# Patient Record
Sex: FEMALE | Race: OTHER | HISPANIC OR LATINO | URBAN - METROPOLITAN AREA
[De-identification: names, ages, dates, MRNs, and addresses within clinical notes are randomized per-mention and may not be internally consistent; named-entity substitution may affect disease eponyms.]

---

## 2017-02-09 ENCOUNTER — EMERGENCY (EMERGENCY)
Facility: HOSPITAL | Age: 62
LOS: 1 days | Discharge: HOME | End: 2017-02-09
Attending: EMERGENCY MEDICINE
Payer: MEDICAID

## 2017-02-09 VITALS
HEIGHT: 62 IN | HEART RATE: 75 BPM | OXYGEN SATURATION: 100 % | TEMPERATURE: 99 F | RESPIRATION RATE: 18 BRPM | WEIGHT: 130.07 LBS | DIASTOLIC BLOOD PRESSURE: 63 MMHG | SYSTOLIC BLOOD PRESSURE: 110 MMHG

## 2017-02-09 VITALS
HEART RATE: 66 BPM | DIASTOLIC BLOOD PRESSURE: 61 MMHG | OXYGEN SATURATION: 99 % | SYSTOLIC BLOOD PRESSURE: 106 MMHG | RESPIRATION RATE: 18 BRPM | TEMPERATURE: 99 F

## 2017-02-09 DIAGNOSIS — Z98.89 OTHER SPECIFIED POSTPROCEDURAL STATES: Chronic | ICD-10-CM
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
## 2017-02-09 DIAGNOSIS — S63.105A UNSPECIFIED DISLOCATION OF LEFT THUMB, INITIAL ENCOUNTER: ICD-10-CM
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
## 2017-02-09 DIAGNOSIS — Z93.1 GASTROSTOMY STATUS: Chronic | ICD-10-CM

## 2017-02-09 PROCEDURE — 99284 EMERGENCY DEPT VISIT MOD MDM: CPT

## 2017-02-09 PROCEDURE — 73130 X-RAY EXAM OF HAND: CPT | Mod: 26,LT

## 2017-02-09 PROCEDURE — 73130 X-RAY EXAM OF HAND: CPT

## 2017-02-09 PROCEDURE — 99283 EMERGENCY DEPT VISIT LOW MDM: CPT | Mod: 25

## 2017-02-09 NOTE — ED PROVIDER NOTE - NS ED MD SCRIBE ATTENDING SCRIBE SECTIONS
REVIEW OF SYSTEMS/DISPOSITION/PHYSICAL EXAM/HIV/HISTORY OF PRESENT ILLNESS/VITAL SIGNS( Pullset)/PAST MEDICAL/SURGICAL/SOCIAL HISTORY

## 2017-02-09 NOTE — ED PROVIDER NOTE - OBJECTIVE STATEMENT
60 y/o female presents to the ED c/o the ED for L thumb pain. Pt sent from nursing home, pt is bed bound, intubated, with feeding tube. ROS limited given pt's condition.

## 2017-02-09 NOTE — ED ADULT NURSE NOTE - OBJECTIVE STATEMENT
Patient came to the ED BIBA from the nursing home for left thumb fracture as per EMS. Patient left thumb has splint that was placed by RN in the nursing home. Awaiting for x-ray as ordered by Dr. Vanessa.

## 2017-02-09 NOTE — ED ADULT NURSE REASSESSMENT NOTE - NS ED NURSE REASSESS COMMENT FT1
PATIENT AWAKE, NON VERBAL, TRACHEOSTOMY IN PLACE, PATENT , ATTACHED TO O2. NO SOB NOTED, PATENT AIRWAY, LEFT THUMB  WITH SWELLING, NO REDNESS NOTED.

## 2017-02-09 NOTE — ED PROVIDER NOTE - ENMT, MLM
intubated. Airway patent, Nasal mucosa clear. Mouth with normal mucosa. Throat has no vesicles, no oropharyngeal exudates and uvula is midline.

## 2018-02-18 ENCOUNTER — INPATIENT (INPATIENT)
Facility: HOSPITAL | Age: 63
LOS: 3 days | Discharge: NURSING, REHAB OR LONG-TERM CARE | DRG: 394 | End: 2018-02-22
Attending: INTERNAL MEDICINE | Admitting: INTERNAL MEDICINE
Payer: MEDICAID

## 2018-02-18 VITALS
SYSTOLIC BLOOD PRESSURE: 135 MMHG | WEIGHT: 119.93 LBS | OXYGEN SATURATION: 100 % | HEIGHT: 62 IN | TEMPERATURE: 99 F | HEART RATE: 58 BPM | RESPIRATION RATE: 18 BRPM | DIASTOLIC BLOOD PRESSURE: 76 MMHG

## 2018-02-18 DIAGNOSIS — Z93.1 GASTROSTOMY STATUS: Chronic | ICD-10-CM

## 2018-02-18 DIAGNOSIS — Z98.89 OTHER SPECIFIED POSTPROCEDURAL STATES: Chronic | ICD-10-CM

## 2018-02-18 PROCEDURE — 71045 X-RAY EXAM CHEST 1 VIEW: CPT | Mod: 26

## 2018-02-18 RX ORDER — SODIUM CHLORIDE 9 MG/ML
1000 INJECTION INTRAMUSCULAR; INTRAVENOUS; SUBCUTANEOUS ONCE
Qty: 0 | Refills: 0 | Status: COMPLETED | OUTPATIENT
Start: 2018-02-18 | End: 2018-02-18

## 2018-02-18 RX ORDER — SODIUM CHLORIDE 9 MG/ML
1000 INJECTION INTRAMUSCULAR; INTRAVENOUS; SUBCUTANEOUS
Qty: 0 | Refills: 0 | Status: DISCONTINUED | OUTPATIENT
Start: 2018-02-18 | End: 2018-02-19

## 2018-02-18 RX ORDER — SODIUM CHLORIDE 9 MG/ML
3 INJECTION INTRAMUSCULAR; INTRAVENOUS; SUBCUTANEOUS EVERY 8 HOURS
Qty: 0 | Refills: 0 | Status: DISCONTINUED | OUTPATIENT
Start: 2018-02-18 | End: 2018-02-22

## 2018-02-18 RX ADMIN — SODIUM CHLORIDE 3000 MILLILITER(S): 9 INJECTION INTRAMUSCULAR; INTRAVENOUS; SUBCUTANEOUS at 23:45

## 2018-02-18 NOTE — ED PROVIDER NOTE - OBJECTIVE STATEMENT
63 yo F s/p trach transferred from [facility] for dislodged feeding tube and replacement. Pt's stoma is stenotic and unable to pass 16 Fr feeding tube after several attempts.

## 2018-02-18 NOTE — ED PROVIDER NOTE - MEDICAL DECISION MAKING DETAILS
MAR and Dr. Amor endorsed. Pt admitted for placement for feeding tube. I had a detailed discussion with the patient and/or guardian regarding the historical points, exam findings, and any diagnostic results supporting the admit diagnosis.

## 2018-02-18 NOTE — ED PROVIDER NOTE - PMH
Diabetes mellitus    Diabetic coma    Hypertension    No pertinent past medical history    Schizophrenia

## 2018-02-18 NOTE — ED ADULT NURSE NOTE - ED STAT RN HANDOFF DETAILS
pt.remained   stable. admitted  to  medicine  for feeding tube dysfunction unable to pass 16 Fr feeding tube after several attempts by . on humified air 28% via trach. no distress noted  at this time./

## 2018-02-19 DIAGNOSIS — Z43.1 ENCOUNTER FOR ATTENTION TO GASTROSTOMY: ICD-10-CM

## 2018-02-19 DIAGNOSIS — R56.9 UNSPECIFIED CONVULSIONS: ICD-10-CM

## 2018-02-19 DIAGNOSIS — F41.9 ANXIETY DISORDER, UNSPECIFIED: ICD-10-CM

## 2018-02-19 DIAGNOSIS — I82.409 ACUTE EMBOLISM AND THROMBOSIS OF UNSPECIFIED DEEP VEINS OF UNSPECIFIED LOWER EXTREMITY: ICD-10-CM

## 2018-02-19 DIAGNOSIS — Z29.9 ENCOUNTER FOR PROPHYLACTIC MEASURES, UNSPECIFIED: ICD-10-CM

## 2018-02-19 DIAGNOSIS — F31.9 BIPOLAR DISORDER, UNSPECIFIED: ICD-10-CM

## 2018-02-19 DIAGNOSIS — E11.9 TYPE 2 DIABETES MELLITUS WITHOUT COMPLICATIONS: ICD-10-CM

## 2018-02-19 DIAGNOSIS — J45.909 UNSPECIFIED ASTHMA, UNCOMPLICATED: ICD-10-CM

## 2018-02-19 DIAGNOSIS — T85.598A OTHER MECHANICAL COMPLICATION OF OTHER GASTROINTESTINAL PROSTHETIC DEVICES, IMPLANTS AND GRAFTS, INITIAL ENCOUNTER: ICD-10-CM

## 2018-02-19 DIAGNOSIS — I10 ESSENTIAL (PRIMARY) HYPERTENSION: ICD-10-CM

## 2018-02-19 LAB
ALBUMIN SERPL ELPH-MCNC: 3.4 G/DL — LOW (ref 3.5–5)
ALP SERPL-CCNC: 86 U/L — SIGNIFICANT CHANGE UP (ref 40–120)
ALT FLD-CCNC: 45 U/L DA — SIGNIFICANT CHANGE UP (ref 10–60)
ANION GAP SERPL CALC-SCNC: 5 MMOL/L — SIGNIFICANT CHANGE UP (ref 5–17)
ANION GAP SERPL CALC-SCNC: 7 MMOL/L — SIGNIFICANT CHANGE UP (ref 5–17)
APTT BLD: 42.2 SEC — HIGH (ref 27.5–37.4)
AST SERPL-CCNC: 26 U/L — SIGNIFICANT CHANGE UP (ref 10–40)
BASOPHILS # BLD AUTO: 0.1 K/UL — SIGNIFICANT CHANGE UP (ref 0–0.2)
BASOPHILS # BLD AUTO: 0.1 K/UL — SIGNIFICANT CHANGE UP (ref 0–0.2)
BASOPHILS NFR BLD AUTO: 0.7 % — SIGNIFICANT CHANGE UP (ref 0–2)
BASOPHILS NFR BLD AUTO: 0.7 % — SIGNIFICANT CHANGE UP (ref 0–2)
BILIRUB SERPL-MCNC: 0.8 MG/DL — SIGNIFICANT CHANGE UP (ref 0.2–1.2)
BUN SERPL-MCNC: 21 MG/DL — HIGH (ref 7–18)
BUN SERPL-MCNC: 24 MG/DL — HIGH (ref 7–18)
CALCIUM SERPL-MCNC: 9.6 MG/DL — SIGNIFICANT CHANGE UP (ref 8.4–10.5)
CALCIUM SERPL-MCNC: 9.7 MG/DL — SIGNIFICANT CHANGE UP (ref 8.4–10.5)
CHLORIDE SERPL-SCNC: 105 MMOL/L — SIGNIFICANT CHANGE UP (ref 96–108)
CHLORIDE SERPL-SCNC: 108 MMOL/L — SIGNIFICANT CHANGE UP (ref 96–108)
CHOLEST SERPL-MCNC: 136 MG/DL — SIGNIFICANT CHANGE UP (ref 10–199)
CO2 SERPL-SCNC: 26 MMOL/L — SIGNIFICANT CHANGE UP (ref 22–31)
CO2 SERPL-SCNC: 31 MMOL/L — SIGNIFICANT CHANGE UP (ref 22–31)
CREAT SERPL-MCNC: 0.71 MG/DL — SIGNIFICANT CHANGE UP (ref 0.5–1.3)
CREAT SERPL-MCNC: 0.73 MG/DL — SIGNIFICANT CHANGE UP (ref 0.5–1.3)
EOSINOPHIL # BLD AUTO: 0.1 K/UL — SIGNIFICANT CHANGE UP (ref 0–0.5)
EOSINOPHIL # BLD AUTO: 0.1 K/UL — SIGNIFICANT CHANGE UP (ref 0–0.5)
EOSINOPHIL NFR BLD AUTO: 0.7 % — SIGNIFICANT CHANGE UP (ref 0–6)
EOSINOPHIL NFR BLD AUTO: 0.8 % — SIGNIFICANT CHANGE UP (ref 0–6)
FOLATE SERPL-MCNC: >20 NG/ML — SIGNIFICANT CHANGE UP (ref 4.8–24.2)
GLUCOSE BLDC GLUCOMTR-MCNC: 122 MG/DL — HIGH (ref 70–99)
GLUCOSE BLDC GLUCOMTR-MCNC: 135 MG/DL — HIGH (ref 70–99)
GLUCOSE BLDC GLUCOMTR-MCNC: 136 MG/DL — HIGH (ref 70–99)
GLUCOSE BLDC GLUCOMTR-MCNC: 150 MG/DL — HIGH (ref 70–99)
GLUCOSE BLDC GLUCOMTR-MCNC: 157 MG/DL — HIGH (ref 70–99)
GLUCOSE BLDC GLUCOMTR-MCNC: 171 MG/DL — HIGH (ref 70–99)
GLUCOSE SERPL-MCNC: 136 MG/DL — HIGH (ref 70–99)
GLUCOSE SERPL-MCNC: 152 MG/DL — HIGH (ref 70–99)
HBA1C BLD-MCNC: 9.1 % — HIGH (ref 4–5.6)
HCT VFR BLD CALC: 43.6 % — SIGNIFICANT CHANGE UP (ref 34.5–45)
HCT VFR BLD CALC: 45.2 % — HIGH (ref 34.5–45)
HDLC SERPL-MCNC: 46 MG/DL — SIGNIFICANT CHANGE UP (ref 40–125)
HGB BLD-MCNC: 13.9 G/DL — SIGNIFICANT CHANGE UP (ref 11.5–15.5)
HGB BLD-MCNC: 13.9 G/DL — SIGNIFICANT CHANGE UP (ref 11.5–15.5)
INR BLD: 1.02 RATIO — SIGNIFICANT CHANGE UP (ref 0.88–1.16)
INR BLD: 1.02 RATIO — SIGNIFICANT CHANGE UP (ref 0.88–1.16)
LACTATE SERPL-SCNC: 2 MMOL/L — SIGNIFICANT CHANGE UP (ref 0.7–2)
LIPID PNL WITH DIRECT LDL SERPL: 63 MG/DL — SIGNIFICANT CHANGE UP
LYMPHOCYTES # BLD AUTO: 3.1 K/UL — SIGNIFICANT CHANGE UP (ref 1–3.3)
LYMPHOCYTES # BLD AUTO: 3.1 K/UL — SIGNIFICANT CHANGE UP (ref 1–3.3)
LYMPHOCYTES # BLD AUTO: 30.4 % — SIGNIFICANT CHANGE UP (ref 13–44)
LYMPHOCYTES # BLD AUTO: 33.4 % — SIGNIFICANT CHANGE UP (ref 13–44)
MAGNESIUM SERPL-MCNC: 2 MG/DL — SIGNIFICANT CHANGE UP (ref 1.6–2.6)
MCHC RBC-ENTMCNC: 25.2 PG — LOW (ref 27–34)
MCHC RBC-ENTMCNC: 25.6 PG — LOW (ref 27–34)
MCHC RBC-ENTMCNC: 30.8 GM/DL — LOW (ref 32–36)
MCHC RBC-ENTMCNC: 31.9 GM/DL — LOW (ref 32–36)
MCV RBC AUTO: 80.3 FL — SIGNIFICANT CHANGE UP (ref 80–100)
MCV RBC AUTO: 81.7 FL — SIGNIFICANT CHANGE UP (ref 80–100)
MONOCYTES # BLD AUTO: 0.5 K/UL — SIGNIFICANT CHANGE UP (ref 0–0.9)
MONOCYTES # BLD AUTO: 0.6 K/UL — SIGNIFICANT CHANGE UP (ref 0–0.9)
MONOCYTES NFR BLD AUTO: 5.8 % — SIGNIFICANT CHANGE UP (ref 2–14)
MONOCYTES NFR BLD AUTO: 6.2 % — SIGNIFICANT CHANGE UP (ref 2–14)
NEUTROPHILS # BLD AUTO: 5.4 K/UL — SIGNIFICANT CHANGE UP (ref 1.8–7.4)
NEUTROPHILS # BLD AUTO: 6.4 K/UL — SIGNIFICANT CHANGE UP (ref 1.8–7.4)
NEUTROPHILS NFR BLD AUTO: 59.4 % — SIGNIFICANT CHANGE UP (ref 43–77)
NEUTROPHILS NFR BLD AUTO: 61.8 % — SIGNIFICANT CHANGE UP (ref 43–77)
PHOSPHATE SERPL-MCNC: 3.8 MG/DL — SIGNIFICANT CHANGE UP (ref 2.5–4.5)
PLATELET # BLD AUTO: 144 K/UL — LOW (ref 150–400)
PLATELET # BLD AUTO: 163 K/UL — SIGNIFICANT CHANGE UP (ref 150–400)
POTASSIUM SERPL-MCNC: 4.1 MMOL/L — SIGNIFICANT CHANGE UP (ref 3.5–5.3)
POTASSIUM SERPL-MCNC: 4.6 MMOL/L — SIGNIFICANT CHANGE UP (ref 3.5–5.3)
POTASSIUM SERPL-SCNC: 4.1 MMOL/L — SIGNIFICANT CHANGE UP (ref 3.5–5.3)
POTASSIUM SERPL-SCNC: 4.6 MMOL/L — SIGNIFICANT CHANGE UP (ref 3.5–5.3)
PROT SERPL-MCNC: 7.4 G/DL — SIGNIFICANT CHANGE UP (ref 6–8.3)
PROTHROM AB SERPL-ACNC: 11.1 SEC — SIGNIFICANT CHANGE UP (ref 9.8–12.7)
PROTHROM AB SERPL-ACNC: 11.1 SEC — SIGNIFICANT CHANGE UP (ref 9.8–12.7)
RBC # BLD: 5.43 M/UL — HIGH (ref 3.8–5.2)
RBC # BLD: 5.53 M/UL — HIGH (ref 3.8–5.2)
RBC # FLD: 13.4 % — SIGNIFICANT CHANGE UP (ref 10.3–14.5)
RBC # FLD: 13.7 % — SIGNIFICANT CHANGE UP (ref 10.3–14.5)
SODIUM SERPL-SCNC: 141 MMOL/L — SIGNIFICANT CHANGE UP (ref 135–145)
SODIUM SERPL-SCNC: 141 MMOL/L — SIGNIFICANT CHANGE UP (ref 135–145)
TOTAL CHOLESTEROL/HDL RATIO MEASUREMENT: 3 RATIO — LOW (ref 3.3–7.1)
TRIGL SERPL-MCNC: 135 MG/DL — SIGNIFICANT CHANGE UP (ref 10–149)
TSH SERPL-MCNC: 1.71 UU/ML — SIGNIFICANT CHANGE UP (ref 0.34–4.82)
VIT B12 SERPL-MCNC: 1513 PG/ML — HIGH (ref 232–1245)
WBC # BLD: 10.3 K/UL — SIGNIFICANT CHANGE UP (ref 3.8–10.5)
WBC # BLD: 9.2 K/UL — SIGNIFICANT CHANGE UP (ref 3.8–10.5)
WBC # FLD AUTO: 10.3 K/UL — SIGNIFICANT CHANGE UP (ref 3.8–10.5)
WBC # FLD AUTO: 9.2 K/UL — SIGNIFICANT CHANGE UP (ref 3.8–10.5)

## 2018-02-19 PROCEDURE — 93010 ELECTROCARDIOGRAM REPORT: CPT

## 2018-02-19 PROCEDURE — 99285 EMERGENCY DEPT VISIT HI MDM: CPT | Mod: 25

## 2018-02-19 PROCEDURE — 99255 IP/OBS CONSLTJ NEW/EST HI 80: CPT

## 2018-02-19 RX ORDER — INSULIN GLARGINE 100 [IU]/ML
20 INJECTION, SOLUTION SUBCUTANEOUS AT BEDTIME
Qty: 0 | Refills: 0 | Status: DISCONTINUED | OUTPATIENT
Start: 2018-02-19 | End: 2018-02-22

## 2018-02-19 RX ORDER — SODIUM CHLORIDE 9 MG/ML
1000 INJECTION INTRAMUSCULAR; INTRAVENOUS; SUBCUTANEOUS
Qty: 0 | Refills: 0 | Status: DISCONTINUED | OUTPATIENT
Start: 2018-02-19 | End: 2018-02-21

## 2018-02-19 RX ORDER — ENOXAPARIN SODIUM 100 MG/ML
40 INJECTION SUBCUTANEOUS
Qty: 0 | Refills: 0 | Status: DISCONTINUED | OUTPATIENT
Start: 2018-02-19 | End: 2018-02-22

## 2018-02-19 RX ORDER — BENZTROPINE MESYLATE 1 MG
0.5 TABLET ORAL EVERY 12 HOURS
Qty: 0 | Refills: 0 | Status: COMPLETED | OUTPATIENT
Start: 2018-02-19 | End: 2018-02-21

## 2018-02-19 RX ORDER — SODIUM CHLORIDE 9 MG/ML
1000 INJECTION, SOLUTION INTRAVENOUS
Qty: 0 | Refills: 0 | Status: DISCONTINUED | OUTPATIENT
Start: 2018-02-19 | End: 2018-02-22

## 2018-02-19 RX ORDER — ALBUTEROL 90 UG/1
1 AEROSOL, METERED ORAL EVERY 4 HOURS
Qty: 0 | Refills: 0 | Status: DISCONTINUED | OUTPATIENT
Start: 2018-02-19 | End: 2018-02-22

## 2018-02-19 RX ORDER — INSULIN HUMAN 100 [IU]/ML
INJECTION, SOLUTION SUBCUTANEOUS EVERY 6 HOURS
Qty: 0 | Refills: 0 | Status: DISCONTINUED | OUTPATIENT
Start: 2018-02-19 | End: 2018-02-22

## 2018-02-19 RX ORDER — METOPROLOL TARTRATE 50 MG
2.5 TABLET ORAL EVERY 12 HOURS
Qty: 0 | Refills: 0 | Status: DISCONTINUED | OUTPATIENT
Start: 2018-02-19 | End: 2018-02-22

## 2018-02-19 RX ORDER — ALBUTEROL 90 UG/1
2 AEROSOL, METERED ORAL EVERY 6 HOURS
Qty: 0 | Refills: 0 | Status: DISCONTINUED | OUTPATIENT
Start: 2018-02-19 | End: 2018-02-19

## 2018-02-19 RX ORDER — FAMOTIDINE 10 MG/ML
20 INJECTION INTRAVENOUS ONCE
Qty: 0 | Refills: 0 | Status: COMPLETED | OUTPATIENT
Start: 2018-02-19 | End: 2018-02-19

## 2018-02-19 RX ORDER — HALOPERIDOL DECANOATE 100 MG/ML
0.5 INJECTION INTRAMUSCULAR
Qty: 0 | Refills: 0 | Status: DISCONTINUED | OUTPATIENT
Start: 2018-02-19 | End: 2018-02-19

## 2018-02-19 RX ORDER — IPRATROPIUM/ALBUTEROL SULFATE 18-103MCG
3 AEROSOL WITH ADAPTER (GRAM) INHALATION EVERY 6 HOURS
Qty: 0 | Refills: 0 | Status: DISCONTINUED | OUTPATIENT
Start: 2018-02-19 | End: 2018-02-22

## 2018-02-19 RX ORDER — LEVETIRACETAM 250 MG/1
10 TABLET, FILM COATED ORAL
Qty: 0 | Refills: 0 | COMMUNITY

## 2018-02-19 RX ORDER — TIOTROPIUM BROMIDE 18 UG/1
1 CAPSULE ORAL; RESPIRATORY (INHALATION) DAILY
Qty: 0 | Refills: 0 | Status: DISCONTINUED | OUTPATIENT
Start: 2018-02-19 | End: 2018-02-22

## 2018-02-19 RX ORDER — DIPHENHYDRAMINE HCL 50 MG
25 CAPSULE ORAL ONCE
Qty: 0 | Refills: 0 | Status: COMPLETED | OUTPATIENT
Start: 2018-02-19 | End: 2018-02-19

## 2018-02-19 RX ORDER — BACITRACIN ZINC 500 UNIT/G
1 OINTMENT IN PACKET (EA) TOPICAL DAILY
Qty: 0 | Refills: 0 | Status: DISCONTINUED | OUTPATIENT
Start: 2018-02-19 | End: 2018-02-22

## 2018-02-19 RX ORDER — LEVETIRACETAM 250 MG/1
1000 TABLET, FILM COATED ORAL EVERY 12 HOURS
Qty: 0 | Refills: 0 | Status: DISCONTINUED | OUTPATIENT
Start: 2018-02-19 | End: 2018-02-22

## 2018-02-19 RX ADMIN — ALBUTEROL 2 PUFF(S): 90 AEROSOL, METERED ORAL at 06:36

## 2018-02-19 RX ADMIN — Medication 25 MILLIGRAM(S): at 09:03

## 2018-02-19 RX ADMIN — SODIUM CHLORIDE 3 MILLILITER(S): 9 INJECTION INTRAMUSCULAR; INTRAVENOUS; SUBCUTANEOUS at 06:13

## 2018-02-19 RX ADMIN — Medication 0.5 MILLIGRAM(S): at 06:11

## 2018-02-19 RX ADMIN — HALOPERIDOL DECANOATE 0.5 MILLIGRAM(S): 100 INJECTION INTRAMUSCULAR at 06:13

## 2018-02-19 RX ADMIN — LEVETIRACETAM 400 MILLIGRAM(S): 250 TABLET, FILM COATED ORAL at 06:11

## 2018-02-19 RX ADMIN — Medication 1 APPLICATION(S): at 11:34

## 2018-02-19 RX ADMIN — TIOTROPIUM BROMIDE 1 CAPSULE(S): 18 CAPSULE ORAL; RESPIRATORY (INHALATION) at 12:25

## 2018-02-19 RX ADMIN — SODIUM CHLORIDE 3 MILLILITER(S): 9 INJECTION INTRAMUSCULAR; INTRAVENOUS; SUBCUTANEOUS at 21:36

## 2018-02-19 RX ADMIN — ENOXAPARIN SODIUM 40 MILLIGRAM(S): 100 INJECTION SUBCUTANEOUS at 06:13

## 2018-02-19 RX ADMIN — INSULIN GLARGINE 20 UNIT(S): 100 INJECTION, SOLUTION SUBCUTANEOUS at 21:34

## 2018-02-19 RX ADMIN — Medication 2.5 MILLIGRAM(S): at 06:12

## 2018-02-19 RX ADMIN — Medication 2.5 MILLIGRAM(S): at 18:28

## 2018-02-19 RX ADMIN — FAMOTIDINE 20 MILLIGRAM(S): 10 INJECTION INTRAVENOUS at 11:32

## 2018-02-19 RX ADMIN — ENOXAPARIN SODIUM 40 MILLIGRAM(S): 100 INJECTION SUBCUTANEOUS at 17:40

## 2018-02-19 RX ADMIN — LEVETIRACETAM 400 MILLIGRAM(S): 250 TABLET, FILM COATED ORAL at 18:28

## 2018-02-19 RX ADMIN — SODIUM CHLORIDE 60 MILLILITER(S): 9 INJECTION, SOLUTION INTRAVENOUS at 11:53

## 2018-02-19 RX ADMIN — SODIUM CHLORIDE 150 MILLILITER(S): 9 INJECTION INTRAMUSCULAR; INTRAVENOUS; SUBCUTANEOUS at 00:46

## 2018-02-19 RX ADMIN — Medication 3 MILLILITER(S): at 20:36

## 2018-02-19 RX ADMIN — ALBUTEROL 2 PUFF(S): 90 AEROSOL, METERED ORAL at 11:34

## 2018-02-19 RX ADMIN — SODIUM CHLORIDE 60 MILLILITER(S): 9 INJECTION, SOLUTION INTRAVENOUS at 21:35

## 2018-02-19 RX ADMIN — Medication 0.5 MILLIGRAM(S): at 11:52

## 2018-02-19 RX ADMIN — Medication 0.5 MILLIGRAM(S): at 15:24

## 2018-02-19 RX ADMIN — Medication 0.5 MILLIGRAM(S): at 21:36

## 2018-02-19 RX ADMIN — Medication 0.5 MILLIGRAM(S): at 18:28

## 2018-02-19 RX ADMIN — SODIUM CHLORIDE 3 MILLILITER(S): 9 INJECTION INTRAMUSCULAR; INTRAVENOUS; SUBCUTANEOUS at 16:18

## 2018-02-19 RX ADMIN — SODIUM CHLORIDE 75 MILLILITER(S): 9 INJECTION INTRAMUSCULAR; INTRAVENOUS; SUBCUTANEOUS at 01:35

## 2018-02-19 RX ADMIN — Medication 2 MILLIGRAM(S): at 09:04

## 2018-02-19 NOTE — CONSULT NOTE ADULT - RS GEN PE MLT RESP DETAILS PC
good air movement/respirations non-labored/no wheezes/clear to auscultation bilaterally/airway patent/breath sounds equal

## 2018-02-19 NOTE — H&P ADULT - PROBLEM SELECTOR PLAN 7
C/w bronchodilator PRN  Not in exacerbation C/w bronchodilator PRN  Not in exacerbation  On trach 40% humidified O2

## 2018-02-19 NOTE — H&P ADULT - RS GEN PE MLT RESP DETAILS PC
breath sounds equal/clear to auscultation bilaterally/respirations non-labored/good air movement/airway patent/no chest wall tenderness/no rhonchi/no wheezes/no subcutaneous emphysema/no rales/no intercostal retractions

## 2018-02-19 NOTE — H&P ADULT - PROBLEM SELECTOR PLAN 9
Imporve VTE score is 5 ( previous DVT, immobilization, age) c/w lovenox  No need for GI ppx Improve VTE score is 5 ( previous DVT, immobilization, age) c/w Lovenox  No need for GI ppx

## 2018-02-19 NOTE — CONSULT NOTE ADULT - ASSESSMENT
1) Recurrent seizure? decreased threshold or non-absorption from clogged PEG
1. Dysphagia  2. Dislodges Peg tube (Peg site closed)     Suggestions:    1. Patient needs PEG tube replacement by endoscopy  2. Consider NGT feedingmeawhile  3. Aspiration precaution  4. DVT prophylaxis

## 2018-02-19 NOTE — H&P ADULT - ATTENDING COMMENTS
61 Y/O F,. non-verbal, bed bound, Trach, PEG tube, from NH, PMhx of HTN, asthma, convulsion disorder, bipolar, DVT legs, DM, sent from NH due to dislodgement of PEG tube and for PEG tube insertion. Unable to get any history from patient, all source of info is NH papers. No record of fever, SOB there. Patient was started on Tamiflu 75 mg daily and supposed to continue upto 2/20/18. In the Ed initial vitals, labs stable, got 1 L bolus, admitted for placement of PEG tube.     pt seen in bed, vitals stable, physical exam reveals tarditive diskinesia, trach colar with oxygen via venti mask, lungs cta b/l, heart s1s2, abd soft nd nt bs+, ext no edema. labs and diagnostic test result reviewed.    assessment  --  dislodged peg, h/o HTN, asthma, convulsion disorder, bipolar, DVT legs, DM,     plan  --  admit to med, cont preadmit home meds, gi and dvt profilaxis,  cbc, bmp, mg, phos, lipids, tsh, bld cx, ua, ucx,      gi cons for peg replacement  neuro cons  psych cons for med mgmt

## 2018-02-19 NOTE — H&P ADULT - PROBLEM SELECTOR PLAN 8
Patient is on lovenox 40 BID for DVT , c/w same dose Patient is on Lovenox 40 BID for DVT , c/w same dose

## 2018-02-19 NOTE — CONSULT NOTE ADULT - ATTENDING COMMENTS
62-year-old woman who has contracted both arms and legs, trach and PEG tube brought in to ED with symptoms of clogged PEG tube. I was wondering if her anti-seizure medicine may have not been well absorb by malfunctioning PEG. So, for the time being use IV Keppra 1000 mg BID as per dose. EEG can be ordered to make sure she does not have subclinical seizures.

## 2018-02-19 NOTE — CONSULT NOTE ADULT - SUBJECTIVE AND OBJECTIVE BOX
History of Present Illness:    HPI:  63 Y/O F,. non-verbal, bed bound, Trach, PEG tube, from NH, PMhx of HTN, asthma, convulsion disorder, bipolar, DVT legs, DM, sent from NH due to dislodgement of PEG tube and for PEG tube insertion. Unable to get any history from patient, all source of info is NH papers. No record of fever, SOB there. Patient was started on Tamiflu 75 mg daily and supposed to continue upto 2/20/18. In the Ed initial vitals, labs stable, got 1 L bolus, admitted for placement of PEG tube.     Later on the floor: Pt is lethargic, non-verbal. As per the chart and requesting resident physician's note: pt has h/o seizure disorder and has been admitted with clogging of her PEG tube. She exhibited mouth automatism and TD. She was given ativan and while I saw her on the floor she was sleeping. no facial/mouth automatism or jerking anywhere on her body.    Allergies    angiotensin converting enzyme inhibitors (Unknown)    Intolerances    PAST MEDICAL & SURGICAL HISTORY:  No pertinent past medical history  Hypertension  Schizophrenia  Diabetic coma  Diabetes mellitus  No significant past surgical history  S/P percutaneous endoscopic gastrostomy (PEG) tube placement  H/O tracheostomy    Social History: [ ] Tobacco use, [ ] Alcohol use.  none    MEDICATIONS  (STANDING):  ALBUTerol    90 MICROgram(s) HFA Inhaler 2 Puff(s) Inhalation every 6 hours  BACItracin   Ointment 1 Application(s) Topical daily  benztropine Injectable 0.5 milliGRAM(s) IV Push every 12 hours  dextrose 5% + sodium chloride 0.9%. 1000 milliLiter(s) (60 mL/Hr) IV Continuous <Continuous>  enoxaparin Injectable 40 milliGRAM(s) SubCutaneous two times a day  insulin glargine Injectable (LANTUS) 20 Unit(s) SubCutaneous at bedtime  insulin regular  human corrective regimen sliding scale   SubCutaneous every 6 hours  levETIRAcetam  IVPB 1000 milliGRAM(s) IV Intermittent every 12 hours  LORazepam   Injectable 0.5 milliGRAM(s) IV Push every 8 hours  metoprolol    tartrate Injectable 2.5 milliGRAM(s) IV Push every 12 hours  sodium chloride 0.9% lock flush 3 milliLiter(s) IV Push every 8 hours  sodium chloride 0.9%. 1000 milliLiter(s) (75 mL/Hr) IV Continuous <Continuous>  tiotropium 18 MICROgram(s) Capsule 1 Capsule(s) Inhalation daily      Family:  FAMILY HISTORY:    Review of Systems:  Pt is non-verbal-unable to obtain.    Vital Signs:    T(C): 37.2 (02-19-18 @ 14:41), Max: 37.3 (02-18-18 @ 21:44)  HR: 63 (02-19-18 @ 14:41) (58 - 77)  BP: 126/82 (02-19-18 @ 14:41) (116/66 - 147/99)  RR: 20 (02-19-18 @ 14:41) (18 - 20)  SpO2: 100% (02-19-18 @ 14:41) (99% - 100%)    Physical Exam:  General:  General Appearance - Well groomed, Not sickly   Build and nutrition - Well nourished and Well developed      Head and Neck:  Head - normocephalic, atraumatic with no lesions or palpable masses    Chest and Lung exam:  Quiet, even and easy respiratory effort with no use of accessory muscles and on ausculation, normal breath sounds, no adventitious sounds and normal vocal resonance    Cardiovascular:  Auscultation: Normal heart sounds  Murmurs & Other heart sounds: Auscultation of the heart reveals- no murmurs  Carotid arteris: No Carotid bruits    Abdomen:  Palpation/Percussion: Non Tender, No Rebound tenderness, No hepatosplenomegaly, and No palpable abdominal masses    Peripheral Vascular:  Lower extremity: Inspection - Bilateral - No varicose veins  Palpation: Tenderness - bilateral - Non tender  Dorsalis pedis pulse - bilateral - normal  Edema - bilateral - no edema    Neurologic Exam:  Mental Status - sleeping, non-verbal  Cranial Nerves: Limited examination  Pupils: reacting  Face looks symmetrical.     other cranial nerves are difficult to examine.    Motor:  Bulk and Contour: Normal  Tone: increased- contracted  Strength:                                                              General Assessment of Reflexes: Right Wrist - 2+ ;  Left Wrist - 2+ ; Right Elbow - 2+ ;  Left Elbow - 1 ;  Right Knee - 1 ;  Left Knee -1  ;   Right Ankle – 0 ; Left Ankle – 0  Plantar Reflexes(Babinski) (L4-S2) – Bilateral – Flexion  Sensory:

## 2018-02-19 NOTE — H&P ADULT - GASTROINTESTINAL DETAILS
no rebound tenderness/no masses palpable/no rigidity/no organomegaly/bowel sounds normal/no guarding/no bruit/soft/nontender/no distention

## 2018-02-19 NOTE — H&P ADULT - PROBLEM SELECTOR PLAN 5
reduce the lantus dose to 20 Units at bedtime ( home dose is 30 Units)  f/up HBa1c reduce the Lantus dose to 20 Units at bedtime ( home dose is 30 Units)  f/up HBa1c

## 2018-02-19 NOTE — CONSULT NOTE ADULT - SUBJECTIVE AND OBJECTIVE BOX
[  ] STAT REQUEST              [  ]ROUTINE REQUEST    Patient is a 62y old  Female who presents with a chief complaint of sent from NH due to dislodged PEG tube. GI consulted to evaluate.      HPI:  62 year old female from NH brought to hospital with dilodged PEG tube for reinsertion. No abdominal pain, nausea, vomiting, abdominal pain, hematemesis, hematochezia, melena, fever, chills, chest pain, SOB, diarrhea reported.          PAIN MANAGEMENT:  Pain Scale:                0 /10  Pain Location:      Prior Colonoscopy: Unknown    PAST MEDICAL HISTORY  DVT  Hypertension  Schizophrenia  Diabetic coma  Diabetes mellitus  Bipolar  Dysphagia      PAST SURGICAL HISTORY  S/P percutaneous endoscopic gastrostomy (PEG) tube placement  H/O tracheostomy      Allergies    angiotensin converting enzyme inhibitors (Unknown)          MEDICATIONS  (STANDING):  ALBUTerol    90 MICROgram(s) HFA Inhaler 2 Puff(s) Inhalation every 6 hours  BACItracin   Ointment 1 Application(s) Topical daily  benztropine Injectable 0.5 milliGRAM(s) IV Push every 12 hours  dextrose 5% + sodium chloride 0.9%. 1000 milliLiter(s) (60 mL/Hr) IV Continuous <Continuous>  enoxaparin Injectable 40 milliGRAM(s) SubCutaneous two times a day  insulin glargine Injectable (LANTUS) 20 Unit(s) SubCutaneous at bedtime  insulin regular  human corrective regimen sliding scale   SubCutaneous every 6 hours  levETIRAcetam  IVPB 1000 milliGRAM(s) IV Intermittent every 12 hours  LORazepam   Injectable 0.5 milliGRAM(s) IV Push every 8 hours  metoprolol    tartrate Injectable 2.5 milliGRAM(s) IV Push every 12 hours  sodium chloride 0.9% lock flush 3 milliLiter(s) IV Push every 8 hours  sodium chloride 0.9%. 1000 milliLiter(s) (75 mL/Hr) IV Continuous <Continuous>  tiotropium 18 MICROgram(s) Capsule 1 Capsule(s) Inhalation daily    MEDICATIONS  (PRN):  artificial  tears Solution 1 Drop(s) Both EYES three times a day PRN Dry Eyes      SOCIAL HISTORY  Advanced Directives:       [ X ] Full Code       [  ] DNR  Marital Status:         [  ] M      [ XX ] S      [  ] D       [  ] W  Children:       [ X ] Yes      [  ] No  Occupation:        [  ] Employed       [ X ] Unemployed       [  ] Retired  Diet:       [  ] Regular       [ X ] PEG feeding          [  ] NG tube feeding  Drug Use:           [ X ] No          [  ] Yes  Alcohol:           [ X ] No             [  ] Yes (socially)         [  ] Yes (chronic)  Tobacco:           [  ] Yes           [ X ] No        FAMILY HISTORY  [  ] Heart Disease            [ X ] Diabetes(mother)             [  ] HTN             [  ] Colon Cancer             [  ] Stomach Cancer              [  ] Pancreatic Cancer      VITAL SIGNS   Vital Signs Last 24 Hrs  T(C): 37.3 (19 Feb 2018 10:00), Max: 37.3 (18 Feb 2018 21:44)  T(F): 99.1 (19 Feb 2018 10:00), Max: 99.2 (18 Feb 2018 21:44)  HR: 77 (19 Feb 2018 10:00) (58 - 77)  BP: 124/74 (19 Feb 2018 10:00) (116/66 - 147/99)   RR: 20 (19 Feb 2018 10:00) (18 - 20)  SpO2: 100% (19 Feb 2018 10:00) (99% - 100%)  Daily Height in cm: 157.48 (18 Feb 2018 21:44)             CBC Full  -  ( 19 Feb 2018 06:17 )  WBC Count : 9.2 K/uL  Hemoglobin : 13.9 g/dL  Hematocrit : 45.2 %  Platelet Count - Automated : 144 K/uL  Mean Cell Volume : 81.7 fl  Mean Cell Hemoglobin : 25.2 pg  Mean Cell Hemoglobin Concentration : 30.8 gm/dL  Auto Neutrophil # : 5.4 K/uL  Auto Lymphocyte # : 3.1 K/uL  Auto Monocyte # : 0.5 K/uL  Auto Eosinophil # : 0.1 K/uL  Auto Basophil # : 0.1 K/uL  Auto Neutrophil % : 59.4 %  Auto Lymphocyte % : 33.4 %  Auto Monocyte % : 5.8 %  Auto Eosinophil % : 0.7 %  Auto Basophil % : 0.7 %      02-19    141  |  108  |  21<H>  ----------------------------<  152<H>  4.6   |  26  |  0.73    Ca    9.7      19 Feb 2018 06:17  Phos  3.8     02-19  Mg     2.0     02-19    TPro  7.4  /  Alb  3.4<L>  /  TBili  0.8  /  DBili  x   /  AST  26  /  ALT  45  /  AlkPhos  86  02-19    PT/INR - ( 19 Feb 2018 06:17 )   PT: 11.1 sec;   INR: 1.02 ratio       PTT - ( 18 Feb 2018 23:56 )  PTT:42.2 sec    Urinalysis (07.23.16 @ 09:05)    Color: YELLOW    Urine Appearance: HAZY    Glucose: >1000    Bilirubin: NEGATIVE    Ketone - Urine: MODERATE    Specific Gravity: 1.024    Blood: TRACE    pH - Urine: 6.5    Protein, Urine: 30    Urobilinogen: NORMAL E.U.    Nitrite: NEGATIVE    Leukocyte Esterase Concentration: LARGE    Red Blood Cell - Urine: 3-5    White Blood Cell - Urine: >50    Mucus: MANY    White Blood Cell Clump: PRESENT    Bacteria: MANY        Ventricular Rate 69 BPM    Atrial Rate 69 BPM    P-R Interval 142 ms    QRS Duration 72 ms    Q-T Interval 408 ms    QTC Calculation(Bezet) 437 ms    P Axis 64 degrees    R Axis 1 degrees    T Axis 66 degrees    Diagnosis Line Normal sinus rhythm  Normal ECG      RADIOLOGY/IMAGING                EXAM:  CT ABD AND PELV W CON        PROCEDURE DATE:  Jul 23 2016     INTERPRETATION:  CLINICAL INFORMATION: 51-year-old female status post   tracheostomy and PEG. Abdominal pain.    COMPARISON: None.    PROCEDURE:   Helical CT of the abdomen and pelvis was performed with intravenous and   enteric contrast. 90 cc of Omnipaque 350 intravenous contrast were   administered, and 10 cc were discarded. Examination was performed with   the patient sedated without breath-holding.    FINDINGS:    LOWER CHEST: Bibasilar subsegmental atelectasis.    LIVER: Within normal limits.  BILE DUCTS: Normal caliber.  GALLBLADDER: Within normal limits.  SPLEEN: Within normal limits.  PANCREAS: Within normal limits.  ADRENALS: Within normal limits.  KIDNEYS/URETERS: Within normal limits.    BLADDER: Within normal limits.  REPRODUCTIVE ORGANS: Normal size uterus with submucosal leiomyoma.   Endometrial thickening to 2.3 cm. Gastrostomy tube in place.    BOWEL: No bowel obstruction. Appendix normal.  PERITONEUM:No ascites.  VESSELS:  Within normal limits.  RETROPERITONEUM: No lymphadenopathy.    ABDOMINAL WALL: Within normal limits.  BONES: Within normal limits.    IMPRESSION: Cause of abdominal pain not identified.  Endometrial thickening with differentialdiagnosis of polyp hyperplasia   or neoplasm.      EXAM:  XR CHEST PORTABLE IMMED 1V                            PROCEDURE DATE:  02/18/2018          INTERPRETATION:  Exam Date: 2/18/2018 11:36 PM    History: Shortness of breath    Technique: Single frontal portable view of the chest with comparisonto    7/28/2016    Findings:    Tracheostomy visualized. Elevation of the right hemidiaphragm with right   basilar subsegmental atelectasis versus pneumonia. Left lung is grossly   clear. The apices are unremarkable. Degenerative changes of the   visualized osseous structures.        Impression:    Elevation of the right hemidiaphragm with right basilar subsegmental   atelectasis versus pneumonia.

## 2018-02-20 DIAGNOSIS — G24.01 DRUG INDUCED SUBACUTE DYSKINESIA: ICD-10-CM

## 2018-02-20 DIAGNOSIS — K94.23 GASTROSTOMY MALFUNCTION: ICD-10-CM

## 2018-02-20 LAB
GLUCOSE BLDC GLUCOMTR-MCNC: 135 MG/DL — HIGH (ref 70–99)
GLUCOSE BLDC GLUCOMTR-MCNC: 138 MG/DL — HIGH (ref 70–99)
GLUCOSE BLDC GLUCOMTR-MCNC: 142 MG/DL — HIGH (ref 70–99)
GLUCOSE BLDC GLUCOMTR-MCNC: 146 MG/DL — HIGH (ref 70–99)
GLUCOSE BLDC GLUCOMTR-MCNC: 153 MG/DL — HIGH (ref 70–99)
GLUCOSE BLDC GLUCOMTR-MCNC: 158 MG/DL — HIGH (ref 70–99)

## 2018-02-20 RX ADMIN — ENOXAPARIN SODIUM 40 MILLIGRAM(S): 100 INJECTION SUBCUTANEOUS at 05:45

## 2018-02-20 RX ADMIN — Medication 1 APPLICATION(S): at 12:12

## 2018-02-20 RX ADMIN — Medication 2.5 MILLIGRAM(S): at 05:44

## 2018-02-20 RX ADMIN — INSULIN GLARGINE 20 UNIT(S): 100 INJECTION, SOLUTION SUBCUTANEOUS at 21:55

## 2018-02-20 RX ADMIN — LEVETIRACETAM 400 MILLIGRAM(S): 250 TABLET, FILM COATED ORAL at 17:22

## 2018-02-20 RX ADMIN — Medication 0.5 MILLIGRAM(S): at 17:21

## 2018-02-20 RX ADMIN — Medication 1 DROP(S): at 21:55

## 2018-02-20 RX ADMIN — SODIUM CHLORIDE 60 MILLILITER(S): 9 INJECTION, SOLUTION INTRAVENOUS at 13:11

## 2018-02-20 RX ADMIN — LEVETIRACETAM 400 MILLIGRAM(S): 250 TABLET, FILM COATED ORAL at 05:45

## 2018-02-20 RX ADMIN — Medication 3 MILLILITER(S): at 02:03

## 2018-02-20 RX ADMIN — Medication 0.5 MILLIGRAM(S): at 05:45

## 2018-02-20 RX ADMIN — INSULIN HUMAN 1: 100 INJECTION, SOLUTION SUBCUTANEOUS at 12:12

## 2018-02-20 RX ADMIN — Medication 0.5 MILLIGRAM(S): at 05:44

## 2018-02-20 RX ADMIN — INSULIN HUMAN 1: 100 INJECTION, SOLUTION SUBCUTANEOUS at 06:55

## 2018-02-20 RX ADMIN — ENOXAPARIN SODIUM 40 MILLIGRAM(S): 100 INJECTION SUBCUTANEOUS at 17:21

## 2018-02-20 RX ADMIN — Medication 3 MILLILITER(S): at 09:06

## 2018-02-20 RX ADMIN — SODIUM CHLORIDE 3 MILLILITER(S): 9 INJECTION INTRAMUSCULAR; INTRAVENOUS; SUBCUTANEOUS at 13:11

## 2018-02-20 RX ADMIN — SODIUM CHLORIDE 3 MILLILITER(S): 9 INJECTION INTRAMUSCULAR; INTRAVENOUS; SUBCUTANEOUS at 21:56

## 2018-02-20 RX ADMIN — Medication 3 MILLILITER(S): at 20:27

## 2018-02-20 RX ADMIN — Medication 0.5 MILLIGRAM(S): at 21:55

## 2018-02-20 RX ADMIN — Medication 0.5 MILLIGRAM(S): at 13:11

## 2018-02-20 RX ADMIN — SODIUM CHLORIDE 60 MILLILITER(S): 9 INJECTION, SOLUTION INTRAVENOUS at 05:45

## 2018-02-20 RX ADMIN — Medication 2.5 MILLIGRAM(S): at 17:21

## 2018-02-20 RX ADMIN — SODIUM CHLORIDE 3 MILLILITER(S): 9 INJECTION INTRAMUSCULAR; INTRAVENOUS; SUBCUTANEOUS at 05:52

## 2018-02-20 RX ADMIN — Medication 3 MILLILITER(S): at 14:00

## 2018-02-20 NOTE — PROGRESS NOTE ADULT - SUBJECTIVE AND OBJECTIVE BOX
HPI:  61 Y/O F,. non-verbal, bed bound, Trach, PEG tube, from NH, PMhx of HTN, asthma, diabetic coma, convulsion disorder, bipolar, DVT legs, DM, sent from NH due to dislodgement of PEG tube and for PEG tube insertion.     Interval history:  Patient has lip smacking and extrapyramidal facial movements. Patient was started on ativan prn. She is afebrile and normotensive.    ROS: Patient has no resp distress, abdomen distension, diarrhea, hematochezia, vomiting, fall, trauma.    Vital:  T(C): 37.1 (02-20-18 @ 05:20), Max: 37.2 (02-19-18 @ 14:41)  HR: 57 (02-20-18 @ 06:07) (57 - 66)  BP: 131/65 (02-20-18 @ 06:07) (95/69 - 131/65)  RR: 20 (02-20-18 @ 06:07) (20 - 22)  SpO2: 100% (02-20-18 @ 06:07) (99% - 100%)      REVIEW OF SYSTEMS: denies fever, chills, SOB, palpitations, chest pain, abdominal pain, nausea, vomitting, diarrhea, constipation, dizziness    MEDICATIONS  (STANDING):  ALBUTerol    90 MICROgram(s) HFA Inhaler 1 Puff(s) Inhalation every 4 hours  ALBUTerol/ipratropium for Nebulization 3 milliLiter(s) Nebulizer every 6 hours  BACItracin   Ointment 1 Application(s) Topical daily  benztropine Injectable 0.5 milliGRAM(s) IV Push every 12 hours  dextrose 5% + sodium chloride 0.9%. 1000 milliLiter(s) (60 mL/Hr) IV Continuous <Continuous>  enoxaparin Injectable 40 milliGRAM(s) SubCutaneous two times a day  insulin glargine Injectable (LANTUS) 20 Unit(s) SubCutaneous at bedtime  insulin regular  human corrective regimen sliding scale   SubCutaneous every 6 hours  levETIRAcetam  IVPB 1000 milliGRAM(s) IV Intermittent every 12 hours  LORazepam   Injectable 0.5 milliGRAM(s) IV Push every 8 hours  metoprolol    tartrate Injectable 2.5 milliGRAM(s) IV Push every 12 hours  sodium chloride 0.9% lock flush 3 milliLiter(s) IV Push every 8 hours  sodium chloride 0.9%. 1000 milliLiter(s) (75 mL/Hr) IV Continuous <Continuous>  tiotropium 18 MICROgram(s) Capsule 1 Capsule(s) Inhalation daily  tiotropium 18 MICROgram(s) Capsule 1 Capsule(s) Inhalation daily    MEDICATIONS  (PRN):  artificial  tears Solution 1 Drop(s) Both EYES three times a day PRN Dry Eyes      PHYSICAL EXAM:  GENERAL: Contracted, bedbound, non verbal and has tracheostomy.  HEAD:  Atraumatic, Normocephalic  EYES: EOMI, PERRLA, conjunctiva and sclera clear  ENMT: No tonsillar erythema, exudates, or enlargement; Moist mucous membranes, Good dentition, No lesions  NECK: Supple, No JVD, Normal thyroid  NERVOUS SYSTEM:  Alert & Oriented X0, Patient is contracted and not following commands  CHEST/LUNG: Clear to percussion bilaterally; No rales, rhonchi, wheezing, or rubs  HEART: Regular rate and rhythm; No murmurs, rubs, or gallops  ABDOMEN: Peg stoma healing with no erythema and discharge, BS +ve, no distension   EXTREMITIES:  2+ Peripheral Pulses, No clubbing, cyanosis, or edema  LYMPH: No lymphadenopathy noted  SKIN: No rashes or lesions  LABS:                        13.9   9.2   )-----------( 144      ( 19 Feb 2018 06:17 )             45.2     02-19    141  |  108  |  21<H>  ----------------------------<  152<H>  4.6   |  26  |  0.73    Ca    9.7      19 Feb 2018 06:17  Phos  3.8     02-19  Mg     2.0     02-19    TPro  7.4  /  Alb  3.4<L>  /  TBili  0.8  /  DBili  x   /  AST  26  /  ALT  45  /  AlkPhos  86  02-19    PT/INR - ( 19 Feb 2018 06:17 )   PT: 11.1 sec;   INR: 1.02 ratio         PTT - ( 18 Feb 2018 23:56 )  PTT:42.2 sec    CAPILLARY BLOOD GLUCOSE      POCT Blood Glucose.: 153 mg/dL (20 Feb 2018 06:48)  POCT Blood Glucose.: 146 mg/dL (20 Feb 2018 00:33)  POCT Blood Glucose.: 122 mg/dL (19 Feb 2018 21:17)  POCT Blood Glucose.: 150 mg/dL (19 Feb 2018 16:39)  POCT Blood Glucose.: 135 mg/dL (19 Feb 2018 11:42)

## 2018-02-21 LAB
ANION GAP SERPL CALC-SCNC: 9 MMOL/L — SIGNIFICANT CHANGE UP (ref 5–17)
BUN SERPL-MCNC: 9 MG/DL — SIGNIFICANT CHANGE UP (ref 7–18)
CALCIUM SERPL-MCNC: 8.7 MG/DL — SIGNIFICANT CHANGE UP (ref 8.4–10.5)
CHLORIDE SERPL-SCNC: 110 MMOL/L — HIGH (ref 96–108)
CO2 SERPL-SCNC: 23 MMOL/L — SIGNIFICANT CHANGE UP (ref 22–31)
CREAT SERPL-MCNC: 0.69 MG/DL — SIGNIFICANT CHANGE UP (ref 0.5–1.3)
GLUCOSE BLDC GLUCOMTR-MCNC: 132 MG/DL — HIGH (ref 70–99)
GLUCOSE BLDC GLUCOMTR-MCNC: 190 MG/DL — HIGH (ref 70–99)
GLUCOSE BLDC GLUCOMTR-MCNC: 93 MG/DL — SIGNIFICANT CHANGE UP (ref 70–99)
GLUCOSE SERPL-MCNC: 206 MG/DL — HIGH (ref 70–99)
HCT VFR BLD CALC: 39.2 % — SIGNIFICANT CHANGE UP (ref 34.5–45)
HGB BLD-MCNC: 11.9 G/DL — SIGNIFICANT CHANGE UP (ref 11.5–15.5)
INR BLD: 1.04 RATIO — SIGNIFICANT CHANGE UP (ref 0.88–1.16)
MAGNESIUM SERPL-MCNC: 1.7 MG/DL — SIGNIFICANT CHANGE UP (ref 1.6–2.6)
MCHC RBC-ENTMCNC: 24.7 PG — LOW (ref 27–34)
MCHC RBC-ENTMCNC: 30.3 GM/DL — LOW (ref 32–36)
MCV RBC AUTO: 81.5 FL — SIGNIFICANT CHANGE UP (ref 80–100)
PHOSPHATE SERPL-MCNC: 3.1 MG/DL — SIGNIFICANT CHANGE UP (ref 2.5–4.5)
PLATELET # BLD AUTO: 115 K/UL — LOW (ref 150–400)
POTASSIUM SERPL-MCNC: 4.3 MMOL/L — SIGNIFICANT CHANGE UP (ref 3.5–5.3)
POTASSIUM SERPL-SCNC: 4.3 MMOL/L — SIGNIFICANT CHANGE UP (ref 3.5–5.3)
PROTHROM AB SERPL-ACNC: 11.3 SEC — SIGNIFICANT CHANGE UP (ref 9.8–12.7)
RBC # BLD: 4.81 M/UL — SIGNIFICANT CHANGE UP (ref 3.8–5.2)
RBC # FLD: 13.8 % — SIGNIFICANT CHANGE UP (ref 10.3–14.5)
SODIUM SERPL-SCNC: 142 MMOL/L — SIGNIFICANT CHANGE UP (ref 135–145)
WBC # BLD: 6.5 K/UL — SIGNIFICANT CHANGE UP (ref 3.8–10.5)
WBC # FLD AUTO: 6.5 K/UL — SIGNIFICANT CHANGE UP (ref 3.8–10.5)

## 2018-02-21 RX ADMIN — LEVETIRACETAM 400 MILLIGRAM(S): 250 TABLET, FILM COATED ORAL at 05:06

## 2018-02-21 RX ADMIN — Medication 0.5 MILLIGRAM(S): at 21:16

## 2018-02-21 RX ADMIN — Medication 2.5 MILLIGRAM(S): at 17:23

## 2018-02-21 RX ADMIN — Medication 3 MILLILITER(S): at 02:57

## 2018-02-21 RX ADMIN — SODIUM CHLORIDE 3 MILLILITER(S): 9 INJECTION INTRAMUSCULAR; INTRAVENOUS; SUBCUTANEOUS at 21:16

## 2018-02-21 RX ADMIN — SODIUM CHLORIDE 3 MILLILITER(S): 9 INJECTION INTRAMUSCULAR; INTRAVENOUS; SUBCUTANEOUS at 13:29

## 2018-02-21 RX ADMIN — Medication 3 MILLILITER(S): at 14:03

## 2018-02-21 RX ADMIN — Medication 1 APPLICATION(S): at 12:36

## 2018-02-21 RX ADMIN — Medication 0.5 MILLIGRAM(S): at 05:07

## 2018-02-21 RX ADMIN — INSULIN GLARGINE 20 UNIT(S): 100 INJECTION, SOLUTION SUBCUTANEOUS at 21:16

## 2018-02-21 RX ADMIN — LEVETIRACETAM 400 MILLIGRAM(S): 250 TABLET, FILM COATED ORAL at 17:23

## 2018-02-21 RX ADMIN — Medication 0.5 MILLIGRAM(S): at 05:08

## 2018-02-21 RX ADMIN — Medication 3 MILLILITER(S): at 20:27

## 2018-02-21 RX ADMIN — ENOXAPARIN SODIUM 40 MILLIGRAM(S): 100 INJECTION SUBCUTANEOUS at 17:24

## 2018-02-21 RX ADMIN — SODIUM CHLORIDE 3 MILLILITER(S): 9 INJECTION INTRAMUSCULAR; INTRAVENOUS; SUBCUTANEOUS at 05:08

## 2018-02-21 RX ADMIN — Medication 0.5 MILLIGRAM(S): at 13:26

## 2018-02-21 RX ADMIN — ENOXAPARIN SODIUM 40 MILLIGRAM(S): 100 INJECTION SUBCUTANEOUS at 05:07

## 2018-02-21 RX ADMIN — Medication 2.5 MILLIGRAM(S): at 05:07

## 2018-02-21 RX ADMIN — INSULIN HUMAN 1: 100 INJECTION, SOLUTION SUBCUTANEOUS at 12:36

## 2018-02-21 RX ADMIN — Medication 3 MILLILITER(S): at 09:24

## 2018-02-21 NOTE — PROGRESS NOTE ADULT - SUBJECTIVE AND OBJECTIVE BOX
HPI:  61 Y/O F,. non-verbal, bed bound, Trach, PEG tube, from NH, PMhx of HTN, asthma, diabetic coma, convulsion disorder, bipolar, DVT legs, DM, sent from NH due to dislodgement of PEG tube and for PEG tube insertion.     Interval history:  Patient is afebrile and normotensive. The tongue movements improved as compare to yesterday    ROS: Patient has no resp distress, abdomen distension, diarrhea, hematochezia, vomiting, fall, trauma.    Vital Signs Last 24 Hrs  T(C): 37.3 (21 Feb 2018 05:08), Max: 37.3 (21 Feb 2018 05:08)  T(F): 99.1 (21 Feb 2018 05:08), Max: 99.1 (21 Feb 2018 05:08)  HR: 71 (21 Feb 2018 05:08) (60 - 71)  BP: 127/61 (21 Feb 2018 05:08) (127/61 - 172/100)  RR: 18 (21 Feb 2018 05:08) (18 - 20)  SpO2: 100% (21 Feb 2018 05:08) (100% - 100%)      REVIEW OF SYSTEMS: denies fever, chills, SOB, palpitations, chest pain, abdominal pain, nausea, vomitting, diarrhea, constipation, dizziness    MEDICATIONS  (STANDING):  ALBUTerol    90 MICROgram(s) HFA Inhaler 1 Puff(s) Inhalation every 4 hours  ALBUTerol/ipratropium for Nebulization 3 milliLiter(s) Nebulizer every 6 hours  BACItracin   Ointment 1 Application(s) Topical daily  benztropine Injectable 0.5 milliGRAM(s) IV Push every 12 hours  dextrose 5% + sodium chloride 0.9%. 1000 milliLiter(s) (60 mL/Hr) IV Continuous <Continuous>  enoxaparin Injectable 40 milliGRAM(s) SubCutaneous two times a day  insulin glargine Injectable (LANTUS) 20 Unit(s) SubCutaneous at bedtime  insulin regular  human corrective regimen sliding scale   SubCutaneous every 6 hours  levETIRAcetam  IVPB 1000 milliGRAM(s) IV Intermittent every 12 hours  LORazepam   Injectable 0.5 milliGRAM(s) IV Push every 8 hours  metoprolol    tartrate Injectable 2.5 milliGRAM(s) IV Push every 12 hours  sodium chloride 0.9% lock flush 3 milliLiter(s) IV Push every 8 hours  sodium chloride 0.9%. 1000 milliLiter(s) (75 mL/Hr) IV Continuous <Continuous>  tiotropium 18 MICROgram(s) Capsule 1 Capsule(s) Inhalation daily  tiotropium 18 MICROgram(s) Capsule 1 Capsule(s) Inhalation daily    MEDICATIONS  (PRN):  artificial  tears Solution 1 Drop(s) Both EYES three times a day PRN Dry Eyes      PHYSICAL EXAM:  GENERAL: Contracted, bedbound, non verbal and has tracheostomy.  HEAD:  Atraumatic, Normocephalic  EYES: EOMI, PERRLA, conjunctiva and sclera clear  ENMT: No tonsillar erythema, exudates, or enlargement; Moist mucous membranes, Good dentition, No lesions  NECK: Supple, No JVD, Normal thyroid  NERVOUS SYSTEM:  Alert & Oriented X0, Patient is contracted and not following commands  CHEST/LUNG: Clear to percussion bilaterally; No rales, rhonchi, wheezing, or rubs  HEART: Regular rate and rhythm; No murmurs, rubs, or gallops  ABDOMEN: Peg stoma healing with no erythema and discharge, BS +ve, no distension   EXTREMITIES:  2+ Peripheral Pulses, No clubbing, cyanosis, or edema  LYMPH: No lymphadenopathy noted  SKIN: No rashes or lesions  LABS:                                             11.9   6.5   )-----------( 115      ( 21 Feb 2018 07:15 )             39.2   02-21    142  |  110<H>  |  9   ----------------------------<  206<H>  4.3   |  23  |  0.69    Ca    8.7      21 Feb 2018 07:15  Phos  3.1     02-21  Mg     1.7     02-21

## 2018-02-21 NOTE — DIETITIAN INITIAL EVALUATION ADULT. - OTHER INFO
Nutrition consult Nutrition consult requested for enteral or parenteral feeding PTA,. Patient from [facility] with malfunction PEG tube & with trach. Visited pt. alert but confused also agitated, presently NPO with IV fluids, awaiting PEG placement by GI/team, no GI distress, abdominal wound care noted, D/W RN.

## 2018-02-21 NOTE — DIETITIAN INITIAL EVALUATION ADULT. - FACTORS AFF FOOD INTAKE
change in mental status/difficulty chewing/difficulty with food procurement/preparation/difficulty feeding self/difficulty swallowing/other (specify)/pain dislodged PEG tube, tracheostomy, bedbound, diabetes/coma, seizure/other (specify)/pain/change in mental status/difficulty with food procurement/preparation/difficulty chewing/difficulty feeding self/difficulty swallowing

## 2018-02-21 NOTE — DIETITIAN INITIAL EVALUATION ADULT. - NS AS NUTRI INTERV COLLABORAT
RD at nursing home, as medically feasible/Collaboration with other nutrition professionals/Collaboration with other providers

## 2018-02-21 NOTE — DIETITIAN INITIAL EVALUATION ADULT. - ORAL INTAKE PTA
n/a/NH diet order-Glucerna 1.2 kcal, 1500ml vol., rate 75ml/hr, total Kcal-1800 Kcal, total vol.-2100 ml, total water-1810 ml, LPS 15-30 xTID daily 30ml via GT.

## 2018-02-21 NOTE — PROGRESS NOTE ADULT - SUBJECTIVE AND OBJECTIVE BOX
IM Attending For This Patients Hospitalization     Pt seen and examined at bedside  Case discussed with medical team     No new acute events noted overnight       HPI:  61 Y/O F,. non-verbal, bed bound, Trach, PEG tube, from NH, PMhx of HTN, asthma, diabetic coma, convulsion disorder, bipolar, DVT legs, DM, sent from NH due to dislodgement of PEG tube and for PEG tube insertion.       ROS: limited 2/2 pt's acute/chronic medical status, however no noted diarrhea, fevers, hematochezia, vomiting, fall, trauma.    Vital Signs Last 24 Hrs  T(C): 37.3 (21 Feb 2018 05:08), Max: 37.3 (21 Feb 2018 05:08)  T(F): 99.1 (21 Feb 2018 05:08), Max: 99.1 (21 Feb 2018 05:08)  HR: 71 (21 Feb 2018 05:08) (60 - 71)  BP: 127/61 (21 Feb 2018 05:08) (127/61 - 172/100)  BP(mean): --  RR: 18 (21 Feb 2018 05:08) (18 - 20)  SpO2: 100% (21 Feb 2018 05:08) (100% - 100%)      MEDICATIONS  (STANDING):  ALBUTerol    90 MICROgram(s) HFA Inhaler 1 Puff(s) Inhalation every 4 hours  ALBUTerol/ipratropium for Nebulization 3 milliLiter(s) Nebulizer every 6 hours  BACItracin   Ointment 1 Application(s) Topical daily  dextrose 5% + sodium chloride 0.9%. 1000 milliLiter(s) (60 mL/Hr) IV Continuous <Continuous>  enoxaparin Injectable 40 milliGRAM(s) SubCutaneous two times a day  insulin glargine Injectable (LANTUS) 20 Unit(s) SubCutaneous at bedtime  insulin regular  human corrective regimen sliding scale   SubCutaneous every 6 hours  levETIRAcetam  IVPB 1000 milliGRAM(s) IV Intermittent every 12 hours  LORazepam   Injectable 0.5 milliGRAM(s) IV Push every 8 hours  metoprolol    tartrate Injectable 2.5 milliGRAM(s) IV Push every 12 hours  sodium chloride 0.9% lock flush 3 milliLiter(s) IV Push every 8 hours  sodium chloride 0.9%. 1000 milliLiter(s) (75 mL/Hr) IV Continuous <Continuous>  tiotropium 18 MICROgram(s) Capsule 1 Capsule(s) Inhalation daily  tiotropium 18 MICROgram(s) Capsule 1 Capsule(s) Inhalation daily    MEDICATIONS  (PRN):  artificial  tears Solution 1 Drop(s) Both EYES three times a day PRN Dry Eyes      PHYSICAL EXAM:  GENERAL: Contracted, bedbound, non verbal and has tracheostomy.  HEAD:  Atraumatic, Normocephalic  EYES: conjunctiva and sclera clear  ENMT: No tonsillar erythema, exudates, or enlargement; Moist mucous membranes, Good dentition, No lesions  NECK: Supple  NERVOUS SYSTEM: Awake.  Alert & Oriented X0, Patient is contracted and not following commands  CHEST/LUNG: trach intact.  HEART: Regular rate and rhythm; No murmurs, rubs, or gallops  ABDOMEN: Peg stoma healing with no erythema and discharge, BS +ve, no distension   EXTREMITIES:  2+ Peripheral Pulses, No clubbing, cyanosis, or edema  LYMPH: No lymphadenopathy noted    LABS:                   Magnesium, Serum (02.21.18 @ 07:15)    Magnesium, Serum: 1.7 mg/dL    Phosphorus Level, Serum (02.21.18 @ 07:15)    Phosphorus Level, Serum: 3.1 mg/dL    Prothrombin Time and INR, Plasma (02.21.18 @ 07:15)    Prothrombin Time, Plasma: 11.3: Effective March 21st, the reference range for PT has changed. sec    INR: 1.04: Please note: New Critical Value: 5.0 ratio as of 1/2/14. ratio    Complete Blood Count (02.21.18 @ 07:15)    WBC Count: 6.5 K/uL    RBC Count: 4.81 M/uL    Hemoglobin: 11.9 g/dL    Hematocrit: 39.2 %    Mean Cell Volume: 81.5 fl    Mean Cell Hemoglobin: 24.7 pg    Mean Cell Hemoglobin Conc: 30.3 gm/dL    Red Cell Distrib Width: 13.8 %    Platelet Count - Automated: 115 K/uL

## 2018-02-21 NOTE — DIETITIAN INITIAL EVALUATION ADULT. - MD RECOMMEND
Rec. tube feeding: Goal: Glucerna 1.5 @ 65ml/h x 16h (1040ml, 1560kcal, 85.8g protein, 789ml water at goal)   MD to adjust free water as needed

## 2018-02-22 ENCOUNTER — TRANSCRIPTION ENCOUNTER (OUTPATIENT)
Age: 63
End: 2018-02-22

## 2018-02-22 VITALS — SYSTOLIC BLOOD PRESSURE: 135 MMHG | HEART RATE: 109 BPM | DIASTOLIC BLOOD PRESSURE: 97 MMHG

## 2018-02-22 LAB
ANION GAP SERPL CALC-SCNC: 9 MMOL/L — SIGNIFICANT CHANGE UP (ref 5–17)
BUN SERPL-MCNC: 7 MG/DL — SIGNIFICANT CHANGE UP (ref 7–18)
CALCIUM SERPL-MCNC: 8.5 MG/DL — SIGNIFICANT CHANGE UP (ref 8.4–10.5)
CHLORIDE SERPL-SCNC: 111 MMOL/L — HIGH (ref 96–108)
CO2 SERPL-SCNC: 23 MMOL/L — SIGNIFICANT CHANGE UP (ref 22–31)
CREAT SERPL-MCNC: 0.6 MG/DL — SIGNIFICANT CHANGE UP (ref 0.5–1.3)
GLUCOSE BLDC GLUCOMTR-MCNC: 185 MG/DL — HIGH (ref 70–99)
GLUCOSE BLDC GLUCOMTR-MCNC: 199 MG/DL — HIGH (ref 70–99)
GLUCOSE BLDC GLUCOMTR-MCNC: 214 MG/DL — HIGH (ref 70–99)
GLUCOSE BLDC GLUCOMTR-MCNC: 215 MG/DL — HIGH (ref 70–99)
GLUCOSE SERPL-MCNC: 226 MG/DL — HIGH (ref 70–99)
HCT VFR BLD CALC: 34.2 % — LOW (ref 34.5–45)
HGB BLD-MCNC: 10.8 G/DL — LOW (ref 11.5–15.5)
MCHC RBC-ENTMCNC: 25.8 PG — LOW (ref 27–34)
MCHC RBC-ENTMCNC: 31.6 GM/DL — LOW (ref 32–36)
MCV RBC AUTO: 81.8 FL — SIGNIFICANT CHANGE UP (ref 80–100)
PLATELET # BLD AUTO: 101 K/UL — LOW (ref 150–400)
POTASSIUM SERPL-MCNC: 3.7 MMOL/L — SIGNIFICANT CHANGE UP (ref 3.5–5.3)
POTASSIUM SERPL-SCNC: 3.7 MMOL/L — SIGNIFICANT CHANGE UP (ref 3.5–5.3)
RBC # BLD: 4.18 M/UL — SIGNIFICANT CHANGE UP (ref 3.8–5.2)
RBC # FLD: 13.5 % — SIGNIFICANT CHANGE UP (ref 10.3–14.5)
SODIUM SERPL-SCNC: 143 MMOL/L — SIGNIFICANT CHANGE UP (ref 135–145)
WBC # BLD: 6.2 K/UL — SIGNIFICANT CHANGE UP (ref 3.8–10.5)
WBC # FLD AUTO: 6.2 K/UL — SIGNIFICANT CHANGE UP (ref 3.8–10.5)

## 2018-02-22 PROCEDURE — 82746 ASSAY OF FOLIC ACID SERUM: CPT

## 2018-02-22 PROCEDURE — 84443 ASSAY THYROID STIM HORMONE: CPT

## 2018-02-22 PROCEDURE — 83605 ASSAY OF LACTIC ACID: CPT

## 2018-02-22 PROCEDURE — 86850 RBC ANTIBODY SCREEN: CPT

## 2018-02-22 PROCEDURE — 80053 COMPREHEN METABOLIC PANEL: CPT

## 2018-02-22 PROCEDURE — 99285 EMERGENCY DEPT VISIT HI MDM: CPT | Mod: 25

## 2018-02-22 PROCEDURE — 82607 VITAMIN B-12: CPT

## 2018-02-22 PROCEDURE — 85027 COMPLETE CBC AUTOMATED: CPT

## 2018-02-22 PROCEDURE — 86900 BLOOD TYPING SEROLOGIC ABO: CPT

## 2018-02-22 PROCEDURE — 86901 BLOOD TYPING SEROLOGIC RH(D): CPT

## 2018-02-22 PROCEDURE — 83735 ASSAY OF MAGNESIUM: CPT

## 2018-02-22 PROCEDURE — 85610 PROTHROMBIN TIME: CPT

## 2018-02-22 PROCEDURE — 71045 X-RAY EXAM CHEST 1 VIEW: CPT

## 2018-02-22 PROCEDURE — 93005 ELECTROCARDIOGRAM TRACING: CPT

## 2018-02-22 PROCEDURE — C1889: CPT

## 2018-02-22 PROCEDURE — 85730 THROMBOPLASTIN TIME PARTIAL: CPT

## 2018-02-22 PROCEDURE — 82962 GLUCOSE BLOOD TEST: CPT

## 2018-02-22 PROCEDURE — 84100 ASSAY OF PHOSPHORUS: CPT

## 2018-02-22 PROCEDURE — 94640 AIRWAY INHALATION TREATMENT: CPT

## 2018-02-22 PROCEDURE — 80061 LIPID PANEL: CPT

## 2018-02-22 PROCEDURE — 80048 BASIC METABOLIC PNL TOTAL CA: CPT

## 2018-02-22 PROCEDURE — 83036 HEMOGLOBIN GLYCOSYLATED A1C: CPT

## 2018-02-22 RX ORDER — LEVETIRACETAM 250 MG/1
1000 TABLET, FILM COATED ORAL
Qty: 0 | Refills: 0 | Status: DISCONTINUED | OUTPATIENT
Start: 2018-02-22 | End: 2018-02-22

## 2018-02-22 RX ORDER — INSULIN LISPRO 100/ML
0 VIAL (ML) SUBCUTANEOUS
Qty: 0 | Refills: 0 | COMMUNITY

## 2018-02-22 RX ORDER — HALOPERIDOL DECANOATE 100 MG/ML
1 INJECTION INTRAMUSCULAR
Qty: 0 | Refills: 0 | COMMUNITY

## 2018-02-22 RX ORDER — HALOPERIDOL DECANOATE 100 MG/ML
0.5 INJECTION INTRAMUSCULAR ONCE
Qty: 0 | Refills: 0 | Status: DISCONTINUED | OUTPATIENT
Start: 2018-02-22 | End: 2018-02-22

## 2018-02-22 RX ORDER — LANOLIN ALCOHOL/MO/W.PET/CERES
5 CREAM (GRAM) TOPICAL ONCE
Qty: 0 | Refills: 0 | Status: DISCONTINUED | OUTPATIENT
Start: 2018-02-22 | End: 2018-02-22

## 2018-02-22 RX ADMIN — Medication 2.5 MILLIGRAM(S): at 19:21

## 2018-02-22 RX ADMIN — SODIUM CHLORIDE 3 MILLILITER(S): 9 INJECTION INTRAMUSCULAR; INTRAVENOUS; SUBCUTANEOUS at 05:48

## 2018-02-22 RX ADMIN — ENOXAPARIN SODIUM 40 MILLIGRAM(S): 100 INJECTION SUBCUTANEOUS at 19:22

## 2018-02-22 RX ADMIN — INSULIN HUMAN 1: 100 INJECTION, SOLUTION SUBCUTANEOUS at 06:05

## 2018-02-22 RX ADMIN — LEVETIRACETAM 400 MILLIGRAM(S): 250 TABLET, FILM COATED ORAL at 05:48

## 2018-02-22 RX ADMIN — SODIUM CHLORIDE 3 MILLILITER(S): 9 INJECTION INTRAMUSCULAR; INTRAVENOUS; SUBCUTANEOUS at 14:44

## 2018-02-22 RX ADMIN — LEVETIRACETAM 1000 MILLIGRAM(S): 250 TABLET, FILM COATED ORAL at 19:34

## 2018-02-22 RX ADMIN — INSULIN HUMAN 2: 100 INJECTION, SOLUTION SUBCUTANEOUS at 12:58

## 2018-02-22 RX ADMIN — ENOXAPARIN SODIUM 40 MILLIGRAM(S): 100 INJECTION SUBCUTANEOUS at 05:48

## 2018-02-22 RX ADMIN — Medication 0.5 MILLIGRAM(S): at 05:47

## 2018-02-22 RX ADMIN — Medication 3 MILLILITER(S): at 08:10

## 2018-02-22 RX ADMIN — Medication 0.5 MILLIGRAM(S): at 02:30

## 2018-02-22 RX ADMIN — Medication 1 APPLICATION(S): at 13:00

## 2018-02-22 RX ADMIN — Medication 3 MILLILITER(S): at 02:31

## 2018-02-22 RX ADMIN — Medication 2.5 MILLIGRAM(S): at 05:47

## 2018-02-22 RX ADMIN — Medication 3 MILLILITER(S): at 15:31

## 2018-02-22 NOTE — DISCHARGE NOTE ADULT - PATIENT PORTAL LINK FT
You can access the [facility] Patient Portal, offered by [facility], by registering with the following website: [email or web address]

## 2018-02-22 NOTE — DISCHARGE NOTE ADULT - MEDICATION SUMMARY - MEDICATIONS TO STOP TAKING
I will STOP taking the medications listed below when I get home from the hospital:    Haldol 0.5 mg oral tablet  -- 1 tab(s) by mouth 2 times a day    HumaLOG 100 units/mL injectable solution

## 2018-02-22 NOTE — PROGRESS NOTE ADULT - SUBJECTIVE AND OBJECTIVE BOX
IM Attending For This Patients Hospitalization     Pt seen and examined at bedside  Case discussed with medical team     No new acute events noted overnight       HPI:  61 Y/O F,. non-verbal, bed bound, Trach, PEG tube, from NH, PMhx of HTN, asthma, diabetic coma, convulsion disorder, bipolar, DVT legs, DM, sent from NH due to dislodgement of PEG tube and for PEG tube insertion.       ROS: limited 2/2 pt's acute/chronic medical status, however no noted diarrhea, fevers, hematochezia, vomiting, fall, trauma.    Vital Signs Last 24 Hrs  T(C): 36.9 (22 Feb 2018 05:25), Max: 37.1 (21 Feb 2018 15:30)  T(F): 98.4 (22 Feb 2018 05:25), Max: 98.7 (21 Feb 2018 15:30)  HR: 67 (22 Feb 2018 05:25) (54 - 72)  BP: 138/72 (22 Feb 2018 05:25) (127/71 - 170/75)  BP(mean): --  RR: 18 (22 Feb 2018 05:25) (18 - 20)  SpO2: 100% (22 Feb 2018 05:25) (99% - 100%)      MEDICATIONS  (STANDING):  ALBUTerol    90 MICROgram(s) HFA Inhaler 1 Puff(s) Inhalation every 4 hours  ALBUTerol/ipratropium for Nebulization 3 milliLiter(s) Nebulizer every 6 hours  BACItracin   Ointment 1 Application(s) Topical daily  dextrose 5% + sodium chloride 0.9%. 1000 milliLiter(s) (60 mL/Hr) IV Continuous <Continuous>  enoxaparin Injectable 40 milliGRAM(s) SubCutaneous two times a day  insulin glargine Injectable (LANTUS) 20 Unit(s) SubCutaneous at bedtime  insulin regular  human corrective regimen sliding scale   SubCutaneous every 6 hours  levETIRAcetam 1000 milliGRAM(s) Oral two times a day  LORazepam   Injectable 0.5 milliGRAM(s) IV Push every 8 hours  metoprolol    tartrate Injectable 2.5 milliGRAM(s) IV Push every 12 hours  sodium chloride 0.9% lock flush 3 milliLiter(s) IV Push every 8 hours  tiotropium 18 MICROgram(s) Capsule 1 Capsule(s) Inhalation daily  tiotropium 18 MICROgram(s) Capsule 1 Capsule(s) Inhalation daily    MEDICATIONS  (PRN):  artificial  tears Solution 1 Drop(s) Both EYES three times a day PRN Dry Eyes      PHYSICAL EXAM:  GENERAL: Contracted, bedbound, non verbal and has tracheostomy.  HEAD:  Atraumatic, Normocephalic  EYES: conjunctiva and sclera clear  ENMT: No tonsillar erythema, exudates, or enlargement; Moist mucous membranes, Good dentition, No lesions  NECK: Supple  NERVOUS SYSTEM: Awake.  Alert & Oriented X0, Patient is contracted and not following commands  CHEST/LUNG: trach intact.  HEART: Regular rate and rhythm; No murmurs, rubs, or gallops  ABDOMEN: Peg intact, BS +ve, no distension   EXTREMITIES:  2+ Peripheral Pulses, No clubbing, cyanosis, or edema  LYMPH: No lymphadenopathy noted    LABS:          Complete Blood Count (02.22.18 @ 07:32)    WBC Count: 6.2 K/uL    RBC Count: 4.18 M/uL    Hemoglobin: 10.8 g/dL    Hematocrit: 34.2 %    Mean Cell Volume: 81.8 fl    Mean Cell Hemoglobin: 25.8 pg    Mean Cell Hemoglobin Conc: 31.6 gm/dL    Red Cell Distrib Width: 13.5 %    Platelet Count - Automated: 101 K/uL    POCT  Blood Glucose (02.22.18 @ 06:03)    POCT Blood Glucose.: 199: MDNotified No Repeat mg/dL

## 2018-02-22 NOTE — DISCHARGE NOTE ADULT - CARE PLAN
Principal Discharge DX:	Dyskinesia, tardive  Goal:	Prevent complications  Assessment and plan of treatment:	Avoid first generation antipsychotics  Secondary Diagnosis:	Dislodged gastrostomy tube  Assessment and plan of treatment:	PEG placed  Secondary Diagnosis:	Diabetes mellitus

## 2018-02-22 NOTE — DISCHARGE NOTE ADULT - HOSPITAL COURSE
61 Y/O F,. non-verbal, bed bound, Trach, PEG tube, from NH, PMhx of HTN, asthma, diabetic coma, convulsion disorder, bipolar, DVT legs, DM, sent from NH due to dislodgement of PEG tube and for PEG tube insertion. Patient has extrapyramidal signs including tardive dyskinesia and lip smacking. Haloperidol was discontinued and patient was kept on ativan prn agitation. Patient Keppra was kept IV. GI consult Dr. Oswald evaluated the patient. PEG was placed. Patient was started on PEG feeding and was able to tolerate the diet. Patient is medically stable and will be discharge home.    Comply with medications and follow up with PMD in 1 week  Encourage hydration

## 2018-02-22 NOTE — DISCHARGE NOTE ADULT - MEDICATION SUMMARY - MEDICATIONS TO TAKE
I will START or STAY ON the medications listed below when I get home from the hospital:    acetaminophen 160 mg/5 mL oral liquid  -- 5 milliliter(s) by gastrostomy tube every 8 hours, As Needed  -- Indication: For Fever prn    Lovenox 40 mg/0.4 mL injectable solution  -- 0.4 milliliter(s) injectable 2 times a day  -- Indication: For Prophylactic measure    Keppra 100 mg/mL oral solution  -- 10 milliliter(s) by mouth 2 times a day  -- Indication: For Seizure    LORazepam 2 mg/mL oral concentrate  -- 0.5 milliliter(s) by mouth 3 times a day  -- Indication: For Agitation, TD    insulin lispro 100 units/mL subcutaneous solution  --  subcutaneous 3 times a day (before meals); 1 Unit(s) if Glucose 151 - 200  2 Unit(s) if Glucose 201 - 250  3 Unit(s) if Glucose 251 - 300  4 Unit(s) if Glucose 301 - 350  5 Unit(s) if Glucose 351 - 400  6 Unit(s) if Glucose GREATER THAN 400  -- Indication: For DM    Lantus 100 units/mL subcutaneous solution  -- 30 unit(s) subcutaneous once a day (at bedtime)  -- Indication: For DM    nystatin 100,000 units/g topical powder (obsolete)  -- Indication: For RASH    atorvastatin 20 mg oral tablet  -- 1 tab(s) by mouth once a day  -- Indication: For HLD    metoprolol tartrate 100 mg oral tablet  -- 1 tab(s) by mouth 2 times a day  -- Indication: For HTN    ipratropium-albuterol 0.5 mg-2.5 mg/3 mLinhalation solution  -- 3 milliliter(s) inhaled 4 times a day, As Needed  -- Indication: For reactive airway disease    amLODIPine 2.5 mg oral tablet  -- 1 tab(s) by mouth once a day  -- Indication: For Htn    bacitracin zinc 500 units/g topical ointment  -- Apply on skin to affected area 2 times a day, As Needed  -- Indication: For rash    Senna 8.6 mg oral tablet  -- 1 tab(s) by mouth once a day (at bedtime)  -- Indication: For Constipation as needed    ocular lubricant ophthalmic solution  -- 1 drop(s) to each affected eye 3 times a day, As needed, Dry Eyes  -- Indication: For Dry eye

## 2018-07-13 ENCOUNTER — EMERGENCY (EMERGENCY)
Facility: HOSPITAL | Age: 63
LOS: 1 days | Discharge: HOME | End: 2018-07-13
Attending: EMERGENCY MEDICINE
Payer: MEDICAID

## 2018-07-13 VITALS
HEART RATE: 90 BPM | TEMPERATURE: 99 F | DIASTOLIC BLOOD PRESSURE: 106 MMHG | SYSTOLIC BLOOD PRESSURE: 145 MMHG | OXYGEN SATURATION: 99 % | HEIGHT: 62 IN | WEIGHT: 160.06 LBS | RESPIRATION RATE: 20 BRPM

## 2018-07-13 VITALS
DIASTOLIC BLOOD PRESSURE: 69 MMHG | HEART RATE: 91 BPM | SYSTOLIC BLOOD PRESSURE: 154 MMHG | TEMPERATURE: 98 F | RESPIRATION RATE: 20 BRPM

## 2018-07-13 DIAGNOSIS — Z98.89 OTHER SPECIFIED POSTPROCEDURAL STATES: Chronic | ICD-10-CM

## 2018-07-13 DIAGNOSIS — Z93.1 GASTROSTOMY STATUS: Chronic | ICD-10-CM

## 2018-07-13 PROCEDURE — 99283 EMERGENCY DEPT VISIT LOW MDM: CPT

## 2018-07-13 PROCEDURE — 49465 FLUORO EXAM OF G/COLON TUBE: CPT

## 2018-07-13 NOTE — ED PROVIDER NOTE - OBJECTIVE STATEMENT
64 yo F sent to ED from nursing facility for dislodged gastric tube. history obtain by nursing home records. unable to obtain history from patient because of medical condition.

## 2018-07-13 NOTE — ED PROVIDER NOTE - PROGRESS NOTE DETAILS
nursing home reports dislodged peg tube but there was a gastric tube in place under the abdominal brace. will confirm placement with gastroview xray.

## 2018-07-13 NOTE — ED ADULT NURSE NOTE - ED STAT RN HANDOFF DETAILS
handover report received from Nurse Jimenez. PATIENT responding to verbal commands. Is being discharged awaiting transport.

## 2018-07-13 NOTE — ED PROVIDER NOTE - PSH
H/O tracheostomy    No significant past surgical history    S/P percutaneous endoscopic gastrostomy (PEG) tube placement

## 2018-07-14 ENCOUNTER — INPATIENT (INPATIENT)
Facility: HOSPITAL | Age: 63
LOS: 16 days | Discharge: NURSING, REHAB OR LONG-TERM CARE | DRG: 871 | End: 2018-07-31
Attending: INTERNAL MEDICINE | Admitting: INTERNAL MEDICINE
Payer: MEDICAID

## 2018-07-14 VITALS
OXYGEN SATURATION: 100 % | RESPIRATION RATE: 22 BRPM | SYSTOLIC BLOOD PRESSURE: 122 MMHG | HEART RATE: 100 BPM | HEIGHT: 66 IN | WEIGHT: 139.99 LBS | DIASTOLIC BLOOD PRESSURE: 92 MMHG

## 2018-07-14 DIAGNOSIS — R11.10 VOMITING, UNSPECIFIED: ICD-10-CM

## 2018-07-14 DIAGNOSIS — E11.9 TYPE 2 DIABETES MELLITUS WITHOUT COMPLICATIONS: ICD-10-CM

## 2018-07-14 DIAGNOSIS — I10 ESSENTIAL (PRIMARY) HYPERTENSION: ICD-10-CM

## 2018-07-14 DIAGNOSIS — J69.0 PNEUMONITIS DUE TO INHALATION OF FOOD AND VOMIT: ICD-10-CM

## 2018-07-14 DIAGNOSIS — F20.9 SCHIZOPHRENIA, UNSPECIFIED: ICD-10-CM

## 2018-07-14 DIAGNOSIS — Z29.9 ENCOUNTER FOR PROPHYLACTIC MEASURES, UNSPECIFIED: ICD-10-CM

## 2018-07-14 DIAGNOSIS — Z98.89 OTHER SPECIFIED POSTPROCEDURAL STATES: Chronic | ICD-10-CM

## 2018-07-14 DIAGNOSIS — Z93.1 GASTROSTOMY STATUS: Chronic | ICD-10-CM

## 2018-07-14 DIAGNOSIS — R56.9 UNSPECIFIED CONVULSIONS: ICD-10-CM

## 2018-07-14 LAB
ALBUMIN SERPL ELPH-MCNC: 3.4 G/DL — LOW (ref 3.5–5)
ALP SERPL-CCNC: 108 U/L — SIGNIFICANT CHANGE UP (ref 40–120)
ALT FLD-CCNC: 44 U/L DA — SIGNIFICANT CHANGE UP (ref 10–60)
ANION GAP SERPL CALC-SCNC: 8 MMOL/L — SIGNIFICANT CHANGE UP (ref 5–17)
ANISOCYTOSIS BLD QL: SLIGHT — SIGNIFICANT CHANGE UP
AST SERPL-CCNC: 26 U/L — SIGNIFICANT CHANGE UP (ref 10–40)
BASOPHILS # BLD AUTO: 0.1 K/UL — SIGNIFICANT CHANGE UP (ref 0–0.2)
BASOPHILS NFR BLD AUTO: 0.4 % — SIGNIFICANT CHANGE UP (ref 0–2)
BILIRUB SERPL-MCNC: 0.9 MG/DL — SIGNIFICANT CHANGE UP (ref 0.2–1.2)
BUN SERPL-MCNC: 28 MG/DL — HIGH (ref 7–18)
CALCIUM SERPL-MCNC: 9 MG/DL — SIGNIFICANT CHANGE UP (ref 8.4–10.5)
CHLORIDE SERPL-SCNC: 113 MMOL/L — HIGH (ref 96–108)
CO2 SERPL-SCNC: 27 MMOL/L — SIGNIFICANT CHANGE UP (ref 22–31)
CREAT SERPL-MCNC: 1.12 MG/DL — SIGNIFICANT CHANGE UP (ref 0.5–1.3)
EOSINOPHIL # BLD AUTO: 0 K/UL — SIGNIFICANT CHANGE UP (ref 0–0.5)
EOSINOPHIL NFR BLD AUTO: 0 % — SIGNIFICANT CHANGE UP (ref 0–6)
GLUCOSE SERPL-MCNC: 308 MG/DL — HIGH (ref 70–99)
HCT VFR BLD CALC: 44 % — SIGNIFICANT CHANGE UP (ref 34.5–45)
HGB BLD-MCNC: 13.4 G/DL — SIGNIFICANT CHANGE UP (ref 11.5–15.5)
HYPOCHROMIA BLD QL: SLIGHT — SIGNIFICANT CHANGE UP
LYMPHOCYTES # BLD AUTO: 12.5 % — LOW (ref 13–44)
LYMPHOCYTES # BLD AUTO: 2.4 K/UL — SIGNIFICANT CHANGE UP (ref 1–3.3)
MANUAL DIF COMMENT BLD-IMP: SIGNIFICANT CHANGE UP
MCHC RBC-ENTMCNC: 24.8 PG — LOW (ref 27–34)
MCHC RBC-ENTMCNC: 30.4 GM/DL — LOW (ref 32–36)
MCV RBC AUTO: 81.5 FL — SIGNIFICANT CHANGE UP (ref 80–100)
MONOCYTES # BLD AUTO: 0.9 K/UL — SIGNIFICANT CHANGE UP (ref 0–0.9)
MONOCYTES NFR BLD AUTO: 4.9 % — SIGNIFICANT CHANGE UP (ref 2–14)
NEUTROPHILS # BLD AUTO: 15.7 K/UL — HIGH (ref 1.8–7.4)
NEUTROPHILS NFR BLD AUTO: 82.2 % — HIGH (ref 43–77)
PLAT MORPH BLD: NORMAL — SIGNIFICANT CHANGE UP
PLATELET # BLD AUTO: 183 K/UL — SIGNIFICANT CHANGE UP (ref 150–400)
POTASSIUM SERPL-MCNC: 5.1 MMOL/L — SIGNIFICANT CHANGE UP (ref 3.5–5.3)
POTASSIUM SERPL-SCNC: 5.1 MMOL/L — SIGNIFICANT CHANGE UP (ref 3.5–5.3)
PROT SERPL-MCNC: 7.4 G/DL — SIGNIFICANT CHANGE UP (ref 6–8.3)
RBC # BLD: 5.4 M/UL — HIGH (ref 3.8–5.2)
RBC # FLD: 14.2 % — SIGNIFICANT CHANGE UP (ref 10.3–14.5)
RBC BLD AUTO: ABNORMAL
SODIUM SERPL-SCNC: 148 MMOL/L — HIGH (ref 135–145)
WBC # BLD: 19.2 K/UL — HIGH (ref 3.8–10.5)
WBC # FLD AUTO: 19.2 K/UL — HIGH (ref 3.8–10.5)

## 2018-07-14 PROCEDURE — 99285 EMERGENCY DEPT VISIT HI MDM: CPT

## 2018-07-14 PROCEDURE — 74018 RADEX ABDOMEN 1 VIEW: CPT | Mod: 26

## 2018-07-14 PROCEDURE — 71045 X-RAY EXAM CHEST 1 VIEW: CPT | Mod: 26

## 2018-07-14 PROCEDURE — 70360 X-RAY EXAM OF NECK: CPT | Mod: 26

## 2018-07-14 RX ORDER — DEXTROSE 50 % IN WATER 50 %
25 SYRINGE (ML) INTRAVENOUS ONCE
Qty: 0 | Refills: 0 | Status: DISCONTINUED | OUTPATIENT
Start: 2018-07-14 | End: 2018-07-31

## 2018-07-14 RX ORDER — PIPERACILLIN AND TAZOBACTAM 4; .5 G/20ML; G/20ML
3.38 INJECTION, POWDER, LYOPHILIZED, FOR SOLUTION INTRAVENOUS ONCE
Qty: 0 | Refills: 0 | Status: COMPLETED | OUTPATIENT
Start: 2018-07-14 | End: 2018-07-14

## 2018-07-14 RX ORDER — VANCOMYCIN HCL 1 G
1000 VIAL (EA) INTRAVENOUS EVERY 12 HOURS
Qty: 0 | Refills: 0 | Status: DISCONTINUED | OUTPATIENT
Start: 2018-07-14 | End: 2018-07-16

## 2018-07-14 RX ORDER — SODIUM CHLORIDE 9 MG/ML
1000 INJECTION INTRAMUSCULAR; INTRAVENOUS; SUBCUTANEOUS ONCE
Qty: 0 | Refills: 0 | Status: COMPLETED | OUTPATIENT
Start: 2018-07-14 | End: 2018-07-14

## 2018-07-14 RX ORDER — AMLODIPINE BESYLATE 2.5 MG/1
2.5 TABLET ORAL DAILY
Qty: 0 | Refills: 0 | Status: DISCONTINUED | OUTPATIENT
Start: 2018-07-14 | End: 2018-07-31

## 2018-07-14 RX ORDER — INSULIN GLARGINE 100 [IU]/ML
30 INJECTION, SOLUTION SUBCUTANEOUS AT BEDTIME
Qty: 0 | Refills: 0 | Status: DISCONTINUED | OUTPATIENT
Start: 2018-07-14 | End: 2018-07-17

## 2018-07-14 RX ORDER — SODIUM CHLORIDE 9 MG/ML
1000 INJECTION, SOLUTION INTRAVENOUS
Qty: 0 | Refills: 0 | Status: DISCONTINUED | OUTPATIENT
Start: 2018-07-14 | End: 2018-07-31

## 2018-07-14 RX ORDER — ATORVASTATIN CALCIUM 80 MG/1
20 TABLET, FILM COATED ORAL AT BEDTIME
Qty: 0 | Refills: 0 | Status: DISCONTINUED | OUTPATIENT
Start: 2018-07-14 | End: 2018-07-31

## 2018-07-14 RX ORDER — LEVETIRACETAM 250 MG/1
1000 TABLET, FILM COATED ORAL
Qty: 0 | Refills: 0 | Status: DISCONTINUED | OUTPATIENT
Start: 2018-07-14 | End: 2018-07-31

## 2018-07-14 RX ORDER — GLUCAGON INJECTION, SOLUTION 0.5 MG/.1ML
1 INJECTION, SOLUTION SUBCUTANEOUS ONCE
Qty: 0 | Refills: 0 | Status: DISCONTINUED | OUTPATIENT
Start: 2018-07-14 | End: 2018-07-31

## 2018-07-14 RX ORDER — ACETAMINOPHEN 500 MG
650 TABLET ORAL EVERY 6 HOURS
Qty: 0 | Refills: 0 | Status: DISCONTINUED | OUTPATIENT
Start: 2018-07-14 | End: 2018-07-31

## 2018-07-14 RX ORDER — PIPERACILLIN AND TAZOBACTAM 4; .5 G/20ML; G/20ML
3.38 INJECTION, POWDER, LYOPHILIZED, FOR SOLUTION INTRAVENOUS EVERY 8 HOURS
Qty: 0 | Refills: 0 | Status: DISCONTINUED | OUTPATIENT
Start: 2018-07-14 | End: 2018-07-18

## 2018-07-14 RX ORDER — DEXTROSE MONOHYDRATE, SODIUM CHLORIDE, AND POTASSIUM CHLORIDE 50; .745; 4.5 G/1000ML; G/1000ML; G/1000ML
1000 INJECTION, SOLUTION INTRAVENOUS
Qty: 0 | Refills: 0 | Status: DISCONTINUED | OUTPATIENT
Start: 2018-07-14 | End: 2018-07-15

## 2018-07-14 RX ORDER — DEXTROSE 50 % IN WATER 50 %
15 SYRINGE (ML) INTRAVENOUS ONCE
Qty: 0 | Refills: 0 | Status: DISCONTINUED | OUTPATIENT
Start: 2018-07-14 | End: 2018-07-31

## 2018-07-14 RX ORDER — METOPROLOL TARTRATE 50 MG
100 TABLET ORAL
Qty: 0 | Refills: 0 | Status: DISCONTINUED | OUTPATIENT
Start: 2018-07-14 | End: 2018-07-31

## 2018-07-14 RX ORDER — DEXTROSE 50 % IN WATER 50 %
12.5 SYRINGE (ML) INTRAVENOUS ONCE
Qty: 0 | Refills: 0 | Status: DISCONTINUED | OUTPATIENT
Start: 2018-07-14 | End: 2018-07-31

## 2018-07-14 RX ORDER — INSULIN LISPRO 100/ML
VIAL (ML) SUBCUTANEOUS
Qty: 0 | Refills: 0 | Status: DISCONTINUED | OUTPATIENT
Start: 2018-07-14 | End: 2018-07-15

## 2018-07-14 RX ORDER — HALOPERIDOL DECANOATE 100 MG/ML
5 INJECTION INTRAMUSCULAR ONCE
Qty: 0 | Refills: 0 | Status: COMPLETED | OUTPATIENT
Start: 2018-07-14 | End: 2018-07-14

## 2018-07-14 RX ADMIN — PIPERACILLIN AND TAZOBACTAM 200 GRAM(S): 4; .5 INJECTION, POWDER, LYOPHILIZED, FOR SOLUTION INTRAVENOUS at 18:59

## 2018-07-14 RX ADMIN — Medication 650 MILLIGRAM(S): at 21:01

## 2018-07-14 RX ADMIN — Medication 1 MILLIGRAM(S): at 14:40

## 2018-07-14 RX ADMIN — HALOPERIDOL DECANOATE 5 MILLIGRAM(S): 100 INJECTION INTRAMUSCULAR at 14:40

## 2018-07-14 RX ADMIN — SODIUM CHLORIDE 1000 MILLILITER(S): 9 INJECTION INTRAMUSCULAR; INTRAVENOUS; SUBCUTANEOUS at 15:45

## 2018-07-14 RX ADMIN — Medication 2 MILLIGRAM(S): at 21:05

## 2018-07-14 NOTE — H&P ADULT - PROBLEM SELECTOR PLAN 3
-Sliding scale AC & HS  -c/w Lantus 20 units at bedtime- home dose is 30 units, inc as indicated   -f/u HbA1c -c/w home dose amlodipine and lopressor   -Monitor BP

## 2018-07-14 NOTE — ED PROVIDER NOTE - OBJECTIVE STATEMENT
63 yr old female DNR from [facility] home with hx of non-verbal, bed bound, Trach, PEG tube, from NH, PMhx of HTN, asthma, convulsion disorder, bipolar, DVT legs, DM, sent from NH due to vomiting coffee ground color.  pt remains very active.

## 2018-07-14 NOTE — H&P ADULT - NEGATIVE OPHTHALMOLOGIC SYMPTOMS
no diplopia/no photophobia/no blurred vision R/no blurred vision L/no lacrimation L/no lacrimation R no lacrimation L/no lacrimation R

## 2018-07-14 NOTE — ED ADULT NURSE REASSESSMENT NOTE - NS ED NURSE REASSESS COMMENT FT1
received pt awake with trache to o2 mask,with saline lock intact n o rednes n o swelling noted,with family member at bedside.

## 2018-07-14 NOTE — ED ADULT NURSE REASSESSMENT NOTE - NS ED NURSE REASSESS COMMENT FT1
pt awake ,with trache to o2 ,not in acute distress,with saline lock intact no redness no swelling noted.

## 2018-07-14 NOTE — H&P ADULT - PROBLEM SELECTOR PLAN 1
-Leukocytosis 19  -CXR concerning for possible RLL infiltrate  -c/w IV zosyn   -f/u Chest CT  -ID: -Sepsis 2/2 Aspiration PNA  -Leukocytosis 19  -CXR concerning for possible RLL infiltrate  -c/w IV zosyn   -f/u Chest CT  -ID: Dr. Amin -Sepsis 2/2 Aspiration PNA  -Leukocytosis 19  -CXR concerning for possible RLL infiltrate  -c/w IV zosyn/ Vanc  -f/u Vanc trough  -f/u Chest CT  -ID: Dr. Kwon -Sepsis 2/2 Complicated Aspiration PNA/HCAP  -CXR concerning for possible RLL infiltrate  -c/w IV zosyn/ Vanc  -f/u Vanc trough  -f/u Chest CT  -ID: Dr. Kwon, Pulm - , Cardio -

## 2018-07-14 NOTE — ED PROVIDER NOTE - PROGRESS NOTE DETAILS
Nolen: cxr shows no sign of pna.  pt require multiple staff to hold down to obtain blood due to pt fighting staff.  haldol  and ativan given still active.    lab shows elevated wbc unknown why but with history likely aspiration pna.    Admit to med for possible aspiration pna.  ct chest non-con ordered- IM team to follow.

## 2018-07-14 NOTE — H&P ADULT - PROBLEM SELECTOR PLAN 5
[] Previous VTE                                                3  [] Thrombophilia                                             2  [] Lower limb paralysis                                   2    [] Current Cancer                                             2   [x] Immobilization > 24 hrs                              1  [] ICU/CCU stay > 24 hours                             1  [x] Age > 60                                                         1    IMPROVE VTE Score: 2; Lovenox for DVT prophy -c/w keppra therapy  -f/u keppra level

## 2018-07-14 NOTE — ED ADULT NURSE REASSESSMENT NOTE - NS ED NURSE REASSESS COMMENT FT1
Received pt awake, restless non verbal niece at bedside , on oxygen via tracheostomy color good skin warm and dry

## 2018-07-14 NOTE — H&P ADULT - PROBLEM SELECTOR PLAN 6
[] Previous VTE                                                3  [] Thrombophilia                                             2  [] Lower limb paralysis                                   2    [] Current Cancer                                             2   [x] Immobilization > 24 hrs                              1  [] ICU/CCU stay > 24 hours                             1  [x] Age > 60                                                         1    IMPROVE VTE Score: 2; Lovenox for DVT prophy

## 2018-07-14 NOTE — H&P ADULT - PROBLEM SELECTOR PLAN 2
-c/w home dose amlodipine and lopressor   -Monitor BP -Questionable coffee ground emesis from nursing home  -Hb stable  -monitor CBC daily  -Repeat CBC if pt has another episode of coffee ground emesis -Questionable coffee ground emesis from nursing home  -Hb stable  -monitor CBC daily  -Repeat CBC if pt has another episode of coffee ground emesis  -f/u Abd CT -Questionable coffee ground emesis from nursing home  -Hb stable  -monitor CBC daily  -Repeat CBC if pt has another episode of coffee ground emesis  -f/u Abd CT  npo, ivf, protonix,   GI -

## 2018-07-14 NOTE — H&P ADULT - HISTORY OF PRESENT ILLNESS
63 Y/O F,. non-verbal, bed bound, Trach, PEG tube, from NH, PMhx of HTN, asthma, convulsion disorder, bipolar, DVT legs, DM, sent from NH 61 Y/O F, non-verbal, bed bound, Trach/ PEG tube, from NH, PMhx of HTN, asthma, convulsion disorder, bipolar, DVT legs, DM, sent from NH for vomiting and cough.  Pt unable to provide history.  History given by sister who states pt was vomiting coffee ground emesis yesterday.  Pt had 2 episodes of reported vomiting, and has not had an episode since.  Pt also developed cough with clear sputum.  Sister states pt is combative at baseline, and her current behavior is baseline.  No reported fever, discomfort, diarrhea, or SOB.      In the ED, pt presents in no acute distress, and vitals WNL. 61 Y/O F, non-verbal, bed bound, Trach/ PEG tube, from NH, PMhx of HTN, asthma, convulsion disorder, bipolar, DVT legs, DM 2, sent from NH for vomiting and cough.  Pt unable to provide history.  History given by sister who states pt was vomiting coffee ground emesis yesterday.  Pt had 2 episodes of reported vomiting, and has not had an episode since.  Pt also developed cough with clear sputum.  Sister states pt is combative at baseline, and her current behavior is baseline.  No reported fever, discomfort, diarrhea, or SOB.

## 2018-07-14 NOTE — ED ADULT NURSE NOTE - OBJECTIVE STATEMENT
BIB EMS to R/O GI bleeding, bed bound none verbal baseline,  tracheostomy, PEG tube noted.  Pt refuse to blood draw become agitated and restless during assessment. MD Nolen made aware

## 2018-07-14 NOTE — H&P ADULT - ATTENDING COMMENTS
pt seen and evaluated  pt admitted for:   -Sepsis 2/2 likely Complicated Aspiration PNA/HCAP - broad spectrum abx, ivf, supportive care, nebs, aspiration precautions  -ARF from dehydration - ivf, treat infection, monitor renal function   -Uncontrolled Type 2 DM with complications of hyperglycemia - lantus, moderate ihss, monitor acks  -Hematemesis - at NH. NPO, IVF, f/u ct abd. GI on board.

## 2018-07-14 NOTE — H&P ADULT - PROBLEM SELECTOR PLAN 4
-c/w keppra therapy  -f/u keppra level -Sliding scale AC & HS  -c/w Lantus 20 units at bedtime- home dose is 30 units, inc as indicated   -f/u HbA1c uncontrolled  -Sliding scale AC & HS  -c/w Lantus 20 units at bedtime- home dose is 30 units, inc as indicated   -f/u HbA1c

## 2018-07-14 NOTE — ED PROVIDER NOTE - MEDICAL DECISION MAKING DETAILS
63 yr old female DNR from [facility] home with hx of non-verbal, bed bound, Trach, PEG tube, from NH, PMhx of HTN, asthma, convulsion disorder, bipolar, DVT legs, DM, sent from NH due to vomiting coffee ground color.  pt remains very active.    pt likely with aspiration pna vs pneumonitis.  labs, cxr, fluids, zosyn, admit

## 2018-07-14 NOTE — H&P ADULT - NSHPPHYSICALEXAM_GEN_ALL_CORE
Vital Signs Last 24 Hrs  T(C): 36.7 (13 Jul 2018 20:20), Max: 36.7 (13 Jul 2018 20:20)  T(F): 98 (13 Jul 2018 20:20), Max: 98 (13 Jul 2018 20:20)  HR: 100 (14 Jul 2018 11:34) (91 - 100)  BP: 122/92 (14 Jul 2018 11:34) (122/92 - 154/69)  RR: 22 (14 Jul 2018 11:34) (20 - 22)  SpO2: 100% (14 Jul 2018 11:34) (100% - 100%)

## 2018-07-14 NOTE — H&P ADULT - RS GEN PE MLT RESP DETAILS PC
good air movement/airway patent/breath sounds equal/respirations non-labored breath sounds equal/good air movement/clear to auscultation bilaterally/respirations non-labored/airway patent

## 2018-07-15 DIAGNOSIS — N17.9 ACUTE KIDNEY FAILURE, UNSPECIFIED: ICD-10-CM

## 2018-07-15 DIAGNOSIS — E86.0 DEHYDRATION: ICD-10-CM

## 2018-07-15 DIAGNOSIS — I10 ESSENTIAL (PRIMARY) HYPERTENSION: ICD-10-CM

## 2018-07-15 LAB
ANION GAP SERPL CALC-SCNC: 10 MMOL/L — SIGNIFICANT CHANGE UP (ref 5–17)
APPEARANCE UR: ABNORMAL
BILIRUB UR-MCNC: NEGATIVE — SIGNIFICANT CHANGE UP
BUN SERPL-MCNC: 19 MG/DL — HIGH (ref 7–18)
CALCIUM SERPL-MCNC: 9 MG/DL — SIGNIFICANT CHANGE UP (ref 8.4–10.5)
CHLORIDE SERPL-SCNC: 110 MMOL/L — HIGH (ref 96–108)
CHOLEST SERPL-MCNC: 124 MG/DL — SIGNIFICANT CHANGE UP (ref 10–199)
CO2 SERPL-SCNC: 24 MMOL/L — SIGNIFICANT CHANGE UP (ref 22–31)
COLOR SPEC: YELLOW — SIGNIFICANT CHANGE UP
CREAT SERPL-MCNC: 0.68 MG/DL — SIGNIFICANT CHANGE UP (ref 0.5–1.3)
DIFF PNL FLD: ABNORMAL
GLUCOSE SERPL-MCNC: 133 MG/DL — HIGH (ref 70–99)
GLUCOSE UR QL: 250
HBA1C BLD-MCNC: 9.8 % — HIGH (ref 4–5.6)
HCT VFR BLD CALC: 38.7 % — SIGNIFICANT CHANGE UP (ref 34.5–45)
HDLC SERPL-MCNC: 49 MG/DL — SIGNIFICANT CHANGE UP (ref 40–125)
HGB BLD-MCNC: 12.2 G/DL — SIGNIFICANT CHANGE UP (ref 11.5–15.5)
KETONES UR-MCNC: ABNORMAL
LACTATE SERPL-SCNC: 0.8 MMOL/L — SIGNIFICANT CHANGE UP (ref 0.7–2)
LEUKOCYTE ESTERASE UR-ACNC: ABNORMAL
LIPID PNL WITH DIRECT LDL SERPL: 60 MG/DL — SIGNIFICANT CHANGE UP
MAGNESIUM SERPL-MCNC: 1.9 MG/DL — SIGNIFICANT CHANGE UP (ref 1.6–2.6)
MCHC RBC-ENTMCNC: 25.3 PG — LOW (ref 27–34)
MCHC RBC-ENTMCNC: 31.6 GM/DL — LOW (ref 32–36)
MCV RBC AUTO: 80.2 FL — SIGNIFICANT CHANGE UP (ref 80–100)
NITRITE UR-MCNC: POSITIVE
PH UR: 6 — SIGNIFICANT CHANGE UP (ref 5–8)
PHOSPHATE SERPL-MCNC: 2.2 MG/DL — LOW (ref 2.5–4.5)
PLATELET # BLD AUTO: 180 K/UL — SIGNIFICANT CHANGE UP (ref 150–400)
POTASSIUM SERPL-MCNC: 4.1 MMOL/L — SIGNIFICANT CHANGE UP (ref 3.5–5.3)
POTASSIUM SERPL-SCNC: 4.1 MMOL/L — SIGNIFICANT CHANGE UP (ref 3.5–5.3)
PROCALCITONIN SERPL-MCNC: 0.32 NG/ML — HIGH (ref 0.02–0.1)
PROT UR-MCNC: 100
RBC # BLD: 4.82 M/UL — SIGNIFICANT CHANGE UP (ref 3.8–5.2)
RBC # FLD: 13.9 % — SIGNIFICANT CHANGE UP (ref 10.3–14.5)
SODIUM SERPL-SCNC: 144 MMOL/L — SIGNIFICANT CHANGE UP (ref 135–145)
SP GR SPEC: 1.02 — SIGNIFICANT CHANGE UP (ref 1.01–1.02)
TOTAL CHOLESTEROL/HDL RATIO MEASUREMENT: 2.5 RATIO — LOW (ref 3.3–7.1)
TRIGL SERPL-MCNC: 77 MG/DL — SIGNIFICANT CHANGE UP (ref 10–149)
TSH SERPL-MCNC: 2.92 UU/ML — SIGNIFICANT CHANGE UP (ref 0.34–4.82)
UROBILINOGEN FLD QL: NEGATIVE — SIGNIFICANT CHANGE UP
VIT B12 SERPL-MCNC: 1644 PG/ML — HIGH (ref 232–1245)
WBC # BLD: 23 K/UL — HIGH (ref 3.8–10.5)
WBC # FLD AUTO: 23 K/UL — HIGH (ref 3.8–10.5)

## 2018-07-15 PROCEDURE — 74176 CT ABD & PELVIS W/O CONTRAST: CPT | Mod: 26

## 2018-07-15 PROCEDURE — 71250 CT THORAX DX C-: CPT | Mod: 26

## 2018-07-15 RX ORDER — SODIUM CHLORIDE 9 MG/ML
1000 INJECTION, SOLUTION INTRAVENOUS
Qty: 0 | Refills: 0 | Status: DISCONTINUED | OUTPATIENT
Start: 2018-07-15 | End: 2018-07-15

## 2018-07-15 RX ORDER — INSULIN LISPRO 100/ML
VIAL (ML) SUBCUTANEOUS
Qty: 0 | Refills: 0 | Status: DISCONTINUED | OUTPATIENT
Start: 2018-07-15 | End: 2018-07-17

## 2018-07-15 RX ORDER — PANTOPRAZOLE SODIUM 20 MG/1
40 TABLET, DELAYED RELEASE ORAL
Qty: 0 | Refills: 0 | Status: DISCONTINUED | OUTPATIENT
Start: 2018-07-15 | End: 2018-07-17

## 2018-07-15 RX ORDER — IPRATROPIUM/ALBUTEROL SULFATE 18-103MCG
3 AEROSOL WITH ADAPTER (GRAM) INHALATION EVERY 6 HOURS
Qty: 0 | Refills: 0 | Status: DISCONTINUED | OUTPATIENT
Start: 2018-07-15 | End: 2018-07-31

## 2018-07-15 RX ORDER — SODIUM CHLORIDE 9 MG/ML
1000 INJECTION, SOLUTION INTRAVENOUS
Qty: 0 | Refills: 0 | Status: DISCONTINUED | OUTPATIENT
Start: 2018-07-15 | End: 2018-07-17

## 2018-07-15 RX ADMIN — Medication 250 MILLIGRAM(S): at 06:06

## 2018-07-15 RX ADMIN — SODIUM CHLORIDE 80 MILLILITER(S): 9 INJECTION, SOLUTION INTRAVENOUS at 22:06

## 2018-07-15 RX ADMIN — Medication 1 MILLIGRAM(S): at 22:05

## 2018-07-15 RX ADMIN — Medication 5: at 08:00

## 2018-07-15 RX ADMIN — PIPERACILLIN AND TAZOBACTAM 25 GRAM(S): 4; .5 INJECTION, POWDER, LYOPHILIZED, FOR SOLUTION INTRAVENOUS at 22:06

## 2018-07-15 RX ADMIN — Medication 100 MILLIGRAM(S): at 17:58

## 2018-07-15 RX ADMIN — Medication 250 MILLIGRAM(S): at 17:57

## 2018-07-15 RX ADMIN — Medication 3 MILLILITER(S): at 20:22

## 2018-07-15 RX ADMIN — Medication 100 MILLIGRAM(S): at 06:07

## 2018-07-15 RX ADMIN — PIPERACILLIN AND TAZOBACTAM 25 GRAM(S): 4; .5 INJECTION, POWDER, LYOPHILIZED, FOR SOLUTION INTRAVENOUS at 13:55

## 2018-07-15 RX ADMIN — Medication 4: at 17:41

## 2018-07-15 RX ADMIN — PIPERACILLIN AND TAZOBACTAM 25 GRAM(S): 4; .5 INJECTION, POWDER, LYOPHILIZED, FOR SOLUTION INTRAVENOUS at 06:06

## 2018-07-15 RX ADMIN — Medication 1 MILLIGRAM(S): at 13:55

## 2018-07-15 RX ADMIN — INSULIN GLARGINE 30 UNIT(S): 100 INJECTION, SOLUTION SUBCUTANEOUS at 01:01

## 2018-07-15 RX ADMIN — AMLODIPINE BESYLATE 2.5 MILLIGRAM(S): 2.5 TABLET ORAL at 06:07

## 2018-07-15 RX ADMIN — LEVETIRACETAM 1000 MILLIGRAM(S): 250 TABLET, FILM COATED ORAL at 06:06

## 2018-07-15 RX ADMIN — LEVETIRACETAM 1000 MILLIGRAM(S): 250 TABLET, FILM COATED ORAL at 17:57

## 2018-07-15 RX ADMIN — SODIUM CHLORIDE 80 MILLILITER(S): 9 INJECTION, SOLUTION INTRAVENOUS at 12:05

## 2018-07-15 RX ADMIN — DEXTROSE MONOHYDRATE, SODIUM CHLORIDE, AND POTASSIUM CHLORIDE 70 MILLILITER(S): 50; .745; 4.5 INJECTION, SOLUTION INTRAVENOUS at 01:03

## 2018-07-15 RX ADMIN — Medication 0.5 MILLIGRAM(S): at 09:16

## 2018-07-15 NOTE — CONSULT NOTE ADULT - SUBJECTIVE AND OBJECTIVE BOX
Time of visit:1200    CHIEF COMPLAINT: Patient is a 63y old  Female who presents with a chief complaint of SOB (14 Jul 2018 18:51)      HPI:  63 Y/O F, non-verbal, bed bound, Trach/ PEG tube, from NH, PMhx of HTN, asthma, convulsion disorder, bipolar, DVT legs, DM 2, sent from NH for vomiting and cough.  Pt unable to provide history.  History given by sister who states pt was vomiting coffee ground emesis yesterday.  Pt had 2 episodes of reported vomiting, and has not had an episode since.  Pt also developed cough with clear sputum.  Sister states pt is combative at baseline, and her current behavior is baseline.  No reported fever, discomfort, diarrhea, or SOB. (14 Jul 2018 18:51)   Patient seen and examined.     PAST MEDICAL & SURGICAL HISTORY:  No pertinent past medical history  Hypertension  Schizophrenia  Diabetic coma  Diabetes mellitus  No significant past surgical history  S/P percutaneous endoscopic gastrostomy (PEG) tube placement  H/O tracheostomy      Allergies    angiotensin converting enzyme inhibitors (Unknown)    Intolerances        MEDICATIONS  (STANDING):  amLODIPine   Tablet 2.5 milliGRAM(s) Oral daily  atorvastatin 20 milliGRAM(s) Oral at bedtime  dextrose 5%. 1000 milliLiter(s) (50 mL/Hr) IV Continuous <Continuous>  dextrose 50% Injectable 12.5 Gram(s) IV Push once  dextrose 50% Injectable 25 Gram(s) IV Push once  dextrose 50% Injectable 25 Gram(s) IV Push once  insulin glargine Injectable (LANTUS) 30 Unit(s) SubCutaneous at bedtime  insulin lispro (HumaLOG) corrective regimen sliding scale   SubCutaneous three times a day before meals  levETIRAcetam  Solution 1000 milliGRAM(s) Oral two times a day  LORazepam     Tablet 1 milliGRAM(s) Oral three times a day  metoprolol tartrate 100 milliGRAM(s) Oral two times a day  pantoprazole    Tablet 40 milliGRAM(s) Oral before breakfast  piperacillin/tazobactam IVPB. 3.375 Gram(s) IV Intermittent every 8 hours  sodium chloride 0.45%. 1000 milliLiter(s) (80 mL/Hr) IV Continuous <Continuous>  vancomycin  IVPB 1000 milliGRAM(s) IV Intermittent every 12 hours      MEDICATIONS  (PRN):  acetaminophen    Suspension 650 milliGRAM(s) Oral every 6 hours PRN For Temp greater than 38 C (100.4 F)  dextrose 40% Gel 15 Gram(s) Oral once PRN Blood Glucose LESS THAN 70 milliGRAM(s)/deciLiter  glucagon  Injectable 1 milliGRAM(s) IntraMuscular once PRN Glucose <70 milliGRAM(s)/deciLiter   Medications up to date at time of exam.    Medications up to date at time of exam.    FAMILY HISTORY:  No pertinent family history in first degree relatives      SOCIAL HISTORY  Smoking History: [  x ] unknown  Living Condition: [   x] NH resident  Work History:   Travel History: denies recent travel  Illicit Substance Use: denies  Alcohol Use: denies    REVIEW OF SYSTEMS: pat is trach and PEG and none verbal    CONSTITUTIONAL:  denies fevers, chills, sweats, weight loss    HEENT:  denies diplopia or blurred vision, sore throat or runny nose.    CARDIOVASCULAR:  denies pressure, squeezing, tightness, or heaviness about the chest; no palpitations.    RESPIRATORY:  denies SOB, cough, DIXON, wheezing.    GASTROINTESTINAL:  denies abdominal pain, nausea, vomiting or diarrhea.    GENITOURINARY: denies dysuria, frequency or urgency.    NEUROLOGIC:  denies numbness, tingling, seizures or weakness.    PSYCHIATRIC:  denies disorder of thought or mood.    MSK: denies swelling, redness      PHYSICAL EXAMINATION:    GENERAL: The patient is a well-developed, well-nourished, in no apparent distress.     Vital Signs Last 24 Hrs  T(C): 36.6 (15 Jul 2018 13:50), Max: 38.2 (14 Jul 2018 20:31)  T(F): 97.8 (15 Jul 2018 13:50), Max: 100.7 (14 Jul 2018 20:31)  HR: 90 (15 Jul 2018 14:02) (85 - 102)  BP: 160/90 (15 Jul 2018 13:50) (123/75 - 160/90)  BP(mean): --  RR: 18 (15 Jul 2018 14:02) (16 - 22)  SpO2: 100% (15 Jul 2018 14:02) (97% - 100%)   (if applicable)    Chest Tube (if applicable)    HEENT: Head is normocephalic and atraumatic. Extraocular muscles are intact. Mucous membranes are moist.     NECK: + trach    LUNGS: Clear to auscultation, no wheezing, rales, or rhonchi.    HEART: Regular rate and rhythm without murmur.    ABDOMEN: Soft, nontender, and nondistended. + PEG    EXTREMITIES: Without any cyanosis, clubbing, rash, lesions or edema.    NEUROLOGIC: Awake, do not follow commands    SKIN: Warm, dry, good turgor.      LABS:                        12.2   23.0  )-----------( 180      ( 15 Jul 2018 12:45 )             38.7     07-15    144  |  110<H>  |  19<H>  ----------------------------<  133<H>  4.1   |  24  |  0.68    Ca    9.0      15 Jul 2018 12:45  Phos  2.2     07-15  Mg     1.9     07-15    TPro  7.4  /  Alb  3.4<L>  /  TBili  0.9  /  DBili  x   /  AST  26  /  ALT  44  /  AlkPhos  108  07-14                  Lactate, Blood: 0.8 mmol/L (07-15-18 @ 12:45)        MICROBIOLOGY: (if applicable)    RADIOLOGY & ADDITIONAL STUDIES:  EKG:   CT chest:< from: CT Chest No Cont (07.15.18 @ 11:14) >    Multiple axial images of the chest, abdomen andpelvis were obtained from   the lung apices through symphysis pubis without the administration of   oral or intravascular contrast limiting the sensitivity of evaluation.   Reformatted coronal and sagittal images are submitted.    COMPARISON: CT evaluation abdomen/pelvis July 23, 2016.    FINDINGS: Mid to distal esophageal wall thickening is suggested.   Correlate with endoscopic evaluation.    A midline tracheostomy is identified.    A PEG tube is identified, the distal aspect of which is identified in the   region of the pylorus/duodenal bulb; correlate clinically as to   appropriate positioning.    No abnormally enlarged mediastinal or hilar lymph nodes are noted. There   is no evidence for axillary lymphadenopathy. The heart size appears   within normal limits. No pericardial effusion is identified. Thoracic   aortic caliber demonstrates unremarkable caliber.    The central bronchial anatomy appears patent.    Elevation of the right hemidiaphragm is identified. There is opacity   identified within the right lower lobe lung parenchyma, likely   representative of a combination of both atelectasis and consolidation. No   additional regions of lung parenchymal infiltrate or consolidation   identified bilaterally. A small right pleuraleffusion is noted. There is   no evidence for left pleural effusion. There is no evidence of   pneumothorax. No mediastinal shift is noted.    The liver is normal in size. No focal hepatic masses are identified.   There is no evidence for intrahepatic or extrahepatic biliary dilatation.   No gallstones, gallbladder wall thickening or pericholecystic fluid   identified.    The spleen, pancreas and adrenal glands are unremarkable.    The kidneys enhance bilaterally, without evidence for space-occupying   lesion or hydronephrosis. A small less than 3 mm calculus is noted within   the upper pole region of the left kidney. No right renal calculi are   identified. The abdominal aorta is normal in course and caliber. No   abnormally enlarged retroperitoneal or pelvic lymphadenopathy is noted.    Fecal material is scattered throughout the colon. There is no evidence   for mechanical bowel obstruction. No colonic wall thickening is   identified. There is no evidence for free intraperitoneal air orfluid.   There is no evidence for acute appendicitis.    The uterus and urinary bladder appear unremarkable.     No acute osseous fractures are evident.    IMPRESSION:    1. Mid to distal esophageal wall thickening is suggested. Correlate with   endoscopic evaluation.  2. A PEG tube is identified, the distal aspect of which is identified in   the region of the pylorus/duodenal bulb; correlate clinically as to   appropriate positioning.  3. There is opacity identified within the right lower lobe lung   parenchyma, likely representative of a combination of both atelectasis   and consolidation. Small right pleural effusion.        < end of copied text >    ECHO:    IMPRESSION: 63y Female PAST MEDICAL & SURGICAL HISTORY:  Hypertension  Schizophrenia  Diabetic coma  Diabetes mellitus  metabolic encephalopathy  No significant past surgical history  S/P percutaneous endoscopic gastrostomy (PEG) tube placement  H/O tracheostomy   p/w      presented with cough, sob and vomiting. Pat has leukocytosis with RLL opacity due to asp pna most likely gram neg organism since she is from a NH. Antibx was started.      Sugg: continue antibx with gram neg coverage   -f/u cultures  -continue O2 40 %  via trach collar   -frequent pulmo hygiene  -add duonebs  -dvt/gi prophy  -monitor blood sugar level

## 2018-07-15 NOTE — PROGRESS NOTE ADULT - SUBJECTIVE AND OBJECTIVE BOX
Patient seen and examined at bedside  agitated and combative overnight  febrile and tachy overnight   Case discussed with medical team    HPI:  63 Y/O F, non-verbal, bed bound, Trach/ PEG tube, from NH, PMhx of HTN, asthma, convulsion disorder, bipolar, DVT legs, DM 2, sent from NH for vomiting and cough.  Pt unable to provide history.  History given by sister who states pt was vomiting coffee ground emesis yesterday.  Pt had 2 episodes of reported vomiting, and has not had an episode since.  Pt also developed cough with clear sputum.  Sister states pt is combative at baseline, and her current behavior is baseline.  No reported fever, discomfort, diarrhea, or SOB. (14 Jul 2018 18:51)      PAST MEDICAL & SURGICAL HISTORY:  No pertinent past medical history  Hypertension  Schizophrenia  Diabetic coma  Diabetes mellitus  No significant past surgical history  S/P percutaneous endoscopic gastrostomy (PEG) tube placement  H/O tracheostomy      angiotensin converting enzyme inhibitors (Unknown)       MEDICATIONS  (STANDING):  amLODIPine   Tablet 2.5 milliGRAM(s) Oral daily  atorvastatin 20 milliGRAM(s) Oral at bedtime  dextrose 5%. 1000 milliLiter(s) (50 mL/Hr) IV Continuous <Continuous>  dextrose 50% Injectable 12.5 Gram(s) IV Push once  dextrose 50% Injectable 25 Gram(s) IV Push once  dextrose 50% Injectable 25 Gram(s) IV Push once  insulin glargine Injectable (LANTUS) 30 Unit(s) SubCutaneous at bedtime  insulin lispro (HumaLOG) corrective regimen sliding scale   SubCutaneous three times a day before meals  levETIRAcetam  Solution 1000 milliGRAM(s) Oral two times a day  LORazepam     Tablet 1 milliGRAM(s) Oral three times a day  metoprolol tartrate 100 milliGRAM(s) Oral two times a day  pantoprazole    Tablet 40 milliGRAM(s) Oral before breakfast  piperacillin/tazobactam IVPB. 3.375 Gram(s) IV Intermittent every 8 hours  sodium chloride 0.45%. 1000 milliLiter(s) (80 mL/Hr) IV Continuous <Continuous>  vancomycin  IVPB 1000 milliGRAM(s) IV Intermittent every 12 hours    MEDICATIONS  (PRN):  acetaminophen    Suspension 650 milliGRAM(s) Oral every 6 hours PRN For Temp greater than 38 C (100.4 F)  dextrose 40% Gel 15 Gram(s) Oral once PRN Blood Glucose LESS THAN 70 milliGRAM(s)/deciLiter  glucagon  Injectable 1 milliGRAM(s) IntraMuscular once PRN Glucose <70 milliGRAM(s)/deciLiter      REVIEW OF SYSTEMS: limited    T(C): 36.6 (07-15-18 @ 04:39), Max: 38.2 (07-14-18 @ 20:31)  HR: 100 (07-15-18 @ 04:39) (89 - 102)  BP: 142/86 (07-15-18 @ 04:39) (122/92 - 144/84)  RR: 18 (07-15-18 @ 04:39) (18 - 22)  SpO2: 100% (07-15-18 @ 04:39) (97% - 100%)    PHYSICAL EXAMINATION:   Constitutional: NAD  HEENT: NC, AT  Neck:  Supple  Respiratory:  trach intact. rhonchi. Adequate airflow b/l. Not using accessory muscles of respiration.  Cardiovascular: diastolic murmur, S1 & S2 intact, no R/G, 2+ radial pulses b/l  Gastrointestinal: soft, ND, normoactive b.s.,  Extremities: WWP  Neurological:  awake. Crude sensation intact.     Labs and imaging reviewed    LABS:                        13.4   19.2  )-----------( 183      ( 14 Jul 2018 17:50 )             44.0     07-14    148<H>  |  113<H>  |  28<H>  ----------------------------<  308<H>  5.1   |  27  |  1.12    Ca    9.0      14 Jul 2018 17:50    TPro  7.4  /  Alb  3.4<L>  /  TBili  0.9  /  DBili  x   /  AST  26  /  ALT  44  /  AlkPhos  108  07-14            CAPILLARY BLOOD GLUCOSE      POCT Blood Glucose.: 353 mg/dL (15 Jul 2018 07:56)  POCT Blood Glucose.: 254 mg/dL (15 Jul 2018 00:42)  POCT Blood Glucose.: 230 mg/dL (14 Jul 2018 22:22)  POCT Blood Glucose.: 316 mg/dL (14 Jul 2018 15:54)        LIVER FUNCTIONS - ( 14 Jul 2018 17:50 )  Alb: 3.4 g/dL / Pro: 7.4 g/dL / ALK PHOS: 108 U/L / ALT: 44 U/L DA / AST: 26 U/L / GGT: x               RADIOLOGY & ADDITIONAL STUDIES:

## 2018-07-15 NOTE — CONSULT NOTE ADULT - SUBJECTIVE AND OBJECTIVE BOX
A 61 yo Female with multiple co-morbidities including non-verbal, bed bound, Trach/ PEG tube, sent in to the ER from NH for evaluation of shortness of breath and cough after vomiting. On admission, she has no fever or chills but Leukocytosis. The CXR  shows No consolidation but CT chest shows RLL opacity. She has  started on Vancomycin and Zosyn and The ID consult  requested to assist with further evaluation and antibiotic management.         REVIEW OF SYSTEMS: Unable to obtained since nonverbal, unless mentioned in HPI        PAST MEDICAL & SURGICAL HISTORY:  Hypertension  Schizophrenia  Diabetic coma  Diabetes mellitus  No significant past surgical history  S/P percutaneous endoscopic gastrostomy (PEG) tube placement  H/O tracheostomy      SOCIAL HISTORY  Alcohol: Does not drink  Tobacco: Does not smoke  Illicit substance use: None      FAMILY HISTORY: Non contributory to the present illness        ALLERGIES: ACIs        T(C): 36.6 (07-15-18 @ 13:50), Max: 38.2 (07-14-18 @ 20:31)  HR: 90 (07-15-18 @ 14:02) (85 - 102)  BP: 160/90 (07-15-18 @ 13:50) (123/75 - 160/90)  RR: 18 (07-15-18 @ 14:02) (16 - 22)  SpO2: 100% (07-15-18 @ 14:02) (97% - 100%)  Wt(kg): --  I&O's Summary        PHYSICAL EXAM:  GENERAL: Not in acute distress  CVS: S1 and s2 present  RESP: Air entry B/L  GI: Abdomen nondistended and PEG in placed  EXT: No pedal edema  CNS: Awake and  alert        LABS:                        12.2   23.0  )-----------( 180      ( 15 Jul 2018 12:45 )             38.7     07-15    144  |  110<H>  |  19<H>  ----------------------------<  133<H>  4.1   |  24  |  0.68    Ca    9.0      15 Jul 2018 12:45  Phos  2.2     07-15  Mg     1.9     07-15    TPro  7.4  /  Alb  3.4<L>  /  TBili  0.9  /  DBili  x   /  AST  26  /  ALT  44  /  AlkPhos  108  07-14        CAPILLARY BLOOD GLUCOSE      POCT Blood Glucose.: 211 mg/dL (15 Jul 2018 17:22)  POCT Blood Glucose.: 123 mg/dL (15 Jul 2018 11:44)  POCT Blood Glucose.: 353 mg/dL (15 Jul 2018 07:56)  POCT Blood Glucose.: 254 mg/dL (15 Jul 2018 00:42)  POCT Blood Glucose.: 230 mg/dL (14 Jul 2018 22:22)            MEDICATIONS  (STANDING):  ALBUTerol/ipratropium for Nebulization 3 milliLiter(s) Nebulizer every 6 hours  amLODIPine   Tablet 2.5 milliGRAM(s) Oral daily  atorvastatin 20 milliGRAM(s) Oral at bedtime  dextrose 5%. 1000 milliLiter(s) (50 mL/Hr) IV Continuous <Continuous>  dextrose 50% Injectable 12.5 Gram(s) IV Push once  dextrose 50% Injectable 25 Gram(s) IV Push once  dextrose 50% Injectable 25 Gram(s) IV Push once  insulin glargine Injectable (LANTUS) 30 Unit(s) SubCutaneous at bedtime  insulin lispro (HumaLOG) corrective regimen sliding scale   SubCutaneous three times a day before meals  levETIRAcetam  Solution 1000 milliGRAM(s) Oral two times a day  LORazepam     Tablet 1 milliGRAM(s) Oral three times a day  metoprolol tartrate 100 milliGRAM(s) Oral two times a day  pantoprazole    Tablet 40 milliGRAM(s) Oral before breakfast  piperacillin/tazobactam IVPB. 3.375 Gram(s) IV Intermittent every 8 hours  sodium chloride 0.45%. 1000 milliLiter(s) (80 mL/Hr) IV Continuous <Continuous>  vancomycin  IVPB 1000 milliGRAM(s) IV Intermittent every 12 hours    MEDICATIONS  (PRN):  acetaminophen    Suspension 650 milliGRAM(s) Oral every 6 hours PRN For Temp greater than 38 C (100.4 F)  dextrose 40% Gel 15 Gram(s) Oral once PRN Blood Glucose LESS THAN 70 milliGRAM(s)/deciLiter  glucagon  Injectable 1 milliGRAM(s) IntraMuscular once PRN Glucose <70 milliGRAM(s)/deciLiter        RADIOLOGY & ADDITIONAL TESTS:    7/15/18: CT Chest No Cont (07.15.18 @ 11:14) 1. Mid to distal esophageal wall thickening is suggested. Correlate with endoscopic evaluation.  2. A PEG tube is identified, the distal aspect of which is identified in the region of the pylorus/duodenal bulb; correlate clinically as to appropriate positioning.  3. There is opacity identified within the right lower lobe lung parenchyma, likely representative of a combination of both atelectasis and consolidation. Small right pleural effusion.    7/14/18: Xray Chest 1 View AP/PA (07.14.18 @ 15:34)No consolidation. Platelike atelectasis in the right lower lobe. A 61 yo Female with multiple co-morbidities including non-verbal, bed bound, Trach/ PEG tube, sent in to the ER from NH for evaluation of shortness of breath and cough after vomiting. On admission, she has no fever or chills but Leukocytosis. The CXR  shows No consolidation but CT chest shows RLL opacity. She has  started on Vancomycin and Zosyn and The ID consult  requested to assist with further evaluation and antibiotic management.         REVIEW OF SYSTEMS: Unable to obtained since nonverbal, unless mentioned in HPI        PAST MEDICAL & SURGICAL HISTORY:  Hypertension  Schizophrenia  Diabetic coma  Diabetes mellitus  No significant past surgical history  S/P percutaneous endoscopic gastrostomy (PEG) tube placement  H/O tracheostomy      SOCIAL HISTORY  Alcohol: Does not drink  Tobacco: Does not smoke  Illicit substance use: None      FAMILY HISTORY: Non contributory to the present illness        ALLERGIES: ACIs        T(C): 36.6 (07-15-18 @ 13:50), Max: 38.2 (07-14-18 @ 20:31)  HR: 90 (07-15-18 @ 14:02) (85 - 102)  BP: 160/90 (07-15-18 @ 13:50) (123/75 - 160/90)  RR: 18 (07-15-18 @ 14:02) (16 - 22)  SpO2: 100% (07-15-18 @ 14:02) (97% - 100%)  Wt(kg): --  I&O's Summary        PHYSICAL EXAM:  GENERAL: Not in acute distress  HEENT: Trach in placed  CVS: S1 and s2 present  RESP: Air entry B/L  GI: Abdomen nondistended, NT and PEG in placed  EXT: No pedal edema, hands contracted   CNS: Awake but not alert        LABS:                        12.2   23.0  )-----------( 180      ( 15 Jul 2018 12:45 )             38.7         07-15    144  |  110<H>  |  19<H>  ----------------------------<  133<H>  4.1   |  24  |  0.68    Ca    9.0      15 Jul 2018 12:45  Phos  2.2     07-15  Mg     1.9     07-15    TPro  7.4  /  Alb  3.4<L>  /  TBili  0.9  /  DBili  x   /  AST  26  /  ALT  44  /  AlkPhos  108  07-14        CAPILLARY BLOOD GLUCOSE      POCT Blood Glucose.: 211 mg/dL (15 Jul 2018 17:22)  POCT Blood Glucose.: 123 mg/dL (15 Jul 2018 11:44)  POCT Blood Glucose.: 353 mg/dL (15 Jul 2018 07:56)  POCT Blood Glucose.: 254 mg/dL (15 Jul 2018 00:42)  POCT Blood Glucose.: 230 mg/dL (14 Jul 2018 22:22)        MEDICATIONS  (STANDING):  ALBUTerol/ipratropium for Nebulization 3 milliLiter(s) Nebulizer every 6 hours  amLODIPine   Tablet 2.5 milliGRAM(s) Oral daily  atorvastatin 20 milliGRAM(s) Oral at bedtime  dextrose 5%. 1000 milliLiter(s) (50 mL/Hr) IV Continuous <Continuous>  dextrose 50% Injectable 12.5 Gram(s) IV Push once  dextrose 50% Injectable 25 Gram(s) IV Push once  dextrose 50% Injectable 25 Gram(s) IV Push once  insulin glargine Injectable (LANTUS) 30 Unit(s) SubCutaneous at bedtime  insulin lispro (HumaLOG) corrective regimen sliding scale   SubCutaneous three times a day before meals  levETIRAcetam  Solution 1000 milliGRAM(s) Oral two times a day  LORazepam     Tablet 1 milliGRAM(s) Oral three times a day  metoprolol tartrate 100 milliGRAM(s) Oral two times a day  pantoprazole    Tablet 40 milliGRAM(s) Oral before breakfast  piperacillin/tazobactam IVPB. 3.375 Gram(s) IV Intermittent every 8 hours  sodium chloride 0.45%. 1000 milliLiter(s) (80 mL/Hr) IV Continuous <Continuous>  vancomycin  IVPB 1000 milliGRAM(s) IV Intermittent every 12 hours    MEDICATIONS  (PRN):  acetaminophen    Suspension 650 milliGRAM(s) Oral every 6 hours PRN For Temp greater than 38 C (100.4 F)  dextrose 40% Gel 15 Gram(s) Oral once PRN Blood Glucose LESS THAN 70 milliGRAM(s)/deciLiter  glucagon  Injectable 1 milliGRAM(s) IntraMuscular once PRN Glucose <70 milliGRAM(s)/deciLiter        RADIOLOGY & ADDITIONAL TESTS:    7/15/18: CT Chest No Cont (07.15.18 @ 11:14) 1. Mid to distal esophageal wall thickening is suggested. Correlate with endoscopic evaluation.  2. A PEG tube is identified, the distal aspect of which is identified in the region of the pylorus/duodenal bulb; correlate clinically as to appropriate positioning.  3. There is opacity identified within the right lower lobe lung parenchyma, likely representative of a combination of both atelectasis and consolidation. Small right pleural effusion.    7/14/18: Xray Chest 1 View AP/PA (07.14.18 @ 15:34)No consolidation. Platelike atelectasis in the right lower lobe.

## 2018-07-15 NOTE — CONSULT NOTE ADULT - PROBLEM SELECTOR RECOMMENDATION 3
If BP remains elevated would increase amlodipine to 5mg daily, but given that HTN is likely due to IVF, will just monitor for now.

## 2018-07-15 NOTE — PROGRESS NOTE ADULT - ASSESSMENT
61 Y/O F, non-verbal, bed bound, Trach/ PEG tube, from NH, PMhx of HTN, asthma, convulsion disorder, bipolar, DVT legs, DM, sent from NH for vomiting and cough.  In the ED, pt presents in no acute distress, and vitals WNL.  Pt is afebrile with leukocytosis of 19.  CXR concerning for RLL infiltrate likely 2/2 asp pneumonia.  Abdominal x-ray sig for non-specific gas pattern and intact PEG tube.  Pt will be admitted to medicine for suspected Aspiration pneumonia.    Plan:   -Sepsis 2/2 likely Complicated Aspiration PNA/HCAP - f/u ct chest and ct abd/pelvis. C/w broad spectrum abx, ivf, supportive care, nebs, aspiration precautions  -ARF from dehydration - ivf, treat infection, monitor renal function   -Uncontrolled Type 2 DM with complications of hyperglycemia - lantus, moderate ihss, monitor acks  -Hematemesis - at NH. NPO, IVF, f/u ct abd. GI on board.  -Need for prophylactic measure: dvt/gi ppx

## 2018-07-15 NOTE — CONSULT NOTE ADULT - ASSESSMENT
63 year-old woman nonverbal from NH with [facility] due to dehydration.  HTN with BP elevated, in part due to IVF.

## 2018-07-15 NOTE — CONSULT NOTE ADULT - ASSESSMENT
A 61 yo Female with multiple co-morbidities including non-verbal, bed bound, Trach/ PEG tube, sent in to the ER from NH for evaluation of shortness of breath and cough after vomiting. On admission, she has no fever or chills but Leukocytosis. The CXR  shows No consolidation but CT chest shows RLL opacity. She has  started on Vancomycin and Zosyn and The ID consult  requested to assist with further evaluation and antibiotic management.     # Aspiration Pneumonia  # Hematemesis    Would recommend:    1. Obtain sputum culture and MRSA/MSSA PCR panel  2. Follow up Procalcitonin level  2. Aspiration precaution  3. Monitor WBC count  and Obtain urine analysis and culture  4. Continue current antimicrobials until work up is done    will follow the patient with you and make further recommendation based on the clinical course and Lab results  Thank you for the opportunity to participate in Ms. PRAJAPATI's care A 63 yo Female with multiple co-morbidities including non-verbal, bed bound, Trach/ PEG tube, sent in to the ER from NH for evaluation of shortness of breath and cough after vomiting. On admission, she has no fever or chills but Leukocytosis. The CXR  shows No consolidation but CT chest shows RLL opacity. She has  started on Vancomycin and Zosyn and The ID consult  requested to assist with further evaluation and antibiotic management.     # Aspiration Pneumonia  # Hematemesis    Would recommend:    1. Obtain sputum culture and MRSA/MSSA PCR panel  2. Follow up Procalcitonin level  2. Aspiration precaution  3. Monitor WBC count  and Obtain urine analysis and culture  4. Continue current antimicrobials until work up is done  5. Monitor Temp. and c/w supportive care    d/w nursing staff    will follow the patient with you and make further recommendation based on the clinical course and Lab results  Thank you for the opportunity to participate in Ms. PRAJAPATI's care

## 2018-07-15 NOTE — CONSULT NOTE ADULT - SUBJECTIVE AND OBJECTIVE BOX
[facility] NEPHROLOGY- CONSULTATION NOTE    Patient is a 63y Female non-verbal, bed bound, Trach/ PEG tube, from NH,  who presented to the hospital with cough/SOB.  She has no history of CKD, but had creatinine elevated from baseline and hypernatremia.  Pt unable to give history.  No ACEi/ARBs/NSAIDs/Diuretics/IV Contrast.    PAST MEDICAL & SURGICAL HISTORY:  No pertinent past medical history  Hypertension  Schizophrenia  Diabetic coma  Diabetes mellitus  No significant past surgical history  S/P percutaneous endoscopic gastrostomy (PEG) tube placement  H/O tracheostomy    angiotensin converting enzyme inhibitors (Unknown)    Home Medications Reviewed  Hospital Medications:   MEDICATIONS  (STANDING):  amLODIPine   Tablet 2.5 milliGRAM(s) Oral daily  atorvastatin 20 milliGRAM(s) Oral at bedtime  dextrose 5%. 1000 milliLiter(s) (50 mL/Hr) IV Continuous <Continuous>  dextrose 50% Injectable 12.5 Gram(s) IV Push once  dextrose 50% Injectable 25 Gram(s) IV Push once  dextrose 50% Injectable 25 Gram(s) IV Push once  insulin glargine Injectable (LANTUS) 30 Unit(s) SubCutaneous at bedtime  insulin lispro (HumaLOG) corrective regimen sliding scale   SubCutaneous three times a day before meals  levETIRAcetam  Solution 1000 milliGRAM(s) Oral two times a day  LORazepam     Tablet 1 milliGRAM(s) Oral three times a day  metoprolol tartrate 100 milliGRAM(s) Oral two times a day  pantoprazole    Tablet 40 milliGRAM(s) Oral before breakfast  piperacillin/tazobactam IVPB. 3.375 Gram(s) IV Intermittent every 8 hours  sodium chloride 0.45%. 1000 milliLiter(s) (80 mL/Hr) IV Continuous <Continuous>  vancomycin  IVPB 1000 milliGRAM(s) IV Intermittent every 12 hours    SOCIAL HISTORY:  Denies ETOh,Smoking,   FAMILY HISTORY:  No pertinent family history in first degree relatives    REVIEW OF SYSTEMS: unable to obtain    VITALS:  T(F): 97.8 (07-15-18 @ 13:50), Max: 100.7 (07-14-18 @ 20:31)  HR: 90 (07-15-18 @ 14:02)  BP: 160/90 (07-15-18 @ 13:50)  RR: 18 (07-15-18 @ 14:02)  SpO2: 100% (07-15-18 @ 14:02)  Wt(kg): --    PHYSICAL EXAM:  Constitutional: NAD  HEENT: anicteric sclera, oropharynx clear, MMM  Respiratory: CTAB, no wheezes, rales or rhonchi, +trach, benign  Cardiovascular: S1, S2, RRR  Gastrointestinal: BS+, soft, NT/ND, +PEG, benign  Extremities: No cyanosis or clubbing. No peripheral edema  Psychiatric: nonverbal/noninteractive  : No CVA tenderness. No coe.   Skin: No rashes    LABS:  07-15    144  |  110<H>  |  19<H>  ----------------------------<  133<H>  4.1   |  24  |  0.68    Ca    9.0      15 Jul 2018 12:45  Phos  2.2     07-15  Mg     1.9     07-15    TPro  7.4  /  Alb  3.4<L>  /  TBili  0.9  /  DBili      /  AST  26  /  ALT  44  /  AlkPhos  108  07-14    Creatinine Trend: 0.68 <--, 1.12 <--                        12.2   23.0  )-----------( 180      ( 15 Jul 2018 12:45 )             38.7     Urine Studies:      RADIOLOGY & ADDITIONAL STUDIES:        < from: CT Chest No Cont (07.15.18 @ 11:14) >    EXAM:  CT ABDOMEN AND PELVIS                          EXAM:  CT CHEST                            PROCEDURE DATE:  07/15/2018          INTERPRETATION:  CLINICAL HISTORY:  Respiratory failure; sepsis    Multiple axial images of the chest, abdomen andpelvis were obtained from   the lung apices through symphysis pubis without the administration of   oral or intravascular contrast limiting the sensitivity of evaluation.   Reformatted coronal and sagittal images are submitted.    COMPARISON: CT evaluation abdomen/pelvis July 23, 2016.    FINDINGS: Mid to distal esophageal wall thickening is suggested.   Correlate with endoscopic evaluation.    A midline tracheostomy is identified.    A PEG tube is identified, the distal aspect of which is identified in the   region of the pylorus/duodenal bulb; correlate clinically as to   appropriate positioning.    No abnormally enlarged mediastinal or hilar lymph nodes are noted. There   is no evidence for axillary lymphadenopathy. The heart size appears   within normal limits. No pericardial effusion is identified. Thoracic   aortic caliber demonstrates unremarkable caliber.    The central bronchial anatomy appears patent.    Elevation of the right hemidiaphragm is identified. There is opacity   identified within the right lower lobe lung parenchyma, likely   representative of a combination of both atelectasis and consolidation. No   additional regions of lung parenchymal infiltrate or consolidation   identified bilaterally. A small right pleuraleffusion is noted. There is   no evidence for left pleural effusion. There is no evidence of   pneumothorax. No mediastinal shift is noted.    The liver is normal in size. No focal hepatic masses are identified.   There is no evidence for intrahepatic or extrahepatic biliary dilatation.   No gallstones, gallbladder wall thickening or pericholecystic fluid   identified.    The spleen, pancreas and adrenal glands are unremarkable.    The kidneys enhance bilaterally, without evidence for space-occupying   lesion or hydronephrosis. A small less than 3 mm calculus is noted within   the upper pole region of the left kidney. No right renal calculi are   identified. The abdominal aorta is normal in course and caliber. No   abnormally enlarged retroperitoneal or pelvic lymphadenopathy is noted.    Fecal material is scattered throughout the colon. There is no evidence   for mechanical bowel obstruction. No colonic wall thickening is   identified. There is no evidence for free intraperitoneal air orfluid.   There is no evidence for acute appendicitis.    The uterus and urinary bladder appear unremarkable.     No acute osseous fractures are evident.    IMPRESSION:    1. Mid to distal esophageal wall thickening is suggested. Correlate with   endoscopic evaluation.  2. A PEG tube is identified, the distal aspect of which is identified in   the region of the pylorus/duodenal bulb; correlate clinically as to   appropriate positioning.  3. There is opacity identified within the right lower lobe lung   parenchyma, likely representative of a combination of both atelectasis   and consolidation. Small right pleural effusion.                  LUCI FELIZ   This document has been electronically signed. Jul 15 2018 11:41AM                < end of copied text >

## 2018-07-15 NOTE — CONSULT NOTE ADULT - ATTENDING COMMENTS
[facility] NEPHROLOGY  Donnell Torres M.D.  Miguel Clayton D.O.  Lizzette Musa M.D.  Yuliya Taylor, MSN, ANP-C    Telephone: [phone]  Facsimile: [phone]    [address] [place] [address]

## 2018-07-16 DIAGNOSIS — R45.1 RESTLESSNESS AND AGITATION: ICD-10-CM

## 2018-07-16 LAB
ANION GAP SERPL CALC-SCNC: 7 MMOL/L — SIGNIFICANT CHANGE UP (ref 5–17)
BUN SERPL-MCNC: 17 MG/DL — SIGNIFICANT CHANGE UP (ref 7–18)
CALCIUM SERPL-MCNC: 8.9 MG/DL — SIGNIFICANT CHANGE UP (ref 8.4–10.5)
CHLORIDE SERPL-SCNC: 105 MMOL/L — SIGNIFICANT CHANGE UP (ref 96–108)
CO2 SERPL-SCNC: 27 MMOL/L — SIGNIFICANT CHANGE UP (ref 22–31)
CREAT SERPL-MCNC: 0.87 MG/DL — SIGNIFICANT CHANGE UP (ref 0.5–1.3)
GLUCOSE SERPL-MCNC: 248 MG/DL — HIGH (ref 70–99)
GRAM STN FLD: SIGNIFICANT CHANGE UP
HCT VFR BLD CALC: 38.1 % — SIGNIFICANT CHANGE UP (ref 34.5–45)
HGB BLD-MCNC: 11.8 G/DL — SIGNIFICANT CHANGE UP (ref 11.5–15.5)
MAGNESIUM SERPL-MCNC: 2 MG/DL — SIGNIFICANT CHANGE UP (ref 1.6–2.6)
MCHC RBC-ENTMCNC: 25 PG — LOW (ref 27–34)
MCHC RBC-ENTMCNC: 30.8 GM/DL — LOW (ref 32–36)
MCV RBC AUTO: 81.2 FL — SIGNIFICANT CHANGE UP (ref 80–100)
MRSA PCR RESULT.: SIGNIFICANT CHANGE UP
PHOSPHATE SERPL-MCNC: 2.1 MG/DL — LOW (ref 2.5–4.5)
PLATELET # BLD AUTO: 170 K/UL — SIGNIFICANT CHANGE UP (ref 150–400)
POTASSIUM SERPL-MCNC: 4.3 MMOL/L — SIGNIFICANT CHANGE UP (ref 3.5–5.3)
POTASSIUM SERPL-SCNC: 4.3 MMOL/L — SIGNIFICANT CHANGE UP (ref 3.5–5.3)
RBC # BLD: 4.69 M/UL — SIGNIFICANT CHANGE UP (ref 3.8–5.2)
RBC # FLD: 13.9 % — SIGNIFICANT CHANGE UP (ref 10.3–14.5)
S AUREUS DNA NOSE QL NAA+PROBE: SIGNIFICANT CHANGE UP
SODIUM SERPL-SCNC: 139 MMOL/L — SIGNIFICANT CHANGE UP (ref 135–145)
SPECIMEN SOURCE: SIGNIFICANT CHANGE UP
VANCOMYCIN TROUGH SERPL-MCNC: 16.5 UG/ML — SIGNIFICANT CHANGE UP (ref 10–20)
WBC # BLD: 15.4 K/UL — HIGH (ref 3.8–10.5)
WBC # FLD AUTO: 15.4 K/UL — HIGH (ref 3.8–10.5)

## 2018-07-16 RX ORDER — INSULIN GLARGINE 100 [IU]/ML
15 INJECTION, SOLUTION SUBCUTANEOUS ONCE
Qty: 0 | Refills: 0 | Status: COMPLETED | OUTPATIENT
Start: 2018-07-16 | End: 2018-07-16

## 2018-07-16 RX ORDER — HEPARIN SODIUM 5000 [USP'U]/ML
5000 INJECTION INTRAVENOUS; SUBCUTANEOUS EVERY 8 HOURS
Qty: 0 | Refills: 0 | Status: DISCONTINUED | OUTPATIENT
Start: 2018-07-16 | End: 2018-07-31

## 2018-07-16 RX ORDER — QUETIAPINE FUMARATE 200 MG/1
50 TABLET, FILM COATED ORAL
Qty: 0 | Refills: 0 | Status: DISCONTINUED | OUTPATIENT
Start: 2018-07-16 | End: 2018-07-17

## 2018-07-16 RX ORDER — QUETIAPINE FUMARATE 200 MG/1
50 TABLET, FILM COATED ORAL ONCE
Qty: 0 | Refills: 0 | Status: COMPLETED | OUTPATIENT
Start: 2018-07-16 | End: 2018-07-16

## 2018-07-16 RX ORDER — POTASSIUM PHOSPHATE, MONOBASIC POTASSIUM PHOSPHATE, DIBASIC 236; 224 MG/ML; MG/ML
15 INJECTION, SOLUTION INTRAVENOUS ONCE
Qty: 0 | Refills: 0 | Status: COMPLETED | OUTPATIENT
Start: 2018-07-16 | End: 2018-07-16

## 2018-07-16 RX ADMIN — Medication 1 MILLIGRAM(S): at 00:47

## 2018-07-16 RX ADMIN — Medication 3 MILLILITER(S): at 14:36

## 2018-07-16 RX ADMIN — AMLODIPINE BESYLATE 2.5 MILLIGRAM(S): 2.5 TABLET ORAL at 05:54

## 2018-07-16 RX ADMIN — SODIUM CHLORIDE 80 MILLILITER(S): 9 INJECTION, SOLUTION INTRAVENOUS at 05:55

## 2018-07-16 RX ADMIN — HEPARIN SODIUM 5000 UNIT(S): 5000 INJECTION INTRAVENOUS; SUBCUTANEOUS at 14:07

## 2018-07-16 RX ADMIN — Medication 1 MILLIGRAM(S): at 05:53

## 2018-07-16 RX ADMIN — Medication 1 MILLIGRAM(S): at 22:03

## 2018-07-16 RX ADMIN — PIPERACILLIN AND TAZOBACTAM 25 GRAM(S): 4; .5 INJECTION, POWDER, LYOPHILIZED, FOR SOLUTION INTRAVENOUS at 14:07

## 2018-07-16 RX ADMIN — Medication 250 MILLIGRAM(S): at 17:12

## 2018-07-16 RX ADMIN — Medication 3 MILLILITER(S): at 02:54

## 2018-07-16 RX ADMIN — PIPERACILLIN AND TAZOBACTAM 25 GRAM(S): 4; .5 INJECTION, POWDER, LYOPHILIZED, FOR SOLUTION INTRAVENOUS at 22:04

## 2018-07-16 RX ADMIN — ATORVASTATIN CALCIUM 20 MILLIGRAM(S): 80 TABLET, FILM COATED ORAL at 00:48

## 2018-07-16 RX ADMIN — Medication 3 MILLILITER(S): at 20:43

## 2018-07-16 RX ADMIN — PIPERACILLIN AND TAZOBACTAM 25 GRAM(S): 4; .5 INJECTION, POWDER, LYOPHILIZED, FOR SOLUTION INTRAVENOUS at 05:53

## 2018-07-16 RX ADMIN — PANTOPRAZOLE SODIUM 40 MILLIGRAM(S): 20 TABLET, DELAYED RELEASE ORAL at 05:55

## 2018-07-16 RX ADMIN — INSULIN GLARGINE 15 UNIT(S): 100 INJECTION, SOLUTION SUBCUTANEOUS at 23:20

## 2018-07-16 RX ADMIN — ATORVASTATIN CALCIUM 20 MILLIGRAM(S): 80 TABLET, FILM COATED ORAL at 22:03

## 2018-07-16 RX ADMIN — QUETIAPINE FUMARATE 50 MILLIGRAM(S): 200 TABLET, FILM COATED ORAL at 15:13

## 2018-07-16 RX ADMIN — Medication 1 MILLIGRAM(S): at 14:07

## 2018-07-16 RX ADMIN — Medication 100 MILLIGRAM(S): at 17:18

## 2018-07-16 RX ADMIN — LEVETIRACETAM 1000 MILLIGRAM(S): 250 TABLET, FILM COATED ORAL at 17:12

## 2018-07-16 RX ADMIN — Medication 100 MILLIGRAM(S): at 05:54

## 2018-07-16 RX ADMIN — Medication 3 MILLILITER(S): at 08:28

## 2018-07-16 RX ADMIN — LEVETIRACETAM 1000 MILLIGRAM(S): 250 TABLET, FILM COATED ORAL at 05:53

## 2018-07-16 RX ADMIN — Medication 2: at 11:39

## 2018-07-16 RX ADMIN — INSULIN GLARGINE 30 UNIT(S): 100 INJECTION, SOLUTION SUBCUTANEOUS at 00:54

## 2018-07-16 RX ADMIN — Medication 250 MILLIGRAM(S): at 05:53

## 2018-07-16 RX ADMIN — POTASSIUM PHOSPHATE, MONOBASIC POTASSIUM PHOSPHATE, DIBASIC 62.5 MILLIMOLE(S): 236; 224 INJECTION, SOLUTION INTRAVENOUS at 09:42

## 2018-07-16 RX ADMIN — Medication 6: at 08:11

## 2018-07-16 RX ADMIN — HEPARIN SODIUM 5000 UNIT(S): 5000 INJECTION INTRAVENOUS; SUBCUTANEOUS at 22:03

## 2018-07-16 NOTE — PROGRESS NOTE ADULT - PROBLEM SELECTOR PLAN 2
-Questionable coffee ground emesis from nursing home  -Hb stable    -Repeat CBC if pt has another episode of coffee ground emesis  -f/u Abd CT  npo, ivf, protonix,   GI -  -Questionable coffee ground emesis from nursing home  -Hb stable  - dr. michelle consulted - continue with protonix.  He will adjust peg tube tomorrow.    -Repeat CBC if pt has another episode of coffee ground emesis

## 2018-07-16 NOTE — DIETITIAN INITIAL EVALUATION ADULT. - PROBLEM SELECTOR PLAN 2
-Questionable coffee ground emesis from nursing home  -Hb stable  -monitor CBC daily  -Repeat CBC if pt has another episode of coffee ground emesis  -f/u Abd CT  npo, ivf, protonix,   GI -

## 2018-07-16 NOTE — CONSULT NOTE ADULT - SUBJECTIVE AND OBJECTIVE BOX
GI INITIAL CONSULT    HPI: 63F w/stated PMH presented from SNF for reported coffee-ground emesis and was found to have aspiration PNA. [facility]ematem[facility] Hb remains stable. Unable to obtain any history from pt as she is nonverbal.    PMH: HTN, asthma, seizure d/o, DM2, DVT, bipolar d/o, nonberbal  PSH: trach/PEG    Meds: MEDICATIONS  (STANDING):  ALBUTerol/ipratropium for Nebulization 3 milliLiter(s) Nebulizer every 6 hours  amLODIPine   Tablet 2.5 milliGRAM(s) Oral daily  atorvastatin 20 milliGRAM(s) Oral at bedtime  dextrose 5%. 1000 milliLiter(s) (50 mL/Hr) IV Continuous <Continuous>  dextrose 50% Injectable 12.5 Gram(s) IV Push once  dextrose 50% Injectable 25 Gram(s) IV Push once  dextrose 50% Injectable 25 Gram(s) IV Push once  heparin  Injectable 5000 Unit(s) SubCutaneous every 8 hours  insulin glargine Injectable (LANTUS) 30 Unit(s) SubCutaneous at bedtime  insulin lispro (HumaLOG) corrective regimen sliding scale   SubCutaneous three times a day before meals  levETIRAcetam  Solution 1000 milliGRAM(s) Oral two times a day  LORazepam     Tablet 1 milliGRAM(s) Oral three times a day  metoprolol tartrate 100 milliGRAM(s) Oral two times a day  pantoprazole    Tablet 40 milliGRAM(s) Oral before breakfast  piperacillin/tazobactam IVPB. 3.375 Gram(s) IV Intermittent every 8 hours  sodium chloride 0.45%. 1000 milliLiter(s) (80 mL/Hr) IV Continuous <Continuous>  vancomycin  IVPB 1000 milliGRAM(s) IV Intermittent every 12 hours    SH: unable to obtain given pt's MS  FH: unable to obtain given pt's MS  ROS: unable to obtain given pt's MS    Vitals: T(C): 36.6 (07-16-18 @ 14:08)  T(F): 97.8 (07-16-18 @ 14:08), Max: 98.5 (07-15-18 @ 20:34)  HR: 79 (07-16-18 @ 14:08) (75 - 79)  BP: 133/70 (07-16-18 @ 14:08) (133/70 - 162/78)    Gen: pt has stereotypical movements, tongue is out, lip-smacking present; does not answer questions and is unable to do so  CVS: RRR; S1/S2  Chest: coarse BS B/L  Abd: S/NT/ND; PEG fistula clean, nonerythematous, no discharge; replacement coe catheter in place of original PEG tube is seen                        11.8   15.4  )-----------( 170      ( 16 Jul 2018 07:12 )             38.1   07-16    139  |  105  |  17  ----------------------------<  248<H>  4.3   |  27  |  0.87    Ca    8.9      16 Jul 2018 07:12  Phos  2.1     07-16  Mg     2.0     07-16    TPro  7.4  /  Alb  3.4<L>  /  TBili  0.9  /  DBili  x   /  AST  26  /  ALT  44  /  AlkPhos  108  07-14    CT Abdomen and Pelvis No Cont (07.15.18 @ 11:10)  IMPRESSION:    1. Mid to distal esophageal wall thickening is suggested. Correlate with endoscopic evaluation.  2. A PEG tube is identified, the distal aspect of which is identified in the region of the pylorus/duodenal bulb; correlate clinically as to appropriate positioning.  3. There is opacity identified within the right lower lobe lung parenchyma, likely representative of a combination of both atelectasis and consolidation. Small right pleural effusion.

## 2018-07-16 NOTE — DIETITIAN INITIAL EVALUATION ADULT. - OTHER INFO
Pt visited. Asleep. Ivf infusing. Pt is from NH was on Glucerna 1.2 @ 1500 ml , 1800 calories.+ LPS protein 15-30 ml TID. Pt is NPO at present. Go consult noted Pt visited. Asleep. Ivf infusing. Pt is from NH was on Glucerna 1.2 @ 1500 ml , 1800 calories.+ LPS protein 15-30 ml TID. Pt is NPO at present. Go consult noted.  Pt seen for Nutrition consult.

## 2018-07-16 NOTE — PROGRESS NOTE ADULT - PROBLEM SELECTOR PLAN 1
-Sepsis 2/2 Complicated Aspiration PNA/HCAP  -CXR concerning for possible RLL infiltrate  - zosyn 3.375mg ivp q 8 hours  - vanco 1 gram q 12 hours  - vanco trough now - will follow  -ID: Dr. Kwon,

## 2018-07-16 NOTE — PROGRESS NOTE ADULT - SUBJECTIVE AND OBJECTIVE BOX
NP Note discussed with  Primary Attending    Patient is a 63y old  Female who presents with a chief complaint of vomiting (2018 18:51).  63 year old female, lying in bed, nad.  Pt is nonverbal, trach and peg s/p diabetic coma 2 years ago.  Pt was admitted after vomiting coffee brown emesis at NH ( [facility]).  No vomiting since.  Xray show right lower lobe consolidation.  Pt now being treated for asp. pna.  Pt recently had peg replaced earlier this week.        INTERVAL HPI/OVERNIGHT EVENTS: no new complaints    MEDICATIONS  (STANDING):  ALBUTerol/ipratropium for Nebulization 3 milliLiter(s) Nebulizer every 6 hours  amLODIPine   Tablet 2.5 milliGRAM(s) Oral daily  atorvastatin 20 milliGRAM(s) Oral at bedtime  dextrose 5%. 1000 milliLiter(s) (50 mL/Hr) IV Continuous <Continuous>  dextrose 50% Injectable 12.5 Gram(s) IV Push once  dextrose 50% Injectable 25 Gram(s) IV Push once  dextrose 50% Injectable 25 Gram(s) IV Push once  heparin  Injectable 5000 Unit(s) SubCutaneous every 8 hours  insulin glargine Injectable (LANTUS) 30 Unit(s) SubCutaneous at bedtime  insulin lispro (HumaLOG) corrective regimen sliding scale   SubCutaneous three times a day before meals  levETIRAcetam  Solution 1000 milliGRAM(s) Oral two times a day  LORazepam     Tablet 1 milliGRAM(s) Oral three times a day  metoprolol tartrate 100 milliGRAM(s) Oral two times a day  pantoprazole    Tablet 40 milliGRAM(s) Oral before breakfast  piperacillin/tazobactam IVPB. 3.375 Gram(s) IV Intermittent every 8 hours  sodium chloride 0.45%. 1000 milliLiter(s) (80 mL/Hr) IV Continuous <Continuous>  vancomycin  IVPB 1000 milliGRAM(s) IV Intermittent every 12 hours    MEDICATIONS  (PRN):  acetaminophen    Suspension 650 milliGRAM(s) Oral every 6 hours PRN For Temp greater than 38 C (100.4 F)  dextrose 40% Gel 15 Gram(s) Oral once PRN Blood Glucose LESS THAN 70 milliGRAM(s)/deciLiter  glucagon  Injectable 1 milliGRAM(s) IntraMuscular once PRN Glucose <70 milliGRAM(s)/deciLiter      __________________________________________________  REVIEW OF SYSTEMS:    unable to elicit due to mentation    Vital Signs Last 24 Hrs  T(C): 36.6 (2018 05:10), Max: 36.9 (15 Jul 2018 20:34)  T(F): 97.8 (2018 05:10), Max: 98.5 (15 Jul 2018 20:34)  HR: 76 (2018 05:10) (75 - 90)  BP: 162/78 (2018 05:10) (143/84 - 162/78)  BP(mean): --  RR: 18 (2018 05:10) (16 - 18)  SpO2: 100% (2018 05:10) (100% - 100%)    ________________________________________________  PHYSICAL EXAM:  GENERAL: NAD, no fever  HEENT: Normocephalic;  conjunctivae and sclerae clear  CHEST/LUNG: decreased bilateral breath sounds, + trach  HEART: S1 S2  regular; no murmurs, gallops or rubs  ABDOMEN: Soft, + peg  EXTREMITIES: no cyanosis; no edema; no calf tenderness  SKIN: warm and dry; no rash  NERVOUS SYSTEM:  Awake and alert, nonverbal, not following commands    _________________________________________________  LABS:                        11.8   15.4  )-----------( 170      ( 2018 07:12 )             38.1     07-16    139  |  105  |  17  ----------------------------<  248<H>  4.3   |  27  |  0.87    Ca    8.9      2018 07:12  Phos  2.1     07-16  Mg     2.0     07-16    TPro  7.4  /  Alb  3.4<L>  /  TBili  0.9  /  DBili  x   /  AST  26  /  ALT  44  /  AlkPhos  108  07-14      Urinalysis Basic - ( 15 Jul 2018 23:04 )    Color: Yellow / Appearance: Slightly Turbid / S.020 / pH: x  Gluc: x / Ketone: Trace  / Bili: Negative / Urobili: Negative   Blood: x / Protein: 100 / Nitrite: Positive   Leuk Esterase: Moderate / RBC: 5-10 /HPF / WBC 11-25 /HPF   Sq Epi: x / Non Sq Epi: Many /HPF / Bacteria: Many /HPF      CAPILLARY BLOOD GLUCOSE      POCT Blood Glucose.: 169 mg/dL (2018 11:33)  POCT Blood Glucose.: 270 mg/dL (2018 08:05)  POCT Blood Glucose.: 242 mg/dL (2018 00:52)  POCT Blood Glucose.: 211 mg/dL (15 Jul 2018 17:22)        RADIOLOGY & ADDITIONAL TESTS:    Imaging Personally Reviewed:  YES/NO    Consultant(s) Notes Reviewed:   YES/ No    Care Discussed with Consultants :     Plan of care was discussed with patient and /or primary care giver; all questions and concerns were addressed and care was aligned with patient's wishes. NP Note discussed with  Primary Attending    Patient is a 63y old  Female who presents with a chief complaint of vomiting (2018 18:51).  63 year old female, lying in bed, nad.  Pt is nonverbal, trach and peg s/p diabetic coma 2 years ago.  Pt was admitted after vomiting coffee brown emesis at NH ( [facility]).  No vomiting since.  Xray show right lower lobe consolidation.  Pt now being treated for asp. pna.  Pt recently had peg replaced earlier this week because it was pulled out.  A coe catheter was put in its place. Dr. michelle to revise it tomorrow.      INTERVAL HPI/OVERNIGHT EVENTS: no new complaints    MEDICATIONS  (STANDING):  ALBUTerol/ipratropium for Nebulization 3 milliLiter(s) Nebulizer every 6 hours  amLODIPine   Tablet 2.5 milliGRAM(s) Oral daily  atorvastatin 20 milliGRAM(s) Oral at bedtime  dextrose 5%. 1000 milliLiter(s) (50 mL/Hr) IV Continuous <Continuous>  dextrose 50% Injectable 12.5 Gram(s) IV Push once  dextrose 50% Injectable 25 Gram(s) IV Push once  dextrose 50% Injectable 25 Gram(s) IV Push once  heparin  Injectable 5000 Unit(s) SubCutaneous every 8 hours  insulin glargine Injectable (LANTUS) 30 Unit(s) SubCutaneous at bedtime  insulin lispro (HumaLOG) corrective regimen sliding scale   SubCutaneous three times a day before meals  levETIRAcetam  Solution 1000 milliGRAM(s) Oral two times a day  LORazepam     Tablet 1 milliGRAM(s) Oral three times a day  metoprolol tartrate 100 milliGRAM(s) Oral two times a day  pantoprazole    Tablet 40 milliGRAM(s) Oral before breakfast  piperacillin/tazobactam IVPB. 3.375 Gram(s) IV Intermittent every 8 hours  sodium chloride 0.45%. 1000 milliLiter(s) (80 mL/Hr) IV Continuous <Continuous>  vancomycin  IVPB 1000 milliGRAM(s) IV Intermittent every 12 hours    MEDICATIONS  (PRN):  acetaminophen    Suspension 650 milliGRAM(s) Oral every 6 hours PRN For Temp greater than 38 C (100.4 F)  dextrose 40% Gel 15 Gram(s) Oral once PRN Blood Glucose LESS THAN 70 milliGRAM(s)/deciLiter  glucagon  Injectable 1 milliGRAM(s) IntraMuscular once PRN Glucose <70 milliGRAM(s)/deciLiter      __________________________________________________  REVIEW OF SYSTEMS:    unable to elicit due to mentation    Vital Signs Last 24 Hrs  T(C): 36.6 (2018 05:10), Max: 36.9 (15 Jul 2018 20:34)  T(F): 97.8 (2018 05:10), Max: 98.5 (15 Jul 2018 20:34)  HR: 76 (2018 05:10) (75 - 90)  BP: 162/78 (2018 05:10) (143/84 - 162/78)  BP(mean): --  RR: 18 (2018 05:10) (16 - 18)  SpO2: 100% (2018 05:10) (100% - 100%)    ________________________________________________  PHYSICAL EXAM:  GENERAL: NAD, no fever  HEENT: Normocephalic;  conjunctivae and sclerae clear  CHEST/LUNG: decreased bilateral breath sounds, + trach  HEART: S1 S2  regular; no murmurs, gallops or rubs  ABDOMEN: Soft, + peg  EXTREMITIES: no cyanosis; no edema; no calf tenderness  SKIN: warm and dry; no rash  NERVOUS SYSTEM:  Awake and alert, nonverbal, not following commands    _________________________________________________  LABS:                        11.8   15.4  )-----------( 170      ( 2018 07:12 )             38.1     07-16    139  |  105  |  17  ----------------------------<  248<H>  4.3   |  27  |  0.87    Ca    8.9      2018 07:12  Phos  2.1     07-16  Mg     2.0     07-16    TPro  7.4  /  Alb  3.4<L>  /  TBili  0.9  /  DBili  x   /  AST  26  /  ALT  44  /  AlkPhos  108  07-14      Urinalysis Basic - ( 15 Jul 2018 23:04 )    Color: Yellow / Appearance: Slightly Turbid / S.020 / pH: x  Gluc: x / Ketone: Trace  / Bili: Negative / Urobili: Negative   Blood: x / Protein: 100 / Nitrite: Positive   Leuk Esterase: Moderate / RBC: 5-10 /HPF / WBC 11-25 /HPF   Sq Epi: x / Non Sq Epi: Many /HPF / Bacteria: Many /HPF      CAPILLARY BLOOD GLUCOSE      POCT Blood Glucose.: 169 mg/dL (2018 11:33)  POCT Blood Glucose.: 270 mg/dL (2018 08:05)  POCT Blood Glucose.: 242 mg/dL (2018 00:52)  POCT Blood Glucose.: 211 mg/dL (15 Jul 2018 17:22)        RADIOLOGY & ADDITIONAL TESTS:    Imaging Personally Reviewed:  YES/NO    Consultant(s) Notes Reviewed:   YES/ No    Care Discussed with Consultants :     Plan of care was discussed with patient and /or primary care giver; all questions and concerns were addressed and care was aligned with patient's wishes.

## 2018-07-16 NOTE — PROGRESS NOTE ADULT - ASSESSMENT
61 Y/O F, non-verbal, bed bound, Trach/ PEG tube, from NH, PMhx of HTN, asthma, convulsion disorder, bipolar, DVT legs, DM, sent from NH for vomiting and cough.  In the ED, pt presents in no acute distress, and vitals WNL.  Pt is afebrile with leukocytosis of 19.  CXR concerning for RLL infiltrate likely 2/2 asp pneumonia.  Abdominal x-ray sig for non-specific gas pattern and intact PEG tube.  Pt will be admitted to medicine for suspected Aspiration pneumonia.

## 2018-07-16 NOTE — DIETITIAN INITIAL EVALUATION ADULT. - PROBLEM SELECTOR PLAN 1
-Sepsis 2/2 Complicated Aspiration PNA/HCAP  -CXR concerning for possible RLL infiltrate  -c/w IV zosyn/ Vanc  -f/u Vanc trough  -f/u Chest CT  -ID: Dr. Kwon, Pulm - , Cardio -

## 2018-07-16 NOTE — PROGRESS NOTE ADULT - PROBLEM SELECTOR PLAN 4
uncontrolled  -Sliding scale AC & HS  -c/w Lantus 20 units at bedtime- home dose is 30 units, inc as indicated   -f/u HbA1c

## 2018-07-16 NOTE — CONSULT NOTE ADULT - ASSESSMENT
63F nonverbal w/reported coffee ground emesis.  -coffee ground emesis not observed on this admission  -Hb stable  -no overt signs of bleeding  -very mild and questionable esophageal findings on CT  -given current clinical situation would tx pt conservatively by checking daily CBC and starting Protonix 40 mg daily  -please call back if pt develops overt signs of GI bleeding

## 2018-07-16 NOTE — PROGRESS NOTE ADULT - ASSESSMENT
A 63 yo Female with multiple co-morbidities including non-verbal, bed bound, Trach/ PEG tube, sent in to the ER from NH for evaluation of shortness of breath and cough after vomiting. On admission, she has no fever or chills but Leukocytosis. The CXR  shows No consolidation but CT chest shows RLL opacity. She has  started on Vancomycin and Zosyn and The ID consult  requested to assist with further evaluation and antibiotic management.     # Aspiration Pneumonia  # Hematemesis  # Procalcitonin level elevated    Would recommend:    1. Follow up  sputum culture   2. Aspiration precaution  3. Monitor WBC count    4. Continue zosyn until sputum culture is finalized  5. Discontinue Vancomycin since MRSA PCR is negative    d/w nursing staff    will follow the patient with you A 61 yo Female with multiple co-morbidities including non-verbal, bed bound, Trach/ PEG tube, sent in to the ER from NH for evaluation of shortness of breath and cough after vomiting. On admission, she has no fever or chills but Leukocytosis. The CXR  shows No consolidation but CT chest shows RLL opacity. She has  started on Vancomycin and Zosyn and The ID consult  requested to assist with further evaluation and antibiotic management.     # Aspiration Pneumonia  # Hematemesis  # Procalcitonin level elevated    Would recommend:    1. Follow up Final sputum culture   2. Aspiration precaution  3. Monitor WBC count , is trending down  4. Continue zosyn until sputum culture is finalized  5. Discontinue Vancomycin since MRSA PCR is negative    d/w nursing staff    will follow the patient with you

## 2018-07-16 NOTE — PROGRESS NOTE ADULT - SUBJECTIVE AND OBJECTIVE BOX
Patient seen and examined at bedside  No new acute events noted overnight  Case discussed with medical team    HPI:  63 Y/O F, non-verbal, bed bound, Trach/ PEG tube, from NH, PMhx of HTN, asthma, convulsion disorder, bipolar, DVT legs, DM 2, sent from NH for vomiting and cough.  Pt unable to provide history.  History given by sister who states pt was vomiting coffee ground emesis yesterday.  Pt had 2 episodes of reported vomiting, and has not had an episode since.  Pt also developed cough with clear sputum.  Sister states pt is combative at baseline, and her current behavior is baseline.  No reported fever, discomfort, diarrhea, or SOB. (2018 18:51)      PAST MEDICAL & SURGICAL HISTORY:  No pertinent past medical history  Hypertension  Schizophrenia  Diabetic coma  Diabetes mellitus  No significant past surgical history  S/P percutaneous endoscopic gastrostomy (PEG) tube placement  H/O tracheostomy      angiotensin converting enzyme inhibitors (Unknown)       MEDICATIONS  (STANDING):  ALBUTerol/ipratropium for Nebulization 3 milliLiter(s) Nebulizer every 6 hours  amLODIPine   Tablet 2.5 milliGRAM(s) Oral daily  atorvastatin 20 milliGRAM(s) Oral at bedtime  dextrose 5%. 1000 milliLiter(s) (50 mL/Hr) IV Continuous <Continuous>  dextrose 50% Injectable 12.5 Gram(s) IV Push once  dextrose 50% Injectable 25 Gram(s) IV Push once  dextrose 50% Injectable 25 Gram(s) IV Push once  heparin  Injectable 5000 Unit(s) SubCutaneous every 8 hours  insulin glargine Injectable (LANTUS) 30 Unit(s) SubCutaneous at bedtime  insulin lispro (HumaLOG) corrective regimen sliding scale   SubCutaneous three times a day before meals  levETIRAcetam  Solution 1000 milliGRAM(s) Oral two times a day  LORazepam     Tablet 1 milliGRAM(s) Oral three times a day  metoprolol tartrate 100 milliGRAM(s) Oral two times a day  pantoprazole    Tablet 40 milliGRAM(s) Oral before breakfast  piperacillin/tazobactam IVPB. 3.375 Gram(s) IV Intermittent every 8 hours  potassium phosphate IVPB 15 milliMole(s) IV Intermittent once  sodium chloride 0.45%. 1000 milliLiter(s) (80 mL/Hr) IV Continuous <Continuous>  vancomycin  IVPB 1000 milliGRAM(s) IV Intermittent every 12 hours    MEDICATIONS  (PRN):  acetaminophen    Suspension 650 milliGRAM(s) Oral every 6 hours PRN For Temp greater than 38 C (100.4 F)  dextrose 40% Gel 15 Gram(s) Oral once PRN Blood Glucose LESS THAN 70 milliGRAM(s)/deciLiter  glucagon  Injectable 1 milliGRAM(s) IntraMuscular once PRN Glucose <70 milliGRAM(s)/deciLiter      REVIEW OF SYSTEMS: limited 2/2 mental status  	    T(C): 36.6 (18 @ 05:10), Max: 36.9 (07-15-18 @ 20:34)  HR: 76 (18 @ 05:10) (75 - 90)  BP: 162/78 (18 @ 05:10) (143/84 - 162/78)  RR: 18 (18 @ 05:10) (16 - 18)  SpO2: 100% (18 @ 05:10) (100% - 100%)    PHYSICAL EXAMINATION:   Constitutional: ill appearance. NAD  HEENT: poor dentition AT  Neck:  Supple  Respiratory:  stable rhonchi. trach intact. Adequate airflow b/l. Not using accessory muscles of respiration.  Cardiovascular:  S1 & S2 intact, no R/G, 2+ radial pulses b/l  Gastrointestinal: peg intact. Soft, NT, ND, normoactive b.s., no organomegaly/RT/rigidity  Extremities: WWP  Neurological:  awake.  Crude sensation intact.     Labs and imaging reviewed    LABS:                        12.2   23.0  )-----------( 180      ( 15 Jul 2018 12:45 )             38.7     -15    144  |  110<H>  |  19<H>  ----------------------------<  133<H>  4.1   |  24  |  0.68    Ca    9.0      15 Jul 2018 12:45  Phos  2.2     07-15  Mg     1.9     -15    TPro  7.4  /  Alb  3.4<L>  /  TBili  0.9  /  DBili  x   /  AST  26  /  ALT  44  /  AlkPhos  108  07-14          Urinalysis Basic - ( 15 Jul 2018 23:04 )    Color: Yellow / Appearance: Slightly Turbid / S.020 / pH: x  Gluc: x / Ketone: Trace  / Bili: Negative / Urobili: Negative   Blood: x / Protein: 100 / Nitrite: Positive   Leuk Esterase: Moderate / RBC: 5-10 /HPF / WBC 11-25 /HPF   Sq Epi: x / Non Sq Epi: Many /HPF / Bacteria: Many /HPF      CAPILLARY BLOOD GLUCOSE      POCT Blood Glucose.: 242 mg/dL (2018 00:52)  POCT Blood Glucose.: 211 mg/dL (15 Jul 2018 17:22)  POCT Blood Glucose.: 123 mg/dL (15 Jul 2018 11:44)  POCT Blood Glucose.: 353 mg/dL (15 Jul 2018 07:56)        LIVER FUNCTIONS - ( 2018 17:50 )  Alb: 3.4 g/dL / Pro: 7.4 g/dL / ALK PHOS: 108 U/L / ALT: 44 U/L DA / AST: 26 U/L / GGT: x               RADIOLOGY & ADDITIONAL STUDIES:

## 2018-07-16 NOTE — PROGRESS NOTE ADULT - SUBJECTIVE AND OBJECTIVE BOX
Patient is seen and examined at the bed side, is afebrile. The MRSA/MSSA PCR is negative. The Sputum culture is pending.      REVIEW OF SYSTEMS: Unable to obtained since nonverbal      ALLERGIES: ACIs      Vital Signs Last 24 Hrs  T(C): 36.6 (16 Jul 2018 14:08), Max: 36.9 (15 Jul 2018 20:34)  T(F): 97.8 (16 Jul 2018 14:08), Max: 98.5 (15 Jul 2018 20:34)  HR: 79 (16 Jul 2018 14:08) (75 - 79)  BP: 133/70 (16 Jul 2018 14:08) (133/70 - 162/78)  BP(mean): --  RR: 17 (16 Jul 2018 14:08) (17 - 18)  SpO2: 95% (16 Jul 2018 14:08) (95% - 100%)      PHYSICAL EXAM:  GENERAL: Not in acute distress  HEENT: Trach in placed  CVS: S1 and s2 present  RESP: Air entry B/L  GI: Abdomen nondistended, NT and PEG in placed  EXT: No pedal edema, hands contracted   CNS: Awake but not alert        LABS:                        12.2   23.0  )-----------( 180      ( 15 Jul 2018 12:45 )             38.7         07-15    144  |  110<H>  |  19<H>  ----------------------------<  133<H>  4.1   |  24  |  0.68    Ca    9.0      15 Jul 2018 12:45  Phos  2.2     07-15  Mg     1.9     07-15    TPro  7.4  /  Alb  3.4<L>  /  TBili  0.9  /  DBili  x   /  AST  26  /  ALT  44  /  AlkPhos  108  07-14        CAPILLARY BLOOD GLUCOSE      POCT Blood Glucose.: 211 mg/dL (15 Jul 2018 17:22)  POCT Blood Glucose.: 123 mg/dL (15 Jul 2018 11:44)  POCT Blood Glucose.: 353 mg/dL (15 Jul 2018 07:56)  POCT Blood Glucose.: 254 mg/dL (15 Jul 2018 00:42)  POCT Blood Glucose.: 230 mg/dL (14 Jul 2018 22:22)        MEDICATIONS  (STANDING):  ALBUTerol/ipratropium for Nebulization 3 milliLiter(s) Nebulizer every 6 hours  amLODIPine   Tablet 2.5 milliGRAM(s) Oral daily  atorvastatin 20 milliGRAM(s) Oral at bedtime  dextrose 5%. 1000 milliLiter(s) (50 mL/Hr) IV Continuous <Continuous>  heparin  Injectable 5000 Unit(s) SubCutaneous every 8 hours  insulin glargine Injectable (LANTUS) 30 Unit(s) SubCutaneous at bedtime  insulin lispro (HumaLOG) corrective regimen sliding scale   SubCutaneous three times a day before meals  levETIRAcetam  Solution 1000 milliGRAM(s) Oral two times a day  LORazepam     Tablet 1 milliGRAM(s) Oral three times a day  metoprolol tartrate 100 milliGRAM(s) Oral two times a day  pantoprazole    Tablet 40 milliGRAM(s) Oral before breakfast  piperacillin/tazobactam IVPB. 3.375 Gram(s) IV Intermittent every 8 hours  sodium chloride 0.45%. 1000 milliLiter(s) (80 mL/Hr) IV Continuous <Continuous>  vancomycin  IVPB 1000 milliGRAM(s) IV Intermittent every 12 hours    MEDICATIONS  (PRN):  acetaminophen    Suspension 650 milliGRAM(s) Oral every 6 hours PRN For Temp greater than 38 C (100.4 F)  dextrose 40% Gel 15 Gram(s) Oral once PRN Blood Glucose LESS THAN 70 milliGRAM(s)/deciLiter  glucagon  Injectable 1 milliGRAM(s) IntraMuscular once PRN Glucose <70 milliGRAM(s)/deciLiter  QUEtiapine 50 milliGRAM(s) Oral two times a day PRN agitation        RADIOLOGY & ADDITIONAL TESTS:    7/15/18: CT Chest No Cont (07.15.18 @ 11:14) 1. Mid to distal esophageal wall thickening is suggested. Correlate with endoscopic evaluation.  2. A PEG tube is identified, the distal aspect of which is identified in the region of the pylorus/duodenal bulb; correlate clinically as to appropriate positioning.  3. There is opacity identified within the right lower lobe lung parenchyma, likely representative of a combination of both atelectasis and consolidation. Small right pleural effusion.    7/14/18: Xray Chest 1 View AP/PA (07.14.18 @ 15:34)No consolidation. Platelike atelectasis in the right lower lobe.        MICROBIOLOGY DATA:    Culture - Sputum . (07.16.18 @ 10:36)    Gram Stain:   Moderate polymorphonuclear leukocytes seen per low power field  Moderate Squamous epithelial cells seen per low power field  Moderate Gram Negative Rods seen per oil power field  Few Gram Positive Cocci seen per oil power field    Specimen Source: .Sputum Sputum trap    MRSA/MSSA PCR (07.16.18 @ 10:15)    [facility].: [facility]: MRSA/MSSA PCR assay is a qualitative in vitro diagnostic test for the  direct detection and differentiation of methicillin-resistant  Staphylococcus aureus (MRSA) from Staphylococcus aureus (SA). The assay  detects DNA from nasal swabs in patients atrisk for nasal colonization.  It is not intended to diagnose MRSA or SA infections nor guide or monitor  treatment for MRSA/SA infections. A negative result does not preclude  nasal colonization. The assay is FDA-approved and its performance has  been established by [employer], [place] and the [facility], [place], [place].    Staph Aureus PCR Result: [facility]    Urine Microscopic-Add On (NC) (07.15.18 @ 23:04)    Bacteria: Many /HPF    Epithelial Cells: Many /HPF    Red Blood Cell - Urine: 5-10 /HPF    White Blood Cell - Urine: 11-25 /HPF Patient is seen and examined at the bed side, is afebrile. The MRSA/MSSA PCR is negative. The Sputum culture is pending. The Leukocytosis is trending down.      REVIEW OF SYSTEMS: Unable to obtained since nonverbal      ALLERGIES: ACIs      Vital Signs Last 24 Hrs  T(C): 36.6 (16 Jul 2018 14:08), Max: 36.9 (15 Jul 2018 20:34)  T(F): 97.8 (16 Jul 2018 14:08), Max: 98.5 (15 Jul 2018 20:34)  HR: 79 (16 Jul 2018 14:08) (75 - 79)  BP: 133/70 (16 Jul 2018 14:08) (133/70 - 162/78)  BP(mean): --  RR: 17 (16 Jul 2018 14:08) (17 - 18)  SpO2: 95% (16 Jul 2018 14:08) (95% - 100%)        PHYSICAL EXAM:  GENERAL: Not in acute distress  HEENT: Trach in placed  CVS: S1 and s2 present  RESP: Air entry B/L  GI: Abdomen nondistended, NT and PEG in placed  EXT: No pedal edema, hands contracted   CNS: Awake but not alert        LABS:                        11.8   15.4  )-----------( 170      ( 16 Jul 2018 07:12 )             38.1                           12.2   23.0  )-----------( 180      ( 15 Jul 2018 12:45 )             38.7           07-16    139  |  105  |  17  ----------------------------<  248<H>  4.3   |  27  |  0.87    Ca    8.9      16 Jul 2018 07:12  Phos  2.1     07-16  Mg     2.0     07-16      07-15    144  |  110<H>  |  19<H>  ----------------------------<  133<H>  4.1   |  24  |  0.68    Ca    9.0      15 Jul 2018 12:45  Phos  2.2     07-15  Mg     1.9     07-15    TPro  7.4  /  Alb  3.4<L>  /  TBili  0.9  /  DBili  x   /  AST  26  /  ALT  44  /  AlkPhos  108  07-14        CAPILLARY BLOOD GLUCOSE      POCT Blood Glucose.: 211 mg/dL (15 Jul 2018 17:22)  POCT Blood Glucose.: 123 mg/dL (15 Jul 2018 11:44)  POCT Blood Glucose.: 353 mg/dL (15 Jul 2018 07:56)  POCT Blood Glucose.: 254 mg/dL (15 Jul 2018 00:42)  POCT Blood Glucose.: 230 mg/dL (14 Jul 2018 22:22)        MEDICATIONS  (STANDING):  ALBUTerol/ipratropium for Nebulization 3 milliLiter(s) Nebulizer every 6 hours  amLODIPine   Tablet 2.5 milliGRAM(s) Oral daily  atorvastatin 20 milliGRAM(s) Oral at bedtime  dextrose 5%. 1000 milliLiter(s) (50 mL/Hr) IV Continuous <Continuous>  heparin  Injectable 5000 Unit(s) SubCutaneous every 8 hours  insulin glargine Injectable (LANTUS) 30 Unit(s) SubCutaneous at bedtime  insulin lispro (HumaLOG) corrective regimen sliding scale   SubCutaneous three times a day before meals  levETIRAcetam  Solution 1000 milliGRAM(s) Oral two times a day  LORazepam     Tablet 1 milliGRAM(s) Oral three times a day  metoprolol tartrate 100 milliGRAM(s) Oral two times a day  pantoprazole    Tablet 40 milliGRAM(s) Oral before breakfast  piperacillin/tazobactam IVPB. 3.375 Gram(s) IV Intermittent every 8 hours  sodium chloride 0.45%. 1000 milliLiter(s) (80 mL/Hr) IV Continuous <Continuous>  vancomycin  IVPB 1000 milliGRAM(s) IV Intermittent every 12 hours    MEDICATIONS  (PRN):  acetaminophen    Suspension 650 milliGRAM(s) Oral every 6 hours PRN For Temp greater than 38 C (100.4 F)  dextrose 40% Gel 15 Gram(s) Oral once PRN Blood Glucose LESS THAN 70 milliGRAM(s)/deciLiter  glucagon  Injectable 1 milliGRAM(s) IntraMuscular once PRN Glucose <70 milliGRAM(s)/deciLiter  QUEtiapine 50 milliGRAM(s) Oral two times a day PRN agitation        RADIOLOGY & ADDITIONAL TESTS:    7/15/18: CT Chest No Cont (07.15.18 @ 11:14) 1. Mid to distal esophageal wall thickening is suggested. Correlate with endoscopic evaluation.  2. A PEG tube is identified, the distal aspect of which is identified in the region of the pylorus/duodenal bulb; correlate clinically as to appropriate positioning.  3. There is opacity identified within the right lower lobe lung parenchyma, likely representative of a combination of both atelectasis and consolidation. Small right pleural effusion.    7/14/18: Xray Chest 1 View AP/PA (07.14.18 @ 15:34)No consolidation. Platelike atelectasis in the right lower lobe.        MICROBIOLOGY DATA:    Culture - Sputum . (07.16.18 @ 10:36)    Gram Stain:   Moderate polymorphonuclear leukocytes seen per low power field  Moderate Squamous epithelial cells seen per low power field  Moderate Gram Negative Rods seen per oil power field  Few Gram Positive Cocci seen per oil power field    Specimen Source: .Sputum Sputum trap    MRSA/MSSA PCR (07.16.18 @ 10:15)    MRSA PCR Result.: [facility]: MRSA/MSSA PCR assay is a qualitative in vitro diagnostic test for the  direct detection and differentiation of methicillin-resistant  Staphylococcus aureus (MRSA) from Staphylococcus aureus (SA). The assay  detects DNA from nasal swabs in patients atrisk for nasal colonization.  It is not intended to diagnose MRSA or SA infections nor guide or monitor  treatment for MRSA/SA infections. A negative result does not preclude  nasal colonization. The assay is FDA-approved and its performance has  been established by [employer] [place], [place] and the [facility], [place], [place].    Staph Aureus PCR Result: [facility]    Urine Microscopic-Add On (NC) (07.15.18 @ 23:04)    Bacteria: Many /HPF    Epithelial Cells: Many /HPF    Red Blood Cell - Urine: 5-10 /HPF    White Blood Cell - Urine: 11-25 /HPF

## 2018-07-16 NOTE — DIETITIAN INITIAL EVALUATION ADULT. - MD RECOMMEND
other/When Tf started Recc Glucerna 1.2 Initial Goal rate @ 80 ml/hr x 16 hr to provide 1280 ml, 1536 calories, [id number] gms, free water 1030 ml /day)

## 2018-07-16 NOTE — PROGRESS NOTE ADULT - SUBJECTIVE AND OBJECTIVE BOX
Time of Visit:  Patient seen and examined.     MEDICATIONS  (STANDING):  ALBUTerol/ipratropium for Nebulization 3 milliLiter(s) Nebulizer every 6 hours  amLODIPine   Tablet 2.5 milliGRAM(s) Oral daily  atorvastatin 20 milliGRAM(s) Oral at bedtime  dextrose 5%. 1000 milliLiter(s) (50 mL/Hr) IV Continuous <Continuous>  dextrose 50% Injectable 12.5 Gram(s) IV Push once  dextrose 50% Injectable 25 Gram(s) IV Push once  dextrose 50% Injectable 25 Gram(s) IV Push once  heparin  Injectable 5000 Unit(s) SubCutaneous every 8 hours  insulin glargine Injectable (LANTUS) 30 Unit(s) SubCutaneous at bedtime  insulin lispro (HumaLOG) corrective regimen sliding scale   SubCutaneous three times a day before meals  levETIRAcetam  Solution 1000 milliGRAM(s) Oral two times a day  LORazepam     Tablet 1 milliGRAM(s) Oral three times a day  metoprolol tartrate 100 milliGRAM(s) Oral two times a day  pantoprazole    Tablet 40 milliGRAM(s) Oral before breakfast  piperacillin/tazobactam IVPB. 3.375 Gram(s) IV Intermittent every 8 hours  sodium chloride 0.45%. 1000 milliLiter(s) (80 mL/Hr) IV Continuous <Continuous>  vancomycin  IVPB 1000 milliGRAM(s) IV Intermittent every 12 hours      MEDICATIONS  (PRN):  acetaminophen    Suspension 650 milliGRAM(s) Oral every 6 hours PRN For Temp greater than 38 C (100.4 F)  dextrose 40% Gel 15 Gram(s) Oral once PRN Blood Glucose LESS THAN 70 milliGRAM(s)/deciLiter  glucagon  Injectable 1 milliGRAM(s) IntraMuscular once PRN Glucose <70 milliGRAM(s)/deciLiter       Medications up to date at time of exam.    ROS; Limited due to Non verbal. No fever, chills, cough, congestion on exam.   PHYSICAL EXAMINATION:      Vital Signs Last 24 Hrs  T(C): 36.6 (2018 14:08), Max: 36.9 (15 Jul 2018 20:34)  T(F): 97.8 (2018 14:08), Max: 98.5 (15 Jul 2018 20:34)  HR: 79 (2018 14:08) (75 - 79)  BP: 133/70 (2018 14:08) (133/70 - 162/78)  BP(mean): --  RR: 17 (2018 14:08) (17 - 18)  SpO2: 95% (2018 14:08) (95% - 100%)   (if applicable)    General; Non verbal. Total care. No acute distress .    HEENT: Normocephalic and atraumatic. No nasal tenderness. Moist mucosa.     NECK: Has Trach#6, non infected.    LUNGS: Clear to auscultation, no wheezing, rales, or rhonchi. No use of accessory muscle.     HEART: S1 S2 Regular rate and no click/ rub.     ABDOMEN: Soft, nontender, and nondistended. Active bowel sounds. + Peg( Fr#20).    EXTREMITIES: Without any cyanosis, clubbing, rash, lesions or edema.    NEUROLOGIC: Cognitive impaired. Non verbal.     SKIN: Warm and moist. Non diaphoretic.       LABS:                        11.8   15.4  )-----------( 170      ( 2018 07:12 )             38.1     07-16    139  |  105  |  17  ----------------------------<  248<H>  4.3   |  27  |  0.87    Ca    8.9      2018 07:12  Phos  2.1     07-16  Mg     2.0     -16    TPro  7.4  /  Alb  3.4<L>  /  TBili  0.9  /  DBili  x   /  AST  26  /  ALT  44  /  AlkPhos  108  07-14      Urinalysis Basic - ( 15 Jul 2018 23:04 )    Color: Yellow / Appearance: Slightly Turbid / S.020 / pH: x  Gluc: x / Ketone: Trace  / Bili: Negative / Urobili: Negative   Blood: x / Protein: 100 / Nitrite: Positive   Leuk Esterase: Moderate / RBC: 5-10 /HPF / WBC 11-25 /HPF   Sq Epi: x / Non Sq Epi: Many /HPF / Bacteria: Many /HPF    Procalcitonin, Serum: 0.32 ng/mL (07-15-18 @ 21:12)        RADIOLOGY & ADDITIONAL STUDIES:  EKG:   Ct chest:  < from: CT Chest No Cont (07.15.18 @ 11:14) >  EXAM:  CT ABDOMEN AND PELVIS                          EXAM:  CT CHEST                            PROCEDURE DATE:  07/15/2018          INTERPRETATION:  CLINICAL HISTORY:  Respiratory failure; sepsis    Multiple axial images of the chest, abdomen andpelvis were obtained from   the lung apices through symphysis pubis without the administration of   oral or intravascular contrast limiting the sensitivity of evaluation.   Reformatted coronal and sagittal images are submitted.    COMPARISON: CT evaluation abdomen/pelvis 2016.    FINDINGS: Mid to distal esophageal wall thickening is suggested.   Correlate with endoscopic evaluation.    A midline tracheostomy is identified.    A PEG tube is identified, the distal aspect of which is identified in the   region of the pylorus/duodenal bulb; correlate clinically as to   appropriate positioning.    No abnormally enlarged mediastinal or hilar lymph nodes are noted. There   is no evidence for axillary lymphadenopathy. The heart size appears   within normal limits. No pericardial effusion is identified. Thoracic   aortic caliber demonstrates unremarkable caliber.    The central bronchial anatomy appears patent.    Elevation of the right hemidiaphragm is identified. There is opacity   identified within the right lower lobe lung parenchyma, likely   representative of a combination of both atelectasis and consolidation. No   additional regions of lung parenchymal infiltrate or consolidation   identified bilaterally. A small right pleuraleffusion is noted. There is   no evidence for left pleural effusion. There is no evidence of   pneumothorax. No mediastinal shift is noted.    The liver is normal in size. No focal hepatic masses are identified.   There is no evidence for intrahepatic or extrahepatic biliary dilatation.   No gallstones, gallbladder wall thickening or pericholecystic fluid   identified.    The spleen, pancreas and adrenal glands are unremarkable.    The kidneys enhance bilaterally, without evidence for space-occupying   lesion or hydronephrosis. A small less than 3 mm calculus is noted within   the upper pole region of the left kidney. No right renal calculi are   identified. The abdominal aorta is normal in course and caliber. No   abnormally enlarged retroperitoneal or pelvic lymphadenopathy is noted.    Fecal material is scattered throughout the colon. There is no evidence   for mechanical bowel obstruction. No colonic wall thickening is   identified. There is no evidence for free intraperitoneal air orfluid.   There is no evidence for acute appendicitis.    The uterus and urinary bladder appear unremarkable.     No acute osseous fractures are evident.    IMPRESSION:    1. Mid to distal esophageal wall thickening is suggested. Correlate with   endoscopic evaluation.  2. A PEG tube is identified, the distal aspect of which is identified in   the region of the pylorus/duodenal bulb; correlate clinically as to   appropriate positioning.  3. There is opacity identified within the right lower lobe lung   parenchyma, likely representative of a combination of both atelectasis   and consolidation. Small right pleural effusion.    PAST MEDICAL & SURGICAL HISTORY:  No pertinent past medical history  Hypertension  Schizophrenia  Diabetic coma  Diabetes mellitus  No significant past surgical history  S/P percutaneous endoscopic gastrostomy (PEG) tube placement  H/O tracheostomy     Impression; 64 Y/O Female with prior mentioned multiple chronic conditions. Presented with Cough, SOB and Vomiting. Leukocytosis from WBC 23.1 to WBC 15.4 with RLL Opacity secondary to Aspiration PNA most likely Gram Negative Organism since she is from Nursing Home. Oxygen supplementation FIO2 40% via Trach collar due to Chronic Hypoxic respiratory failure.      Suggestion:   Oxygen supplementation via Trach collar.  Continue DuoNeb Q 6 hours.  Continue antibiotic as per ID recommendations.  DVT/ GI prophylactic.    F/u cultures  Aspiration precautions. HOB elevation especially during Peg feeding. Time of Visit:  Patient seen and examined.     MEDICATIONS  (STANDING):  ALBUTerol/ipratropium for Nebulization 3 milliLiter(s) Nebulizer every 6 hours  amLODIPine   Tablet 2.5 milliGRAM(s) Oral daily  atorvastatin 20 milliGRAM(s) Oral at bedtime  dextrose 5%. 1000 milliLiter(s) (50 mL/Hr) IV Continuous <Continuous>  dextrose 50% Injectable 12.5 Gram(s) IV Push once  dextrose 50% Injectable 25 Gram(s) IV Push once  dextrose 50% Injectable 25 Gram(s) IV Push once  heparin  Injectable 5000 Unit(s) SubCutaneous every 8 hours  insulin glargine Injectable (LANTUS) 30 Unit(s) SubCutaneous at bedtime  insulin lispro (HumaLOG) corrective regimen sliding scale   SubCutaneous three times a day before meals  levETIRAcetam  Solution 1000 milliGRAM(s) Oral two times a day  LORazepam     Tablet 1 milliGRAM(s) Oral three times a day  metoprolol tartrate 100 milliGRAM(s) Oral two times a day  pantoprazole    Tablet 40 milliGRAM(s) Oral before breakfast  piperacillin/tazobactam IVPB. 3.375 Gram(s) IV Intermittent every 8 hours  sodium chloride 0.45%. 1000 milliLiter(s) (80 mL/Hr) IV Continuous <Continuous>  vancomycin  IVPB 1000 milliGRAM(s) IV Intermittent every 12 hours      MEDICATIONS  (PRN):  acetaminophen    Suspension 650 milliGRAM(s) Oral every 6 hours PRN For Temp greater than 38 C (100.4 F)  dextrose 40% Gel 15 Gram(s) Oral once PRN Blood Glucose LESS THAN 70 milliGRAM(s)/deciLiter  glucagon  Injectable 1 milliGRAM(s) IntraMuscular once PRN Glucose <70 milliGRAM(s)/deciLiter       Medications up to date at time of exam.    ROS; Limited due to Non verbal. No fever, chills, cough, congestion on exam.   PHYSICAL EXAMINATION:      Vital Signs Last 24 Hrs  T(C): 36.6 (2018 14:08), Max: 36.9 (15 Jul 2018 20:34)  T(F): 97.8 (2018 14:08), Max: 98.5 (15 Jul 2018 20:34)  HR: 79 (2018 14:08) (75 - 79)  BP: 133/70 (2018 14:08) (133/70 - 162/78)  BP(mean): --  RR: 17 (2018 14:08) (17 - 18)  SpO2: 95% (2018 14:08) (95% - 100%)   (if applicable)    General; Non verbal. Total care. No acute distress .    HEENT: Normocephalic and atraumatic. No nasal tenderness. Moist mucosa.     NECK: Has Trach#6, non infected.    LUNGS: Clear to auscultation, no wheezing, rales, or rhonchi. No use of accessory muscle.     HEART: S1 S2 Regular rate and no click/ rub.     ABDOMEN: Soft, nontender, and nondistended. Active bowel sounds. + Peg( Fr#20).    EXTREMITIES: Without any cyanosis, clubbing, rash, lesions or edema.    NEUROLOGIC: Cognitive impaired. Non verbal.     SKIN: Warm and moist. Non diaphoretic.       LABS:                        11.8   15.4  )-----------( 170      ( 2018 07:12 )             38.1     07-16    139  |  105  |  17  ----------------------------<  248<H>  4.3   |  27  |  0.87    Ca    8.9      2018 07:12  Phos  2.1     07-16  Mg     2.0     -16    TPro  7.4  /  Alb  3.4<L>  /  TBili  0.9  /  DBili  x   /  AST  26  /  ALT  44  /  AlkPhos  108  07-14      Urinalysis Basic - ( 15 Jul 2018 23:04 )    Color: Yellow / Appearance: Slightly Turbid / S.020 / pH: x  Gluc: x / Ketone: Trace  / Bili: Negative / Urobili: Negative   Blood: x / Protein: 100 / Nitrite: Positive   Leuk Esterase: Moderate / RBC: 5-10 /HPF / WBC 11-25 /HPF   Sq Epi: x / Non Sq Epi: Many /HPF / Bacteria: Many /HPF    Procalcitonin, Serum: 0.32 ng/mL (07-15-18 @ 21:12)        RADIOLOGY & ADDITIONAL STUDIES:  EKG:   Ct chest:  < from: CT Chest No Cont (07.15.18 @ 11:14) >  EXAM:  CT ABDOMEN AND PELVIS                          EXAM:  CT CHEST                            PROCEDURE DATE:  07/15/2018          INTERPRETATION:  CLINICAL HISTORY:  Respiratory failure; sepsis    Multiple axial images of the chest, abdomen andpelvis were obtained from   the lung apices through symphysis pubis without the administration of   oral or intravascular contrast limiting the sensitivity of evaluation.   Reformatted coronal and sagittal images are submitted.    COMPARISON: CT evaluation abdomen/pelvis 2016.    FINDINGS: Mid to distal esophageal wall thickening is suggested.   Correlate with endoscopic evaluation.    A midline tracheostomy is identified.    A PEG tube is identified, the distal aspect of which is identified in the   region of the pylorus/duodenal bulb; correlate clinically as to   appropriate positioning.    No abnormally enlarged mediastinal or hilar lymph nodes are noted. There   is no evidence for axillary lymphadenopathy. The heart size appears   within normal limits. No pericardial effusion is identified. Thoracic   aortic caliber demonstrates unremarkable caliber.    The central bronchial anatomy appears patent.    Elevation of the right hemidiaphragm is identified. There is opacity   identified within the right lower lobe lung parenchyma, likely   representative of a combination of both atelectasis and consolidation. No   additional regions of lung parenchymal infiltrate or consolidation   identified bilaterally. A small right pleuraleffusion is noted. There is   no evidence for left pleural effusion. There is no evidence of   pneumothorax. No mediastinal shift is noted.    The liver is normal in size. No focal hepatic masses are identified.   There is no evidence for intrahepatic or extrahepatic biliary dilatation.   No gallstones, gallbladder wall thickening or pericholecystic fluid   identified.    The spleen, pancreas and adrenal glands are unremarkable.    The kidneys enhance bilaterally, without evidence for space-occupying   lesion or hydronephrosis. A small less than 3 mm calculus is noted within   the upper pole region of the left kidney. No right renal calculi are   identified. The abdominal aorta is normal in course and caliber. No   abnormally enlarged retroperitoneal or pelvic lymphadenopathy is noted.    Fecal material is scattered throughout the colon. There is no evidence   for mechanical bowel obstruction. No colonic wall thickening is   identified. There is no evidence for free intraperitoneal air orfluid.   There is no evidence for acute appendicitis.    The uterus and urinary bladder appear unremarkable.     No acute osseous fractures are evident.    IMPRESSION:    1. Mid to distal esophageal wall thickening is suggested. Correlate with   endoscopic evaluation.  2. A PEG tube is identified, the distal aspect of which is identified in   the region of the pylorus/duodenal bulb; correlate clinically as to   appropriate positioning.  3. There is opacity identified within the right lower lobe lung   parenchyma, likely representative of a combination of both atelectasis   and consolidation. Small right pleural effusion.    PAST MEDICAL & SURGICAL HISTORY:  No pertinent past medical history  Hypertension  Schizophrenia  Diabetic coma  Diabetes mellitus  No significant past surgical history  S/P percutaneous endoscopic gastrostomy (PEG) tube placement  H/O tracheostomy     Impression; 64 Y/O Female with prior mentioned multiple chronic conditions. Presented with Cough, SOB and Vomiting. Leukocytosis from WBC 23.1 to WBC 15.4 with RLL Opacity secondary to Aspiration PNA most likely Gram Negative Organism since she is from Nursing Home. Oxygen supplementation FIO2 40% via Trach collar due to Chronic Hypoxic respiratory failure.      Suggestion:   Oxygen supplementation via Trach collar.  Continue DuoNeb Q 6 hours.  Continue antibiotic as per ID recommendations.  DVT/ GI prophylactic.    F/u cultures  Aspiration precautions. HOB elevation especially during Peg feeding.     Agree with above assessment and plan as transcribed.

## 2018-07-16 NOTE — PROGRESS NOTE ADULT - ASSESSMENT
63 Y/O F, non-verbal, bed bound, Trach/ PEG tube, from NH, PMhx of HTN, asthma, convulsion disorder, bipolar, DVT legs, DM, sent from NH for vomiting and cough.  In the ED, pt presents in no acute distress, and vitals WNL.  Pt is afebrile with leukocytosis of 19.  CXR concerning for RLL infiltrate likely 2/2 asp pneumonia.  Abdominal x-ray sig for non-specific gas pattern and intact PEG tube.  Pt will be admitted to medicine for suspected Aspiration pneumonia.    Plan:   -Sepsis 2/2 likely Complicated Aspiration PNA/HCAP & UTI - (+) u/a.  F/u UCx. RLL PNA. C/w broad spectrum abx, ivf, supportive care, nebs, aspiration precautions  -ARF from dehydration - improving. C/w IVF, treat infection, monitor renal function   -Uncontrolled Type 2 DM with complications of hyperglycemia - improving. C/w lantus, moderate ihss, monitor acks  -Hematemesis - no additional events noted inpatient.  Questionable placement of peg tube.  F/u GI recs. C/w protonix. F/u nutrition and restart peg feeding pending gi clearance.   -Need for prophylactic measure: dvt/gi ppx   -Hypophosphatemia: replace

## 2018-07-17 DIAGNOSIS — J69.0 PNEUMONITIS DUE TO INHALATION OF FOOD AND VOMIT: ICD-10-CM

## 2018-07-17 LAB
ANION GAP SERPL CALC-SCNC: 7 MMOL/L — SIGNIFICANT CHANGE UP (ref 5–17)
BUN SERPL-MCNC: 11 MG/DL — SIGNIFICANT CHANGE UP (ref 7–18)
CALCIUM SERPL-MCNC: 8.5 MG/DL — SIGNIFICANT CHANGE UP (ref 8.4–10.5)
CHLORIDE SERPL-SCNC: 103 MMOL/L — SIGNIFICANT CHANGE UP (ref 96–108)
CO2 SERPL-SCNC: 27 MMOL/L — SIGNIFICANT CHANGE UP (ref 22–31)
CREAT SERPL-MCNC: 0.74 MG/DL — SIGNIFICANT CHANGE UP (ref 0.5–1.3)
GLUCOSE SERPL-MCNC: 177 MG/DL — HIGH (ref 70–99)
HCT VFR BLD CALC: 39.9 % — SIGNIFICANT CHANGE UP (ref 34.5–45)
HGB BLD-MCNC: 12.5 G/DL — SIGNIFICANT CHANGE UP (ref 11.5–15.5)
LEVETIRACETAM SERPL-MCNC: 17.9 MCG/ML — SIGNIFICANT CHANGE UP (ref 12–46)
MCHC RBC-ENTMCNC: 25.1 PG — LOW (ref 27–34)
MCHC RBC-ENTMCNC: 31.3 GM/DL — LOW (ref 32–36)
MCV RBC AUTO: 80.2 FL — SIGNIFICANT CHANGE UP (ref 80–100)
PLATELET # BLD AUTO: 157 K/UL — SIGNIFICANT CHANGE UP (ref 150–400)
POTASSIUM SERPL-MCNC: 3.9 MMOL/L — SIGNIFICANT CHANGE UP (ref 3.5–5.3)
POTASSIUM SERPL-SCNC: 3.9 MMOL/L — SIGNIFICANT CHANGE UP (ref 3.5–5.3)
RBC # BLD: 4.97 M/UL — SIGNIFICANT CHANGE UP (ref 3.8–5.2)
RBC # FLD: 13.7 % — SIGNIFICANT CHANGE UP (ref 10.3–14.5)
SODIUM SERPL-SCNC: 137 MMOL/L — SIGNIFICANT CHANGE UP (ref 135–145)
WBC # BLD: 10.1 K/UL — SIGNIFICANT CHANGE UP (ref 3.8–10.5)
WBC # FLD AUTO: 10.1 K/UL — SIGNIFICANT CHANGE UP (ref 3.8–10.5)

## 2018-07-17 RX ORDER — PANTOPRAZOLE SODIUM 20 MG/1
40 TABLET, DELAYED RELEASE ORAL DAILY
Qty: 0 | Refills: 0 | Status: DISCONTINUED | OUTPATIENT
Start: 2018-07-17 | End: 2018-07-31

## 2018-07-17 RX ORDER — SODIUM CHLORIDE 9 MG/ML
1000 INJECTION, SOLUTION INTRAVENOUS
Qty: 0 | Refills: 0 | Status: DISCONTINUED | OUTPATIENT
Start: 2018-07-17 | End: 2018-07-26

## 2018-07-17 RX ORDER — INSULIN LISPRO 100/ML
VIAL (ML) SUBCUTANEOUS EVERY 6 HOURS
Qty: 0 | Refills: 0 | Status: DISCONTINUED | OUTPATIENT
Start: 2018-07-17 | End: 2018-07-31

## 2018-07-17 RX ORDER — QUETIAPINE FUMARATE 200 MG/1
50 TABLET, FILM COATED ORAL EVERY 12 HOURS
Qty: 0 | Refills: 0 | Status: DISCONTINUED | OUTPATIENT
Start: 2018-07-17 | End: 2018-07-24

## 2018-07-17 RX ORDER — QUETIAPINE FUMARATE 200 MG/1
50 TABLET, FILM COATED ORAL
Qty: 0 | Refills: 0 | Status: DISCONTINUED | OUTPATIENT
Start: 2018-07-17 | End: 2018-07-17

## 2018-07-17 RX ORDER — SODIUM CHLORIDE 9 MG/ML
1000 INJECTION, SOLUTION INTRAVENOUS
Qty: 0 | Refills: 0 | Status: DISCONTINUED | OUTPATIENT
Start: 2018-07-17 | End: 2018-07-17

## 2018-07-17 RX ORDER — INSULIN GLARGINE 100 [IU]/ML
15 INJECTION, SOLUTION SUBCUTANEOUS AT BEDTIME
Qty: 0 | Refills: 0 | Status: DISCONTINUED | OUTPATIENT
Start: 2018-07-17 | End: 2018-07-31

## 2018-07-17 RX ADMIN — Medication 1 MILLIGRAM(S): at 23:12

## 2018-07-17 RX ADMIN — SODIUM CHLORIDE 75 MILLILITER(S): 9 INJECTION, SOLUTION INTRAVENOUS at 23:20

## 2018-07-17 RX ADMIN — Medication 100 MILLIGRAM(S): at 05:48

## 2018-07-17 RX ADMIN — Medication 2: at 05:49

## 2018-07-17 RX ADMIN — QUETIAPINE FUMARATE 50 MILLIGRAM(S): 200 TABLET, FILM COATED ORAL at 11:03

## 2018-07-17 RX ADMIN — LEVETIRACETAM 1000 MILLIGRAM(S): 250 TABLET, FILM COATED ORAL at 05:49

## 2018-07-17 RX ADMIN — HEPARIN SODIUM 5000 UNIT(S): 5000 INJECTION INTRAVENOUS; SUBCUTANEOUS at 05:49

## 2018-07-17 RX ADMIN — INSULIN GLARGINE 15 UNIT(S): 100 INJECTION, SOLUTION SUBCUTANEOUS at 23:13

## 2018-07-17 RX ADMIN — ATORVASTATIN CALCIUM 20 MILLIGRAM(S): 80 TABLET, FILM COATED ORAL at 21:13

## 2018-07-17 RX ADMIN — PIPERACILLIN AND TAZOBACTAM 25 GRAM(S): 4; .5 INJECTION, POWDER, LYOPHILIZED, FOR SOLUTION INTRAVENOUS at 05:48

## 2018-07-17 RX ADMIN — Medication 1 MILLIGRAM(S): at 18:48

## 2018-07-17 RX ADMIN — Medication 3 MILLILITER(S): at 08:51

## 2018-07-17 RX ADMIN — HEPARIN SODIUM 5000 UNIT(S): 5000 INJECTION INTRAVENOUS; SUBCUTANEOUS at 21:13

## 2018-07-17 RX ADMIN — PIPERACILLIN AND TAZOBACTAM 25 GRAM(S): 4; .5 INJECTION, POWDER, LYOPHILIZED, FOR SOLUTION INTRAVENOUS at 13:26

## 2018-07-17 RX ADMIN — PANTOPRAZOLE SODIUM 40 MILLIGRAM(S): 20 TABLET, DELAYED RELEASE ORAL at 11:03

## 2018-07-17 RX ADMIN — HEPARIN SODIUM 5000 UNIT(S): 5000 INJECTION INTRAVENOUS; SUBCUTANEOUS at 13:26

## 2018-07-17 RX ADMIN — Medication 100 MILLIGRAM(S): at 17:23

## 2018-07-17 RX ADMIN — Medication 3 MILLILITER(S): at 15:19

## 2018-07-17 RX ADMIN — Medication 1 MILLIGRAM(S): at 05:48

## 2018-07-17 RX ADMIN — Medication 1 MILLIGRAM(S): at 13:26

## 2018-07-17 RX ADMIN — Medication 3 MILLILITER(S): at 20:36

## 2018-07-17 RX ADMIN — Medication 3 MILLILITER(S): at 02:34

## 2018-07-17 RX ADMIN — QUETIAPINE FUMARATE 50 MILLIGRAM(S): 200 TABLET, FILM COATED ORAL at 17:23

## 2018-07-17 RX ADMIN — PIPERACILLIN AND TAZOBACTAM 25 GRAM(S): 4; .5 INJECTION, POWDER, LYOPHILIZED, FOR SOLUTION INTRAVENOUS at 21:13

## 2018-07-17 RX ADMIN — AMLODIPINE BESYLATE 2.5 MILLIGRAM(S): 2.5 TABLET ORAL at 05:48

## 2018-07-17 RX ADMIN — LEVETIRACETAM 1000 MILLIGRAM(S): 250 TABLET, FILM COATED ORAL at 17:22

## 2018-07-17 NOTE — PROGRESS NOTE ADULT - SUBJECTIVE AND OBJECTIVE BOX
Patient is a 63y old  Female who presents with a chief complaint of SOB (2018 18:51)  Allergy:  angiotensin converting enzyme inhibitors (Unknown)      INTERVAL HPI/OVERNIGHT EVENTS:    T(C): 37.2 (18 @ 14:30), Max: 37.2 (18 @ 14:30)  HR: 94 (18 @ 14:30) (60 - 94)  BP: 146/72 (18 @ 14:30) (121/60 - 152/91)  RR: 18 (18 @ 14:30) (16 - 18)  SpO2: 92% (18 @ 14:30) (92% - 100%)  I&O's Summary    Vital Signs Last 24 Hrs  T(C): 37.2 (2018 14:30), Max: 37.2 (2018 14:30)  T(F): 99 (2018 14:30), Max: 99 (2018 14:30)  HR: 94 (2018 14:30) (60 - 94)  BP: 146/72 (2018 14:30) (121/60 - 152/91)  BP(mean): --  RR: 18 (2018 14:30) (16 - 18)  SpO2: 92% (2018 14:30) (92% - 100%)  PAST MEDICAL & SURGICAL HISTORY:  No pertinent past medical history  Hypertension  Schizophrenia  Diabetic coma  Diabetes mellitus  No significant past surgical history  S/P percutaneous endoscopic gastrostomy (PEG) tube placement  H/O tracheostomy          LABS:                        12.5   10.1  )-----------( 157      ( 2018 06:01 )             39.9     -    137  |  103  |  11  ----------------------------<  177<H>  3.9   |  27  |  0.74    Ca    8.5      2018 06:01  Phos  2.1     07-16  Mg     2.0     07-16        CAPILLARY BLOOD GLUCOSE      POCT Blood Glucose.: 134 mg/dL (2018 11:52)  POCT Blood Glucose.: 158 mg/dL (2018 05:30)  POCT Blood Glucose.: 150 mg/dL (2018 23:10)  POCT Blood Glucose.: 170 mg/dL (2018 22:03)  POCT Blood Glucose.: 114 mg/dL (2018 17:10)        Urinalysis Basic - ( 15 Jul 2018 23:04 )    Color: Yellow / Appearance: Slightly Turbid / S.020 / pH: x  Gluc: x / Ketone: Trace  / Bili: Negative / Urobili: Negative   Blood: x / Protein: 100 / Nitrite: Positive   Leuk Esterase: Moderate / RBC: 5-10 /HPF / WBC 11-25 /HPF   Sq Epi: x / Non Sq Epi: Many /HPF / Bacteria: Many /HPF        MEDICATIONS  (STANDING):  ALBUTerol/ipratropium for Nebulization 3 milliLiter(s) Nebulizer every 6 hours  amLODIPine   Tablet 2.5 milliGRAM(s) Oral daily  atorvastatin 20 milliGRAM(s) Oral at bedtime  dextrose 5%. 1000 milliLiter(s) (50 mL/Hr) IV Continuous <Continuous>  dextrose 50% Injectable 12.5 Gram(s) IV Push once  dextrose 50% Injectable 25 Gram(s) IV Push once  dextrose 50% Injectable 25 Gram(s) IV Push once  heparin  Injectable 5000 Unit(s) SubCutaneous every 8 hours  insulin glargine Injectable (LANTUS) 30 Unit(s) SubCutaneous at bedtime  insulin lispro (HumaLOG) corrective regimen sliding scale   SubCutaneous every 6 hours  levETIRAcetam  Solution 1000 milliGRAM(s) Oral two times a day  metoprolol tartrate 100 milliGRAM(s) Oral two times a day  pantoprazole   Suspension 40 milliGRAM(s) Enteral Tube daily  piperacillin/tazobactam IVPB. 3.375 Gram(s) IV Intermittent every 8 hours  QUEtiapine 50 milliGRAM(s) Oral every 12 hours  sodium chloride 0.45%. 1000 milliLiter(s) (80 mL/Hr) IV Continuous <Continuous>    MEDICATIONS  (PRN):  acetaminophen    Suspension 650 milliGRAM(s) Oral every 6 hours PRN For Temp greater than 38 C (100.4 F)  dextrose 40% Gel 15 Gram(s) Oral once PRN Blood Glucose LESS THAN 70 milliGRAM(s)/deciLiter  glucagon  Injectable 1 milliGRAM(s) IntraMuscular once PRN Glucose <70 milliGRAM(s)/deciLiter  LORazepam     Tablet 1 milliGRAM(s) Oral every 6 hours PRN Assaultive behavior      REVIEW OF SYSTEMS:  CONSTITUTIONAL: No fever, weight loss, or fatigueRADIOLOGY & ADDITIONAL TESTS:      Consultant(s) Notes Reviewed:  [ ] YES  [ ] NO    PHYSICAL EXAM:  GENERAL: NAD, well-groomed,   HEAD:  Atraumatic, Normocephalic  EYES:  conjunctiva and sclera clear  ENMT:  Moist mucous membranes NECK: trach in place.  NERVOUS SYSTEM:  EDMONDS, aggressive with care  CHEST/LUNG: rhonchi and productive yellow sputum noted via mouth. suctioned x 2  HEART: S1, S2;  ABDOMEN: Soft, Nontender, Nondistended; Peg in situ. Bowel sounds present. Pt NPO  EXTREMITIES:  2+ Peripheral Pulses, No clubbing, cyanosis, or edema  LYMPH: No lymphadenopathy noted  SKIN: No rashes or lesions    Care Collaborated Discussed with Consultants/Other Providers [y ] YES  [ ] NO

## 2018-07-17 NOTE — PROGRESS NOTE ADULT - ASSESSMENT
63 year-old woman nonverbal from NH with [facility] due to dehydration.  HTN with BP elevated, in part due to IVF.    All improved.  Will s/o. Please reconsult as necessary

## 2018-07-17 NOTE — PROGRESS NOTE ADULT - SUBJECTIVE AND OBJECTIVE BOX
Patient is seen and examined at the bed side, is afebrile.  The Sputum culture growing Pseudomonas and the Leukocytosis  trended down to normal.       REVIEW OF SYSTEMS: Unable to obtained since nonverbal      ALLERGIES: ACIs      Vital Signs Last 24 Hrs  T(C): 37.2 (17 Jul 2018 14:30), Max: 37.2 (17 Jul 2018 14:30)  T(F): 99 (17 Jul 2018 14:30), Max: 99 (17 Jul 2018 14:30)  HR: 75 (17 Jul 2018 17:15) (60 - 94)  BP: 120/65 (17 Jul 2018 17:15) (120/65 - 152/91)  BP(mean): --  RR: 18 (17 Jul 2018 14:30) (16 - 18)  SpO2: 92% (17 Jul 2018 14:30) (92% - 100%)      PHYSICAL EXAM:  GENERAL: Not in acute distress  HEENT: Trach in placed  CVS: S1 and s2 present  RESP: Air entry B/L  GI: Abdomen nondistended, NT and PEG in placed  EXT: No pedal edema, hands contracted   CNS: Awake but not alert        LABS:                        12.5   10.1  )-----------( 157      ( 17 Jul 2018 06:01 )             39.9                           11.8   15.4  )-----------( 170      ( 16 Jul 2018 07:12 )             38.1                           12.2   23.0  )-----------( 180      ( 15 Jul 2018 12:45 )             38.7         07-17    137  |  103  |  11  ----------------------------<  177<H>  3.9   |  27  |  0.74    Ca    8.5      17 Jul 2018 06:01  Phos  2.1     07-16  Mg     2.0     07-16      07-16    139  |  105  |  17  ----------------------------<  248<H>  4.3   |  27  |  0.87    Ca    8.9      16 Jul 2018 07:12  Phos  2.1     07-16  Mg     2.0     07-16    TPro  7.4  /  Alb  3.4<L>  /  TBili  0.9  /  DBili  x   /  AST  26  /  ALT  44  /  AlkPhos  108  07-14        CAPILLARY BLOOD GLUCOSE      POCT Blood Glucose.: 211 mg/dL (15 Jul 2018 17:22)  POCT Blood Glucose.: 123 mg/dL (15 Jul 2018 11:44)  POCT Blood Glucose.: 353 mg/dL (15 Jul 2018 07:56)  POCT Blood Glucose.: 254 mg/dL (15 Jul 2018 00:42)  POCT Blood Glucose.: 230 mg/dL (14 Jul 2018 22:22)        MEDICATIONS  (STANDING):  ALBUTerol/ipratropium for Nebulization 3 milliLiter(s) Nebulizer every 6 hours  amLODIPine   Tablet 2.5 milliGRAM(s) Oral daily  atorvastatin 20 milliGRAM(s) Oral at bedtime  dextrose 5%. 1000 milliLiter(s) (50 mL/Hr) IV Continuous <Continuous>  heparin  Injectable 5000 Unit(s) SubCutaneous every 8 hours  insulin glargine Injectable (LANTUS) 30 Unit(s) SubCutaneous at bedtime  insulin lispro (HumaLOG) corrective regimen sliding scale   SubCutaneous every 6 hours  levETIRAcetam  Solution 1000 milliGRAM(s) Oral two times a day  LORazepam     Tablet 1 milliGRAM(s) Oral every 6 hours  LORazepam   Injectable 1 milliGRAM(s) IV Push once  metoprolol tartrate 100 milliGRAM(s) Oral two times a day  pantoprazole   Suspension 40 milliGRAM(s) Enteral Tube daily  piperacillin/tazobactam IVPB. 3.375 Gram(s) IV Intermittent every 8 hours  QUEtiapine 50 milliGRAM(s) Oral every 12 hours  sodium chloride 0.45%. 1000 milliLiter(s) (80 mL/Hr) IV Continuous <Continuous>    MEDICATIONS  (PRN):  acetaminophen    Suspension 650 milliGRAM(s) Oral every 6 hours PRN For Temp greater than 38 C (100.4 F)  dextrose 40% Gel 15 Gram(s) Oral once PRN Blood Glucose LESS THAN 70 milliGRAM(s)/deciLiter  glucagon  Injectable 1 milliGRAM(s) IntraMuscular once PRN Glucose <70 milliGRAM(s)/deciLiter        RADIOLOGY & ADDITIONAL TESTS:    7/15/18: CT Chest No Cont (07.15.18 @ 11:14) 1. Mid to distal esophageal wall thickening is suggested. Correlate with endoscopic evaluation.  2. A PEG tube is identified, the distal aspect of which is identified in the region of the pylorus/duodenal bulb; correlate clinically as to appropriate positioning.  3. There is opacity identified within the right lower lobe lung parenchyma, likely representative of a combination of both atelectasis and consolidation. Small right pleural effusion.    7/14/18: Xray Chest 1 View AP/PA (07.14.18 @ 15:34)No consolidation. Platelike atelectasis in the right lower lobe.        MICROBIOLOGY DATA:      Culture - Sputum . (07.16.18 @ 10:36)    Gram Stain:   Moderate polymorphonuclear leukocytes seen per low power field  Moderate Squamous epithelial cells seen per low power field  Moderate Gram Negative Rods seen per oil power field  Few Gram Positive Cocci seen per oil power field    Specimen Source: .Sputum Sputum trap    Culture Results:   Three or more mixed gram negative rods including  Moderate Pseudomonas aeruginosa        MRSA/MSSA PCR (07.16.18 @ 10:15)    MRSA PCR Result.: NotDete: MRSA/MSSA PCR assay is a qualitative in vitro diagnostic test for the  direct detection and differentiation of methicillin-resistant  Staphylococcus aureus (MRSA) from Staphylococcus aureus (SA). The assay  detects DNA from nasal swabs in patients atrisk for nasal colonization.  It is not intended to diagnose MRSA or SA infections nor guide or monitor  treatment for MRSA/SA infections. A negative result does not preclude  nasal colonization. The assay is FDA-approved and its performance has  been established by [employer], [place] and the [facility], [place], [place].    Staph Aureus PCR Result: NotDetec    Urine Microscopic-Add On (NC) (07.15.18 @ 23:04)    Bacteria: Many /HPF    Epithelial Cells: Many /HPF    Red Blood Cell - Urine: 5-10 /HPF    White Blood Cell - Urine: 11-25 /HPF

## 2018-07-17 NOTE — PROGRESS NOTE ADULT - SUBJECTIVE AND OBJECTIVE BOX
Time of Visit:  Patient seen and examined.     MEDICATIONS  (STANDING):  ALBUTerol/ipratropium for Nebulization 3 milliLiter(s) Nebulizer every 6 hours  amLODIPine   Tablet 2.5 milliGRAM(s) Oral daily  atorvastatin 20 milliGRAM(s) Oral at bedtime  dextrose 5%. 1000 milliLiter(s) (50 mL/Hr) IV Continuous <Continuous>  dextrose 50% Injectable 12.5 Gram(s) IV Push once  dextrose 50% Injectable 25 Gram(s) IV Push once  dextrose 50% Injectable 25 Gram(s) IV Push once  heparin  Injectable 5000 Unit(s) SubCutaneous every 8 hours  insulin glargine Injectable (LANTUS) 30 Unit(s) SubCutaneous at bedtime  insulin lispro (HumaLOG) corrective regimen sliding scale   SubCutaneous every 6 hours  levETIRAcetam  Solution 1000 milliGRAM(s) Oral two times a day  LORazepam     Tablet 1 milliGRAM(s) Oral three times a day  metoprolol tartrate 100 milliGRAM(s) Oral two times a day  pantoprazole   Suspension 40 milliGRAM(s) Enteral Tube daily  piperacillin/tazobactam IVPB. 3.375 Gram(s) IV Intermittent every 8 hours  sodium chloride 0.45%. 1000 milliLiter(s) (80 mL/Hr) IV Continuous <Continuous>      MEDICATIONS  (PRN):  acetaminophen    Suspension 650 milliGRAM(s) Oral every 6 hours PRN For Temp greater than 38 C (100.4 F)  dextrose 40% Gel 15 Gram(s) Oral once PRN Blood Glucose LESS THAN 70 milliGRAM(s)/deciLiter  glucagon  Injectable 1 milliGRAM(s) IntraMuscular once PRN Glucose <70 milliGRAM(s)/deciLiter  QUEtiapine 50 milliGRAM(s) Oral two times a day PRN agitation       Medications up to date at time of exam.    ROS: Limited due to patient is Non verbal. No fever, chills, congestion, cough on exam.  PHYSICAL EXAMINATION:  Vital Signs Last 24 Hrs  T(C): 36.7 (2018 04:45), Max: 37.1 (2018 20:55)  T(F): 98 (2018 04:45), Max: 98.7 (2018 20:55)  HR: 89 (2018 04:45) (60 - 89)  BP: 152/91 (2018 04:45) (121/60 - 152/91)  BP(mean): --  RR: 16 (2018 04:45) (16 - 17)  SpO2: 95% (2018 04:45) (95% - 100%)   (if applicable)    General; Non verbal. Total care. No acute distress .      HEENT: Normocephalic and atraumatic. No nasal tenderness. Moist mucosa.     NECK: Has Trach#6, non infected.    LUNGS: Clear to auscultation, no wheezing, rales, or rhonchi. No use of accessory muscle.     HEART: S1 S2 Regular rate and no click/ rub.     ABDOMEN: Soft, nontender, and nondistended. Active bowel sounds. + Peg( Fr#20).    EXTREMITIES: Without any cyanosis, clubbing, rash, lesions or edema.    NEUROLOGIC: Non verbal. Cognitive impaired.      SKIN: Warm and moist. Non diaphoretic.       LABS:                        12.5   10.1  )-----------( 157      ( 2018 06:01 )             39.9         137  |  103  |  11  ----------------------------<  177<H>  3.9   |  27  |  0.74    Ca    8.5      2018 06:01  Phos  2.1       Mg     2.0             Urinalysis Basic - ( 15 Jul 2018 23:04 )    Color: Yellow / Appearance: Slightly Turbid / S.020 / pH: x  Gluc: x / Ketone: Trace  / Bili: Negative / Urobili: Negative   Blood: x / Protein: 100 / Nitrite: Positive   Leuk Esterase: Moderate / RBC: 5-10 /HPF / WBC 11-25 /HPF   Sq Epi: x / Non Sq Epi: Many /HPF / Bacteria: Many /HPF  Procalcitonin, Serum: 0.32 ng/mL (07-15-18 @ 21:12)        RADIOLOGY & ADDITIONAL STUDIES:  EKG:   Ct chest: < from: CT Chest No Cont (07.15.18 @ 11:14) >    EXAM:  CT ABDOMEN AND PELVIS                          EXAM:  CT CHEST                            PROCEDURE DATE:  07/15/2018          INTERPRETATION:  CLINICAL HISTORY:  Respiratory failure; sepsis    Multiple axial images of the chest, abdomen andpelvis were obtained from   the lung apices through symphysis pubis without the administration of   oral or intravascular contrast limiting the sensitivity of evaluation.   Reformatted coronal and sagittal images are submitted.    COMPARISON: CT evaluation abdomen/pelvis 2016.    FINDINGS: Mid to distal esophageal wall thickening is suggested.   Correlate with endoscopic evaluation.    A midline tracheostomy is identified.    A PEG tube is identified, the distal aspect of which is identified in the   region of the pylorus/duodenal bulb; correlate clinically as to   appropriate positioning.    No abnormally enlarged mediastinal or hilar lymph nodes are noted. There   is no evidence for axillary lymphadenopathy. The heart size appears   within normal limits. No pericardial effusion is identified. Thoracic   aortic caliber demonstrates unremarkable caliber.    The central bronchial anatomy appears patent.    Elevation of the right hemidiaphragm is identified. There is opacity   identified within the right lower lobe lung parenchyma, likely   representative of a combination of both atelectasis and consolidation. No   additional regions of lung parenchymal infiltrate or consolidation   identified bilaterally. A small right pleuraleffusion is noted. There is   no evidence for left pleural effusion. There is no evidence of   pneumothorax. No mediastinal shift is noted.    The liver is normal in size. No focal hepatic masses are identified.   There is no evidence for intrahepatic or extrahepatic biliary dilatation.   No gallstones, gallbladder wall thickening or pericholecystic fluid   identified.    The spleen, pancreas and adrenal glands are unremarkable.    The kidneys enhance bilaterally, without evidence for space-occupying   lesion or hydronephrosis. A small less than 3 mm calculus is noted within   the upper pole region of the left kidney. No right renal calculi are   identified. The abdominal aorta is normal in course and caliber. No   abnormally enlarged retroperitoneal or pelvic lymphadenopathy is noted.    Fecal material is scattered throughout the colon. There is no evidence   for mechanical bowel obstruction. No colonic wall thickening is   identified. There is no evidence for free intraperitoneal air orfluid.   There is no evidence for acute appendicitis.    The uterus and urinary bladder appear unremarkable.     No acute osseous fractures are evident.    IMPRESSION:    1. Mid to distal esophageal wall thickening is suggested. Correlate with   endoscopic evaluation.  2. A PEG tube is identified, the distal aspect of which is identified in   the region of the pylorus/duodenal bulb; correlate clinically as to   appropriate positioning.  3. There is opacity identified within the right lower lobe lung   parenchyma, likely representative of a combination of both atelectasis   and consolidation. Small right pleural effusion.     PAST MEDICAL & SURGICAL HISTORY:  No pertinent past medical history  Hypertension  Schizophrenia  Diabetic coma  Diabetes mellitus  No significant past surgical history  S/P percutaneous endoscopic gastrostomy (PEG) tube placement  H/O tracheostomy    Impression; 64 Y/O Female with prior mentioned multiple chronic conditions. Presented with Cough, SOB and Vomiting. Leukocytosis from WBC 23.1 to WBC 15.4 with RLL Opacity secondary to Aspiration PNA most likely Gram Negative Organism since she is from Nursing Home. Oxygen supplementation FIO2 40% via Trach collar due to Chronic Hypoxic respiratory failure. leukocytosis WBC from 23 to WBC 10.1. Been Afebrile.     Suggestion:   Oxygen supplementation via Trach collar.  Continue DuoNeb Q 6 hours.  Continue antibiotic as per ID recommendations.  DVT/ GI prophylactic.    Aspiration precautions. HOB elevation especially during Peg feeding.   Turn and reposition in bed. Time of Visit:  Patient seen and examined.     MEDICATIONS  (STANDING):  ALBUTerol/ipratropium for Nebulization 3 milliLiter(s) Nebulizer every 6 hours  amLODIPine   Tablet 2.5 milliGRAM(s) Oral daily  atorvastatin 20 milliGRAM(s) Oral at bedtime  dextrose 5%. 1000 milliLiter(s) (50 mL/Hr) IV Continuous <Continuous>  dextrose 50% Injectable 12.5 Gram(s) IV Push once  dextrose 50% Injectable 25 Gram(s) IV Push once  dextrose 50% Injectable 25 Gram(s) IV Push once  heparin  Injectable 5000 Unit(s) SubCutaneous every 8 hours  insulin glargine Injectable (LANTUS) 30 Unit(s) SubCutaneous at bedtime  insulin lispro (HumaLOG) corrective regimen sliding scale   SubCutaneous every 6 hours  levETIRAcetam  Solution 1000 milliGRAM(s) Oral two times a day  LORazepam     Tablet 1 milliGRAM(s) Oral three times a day  metoprolol tartrate 100 milliGRAM(s) Oral two times a day  pantoprazole   Suspension 40 milliGRAM(s) Enteral Tube daily  piperacillin/tazobactam IVPB. 3.375 Gram(s) IV Intermittent every 8 hours  sodium chloride 0.45%. 1000 milliLiter(s) (80 mL/Hr) IV Continuous <Continuous>      MEDICATIONS  (PRN):  acetaminophen    Suspension 650 milliGRAM(s) Oral every 6 hours PRN For Temp greater than 38 C (100.4 F)  dextrose 40% Gel 15 Gram(s) Oral once PRN Blood Glucose LESS THAN 70 milliGRAM(s)/deciLiter  glucagon  Injectable 1 milliGRAM(s) IntraMuscular once PRN Glucose <70 milliGRAM(s)/deciLiter  QUEtiapine 50 milliGRAM(s) Oral two times a day PRN agitation       Medications up to date at time of exam.    ROS: Limited due to patient is Non verbal. No fever, chills, congestion, cough on exam.  PHYSICAL EXAMINATION:  Vital Signs Last 24 Hrs  T(C): 36.7 (2018 04:45), Max: 37.1 (2018 20:55)  T(F): 98 (2018 04:45), Max: 98.7 (2018 20:55)  HR: 89 (2018 04:45) (60 - 89)  BP: 152/91 (2018 04:45) (121/60 - 152/91)  BP(mean): --  RR: 16 (2018 04:45) (16 - 17)  SpO2: 95% (2018 04:45) (95% - 100%)   (if applicable)    General; Non verbal. Total care. No acute distress .      HEENT: Normocephalic and atraumatic. No nasal tenderness. Moist mucosa.     NECK: Has Trach#6, non infected.    LUNGS: Clear to auscultation, no wheezing, rales, or rhonchi. No use of accessory muscle.     HEART: S1 S2 Regular rate and no click/ rub.     ABDOMEN: Soft, nontender, and nondistended. Active bowel sounds. + Peg( Fr#20).    EXTREMITIES: Without any cyanosis, clubbing, rash, lesions or edema.    NEUROLOGIC: Non verbal. Cognitive impaired.      SKIN: Warm and moist. Non diaphoretic.       LABS:                        12.5   10.1  )-----------( 157      ( 2018 06:01 )             39.9         137  |  103  |  11  ----------------------------<  177<H>  3.9   |  27  |  0.74    Ca    8.5      2018 06:01  Phos  2.1       Mg     2.0             Urinalysis Basic - ( 15 Jul 2018 23:04 )    Color: Yellow / Appearance: Slightly Turbid / S.020 / pH: x  Gluc: x / Ketone: Trace  / Bili: Negative / Urobili: Negative   Blood: x / Protein: 100 / Nitrite: Positive   Leuk Esterase: Moderate / RBC: 5-10 /HPF / WBC 11-25 /HPF   Sq Epi: x / Non Sq Epi: Many /HPF / Bacteria: Many /HPF  Procalcitonin, Serum: 0.32 ng/mL (07-15-18 @ 21:12)        RADIOLOGY & ADDITIONAL STUDIES:  EKG:   Ct chest: < from: CT Chest No Cont (07.15.18 @ 11:14) >    EXAM:  CT ABDOMEN AND PELVIS                          EXAM:  CT CHEST                            PROCEDURE DATE:  07/15/2018          INTERPRETATION:  CLINICAL HISTORY:  Respiratory failure; sepsis    Multiple axial images of the chest, abdomen andpelvis were obtained from   the lung apices through symphysis pubis without the administration of   oral or intravascular contrast limiting the sensitivity of evaluation.   Reformatted coronal and sagittal images are submitted.    COMPARISON: CT evaluation abdomen/pelvis 2016.    FINDINGS: Mid to distal esophageal wall thickening is suggested.   Correlate with endoscopic evaluation.    A midline tracheostomy is identified.    A PEG tube is identified, the distal aspect of which is identified in the   region of the pylorus/duodenal bulb; correlate clinically as to   appropriate positioning.    No abnormally enlarged mediastinal or hilar lymph nodes are noted. There   is no evidence for axillary lymphadenopathy. The heart size appears   within normal limits. No pericardial effusion is identified. Thoracic   aortic caliber demonstrates unremarkable caliber.    The central bronchial anatomy appears patent.    Elevation of the right hemidiaphragm is identified. There is opacity   identified within the right lower lobe lung parenchyma, likely   representative of a combination of both atelectasis and consolidation. No   additional regions of lung parenchymal infiltrate or consolidation   identified bilaterally. A small right pleuraleffusion is noted. There is   no evidence for left pleural effusion. There is no evidence of   pneumothorax. No mediastinal shift is noted.    The liver is normal in size. No focal hepatic masses are identified.   There is no evidence for intrahepatic or extrahepatic biliary dilatation.   No gallstones, gallbladder wall thickening or pericholecystic fluid   identified.    The spleen, pancreas and adrenal glands are unremarkable.    The kidneys enhance bilaterally, without evidence for space-occupying   lesion or hydronephrosis. A small less than 3 mm calculus is noted within   the upper pole region of the left kidney. No right renal calculi are   identified. The abdominal aorta is normal in course and caliber. No   abnormally enlarged retroperitoneal or pelvic lymphadenopathy is noted.    Fecal material is scattered throughout the colon. There is no evidence   for mechanical bowel obstruction. No colonic wall thickening is   identified. There is no evidence for free intraperitoneal air orfluid.   There is no evidence for acute appendicitis.    The uterus and urinary bladder appear unremarkable.     No acute osseous fractures are evident.    IMPRESSION:    1. Mid to distal esophageal wall thickening is suggested. Correlate with   endoscopic evaluation.  2. A PEG tube is identified, the distal aspect of which is identified in   the region of the pylorus/duodenal bulb; correlate clinically as to   appropriate positioning.  3. There is opacity identified within the right lower lobe lung   parenchyma, likely representative of a combination of both atelectasis   and consolidation. Small right pleural effusion.     PAST MEDICAL & SURGICAL HISTORY:  No pertinent past medical history  Hypertension  Schizophrenia  Diabetic coma  Diabetes mellitus  No significant past surgical history  S/P percutaneous endoscopic gastrostomy (PEG) tube placement  H/O tracheostomy    Impression; 62 Y/O Female with prior mentioned multiple chronic conditions. Presented with Cough, SOB and Vomiting. Leukocytosis from WBC 23.1 to WBC 15.4 with RLL Opacity secondary to Aspiration PNA most likely Gram Negative Organism since she is from Nursing Home. Oxygen supplementation FIO2 40% via Trach collar due to Chronic Hypoxic respiratory failure. leukocytosis WBC from 23 to WBC 10.1. Been Afebrile.     Suggestion:   Oxygen supplementation via Trach collar.  Continue DuoNeb Q 6 hours.  Continue antibiotic as per ID recommendations.  DVT/ GI prophylactic.    Aspiration precautions. HOB elevation especially during Peg feeding.   Turn and reposition in bed.    Agree with above assessment and plan as transcribed.

## 2018-07-17 NOTE — PROGRESS NOTE ADULT - ASSESSMENT
61 Y/O F, non-verbal, bed bound, Trach/ PEG tube, from NH, PMhx of HTN, asthma, convulsion disorder, bipolar, DVT legs, DM, sent from NH for vomiting and cough.  In the ED, pt presents in no acute distress, and vitals WNL.  Pt is afebrile with leukocytosis of 19.  CXR concerning for RLL infiltrate likely 2/2 asp pneumonia.  Abdominal x-ray sig for non-specific gas pattern and intact PEG tube.  Pt will be admitted to medicine for suspected Aspiration pneumonia.    Plan:   -Sepsis 2/2 likely Complicated Aspiration PNA/HCAP & UTI - improving, c/w broad spectrum abx, ivf, supportive care, nebs, aspiration precautions, f/u cultures to final date.   -ARF from dehydration - improved. C/w IVF, treat infection, monitor renal function   -Uncontrolled Type 2 DM with complications of hyperglycemia - improving. C/w lantus, moderate ihss, monitor acks  -Hematemesis - no additional events noted inpatient. replace peg for peg tube dislodgement  F/u GI recs. C/w protonix. F/u nutrition and restart peg feeding after peg replacement.   -Need for prophylactic measure: dvt/gi ppx

## 2018-07-17 NOTE — CHART NOTE - NSCHARTNOTEFT_GEN_A_CORE
Was told by RN that patient is NPO for aspiration PNA and blood sugar at bedtime is 104. Patient has 30 units of Lantus ordered, will change to 15 units at bedtime , fluids changed to dextrose + 0.45 normal saline. Primary team to follow.

## 2018-07-17 NOTE — PROGRESS NOTE ADULT - SUBJECTIVE AND OBJECTIVE BOX
[facility] NEPHROLOGY- CONSULTATION NOTE    Patient is a 63y Female non-verbal, bed bound, Trach/ PEG tube, from NH,  who presented to the hospital with cough/SOB.  She has no history of CKD, but had creatinine elevated from baseline and hypernatremia.  Pt unable to give history.  No ACEi/ARBs/NSAIDs/Diuretics/IV Contrast.  ROS: unable to obtain    VITALS:  T(F): 98 (18 @ 04:45), Max: 98.7 (18 @ 20:55)  HR: 89 (18 @ 04:45)  BP: 152/91 (18 @ 04:45)  RR: 16 (18 @ 04:45)  SpO2: 95% (18 @ 04:45)  Wt(kg): --    PHYSICAL EXAM:  Constitutional: NAD  HEENT: anicteric sclera, oropharynx clear, MMM  Respiratory: CTAB, no wheezes, rales or rhonchi, +trach, benign  Cardiovascular: S1, S2, RRR  Gastrointestinal: BS+, soft, NT/ND, +PEG, benign  Extremities: No cyanosis or clubbing. No peripheral edema  Psychiatric: nonverbal/noninteractive  : No CVA tenderness. No coe.       LABS:      137  |  103  |  11  ----------------------------<  177<H>  3.9   |  27  |  0.74    Ca    8.5      2018 06:01  Phos  2.1     -16  Mg     2.0     07-16      Creatinine Trend: 0.74 <--, 0.87 <--, 0.68 <--, 1.12 <--                        12.5   10.1  )-----------( 157      ( 2018 06:01 )             39.9     Urine Studies:  Urinalysis Basic - ( 15 Jul 2018 23:04 )    Color: Yellow / Appearance: Slightly Turbid / S.020 / pH:   Gluc:  / Ketone: Trace  / Bili: Negative / Urobili: Negative   Blood:  / Protein: 100 / Nitrite: Positive   Leuk Esterase: Moderate / RBC: 5-10 /HPF / WBC 11-25 /HPF   Sq Epi:  / Non Sq Epi: Many /HPF / Bacteria: Many /HPF                        LUCI FELIZ   This document has been electronically signed. Jul 15 2018 11:41AM                < end of copied text >

## 2018-07-17 NOTE — PROGRESS NOTE ADULT - ASSESSMENT
A 63 yo Female with multiple co-morbidities including non-verbal, bed bound, Trach/ PEG tube, sent in to the ER from NH for evaluation of shortness of breath and cough after vomiting. On admission, she has no fever or chills but Leukocytosis. The CXR  shows No consolidation but CT chest shows RLL opacity. She has  started on Vancomycin and Zosyn and The ID consult  requested to assist with further evaluation and antibiotic management.     # Aspiration Pneumonia - Pseudomonas  # Hematemesis  # Procalcitonin level elevated    Would recommend:    1. Follow up the sensitivity of Pseudomonas in  sputum culture   2. Aspiration precaution  3. Continue zosyn until sputum culture is finalized  4. bronchial suctioning    d/w nursing staff    will follow the patient with you

## 2018-07-17 NOTE — PROGRESS NOTE ADULT - SUBJECTIVE AND OBJECTIVE BOX
Patient seen and examined at bedside, with sister at bedside.   No acute events noted overnight  Case discussed with medical team    HPI:  63 Y/O F, non-verbal, bed bound, Trach/ PEG tube, from NH, PMhx of HTN, asthma, convulsion disorder, bipolar, DVT legs, DM 2, sent from NH for vomiting and cough.  Pt unable to provide history.  History given by sister who states pt was vomiting coffee ground emesis yesterday.  Pt had 2 episodes of reported vomiting, and has not had an episode since.  Pt also developed cough with clear sputum.  Sister states pt is combative at baseline, and her current behavior is baseline.  No reported fever, discomfort, diarrhea, or SOB. (2018 18:51)      PAST MEDICAL & SURGICAL HISTORY:  No pertinent past medical history  Hypertension  Schizophrenia  Diabetic coma  Diabetes mellitus  No significant past surgical history  S/P percutaneous endoscopic gastrostomy (PEG) tube placement  H/O tracheostomy      angiotensin converting enzyme inhibitors (Unknown)       MEDICATIONS  (STANDING):  ALBUTerol/ipratropium for Nebulization 3 milliLiter(s) Nebulizer every 6 hours  amLODIPine   Tablet 2.5 milliGRAM(s) Oral daily  atorvastatin 20 milliGRAM(s) Oral at bedtime  dextrose 5%. 1000 milliLiter(s) (50 mL/Hr) IV Continuous <Continuous>  dextrose 50% Injectable 12.5 Gram(s) IV Push once  dextrose 50% Injectable 25 Gram(s) IV Push once  dextrose 50% Injectable 25 Gram(s) IV Push once  heparin  Injectable 5000 Unit(s) SubCutaneous every 8 hours  insulin glargine Injectable (LANTUS) 30 Unit(s) SubCutaneous at bedtime  insulin lispro (HumaLOG) corrective regimen sliding scale   SubCutaneous every 6 hours  levETIRAcetam  Solution 1000 milliGRAM(s) Oral two times a day  LORazepam     Tablet 1 milliGRAM(s) Oral three times a day  metoprolol tartrate 100 milliGRAM(s) Oral two times a day  pantoprazole    Tablet 40 milliGRAM(s) Oral before breakfast  piperacillin/tazobactam IVPB. 3.375 Gram(s) IV Intermittent every 8 hours  sodium chloride 0.45%. 1000 milliLiter(s) (80 mL/Hr) IV Continuous <Continuous>    MEDICATIONS  (PRN):  acetaminophen    Suspension 650 milliGRAM(s) Oral every 6 hours PRN For Temp greater than 38 C (100.4 F)  dextrose 40% Gel 15 Gram(s) Oral once PRN Blood Glucose LESS THAN 70 milliGRAM(s)/deciLiter  glucagon  Injectable 1 milliGRAM(s) IntraMuscular once PRN Glucose <70 milliGRAM(s)/deciLiter  QUEtiapine 50 milliGRAM(s) Oral two times a day PRN agitation      REVIEW OF SYSTEMS: limited 2/2 mental status and medical condition   	    T(C): 36.7 (18 @ 04:45), Max: 37.1 (18 @ 20:55)  HR: 89 (18 @ 04:45) (60 - 89)  BP: 152/91 (18 @ 04:45) (121/60 - 152/91)  RR: 16 (18 @ 04:45) (16 - 17)  SpO2: 95% (18 @ 04:45) (95% - 100%)    PHYSICAL EXAMINATION:   Constitutional: NAD  HEENT: trach intact. NC, AT  Neck:  Supple  Respiratory:  Adequate airflow b/l. Not using accessory muscles of respiration.  Cardiovascular:  S1 & S2 intact, no R/G, 2+ radial pulses b/l  Gastrointestinal: Soft, NT, ND, normoactive b.s., no organomegaly/RT/rigidity  Extremities: WWP  Neurological:  awake.  stable. Crude sensation intact.     Labs and imaging reviewed    LABS:                        12.5   10.1  )-----------( 157      ( 2018 06:01 )             39.9     07-17    137  |  103  |  11  ----------------------------<  177<H>  3.9   |  27  |  0.74    Ca    8.5      2018 06:01  Phos  2.1     07-16  Mg     2.0     07-16            Urinalysis Basic - ( 15 Jul 2018 23:04 )    Color: Yellow / Appearance: Slightly Turbid / S.020 / pH: x  Gluc: x / Ketone: Trace  / Bili: Negative / Urobili: Negative   Blood: x / Protein: 100 / Nitrite: Positive   Leuk Esterase: Moderate / RBC: 5-10 /HPF / WBC 11-25 /HPF   Sq Epi: x / Non Sq Epi: Many /HPF / Bacteria: Many /HPF      CAPILLARY BLOOD GLUCOSE      POCT Blood Glucose.: 158 mg/dL (2018 05:30)  POCT Blood Glucose.: 150 mg/dL (2018 23:10)  POCT Blood Glucose.: 170 mg/dL (2018 22:03)  POCT Blood Glucose.: 114 mg/dL (2018 17:10)  POCT Blood Glucose.: 169 mg/dL (2018 11:33)        Culture - Sputum (collected 18 @ 10:36)  Source: .Sputum Sputum trap  Gram Stain (18 @ 12:43):    Moderate polymorphonuclear leukocytes seen per low power field    Moderate Squamous epithelial cells seen per low power field    Moderate Gram Negative Rods seen per oil power field    Few Gram Positive Cocci seen per oil power field            RADIOLOGY & ADDITIONAL STUDIES:

## 2018-07-17 NOTE — PROGRESS NOTE ADULT - ATTENDING COMMENTS
[facility] NEPHROLOGY  Donnell Torres M.D.  Miguel Clayton D.O.  Lizzette Musa M.D.  Yuliya Taylor, MSN, ANP-C    Telephone: [phone]  Facsimile: [phone]    [id number] [place] [address]

## 2018-07-18 DIAGNOSIS — E11.9 TYPE 2 DIABETES MELLITUS WITHOUT COMPLICATIONS: ICD-10-CM

## 2018-07-18 DIAGNOSIS — Z93.1 GASTROSTOMY STATUS: ICD-10-CM

## 2018-07-18 LAB
-  AMIKACIN: SIGNIFICANT CHANGE UP
-  AZTREONAM: SIGNIFICANT CHANGE UP
-  CEFEPIME: SIGNIFICANT CHANGE UP
-  CEFTAZIDIME: SIGNIFICANT CHANGE UP
-  CIPROFLOXACIN: SIGNIFICANT CHANGE UP
-  GENTAMICIN: SIGNIFICANT CHANGE UP
-  IMIPENEM: SIGNIFICANT CHANGE UP
-  LEVOFLOXACIN: SIGNIFICANT CHANGE UP
-  MEROPENEM: SIGNIFICANT CHANGE UP
-  PIPERACILLIN/TAZOBACTAM: SIGNIFICANT CHANGE UP
-  TOBRAMYCIN: SIGNIFICANT CHANGE UP
ANION GAP SERPL CALC-SCNC: 7 MMOL/L — SIGNIFICANT CHANGE UP (ref 5–17)
BUN SERPL-MCNC: 6 MG/DL — LOW (ref 7–18)
CALCIUM SERPL-MCNC: 8.2 MG/DL — LOW (ref 8.4–10.5)
CHLORIDE SERPL-SCNC: 107 MMOL/L — SIGNIFICANT CHANGE UP (ref 96–108)
CO2 SERPL-SCNC: 27 MMOL/L — SIGNIFICANT CHANGE UP (ref 22–31)
CREAT SERPL-MCNC: 0.78 MG/DL — SIGNIFICANT CHANGE UP (ref 0.5–1.3)
CULTURE RESULTS: SIGNIFICANT CHANGE UP
GLUCOSE SERPL-MCNC: 202 MG/DL — HIGH (ref 70–99)
LEVETIRACETAM SERPL-MCNC: 18.5 MCG/ML — SIGNIFICANT CHANGE UP (ref 12–46)
METHOD TYPE: SIGNIFICANT CHANGE UP
ORGANISM # SPEC MICROSCOPIC CNT: SIGNIFICANT CHANGE UP
ORGANISM # SPEC MICROSCOPIC CNT: SIGNIFICANT CHANGE UP
POTASSIUM SERPL-MCNC: 3.6 MMOL/L — SIGNIFICANT CHANGE UP (ref 3.5–5.3)
POTASSIUM SERPL-SCNC: 3.6 MMOL/L — SIGNIFICANT CHANGE UP (ref 3.5–5.3)
SODIUM SERPL-SCNC: 141 MMOL/L — SIGNIFICANT CHANGE UP (ref 135–145)
SPECIMEN SOURCE: SIGNIFICANT CHANGE UP

## 2018-07-18 RX ORDER — AMIKACIN SULFATE 250 MG/ML
600 INJECTION, SOLUTION INTRAMUSCULAR; INTRAVENOUS EVERY 12 HOURS
Qty: 0 | Refills: 0 | Status: DISCONTINUED | OUTPATIENT
Start: 2018-07-18 | End: 2018-07-29

## 2018-07-18 RX ORDER — CEFEPIME 1 G/1
2000 INJECTION, POWDER, FOR SOLUTION INTRAMUSCULAR; INTRAVENOUS EVERY 8 HOURS
Qty: 0 | Refills: 0 | Status: DISCONTINUED | OUTPATIENT
Start: 2018-07-18 | End: 2018-07-29

## 2018-07-18 RX ORDER — AMIKACIN SULFATE 250 MG/ML
500 INJECTION, SOLUTION INTRAMUSCULAR; INTRAVENOUS EVERY 12 HOURS
Qty: 0 | Refills: 0 | Status: DISCONTINUED | OUTPATIENT
Start: 2018-07-18 | End: 2018-07-18

## 2018-07-18 RX ORDER — CEFEPIME 1 G/1
2000 INJECTION, POWDER, FOR SOLUTION INTRAMUSCULAR; INTRAVENOUS ONCE
Qty: 0 | Refills: 0 | Status: COMPLETED | OUTPATIENT
Start: 2018-07-18 | End: 2018-07-18

## 2018-07-18 RX ORDER — CEFEPIME 1 G/1
INJECTION, POWDER, FOR SOLUTION INTRAMUSCULAR; INTRAVENOUS
Qty: 0 | Refills: 0 | Status: DISCONTINUED | OUTPATIENT
Start: 2018-07-18 | End: 2018-07-29

## 2018-07-18 RX ORDER — SODIUM CHLORIDE 9 MG/ML
1000 INJECTION, SOLUTION INTRAVENOUS
Qty: 0 | Refills: 0 | Status: DISCONTINUED | OUTPATIENT
Start: 2018-07-18 | End: 2018-07-26

## 2018-07-18 RX ADMIN — CEFEPIME 100 MILLIGRAM(S): 1 INJECTION, POWDER, FOR SOLUTION INTRAMUSCULAR; INTRAVENOUS at 18:08

## 2018-07-18 RX ADMIN — Medication 1 MILLIGRAM(S): at 18:07

## 2018-07-18 RX ADMIN — INSULIN GLARGINE 15 UNIT(S): 100 INJECTION, SOLUTION SUBCUTANEOUS at 22:46

## 2018-07-18 RX ADMIN — Medication 3 MILLILITER(S): at 20:16

## 2018-07-18 RX ADMIN — PIPERACILLIN AND TAZOBACTAM 25 GRAM(S): 4; .5 INJECTION, POWDER, LYOPHILIZED, FOR SOLUTION INTRAVENOUS at 06:47

## 2018-07-18 RX ADMIN — LEVETIRACETAM 1000 MILLIGRAM(S): 250 TABLET, FILM COATED ORAL at 06:21

## 2018-07-18 RX ADMIN — PANTOPRAZOLE SODIUM 40 MILLIGRAM(S): 20 TABLET, DELAYED RELEASE ORAL at 12:01

## 2018-07-18 RX ADMIN — HEPARIN SODIUM 5000 UNIT(S): 5000 INJECTION INTRAVENOUS; SUBCUTANEOUS at 06:20

## 2018-07-18 RX ADMIN — HEPARIN SODIUM 5000 UNIT(S): 5000 INJECTION INTRAVENOUS; SUBCUTANEOUS at 22:46

## 2018-07-18 RX ADMIN — HEPARIN SODIUM 5000 UNIT(S): 5000 INJECTION INTRAVENOUS; SUBCUTANEOUS at 13:21

## 2018-07-18 RX ADMIN — AMIKACIN SULFATE 102.4 MILLIGRAM(S): 250 INJECTION, SOLUTION INTRAMUSCULAR; INTRAVENOUS at 22:44

## 2018-07-18 RX ADMIN — SODIUM CHLORIDE 50 MILLILITER(S): 9 INJECTION, SOLUTION INTRAVENOUS at 18:51

## 2018-07-18 RX ADMIN — Medication 1 MILLIGRAM(S): at 12:01

## 2018-07-18 RX ADMIN — CEFEPIME 100 MILLIGRAM(S): 1 INJECTION, POWDER, FOR SOLUTION INTRAMUSCULAR; INTRAVENOUS at 22:45

## 2018-07-18 RX ADMIN — Medication 100 MILLIGRAM(S): at 18:07

## 2018-07-18 RX ADMIN — Medication 100 MILLIGRAM(S): at 06:20

## 2018-07-18 RX ADMIN — Medication 4: at 12:01

## 2018-07-18 RX ADMIN — QUETIAPINE FUMARATE 50 MILLIGRAM(S): 200 TABLET, FILM COATED ORAL at 18:07

## 2018-07-18 RX ADMIN — AMLODIPINE BESYLATE 2.5 MILLIGRAM(S): 2.5 TABLET ORAL at 06:20

## 2018-07-18 RX ADMIN — Medication 3 MILLILITER(S): at 14:44

## 2018-07-18 RX ADMIN — LEVETIRACETAM 1000 MILLIGRAM(S): 250 TABLET, FILM COATED ORAL at 18:08

## 2018-07-18 RX ADMIN — Medication 1 MILLIGRAM(S): at 06:20

## 2018-07-18 RX ADMIN — Medication 3 MILLILITER(S): at 08:31

## 2018-07-18 RX ADMIN — QUETIAPINE FUMARATE 50 MILLIGRAM(S): 200 TABLET, FILM COATED ORAL at 06:20

## 2018-07-18 NOTE — PROGRESS NOTE ADULT - SUBJECTIVE AND OBJECTIVE BOX
Patient seen and examined at bedside  No acute events noted overnight  Case discussed with medical team    HPI:  61 Y/O F, non-verbal, bed bound, Trach/ PEG tube, from NH, PMhx of HTN, asthma, convulsion disorder, bipolar, DVT legs, DM 2, sent from NH for vomiting and cough.  Pt unable to provide history.  History given by sister who states pt was vomiting coffee ground emesis yesterday.  Pt had 2 episodes of reported vomiting, and has not had an episode since.  Pt also developed cough with clear sputum.  Sister states pt is combative at baseline, and her current behavior is baseline.  No reported fever, discomfort, diarrhea, or SOB. (14 Jul 2018 18:51)      PAST MEDICAL & SURGICAL HISTORY:  No pertinent past medical history  Hypertension  Schizophrenia  Diabetic coma  Diabetes mellitus  No significant past surgical history  S/P percutaneous endoscopic gastrostomy (PEG) tube placement  H/O tracheostomy      angiotensin converting enzyme inhibitors (Unknown)       MEDICATIONS  (STANDING):  ALBUTerol/ipratropium for Nebulization 3 milliLiter(s) Nebulizer every 6 hours  amLODIPine   Tablet 2.5 milliGRAM(s) Oral daily  atorvastatin 20 milliGRAM(s) Oral at bedtime  dextrose 5% + sodium chloride 0.45%. 1000 milliLiter(s) (75 mL/Hr) IV Continuous <Continuous>  dextrose 5%. 1000 milliLiter(s) (50 mL/Hr) IV Continuous <Continuous>  dextrose 50% Injectable 12.5 Gram(s) IV Push once  dextrose 50% Injectable 25 Gram(s) IV Push once  dextrose 50% Injectable 25 Gram(s) IV Push once  heparin  Injectable 5000 Unit(s) SubCutaneous every 8 hours  insulin glargine Injectable (LANTUS) 15 Unit(s) SubCutaneous at bedtime  insulin lispro (HumaLOG) corrective regimen sliding scale   SubCutaneous every 6 hours  levETIRAcetam  Solution 1000 milliGRAM(s) Oral two times a day  LORazepam     Tablet 1 milliGRAM(s) Oral every 6 hours  LORazepam   Injectable 1 milliGRAM(s) IV Push once  metoprolol tartrate 100 milliGRAM(s) Oral two times a day  pantoprazole   Suspension 40 milliGRAM(s) Enteral Tube daily  piperacillin/tazobactam IVPB. 3.375 Gram(s) IV Intermittent every 8 hours  QUEtiapine 50 milliGRAM(s) Oral every 12 hours    MEDICATIONS  (PRN):  acetaminophen    Suspension 650 milliGRAM(s) Oral every 6 hours PRN For Temp greater than 38 C (100.4 F)  dextrose 40% Gel 15 Gram(s) Oral once PRN Blood Glucose LESS THAN 70 milliGRAM(s)/deciLiter  glucagon  Injectable 1 milliGRAM(s) IntraMuscular once PRN Glucose <70 milliGRAM(s)/deciLiter      REVIEW OF SYSTEMS:  limited 2/2 mental status	    T(C): 36.5 (07-18-18 @ 04:45), Max: 37.2 (07-17-18 @ 14:30)  HR: 64 (07-18-18 @ 04:45) (56 - 94)  BP: 142/70 (07-18-18 @ 04:45) (120/65 - 146/72)  RR: 16 (07-18-18 @ 04:45) (16 - 18)  SpO2: 100% (07-18-18 @ 04:45) (92% - 100%)    PHYSICAL EXAMINATION:   Constitutional: NAD  HEENT: trach intact. AT  Neck:  Supple  Respiratory: stable b/l bronchial breath sounds. Adequate airflow b/l. Not using accessory muscles of respiration.  Cardiovascular:  S1 & S2 intact, no R/G, 2+ radial pulses b/l  Gastrointestinal: Soft, ND, normoactive b.s.  Extremities: WWP  Neurological:  stable. awake    Labs and imaging reviewed    LABS:                        12.5   10.1  )-----------( 157      ( 17 Jul 2018 06:01 )             39.9     07-17    137  |  103  |  11  ----------------------------<  177<H>  3.9   |  27  |  0.74    Ca    8.5      17 Jul 2018 06:01              CAPILLARY BLOOD GLUCOSE      POCT Blood Glucose.: 147 mg/dL (18 Jul 2018 06:00)  POCT Blood Glucose.: 104 mg/dL (17 Jul 2018 22:23)  POCT Blood Glucose.: 134 mg/dL (17 Jul 2018 11:52)              RADIOLOGY & ADDITIONAL STUDIES:

## 2018-07-18 NOTE — PROGRESS NOTE ADULT - PROBLEM SELECTOR PLAN 4
Continue zosyn. Afebrile at present Sputum grew out MDR pseudomonas only sensitive to Amikacin. D/W ID and will change ABX.

## 2018-07-18 NOTE — PROGRESS NOTE ADULT - SUBJECTIVE AND OBJECTIVE BOX
Patient is a 63y old  Female who presents with a chief complaint of SOB (14 Jul 2018 18:51)    angiotensin converting enzyme inhibitors (Unknown)      INTERVAL HPI/OVERNIGHT EVENTS:    T(C): 36.5 (07-18-18 @ 04:45), Max: 37.2 (07-17-18 @ 14:30)  HR: 64 (07-18-18 @ 04:45) (56 - 94)  BP: 142/70 (07-18-18 @ 04:45) (120/65 - 146/72)  RR: 16 (07-18-18 @ 04:45) (16 - 18)  SpO2: 100% (07-18-18 @ 04:45) (92% - 100%)  I&O's Summary    Vital Signs Last 24 Hrs  T(C): 36.5 (18 Jul 2018 04:45), Max: 37.2 (17 Jul 2018 14:30)  T(F): 97.7 (18 Jul 2018 04:45), Max: 99 (17 Jul 2018 14:30)  HR: 64 (18 Jul 2018 04:45) (56 - 94)  BP: 142/70 (18 Jul 2018 04:45) (120/65 - 146/72)  BP(mean): --  RR: 16 (18 Jul 2018 04:45) (16 - 18)  SpO2: 100% (18 Jul 2018 04:45) (92% - 100%)  PAST MEDICAL & SURGICAL HISTORY:  No pertinent past medical history  Hypertension  Schizophrenia  Diabetic coma  Diabetes mellitus  No significant past surgical history  S/P percutaneous endoscopic gastrostomy (PEG) tube placement  H/O tracheostomy          LABS:                        12.5   10.1  )-----------( 157      ( 17 Jul 2018 06:01 )             39.9     07-17    137  |  103  |  11  ----------------------------<  177<H>  3.9   |  27  |  0.74    Ca    8.5      17 Jul 2018 06:01        CAPILLARY BLOOD GLUCOSE      POCT Blood Glucose.: 147 mg/dL (18 Jul 2018 06:00)  POCT Blood Glucose.: 104 mg/dL (17 Jul 2018 22:23)  POCT Blood Glucose.: 134 mg/dL (17 Jul 2018 11:52)            MEDICATIONS  (STANDING):  ALBUTerol/ipratropium for Nebulization 3 milliLiter(s) Nebulizer every 6 hours  amLODIPine   Tablet 2.5 milliGRAM(s) Oral daily  atorvastatin 20 milliGRAM(s) Oral at bedtime  dextrose 5% + sodium chloride 0.45%. 1000 milliLiter(s) (75 mL/Hr) IV Continuous <Continuous>  dextrose 5%. 1000 milliLiter(s) (50 mL/Hr) IV Continuous <Continuous>  dextrose 50% Injectable 12.5 Gram(s) IV Push once  dextrose 50% Injectable 25 Gram(s) IV Push once  dextrose 50% Injectable 25 Gram(s) IV Push once  heparin  Injectable 5000 Unit(s) SubCutaneous every 8 hours  insulin glargine Injectable (LANTUS) 15 Unit(s) SubCutaneous at bedtime  insulin lispro (HumaLOG) corrective regimen sliding scale   SubCutaneous every 6 hours  levETIRAcetam  Solution 1000 milliGRAM(s) Oral two times a day  LORazepam     Tablet 1 milliGRAM(s) Oral every 6 hours  LORazepam   Injectable 1 milliGRAM(s) IV Push once  metoprolol tartrate 100 milliGRAM(s) Oral two times a day  pantoprazole   Suspension 40 milliGRAM(s) Enteral Tube daily  piperacillin/tazobactam IVPB. 3.375 Gram(s) IV Intermittent every 8 hours  QUEtiapine 50 milliGRAM(s) Oral every 12 hours    MEDICATIONS  (PRN):  acetaminophen    Suspension 650 milliGRAM(s) Oral every 6 hours PRN For Temp greater than 38 C (100.4 F)  dextrose 40% Gel 15 Gram(s) Oral once PRN Blood Glucose LESS THAN 70 milliGRAM(s)/deciLiter  glucagon  Injectable 1 milliGRAM(s) IntraMuscular once PRN Glucose <70 milliGRAM(s)/deciLiter      REVIEW OF SYSTEMS:  Unable to review systems 2/2 cognitive impairment    RADIOLOGY & ADDITIONAL TESTS:      Consultant(s) Notes Reviewed:  [ ] YES  [ ] NO    PHYSICAL EXAM:  GENERAL: NAD,  HEAD:  Atraumatic, Normocephalic  EYES:  conjunctiva and sclera clear  ENMT:; Moist mucous membranes, Good dentition, No lesions  NECK: Supple, No JVD, Normal thyroid  NERVOUS SYSTEM: calm today resting comfortablys   CHEST/LUNG: decreased BS  HEART: S1, S2;  ABDOMEN: Soft, Nontender, Nondistended; Bowel sounds present. Mallory/peg insitu  EXTREMITIES:  2+ Peripheral Pulses, No clubbing, cyanosis, or edema  LYMPH: No lymphadenopathy noted  SKIN: No rashes or lesions    Care Collaborated Discussed with Consultants/Other Providers [ Y] YES

## 2018-07-18 NOTE — PROGRESS NOTE ADULT - SUBJECTIVE AND OBJECTIVE BOX
Patient is seen and examined at the bed side, is afebrile.  The Sensitivity of Pseudomonas shows Carbapenem resistant and only sensitive to Amikacin and Intermediate to Cefepime.       REVIEW OF SYSTEMS: Unable to obtained since nonverbal      ALLERGIES: ACIs      Vital Signs Last 24 Hrs  T(C): 36.2 (18 Jul 2018 13:20), Max: 36.7 (17 Jul 2018 20:49)  T(F): 97.2 (18 Jul 2018 13:20), Max: 98.1 (17 Jul 2018 20:49)  HR: 59 (18 Jul 2018 13:20) (56 - 75)  BP: 127/62 (18 Jul 2018 13:20) (120/65 - 142/70)  BP(mean): --  RR: 18 (18 Jul 2018 13:20) (16 - 18)  SpO2: 100% (18 Jul 2018 13:20) (100% - 100%)        PHYSICAL EXAM:  GENERAL: Not in acute distress, having involuntary movements of face  HEENT: Trach in placed  CVS: S1 and s2 present  RESP: Air entry B/L  GI: Abdomen nondistended, NT and PEG in placed  EXT: No pedal edema, hands contracted   CNS: Awake but not alert        LABS: No new CBC                        12.5   10.1  )-----------( 157      ( 17 Jul 2018 06:01 )             39.9                    11.8   15.4  )-----------( 170      ( 16 Jul 2018 07:12 )             38.1                           12.2   23.0  )-----------( 180      ( 15 Jul 2018 12:45 )             38.7         07-18    141  |  107  |  6<L>  ----------------------------<  202<H>  3.6   |  27  |  0.78    Ca    8.2<L>      18 Jul 2018 13:06      07-17    137  |  103  |  11  ----------------------------<  177<H>  3.9   |  27  |  0.74    Ca    8.5      17 Jul 2018 06:01  Phos  2.1     07-16  Mg     2.0     07-16    TPro  7.4  /  Alb  3.4<L>  /  TBili  0.9  /  DBili  x   /  AST  26  /  ALT  44  /  AlkPhos  108  07-14        CAPILLARY BLOOD GLUCOSE      POCT Blood Glucose.: 211 mg/dL (15 Jul 2018 17:22)  POCT Blood Glucose.: 123 mg/dL (15 Jul 2018 11:44)  POCT Blood Glucose.: 353 mg/dL (15 Jul 2018 07:56)  POCT Blood Glucose.: 254 mg/dL (15 Jul 2018 00:42)  POCT Blood Glucose.: 230 mg/dL (14 Jul 2018 22:22)          MEDICATIONS  (STANDING):  ALBUTerol/ipratropium for Nebulization 3 milliLiter(s) Nebulizer every 6 hours  amiKACIN  IVPB 500 milliGRAM(s) IV Intermittent every 12 hours  amLODIPine   Tablet 2.5 milliGRAM(s) Oral daily  atorvastatin 20 milliGRAM(s) Oral at bedtime  dextrose 5% + sodium chloride 0.45%. 1000 milliLiter(s) (75 mL/Hr) IV Continuous <Continuous>  dextrose 5%. 1000 milliLiter(s) (50 mL/Hr) IV Continuous <Continuous>  dextrose 50% Injectable 12.5 Gram(s) IV Push once  dextrose 50% Injectable 25 Gram(s) IV Push once  dextrose 50% Injectable 25 Gram(s) IV Push once  heparin  Injectable 5000 Unit(s) SubCutaneous every 8 hours  insulin glargine Injectable (LANTUS) 15 Unit(s) SubCutaneous at bedtime  insulin lispro (HumaLOG) corrective regimen sliding scale   SubCutaneous every 6 hours  levETIRAcetam  Solution 1000 milliGRAM(s) Oral two times a day  LORazepam     Tablet 1 milliGRAM(s) Oral every 6 hours  LORazepam   Injectable 1 milliGRAM(s) IV Push once  metoprolol tartrate 100 milliGRAM(s) Oral two times a day  pantoprazole   Suspension 40 milliGRAM(s) Enteral Tube daily  QUEtiapine 50 milliGRAM(s) Oral every 12 hours    MEDICATIONS  (PRN):  acetaminophen    Suspension 650 milliGRAM(s) Oral every 6 hours PRN For Temp greater than 38 C (100.4 F)  dextrose 40% Gel 15 Gram(s) Oral once PRN Blood Glucose LESS THAN 70 milliGRAM(s)/deciLiter  glucagon  Injectable 1 milliGRAM(s) IntraMuscular once PRN Glucose <70 milliGRAM(s)/deciLiter        RADIOLOGY & ADDITIONAL TESTS:    7/15/18: CT Chest No Cont (07.15.18 @ 11:14) 1. Mid to distal esophageal wall thickening is suggested. Correlate with endoscopic evaluation.  2. A PEG tube is identified, the distal aspect of which is identified in the region of the pylorus/duodenal bulb; correlate clinically as to appropriate positioning.  3. There is opacity identified within the right lower lobe lung parenchyma, likely representative of a combination of both atelectasis and consolidation. Small right pleural effusion.    7/14/18: Xray Chest 1 View AP/PA (07.14.18 @ 15:34)No consolidation. Platelike atelectasis in the right lower lobe.        MICROBIOLOGY DATA:    Culture - Sputum . (07.16.18 @ 10:36)    -  Gentamicin: R >8    -  Imipenem: R >8    -  Levofloxacin: R >4    -  Meropenem: R >8    -  Piperacillin/Tazobactam: R >64    -  Tobramycin: R >8    -  Amikacin: S 16    Gram Stain:   Moderate polymorphonuclear leukocytes seen per low power field  Moderate Squamous epithelial cells seen per low power field  Moderate Gram Negative Rods seen per oil power field  Few Gram Positive Cocci seen per oil power field    -  Aztreonam: R >16    -  Cefepime: I 16    -  Ceftazidime: R >16    -  Ciprofloxacin: R >2    Specimen Source: .Sputum Sputum trap    Culture Results:   Three or more mixed gram negative rods including  Moderate Pseudomonas aeruginosa (Carbapenem Resistant)    Organism Identification: Pseudomonas aeruginosa (Carbapenem Resistant)    Organism: Pseudomonas aeruginosa (Carbapenem Resistant)    Method Type: ESTIVEN        MRSA/MSSA PCR (07.16.18 @ 10:15)    MRSA PCR Result.: NotDete: MRSA/MSSA PCR assay is a qualitative in vitro diagnostic test for the  direct detection and differentiation of methicillin-resistant  Staphylococcus aureus (MRSA) from Staphylococcus aureus (SA). The assay  detects DNA from nasal swabs in patients atrisk for nasal colonization.  It is not intended to diagnose MRSA or SA infections nor guide or monitor  treatment for MRSA/SA infections. A negative result does not preclude  nasal colonization. The assay is FDA-approved and its performance has  been established by [employer] [place], [place] and the [facility], [place], [place].    Staph Aureus PCR Result: NotDetec    Urine Microscopic-Add On (NC) (07.15.18 @ 23:04)    Bacteria: Many /HPF    Epithelial Cells: Many /HPF    Red Blood Cell - Urine: 5-10 /HPF    White Blood Cell - Urine: 11-25 /HPF

## 2018-07-18 NOTE — PROGRESS NOTE ADULT - SUBJECTIVE AND OBJECTIVE BOX
Time of Visit:  Patient seen and examined.     MEDICATIONS  (STANDING):  ALBUTerol/ipratropium for Nebulization 3 milliLiter(s) Nebulizer every 6 hours  amiKACIN  IVPB 500 milliGRAM(s) IV Intermittent every 12 hours  amLODIPine   Tablet 2.5 milliGRAM(s) Oral daily  atorvastatin 20 milliGRAM(s) Oral at bedtime  dextrose 5% + sodium chloride 0.45%. 1000 milliLiter(s) (75 mL/Hr) IV Continuous <Continuous>  dextrose 5%. 1000 milliLiter(s) (50 mL/Hr) IV Continuous <Continuous>  dextrose 50% Injectable 12.5 Gram(s) IV Push once  dextrose 50% Injectable 25 Gram(s) IV Push once  dextrose 50% Injectable 25 Gram(s) IV Push once  heparin  Injectable 5000 Unit(s) SubCutaneous every 8 hours  insulin glargine Injectable (LANTUS) 15 Unit(s) SubCutaneous at bedtime  insulin lispro (HumaLOG) corrective regimen sliding scale   SubCutaneous every 6 hours  levETIRAcetam  Solution 1000 milliGRAM(s) Oral two times a day  LORazepam     Tablet 1 milliGRAM(s) Oral every 6 hours  LORazepam   Injectable 1 milliGRAM(s) IV Push once  metoprolol tartrate 100 milliGRAM(s) Oral two times a day  pantoprazole   Suspension 40 milliGRAM(s) Enteral Tube daily  QUEtiapine 50 milliGRAM(s) Oral every 12 hours      MEDICATIONS  (PRN):  acetaminophen    Suspension 650 milliGRAM(s) Oral every 6 hours PRN For Temp greater than 38 C (100.4 F)  dextrose 40% Gel 15 Gram(s) Oral once PRN Blood Glucose LESS THAN 70 milliGRAM(s)/deciLiter  glucagon  Injectable 1 milliGRAM(s) IntraMuscular once PRN Glucose <70 milliGRAM(s)/deciLiter       Medications up to date at time of exam.    ROS: Limited due to Non verbal. No cough, congestion, hypoxia, fever, chills.   PHYSICAL EXAMINATION:    Vital Signs Last 24 Hrs  T(C): 36.2 (18 Jul 2018 13:20), Max: 36.7 (17 Jul 2018 20:49)  T(F): 97.2 (18 Jul 2018 13:20), Max: 98.1 (17 Jul 2018 20:49)  HR: 59 (18 Jul 2018 13:20) (56 - 75)  BP: 127/62 (18 Jul 2018 13:20) (120/65 - 142/70)  BP(mean): --  RR: 18 (18 Jul 2018 13:20) (16 - 18)  SpO2: 100% (18 Jul 2018 13:20) (100% - 100%)   (if applicable)    General; Non verbal. Total care. No acute distress . Eyes does not follow verbal and tactile stimuli.      HEENT: Normocephalic and atraumatic. No nasal tenderness. Moist mucosa.     NECK: Has Trach#6, non infected.    LUNGS: Clear to auscultation B/L. No wheezing, rales, or rhonchi. No use of accessory muscle.     HEART: S1 S2 Regular rate and no click/ rub.     ABDOMEN: Soft, nontender, and nondistended. Active bowel sounds. + G tube( Mallory tubing type) ( Fr#20).    EXTREMITIES: Without any cyanosis, clubbing, rash, lesions or edema.    NEUROLOGIC: Non verbal. Cognitive impaired.      SKIN: Warm and moist. Non diaphoretic.     LABS:                        12.5   10.1  )-----------( 157      ( 17 Jul 2018 06:01 )             39.9     07-18    141  |  107  |  6<L>  ----------------------------<  202<H>  3.6   |  27  |  0.78    Ca    8.2<L>      18 Jul 2018 13:06      Procalcitonin, Serum: 0.32 ng/mL (07-15-18 @ 21:12)      RADIOLOGY & ADDITIONAL STUDIES:  EKG:   CXR: < from: Xray Chest 1 View AP/PA (07.14.18 @ 15:34) >  INTERPRETATION:  Chest radiograph (one view)          CLINICAL INFORMATION:  Chest pain. The patient is unable to communicate.    TECHNIQUE:  Single frontalview of the chest was obtained.    FINDINGS:  No previous examinations are available for review.    There is a poor inspiratory effort. No consolidation is identified. There   is a platelike atelectasis in the right lower lobe..  No pleural   abnormality is seen.     The heart and mediastinum appear intact. A tracheostomy tube is in place.    No destructive lesion is seen in the visualized skeleton.    IMPRESSION: No consolidation. Platelike atelectasis in the right lower   lobe.      PAST MEDICAL & SURGICAL HISTORY:  No pertinent past medical history  Hypertension  Schizophrenia  Diabetic coma  Diabetes mellitus  No significant past surgical history  S/P percutaneous endoscopic gastrostomy (PEG) tube placement  H/O tracheostomy     Impression; 62 Y/O Female with prior mentioned multiple chronic conditions. Presented with Cough, SOB and Vomiting. Leukocytosis from WBC 23.1 to WBC 15.4 with RLL Opacity secondary to Aspiration PNA most likely Gram Negative Organism since she is from Nursing Home. Oxygen supplementation FIO2 40% via Trach collar due to Chronic Hypoxic respiratory failure. Leukocytosis WBC from 23 to WBC 10.1. Been Afebrile.     Suggestion:   Oxygen supplementation via Trach collar.  Continue DuoNeb Q 6 hours.  Continue antibiotic as per ID recommendations.  DVT/ GI prophylactic.    Aspiration precautions. HOB elevation especially during Peg feeding.   Turn and reposition in bed. Time of Visit:  Patient seen and examined.     MEDICATIONS  (STANDING):  ALBUTerol/ipratropium for Nebulization 3 milliLiter(s) Nebulizer every 6 hours  amiKACIN  IVPB 500 milliGRAM(s) IV Intermittent every 12 hours  amLODIPine   Tablet 2.5 milliGRAM(s) Oral daily  atorvastatin 20 milliGRAM(s) Oral at bedtime  dextrose 5% + sodium chloride 0.45%. 1000 milliLiter(s) (75 mL/Hr) IV Continuous <Continuous>  dextrose 5%. 1000 milliLiter(s) (50 mL/Hr) IV Continuous <Continuous>  dextrose 50% Injectable 12.5 Gram(s) IV Push once  dextrose 50% Injectable 25 Gram(s) IV Push once  dextrose 50% Injectable 25 Gram(s) IV Push once  heparin  Injectable 5000 Unit(s) SubCutaneous every 8 hours  insulin glargine Injectable (LANTUS) 15 Unit(s) SubCutaneous at bedtime  insulin lispro (HumaLOG) corrective regimen sliding scale   SubCutaneous every 6 hours  levETIRAcetam  Solution 1000 milliGRAM(s) Oral two times a day  LORazepam     Tablet 1 milliGRAM(s) Oral every 6 hours  LORazepam   Injectable 1 milliGRAM(s) IV Push once  metoprolol tartrate 100 milliGRAM(s) Oral two times a day  pantoprazole   Suspension 40 milliGRAM(s) Enteral Tube daily  QUEtiapine 50 milliGRAM(s) Oral every 12 hours      MEDICATIONS  (PRN):  acetaminophen    Suspension 650 milliGRAM(s) Oral every 6 hours PRN For Temp greater than 38 C (100.4 F)  dextrose 40% Gel 15 Gram(s) Oral once PRN Blood Glucose LESS THAN 70 milliGRAM(s)/deciLiter  glucagon  Injectable 1 milliGRAM(s) IntraMuscular once PRN Glucose <70 milliGRAM(s)/deciLiter       Medications up to date at time of exam.    ROS: Limited due to Non verbal. No cough, congestion, hypoxia, fever, chills.   PHYSICAL EXAMINATION:    Vital Signs Last 24 Hrs  T(C): 36.2 (18 Jul 2018 13:20), Max: 36.7 (17 Jul 2018 20:49)  T(F): 97.2 (18 Jul 2018 13:20), Max: 98.1 (17 Jul 2018 20:49)  HR: 59 (18 Jul 2018 13:20) (56 - 75)  BP: 127/62 (18 Jul 2018 13:20) (120/65 - 142/70)  BP(mean): --  RR: 18 (18 Jul 2018 13:20) (16 - 18)  SpO2: 100% (18 Jul 2018 13:20) (100% - 100%)   (if applicable)    General; Non verbal. Total care. No acute distress . Eyes does not follow verbal and tactile stimuli.      HEENT: Normocephalic and atraumatic. No nasal tenderness. Moist mucosa.     NECK: Has Trach#6, non infected.    LUNGS: Clear to auscultation B/L. No wheezing, rales, or rhonchi. No use of accessory muscle.     HEART: S1 S2 Regular rate and no click/ rub.     ABDOMEN: Soft, nontender, and nondistended. Active bowel sounds. + G tube( Mallory tubing type) ( Fr#20).    EXTREMITIES: Without any cyanosis, clubbing, rash, lesions or edema.    NEUROLOGIC: Non verbal. Cognitive impaired.      SKIN: Warm and moist. Non diaphoretic.     LABS:                        12.5   10.1  )-----------( 157      ( 17 Jul 2018 06:01 )             39.9     07-18    141  |  107  |  6<L>  ----------------------------<  202<H>  3.6   |  27  |  0.78    Ca    8.2<L>      18 Jul 2018 13:06      Procalcitonin, Serum: 0.32 ng/mL (07-15-18 @ 21:12)      RADIOLOGY & ADDITIONAL STUDIES:  EKG:   CXR: < from: Xray Chest 1 View AP/PA (07.14.18 @ 15:34) >  INTERPRETATION:  Chest radiograph (one view)          CLINICAL INFORMATION:  Chest pain. The patient is unable to communicate.    TECHNIQUE:  Single frontalview of the chest was obtained.    FINDINGS:  No previous examinations are available for review.    There is a poor inspiratory effort. No consolidation is identified. There   is a platelike atelectasis in the right lower lobe..  No pleural   abnormality is seen.     The heart and mediastinum appear intact. A tracheostomy tube is in place.    No destructive lesion is seen in the visualized skeleton.    IMPRESSION: No consolidation. Platelike atelectasis in the right lower   lobe.      PAST MEDICAL & SURGICAL HISTORY:  No pertinent past medical history  Hypertension  Schizophrenia  Diabetic coma  Diabetes mellitus  No significant past surgical history  S/P percutaneous endoscopic gastrostomy (PEG) tube placement  H/O tracheostomy     Impression; 64 Y/O Female with prior mentioned multiple chronic conditions. Presented with Cough, SOB and Vomiting. Leukocytosis from WBC 23.1 to WBC 15.4 with RLL Opacity secondary to Aspiration PNA most likely Gram Negative Organism since she is from Nursing Home. Oxygen supplementation FIO2 40% via Trach collar due to Chronic Hypoxic respiratory failure. Leukocytosis WBC from 23 to WBC 10.1. Been Afebrile.     Suggestion:   Oxygen supplementation via Trach collar.  Continue DuoNeb Q 6 hours.  Continue antibiotic as per ID recommendations.  DVT/ GI prophylactic.    Aspiration precautions. HOB elevation especially during Peg feeding.   Turn and reposition in bed.    Agree with above assessment and plan as transcribed.

## 2018-07-18 NOTE — PROGRESS NOTE ADULT - SUBJECTIVE AND OBJECTIVE BOX
Pt had a malfunctioning PEG tube, which was essentially a Coe catheter.    MEDICATIONS  (STANDING):  ALBUTerol/ipratropium for Nebulization 3 milliLiter(s) Nebulizer every 6 hours  amiKACIN  IVPB 600 milliGRAM(s) IV Intermittent every 12 hours  amLODIPine   Tablet 2.5 milliGRAM(s) Oral daily  atorvastatin 20 milliGRAM(s) Oral at bedtime  cefepime   IVPB 2000 milliGRAM(s) IV Intermittent every 8 hours  cefepime   IVPB      dextrose 5% + sodium chloride 0.45%. 1000 milliLiter(s) (50 mL/Hr) IV Continuous <Continuous>  dextrose 5% + sodium chloride 0.45%. 1000 milliLiter(s) (75 mL/Hr) IV Continuous <Continuous>  dextrose 5%. 1000 milliLiter(s) (50 mL/Hr) IV Continuous <Continuous>  dextrose 50% Injectable 12.5 Gram(s) IV Push once  dextrose 50% Injectable 25 Gram(s) IV Push once  dextrose 50% Injectable 25 Gram(s) IV Push once  heparin  Injectable 5000 Unit(s) SubCutaneous every 8 hours  insulin glargine Injectable (LANTUS) 15 Unit(s) SubCutaneous at bedtime  insulin lispro (HumaLOG) corrective regimen sliding scale   SubCutaneous every 6 hours  levETIRAcetam  Solution 1000 milliGRAM(s) Oral two times a day  LORazepam     Tablet 1 milliGRAM(s) Oral every 6 hours  LORazepam   Injectable 1 milliGRAM(s) IV Push once  metoprolol tartrate 100 milliGRAM(s) Oral two times a day  pantoprazole   Suspension 40 milliGRAM(s) Enteral Tube daily  QUEtiapine 50 milliGRAM(s) Oral every 12 hours    T(C): 36.3 (07-18-18 @ 20:46)  T(F): 97.4 (07-18-18 @ 20:46), Max: 97.7 (07-18-18 @ 04:45)  HR: 58 (07-18-18 @ 20:46) (58 - 64)  BP: 120/54 (07-18-18 @ 20:46) (117/59 - 142/70)    CVS: S1/S2  Chest: coarse BS B/L  Abd: S/NT/ND; coe-as-PEG tube in place, balloon deflated; PEG site clean, nonerythematous                        12.5   10.1  )-----------( 157      ( 17 Jul 2018 06:01 )             39.9   07-18    141  |  107  |  6<L>  ----------------------------<  202<H>  3.6   |  27  |  0.78    Ca    8.2<L>      18 Jul 2018 13:06

## 2018-07-18 NOTE — PROGRESS NOTE ADULT - ASSESSMENT
63 Y/O F, non-verbal, bed bound, Trach/ PEG tube, from NH, PMhx of HTN, asthma, convulsion disorder, bipolar, DVT legs, DM, sent from NH for vomiting and cough.  In the ED, pt presents in no acute distress, and vitals WNL.  Pt is afebrile with leukocytosis of 19.  CXR concerning for RLL infiltrate likely 2/2 asp pneumonia.  Abdominal x-ray sig for non-specific gas pattern and intact PEG tube.  Pt will be admitted to medicine for suspected Aspiration pneumonia.    Plan:   -Sepsis 2/2 likely Complicated Aspiration PNA/HCAP & UTI - improving, c/w iv zosyn. ivf, supportive care, nebs, aspiration precautions, f/u cultures to final date.   -Hematemesis - no additional events noted inpatient. **Patient needs PEG replacement  F/u GI recs. C/w protonix. F/u nutrition and restart peg feeding after peg replacement.   -Uncontrolled Type 2 DM with complications of hyperglycemia - improving. C/w lantus, moderate ihss, monitor acks  -Need for prophylactic measure: dvt/gi ppx

## 2018-07-18 NOTE — PROGRESS NOTE ADULT - PROBLEM SELECTOR PLAN 1
Mallory in tract. D/W Dr. Zambrano.  Start feeds with Glucerna and he will swap Mallory to peg tube. Gastrografin study discussed. Dr. Lau's note appreciated. Aspiration precautions needed

## 2018-07-18 NOTE — PROGRESS NOTE ADULT - ASSESSMENT
63F w/malfunctioning PEG tube.  -coe catheter that was used in lieu of an actual PEG tube was removed  -a 22-Fr balloon replacement PEG tube placed at bedside successfully  -obtain tube study X-ray to confirm proper placement  -once placement is confirmed can start using PEG tube for feeds and meds

## 2018-07-18 NOTE — PROGRESS NOTE ADULT - ASSESSMENT
A 63 yo Female with multiple co-morbidities including non-verbal, bed bound, Trach/ PEG tube, sent in to the ER from NH for evaluation of shortness of breath and cough after vomiting. On admission, she has no fever or chills but Leukocytosis. The CXR  shows No consolidation but CT chest shows RLL opacity. She has  started on Vancomycin and Zosyn and The ID consult  requested to assist with further evaluation and antibiotic management.     # Aspiration Pneumonia - Pseudomonas ( CRE)  # Hematemesis  # Procalcitonin level elevated    Would recommend:    1. Add Amikacin 10 mg /Kg q 12hours and Also add Cefepime as a double agent  to prevent further resistant  2. Aspiration precaution  3. DisContinue zosyn since its resistant  4. Continue  Bronchial/Oral  suctioning    d/w nursing staff and Covering NP    will follow the patient with you A 63 yo Female with multiple co-morbidities including non-verbal, bed bound, Trach/ PEG tube, sent in to the ER from NH for evaluation of shortness of breath and cough after vomiting. On admission, she has no fever or chills but Leukocytosis. The CXR  shows No consolidation but CT chest shows RLL opacity. She has  started on Vancomycin and Zosyn and The ID consult  requested to assist with further evaluation and antibiotic management.     # Aspiration Pneumonia - Pseudomonas ( CRE)  # Hematemesis  # Procalcitonin level elevated    Would recommend:    1. Add Amikacin 10 mg /Kg q 12hours and Also add Cefepime as a double agent  to prevent further resistant and continue until 7/28/18  2. Aspiration precaution  3. DisContinue zosyn since its resistant  4. Continue  Bronchial/Oral  suctioning    d/w nursing staff and Covering NP    will follow the patient with you A 61 yo Female with multiple co-morbidities including non-verbal, bed bound, Trach/ PEG tube, sent in to the ER from NH for evaluation of shortness of breath and cough after vomiting. On admission, she has no fever or chills but Leukocytosis. The CXR  shows No consolidation but CT chest shows RLL opacity. She has  started on Vancomycin and Zosyn and The ID consult  requested to assist with further evaluation and antibiotic management.     # Aspiration Pneumonia - Pseudomonas ( CRE)  # Hematemesis  # Procalcitonin level elevated    Would recommend:    1. Add Amikacin 10 mg /Kg q 12hours and Also add Cefepime as a double agent  to prevent further resistant and continue until 7/28/18  2. Aspiration precaution  3. DisContinue zosyn since its resistant  4. Continue  Bronchial/Oral  suctioning  5. Contact Isoaltion    d/w nursing staff and Covering NP    will follow the patient with you

## 2018-07-18 NOTE — PROVIDER CONTACT NOTE (CRITICAL VALUE NOTIFICATION) - SITUATION
sputum culture 07/15/18 mixed gram neg rods including moderate pseudomonas aeruginosa carbapenem resistant

## 2018-07-19 DIAGNOSIS — B35.6 TINEA CRURIS: ICD-10-CM

## 2018-07-19 DIAGNOSIS — R19.7 DIARRHEA, UNSPECIFIED: ICD-10-CM

## 2018-07-19 DIAGNOSIS — E11.9 TYPE 2 DIABETES MELLITUS WITHOUT COMPLICATIONS: ICD-10-CM

## 2018-07-19 LAB
HCT VFR BLD CALC: 40.2 % — SIGNIFICANT CHANGE UP (ref 34.5–45)
HGB BLD-MCNC: 12.6 G/DL — SIGNIFICANT CHANGE UP (ref 11.5–15.5)
MCHC RBC-ENTMCNC: 25.2 PG — LOW (ref 27–34)
MCHC RBC-ENTMCNC: 31.5 GM/DL — LOW (ref 32–36)
MCV RBC AUTO: 80.1 FL — SIGNIFICANT CHANGE UP (ref 80–100)
PLATELET # BLD AUTO: 153 K/UL — SIGNIFICANT CHANGE UP (ref 150–400)
RBC # BLD: 5.01 M/UL — SIGNIFICANT CHANGE UP (ref 3.8–5.2)
RBC # FLD: 13.9 % — SIGNIFICANT CHANGE UP (ref 10.3–14.5)
WBC # BLD: 6.1 K/UL — SIGNIFICANT CHANGE UP (ref 3.8–10.5)
WBC # FLD AUTO: 6.1 K/UL — SIGNIFICANT CHANGE UP (ref 3.8–10.5)

## 2018-07-19 PROCEDURE — 74018 RADEX ABDOMEN 1 VIEW: CPT | Mod: 26

## 2018-07-19 RX ADMIN — HEPARIN SODIUM 5000 UNIT(S): 5000 INJECTION INTRAVENOUS; SUBCUTANEOUS at 14:10

## 2018-07-19 RX ADMIN — Medication 3 MILLILITER(S): at 02:12

## 2018-07-19 RX ADMIN — Medication 3 MILLILITER(S): at 21:26

## 2018-07-19 RX ADMIN — AMLODIPINE BESYLATE 2.5 MILLIGRAM(S): 2.5 TABLET ORAL at 06:57

## 2018-07-19 RX ADMIN — Medication 1 MILLIGRAM(S): at 06:59

## 2018-07-19 RX ADMIN — Medication 1 MILLIGRAM(S): at 17:47

## 2018-07-19 RX ADMIN — HEPARIN SODIUM 5000 UNIT(S): 5000 INJECTION INTRAVENOUS; SUBCUTANEOUS at 06:42

## 2018-07-19 RX ADMIN — QUETIAPINE FUMARATE 50 MILLIGRAM(S): 200 TABLET, FILM COATED ORAL at 17:47

## 2018-07-19 RX ADMIN — LEVETIRACETAM 1000 MILLIGRAM(S): 250 TABLET, FILM COATED ORAL at 17:58

## 2018-07-19 RX ADMIN — Medication 100 MILLIGRAM(S): at 06:57

## 2018-07-19 RX ADMIN — Medication 0: at 23:53

## 2018-07-19 RX ADMIN — PANTOPRAZOLE SODIUM 40 MILLIGRAM(S): 20 TABLET, DELAYED RELEASE ORAL at 12:03

## 2018-07-19 RX ADMIN — INSULIN GLARGINE 15 UNIT(S): 100 INJECTION, SOLUTION SUBCUTANEOUS at 22:46

## 2018-07-19 RX ADMIN — QUETIAPINE FUMARATE 50 MILLIGRAM(S): 200 TABLET, FILM COATED ORAL at 06:57

## 2018-07-19 RX ADMIN — ATORVASTATIN CALCIUM 20 MILLIGRAM(S): 80 TABLET, FILM COATED ORAL at 22:47

## 2018-07-19 RX ADMIN — CEFEPIME 100 MILLIGRAM(S): 1 INJECTION, POWDER, FOR SOLUTION INTRAMUSCULAR; INTRAVENOUS at 06:43

## 2018-07-19 RX ADMIN — Medication 1 MILLIGRAM(S): at 12:03

## 2018-07-19 RX ADMIN — Medication 100 MILLIGRAM(S): at 17:58

## 2018-07-19 RX ADMIN — Medication 3 MILLILITER(S): at 15:08

## 2018-07-19 RX ADMIN — HEPARIN SODIUM 5000 UNIT(S): 5000 INJECTION INTRAVENOUS; SUBCUTANEOUS at 22:48

## 2018-07-19 RX ADMIN — Medication 1 MILLIGRAM(S): at 22:48

## 2018-07-19 RX ADMIN — Medication 1 MILLIGRAM(S): at 19:22

## 2018-07-19 RX ADMIN — Medication 3 MILLILITER(S): at 08:39

## 2018-07-19 RX ADMIN — Medication 1 MILLIGRAM(S): at 00:57

## 2018-07-19 RX ADMIN — AMIKACIN SULFATE 102.4 MILLIGRAM(S): 250 INJECTION, SOLUTION INTRAMUSCULAR; INTRAVENOUS at 06:41

## 2018-07-19 RX ADMIN — LEVETIRACETAM 1000 MILLIGRAM(S): 250 TABLET, FILM COATED ORAL at 06:57

## 2018-07-19 NOTE — PROGRESS NOTE ADULT - SUBJECTIVE AND OBJECTIVE BOX
Patient seen and examined at bedside  Abx adjusted for CRE Psudomonas   Case discussed with medical team    HPI:  63 Y/O F, non-verbal, bed bound, Trach/ PEG tube, from NH, PMhx of HTN, asthma, convulsion disorder, bipolar, DVT legs, DM 2, sent from NH for vomiting and cough.  Pt unable to provide history.  History given by sister who states pt was vomiting coffee ground emesis yesterday.  Pt had 2 episodes of reported vomiting, and has not had an episode since.  Pt also developed cough with clear sputum.  Sister states pt is combative at baseline, and her current behavior is baseline.  No reported fever, discomfort, diarrhea, or SOB. (14 Jul 2018 18:51)      PAST MEDICAL & SURGICAL HISTORY:  No pertinent past medical history  Hypertension  Schizophrenia  Diabetic coma  Diabetes mellitus  No significant past surgical history  S/P percutaneous endoscopic gastrostomy (PEG) tube placement  H/O tracheostomy      angiotensin converting enzyme inhibitors (Unknown)       MEDICATIONS  (STANDING):  ALBUTerol/ipratropium for Nebulization 3 milliLiter(s) Nebulizer every 6 hours  amiKACIN  IVPB 600 milliGRAM(s) IV Intermittent every 12 hours  amLODIPine   Tablet 2.5 milliGRAM(s) Oral daily  atorvastatin 20 milliGRAM(s) Oral at bedtime  cefepime   IVPB 2000 milliGRAM(s) IV Intermittent every 8 hours  cefepime   IVPB      dextrose 5% + sodium chloride 0.45%. 1000 milliLiter(s) (50 mL/Hr) IV Continuous <Continuous>  dextrose 5% + sodium chloride 0.45%. 1000 milliLiter(s) (75 mL/Hr) IV Continuous <Continuous>  dextrose 5%. 1000 milliLiter(s) (50 mL/Hr) IV Continuous <Continuous>  dextrose 50% Injectable 12.5 Gram(s) IV Push once  dextrose 50% Injectable 25 Gram(s) IV Push once  dextrose 50% Injectable 25 Gram(s) IV Push once  heparin  Injectable 5000 Unit(s) SubCutaneous every 8 hours  insulin glargine Injectable (LANTUS) 15 Unit(s) SubCutaneous at bedtime  insulin lispro (HumaLOG) corrective regimen sliding scale   SubCutaneous every 6 hours  levETIRAcetam  Solution 1000 milliGRAM(s) Oral two times a day  LORazepam     Tablet 1 milliGRAM(s) Oral every 6 hours  LORazepam   Injectable 1 milliGRAM(s) IV Push once  metoprolol tartrate 100 milliGRAM(s) Oral two times a day  pantoprazole   Suspension 40 milliGRAM(s) Enteral Tube daily  QUEtiapine 50 milliGRAM(s) Oral every 12 hours    MEDICATIONS  (PRN):  acetaminophen    Suspension 650 milliGRAM(s) Oral every 6 hours PRN For Temp greater than 38 C (100.4 F)  dextrose 40% Gel 15 Gram(s) Oral once PRN Blood Glucose LESS THAN 70 milliGRAM(s)/deciLiter  glucagon  Injectable 1 milliGRAM(s) IntraMuscular once PRN Glucose <70 milliGRAM(s)/deciLiter  LORazepam     Tablet 1 milliGRAM(s) Oral once PRN Anxiety      REVIEW OF SYSTEMS:  limited 2/2 mental status    T(C): 37.1 (07-19-18 @ 06:18), Max: 37.1 (07-19-18 @ 06:18)  HR: 69 (07-19-18 @ 06:18) (58 - 69)  BP: 137/62 (07-19-18 @ 06:18) (117/59 - 137/62)  RR: 16 (07-19-18 @ 06:18) (16 - 18)  SpO2: 100% (07-19-18 @ 06:18) (98% - 100%)    PHYSICAL EXAMINATION:   Constitutional: stable ill appearance. NAD  HEENT: AT  Neck:  Supple  Respiratory:  rhonchi. Adequate airflow b/l. Not using accessory muscles of respiration.  Cardiovascular:  S1 & S2 intact, no R/G, 2+ radial pulses b/l  Gastrointestinal: Soft, ND, normoactive b.s., no organomegaly/RT/rigidity  Extremities: WWP  Neurological: awake. Crude sensation intact.     Labs and imaging reviewed    LABS:    07-18    141  |  107  |  6<L>  ----------------------------<  202<H>  3.6   |  27  |  0.78    Ca    8.2<L>      18 Jul 2018 13:06              CAPILLARY BLOOD GLUCOSE      POCT Blood Glucose.: 121 mg/dL (19 Jul 2018 06:39)  POCT Blood Glucose.: 136 mg/dL (18 Jul 2018 23:41)  POCT Blood Glucose.: 166 mg/dL (18 Jul 2018 22:43)  POCT Blood Glucose.: 144 mg/dL (18 Jul 2018 17:20)  POCT Blood Glucose.: 63 mg/dL (18 Jul 2018 16:45)  POCT Blood Glucose.: 216 mg/dL (18 Jul 2018 11:48)              RADIOLOGY & ADDITIONAL STUDIES:

## 2018-07-19 NOTE — PROGRESS NOTE ADULT - ASSESSMENT
A 63 yo Female with multiple co-morbidities including non-verbal, bed bound, Trach/ PEG tube, sent in to the ER from NH for evaluation of shortness of breath and cough after vomiting. On admission, she has no fever or chills but Leukocytosis. The CXR  shows No consolidation but CT chest shows RLL opacity. She has  started on Vancomycin and Zosyn and The ID consult  requested to assist with further evaluation and antibiotic management.     # Aspiration Pneumonia - Pseudomonas ( CRE)  # Hematemesis  # Procalcitonin level elevated    Would recommend:    1. Continue Amikacin 10 mg /Kg q 12hours and  Cefepime as a double agent  to prevent further resistant and continue until 7/28/18  2. Aspiration precaution  3. Continue  Bronchial/Oral  suctioning  4. Contact Isoaltion    d/w nursing staff and Covering NP    will follow the patient with you A 61 yo Female with multiple co-morbidities including non-verbal, bed bound, Trach/ PEG tube, sent in to the ER from NH for evaluation of shortness of breath and cough after vomiting. On admission, she has no fever or chills but Leukocytosis. The CXR  shows No consolidation but CT chest shows RLL opacity. She has  started on Vancomycin and Zosyn and The ID consult  requested to assist with further evaluation and antibiotic management.     # Aspiration Pneumonia - Pseudomonas ( CRE)  # Hematemesis  # Procalcitonin level elevated    Would recommend:    1. Continue Amikacin and Cefepime until 7/28/18  2. Aspiration precaution  3. Continue  Bronchial/Oral  suctioning  4. [facility]    d/w nursing staff     will follow the patient with you

## 2018-07-19 NOTE — PROGRESS NOTE ADULT - SUBJECTIVE AND OBJECTIVE BOX
Patient is a 63y old  Female who presents with a chief complaint of SOB (14 Jul 2018 18:51)    Allergy: angiotensin converting enzyme inhibitors (Unknown)      INTERVAL HPI/OVERNIGHT EVENTS:    T(C): 37.1 (07-19-18 @ 06:18), Max: 37.1 (07-19-18 @ 06:18)  HR: 69 (07-19-18 @ 06:18) (58 - 69)  BP: 137/62 (07-19-18 @ 06:18) (117/59 - 137/62)  RR: 16 (07-19-18 @ 06:18) (16 - 18)  SpO2: 100% (07-19-18 @ 06:18) (98% - 100%)  I&O's Summary    Vital Signs Last 24 Hrs  T(C): 37.1 (19 Jul 2018 06:18), Max: 37.1 (19 Jul 2018 06:18)  T(F): 98.7 (19 Jul 2018 06:18), Max: 98.7 (19 Jul 2018 06:18)  HR: 69 (19 Jul 2018 06:18) (58 - 69)  BP: 137/62 (19 Jul 2018 06:18) (117/59 - 137/62)  BP(mean): --  RR: 16 (19 Jul 2018 06:18) (16 - 18)  SpO2: 100% (19 Jul 2018 06:18) (98% - 100%)    PAST MEDICAL & SURGICAL HISTORY:    Hypertension  Schizophrenia  Diabetic coma  Diabetes mellitus  No significant past surgical history  S/P percutaneous endoscopic gastrostomy (PEG) tube placement  H/O tracheostomy          LABS:                        12.6   6.1   )-----------( 153      ( 19 Jul 2018 07:30 )             40.2     07-18    141  |  107  |  6<L>  ----------------------------<  202<H>  3.6   |  27  |  0.78    Ca    8.2<L>      18 Jul 2018 13:06        CAPILLARY BLOOD GLUCOSE      POCT Blood Glucose.: 121 mg/dL (19 Jul 2018 06:39)  POCT Blood Glucose.: 136 mg/dL (18 Jul 2018 23:41)  POCT Blood Glucose.: 166 mg/dL (18 Jul 2018 22:43)  POCT Blood Glucose.: 144 mg/dL (18 Jul 2018 17:20)  POCT Blood Glucose.: 63 mg/dL (18 Jul 2018 16:45)  POCT Blood Glucose.: 216 mg/dL (18 Jul 2018 11:48)            MEDICATIONS  (STANDING):  ALBUTerol/ipratropium for Nebulization 3 milliLiter(s) Nebulizer every 6 hours  amiKACIN  IVPB 600 milliGRAM(s) IV Intermittent every 12 hours  amLODIPine   Tablet 2.5 milliGRAM(s) Oral daily  atorvastatin 20 milliGRAM(s) Oral at bedtime  cefepime   IVPB 2000 milliGRAM(s) IV Intermittent every 8 hours  cefepime   IVPB      dextrose 5% + sodium chloride 0.45%. 1000 milliLiter(s) (50 mL/Hr) IV Continuous <Continuous>  dextrose 5% + sodium chloride 0.45%. 1000 milliLiter(s) (75 mL/Hr) IV Continuous <Continuous>  dextrose 5%. 1000 milliLiter(s) (50 mL/Hr) IV Continuous <Continuous>  dextrose 50% Injectable 12.5 Gram(s) IV Push once  dextrose 50% Injectable 25 Gram(s) IV Push once  dextrose 50% Injectable 25 Gram(s) IV Push once  heparin  Injectable 5000 Unit(s) SubCutaneous every 8 hours  insulin glargine Injectable (LANTUS) 15 Unit(s) SubCutaneous at bedtime  insulin lispro (HumaLOG) corrective regimen sliding scale   SubCutaneous every 6 hours  levETIRAcetam  Solution 1000 milliGRAM(s) Oral two times a day  LORazepam     Tablet 1 milliGRAM(s) Oral every 6 hours  LORazepam   Injectable 1 milliGRAM(s) IV Push once  metoprolol tartrate 100 milliGRAM(s) Oral two times a day  pantoprazole   Suspension 40 milliGRAM(s) Enteral Tube daily  QUEtiapine 50 milliGRAM(s) Oral every 12 hours    MEDICATIONS  (PRN):  acetaminophen    Suspension 650 milliGRAM(s) Oral every 6 hours PRN For Temp greater than 38 C (100.4 F)  dextrose 40% Gel 15 Gram(s) Oral once PRN Blood Glucose LESS THAN 70 milliGRAM(s)/deciLiter  glucagon  Injectable 1 milliGRAM(s) IntraMuscular once PRN Glucose <70 milliGRAM(s)/deciLiter  LORazepam     Tablet 1 milliGRAM(s) Oral once PRN Anxiety      REVIEW OF SYSTEMS: Unable to perform  RADIOLOGY & ADDITIONAL TESTS:        Consultant(s) Notes Reviewed:  [ ] YES  [ ] NO    PHYSICAL EXAM:  GENERAL: NAD,HEAD:  Atraumatic, Normocephalic  EYES:  conjunctiva and sclera clear  ENT: Moist mucous membranes, Good dentition, No lesions  NECK: Supple, No JVD, Normal thyroid  NERVOUS SYSTEM: EDMONDS, Restless non verba  lHEART: S1, S2;   ABDOMEN: Soft, Nontender, Nondistended; Bowel sounds present. 22 [place] peg present  EXTREMITIES:  2+ Peripheral Pulses, No clubbing, cyanosis, or edema  LYMPH: No lymphadenopathy noted  SKIN:Groin and inner thigh rash  Care Collaborated Discussed with Consultants/Other Providers [Y ] YES  [ ] NO

## 2018-07-19 NOTE — PROGRESS NOTE ADULT - SUBJECTIVE AND OBJECTIVE BOX
Time of Visit:  Patient seen and examined.     MEDICATIONS  (STANDING):  ALBUTerol/ipratropium for Nebulization 3 milliLiter(s) Nebulizer every 6 hours  amiKACIN  IVPB 600 milliGRAM(s) IV Intermittent every 12 hours  amLODIPine   Tablet 2.5 milliGRAM(s) Oral daily  atorvastatin 20 milliGRAM(s) Oral at bedtime  cefepime   IVPB 2000 milliGRAM(s) IV Intermittent every 8 hours  cefepime   IVPB      dextrose 5% + sodium chloride 0.45%. 1000 milliLiter(s) (50 mL/Hr) IV Continuous <Continuous>  dextrose 5% + sodium chloride 0.45%. 1000 milliLiter(s) (75 mL/Hr) IV Continuous <Continuous>  dextrose 5%. 1000 milliLiter(s) (50 mL/Hr) IV Continuous <Continuous>  dextrose 50% Injectable 12.5 Gram(s) IV Push once  dextrose 50% Injectable 25 Gram(s) IV Push once  dextrose 50% Injectable 25 Gram(s) IV Push once  heparin  Injectable 5000 Unit(s) SubCutaneous every 8 hours  insulin glargine Injectable (LANTUS) 15 Unit(s) SubCutaneous at bedtime  insulin lispro (HumaLOG) corrective regimen sliding scale   SubCutaneous every 6 hours  levETIRAcetam  Solution 1000 milliGRAM(s) Oral two times a day  LORazepam     Tablet 1 milliGRAM(s) Oral every 6 hours  LORazepam   Injectable 1 milliGRAM(s) IV Push once  metoprolol tartrate 100 milliGRAM(s) Oral two times a day  pantoprazole   Suspension 40 milliGRAM(s) Enteral Tube daily  QUEtiapine 50 milliGRAM(s) Oral every 12 hours      MEDICATIONS  (PRN):  acetaminophen    Suspension 650 milliGRAM(s) Oral every 6 hours PRN For Temp greater than 38 C (100.4 F)  dextrose 40% Gel 15 Gram(s) Oral once PRN Blood Glucose LESS THAN 70 milliGRAM(s)/deciLiter  glucagon  Injectable 1 milliGRAM(s) IntraMuscular once PRN Glucose <70 milliGRAM(s)/deciLiter  LORazepam     Tablet 1 milliGRAM(s) Oral once PRN Anxiety       Medications up to date at time of exam.    ROS: Limited due to Non verbal. No fever, chills, cough, congestion on exam.  PHYSICAL EXAMINATION:    Vital Signs Last 24 Hrs  T(C): 37.1 (19 Jul 2018 06:18), Max: 37.1 (19 Jul 2018 06:18)  T(F): 98.7 (19 Jul 2018 06:18), Max: 98.7 (19 Jul 2018 06:18)  HR: 69 (19 Jul 2018 06:18) (58 - 69)  BP: 137/62 (19 Jul 2018 06:18) (117/59 - 137/62)  BP(mean): --  RR: 16 (19 Jul 2018 06:18) (16 - 18)  SpO2: 100% (19 Jul 2018 06:18) (98% - 100%)   (if applicable)    General; Non verbal. Total care. Eyes does not follow verbal and tactile stimuli. No acute distress.      HEENT: Normocephalic and atraumatic. No nasal tenderness. Moist mucosa.     NECK: Has Trach#6, non infected.    LUNGS: Clear to auscultation B/L. No wheezing, rales, or rhonchi. No use of accessory muscle.     HEART: S1 S2 Regular rate and no click/ rub.     ABDOMEN: Soft, nontender, and nondistended. Active bowel sounds. + G tube( Mallory tubing type) ( Fr#20).    EXTREMITIES: Without any cyanosis, clubbing, rash, lesions or edema.    NEUROLOGIC: Non verbal. Cognitive impaired.      SKIN: Warm and moist. Non diaphoretic.       LABS:                        12.6   6.1   )-----------( 153      ( 19 Jul 2018 07:30 )             40.2     07-18    141  |  107  |  6<L>  ----------------------------<  202<H>  3.6   |  27  |  0.78    Ca    8.2<L>      18 Jul 2018 13:06    RADIOLOGY & ADDITIONAL STUDIES:  EKG:   Ct chest:  < from: CT Chest No Cont (07.15.18 @ 11:14) >    EXAM:  CT ABDOMEN AND PELVIS                          EXAM:  CT CHEST                            PROCEDURE DATE:  07/15/2018          INTERPRETATION:  CLINICAL HISTORY:  Respiratory failure; sepsis    Multiple axial images of the chest, abdomen andpelvis were obtained from   the lung apices through symphysis pubis without the administration of   oral or intravascular contrast limiting the sensitivity of evaluation.   Reformatted coronal and sagittal images are submitted.    COMPARISON: CT evaluation abdomen/pelvis July 23, 2016.    FINDINGS: Mid to distal esophageal wall thickening is suggested.   Correlate with endoscopic evaluation.    A midline tracheostomy is identified.    A PEG tube is identified, the distal aspect of which is identified in the   region of the pylorus/duodenal bulb; correlate clinically as to   appropriate positioning.    No abnormally enlarged mediastinal or hilar lymph nodes are noted. There   is no evidence for axillary lymphadenopathy. The heart size appears   within normal limits. No pericardial effusion is identified. Thoracic   aortic caliber demonstrates unremarkable caliber.    The central bronchial anatomy appears patent.    Elevation of the right hemidiaphragm is identified. There is opacity   identified within the right lower lobe lung parenchyma, likely   representative of a combination of both atelectasis and consolidation. No   additional regions of lung parenchymal infiltrate or consolidation   identified bilaterally. A small right pleuraleffusion is noted. There is   no evidence for left pleural effusion. There is no evidence of   pneumothorax. No mediastinal shift is noted.    The liver is normal in size. No focal hepatic masses are identified.   There is no evidence for intrahepatic or extrahepatic biliary dilatation.   No gallstones, gallbladder wall thickening or pericholecystic fluid   identified.    The spleen, pancreas and adrenal glands are unremarkable.    The kidneys enhance bilaterally, without evidence for space-occupying   lesion or hydronephrosis. A small less than 3 mm calculus is noted within   the upper pole region of the left kidney. No right renal calculi are   identified. The abdominal aorta is normal in course and caliber. No   abnormally enlarged retroperitoneal or pelvic lymphadenopathy is noted.    Fecal material is scattered throughout the colon. There is no evidence   for mechanical bowel obstruction. No colonic wall thickening is   identified. There is no evidence for free intraperitoneal air orfluid.   There is no evidence for acute appendicitis.    The uterus and urinary bladder appear unremarkable.     No acute osseous fractures are evident.    IMPRESSION:    1. Mid to distal esophageal wall thickening is suggested. Correlate with   endoscopic evaluation.  2. A PEG tube is identified, the distal aspect of which is identified in   the region of the pylorus/duodenal bulb; correlate clinically as to   appropriate positioning.  3. There is opacity identified within the right lower lobe lung   parenchyma, likely representative of a combination of both atelectasis   and consolidation. Small right pleural effusion.     PAST MEDICAL & SURGICAL HISTORY:  No pertinent past medical history  Hypertension  Schizophrenia  Diabetic coma  Diabetes mellitus  No significant past surgical history  S/P percutaneous endoscopic gastrostomy (PEG) tube placement  H/O tracheostomy     Impression; 62 Y/O Female with prior mentioned multiple chronic conditions. Presented with Cough, SOB and Vomiting. Leukocytosis from WBC 23.1 to WBC 15.4 with RLL Opacity secondary to Aspiration PNA most likely Gram Negative Organism since she is from Nursing Home. Oxygen supplementation FIO2 40% via Trach collar due to Chronic Hypoxic respiratory failure. Leukocytosis WBC from 23 to WBC 6.1. Been Afebrile.     Suggestion:   Oxygen supplementation via Trach collar.  Continue DuoNeb Q 6 hours.  Continue antibiotic as per ID recommendations.  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    Aspiration precautions. HOB elevation especially during Peg feeding.   Turn and reposition in bed. Time of Visit:  Patient seen and examined.     MEDICATIONS  (STANDING):  ALBUTerol/ipratropium for Nebulization 3 milliLiter(s) Nebulizer every 6 hours  amiKACIN  IVPB 600 milliGRAM(s) IV Intermittent every 12 hours  amLODIPine   Tablet 2.5 milliGRAM(s) Oral daily  atorvastatin 20 milliGRAM(s) Oral at bedtime  cefepime   IVPB 2000 milliGRAM(s) IV Intermittent every 8 hours  cefepime   IVPB      dextrose 5% + sodium chloride 0.45%. 1000 milliLiter(s) (50 mL/Hr) IV Continuous <Continuous>  dextrose 5% + sodium chloride 0.45%. 1000 milliLiter(s) (75 mL/Hr) IV Continuous <Continuous>  dextrose 5%. 1000 milliLiter(s) (50 mL/Hr) IV Continuous <Continuous>  dextrose 50% Injectable 12.5 Gram(s) IV Push once  dextrose 50% Injectable 25 Gram(s) IV Push once  dextrose 50% Injectable 25 Gram(s) IV Push once  heparin  Injectable 5000 Unit(s) SubCutaneous every 8 hours  insulin glargine Injectable (LANTUS) 15 Unit(s) SubCutaneous at bedtime  insulin lispro (HumaLOG) corrective regimen sliding scale   SubCutaneous every 6 hours  levETIRAcetam  Solution 1000 milliGRAM(s) Oral two times a day  LORazepam     Tablet 1 milliGRAM(s) Oral every 6 hours  LORazepam   Injectable 1 milliGRAM(s) IV Push once  metoprolol tartrate 100 milliGRAM(s) Oral two times a day  pantoprazole   Suspension 40 milliGRAM(s) Enteral Tube daily  QUEtiapine 50 milliGRAM(s) Oral every 12 hours      MEDICATIONS  (PRN):  acetaminophen    Suspension 650 milliGRAM(s) Oral every 6 hours PRN For Temp greater than 38 C (100.4 F)  dextrose 40% Gel 15 Gram(s) Oral once PRN Blood Glucose LESS THAN 70 milliGRAM(s)/deciLiter  glucagon  Injectable 1 milliGRAM(s) IntraMuscular once PRN Glucose <70 milliGRAM(s)/deciLiter  LORazepam     Tablet 1 milliGRAM(s) Oral once PRN Anxiety       Medications up to date at time of exam.    ROS: Limited due to Non verbal. No fever, chills, cough, congestion on exam.  PHYSICAL EXAMINATION:    Vital Signs Last 24 Hrs  T(C): 37.1 (19 Jul 2018 06:18), Max: 37.1 (19 Jul 2018 06:18)  T(F): 98.7 (19 Jul 2018 06:18), Max: 98.7 (19 Jul 2018 06:18)  HR: 69 (19 Jul 2018 06:18) (58 - 69)  BP: 137/62 (19 Jul 2018 06:18) (117/59 - 137/62)  BP(mean): --  RR: 16 (19 Jul 2018 06:18) (16 - 18)  SpO2: 100% (19 Jul 2018 06:18) (98% - 100%)   (if applicable)    General; Non verbal. Total care. Eyes does not follow verbal and tactile stimuli. No acute distress.      HEENT: Normocephalic and atraumatic. No nasal tenderness. Moist mucosa.     NECK: Has Trach#6, non infected.    LUNGS: Clear to auscultation B/L. No wheezing, rales, or rhonchi. No use of accessory muscle.     HEART: S1 S2 Regular rate and no click/ rub.     ABDOMEN: Soft, nontender, and nondistended. Active bowel sounds. + G tube( Mallory tubing type) ( Fr#20).    EXTREMITIES: Without any cyanosis, clubbing, rash, lesions or edema.    NEUROLOGIC: Non verbal. Cognitive impaired.      SKIN: Warm and moist. Non diaphoretic.       LABS:                        12.6   6.1   )-----------( 153      ( 19 Jul 2018 07:30 )             40.2     07-18    141  |  107  |  6<L>  ----------------------------<  202<H>  3.6   |  27  |  0.78    Ca    8.2<L>      18 Jul 2018 13:06    RADIOLOGY & ADDITIONAL STUDIES:  EKG:   Ct chest:  < from: CT Chest No Cont (07.15.18 @ 11:14) >    EXAM:  CT ABDOMEN AND PELVIS                          EXAM:  CT CHEST                            PROCEDURE DATE:  07/15/2018          INTERPRETATION:  CLINICAL HISTORY:  Respiratory failure; sepsis    Multiple axial images of the chest, abdomen andpelvis were obtained from   the lung apices through symphysis pubis without the administration of   oral or intravascular contrast limiting the sensitivity of evaluation.   Reformatted coronal and sagittal images are submitted.    COMPARISON: CT evaluation abdomen/pelvis July 23, 2016.    FINDINGS: Mid to distal esophageal wall thickening is suggested.   Correlate with endoscopic evaluation.    A midline tracheostomy is identified.    A PEG tube is identified, the distal aspect of which is identified in the   region of the pylorus/duodenal bulb; correlate clinically as to   appropriate positioning.    No abnormally enlarged mediastinal or hilar lymph nodes are noted. There   is no evidence for axillary lymphadenopathy. The heart size appears   within normal limits. No pericardial effusion is identified. Thoracic   aortic caliber demonstrates unremarkable caliber.    The central bronchial anatomy appears patent.    Elevation of the right hemidiaphragm is identified. There is opacity   identified within the right lower lobe lung parenchyma, likely   representative of a combination of both atelectasis and consolidation. No   additional regions of lung parenchymal infiltrate or consolidation   identified bilaterally. A small right pleuraleffusion is noted. There is   no evidence for left pleural effusion. There is no evidence of   pneumothorax. No mediastinal shift is noted.    The liver is normal in size. No focal hepatic masses are identified.   There is no evidence for intrahepatic or extrahepatic biliary dilatation.   No gallstones, gallbladder wall thickening or pericholecystic fluid   identified.    The spleen, pancreas and adrenal glands are unremarkable.    The kidneys enhance bilaterally, without evidence for space-occupying   lesion or hydronephrosis. A small less than 3 mm calculus is noted within   the upper pole region of the left kidney. No right renal calculi are   identified. The abdominal aorta is normal in course and caliber. No   abnormally enlarged retroperitoneal or pelvic lymphadenopathy is noted.    Fecal material is scattered throughout the colon. There is no evidence   for mechanical bowel obstruction. No colonic wall thickening is   identified. There is no evidence for free intraperitoneal air orfluid.   There is no evidence for acute appendicitis.    The uterus and urinary bladder appear unremarkable.     No acute osseous fractures are evident.    IMPRESSION:    1. Mid to distal esophageal wall thickening is suggested. Correlate with   endoscopic evaluation.  2. A PEG tube is identified, the distal aspect of which is identified in   the region of the pylorus/duodenal bulb; correlate clinically as to   appropriate positioning.  3. There is opacity identified within the right lower lobe lung   parenchyma, likely representative of a combination of both atelectasis   and consolidation. Small right pleural effusion.     PAST MEDICAL & SURGICAL HISTORY:  No pertinent past medical history  Hypertension  Schizophrenia  Diabetic coma  Diabetes mellitus  No significant past surgical history  S/P percutaneous endoscopic gastrostomy (PEG) tube placement  H/O tracheostomy     Impression; 62 Y/O Female with prior mentioned multiple chronic conditions. Presented with Cough, SOB and Vomiting. Leukocytosis from WBC 23.1 to WBC 15.4 with RLL Opacity secondary to Aspiration PNA due to pseudomonas ( CRE) Oxygen supplementation FIO2 40% via Trach collar due to Chronic Hypoxic respiratory failure. Leukocytosis WBC from 23 to WBC 6.1. Been Afebrile. Will need antibx until 7/28.. double coverage     Suggestion:   Oxygen supplementation via Trach collar.  Continue DuoNeb Q 6 hours.  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    Aspiration precautions. HOB elevation especially during Peg feeding.   Turn and reposition in bed.  Agree with above assessment and plan as transcribed.

## 2018-07-19 NOTE — PROGRESS NOTE ADULT - ASSESSMENT
63 Y/O F, non-verbal, bed bound, Trach/ PEG tube, from NH, PMhx of HTN, asthma, convulsion disorder, bipolar, DVT legs, DM, sent from NH for vomiting and cough.  In the ED, pt presents in no acute distress, and vitals WNL.  Pt is afebrile with leukocytosis of 19.  CXR concerning for RLL infiltrate likely 2/2 asp pneumonia.  Abdominal x-ray sig for non-specific gas pattern and intact PEG tube.  Pt will be admitted to medicine for suspected Aspiration pneumonia.    Plan:   -Sepsis 2/2 likely Complicated Aspiration PNA/HCAP 2/2 CRE Pseudomonas & UTI - c/w iv abx, plan for picc. contact precautions. ivf, supportive care, nebs, aspiration precautions, f/u cultures to final date.   -Hematemesis - improved  - PEG Dislodgement - peg replaced. peg feeding starting   -Uncontrolled Type 2 DM with complications of hyperglycemia - improved. C/w lantus, moderate ihss, monitor acks  -Need for prophylactic measure: dvt/gi ppx

## 2018-07-19 NOTE — PROGRESS NOTE ADULT - PROBLEM SELECTOR PLAN 1
Rectal tube placed for liquid diarrhea. Pt had Tube feeding restarted last evening. Most likely 2/2 feeds. No  cCdiff like odor and wbc WNL Rectal tube placed for brown liquid diarrhea. Pt had Tube feeding restarted last evening. Most likely 2/2 feeds. No  C diff like odor and wbc WNL

## 2018-07-19 NOTE — PROGRESS NOTE ADULT - SUBJECTIVE AND OBJECTIVE BOX
Patient is seen and examined at the bed side, is afebrile.  The Sensitivity of Pseudomonas shows Carbapenem resistant and only sensitive to Amikacin and Intermediate to Cefepime.       REVIEW OF SYSTEMS: Unable to obtained since nonverbal      ALLERGIES: ACIs      Vital Signs Last 24 Hrs  T(C): 36.9 (19 Jul 2018 14:08), Max: 37.1 (19 Jul 2018 06:18)  T(F): 98.4 (19 Jul 2018 14:08), Max: 98.7 (19 Jul 2018 06:18)  HR: 58 (19 Jul 2018 14:08) (58 - 69)  BP: 141/53 (19 Jul 2018 14:08) (117/59 - 141/53)  BP(mean): --  RR: 19 (19 Jul 2018 14:08) (16 - 19)  SpO2: 100% (19 Jul 2018 14:08) (98% - 100%)      PHYSICAL EXAM:  GENERAL: Not in acute distress, having involuntary movements of face  HEENT: Trach in placed  CVS: S1 and s2 present  RESP: Air entry B/L  GI: Abdomen nondistended, NT and PEG in placed  EXT: No pedal edema, hands contracted   CNS: Awake but not alert        LABS:                                  12.6   6.1   )-----------( 153      ( 19 Jul 2018 07:30 )             40.2                       11.8   15.4  )-----------( 170      ( 16 Jul 2018 07:12 )             38.1                           12.2   23.0  )-----------( 180      ( 15 Jul 2018 12:45 )             38.7       07-18    141  |  107  |  6<L>  ----------------------------<  202<H>  3.6   |  27  |  0.78    Ca    8.2<L>      18 Jul 2018 13:06      07-18    141  |  107  |  6<L>  ----------------------------<  202<H>  3.6   |  27  |  0.78    Ca    8.2<L>      18 Jul 2018 13:06      TPro  7.4  /  Alb  3.4<L>  /  TBili  0.9  /  DBili  x   /  AST  26  /  ALT  44  /  AlkPhos  108  07-14        CAPILLARY BLOOD GLUCOSE      POCT Blood Glucose.: 211 mg/dL (15 Jul 2018 17:22)  POCT Blood Glucose.: 123 mg/dL (15 Jul 2018 11:44)  POCT Blood Glucose.: 353 mg/dL (15 Jul 2018 07:56)  POCT Blood Glucose.: 254 mg/dL (15 Jul 2018 00:42)  POCT Blood Glucose.: 230 mg/dL (14 Jul 2018 22:22)        MEDICATIONS  (STANDING):  ALBUTerol/ipratropium for Nebulization 3 milliLiter(s) Nebulizer every 6 hours  amiKACIN  IVPB 600 milliGRAM(s) IV Intermittent every 12 hours  amLODIPine   Tablet 2.5 milliGRAM(s) Oral daily  atorvastatin 20 milliGRAM(s) Oral at bedtime  cefepime   IVPB 2000 milliGRAM(s) IV Intermittent every 8 hours  cefepime   IVPB      dextrose 5% + sodium chloride 0.45%. 1000 milliLiter(s) (50 mL/Hr) IV Continuous <Continuous>  dextrose 5% + sodium chloride 0.45%. 1000 milliLiter(s) (75 mL/Hr) IV Continuous <Continuous>  dextrose 50% Injectable 25 Gram(s) IV Push once  heparin  Injectable 5000 Unit(s) SubCutaneous every 8 hours  insulin glargine Injectable (LANTUS) 15 Unit(s) SubCutaneous at bedtime  insulin lispro (HumaLOG) corrective regimen sliding scale   SubCutaneous every 6 hours  levETIRAcetam  Solution 1000 milliGRAM(s) Oral two times a day  LORazepam     Tablet 1 milliGRAM(s) Oral every 6 hours  LORazepam   Injectable 1 milliGRAM(s) IV Push once  metoprolol tartrate 100 milliGRAM(s) Oral two times a day  pantoprazole   Suspension 40 milliGRAM(s) Enteral Tube daily  QUEtiapine 50 milliGRAM(s) Oral every 12 hours    MEDICATIONS  (PRN):  acetaminophen    Suspension 650 milliGRAM(s) Oral every 6 hours PRN For Temp greater than 38 C (100.4 F)  dextrose 40% Gel 15 Gram(s) Oral once PRN Blood Glucose LESS THAN 70 milliGRAM(s)/deciLiter  glucagon  Injectable 1 milliGRAM(s) IntraMuscular once PRN Glucose <70 milliGRAM(s)/deciLiter  LORazepam     Tablet 1 milliGRAM(s) Oral once PRN Anxiety        RADIOLOGY & ADDITIONAL TESTS:    7/15/18: CT Chest No Cont (07.15.18 @ 11:14) 1. Mid to distal esophageal wall thickening is suggested. Correlate with endoscopic evaluation.  2. A PEG tube is identified, the distal aspect of which is identified in the region of the pylorus/duodenal bulb; correlate clinically as to appropriate positioning.  3. There is opacity identified within the right lower lobe lung parenchyma, likely representative of a combination of both atelectasis and consolidation. Small right pleural effusion.    7/14/18: Xray Chest 1 View AP/PA (07.14.18 @ 15:34)No consolidation. Platelike atelectasis in the right lower lobe.        MICROBIOLOGY DATA:    Culture - Sputum . (07.16.18 @ 10:36)    -  Gentamicin: R >8    -  Imipenem: R >8    -  Levofloxacin: R >4    -  Meropenem: R >8    -  Piperacillin/Tazobactam: R >64    -  Tobramycin: R >8    -  Amikacin: S 16    Gram Stain:   Moderate polymorphonuclear leukocytes seen per low power field  Moderate Squamous epithelial cells seen per low power field  Moderate Gram Negative Rods seen per oil power field  Few Gram Positive Cocci seen per oil power field    -  Aztreonam: R >16    -  Cefepime: I 16    -  Ceftazidime: R >16    -  Ciprofloxacin: R >2    Specimen Source: .Sputum Sputum trap    Culture Results:   Three or more mixed gram negative rods including  Moderate Pseudomonas aeruginosa (Carbapenem Resistant)    Organism Identification: Pseudomonas aeruginosa (Carbapenem Resistant)    Organism: Pseudomonas aeruginosa (Carbapenem Resistant)    Method Type: ESTIVEN        MRSA/MSSA PCR (07.16.18 @ 10:15)    MRSA PCR Result.: NotDetec: MRSA/MSSA PCR assay is a qualitative in vitro diagnostic test for the  direct detection and differentiation of methicillin-resistant  Staphylococcus aureus (MRSA) from Staphylococcus aureus (SA). The assay  detects DNA from nasal swabs in patients atrisk for nasal colonization.  It is not intended to diagnose MRSA or SA infections nor guide or monitor  treatment for MRSA/SA infections. A negative result does not preclude  nasal colonization. The assay is FDA-approved and its performance has  been established by [employer], [place] and the [facility], [place], [place].    Staph Aureus PCR Result: NotDetec    Urine Microscopic-Add On (NC) (07.15.18 @ 23:04)    Bacteria: Many /HPF    Epithelial Cells: Many /HPF    Red Blood Cell - Urine: 5-10 /HPF    White Blood Cell - Urine: 11-25 /HPF Patient is seen and examined at the bed side, is afebrile.  She is tolerating Amikacin and  Cefepime well.       REVIEW OF SYSTEMS: Unable to obtained since nonverbal      ALLERGIES: ACIs      Vital Signs Last 24 Hrs  T(C): 36.9 (19 Jul 2018 14:08), Max: 37.1 (19 Jul 2018 06:18)  T(F): 98.4 (19 Jul 2018 14:08), Max: 98.7 (19 Jul 2018 06:18)  HR: 58 (19 Jul 2018 14:08) (58 - 69)  BP: 141/53 (19 Jul 2018 14:08) (117/59 - 141/53)  BP(mean): --  RR: 19 (19 Jul 2018 14:08) (16 - 19)  SpO2: 100% (19 Jul 2018 14:08) (98% - 100%)      PHYSICAL EXAM:  GENERAL: Not in acute distress, having involuntary movements of face  HEENT: Trach in placed  CVS: S1 and s2 present  RESP: Air entry B/L  GI: Abdomen nondistended, NT and PEG in placed  EXT: No pedal edema, hands contracted   CNS: Awake but not alert        LABS:                                  12.6   6.1   )-----------( 153      ( 19 Jul 2018 07:30 )             40.2                       11.8   15.4  )-----------( 170      ( 16 Jul 2018 07:12 )             38.1                           12.2   23.0  )-----------( 180      ( 15 Jul 2018 12:45 )             38.7           07-18    141  |  107  |  6<L>  ----------------------------<  202<H>  3.6   |  27  |  0.78    Ca    8.2<L>      18 Jul 2018 13:06      TPro  7.4  /  Alb  3.4<L>  /  TBili  0.9  /  DBili  x   /  AST  26  /  ALT  44  /  AlkPhos  108  07-14        CAPILLARY BLOOD GLUCOSE      POCT Blood Glucose.: 211 mg/dL (15 Jul 2018 17:22)  POCT Blood Glucose.: 123 mg/dL (15 Jul 2018 11:44)  POCT Blood Glucose.: 353 mg/dL (15 Jul 2018 07:56)  POCT Blood Glucose.: 254 mg/dL (15 Jul 2018 00:42)  POCT Blood Glucose.: 230 mg/dL (14 Jul 2018 22:22)        MEDICATIONS  (STANDING):  ALBUTerol/ipratropium for Nebulization 3 milliLiter(s) Nebulizer every 6 hours  amiKACIN  IVPB 600 milliGRAM(s) IV Intermittent every 12 hours  amLODIPine   Tablet 2.5 milliGRAM(s) Oral daily  atorvastatin 20 milliGRAM(s) Oral at bedtime  cefepime   IVPB 2000 milliGRAM(s) IV Intermittent every 8 hours  cefepime   IVPB      dextrose 5% + sodium chloride 0.45%. 1000 milliLiter(s) (50 mL/Hr) IV Continuous <Continuous>  dextrose 5% + sodium chloride 0.45%. 1000 milliLiter(s) (75 mL/Hr) IV Continuous <Continuous>  dextrose 50% Injectable 25 Gram(s) IV Push once  heparin  Injectable 5000 Unit(s) SubCutaneous every 8 hours  insulin glargine Injectable (LANTUS) 15 Unit(s) SubCutaneous at bedtime  insulin lispro (HumaLOG) corrective regimen sliding scale   SubCutaneous every 6 hours  levETIRAcetam  Solution 1000 milliGRAM(s) Oral two times a day  LORazepam     Tablet 1 milliGRAM(s) Oral every 6 hours  LORazepam   Injectable 1 milliGRAM(s) IV Push once  metoprolol tartrate 100 milliGRAM(s) Oral two times a day  pantoprazole   Suspension 40 milliGRAM(s) Enteral Tube daily  QUEtiapine 50 milliGRAM(s) Oral every 12 hours    MEDICATIONS  (PRN):  acetaminophen    Suspension 650 milliGRAM(s) Oral every 6 hours PRN For Temp greater than 38 C (100.4 F)  dextrose 40% Gel 15 Gram(s) Oral once PRN Blood Glucose LESS THAN 70 milliGRAM(s)/deciLiter  glucagon  Injectable 1 milliGRAM(s) IntraMuscular once PRN Glucose <70 milliGRAM(s)/deciLiter  LORazepam     Tablet 1 milliGRAM(s) Oral once PRN Anxiety        RADIOLOGY & ADDITIONAL TESTS:    7/15/18: CT Chest No Cont (07.15.18 @ 11:14) 1. Mid to distal esophageal wall thickening is suggested. Correlate with endoscopic evaluation.  2. A PEG tube is identified, the distal aspect of which is identified in the region of the pylorus/duodenal bulb; correlate clinically as to appropriate positioning.  3. There is opacity identified within the right lower lobe lung parenchyma, likely representative of a combination of both atelectasis and consolidation. Small right pleural effusion.    7/14/18: Xray Chest 1 View AP/PA (07.14.18 @ 15:34)No consolidation. Platelike atelectasis in the right lower lobe.        MICROBIOLOGY DATA:    Culture - Sputum . (07.16.18 @ 10:36)    -  Gentamicin: R >8    -  Imipenem: R >8    -  Levofloxacin: R >4    -  Meropenem: R >8    -  Piperacillin/Tazobactam: R >64    -  Tobramycin: R >8    -  Amikacin: S 16    Gram Stain:   Moderate polymorphonuclear leukocytes seen per low power field  Moderate Squamous epithelial cells seen per low power field  Moderate Gram Negative Rods seen per oil power field  Few Gram Positive Cocci seen per oil power field    -  Aztreonam: R >16    -  Cefepime: I 16    -  Ceftazidime: R >16    -  Ciprofloxacin: R >2    Specimen Source: .Sputum Sputum trap    Culture Results:   Three or more mixed gram negative rods including  Moderate Pseudomonas aeruginosa (Carbapenem Resistant)    Organism Identification: Pseudomonas aeruginosa (Carbapenem Resistant)    Organism: Pseudomonas aeruginosa (Carbapenem Resistant)    Method Type: ESTIVEN        MRSA/MSSA PCR (07.16.18 @ 10:15)    MRSA PCR Result.: Melanie: MRSA/MSSA PCR assay is a qualitative in vitro diagnostic test for the  direct detection and differentiation of methicillin-resistant  Staphylococcus aureus (MRSA) from Staphylococcus aureus (SA). The assay  detects DNA from nasal swabs in patients atrisk for nasal colonization.  It is not intended to diagnose MRSA or SA infections nor guide or monitor  treatment for MRSA/SA infections. A negative result does not preclude  nasal colonization. The assay is FDA-approved and its performance has  been established by [employer], [place] and the [facility], [place], [place].    Staph Aureus PCR Result: Yolite[employer]    Urine Microscopic-Add On (NC) (07.15.18 @ 23:04)    Bacteria: Many /HPF    Epithelial Cells: Many /HPF    Red Blood Cell - Urine: 5-10 /HPF    White Blood Cell - Urine: 11-25 /HPF

## 2018-07-20 PROCEDURE — 76937 US GUIDE VASCULAR ACCESS: CPT | Mod: 26

## 2018-07-20 PROCEDURE — 36569 INSJ PICC 5 YR+ W/O IMAGING: CPT

## 2018-07-20 PROCEDURE — 77001 FLUOROGUIDE FOR VEIN DEVICE: CPT | Mod: 26

## 2018-07-20 RX ORDER — SODIUM CHLORIDE 9 MG/ML
10 INJECTION INTRAMUSCULAR; INTRAVENOUS; SUBCUTANEOUS EVERY 12 HOURS
Qty: 0 | Refills: 0 | Status: DISCONTINUED | OUTPATIENT
Start: 2018-07-20 | End: 2018-07-31

## 2018-07-20 RX ORDER — SODIUM CHLORIDE 9 MG/ML
20 INJECTION INTRAMUSCULAR; INTRAVENOUS; SUBCUTANEOUS ONCE
Qty: 0 | Refills: 0 | Status: DISCONTINUED | OUTPATIENT
Start: 2018-07-20 | End: 2018-07-31

## 2018-07-20 RX ORDER — SODIUM CHLORIDE 9 MG/ML
10 INJECTION INTRAMUSCULAR; INTRAVENOUS; SUBCUTANEOUS
Qty: 0 | Refills: 0 | Status: DISCONTINUED | OUTPATIENT
Start: 2018-07-20 | End: 2018-07-31

## 2018-07-20 RX ADMIN — Medication 3 MILLILITER(S): at 08:35

## 2018-07-20 RX ADMIN — HEPARIN SODIUM 5000 UNIT(S): 5000 INJECTION INTRAVENOUS; SUBCUTANEOUS at 13:55

## 2018-07-20 RX ADMIN — Medication 1 MILLIGRAM(S): at 11:08

## 2018-07-20 RX ADMIN — Medication 1 MILLIGRAM(S): at 17:19

## 2018-07-20 RX ADMIN — Medication 1 MILLIGRAM(S): at 23:18

## 2018-07-20 RX ADMIN — Medication 3 MILLILITER(S): at 02:25

## 2018-07-20 RX ADMIN — HEPARIN SODIUM 5000 UNIT(S): 5000 INJECTION INTRAVENOUS; SUBCUTANEOUS at 06:34

## 2018-07-20 RX ADMIN — Medication 3 MILLILITER(S): at 21:19

## 2018-07-20 RX ADMIN — LEVETIRACETAM 1000 MILLIGRAM(S): 250 TABLET, FILM COATED ORAL at 17:17

## 2018-07-20 RX ADMIN — HEPARIN SODIUM 5000 UNIT(S): 5000 INJECTION INTRAVENOUS; SUBCUTANEOUS at 22:09

## 2018-07-20 RX ADMIN — Medication 100 MILLIGRAM(S): at 06:35

## 2018-07-20 RX ADMIN — INSULIN GLARGINE 15 UNIT(S): 100 INJECTION, SOLUTION SUBCUTANEOUS at 23:16

## 2018-07-20 RX ADMIN — Medication 1 MILLIGRAM(S): at 06:34

## 2018-07-20 RX ADMIN — AMLODIPINE BESYLATE 2.5 MILLIGRAM(S): 2.5 TABLET ORAL at 06:34

## 2018-07-20 RX ADMIN — QUETIAPINE FUMARATE 50 MILLIGRAM(S): 200 TABLET, FILM COATED ORAL at 06:34

## 2018-07-20 RX ADMIN — PANTOPRAZOLE SODIUM 40 MILLIGRAM(S): 20 TABLET, DELAYED RELEASE ORAL at 11:08

## 2018-07-20 RX ADMIN — ATORVASTATIN CALCIUM 20 MILLIGRAM(S): 80 TABLET, FILM COATED ORAL at 22:09

## 2018-07-20 RX ADMIN — AMIKACIN SULFATE 102.4 MILLIGRAM(S): 250 INJECTION, SOLUTION INTRAMUSCULAR; INTRAVENOUS at 17:17

## 2018-07-20 RX ADMIN — LEVETIRACETAM 1000 MILLIGRAM(S): 250 TABLET, FILM COATED ORAL at 06:34

## 2018-07-20 RX ADMIN — CEFEPIME 100 MILLIGRAM(S): 1 INJECTION, POWDER, FOR SOLUTION INTRAMUSCULAR; INTRAVENOUS at 22:09

## 2018-07-20 RX ADMIN — Medication 100 MILLIGRAM(S): at 17:17

## 2018-07-20 RX ADMIN — QUETIAPINE FUMARATE 50 MILLIGRAM(S): 200 TABLET, FILM COATED ORAL at 17:17

## 2018-07-20 NOTE — PROGRESS NOTE ADULT - SUBJECTIVE AND OBJECTIVE BOX
Time of Visit:  Patient seen and examined.     MEDICATIONS  (STANDING):  ALBUTerol/ipratropium for Nebulization 3 milliLiter(s) Nebulizer every 6 hours  amiKACIN  IVPB 600 milliGRAM(s) IV Intermittent every 12 hours  amLODIPine   Tablet 2.5 milliGRAM(s) Oral daily  atorvastatin 20 milliGRAM(s) Oral at bedtime  cefepime   IVPB 2000 milliGRAM(s) IV Intermittent every 8 hours  cefepime   IVPB      dextrose 5% + sodium chloride 0.45%. 1000 milliLiter(s) (50 mL/Hr) IV Continuous <Continuous>  dextrose 5% + sodium chloride 0.45%. 1000 milliLiter(s) (75 mL/Hr) IV Continuous <Continuous>  dextrose 5%. 1000 milliLiter(s) (50 mL/Hr) IV Continuous <Continuous>  dextrose 50% Injectable 12.5 Gram(s) IV Push once  dextrose 50% Injectable 25 Gram(s) IV Push once  dextrose 50% Injectable 25 Gram(s) IV Push once  heparin  Injectable 5000 Unit(s) SubCutaneous every 8 hours  insulin glargine Injectable (LANTUS) 15 Unit(s) SubCutaneous at bedtime  insulin lispro (HumaLOG) corrective regimen sliding scale   SubCutaneous every 6 hours  levETIRAcetam  Solution 1000 milliGRAM(s) Oral two times a day  LORazepam     Tablet 1 milliGRAM(s) Oral every 6 hours  LORazepam   Injectable 1 milliGRAM(s) IV Push once  metoprolol tartrate 100 milliGRAM(s) Oral two times a day  pantoprazole   Suspension 40 milliGRAM(s) Enteral Tube daily  QUEtiapine 50 milliGRAM(s) Oral every 12 hours      MEDICATIONS  (PRN):  acetaminophen    Suspension 650 milliGRAM(s) Oral every 6 hours PRN For Temp greater than 38 C (100.4 F)  dextrose 40% Gel 15 Gram(s) Oral once PRN Blood Glucose LESS THAN 70 milliGRAM(s)/deciLiter  glucagon  Injectable 1 milliGRAM(s) IntraMuscular once PRN Glucose <70 milliGRAM(s)/deciLiter       Medications up to date at time of exam.    ROS: No fever, chills, cough, congestion. No Hypoxia on exam.  PHYSICAL EXAMINATION:  Vital Signs Last 24 Hrs  T(C): 36.7 (20 Jul 2018 06:32), Max: 36.9 (19 Jul 2018 14:08)  T(F): 98 (20 Jul 2018 06:32), Max: 98.4 (19 Jul 2018 14:08)  HR: 74 (20 Jul 2018 06:32) (55 - 74)  BP: 160/71 (20 Jul 2018 06:32) (122/78 - 160/71)  BP(mean): 87 (19 Jul 2018 17:54) (87 - 87)  RR: 18 (20 Jul 2018 06:32) (16 - 19)  SpO2: 100% (20 Jul 2018 06:32) (100% - 100%)   (if applicable)    General; Non verbal. No acute distress.  Eyes does not follow verbal and tactile stimuli. Total care.      HEENT: Normocephalic and atraumatic. No nasal tenderness. Moist mucosa.     NECK: Has Trach#6, non infected.    LUNGS: Clear to auscultation B/L. No wheezing, rales, or rhonchi. No use of accessory muscle.     HEART: S1 S2 Regular rate and no click/ rub.     ABDOMEN: Soft, nontender, and nondistended. Active bowel sounds. + Peg.     EXTREMITIES: Without any cyanosis, clubbing, rash, lesions or edema.    NEUROLOGIC: Non verbal. Cognitive impaired.      SKIN: Warm and moist. Non diaphoretic.           LABS:                        12.6   6.1   )-----------( 153      ( 19 Jul 2018 07:30 )             40.2     07-18    141  |  107  |  6<L>  ----------------------------<  202<H>  3.6   |  27  |  0.78    Ca    8.2<L>      18 Jul 2018 13:06  RADIOLOGY & ADDITIONAL STUDIES:  EKG:   Ct chest: < from: CT Chest No Cont (07.15.18 @ 11:14) >    EXAM:  CT ABDOMEN AND PELVIS                          EXAM:  CT CHEST                            PROCEDURE DATE:  07/15/2018          INTERPRETATION:  CLINICAL HISTORY:  Respiratory failure; sepsis    Multiple axial images of the chest, abdomen andpelvis were obtained from   the lung apices through symphysis pubis without the administration of   oral or intravascular contrast limiting the sensitivity of evaluation.   Reformatted coronal and sagittal images are submitted.    COMPARISON: CT evaluation abdomen/pelvis July 23, 2016.    FINDINGS: Mid to distal esophageal wall thickening is suggested.   Correlate with endoscopic evaluation.    A midline tracheostomy is identified.    A PEG tube is identified, the distal aspect of which is identified in the   region of the pylorus/duodenal bulb; correlate clinically as to   appropriate positioning.    No abnormally enlarged mediastinal or hilar lymph nodes are noted. There   is no evidence for axillary lymphadenopathy. The heart size appears   within normal limits. No pericardial effusion is identified. Thoracic   aortic caliber demonstrates unremarkable caliber.    The central bronchial anatomy appears patent.    Elevation of the right hemidiaphragm is identified. There is opacity   identified within the right lower lobe lung parenchyma, likely   representative of a combination of both atelectasis and consolidation. No   additional regions of lung parenchymal infiltrate or consolidation   identified bilaterally. A small right pleuraleffusion is noted. There is   no evidence for left pleural effusion. There is no evidence of   pneumothorax. No mediastinal shift is noted.      The liver is normal in size. No focal hepatic masses are identified.   There is no evidence for intrahepatic or extrahepatic biliary dilatation.   No gallstones, gallbladder wall thickening or pericholecystic fluid   identified.    The spleen, pancreas and adrenal glands are unremarkable.    The kidneys enhance bilaterally, without evidence for space-occupying   lesion or hydronephrosis. A small less than 3 mm calculus is noted within   the upper pole region of the left kidney. No right renal calculi are   identified. The abdominal aorta is normal in course and caliber. No   abnormally enlarged retroperitoneal or pelvic lymphadenopathy is noted.    Fecal material is scattered throughout the colon. There is no evidence   for mechanical bowel obstruction. No colonic wall thickening is   identified. There is no evidence for free intraperitoneal air orfluid.   There is no evidence for acute appendicitis.    The uterus and urinary bladder appear unremarkable.     No acute osseous fractures are evident.    IMPRESSION:    1. Mid to distal esophageal wall thickening is suggested. Correlate with   endoscopic evaluation.  2. A PEG tube is identified, the distal aspect of which is identified in   the region of the pylorus/duodenal bulb; correlate clinically as to   appropriate positioning.  3. There is opacity identified within the right lower lobe lung   parenchyma, likely representative of a combination of both atelectasis   and consolidation. Small right pleural effusion.     PAST MEDICAL & SURGICAL HISTORY:  No pertinent past medical history  Hypertension  Schizophrenia  Diabetic coma  Diabetes mellitus  No significant past surgical history  S/P percutaneous endoscopic gastrostomy (PEG) tube placement  H/O tracheostomy      Impression; 62 Y/O Female with prior mentioned multiple chronic conditions. Presented with Cough, SOB and Vomiting. Leukocytosis from WBC 23.1 to WBC 15.4 with RLL Opacity secondary to Aspiration PNA due to pseudomonas ( CRE) Oxygen supplementation FIO2 40% via Trach collar due to Chronic Hypoxic respiratory failure. Leukocytosis WBC from 23 to WBC 6.1. Been Afebrile. Will need antibx until 7/28.. double coverage     Suggestion:     Continue DuoNeb Q 6 hours.  Oxygen supplementation via Trach collar.  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    Aspiration precautions. HOB elevation especially during Peg feeding.   Turn and reposition in bed. Time of Visit:  Patient seen and examined.     MEDICATIONS  (STANDING):  ALBUTerol/ipratropium for Nebulization 3 milliLiter(s) Nebulizer every 6 hours  amiKACIN  IVPB 600 milliGRAM(s) IV Intermittent every 12 hours  amLODIPine   Tablet 2.5 milliGRAM(s) Oral daily  atorvastatin 20 milliGRAM(s) Oral at bedtime  cefepime   IVPB 2000 milliGRAM(s) IV Intermittent every 8 hours  cefepime   IVPB      dextrose 5% + sodium chloride 0.45%. 1000 milliLiter(s) (50 mL/Hr) IV Continuous <Continuous>  dextrose 5% + sodium chloride 0.45%. 1000 milliLiter(s) (75 mL/Hr) IV Continuous <Continuous>  dextrose 5%. 1000 milliLiter(s) (50 mL/Hr) IV Continuous <Continuous>  dextrose 50% Injectable 12.5 Gram(s) IV Push once  dextrose 50% Injectable 25 Gram(s) IV Push once  dextrose 50% Injectable 25 Gram(s) IV Push once  heparin  Injectable 5000 Unit(s) SubCutaneous every 8 hours  insulin glargine Injectable (LANTUS) 15 Unit(s) SubCutaneous at bedtime  insulin lispro (HumaLOG) corrective regimen sliding scale   SubCutaneous every 6 hours  levETIRAcetam  Solution 1000 milliGRAM(s) Oral two times a day  LORazepam     Tablet 1 milliGRAM(s) Oral every 6 hours  LORazepam   Injectable 1 milliGRAM(s) IV Push once  metoprolol tartrate 100 milliGRAM(s) Oral two times a day  pantoprazole   Suspension 40 milliGRAM(s) Enteral Tube daily  QUEtiapine 50 milliGRAM(s) Oral every 12 hours      MEDICATIONS  (PRN):  acetaminophen    Suspension 650 milliGRAM(s) Oral every 6 hours PRN For Temp greater than 38 C (100.4 F)  dextrose 40% Gel 15 Gram(s) Oral once PRN Blood Glucose LESS THAN 70 milliGRAM(s)/deciLiter  glucagon  Injectable 1 milliGRAM(s) IntraMuscular once PRN Glucose <70 milliGRAM(s)/deciLiter       Medications up to date at time of exam.    ROS: No fever, chills, cough, congestion. No Hypoxia on exam.  PHYSICAL EXAMINATION:  Vital Signs Last 24 Hrs  T(C): 36.7 (20 Jul 2018 06:32), Max: 36.9 (19 Jul 2018 14:08)  T(F): 98 (20 Jul 2018 06:32), Max: 98.4 (19 Jul 2018 14:08)  HR: 74 (20 Jul 2018 06:32) (55 - 74)  BP: 160/71 (20 Jul 2018 06:32) (122/78 - 160/71)  BP(mean): 87 (19 Jul 2018 17:54) (87 - 87)  RR: 18 (20 Jul 2018 06:32) (16 - 19)  SpO2: 100% (20 Jul 2018 06:32) (100% - 100%)   (if applicable)    General; Non verbal. No acute distress.  Eyes does not follow verbal and tactile stimuli. Total care.      HEENT: Normocephalic and atraumatic. No nasal tenderness. Moist mucosa.     NECK: Has Trach#6, non infected.    LUNGS: Clear to auscultation B/L. No wheezing, rales, or rhonchi. No use of accessory muscle.     HEART: S1 S2 Regular rate and no click/ rub.     ABDOMEN: Soft, nontender, and nondistended. Active bowel sounds. + Peg.     EXTREMITIES: Without any cyanosis, clubbing, rash, lesions or edema.    NEUROLOGIC: Non verbal. Cognitive impaired.      SKIN: Warm and moist. Non diaphoretic.           LABS:                        12.6   6.1   )-----------( 153      ( 19 Jul 2018 07:30 )             40.2     07-18    141  |  107  |  6<L>  ----------------------------<  202<H>  3.6   |  27  |  0.78    Ca    8.2<L>      18 Jul 2018 13:06  RADIOLOGY & ADDITIONAL STUDIES:  EKG:   Ct chest: < from: CT Chest No Cont (07.15.18 @ 11:14) >    EXAM:  CT ABDOMEN AND PELVIS                          EXAM:  CT CHEST                            PROCEDURE DATE:  07/15/2018          INTERPRETATION:  CLINICAL HISTORY:  Respiratory failure; sepsis    Multiple axial images of the chest, abdomen andpelvis were obtained from   the lung apices through symphysis pubis without the administration of   oral or intravascular contrast limiting the sensitivity of evaluation.   Reformatted coronal and sagittal images are submitted.    COMPARISON: CT evaluation abdomen/pelvis July 23, 2016.    FINDINGS: Mid to distal esophageal wall thickening is suggested.   Correlate with endoscopic evaluation.    A midline tracheostomy is identified.    A PEG tube is identified, the distal aspect of which is identified in the   region of the pylorus/duodenal bulb; correlate clinically as to   appropriate positioning.    No abnormally enlarged mediastinal or hilar lymph nodes are noted. There   is no evidence for axillary lymphadenopathy. The heart size appears   within normal limits. No pericardial effusion is identified. Thoracic   aortic caliber demonstrates unremarkable caliber.    The central bronchial anatomy appears patent.    Elevation of the right hemidiaphragm is identified. There is opacity   identified within the right lower lobe lung parenchyma, likely   representative of a combination of both atelectasis and consolidation. No   additional regions of lung parenchymal infiltrate or consolidation   identified bilaterally. A small right pleuraleffusion is noted. There is   no evidence for left pleural effusion. There is no evidence of   pneumothorax. No mediastinal shift is noted.      The liver is normal in size. No focal hepatic masses are identified.   There is no evidence for intrahepatic or extrahepatic biliary dilatation.   No gallstones, gallbladder wall thickening or pericholecystic fluid   identified.    The spleen, pancreas and adrenal glands are unremarkable.    The kidneys enhance bilaterally, without evidence for space-occupying   lesion or hydronephrosis. A small less than 3 mm calculus is noted within   the upper pole region of the left kidney. No right renal calculi are   identified. The abdominal aorta is normal in course and caliber. No   abnormally enlarged retroperitoneal or pelvic lymphadenopathy is noted.    Fecal material is scattered throughout the colon. There is no evidence   for mechanical bowel obstruction. No colonic wall thickening is   identified. There is no evidence for free intraperitoneal air orfluid.   There is no evidence for acute appendicitis.    The uterus and urinary bladder appear unremarkable.     No acute osseous fractures are evident.    IMPRESSION:    1. Mid to distal esophageal wall thickening is suggested. Correlate with   endoscopic evaluation.  2. A PEG tube is identified, the distal aspect of which is identified in   the region of the pylorus/duodenal bulb; correlate clinically as to   appropriate positioning.  3. There is opacity identified within the right lower lobe lung   parenchyma, likely representative of a combination of both atelectasis   and consolidation. Small right pleural effusion.     PAST MEDICAL & SURGICAL HISTORY:  No pertinent past medical history  Hypertension  Schizophrenia  Diabetic coma  Diabetes mellitus  No significant past surgical history  S/P percutaneous endoscopic gastrostomy (PEG) tube placement  H/O tracheostomy      Impression; 62 Y/O Female with prior mentioned multiple chronic conditions. Presented with Cough, SOB and Vomiting. Leukocytosis from WBC 23.1 to WBC 15.4 with RLL Opacity secondary to Aspiration PNA due to pseudomonas ( CRE) Oxygen supplementation FIO2 40% via Trach collar due to Chronic Hypoxic respiratory failure. Leukocytosis WBC from 23 to WBC 6.1. Been Afebrile. Will need antibx until 7/28.. double coverage     Suggestion:     Continue DuoNeb Q 6 hours.  Oxygen supplementation via Trach collar.  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    Aspiration precautions. HOB elevation especially during Peg feeding.   Turn and reposition in bed.    Agree with above assessment and plan as transcribed.

## 2018-07-20 NOTE — PROGRESS NOTE ADULT - ASSESSMENT
A 61 yo Female with multiple co-morbidities including non-verbal, bed bound, Trach/ PEG tube, sent in to the ER from NH for evaluation of shortness of breath and cough after vomiting. On admission, she has no fever or chills but Leukocytosis. The CXR  shows No consolidation but CT chest shows RLL opacity. She has  started on Vancomycin and Zosyn and The ID consult  requested to assist with further evaluation and antibiotic management.     # Aspiration Pneumonia - Pseudomonas ( CRE)  # Hematemesis  # Procalcitonin level elevated    Would recommend:    1.  Continue  Bronchial/Oral  suctioning  2. Continue Amikacin and Cefepime until 7/28/18  3. Aspiration precaution  4. Contact Isolation     d/w nursing staff     will follow the patient with you

## 2018-07-20 NOTE — PROGRESS NOTE ADULT - ASSESSMENT
61 Y/O F, non-verbal, bed bound, Trach/ PEG tube, from NH, PMhx of HTN, asthma, convulsion disorder, bipolar, DVT legs, DM, sent from NH for vomiting and cough.  In the ED, pt presents in no acute distress, and vitals WNL.  Pt is afebrile with leukocytosis of 19.  CXR concerning for RLL infiltrate likely 2/2 asp pneumonia.  Abdominal x-ray sig for non-specific gas pattern and intact PEG tube.  Pt will be admitted to medicine for suspected Aspiration pneumonia.    Plan:   -Sepsis 2/2 likely Complicated Aspiration PNA/HCAP 2/2 CRE Pseudomonas & UTI - c/w iv abx til 7/28/18, plan for picc. contact precautions. ivf, supportive care, nebs, aspiration precautions, f/u cultures to final date.   - Diarrhea: likely 2/2 restarting peg feeding it started after initiation.  Monitor stool quantity/quality.  If diarrhea persists then obtain cdiff pcr.   -Hematemesis - improved  - PEG Dislodgement - improved   -Uncontrolled Type 2 DM with complications of hyperglycemia - improved. C/w lantus, moderate ihss, monitor acks  -Need for prophylactic measure: dvt/gi ppx

## 2018-07-20 NOTE — PROGRESS NOTE ADULT - PROBLEM SELECTOR PLAN 1
PT with pseudamonous in sputim. On Amikacin x 10 days. Pt missed 2 doses 2/2 to no access. Picc line being placed today. 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 On Amikacin x 10 days. Pt missed 2 doses 2/2 to no access. Picc line being placed today. IV access was attempted but several practitioners w/o success.  Secretions noted via trach.

## 2018-07-20 NOTE — PROGRESS NOTE ADULT - SUBJECTIVE AND OBJECTIVE BOX
Patient seen and examined at bedside  No acute events noted overnight  Case discussed with medical team    HPI:  61 Y/O F, non-verbal, bed bound, Trach/ PEG tube, from NH, PMhx of HTN, asthma, convulsion disorder, bipolar, DVT legs, DM 2, sent from NH for vomiting and cough.  Pt unable to provide history.  History given by sister who states pt was vomiting coffee ground emesis yesterday.  Pt had 2 episodes of reported vomiting, and has not had an episode since.  Pt also developed cough with clear sputum.  Sister states pt is combative at baseline, and her current behavior is baseline.  No reported fever, discomfort, diarrhea, or SOB. (14 Jul 2018 18:51)      PAST MEDICAL & SURGICAL HISTORY:  No pertinent past medical history  Hypertension  Schizophrenia  Diabetic coma  Diabetes mellitus  No significant past surgical history  S/P percutaneous endoscopic gastrostomy (PEG) tube placement  H/O tracheostomy      angiotensin converting enzyme inhibitors (Unknown)       MEDICATIONS  (STANDING):  ALBUTerol/ipratropium for Nebulization 3 milliLiter(s) Nebulizer every 6 hours  amiKACIN  IVPB 600 milliGRAM(s) IV Intermittent every 12 hours  amLODIPine   Tablet 2.5 milliGRAM(s) Oral daily  atorvastatin 20 milliGRAM(s) Oral at bedtime  cefepime   IVPB 2000 milliGRAM(s) IV Intermittent every 8 hours  cefepime   IVPB      dextrose 5% + sodium chloride 0.45%. 1000 milliLiter(s) (50 mL/Hr) IV Continuous <Continuous>  dextrose 5% + sodium chloride 0.45%. 1000 milliLiter(s) (75 mL/Hr) IV Continuous <Continuous>  dextrose 5%. 1000 milliLiter(s) (50 mL/Hr) IV Continuous <Continuous>  dextrose 50% Injectable 12.5 Gram(s) IV Push once  dextrose 50% Injectable 25 Gram(s) IV Push once  dextrose 50% Injectable 25 Gram(s) IV Push once  heparin  Injectable 5000 Unit(s) SubCutaneous every 8 hours  insulin glargine Injectable (LANTUS) 15 Unit(s) SubCutaneous at bedtime  insulin lispro (HumaLOG) corrective regimen sliding scale   SubCutaneous every 6 hours  levETIRAcetam  Solution 1000 milliGRAM(s) Oral two times a day  LORazepam     Tablet 1 milliGRAM(s) Oral every 6 hours  LORazepam   Injectable 1 milliGRAM(s) IV Push once  metoprolol tartrate 100 milliGRAM(s) Oral two times a day  pantoprazole   Suspension 40 milliGRAM(s) Enteral Tube daily  QUEtiapine 50 milliGRAM(s) Oral every 12 hours    MEDICATIONS  (PRN):  acetaminophen    Suspension 650 milliGRAM(s) Oral every 6 hours PRN For Temp greater than 38 C (100.4 F)  dextrose 40% Gel 15 Gram(s) Oral once PRN Blood Glucose LESS THAN 70 milliGRAM(s)/deciLiter  glucagon  Injectable 1 milliGRAM(s) IntraMuscular once PRN Glucose <70 milliGRAM(s)/deciLiter      REVIEW OF SYSTEMS:  limited 2/2 mental status	    T(C): 36.7 (07-20-18 @ 06:32), Max: 36.9 (07-19-18 @ 14:08)  HR: 74 (07-20-18 @ 06:32) (55 - 74)  BP: 160/71 (07-20-18 @ 06:32) (122/78 - 160/71)  RR: 18 (07-20-18 @ 06:32) (16 - 19)  SpO2: 100% (07-20-18 @ 06:32) (100% - 100%)    PHYSICAL EXAMINATION:   Constitutional: NAD  HEENT: AT  Neck:  Supple  Respiratory:  Adequate airflow b/l. Not using accessory muscles of respiration.  Cardiovascular:  S1 & S2 intact, no R/G, 2+ radial pulses b/l  Gastrointestinal: Soft, ND, normoactive b.s., no organomegaly/RT/rigidity  Extremities: WWP  Neurological:  awake.  Crude sensation intact.     Labs and imaging reviewed    LABS:                        12.6   6.1   )-----------( 153      ( 19 Jul 2018 07:30 )             40.2     07-18    141  |  107  |  6<L>  ----------------------------<  202<H>  3.6   |  27  |  0.78    Ca    8.2<L>      18 Jul 2018 13:06              CAPILLARY BLOOD GLUCOSE      POCT Blood Glucose.: 125 mg/dL (20 Jul 2018 05:25)  POCT Blood Glucose.: 133 mg/dL (19 Jul 2018 23:52)  POCT Blood Glucose.: 124 mg/dL (19 Jul 2018 21:55)  POCT Blood Glucose.: 90 mg/dL (19 Jul 2018 16:53)  POCT Blood Glucose.: 98 mg/dL (19 Jul 2018 11:55)              RADIOLOGY & ADDITIONAL STUDIES:

## 2018-07-20 NOTE — PROGRESS NOTE ADULT - SUBJECTIVE AND OBJECTIVE BOX
Patient is seen and examined at the bed side, is afebrile.  She is tolerating Amikacin and  Cefepime well.       REVIEW OF SYSTEMS: Unable to obtained since nonverbal      ALLERGIES: ACIs      Vital Signs Last 24 Hrs  T(C): 36.6 (20 Jul 2018 13:44), Max: 36.7 (19 Jul 2018 20:41)  T(F): 97.8 (20 Jul 2018 13:44), Max: 98.1 (19 Jul 2018 20:41)  HR: 72 (20 Jul 2018 13:44) (55 - 74)  BP: 112/56 (20 Jul 2018 13:44) (112/56 - 160/71)  BP(mean): 87 (19 Jul 2018 17:54) (87 - 87)  RR: 20 (20 Jul 2018 13:44) (16 - 20)  SpO2: 100% (20 Jul 2018 13:44) (100% - 100%)        PHYSICAL EXAM:  GENERAL: Not in acute distress, having involuntary movements of face  HEENT: Trach in placed  CVS: S1 and s2 present  RESP: Air entry B/L  GI: Abdomen nondistended, NT and PEG in placed  EXT: No pedal edema, hands contracted   CNS: Awake but not alert        LABS: NO NEW labs                        12.6   6.1   )-----------( 153      ( 19 Jul 2018 07:30 )             40.2                         11.8   15.4  )-----------( 170      ( 16 Jul 2018 07:12 )             38.1                           12.2   23.0  )-----------( 180      ( 15 Jul 2018 12:45 )             38.7           07-18    141  |  107  |  6<L>  ----------------------------<  202<H>  3.6   |  27  |  0.78    Ca    8.2<L>      18 Jul 2018 13:06      TPro  7.4  /  Alb  3.4<L>  /  TBili  0.9  /  DBili  x   /  AST  26  /  ALT  44  /  AlkPhos  108  07-14        CAPILLARY BLOOD GLUCOSE      POCT Blood Glucose.: 211 mg/dL (15 Jul 2018 17:22)  POCT Blood Glucose.: 123 mg/dL (15 Jul 2018 11:44)  POCT Blood Glucose.: 353 mg/dL (15 Jul 2018 07:56)  POCT Blood Glucose.: 254 mg/dL (15 Jul 2018 00:42)  POCT Blood Glucose.: 230 mg/dL (14 Jul 2018 22:22)      MEDICATIONS  (STANDING):  ALBUTerol/ipratropium for Nebulization 3 milliLiter(s) Nebulizer every 6 hours  amiKACIN  IVPB 600 milliGRAM(s) IV Intermittent every 12 hours  amLODIPine   Tablet 2.5 milliGRAM(s) Oral daily  atorvastatin 20 milliGRAM(s) Oral at bedtime  cefepime   IVPB 2000 milliGRAM(s) IV Intermittent every 8 hours  cefepime   IVPB      dextrose 5% + sodium chloride 0.45%. 1000 milliLiter(s) (50 mL/Hr) IV Continuous <Continuous>  dextrose 5% + sodium chloride 0.45%. 1000 milliLiter(s) (75 mL/Hr) IV Continuous <Continuous>  dextrose 5%. 1000 milliLiter(s) (50 mL/Hr) IV Continuous <Continuous>  dextrose 50% Injectable 12.5 Gram(s) IV Push once  dextrose 50% Injectable 25 Gram(s) IV Push once  dextrose 50% Injectable 25 Gram(s) IV Push once  heparin  Injectable 5000 Unit(s) SubCutaneous every 8 hours  insulin glargine Injectable (LANTUS) 15 Unit(s) SubCutaneous at bedtime  insulin lispro (HumaLOG) corrective regimen sliding scale   SubCutaneous every 6 hours  levETIRAcetam  Solution 1000 milliGRAM(s) Oral two times a day  LORazepam     Tablet 1 milliGRAM(s) Oral every 6 hours  LORazepam   Injectable 1 milliGRAM(s) IV Push once  metoprolol tartrate 100 milliGRAM(s) Oral two times a day  pantoprazole   Suspension 40 milliGRAM(s) Enteral Tube daily  QUEtiapine 50 milliGRAM(s) Oral every 12 hours  sodium chloride 0.9% lock flush 20 milliLiter(s) IV Push once    MEDICATIONS  (PRN):  acetaminophen    Suspension 650 milliGRAM(s) Oral every 6 hours PRN For Temp greater than 38 C (100.4 F)  dextrose 40% Gel 15 Gram(s) Oral once PRN Blood Glucose LESS THAN 70 milliGRAM(s)/deciLiter  glucagon  Injectable 1 milliGRAM(s) IntraMuscular once PRN Glucose <70 milliGRAM(s)/deciLiter  sodium chloride 0.9% lock flush 10 milliLiter(s) IV Push every 1 hour PRN After each medication administration  sodium chloride 0.9% lock flush 10 milliLiter(s) IV Push every 12 hours PRN Lumen of catheter NOT used      RADIOLOGY & ADDITIONAL TESTS:    7/15/18: CT Chest No Cont (07.15.18 @ 11:14) 1. Mid to distal esophageal wall thickening is suggested. Correlate with endoscopic evaluation.  2. A PEG tube is identified, the distal aspect of which is identified in the region of the pylorus/duodenal bulb; correlate clinically as to appropriate positioning.  3. There is opacity identified within the right lower lobe lung parenchyma, likely representative of a combination of both atelectasis and consolidation. Small right pleural effusion.    7/14/18: Xray Chest 1 View AP/PA (07.14.18 @ 15:34)No consolidation. Platelike atelectasis in the right lower lobe.        MICROBIOLOGY DATA:    Culture - Sputum . (07.16.18 @ 10:36)    -  Gentamicin: R >8    -  Imipenem: R >8    -  Levofloxacin: R >4    -  Meropenem: R >8    -  Piperacillin/Tazobactam: R >64    -  Tobramycin: R >8    -  Amikacin: S 16    Gram Stain:   Moderate polymorphonuclear leukocytes seen per low power field  Moderate Squamous epithelial cells seen per low power field  Moderate Gram Negative Rods seen per oil power field  Few Gram Positive Cocci seen per oil power field    -  Aztreonam: R >16    -  Cefepime: I 16    -  Ceftazidime: R >16    -  Ciprofloxacin: R >2    Specimen Source: .Sputum Sputum trap    Culture Results:   Three or more mixed gram negative rods including  Moderate Pseudomonas aeruginosa (Carbapenem Resistant)    Organism Identification: Pseudomonas aeruginosa (Carbapenem Resistant)    Organism: Pseudomonas aeruginosa (Carbapenem Resistant)    Method Type: ESTIVEN        MRSA/MSSA PCR (07.16.18 @ 10:15)    MRSA PCR Result.: NotDetec: MRSA/MSSA PCR assay is a qualitative in vitro diagnostic test for the  direct detection and differentiation of methicillin-resistant  Staphylococcus aureus (MRSA) from Staphylococcus aureus (SA). The assay  detects DNA from nasal swabs in patients atrisk for nasal colonization.  It is not intended to diagnose MRSA or SA infections nor guide or monitor  treatment for MRSA/SA infections. A negative result does not preclude  nasal colonization. The assay is FDA-approved and its performance has  been established by [employer], [place] and the [facility], [place], [place].    Staph Aureus PCR Result: NotDete    Urine Microscopic-Add On (NC) (07.15.18 @ 23:04)    Bacteria: Many /HPF    Epithelial Cells: Many /HPF    Red Blood Cell - Urine: 5-10 /HPF    White Blood Cell - Urine: 11-25 /HPF            Assessment and Plan:   · Assessment		  A 61 yo Female with multiple co-morbidities including non-verbal, bed bound, Trach/ PEG tube, sent in to the ER from NH for evaluation of shortness of breath and cough after vomiting. On admission, she has no fever or chills but Leukocytosis. The CXR  shows No consolidation but CT chest shows RLL opacity. She has  started on Vancomycin and Zosyn and The ID consult  requested to assist with further evaluation and antibiotic management.     # Aspiration Pneumonia - Pseudomonas ( CRE)  # Hematemesis  # Procalcitonin level elevated    Would recommend:    1. Continue Amikacin and Cefepime until 7/28/18  2. Aspiration precaution  3. Continue  Bronchial/Oral  suctioning  4. Contact Isoaltion    d/w nursing staff     will follow the patient with you Patient is seen and examined at the bed side, is afebrile.  No new events.       REVIEW OF SYSTEMS: Unable to obtained since nonverbal      ALLERGIES: ACIs      Vital Signs Last 24 Hrs  T(C): 36.6 (20 Jul 2018 13:44), Max: 36.7 (19 Jul 2018 20:41)  T(F): 97.8 (20 Jul 2018 13:44), Max: 98.1 (19 Jul 2018 20:41)  HR: 72 (20 Jul 2018 13:44) (55 - 74)  BP: 112/56 (20 Jul 2018 13:44) (112/56 - 160/71)  BP(mean): 87 (19 Jul 2018 17:54) (87 - 87)  RR: 20 (20 Jul 2018 13:44) (16 - 20)  SpO2: 100% (20 Jul 2018 13:44) (100% - 100%)        PHYSICAL EXAM:  GENERAL: Not in acute distress, having involuntary movements of face  HEENT: Trach in placed  CVS: S1 and s2 present  RESP: Air entry B/L  GI: Abdomen nondistended, NT and PEG in placed  EXT: No pedal edema, hands contracted   CNS: Awake but not alert        LABS: NO NEW labs                        12.6   6.1   )-----------( 153      ( 19 Jul 2018 07:30 )             40.2                         11.8   15.4  )-----------( 170      ( 16 Jul 2018 07:12 )             38.1                           12.2   23.0  )-----------( 180      ( 15 Jul 2018 12:45 )             38.7           07-18    141  |  107  |  6<L>  ----------------------------<  202<H>  3.6   |  27  |  0.78    Ca    8.2<L>      18 Jul 2018 13:06      TPro  7.4  /  Alb  3.4<L>  /  TBili  0.9  /  DBili  x   /  AST  26  /  ALT  44  /  AlkPhos  108  07-14        CAPILLARY BLOOD GLUCOSE      POCT Blood Glucose.: 211 mg/dL (15 Jul 2018 17:22)  POCT Blood Glucose.: 123 mg/dL (15 Jul 2018 11:44)  POCT Blood Glucose.: 353 mg/dL (15 Jul 2018 07:56)  POCT Blood Glucose.: 254 mg/dL (15 Jul 2018 00:42)  POCT Blood Glucose.: 230 mg/dL (14 Jul 2018 22:22)      MEDICATIONS  (STANDING):  ALBUTerol/ipratropium for Nebulization 3 milliLiter(s) Nebulizer every 6 hours  amiKACIN  IVPB 600 milliGRAM(s) IV Intermittent every 12 hours  amLODIPine   Tablet 2.5 milliGRAM(s) Oral daily  atorvastatin 20 milliGRAM(s) Oral at bedtime  cefepime   IVPB 2000 milliGRAM(s) IV Intermittent every 8 hours  cefepime   IVPB      dextrose 5% + sodium chloride 0.45%. 1000 milliLiter(s) (50 mL/Hr) IV Continuous <Continuous>  dextrose 5% + sodium chloride 0.45%. 1000 milliLiter(s) (75 mL/Hr) IV Continuous <Continuous>  dextrose 5%. 1000 milliLiter(s) (50 mL/Hr) IV Continuous <Continuous>  dextrose 50% Injectable 12.5 Gram(s) IV Push once  dextrose 50% Injectable 25 Gram(s) IV Push once  dextrose 50% Injectable 25 Gram(s) IV Push once  heparin  Injectable 5000 Unit(s) SubCutaneous every 8 hours  insulin glargine Injectable (LANTUS) 15 Unit(s) SubCutaneous at bedtime  insulin lispro (HumaLOG) corrective regimen sliding scale   SubCutaneous every 6 hours  levETIRAcetam  Solution 1000 milliGRAM(s) Oral two times a day  LORazepam     Tablet 1 milliGRAM(s) Oral every 6 hours  LORazepam   Injectable 1 milliGRAM(s) IV Push once  metoprolol tartrate 100 milliGRAM(s) Oral two times a day  pantoprazole   Suspension 40 milliGRAM(s) Enteral Tube daily  QUEtiapine 50 milliGRAM(s) Oral every 12 hours  sodium chloride 0.9% lock flush 20 milliLiter(s) IV Push once    MEDICATIONS  (PRN):  acetaminophen    Suspension 650 milliGRAM(s) Oral every 6 hours PRN For Temp greater than 38 C (100.4 F)  dextrose 40% Gel 15 Gram(s) Oral once PRN Blood Glucose LESS THAN 70 milliGRAM(s)/deciLiter  glucagon  Injectable 1 milliGRAM(s) IntraMuscular once PRN Glucose <70 milliGRAM(s)/deciLiter  sodium chloride 0.9% lock flush 10 milliLiter(s) IV Push every 1 hour PRN After each medication administration  sodium chloride 0.9% lock flush 10 milliLiter(s) IV Push every 12 hours PRN Lumen of catheter NOT used      RADIOLOGY & ADDITIONAL TESTS:    7/15/18: CT Chest No Cont (07.15.18 @ 11:14) 1. Mid to distal esophageal wall thickening is suggested. Correlate with endoscopic evaluation.  2. A PEG tube is identified, the distal aspect of which is identified in the region of the pylorus/duodenal bulb; correlate clinically as to appropriate positioning.  3. There is opacity identified within the right lower lobe lung parenchyma, likely representative of a combination of both atelectasis and consolidation. Small right pleural effusion.    7/14/18: Xray Chest 1 View AP/PA (07.14.18 @ 15:34)No consolidation. Platelike atelectasis in the right lower lobe.        MICROBIOLOGY DATA:    Culture - Sputum . (07.16.18 @ 10:36)    -  Gentamicin: R >8    -  Imipenem: R >8    -  Levofloxacin: R >4    -  Meropenem: R >8    -  Piperacillin/Tazobactam: R >64    -  Tobramycin: R >8    -  Amikacin: S 16    Gram Stain:   Moderate polymorphonuclear leukocytes seen per low power field  Moderate Squamous epithelial cells seen per low power field  Moderate Gram Negative Rods seen per oil power field  Few Gram Positive Cocci seen per oil power field    -  Aztreonam: R >16    -  Cefepime: I 16    -  Ceftazidime: R >16    -  Ciprofloxacin: R >2    Specimen Source: .Sputum Sputum trap    Culture Results:   Three or more mixed gram negative rods including  Moderate Pseudomonas aeruginosa (Carbapenem Resistant)    Organism Identification: Pseudomonas aeruginosa (Carbapenem Resistant)    Organism: Pseudomonas aeruginosa (Carbapenem Resistant)    Method Type: ESTIVEN        MRSA/MSSA PCR (07.16.18 @ 10:15)    MRSA PCR Result.: NotDetec: MRSA/MSSA PCR assay is a qualitative in vitro diagnostic test for the  direct detection and differentiation of methicillin-resistant  Staphylococcus aureus (MRSA) from Staphylococcus aureus (SA). The assay  detects DNA from nasal swabs in patients atrisk for nasal colonization.  It is not intended to diagnose MRSA or SA infections nor guide or monitor  treatment for MRSA/SA infections. A negative result does not preclude  nasal colonization. The assay is FDA-approved and its performance has  been established by [employer], [place] and the [facility], [place], [place].    Staph Aureus PCR Result: [facility]    Urine Microscopic-Add On (NC) (07.15.18 @ 23:04)    Bacteria: Many /HPF    Epithelial Cells: Many /HPF    Red Blood Cell - Urine: 5-10 /HPF    White Blood Cell - Urine: 11-25 /HPF

## 2018-07-20 NOTE — PROGRESS NOTE ADULT - SUBJECTIVE AND OBJECTIVE BOX
Patient is a 63y old  Female who presents with a chief complaint of SOB (14 Jul 2018 18:51)  PT en route to IR for PICC line. IV access failed x several attempts from many discipline to no avail.    Allergies:  angiotensin converting enzyme inhibitors (Unknown)      INTERVAL HPI/OVERNIGHT EVENTS:    T(C): 36.6 (07-20-18 @ 13:44), Max: 36.7 (07-19-18 @ 20:41)  HR: 72 (07-20-18 @ 13:44) (55 - 74)  BP: 112/56 (07-20-18 @ 13:44) (112/56 - 160/71)  RR: 20 (07-20-18 @ 13:44) (16 - 20)  SpO2: 100% (07-20-18 @ 13:44) (100% - 100%)  I&O's Summary    19 Jul 2018 07:01  -  20 Jul 2018 07:00  --------------------------------------------------------  IN: 0 mL / OUT: 350 mL / NET: -350 mL      Vital Signs Last 24 Hrs  T(C): 36.6 (20 Jul 2018 13:44), Max: 36.7 (19 Jul 2018 20:41)  T(F): 97.8 (20 Jul 2018 13:44), Max: 98.1 (19 Jul 2018 20:41)  HR: 72 (20 Jul 2018 13:44) (55 - 74)  BP: 112/56 (20 Jul 2018 13:44) (112/56 - 160/71)  BP(mean): 87 (19 Jul 2018 17:54) (87 - 87)  RR: 20 (20 Jul 2018 13:44) (16 - 20)  SpO2: 100% (20 Jul 2018 13:44) (100% - 100%)  PAST MEDICAL & SURGICAL HISTORY:  No pertinent past medical history  Hypertension  Schizophrenia  Diabetic coma  Diabetes mellitus  No significant past surgical history  S/P percutaneous endoscopic gastrostomy (PEG) tube placement  H/O tracheostomy          LABS:                        12.6   6.1   )-----------( 153      ( 19 Jul 2018 07:30 )             40.2             CAPILLARY BLOOD GLUCOSE      POCT Blood Glucose.: 106 mg/dL (20 Jul 2018 11:52)  POCT Blood Glucose.: 125 mg/dL (20 Jul 2018 05:25)  POCT Blood Glucose.: 133 mg/dL (19 Jul 2018 23:52)  POCT Blood Glucose.: 124 mg/dL (19 Jul 2018 21:55)  POCT Blood Glucose.: 90 mg/dL (19 Jul 2018 16:53)            MEDICATIONS  (STANDING):  ALBUTerol/ipratropium for Nebulization 3 milliLiter(s) Nebulizer every 6 hours  amiKACIN  IVPB 600 milliGRAM(s) IV Intermittent every 12 hours  amLODIPine   Tablet 2.5 milliGRAM(s) Oral daily  atorvastatin 20 milliGRAM(s) Oral at bedtime  cefepime   IVPB 2000 milliGRAM(s) IV Intermittent every 8 hours  cefepime   IVPB      dextrose 5% + sodium chloride 0.45%. 1000 milliLiter(s) (50 mL/Hr) IV Continuous <Continuous>  dextrose 5% + sodium chloride 0.45%. 1000 milliLiter(s) (75 mL/Hr) IV Continuous <Continuous>  dextrose 5%. 1000 milliLiter(s) (50 mL/Hr) IV Continuous <Continuous>  dextrose 50% Injectable 12.5 Gram(s) IV Push once  dextrose 50% Injectable 25 Gram(s) IV Push once  dextrose 50% Injectable 25 Gram(s) IV Push once  heparin  Injectable 5000 Unit(s) SubCutaneous every 8 hours  insulin glargine Injectable (LANTUS) 15 Unit(s) SubCutaneous at bedtime  insulin lispro (HumaLOG) corrective regimen sliding scale   SubCutaneous every 6 hours  levETIRAcetam  Solution 1000 milliGRAM(s) Oral two times a day  LORazepam     Tablet 1 milliGRAM(s) Oral every 6 hours  LORazepam   Injectable 1 milliGRAM(s) IV Push once  metoprolol tartrate 100 milliGRAM(s) Oral two times a day  pantoprazole   Suspension 40 milliGRAM(s) Enteral Tube daily  QUEtiapine 50 milliGRAM(s) Oral every 12 hours    MEDICATIONS  (PRN):  acetaminophen    Suspension 650 milliGRAM(s) Oral every 6 hours PRN For Temp greater than 38 C (100.4 F)  dextrose 40% Gel 15 Gram(s) Oral once PRN Blood Glucose LESS THAN 70 milliGRAM(s)/deciLiter  glucagon  Injectable 1 milliGRAM(s) IntraMuscular once PRN Glucose <70 milliGRAM(s)/deciLiter      REVIEW OF SYSTEMS:  CONSTITUTIONAL:unable to review systems  RADIOLOGY & ADDITIONAL TESTS:      Consultant(s) Notes Reviewed:  [ ] YES  [ ] NO    PHYSICAL EXAM:  GENERAL: NAD, well-groomed, HEAD:  Atraumatic, Normocephalic  EYES:, conjunctiva and sclera clear  ENMT: Moist mucous membranes, Good dentition, No lesions  NECK: Supple, No JVD, Normal thyroid  NERVOUS SYSTEM:  Alert , EDMONDS, Restless  CHEST/LUNG: Coarse BS b/l , HEART: S1, S2; No murmurs, rubs, or gallops  ABDOMEN: Soft, Nontender, Nondistended; Bowel sounds present, peg in situ  EXTREMITIES:  2+ Peripheral Pulses, No clubbing, cyanosis, or edema  LYMPH: No lymphadenopathy noted  SKIN: No rashes or lesions    Care Collaborated Discussed with Consultants/Other Providers [y ] YES  [ ] NO

## 2018-07-21 RX ADMIN — AMLODIPINE BESYLATE 2.5 MILLIGRAM(S): 2.5 TABLET ORAL at 06:06

## 2018-07-21 RX ADMIN — Medication 3 MILLILITER(S): at 14:12

## 2018-07-21 RX ADMIN — ATORVASTATIN CALCIUM 20 MILLIGRAM(S): 80 TABLET, FILM COATED ORAL at 21:43

## 2018-07-21 RX ADMIN — QUETIAPINE FUMARATE 50 MILLIGRAM(S): 200 TABLET, FILM COATED ORAL at 17:59

## 2018-07-21 RX ADMIN — Medication 100 MILLIGRAM(S): at 06:05

## 2018-07-21 RX ADMIN — Medication 100 MILLIGRAM(S): at 17:59

## 2018-07-21 RX ADMIN — AMIKACIN SULFATE 102.4 MILLIGRAM(S): 250 INJECTION, SOLUTION INTRAMUSCULAR; INTRAVENOUS at 06:47

## 2018-07-21 RX ADMIN — Medication 1 MILLIGRAM(S): at 18:01

## 2018-07-21 RX ADMIN — HEPARIN SODIUM 5000 UNIT(S): 5000 INJECTION INTRAVENOUS; SUBCUTANEOUS at 06:05

## 2018-07-21 RX ADMIN — AMIKACIN SULFATE 102.4 MILLIGRAM(S): 250 INJECTION, SOLUTION INTRAMUSCULAR; INTRAVENOUS at 18:32

## 2018-07-21 RX ADMIN — Medication 4: at 00:08

## 2018-07-21 RX ADMIN — LEVETIRACETAM 1000 MILLIGRAM(S): 250 TABLET, FILM COATED ORAL at 17:59

## 2018-07-21 RX ADMIN — HEPARIN SODIUM 5000 UNIT(S): 5000 INJECTION INTRAVENOUS; SUBCUTANEOUS at 15:22

## 2018-07-21 RX ADMIN — Medication 1 MILLIGRAM(S): at 12:59

## 2018-07-21 RX ADMIN — Medication 2: at 23:16

## 2018-07-21 RX ADMIN — CEFEPIME 100 MILLIGRAM(S): 1 INJECTION, POWDER, FOR SOLUTION INTRAMUSCULAR; INTRAVENOUS at 21:43

## 2018-07-21 RX ADMIN — HEPARIN SODIUM 5000 UNIT(S): 5000 INJECTION INTRAVENOUS; SUBCUTANEOUS at 21:43

## 2018-07-21 RX ADMIN — LEVETIRACETAM 1000 MILLIGRAM(S): 250 TABLET, FILM COATED ORAL at 06:05

## 2018-07-21 RX ADMIN — Medication 4: at 12:35

## 2018-07-21 RX ADMIN — INSULIN GLARGINE 15 UNIT(S): 100 INJECTION, SOLUTION SUBCUTANEOUS at 22:58

## 2018-07-21 RX ADMIN — Medication 1 MILLIGRAM(S): at 23:15

## 2018-07-21 RX ADMIN — Medication 3 MILLILITER(S): at 08:15

## 2018-07-21 RX ADMIN — Medication 1 MILLIGRAM(S): at 12:35

## 2018-07-21 RX ADMIN — CEFEPIME 100 MILLIGRAM(S): 1 INJECTION, POWDER, FOR SOLUTION INTRAMUSCULAR; INTRAVENOUS at 15:22

## 2018-07-21 RX ADMIN — PANTOPRAZOLE SODIUM 40 MILLIGRAM(S): 20 TABLET, DELAYED RELEASE ORAL at 12:35

## 2018-07-21 RX ADMIN — CEFEPIME 100 MILLIGRAM(S): 1 INJECTION, POWDER, FOR SOLUTION INTRAMUSCULAR; INTRAVENOUS at 06:05

## 2018-07-21 RX ADMIN — SODIUM CHLORIDE 50 MILLILITER(S): 9 INJECTION, SOLUTION INTRAVENOUS at 15:23

## 2018-07-21 RX ADMIN — Medication 3 MILLILITER(S): at 20:43

## 2018-07-21 RX ADMIN — Medication 1 MILLIGRAM(S): at 06:06

## 2018-07-21 RX ADMIN — QUETIAPINE FUMARATE 50 MILLIGRAM(S): 200 TABLET, FILM COATED ORAL at 06:05

## 2018-07-21 RX ADMIN — Medication 3 MILLILITER(S): at 02:28

## 2018-07-21 NOTE — PROGRESS NOTE ADULT - SUBJECTIVE AND OBJECTIVE BOX
Time of Visit:  Patient seen and examined.     MEDICATIONS  (STANDING):  ALBUTerol/ipratropium for Nebulization 3 milliLiter(s) Nebulizer every 6 hours  amiKACIN  IVPB 600 milliGRAM(s) IV Intermittent every 12 hours  amLODIPine   Tablet 2.5 milliGRAM(s) Oral daily  atorvastatin 20 milliGRAM(s) Oral at bedtime  cefepime   IVPB 2000 milliGRAM(s) IV Intermittent every 8 hours  cefepime   IVPB      dextrose 5% + sodium chloride 0.45%. 1000 milliLiter(s) (50 mL/Hr) IV Continuous <Continuous>  dextrose 5% + sodium chloride 0.45%. 1000 milliLiter(s) (75 mL/Hr) IV Continuous <Continuous>  dextrose 5%. 1000 milliLiter(s) (50 mL/Hr) IV Continuous <Continuous>  dextrose 50% Injectable 12.5 Gram(s) IV Push once  dextrose 50% Injectable 25 Gram(s) IV Push once  dextrose 50% Injectable 25 Gram(s) IV Push once  heparin  Injectable 5000 Unit(s) SubCutaneous every 8 hours  insulin glargine Injectable (LANTUS) 15 Unit(s) SubCutaneous at bedtime  insulin lispro (HumaLOG) corrective regimen sliding scale   SubCutaneous every 6 hours  levETIRAcetam  Solution 1000 milliGRAM(s) Oral two times a day  LORazepam     Tablet 1 milliGRAM(s) Oral every 6 hours  LORazepam   Injectable 1 milliGRAM(s) IV Push once  metoprolol tartrate 100 milliGRAM(s) Oral two times a day  pantoprazole   Suspension 40 milliGRAM(s) Enteral Tube daily  QUEtiapine 50 milliGRAM(s) Oral every 12 hours  sodium chloride 0.9% lock flush 20 milliLiter(s) IV Push once      MEDICATIONS  (PRN):  acetaminophen    Suspension 650 milliGRAM(s) Oral every 6 hours PRN For Temp greater than 38 C (100.4 F)  dextrose 40% Gel 15 Gram(s) Oral once PRN Blood Glucose LESS THAN 70 milliGRAM(s)/deciLiter  glucagon  Injectable 1 milliGRAM(s) IntraMuscular once PRN Glucose <70 milliGRAM(s)/deciLiter  sodium chloride 0.9% lock flush 10 milliLiter(s) IV Push every 1 hour PRN After each medication administration  sodium chloride 0.9% lock flush 10 milliLiter(s) IV Push every 12 hours PRN Lumen of catheter NOT used       Medications up to date at time of exam.      PHYSICAL EXAMINATION:  Patient has no new complaints.  GENERAL: The patient is a well-developed, well-nourished, in no apparent distress.     Vital Signs Last 24 Hrs  T(C): 37 (21 Jul 2018 12:44), Max: 37 (21 Jul 2018 12:44)  T(F): 98.6 (21 Jul 2018 12:44), Max: 98.6 (21 Jul 2018 12:44)  HR: 75 (21 Jul 2018 12:44) (52 - 75)  BP: 152/113 (21 Jul 2018 12:44) (126/64 - 152/113)  BP(mean): --  RR: 20 (21 Jul 2018 12:44) (18 - 20)  SpO2: 100% (21 Jul 2018 12:44) (100% - 100%)   (if applicable)    Chest Tube (if applicable)    HEENT: Head is normocephalic and atraumatic. Extraocular muscles are intact. Mucous membranes are moist.     NECK: Supple, no palpable adenopathy.    LUNGS: Clear to auscultation, no wheezing, rales, or rhonchi.    HEART: Regular rate and rhythm without murmur.    ABDOMEN: Soft, nontender, and nondistended.  No hepatosplenomegaly is noted.    EXTREMITIES: Without any cyanosis, clubbing, rash, lesions or edema.    NEUROLOGIC: Awake, alert, oriented.     SKIN: Warm, dry, good turgor.      LABS:        MICROBIOLOGY: (if applicable)    RADIOLOGY & ADDITIONAL STUDIES:  EKG:   CXR:  ECHO:    IMPRESSION: 63y Female PAST MEDICAL & SURGICAL HISTORY:  No pertinent past medical history  Hypertension  Schizophrenia  Diabetic coma  Diabetes mellitus  No significant past surgical history  S/P percutaneous endoscopic gastrostomy (PEG) tube placement  H/O tracheostomy   p/w       Impression; 64 Y/O Female with prior mentioned multiple chronic conditions. Presented with Cough, SOB , Vomiting and RLL Opacity secondary to Aspiration PNA due to pseudomonas ( CRE) Oxygen supplementation FIO2 40% via Trach collar due to Chronic Hypoxic resp failure     Suggestion:     Continue DuoNeb Q 6 hours.  Oxygen supplementation via Trach collar.  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    Aspiration precautions. HOB elevation especially during Peg feeding.   Turn and reposition in bed.

## 2018-07-21 NOTE — PROGRESS NOTE ADULT - ASSESSMENT
61 Y/O F, non-verbal, bed bound, Trach/ PEG tube, from NH, PMhx of HTN, asthma, convulsion disorder, bipolar, DVT legs, DM, sent from NH for vomiting and cough.  In the ED, pt presents in no acute distress, and vitals WNL.  Pt is afebrile with leukocytosis of 19.  CXR concerning for RLL infiltrate likely 2/2 asp pneumonia.  Abdominal x-ray sig for non-specific gas pattern and intact PEG tube.  Pt will be admitted to medicine for suspected Aspiration pneumonia.    Plan:   - Diarrhea: persists, f/u Cdiff pcr  -Complicated Aspiration PNA/HCAP 2/2 CRE Pseudomonas & UTI - c/w iv abx til 7/28/18. contact precautions. ivf, supportive care, nebs, aspiration precautions, f/u cultures to final date.   -Hematemesis - improved  - PEG Dislodgement - improved   -Uncontrolled Type 2 DM with complications of hyperglycemia - improved. C/w lantus, moderate ihss, monitor acks  -Need for prophylactic measure: dvt/gi ppx

## 2018-07-21 NOTE — PROGRESS NOTE ADULT - SUBJECTIVE AND OBJECTIVE BOX
Patient seen and examined at bedside  diarrhea persists    HPI:  63 Y/O F, non-verbal, bed bound, Trach/ PEG tube, from NH, PMhx of HTN, asthma, convulsion disorder, bipolar, DVT legs, DM 2, sent from NH for vomiting and cough.  Pt unable to provide history.  History given by sister who states pt was vomiting coffee ground emesis yesterday.  Pt had 2 episodes of reported vomiting, and has not had an episode since.  Pt also developed cough with clear sputum.  Sister states pt is combative at baseline, and her current behavior is baseline.  No reported fever, discomfort, diarrhea, or SOB. (14 Jul 2018 18:51)      PAST MEDICAL & SURGICAL HISTORY:  No pertinent past medical history  Hypertension  Schizophrenia  Diabetic coma  Diabetes mellitus  No significant past surgical history  S/P percutaneous endoscopic gastrostomy (PEG) tube placement  H/O tracheostomy      angiotensin converting enzyme inhibitors (Unknown)       MEDICATIONS  (STANDING):  ALBUTerol/ipratropium for Nebulization 3 milliLiter(s) Nebulizer every 6 hours  amiKACIN  IVPB 600 milliGRAM(s) IV Intermittent every 12 hours  amLODIPine   Tablet 2.5 milliGRAM(s) Oral daily  atorvastatin 20 milliGRAM(s) Oral at bedtime  cefepime   IVPB 2000 milliGRAM(s) IV Intermittent every 8 hours  cefepime   IVPB      dextrose 5% + sodium chloride 0.45%. 1000 milliLiter(s) (50 mL/Hr) IV Continuous <Continuous>  dextrose 5% + sodium chloride 0.45%. 1000 milliLiter(s) (75 mL/Hr) IV Continuous <Continuous>  dextrose 5%. 1000 milliLiter(s) (50 mL/Hr) IV Continuous <Continuous>  dextrose 50% Injectable 12.5 Gram(s) IV Push once  dextrose 50% Injectable 25 Gram(s) IV Push once  dextrose 50% Injectable 25 Gram(s) IV Push once  heparin  Injectable 5000 Unit(s) SubCutaneous every 8 hours  insulin glargine Injectable (LANTUS) 15 Unit(s) SubCutaneous at bedtime  insulin lispro (HumaLOG) corrective regimen sliding scale   SubCutaneous every 6 hours  levETIRAcetam  Solution 1000 milliGRAM(s) Oral two times a day  LORazepam     Tablet 1 milliGRAM(s) Oral every 6 hours  LORazepam   Injectable 1 milliGRAM(s) IV Push once  metoprolol tartrate 100 milliGRAM(s) Oral two times a day  pantoprazole   Suspension 40 milliGRAM(s) Enteral Tube daily  QUEtiapine 50 milliGRAM(s) Oral every 12 hours    MEDICATIONS  (PRN):  acetaminophen    Suspension 650 milliGRAM(s) Oral every 6 hours PRN For Temp greater than 38 C (100.4 F)  dextrose 40% Gel 15 Gram(s) Oral once PRN Blood Glucose LESS THAN 70 milliGRAM(s)/deciLiter  glucagon  Injectable 1 milliGRAM(s) IntraMuscular once PRN Glucose <70 milliGRAM(s)/deciLiter      REVIEW OF SYSTEMS:  limited 2/2 mental status	    Vital Signs Last 24 Hrs  T(C): 36.7 (21 Jul 2018 04:45), Max: 36.8 (20 Jul 2018 20:21)  T(F): 98 (21 Jul 2018 04:45), Max: 98.2 (20 Jul 2018 20:21)  HR: 61 (21 Jul 2018 04:45) (52 - 75)  BP: 126/64 (21 Jul 2018 04:45) (112/56 - 145/82)  BP(mean): --  RR: 18 (21 Jul 2018 04:45) (18 - 20)  SpO2: 100% (21 Jul 2018 04:45) (100% - 100%)    PHYSICAL EXAMINATION:   Constitutional: NAD  HEENT: AT  Neck:  Supple  Respiratory:  trach intact. adequate airflow b/l. Not using accessory muscles of respiration.  Cardiovascular:  S1 & S2 intact, no R/G, 2+ radial pulses b/l  Gastrointestinal: rectal tube with diarrhea. NT, ND, normoactive b.s., no organomegaly/RT/rigidity  Extremities: WWP  Neurological:  awake.  Crude sensation intact.     Labs and imaging reviewed    LABS:                        12.6   6.1   )-----------( 153      ( 19 Jul 2018 07:30 )             40.2     07-18    141  |  107  |  6<L>  ----------------------------<  202<H>  3.6   |  27  |  0.78    Ca    8.2<L>      18 Jul 2018 13:06              CAPILLARY BLOOD GLUCOSE      POCT Blood Glucose.: 125 mg/dL (20 Jul 2018 05:25)  POCT Blood Glucose.: 133 mg/dL (19 Jul 2018 23:52)  POCT Blood Glucose.: 124 mg/dL (19 Jul 2018 21:55)  POCT Blood Glucose.: 90 mg/dL (19 Jul 2018 16:53)  POCT Blood Glucose.: 98 mg/dL (19 Jul 2018 11:55)              RADIOLOGY & ADDITIONAL STUDIES:

## 2018-07-21 NOTE — PROGRESS NOTE ADULT - ASSESSMENT
A 63 yo Female with multiple co-morbidities including non-verbal, bed bound, Trach/ PEG tube, sent in to the ER from NH for evaluation of shortness of breath and cough after vomiting. On admission, she has no fever or chills but Leukocytosis. The CXR  shows No consolidation but CT chest shows RLL opacity. She has  started on Vancomycin and Zosyn and The ID consult  requested to assist with further evaluation and antibiotic management.     # Aspiration Pneumonia - Pseudomonas ( CRE)  # Hematemesis  # Procalcitonin level elevated    Would recommend:    1.  Continue  Bronchial/Oral  suctioning  2. Continue Amikacin and Cefepime until 7/28/18  3. Aspiration precaution  4. Contact Isolation     d/w nursing staff     will follow the patient with you A 63 yo Female with multiple co-morbidities including non-verbal, bed bound, Trach/ PEG tube, sent in to the ER from NH for evaluation of shortness of breath and cough after vomiting. On admission, she has no fever or chills but Leukocytosis. The CXR  shows No consolidation but CT chest shows RLL opacity. She has  started on Vancomycin and Zosyn and The ID consult  requested to assist with further evaluation and antibiotic management.     # Aspiration Pneumonia - Pseudomonas ( CRE)  # Hematemesis  # Procalcitonin level elevated    Would recommend:    1. Aspiration precaution  2. Continue  Bronchial/Oral  suctioning  3. Continue Amikacin and Cefepime until 7/28/18  4. Contact Isolation     d/w nursing staff     will follow the patient with you

## 2018-07-21 NOTE — PROGRESS NOTE ADULT - SUBJECTIVE AND OBJECTIVE BOX
Patient is seen and examined at the bed side, is afebrile.  No new events.       REVIEW OF SYSTEMS: Unable to obtained since nonverbal      ALLERGIES: ACIs      Vital Signs Last 24 Hrs  T(C): 37 (21 Jul 2018 12:44), Max: 37 (21 Jul 2018 12:44)  T(F): 98.6 (21 Jul 2018 12:44), Max: 98.6 (21 Jul 2018 12:44)  HR: 75 (21 Jul 2018 12:44) (52 - 75)  BP: 152/113 (21 Jul 2018 12:44) (126/64 - 152/113)  BP(mean): --  RR: 20 (21 Jul 2018 12:44) (18 - 20)  SpO2: 100% (21 Jul 2018 12:44) (100% - 100%)      PHYSICAL EXAM:  GENERAL: Not in acute distress, having involuntary movements of face  HEENT: Trach in placed  CVS: S1 and s2 present  RESP: Air entry B/L  GI: Abdomen nondistended, NT and PEG in placed  EXT: No pedal edema, hands contracted   CNS: Awake but not alert        LABS: NO NEW labs                            12.6   6.1   )-----------( 153      ( 19 Jul 2018 07:30 )             40.2                         11.8   15.4  )-----------( 170      ( 16 Jul 2018 07:12 )             38.1                           12.2   23.0  )-----------( 180      ( 15 Jul 2018 12:45 )             38.7           07-18    141  |  107  |  6<L>  ----------------------------<  202<H>  3.6   |  27  |  0.78    Ca    8.2<L>      18 Jul 2018 13:06      TPro  7.4  /  Alb  3.4<L>  /  TBili  0.9  /  DBili  x   /  AST  26  /  ALT  44  /  AlkPhos  108  07-14        CAPILLARY BLOOD GLUCOSE      POCT Blood Glucose.: 211 mg/dL (15 Jul 2018 17:22)  POCT Blood Glucose.: 123 mg/dL (15 Jul 2018 11:44)  POCT Blood Glucose.: 353 mg/dL (15 Jul 2018 07:56)  POCT Blood Glucose.: 254 mg/dL (15 Jul 2018 00:42)  POCT Blood Glucose.: 230 mg/dL (14 Jul 2018 22:22)    MEDICATIONS  (STANDING):  ALBUTerol/ipratropium for Nebulization 3 milliLiter(s) Nebulizer every 6 hours  amiKACIN  IVPB 600 milliGRAM(s) IV Intermittent every 12 hours  amLODIPine   Tablet 2.5 milliGRAM(s) Oral daily  atorvastatin 20 milliGRAM(s) Oral at bedtime  cefepime   IVPB 2000 milliGRAM(s) IV Intermittent every 8 hours  cefepime   IVPB      dextrose 5% + sodium chloride 0.45%. 1000 milliLiter(s) (50 mL/Hr) IV Continuous <Continuous>  dextrose 5% + sodium chloride 0.45%. 1000 milliLiter(s) (75 mL/Hr) IV Continuous <Continuous>  dextrose 5%. 1000 milliLiter(s) (50 mL/Hr) IV Continuous <Continuous>  dextrose 50% Injectable 12.5 Gram(s) IV Push once  dextrose 50% Injectable 25 Gram(s) IV Push once  dextrose 50% Injectable 25 Gram(s) IV Push once  heparin  Injectable 5000 Unit(s) SubCutaneous every 8 hours  insulin glargine Injectable (LANTUS) 15 Unit(s) SubCutaneous at bedtime  insulin lispro (HumaLOG) corrective regimen sliding scale   SubCutaneous every 6 hours  levETIRAcetam  Solution 1000 milliGRAM(s) Oral two times a day  LORazepam     Tablet 1 milliGRAM(s) Oral every 6 hours  LORazepam   Injectable 1 milliGRAM(s) IV Push once  metoprolol tartrate 100 milliGRAM(s) Oral two times a day  pantoprazole   Suspension 40 milliGRAM(s) Enteral Tube daily  QUEtiapine 50 milliGRAM(s) Oral every 12 hours  sodium chloride 0.9% lock flush 20 milliLiter(s) IV Push once    MEDICATIONS  (PRN):  acetaminophen    Suspension 650 milliGRAM(s) Oral every 6 hours PRN For Temp greater than 38 C (100.4 F)  dextrose 40% Gel 15 Gram(s) Oral once PRN Blood Glucose LESS THAN 70 milliGRAM(s)/deciLiter  glucagon  Injectable 1 milliGRAM(s) IntraMuscular once PRN Glucose <70 milliGRAM(s)/deciLiter  sodium chloride 0.9% lock flush 10 milliLiter(s) IV Push every 1 hour PRN After each medication administration  sodium chloride 0.9% lock flush 10 milliLiter(s) IV Push every 12 hours PRN Lumen of catheter NOT used          RADIOLOGY & ADDITIONAL TESTS:    7/15/18: CT Chest No Cont (07.15.18 @ 11:14) 1. Mid to distal esophageal wall thickening is suggested. Correlate with endoscopic evaluation.  2. A PEG tube is identified, the distal aspect of which is identified in the region of the pylorus/duodenal bulb; correlate clinically as to appropriate positioning.  3. There is opacity identified within the right lower lobe lung parenchyma, likely representative of a combination of both atelectasis and consolidation. Small right pleural effusion.    7/14/18: Xray Chest 1 View AP/PA (07.14.18 @ 15:34)No consolidation. Platelike atelectasis in the right lower lobe.        MICROBIOLOGY DATA:    Culture - Sputum . (07.16.18 @ 10:36)    -  Gentamicin: R >8    -  Imipenem: R >8    -  Levofloxacin: R >4    -  Meropenem: R >8    -  Piperacillin/Tazobactam: R >64    -  Tobramycin: R >8    -  Amikacin: S 16    Gram Stain:   Moderate polymorphonuclear leukocytes seen per low power field  Moderate Squamous epithelial cells seen per low power field  Moderate Gram Negative Rods seen per oil power field  Few Gram Positive Cocci seen per oil power field    -  Aztreonam: R >16    -  Cefepime: I 16    -  Ceftazidime: R >16    -  Ciprofloxacin: R >2    Specimen Source: .Sputum Sputum trap    Culture Results:   Three or more mixed gram negative rods including  Moderate Pseudomonas aeruginosa (Carbapenem Resistant)    Organism Identification: Pseudomonas aeruginosa (Carbapenem Resistant)    Organism: Pseudomonas aeruginosa (Carbapenem Resistant)    Method Type: ESTIVEN        MRSA/MSSA PCR (07.16.18 @ 10:15)    MRSA PCR Result.: NotDetec: MRSA/MSSA PCR assay is a qualitative in vitro diagnostic test for the  direct detection and differentiation of methicillin-resistant  Staphylococcus aureus (MRSA) from Staphylococcus aureus (SA). The assay  detects DNA from nasal swabs in patients atrisk for nasal colonization.  It is not intended to diagnose MRSA or SA infections nor guide or monitor  treatment for MRSA/SA infections. A negative result does not preclude  nasal colonization. The assay is FDA-approved and its performance has  been established by [employer] [place] and the [facility], [place], [place].    Staph Aureus PCR Result: [facility]    Urine Microscopic-Add On (NC) (07.15.18 @ 23:04)    Bacteria: Many /HPF    Epithelial Cells: Many /HPF    Red Blood Cell - Urine: 5-10 /HPF    White Blood Cell - Urine: 11-25 /HPF Patient is seen and examined at the bed side, is afebrile.  She has having loose stool.       REVIEW OF SYSTEMS: Unable to obtained since nonverbal      ALLERGIES: ACIs      Vital Signs Last 24 Hrs  T(C): 37 (21 Jul 2018 12:44), Max: 37 (21 Jul 2018 12:44)  T(F): 98.6 (21 Jul 2018 12:44), Max: 98.6 (21 Jul 2018 12:44)  HR: 75 (21 Jul 2018 12:44) (52 - 75)  BP: 152/113 (21 Jul 2018 12:44) (126/64 - 152/113)  BP(mean): --  RR: 20 (21 Jul 2018 12:44) (18 - 20)  SpO2: 100% (21 Jul 2018 12:44) (100% - 100%)        PHYSICAL EXAM:  GENERAL: Not in acute distress, having involuntary movements of face  HEENT: Trach in placed  CVS: S1 and s2 present  RESP: Air entry B/L  GI: Abdomen nondistended, NT and PEG in placed  EXT: No pedal edema, hands contracted   CNS: Awake but not alert        LABS: NO NEW labs                            12.6   6.1   )-----------( 153      ( 19 Jul 2018 07:30 )             40.2                         11.8   15.4  )-----------( 170      ( 16 Jul 2018 07:12 )             38.1                           12.2   23.0  )-----------( 180      ( 15 Jul 2018 12:45 )             38.7           07-18    141  |  107  |  6<L>  ----------------------------<  202<H>  3.6   |  27  |  0.78    Ca    8.2<L>      18 Jul 2018 13:06      TPro  7.4  /  Alb  3.4<L>  /  TBili  0.9  /  DBili  x   /  AST  26  /  ALT  44  /  AlkPhos  108  07-14        CAPILLARY BLOOD GLUCOSE      POCT Blood Glucose.: 211 mg/dL (15 Jul 2018 17:22)  POCT Blood Glucose.: 123 mg/dL (15 Jul 2018 11:44)  POCT Blood Glucose.: 353 mg/dL (15 Jul 2018 07:56)  POCT Blood Glucose.: 254 mg/dL (15 Jul 2018 00:42)  POCT Blood Glucose.: 230 mg/dL (14 Jul 2018 22:22)    MEDICATIONS  (STANDING):  ALBUTerol/ipratropium for Nebulization 3 milliLiter(s) Nebulizer every 6 hours  amiKACIN  IVPB 600 milliGRAM(s) IV Intermittent every 12 hours  amLODIPine   Tablet 2.5 milliGRAM(s) Oral daily  atorvastatin 20 milliGRAM(s) Oral at bedtime  cefepime   IVPB 2000 milliGRAM(s) IV Intermittent every 8 hours  cefepime   IVPB      dextrose 5% + sodium chloride 0.45%. 1000 milliLiter(s) (50 mL/Hr) IV Continuous <Continuous>  dextrose 5% + sodium chloride 0.45%. 1000 milliLiter(s) (75 mL/Hr) IV Continuous <Continuous>  dextrose 5%. 1000 milliLiter(s) (50 mL/Hr) IV Continuous <Continuous>  dextrose 50% Injectable 12.5 Gram(s) IV Push once  dextrose 50% Injectable 25 Gram(s) IV Push once  dextrose 50% Injectable 25 Gram(s) IV Push once  heparin  Injectable 5000 Unit(s) SubCutaneous every 8 hours  insulin glargine Injectable (LANTUS) 15 Unit(s) SubCutaneous at bedtime  insulin lispro (HumaLOG) corrective regimen sliding scale   SubCutaneous every 6 hours  levETIRAcetam  Solution 1000 milliGRAM(s) Oral two times a day  LORazepam     Tablet 1 milliGRAM(s) Oral every 6 hours  LORazepam   Injectable 1 milliGRAM(s) IV Push once  metoprolol tartrate 100 milliGRAM(s) Oral two times a day  pantoprazole   Suspension 40 milliGRAM(s) Enteral Tube daily  QUEtiapine 50 milliGRAM(s) Oral every 12 hours  sodium chloride 0.9% lock flush 20 milliLiter(s) IV Push once    MEDICATIONS  (PRN):  acetaminophen    Suspension 650 milliGRAM(s) Oral every 6 hours PRN For Temp greater than 38 C (100.4 F)  dextrose 40% Gel 15 Gram(s) Oral once PRN Blood Glucose LESS THAN 70 milliGRAM(s)/deciLiter  glucagon  Injectable 1 milliGRAM(s) IntraMuscular once PRN Glucose <70 milliGRAM(s)/deciLiter  sodium chloride 0.9% lock flush 10 milliLiter(s) IV Push every 1 hour PRN After each medication administration  sodium chloride 0.9% lock flush 10 milliLiter(s) IV Push every 12 hours PRN Lumen of catheter NOT used          RADIOLOGY & ADDITIONAL TESTS:    7/15/18: CT Chest No Cont (07.15.18 @ 11:14) 1. Mid to distal esophageal wall thickening is suggested. Correlate with endoscopic evaluation.  2. A PEG tube is identified, the distal aspect of which is identified in the region of the pylorus/duodenal bulb; correlate clinically as to appropriate positioning.  3. There is opacity identified within the right lower lobe lung parenchyma, likely representative of a combination of both atelectasis and consolidation. Small right pleural effusion.    7/14/18: Xray Chest 1 View AP/PA (07.14.18 @ 15:34)No consolidation. Platelike atelectasis in the right lower lobe.        MICROBIOLOGY DATA:    Culture - Sputum . (07.16.18 @ 10:36)    -  Gentamicin: R >8    -  Imipenem: R >8    -  Levofloxacin: R >4    -  Meropenem: R >8    -  Piperacillin/Tazobactam: R >64    -  Tobramycin: R >8    -  Amikacin: S 16    Gram Stain:   Moderate polymorphonuclear leukocytes seen per low power field  Moderate Squamous epithelial cells seen per low power field  Moderate Gram Negative Rods seen per oil power field  Few Gram Positive Cocci seen per oil power field    -  Aztreonam: R >16    -  Cefepime: I 16    -  Ceftazidime: R >16    -  Ciprofloxacin: R >2    Specimen Source: .Sputum Sputum trap    Culture Results:   Three or more mixed gram negative rods including  Moderate Pseudomonas aeruginosa (Carbapenem Resistant)    Organism Identification: Pseudomonas aeruginosa (Carbapenem Resistant)    Organism: Pseudomonas aeruginosa (Carbapenem Resistant)    Method Type: ESTIVEN        MRSA/MSSA PCR (07.16.18 @ 10:15)    MRSA PCR Result.: NotDetec: MRSA/MSSA PCR assay is a qualitative in vitro diagnostic test for the  direct detection and differentiation of methicillin-resistant  Staphylococcus aureus (MRSA) from Staphylococcus aureus (SA). The assay  detects DNA from nasal swabs in patients atrisk for nasal colonization.  It is not intended to diagnose MRSA or SA infections nor guide or monitor  treatment for MRSA/SA infections. A negative result does not preclude  nasal colonization. The assay is FDA-approved and its performance has  been established by [employer], [place] and the [place], [place].    Staph Aureus PCR Result: NotDete    Urine Microscopic-Add On (NC) (07.15.18 @ 23:04)    Bacteria: Many /HPF    Epithelial Cells: Many /HPF    Red Blood Cell - Urine: 5-10 /HPF    White Blood Cell - Urine: 11-25 /HPF

## 2018-07-22 LAB
ANION GAP SERPL CALC-SCNC: 9 MMOL/L — SIGNIFICANT CHANGE UP (ref 5–17)
BUN SERPL-MCNC: 4 MG/DL — LOW (ref 7–18)
CALCIUM SERPL-MCNC: 9.1 MG/DL — SIGNIFICANT CHANGE UP (ref 8.4–10.5)
CHLORIDE SERPL-SCNC: 108 MMOL/L — SIGNIFICANT CHANGE UP (ref 96–108)
CO2 SERPL-SCNC: 27 MMOL/L — SIGNIFICANT CHANGE UP (ref 22–31)
CREAT SERPL-MCNC: 0.74 MG/DL — SIGNIFICANT CHANGE UP (ref 0.5–1.3)
GLUCOSE SERPL-MCNC: 173 MG/DL — HIGH (ref 70–99)
HCT VFR BLD CALC: 37.2 % — SIGNIFICANT CHANGE UP (ref 34.5–45)
HGB BLD-MCNC: 11.6 G/DL — SIGNIFICANT CHANGE UP (ref 11.5–15.5)
MAGNESIUM SERPL-MCNC: 1.7 MG/DL — SIGNIFICANT CHANGE UP (ref 1.6–2.6)
MCHC RBC-ENTMCNC: 25.3 PG — LOW (ref 27–34)
MCHC RBC-ENTMCNC: 31.2 GM/DL — LOW (ref 32–36)
MCV RBC AUTO: 81.1 FL — SIGNIFICANT CHANGE UP (ref 80–100)
PHOSPHATE SERPL-MCNC: 3 MG/DL — SIGNIFICANT CHANGE UP (ref 2.5–4.5)
PLATELET # BLD AUTO: 146 K/UL — LOW (ref 150–400)
POTASSIUM SERPL-MCNC: 4 MMOL/L — SIGNIFICANT CHANGE UP (ref 3.5–5.3)
POTASSIUM SERPL-SCNC: 4 MMOL/L — SIGNIFICANT CHANGE UP (ref 3.5–5.3)
RBC # BLD: 4.59 M/UL — SIGNIFICANT CHANGE UP (ref 3.8–5.2)
RBC # FLD: 14 % — SIGNIFICANT CHANGE UP (ref 10.3–14.5)
SODIUM SERPL-SCNC: 144 MMOL/L — SIGNIFICANT CHANGE UP (ref 135–145)
WBC # BLD: 6 K/UL — SIGNIFICANT CHANGE UP (ref 3.8–10.5)
WBC # FLD AUTO: 6 K/UL — SIGNIFICANT CHANGE UP (ref 3.8–10.5)

## 2018-07-22 RX ADMIN — Medication 3 MILLILITER(S): at 14:54

## 2018-07-22 RX ADMIN — ATORVASTATIN CALCIUM 20 MILLIGRAM(S): 80 TABLET, FILM COATED ORAL at 22:29

## 2018-07-22 RX ADMIN — Medication 3 MILLILITER(S): at 08:44

## 2018-07-22 RX ADMIN — Medication 2: at 06:37

## 2018-07-22 RX ADMIN — HEPARIN SODIUM 5000 UNIT(S): 5000 INJECTION INTRAVENOUS; SUBCUTANEOUS at 05:29

## 2018-07-22 RX ADMIN — LEVETIRACETAM 1000 MILLIGRAM(S): 250 TABLET, FILM COATED ORAL at 17:31

## 2018-07-22 RX ADMIN — AMIKACIN SULFATE 102.4 MILLIGRAM(S): 250 INJECTION, SOLUTION INTRAMUSCULAR; INTRAVENOUS at 06:39

## 2018-07-22 RX ADMIN — Medication 1 MILLIGRAM(S): at 12:36

## 2018-07-22 RX ADMIN — Medication 2: at 12:32

## 2018-07-22 RX ADMIN — Medication 1 MILLIGRAM(S): at 05:27

## 2018-07-22 RX ADMIN — CEFEPIME 100 MILLIGRAM(S): 1 INJECTION, POWDER, FOR SOLUTION INTRAMUSCULAR; INTRAVENOUS at 05:28

## 2018-07-22 RX ADMIN — HEPARIN SODIUM 5000 UNIT(S): 5000 INJECTION INTRAVENOUS; SUBCUTANEOUS at 22:29

## 2018-07-22 RX ADMIN — Medication 100 MILLIGRAM(S): at 17:32

## 2018-07-22 RX ADMIN — Medication 3 MILLILITER(S): at 02:18

## 2018-07-22 RX ADMIN — QUETIAPINE FUMARATE 50 MILLIGRAM(S): 200 TABLET, FILM COATED ORAL at 17:32

## 2018-07-22 RX ADMIN — LEVETIRACETAM 1000 MILLIGRAM(S): 250 TABLET, FILM COATED ORAL at 05:29

## 2018-07-22 RX ADMIN — Medication 1 MILLIGRAM(S): at 17:31

## 2018-07-22 RX ADMIN — CEFEPIME 100 MILLIGRAM(S): 1 INJECTION, POWDER, FOR SOLUTION INTRAMUSCULAR; INTRAVENOUS at 14:37

## 2018-07-22 RX ADMIN — CEFEPIME 100 MILLIGRAM(S): 1 INJECTION, POWDER, FOR SOLUTION INTRAMUSCULAR; INTRAVENOUS at 22:29

## 2018-07-22 RX ADMIN — AMLODIPINE BESYLATE 2.5 MILLIGRAM(S): 2.5 TABLET ORAL at 05:28

## 2018-07-22 RX ADMIN — AMIKACIN SULFATE 102.4 MILLIGRAM(S): 250 INJECTION, SOLUTION INTRAMUSCULAR; INTRAVENOUS at 18:36

## 2018-07-22 RX ADMIN — QUETIAPINE FUMARATE 50 MILLIGRAM(S): 200 TABLET, FILM COATED ORAL at 05:28

## 2018-07-22 RX ADMIN — Medication 100 MILLIGRAM(S): at 05:28

## 2018-07-22 RX ADMIN — PANTOPRAZOLE SODIUM 40 MILLIGRAM(S): 20 TABLET, DELAYED RELEASE ORAL at 12:33

## 2018-07-22 RX ADMIN — HEPARIN SODIUM 5000 UNIT(S): 5000 INJECTION INTRAVENOUS; SUBCUTANEOUS at 14:37

## 2018-07-22 RX ADMIN — Medication 3 MILLILITER(S): at 20:12

## 2018-07-22 RX ADMIN — INSULIN GLARGINE 15 UNIT(S): 100 INJECTION, SOLUTION SUBCUTANEOUS at 22:30

## 2018-07-22 RX ADMIN — Medication 2: at 17:32

## 2018-07-22 NOTE — PROGRESS NOTE ADULT - SUBJECTIVE AND OBJECTIVE BOX
Time of Visit:  Patient seen and examined.     MEDICATIONS  (STANDING):  ALBUTerol/ipratropium for Nebulization 3 milliLiter(s) Nebulizer every 6 hours  amiKACIN  IVPB 600 milliGRAM(s) IV Intermittent every 12 hours  amLODIPine   Tablet 2.5 milliGRAM(s) Oral daily  atorvastatin 20 milliGRAM(s) Oral at bedtime  cefepime   IVPB 2000 milliGRAM(s) IV Intermittent every 8 hours  cefepime   IVPB      dextrose 5% + sodium chloride 0.45%. 1000 milliLiter(s) (50 mL/Hr) IV Continuous <Continuous>  dextrose 5% + sodium chloride 0.45%. 1000 milliLiter(s) (75 mL/Hr) IV Continuous <Continuous>  dextrose 5%. 1000 milliLiter(s) (50 mL/Hr) IV Continuous <Continuous>  dextrose 50% Injectable 12.5 Gram(s) IV Push once  dextrose 50% Injectable 25 Gram(s) IV Push once  dextrose 50% Injectable 25 Gram(s) IV Push once  heparin  Injectable 5000 Unit(s) SubCutaneous every 8 hours  insulin glargine Injectable (LANTUS) 15 Unit(s) SubCutaneous at bedtime  insulin lispro (HumaLOG) corrective regimen sliding scale   SubCutaneous every 6 hours  levETIRAcetam  Solution 1000 milliGRAM(s) Oral two times a day  LORazepam     Tablet 1 milliGRAM(s) Oral every 6 hours  LORazepam   Injectable 1 milliGRAM(s) IV Push once  metoprolol tartrate 100 milliGRAM(s) Oral two times a day  pantoprazole   Suspension 40 milliGRAM(s) Enteral Tube daily  QUEtiapine 50 milliGRAM(s) Oral every 12 hours  sodium chloride 0.9% lock flush 20 milliLiter(s) IV Push once      MEDICATIONS  (PRN):  acetaminophen    Suspension 650 milliGRAM(s) Oral every 6 hours PRN For Temp greater than 38 C (100.4 F)  dextrose 40% Gel 15 Gram(s) Oral once PRN Blood Glucose LESS THAN 70 milliGRAM(s)/deciLiter  glucagon  Injectable 1 milliGRAM(s) IntraMuscular once PRN Glucose <70 milliGRAM(s)/deciLiter  sodium chloride 0.9% lock flush 10 milliLiter(s) IV Push every 1 hour PRN After each medication administration  sodium chloride 0.9% lock flush 10 milliLiter(s) IV Push every 12 hours PRN Lumen of catheter NOT used       Medications up to date at time of exam.      PHYSICAL EXAMINATION:  Patient has no new complaints.  GENERAL: The patient is a well-developed, well-nourished, in no apparent distress.     Vital Signs Last 24 Hrs  T(C): 36.4 (22 Jul 2018 13:53), Max: 36.9 (21 Jul 2018 20:58)  T(F): 97.6 (22 Jul 2018 13:53), Max: 98.5 (21 Jul 2018 20:58)  HR: 53 (22 Jul 2018 13:53) (53 - 74)  BP: 107/50 (22 Jul 2018 13:53) (107/50 - 145/59)  BP(mean): --  RR: 18 (22 Jul 2018 13:53) (18 - 20)  SpO2: 100% (22 Jul 2018 13:53) (96% - 100%)   (if applicable)    Chest Tube (if applicable)    HEENT: Head is normocephalic and atraumatic. Extraocular muscles are intact. Mucous membranes are moist.     NECK: + trach    LUNGS: Clear to auscultation, no wheezing, rales, or rhonchi.    HEART: Regular rate and rhythm without murmur.    ABDOMEN: Soft, +PEG    EXTREMITIES: Without any cyanosis, clubbing, rash, lesions or edema.    NEUROLOGIC: Awake, alert, oriented.     SKIN: Warm, dry, good turgor.      LABS:                        11.6   6.0   )-----------( 146      ( 22 Jul 2018 07:06 )             37.2     07-22    144  |  108  |  4<L>  ----------------------------<  173<H>  4.0   |  27  |  0.74    Ca    9.1      22 Jul 2018 07:06  Phos  3.0     07-22  Mg     1.7     07-22                          MICROBIOLOGY: (if applicable)    RADIOLOGY & ADDITIONAL STUDIES:  EKG:   CXR:  ECHO:    IMPRESSION: 63y Female PAST MEDICAL & SURGICAL HISTORY:  No pertinent past medical history  Hypertension  Schizophrenia  Diabetic coma  Diabetes mellitus  No significant past surgical history  S/P percutaneous endoscopic gastrostomy (PEG) tube placement  H/O tracheostomy   p/w     Impression; 64 Y/O Female with prior mentioned multiple chronic conditions. Presented with Cough, SOB and Vomiting. Leukocytosis from WBC 23.1 to WBC 15.4 with RLL Opacity secondary to Aspiration PNA due to pseudomonas ( CRE) Oxygen supplementation FIO2 40% via Trach collar due to Chronic Hypoxic respiratory failure.      Suggestion:     Continue DuoNeb Q 6 hours.  Oxygen supplementation via Trach collar.  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    Aspiration precautions. HOB elevation especially during Peg feeding.   Turn and reposition in bed.

## 2018-07-23 RX ADMIN — Medication 1 MILLIGRAM(S): at 00:10

## 2018-07-23 RX ADMIN — Medication 3 MILLILITER(S): at 02:12

## 2018-07-23 RX ADMIN — HEPARIN SODIUM 5000 UNIT(S): 5000 INJECTION INTRAVENOUS; SUBCUTANEOUS at 06:25

## 2018-07-23 RX ADMIN — Medication 4: at 00:10

## 2018-07-23 RX ADMIN — Medication 1 MILLIGRAM(S): at 09:06

## 2018-07-23 RX ADMIN — CEFEPIME 100 MILLIGRAM(S): 1 INJECTION, POWDER, FOR SOLUTION INTRAMUSCULAR; INTRAVENOUS at 06:24

## 2018-07-23 RX ADMIN — Medication 1 MILLIGRAM(S): at 06:26

## 2018-07-23 RX ADMIN — ATORVASTATIN CALCIUM 20 MILLIGRAM(S): 80 TABLET, FILM COATED ORAL at 22:08

## 2018-07-23 RX ADMIN — Medication 3 MILLILITER(S): at 09:01

## 2018-07-23 RX ADMIN — HEPARIN SODIUM 5000 UNIT(S): 5000 INJECTION INTRAVENOUS; SUBCUTANEOUS at 22:08

## 2018-07-23 RX ADMIN — Medication 1 MILLIGRAM(S): at 23:19

## 2018-07-23 RX ADMIN — QUETIAPINE FUMARATE 50 MILLIGRAM(S): 200 TABLET, FILM COATED ORAL at 17:08

## 2018-07-23 RX ADMIN — Medication 100 MILLIGRAM(S): at 17:08

## 2018-07-23 RX ADMIN — INSULIN GLARGINE 15 UNIT(S): 100 INJECTION, SOLUTION SUBCUTANEOUS at 23:18

## 2018-07-23 RX ADMIN — CEFEPIME 100 MILLIGRAM(S): 1 INJECTION, POWDER, FOR SOLUTION INTRAMUSCULAR; INTRAVENOUS at 22:08

## 2018-07-23 RX ADMIN — Medication 1 MILLIGRAM(S): at 17:10

## 2018-07-23 RX ADMIN — AMIKACIN SULFATE 102.4 MILLIGRAM(S): 250 INJECTION, SOLUTION INTRAMUSCULAR; INTRAVENOUS at 06:24

## 2018-07-23 RX ADMIN — Medication 2: at 12:35

## 2018-07-23 RX ADMIN — LEVETIRACETAM 1000 MILLIGRAM(S): 250 TABLET, FILM COATED ORAL at 17:08

## 2018-07-23 RX ADMIN — Medication 3 MILLILITER(S): at 14:24

## 2018-07-23 RX ADMIN — AMLODIPINE BESYLATE 2.5 MILLIGRAM(S): 2.5 TABLET ORAL at 06:25

## 2018-07-23 RX ADMIN — QUETIAPINE FUMARATE 50 MILLIGRAM(S): 200 TABLET, FILM COATED ORAL at 06:25

## 2018-07-23 RX ADMIN — AMIKACIN SULFATE 102.4 MILLIGRAM(S): 250 INJECTION, SOLUTION INTRAMUSCULAR; INTRAVENOUS at 23:19

## 2018-07-23 RX ADMIN — HEPARIN SODIUM 5000 UNIT(S): 5000 INJECTION INTRAVENOUS; SUBCUTANEOUS at 13:00

## 2018-07-23 RX ADMIN — Medication 1 MILLIGRAM(S): at 11:43

## 2018-07-23 RX ADMIN — LEVETIRACETAM 1000 MILLIGRAM(S): 250 TABLET, FILM COATED ORAL at 06:26

## 2018-07-23 RX ADMIN — Medication 4: at 23:19

## 2018-07-23 RX ADMIN — CEFEPIME 100 MILLIGRAM(S): 1 INJECTION, POWDER, FOR SOLUTION INTRAMUSCULAR; INTRAVENOUS at 13:00

## 2018-07-23 RX ADMIN — Medication 3 MILLILITER(S): at 20:02

## 2018-07-23 RX ADMIN — Medication 100 MILLIGRAM(S): at 06:25

## 2018-07-23 RX ADMIN — PANTOPRAZOLE SODIUM 40 MILLIGRAM(S): 20 TABLET, DELAYED RELEASE ORAL at 11:44

## 2018-07-23 NOTE — PROGRESS NOTE ADULT - ASSESSMENT
61 Y/O F, non-verbal, bed bound, Trach/ PEG tube, from NH, PMhx of HTN, asthma, convulsion disorder, bipolar, DVT legs, DM, sent from NH for vomiting and cough.  In the ED, pt presents in no acute distress, and vitals WNL.  Pt is afebrile with leukocytosis of 19.  CXR concerning for RLL infiltrate likely 2/2 asp pneumonia.  Abdominal x-ray sig for non-specific gas pattern and intact PEG tube.  Pt will be admitted to medicine for suspected Aspiration pneumonia.    Plan:   - Diarrhea: persists, f/u Cdiff pcr  - Complicated Aspiration PNA/HCAP 2/2 CRE Pseudomonas & UTI - c/w iv abx til 7/28/18. contact precautions. ivf, supportive care, nebs, aspiration precautions, f/u cultures to final date.   - Hematemesis - improved  - PEG Dislodgement - improved   - Uncontrolled Type 2 DM with complications of hyperglycemia - improved. C/w lantus, moderate ihss, monitor acks  - Need for prophylactic measure: dvt/gi ppx

## 2018-07-23 NOTE — PROGRESS NOTE ADULT - SUBJECTIVE AND OBJECTIVE BOX
Patient seen and examined.     MEDICATIONS  (STANDING):  ALBUTerol/ipratropium for Nebulization 3 milliLiter(s) Nebulizer every 6 hours  amiKACIN  IVPB 600 milliGRAM(s) IV Intermittent every 12 hours  amLODIPine   Tablet 2.5 milliGRAM(s) Oral daily  atorvastatin 20 milliGRAM(s) Oral at bedtime  cefepime   IVPB 2000 milliGRAM(s) IV Intermittent every 8 hours  cefepime   IVPB      dextrose 5% + sodium chloride 0.45%. 1000 milliLiter(s) (50 mL/Hr) IV Continuous <Continuous>  dextrose 5% + sodium chloride 0.45%. 1000 milliLiter(s) (75 mL/Hr) IV Continuous <Continuous>  dextrose 5%. 1000 milliLiter(s) (50 mL/Hr) IV Continuous <Continuous>  dextrose 50% Injectable 12.5 Gram(s) IV Push once  dextrose 50% Injectable 25 Gram(s) IV Push once  dextrose 50% Injectable 25 Gram(s) IV Push once  heparin  Injectable 5000 Unit(s) SubCutaneous every 8 hours  insulin glargine Injectable (LANTUS) 15 Unit(s) SubCutaneous at bedtime  insulin lispro (HumaLOG) corrective regimen sliding scale   SubCutaneous every 6 hours  levETIRAcetam  Solution 1000 milliGRAM(s) Oral two times a day  LORazepam     Tablet 1 milliGRAM(s) Oral every 6 hours  LORazepam   Injectable 1 milliGRAM(s) IV Push once  metoprolol tartrate 100 milliGRAM(s) Oral two times a day  pantoprazole   Suspension 40 milliGRAM(s) Enteral Tube daily  QUEtiapine 50 milliGRAM(s) Oral every 12 hours  sodium chloride 0.9% lock flush 20 milliLiter(s) IV Push once      MEDICATIONS  (PRN):  acetaminophen    Suspension 650 milliGRAM(s) Oral every 6 hours PRN For Temp greater than 38 C (100.4 F)  dextrose 40% Gel 15 Gram(s) Oral once PRN Blood Glucose LESS THAN 70 milliGRAM(s)/deciLiter  glucagon  Injectable 1 milliGRAM(s) IntraMuscular once PRN Glucose <70 milliGRAM(s)/deciLiter  LORazepam   Injectable 1 milliGRAM(s) IV Push every 6 hours PRN combative /agitation  sodium chloride 0.9% lock flush 10 milliLiter(s) IV Push every 1 hour PRN After each medication administration  sodium chloride 0.9% lock flush 10 milliLiter(s) IV Push every 12 hours PRN Lumen of catheter NOT used     Medications up to date at time of exam.    PHYSICAL EXAMINATION:  Patient has no new complaints.  GENERAL: The patient is a well-developed, well-nourished, in no apparent distress.     Vital Signs Last 24 Hrs  T(C): 36.8 (23 Jul 2018 13:28), Max: 36.8 (23 Jul 2018 13:28)  T(F): 98.3 (23 Jul 2018 13:28), Max: 98.3 (23 Jul 2018 13:28)  HR: 66 (23 Jul 2018 13:28) (57 - 75)  BP: 130/68 (23 Jul 2018 13:28) (119/65 - 130/68)  BP(mean): --  RR: 18 (23 Jul 2018 13:28) (16 - 18)  SpO2: 95% (23 Jul 2018 13:28) (95% - 99%)   (if applicable)    Chest Tube (if applicable)    HEENT: Head is normocephalic and atraumatic.     NECK: Supple, no palpable adenopathy. +trach    LUNGS: Clear to auscultation, no wheezing, rales, + rhonchi.    HEART: Regular rate and rhythm without murmur.    ABDOMEN: Soft, nontender, and nondistended.  PEG    EXTREMITIES: Without any cyanosis, clubbing, rash, lesions or edema.    NEUROLOGIC: Awake, alert, not oriented    SKIN: Warm, dry, good turgor.    LABS:                        11.6   6.0   )-----------( 146      ( 22 Jul 2018 07:06 )             37.2     07-22    144  |  108  |  4<L>  ----------------------------<  173<H>  4.0   |  27  |  0.74    Ca    9.1      22 Jul 2018 07:06  Phos  3.0     07-22  Mg     1.7     07-22        MICROBIOLOGY: (if applicable)    RADIOLOGY & ADDITIONAL STUDIES:  EKG:   CXR:  ECHO:    IMPRESSION: 63y Female PAST MEDICAL & SURGICAL HISTORY:  No pertinent past medical history  Hypertension  Schizophrenia  Diabetic coma  Diabetes mellitus  No significant past surgical history  S/P percutaneous endoscopic gastrostomy (PEG) tube placement  H/O tracheostomy       Impression; 62 Y/O Female with prior mentioned multiple chronic conditions. Presented with Cough, SOB and Vomiting. Leukocytosis from WBC 23.1 to WBC 15.4 with RLL Opacity secondary to Aspiration PNA due to pseudomonas ( CRE) Oxygen supplementation FIO2 40% via Trach collar due to Chronic Hypoxic respiratory failure.      Suggestion:     Continue DuoNeb Q 6 hours.  Oxygen supplementation via Trach collar.  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    Aspiration precautions. HOB elevation especially during Peg feeding.   Turn and reposition in bed. Patient seen and examined.     MEDICATIONS  (STANDING):  ALBUTerol/ipratropium for Nebulization 3 milliLiter(s) Nebulizer every 6 hours  amiKACIN  IVPB 600 milliGRAM(s) IV Intermittent every 12 hours  amLODIPine   Tablet 2.5 milliGRAM(s) Oral daily  atorvastatin 20 milliGRAM(s) Oral at bedtime  cefepime   IVPB 2000 milliGRAM(s) IV Intermittent every 8 hours  cefepime   IVPB      dextrose 5% + sodium chloride 0.45%. 1000 milliLiter(s) (50 mL/Hr) IV Continuous <Continuous>  dextrose 5% + sodium chloride 0.45%. 1000 milliLiter(s) (75 mL/Hr) IV Continuous <Continuous>  dextrose 5%. 1000 milliLiter(s) (50 mL/Hr) IV Continuous <Continuous>  dextrose 50% Injectable 12.5 Gram(s) IV Push once  dextrose 50% Injectable 25 Gram(s) IV Push once  dextrose 50% Injectable 25 Gram(s) IV Push once  heparin  Injectable 5000 Unit(s) SubCutaneous every 8 hours  insulin glargine Injectable (LANTUS) 15 Unit(s) SubCutaneous at bedtime  insulin lispro (HumaLOG) corrective regimen sliding scale   SubCutaneous every 6 hours  levETIRAcetam  Solution 1000 milliGRAM(s) Oral two times a day  LORazepam     Tablet 1 milliGRAM(s) Oral every 6 hours  LORazepam   Injectable 1 milliGRAM(s) IV Push once  metoprolol tartrate 100 milliGRAM(s) Oral two times a day  pantoprazole   Suspension 40 milliGRAM(s) Enteral Tube daily  QUEtiapine 50 milliGRAM(s) Oral every 12 hours  sodium chloride 0.9% lock flush 20 milliLiter(s) IV Push once      MEDICATIONS  (PRN):  acetaminophen    Suspension 650 milliGRAM(s) Oral every 6 hours PRN For Temp greater than 38 C (100.4 F)  dextrose 40% Gel 15 Gram(s) Oral once PRN Blood Glucose LESS THAN 70 milliGRAM(s)/deciLiter  glucagon  Injectable 1 milliGRAM(s) IntraMuscular once PRN Glucose <70 milliGRAM(s)/deciLiter  LORazepam   Injectable 1 milliGRAM(s) IV Push every 6 hours PRN combative /agitation  sodium chloride 0.9% lock flush 10 milliLiter(s) IV Push every 1 hour PRN After each medication administration  sodium chloride 0.9% lock flush 10 milliLiter(s) IV Push every 12 hours PRN Lumen of catheter NOT used     Medications up to date at time of exam.    PHYSICAL EXAMINATION:  Patient has no new complaints.  GENERAL: The patient is a well-developed, well-nourished, in no apparent distress.     Vital Signs Last 24 Hrs  T(C): 36.8 (23 Jul 2018 13:28), Max: 36.8 (23 Jul 2018 13:28)  T(F): 98.3 (23 Jul 2018 13:28), Max: 98.3 (23 Jul 2018 13:28)  HR: 66 (23 Jul 2018 13:28) (57 - 75)  BP: 130/68 (23 Jul 2018 13:28) (119/65 - 130/68)  BP(mean): --  RR: 18 (23 Jul 2018 13:28) (16 - 18)  SpO2: 95% (23 Jul 2018 13:28) (95% - 99%)   (if applicable)    Chest Tube (if applicable)    HEENT: Head is normocephalic and atraumatic.     NECK: Supple, no palpable adenopathy. +trach    LUNGS: Clear to auscultation, no wheezing, rales, + rhonchi.    HEART: Regular rate and rhythm without murmur.    ABDOMEN: Soft, nontender, and nondistended.  PEG    EXTREMITIES: Without any cyanosis, clubbing, rash, lesions or edema.    NEUROLOGIC: Awake, alert, not oriented    SKIN: Warm, dry, good turgor.    LABS:                        11.6   6.0   )-----------( 146      ( 22 Jul 2018 07:06 )             37.2     07-22    144  |  108  |  4<L>  ----------------------------<  173<H>  4.0   |  27  |  0.74    Ca    9.1      22 Jul 2018 07:06  Phos  3.0     07-22  Mg     1.7     07-22        MICROBIOLOGY: (if applicable)    RADIOLOGY & ADDITIONAL STUDIES:  EKG:   CXR:  ECHO:    IMPRESSION: 63y Female PAST MEDICAL & SURGICAL HISTORY:  No pertinent past medical history  Hypertension  Schizophrenia  Diabetic coma  Diabetes mellitus  No significant past surgical history  S/P percutaneous endoscopic gastrostomy (PEG) tube placement  H/O tracheostomy       Impression; 62 Y/O Female with prior mentioned multiple chronic conditions. Presented with Cough, SOB and Vomiting. Leukocytosis from WBC 23.1 to WBC 15.4 with RLL Opacity secondary to Aspiration PNA due to pseudomonas ( CRE) Oxygen supplementation FIO2 40% via Trach collar due to Chronic Hypoxic respiratory failure.      Suggestion:     Continue DuoNeb Q 6 hours.  Oxygen supplementation via Trach collar.  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    Aspiration precautions. HOB elevation especially during Peg feeding.   Turn and reposition in bed.      Agree with above assessment and plan as transcribed.

## 2018-07-23 NOTE — PROGRESS NOTE ADULT - ASSESSMENT
A 63 yo Female with multiple co-morbidities including non-verbal, bed bound, Trach/ PEG tube, sent in to the ER from NH for evaluation of shortness of breath and cough after vomiting. On admission, she has no fever or chills but Leukocytosis. The CXR  shows No consolidation but CT chest shows RLL opacity. She has  started on Vancomycin and Zosyn and The ID consult  requested to assist with further evaluation and antibiotic management.     # Aspiration Pneumonia - Pseudomonas ( CRE)  # Hematemesis  # Procalcitonin level elevated    Would recommend:    1. Aspiration precaution  2. Continue  Bronchial/Oral  suctioning  3. Continue Amikacin and Cefepime until 7/28/18  4. Contact Isolation     d/w nursing staff     will follow the patient with you A 63 yo Female with multiple co-morbidities including non-verbal, bed bound, Trach/ PEG tube, sent in to the ER from NH for evaluation of shortness of breath and cough after vomiting. On admission, she has no fever or chills but Leukocytosis. The CXR  shows No consolidation but CT chest shows RLL opacity. She has  started on Vancomycin and Zosyn and The ID consult  requested to assist with further evaluation and antibiotic management.     # Aspiration Pneumonia - Pseudomonas ( CRE)  # Hematemesis  # Procalcitonin level elevated    Would recommend:    1. Continue  Bronchial/Oral  suctioning  2. Aspiration precaution  3. Continue Amikacin and Cefepime until 7/28/18  4. Contact Isolation     d/w nursing staff     will follow the patient with you

## 2018-07-23 NOTE — PROGRESS NOTE ADULT - SUBJECTIVE AND OBJECTIVE BOX
Patient is a 63y old  Female who presents with a chief complaint of SOB  Today pt is much more agitated today        (14 Jul 2018 18:51)    Allergies: angiotensin converting enzyme inhibitors (Unknown)      INTERVAL HPI/OVERNIGHT EVENTS:    T(C): 36.6 (07-23-18 @ 04:45), Max: 36.6 (07-22-18 @ 20:35)  HR: 75 (07-23-18 @ 04:45) (53 - 75)  BP: 119/65 (07-23-18 @ 04:45) (107/50 - 129/69)  RR: 16 (07-23-18 @ 04:45) (16 - 18)  SpO2: 99% (07-23-18 @ 04:45) (98% - 100%)  I&O's Summary    Vital Signs Last 24 Hrs  T(C): 36.6 (23 Jul 2018 04:45), Max: 36.6 (22 Jul 2018 20:35)  T(F): 97.8 (23 Jul 2018 04:45), Max: 97.8 (22 Jul 2018 20:35)  HR: 75 (23 Jul 2018 04:45) (53 - 75)  BP: 119/65 (23 Jul 2018 04:45) (107/50 - 129/69)  BP(mean): --  RR: 16 (23 Jul 2018 04:45) (16 - 18)  SpO2: 99% (23 Jul 2018 04:45) (98% - 100%)  PAST MEDICAL & SURGICAL HISTORY:  No pertinent past medical history  Hypertension  Schizophrenia  Diabetic coma  Diabetes mellitus  No significant past surgical history  S/P percutaneous endoscopic gastrostomy (PEG) tube placement  H/O tracheostomy          LABS:                        11.6   6.0   )-----------( 146      ( 22 Jul 2018 07:06 )             37.2     07-22    144  |  108  |  4<L>  ----------------------------<  173<H>  4.0   |  27  |  0.74    Ca    9.1      22 Jul 2018 07:06  Phos  3.0     07-22  Mg     1.7     07-22        CAPILLARY BLOOD GLUCOSE      POCT Blood Glucose.: 186 mg/dL (23 Jul 2018 12:13)  POCT Blood Glucose.: 131 mg/dL (23 Jul 2018 05:21)  POCT Blood Glucose.: 208 mg/dL (22 Jul 2018 23:35)  POCT Blood Glucose.: 164 mg/dL (22 Jul 2018 16:57)            MEDICATIONS  (STANDING):  ALBUTerol/ipratropium for Nebulization 3 milliLiter(s) Nebulizer every 6 hours  amiKACIN  IVPB 600 milliGRAM(s) IV Intermittent every 12 hours  amLODIPine   Tablet 2.5 milliGRAM(s) Oral daily  atorvastatin 20 milliGRAM(s) Oral at bedtime  cefepime   IVPB 2000 milliGRAM(s) IV Intermittent every 8 hours  cefepime   IVPB      dextrose 5% + sodium chloride 0.45%. 1000 milliLiter(s) (50 mL/Hr) IV Continuous <Continuous>  dextrose 5% + sodium chloride 0.45%. 1000 milliLiter(s) (75 mL/Hr) IV Continuous <Continuous>  dextrose 5%. 1000 milliLiter(s) (50 mL/Hr) IV Continuous <Continuous>  dextrose 50% Injectable 12.5 Gram(s) IV Push once  dextrose 50% Injectable 25 Gram(s) IV Push once  dextrose 50% Injectable 25 Gram(s) IV Push once  heparin  Injectable 5000 Unit(s) SubCutaneous every 8 hours  insulin glargine Injectable (LANTUS) 15 Unit(s) SubCutaneous at bedtime  insulin lispro (HumaLOG) corrective regimen sliding scale   SubCutaneous every 6 hours  levETIRAcetam  Solution 1000 milliGRAM(s) Oral two times a day  LORazepam     Tablet 1 milliGRAM(s) Oral every 6 hours  LORazepam   Injectable 1 milliGRAM(s) IV Push once  metoprolol tartrate 100 milliGRAM(s) Oral two times a day  pantoprazole   Suspension 40 milliGRAM(s) Enteral Tube daily  QUEtiapine 50 milliGRAM(s) Oral every 12 hours  sodium chloride 0.9% lock flush 20 milliLiter(s) IV Push once    MEDICATIONS  (PRN):  acetaminophen    Suspension 650 milliGRAM(s) Oral every 6 hours PRN For Temp greater than 38 C (100.4 F)  dextrose 40% Gel 15 Gram(s) Oral once PRN Blood Glucose LESS THAN 70 milliGRAM(s)/deciLiter  glucagon  Injectable 1 milliGRAM(s) IntraMuscular once PRN Glucose <70 milliGRAM(s)/deciLiter  sodium chloride 0.9% lock flush 10 milliLiter(s) IV Push every 1 hour PRN After each medication administration  sodium chloride 0.9% lock flush 10 milliLiter(s) IV Push every 12 hours PRN Lumen of catheter NOT used      REVIEW OF SYSTEMS:  unable  RADIOLOGY & ADDITIONAL TESTS:      Consultant(s) Notes Reviewed:  [ ] YES  [ ] NO    PHYSICAL EXAM:  GENERAL: NAD,   HEAD:  Atraumatic, Normocephalic  EYES: conjunctiva and sclera clear  ENMT: Moist mucous membranes, Good dentition, No lesions  NECK: Supple, No JVD, Normal thyroid  NERVOUS SYSTEM:  Alert , agitated. EDMONDS  CHEST/LUNG: Good air movement bilaterally;  HEART: S1, S2;  ABDOMEN: Soft, Nontender, Nondistended; Bowel sounds present. Peg in place  EXTREMITIES:  2+ Peripheral Pulses, No clubbing, cyanosis, or edema  LYMPH: No lymphadenopathy noted  SKIN: No rashes or lesions    Care Collaborated Discussed with Consultants/Other Providers [ ] YES  [ ] NO

## 2018-07-23 NOTE — PROGRESS NOTE ADULT - SUBJECTIVE AND OBJECTIVE BOX
Patient seen and examined at bedside  diarrhea persists    HPI:  61 Y/O F, non-verbal, bed bound, Trach/ PEG tube, from NH, PMhx of HTN, asthma, convulsion disorder, bipolar, DVT legs, DM 2, sent from NH for vomiting and cough.  Pt unable to provide history.  History given by sister who states pt was vomiting coffee ground emesis yesterday.  Pt had 2 episodes of reported vomiting, and has not had an episode since.  Pt also developed cough with clear sputum.  Sister states pt is combative at baseline, and her current behavior is baseline.  No reported fever, discomfort, diarrhea, or SOB. (14 Jul 2018 18:51)      PAST MEDICAL & SURGICAL HISTORY:  No pertinent past medical history  Hypertension  Schizophrenia  Diabetic coma  Diabetes mellitus  No significant past surgical history  S/P percutaneous endoscopic gastrostomy (PEG) tube placement  H/O tracheostomy      angiotensin converting enzyme inhibitors (Unknown)       MEDICATIONS  (STANDING):  ALBUTerol/ipratropium for Nebulization 3 milliLiter(s) Nebulizer every 6 hours  amiKACIN  IVPB 600 milliGRAM(s) IV Intermittent every 12 hours  amLODIPine   Tablet 2.5 milliGRAM(s) Oral daily  atorvastatin 20 milliGRAM(s) Oral at bedtime  cefepime   IVPB 2000 milliGRAM(s) IV Intermittent every 8 hours  cefepime   IVPB      dextrose 5% + sodium chloride 0.45%. 1000 milliLiter(s) (50 mL/Hr) IV Continuous <Continuous>  dextrose 5% + sodium chloride 0.45%. 1000 milliLiter(s) (75 mL/Hr) IV Continuous <Continuous>  dextrose 5%. 1000 milliLiter(s) (50 mL/Hr) IV Continuous <Continuous>  dextrose 50% Injectable 12.5 Gram(s) IV Push once  dextrose 50% Injectable 25 Gram(s) IV Push once  dextrose 50% Injectable 25 Gram(s) IV Push once  heparin  Injectable 5000 Unit(s) SubCutaneous every 8 hours  insulin glargine Injectable (LANTUS) 15 Unit(s) SubCutaneous at bedtime  insulin lispro (HumaLOG) corrective regimen sliding scale   SubCutaneous every 6 hours  levETIRAcetam  Solution 1000 milliGRAM(s) Oral two times a day  LORazepam     Tablet 1 milliGRAM(s) Oral every 6 hours  LORazepam   Injectable 1 milliGRAM(s) IV Push once  metoprolol tartrate 100 milliGRAM(s) Oral two times a day  pantoprazole   Suspension 40 milliGRAM(s) Enteral Tube daily  QUEtiapine 50 milliGRAM(s) Oral every 12 hours    MEDICATIONS  (PRN):  acetaminophen    Suspension 650 milliGRAM(s) Oral every 6 hours PRN For Temp greater than 38 C (100.4 F)  dextrose 40% Gel 15 Gram(s) Oral once PRN Blood Glucose LESS THAN 70 milliGRAM(s)/deciLiter  glucagon  Injectable 1 milliGRAM(s) IntraMuscular once PRN Glucose <70 milliGRAM(s)/deciLiter      REVIEW OF SYSTEMS:  limited 2/2 mental status	  ICU Vital Signs Last 24 Hrs  T(C): 36.6 (23 Jul 2018 04:45), Max: 36.6 (22 Jul 2018 20:35)  T(F): 97.8 (23 Jul 2018 04:45), Max: 97.8 (22 Jul 2018 20:35)  HR: 75 (23 Jul 2018 04:45) (53 - 75)  BP: 119/65 (23 Jul 2018 04:45) (107/50 - 129/69)  BP(mean): --  ABP: --  ABP(mean): --  RR: 16 (23 Jul 2018 04:45) (16 - 18)  SpO2: 99% (23 Jul 2018 04:45) (98% - 100%)        PHYSICAL EXAMINATION:   Constitutional: NAD  HEENT: AT  Neck:  Supple  Respiratory:  trach intact. adequate airflow b/l. Not using accessory muscles of respiration.  Cardiovascular:  S1 & S2 intact, no R/G, 2+ radial pulses b/l  Gastrointestinal: rectal tube with diarrhea. NT, ND, normoactive b.s., no organomegaly/RT/rigidity  Extremities: WWP  Neurological:  awake.  Crude sensation intact.     Labs and imaging reviewed    LABS:        Magnesium, Serum in AM (07.22.18 @ 07:06)    Magnesium, Serum: 1.7 mg/dL    Basic Metabolic Panel in AM (07.22.18 @ 07:06)    Sodium, Serum: 144 mmol/L    Potassium, Serum: 4.0 mmol/L    Chloride, Serum: 108 mmol/L    Carbon Dioxide, Serum: 27 mmol/L    Anion Gap, Serum: 9 mmol/L    Blood Urea Nitrogen, Serum: 4 mg/dL    Creatinine, Serum: 0.74 mg/dL    Glucose, Serum: 173 mg/dL    Calcium, Total Serum: 9.1 mg/dL    eGFR if Non : 86: Interpretative comment  The units for eGFR are ml/min/1.73m2 (normalized body surface area). The  eGFR is calculated from a serum creatinine using the CKD-EPI equation.  Other variables required for calculation are race, age and sex. Among  patients with chronic kidney disease (CKD), the eGFR is useful in  determining the stage of disease according to KDOQI CKD classification.  All eGFR results are reported numerically with the following  interpretation.          GFR                    With                 Without     (ml/min/1.73 m2)    Kidney Damage       Kidney Damage        >= 90                    Stage 1                     Normal        60-89                    Stage 2                     Decreased GFR        30-59     Stage 3                     Stage 3        15-29                    Stage 4                     Stage 4        < 15                      Stage 5                     Stage 5  Each stage of CKD assumes that the associated GFR level has been in  effect for at least 3 months. Determination of stages one and two (with  eGFR > 59 ml/min/m2) requires estimation of kidney damage for at least 3  months as defined by structural or functional abnormalities.  Limitations: All estimates of GFR will be less accurate for patients at  extremes of muscle mass (including but not limited to frail elderly,  critically ill, or cancer patients), those with unusual diets, and those  with conditions associated with reduced secretion or extrarenal  elimination of creatinine. The eGFR equation is not recommended for use  in patients with unstable creatinine levels. mL/min/1.73M2    eGFR if African American: 100 mL/min/1.73M2          RADIOLOGY & ADDITIONAL STUDIES:

## 2018-07-24 DIAGNOSIS — F05 DELIRIUM DUE TO KNOWN PHYSIOLOGICAL CONDITION: ICD-10-CM

## 2018-07-24 LAB
ANION GAP SERPL CALC-SCNC: 4 MMOL/L — LOW (ref 5–17)
BUN SERPL-MCNC: 6 MG/DL — LOW (ref 7–18)
CALCIUM SERPL-MCNC: 9.3 MG/DL — SIGNIFICANT CHANGE UP (ref 8.4–10.5)
CHLORIDE SERPL-SCNC: 104 MMOL/L — SIGNIFICANT CHANGE UP (ref 96–108)
CO2 SERPL-SCNC: 31 MMOL/L — SIGNIFICANT CHANGE UP (ref 22–31)
CREAT SERPL-MCNC: 0.83 MG/DL — SIGNIFICANT CHANGE UP (ref 0.5–1.3)
GLUCOSE SERPL-MCNC: 207 MG/DL — HIGH (ref 70–99)
HCT VFR BLD CALC: 39.8 % — SIGNIFICANT CHANGE UP (ref 34.5–45)
HGB BLD-MCNC: 12.5 G/DL — SIGNIFICANT CHANGE UP (ref 11.5–15.5)
MCHC RBC-ENTMCNC: 25.2 PG — LOW (ref 27–34)
MCHC RBC-ENTMCNC: 31.4 GM/DL — LOW (ref 32–36)
MCV RBC AUTO: 80.3 FL — SIGNIFICANT CHANGE UP (ref 80–100)
PLATELET # BLD AUTO: 152 K/UL — SIGNIFICANT CHANGE UP (ref 150–400)
POTASSIUM SERPL-MCNC: 3.8 MMOL/L — SIGNIFICANT CHANGE UP (ref 3.5–5.3)
POTASSIUM SERPL-SCNC: 3.8 MMOL/L — SIGNIFICANT CHANGE UP (ref 3.5–5.3)
RBC # BLD: 4.96 M/UL — SIGNIFICANT CHANGE UP (ref 3.8–5.2)
RBC # FLD: 14.2 % — SIGNIFICANT CHANGE UP (ref 10.3–14.5)
SODIUM SERPL-SCNC: 139 MMOL/L — SIGNIFICANT CHANGE UP (ref 135–145)
WBC # BLD: 6.2 K/UL — SIGNIFICANT CHANGE UP (ref 3.8–10.5)
WBC # FLD AUTO: 6.2 K/UL — SIGNIFICANT CHANGE UP (ref 3.8–10.5)

## 2018-07-24 RX ORDER — BENZTROPINE MESYLATE 1 MG
1 TABLET ORAL
Qty: 0 | Refills: 0 | Status: DISCONTINUED | OUTPATIENT
Start: 2018-07-24 | End: 2018-07-25

## 2018-07-24 RX ORDER — BENZTROPINE MESYLATE 1 MG
2 TABLET ORAL ONCE
Qty: 0 | Refills: 0 | Status: DISCONTINUED | OUTPATIENT
Start: 2018-07-24 | End: 2018-07-31

## 2018-07-24 RX ADMIN — HEPARIN SODIUM 5000 UNIT(S): 5000 INJECTION INTRAVENOUS; SUBCUTANEOUS at 14:06

## 2018-07-24 RX ADMIN — CEFEPIME 100 MILLIGRAM(S): 1 INJECTION, POWDER, FOR SOLUTION INTRAMUSCULAR; INTRAVENOUS at 05:19

## 2018-07-24 RX ADMIN — Medication 3 MILLILITER(S): at 20:13

## 2018-07-24 RX ADMIN — INSULIN GLARGINE 15 UNIT(S): 100 INJECTION, SOLUTION SUBCUTANEOUS at 22:46

## 2018-07-24 RX ADMIN — HEPARIN SODIUM 5000 UNIT(S): 5000 INJECTION INTRAVENOUS; SUBCUTANEOUS at 21:29

## 2018-07-24 RX ADMIN — Medication 100 MILLIGRAM(S): at 17:49

## 2018-07-24 RX ADMIN — AMIKACIN SULFATE 102.4 MILLIGRAM(S): 250 INJECTION, SOLUTION INTRAMUSCULAR; INTRAVENOUS at 17:50

## 2018-07-24 RX ADMIN — AMIKACIN SULFATE 102.4 MILLIGRAM(S): 250 INJECTION, SOLUTION INTRAMUSCULAR; INTRAVENOUS at 09:10

## 2018-07-24 RX ADMIN — Medication 1 MILLIGRAM(S): at 06:23

## 2018-07-24 RX ADMIN — Medication 3 MILLILITER(S): at 15:13

## 2018-07-24 RX ADMIN — Medication 1 MILLIGRAM(S): at 17:53

## 2018-07-24 RX ADMIN — Medication 1 MILLIGRAM(S): at 05:27

## 2018-07-24 RX ADMIN — PANTOPRAZOLE SODIUM 40 MILLIGRAM(S): 20 TABLET, DELAYED RELEASE ORAL at 12:29

## 2018-07-24 RX ADMIN — Medication 100 MILLIGRAM(S): at 06:24

## 2018-07-24 RX ADMIN — LEVETIRACETAM 1000 MILLIGRAM(S): 250 TABLET, FILM COATED ORAL at 17:50

## 2018-07-24 RX ADMIN — QUETIAPINE FUMARATE 50 MILLIGRAM(S): 200 TABLET, FILM COATED ORAL at 06:24

## 2018-07-24 RX ADMIN — Medication 3 MILLILITER(S): at 02:16

## 2018-07-24 RX ADMIN — Medication 3 MILLILITER(S): at 08:33

## 2018-07-24 RX ADMIN — ATORVASTATIN CALCIUM 20 MILLIGRAM(S): 80 TABLET, FILM COATED ORAL at 21:29

## 2018-07-24 RX ADMIN — AMLODIPINE BESYLATE 2.5 MILLIGRAM(S): 2.5 TABLET ORAL at 06:24

## 2018-07-24 RX ADMIN — Medication 4: at 12:26

## 2018-07-24 RX ADMIN — LEVETIRACETAM 1000 MILLIGRAM(S): 250 TABLET, FILM COATED ORAL at 06:24

## 2018-07-24 RX ADMIN — Medication 1 MILLIGRAM(S): at 12:26

## 2018-07-24 RX ADMIN — Medication 4: at 23:30

## 2018-07-24 RX ADMIN — HEPARIN SODIUM 5000 UNIT(S): 5000 INJECTION INTRAVENOUS; SUBCUTANEOUS at 06:24

## 2018-07-24 RX ADMIN — Medication 1 MILLIGRAM(S): at 17:56

## 2018-07-24 RX ADMIN — CEFEPIME 100 MILLIGRAM(S): 1 INJECTION, POWDER, FOR SOLUTION INTRAMUSCULAR; INTRAVENOUS at 14:06

## 2018-07-24 RX ADMIN — CEFEPIME 100 MILLIGRAM(S): 1 INJECTION, POWDER, FOR SOLUTION INTRAMUSCULAR; INTRAVENOUS at 21:29

## 2018-07-24 NOTE — PROGRESS NOTE ADULT - SUBJECTIVE AND OBJECTIVE BOX
Time of Visit:  Patient seen and examined.     MEDICATIONS  (STANDING):  ALBUTerol/ipratropium for Nebulization 3 milliLiter(s) Nebulizer every 6 hours  amiKACIN  IVPB 600 milliGRAM(s) IV Intermittent every 12 hours  amLODIPine   Tablet 2.5 milliGRAM(s) Oral daily  atorvastatin 20 milliGRAM(s) Oral at bedtime  cefepime   IVPB 2000 milliGRAM(s) IV Intermittent every 8 hours  cefepime   IVPB      dextrose 5% + sodium chloride 0.45%. 1000 milliLiter(s) (50 mL/Hr) IV Continuous <Continuous>  dextrose 5% + sodium chloride 0.45%. 1000 milliLiter(s) (75 mL/Hr) IV Continuous <Continuous>  dextrose 5%. 1000 milliLiter(s) (50 mL/Hr) IV Continuous <Continuous>  dextrose 50% Injectable 12.5 Gram(s) IV Push once  dextrose 50% Injectable 25 Gram(s) IV Push once  dextrose 50% Injectable 25 Gram(s) IV Push once  heparin  Injectable 5000 Unit(s) SubCutaneous every 8 hours  insulin glargine Injectable (LANTUS) 15 Unit(s) SubCutaneous at bedtime  insulin lispro (HumaLOG) corrective regimen sliding scale   SubCutaneous every 6 hours  levETIRAcetam  Solution 1000 milliGRAM(s) Oral two times a day  LORazepam     Tablet 1 milliGRAM(s) Oral every 6 hours  LORazepam   Injectable 1 milliGRAM(s) IV Push once  metoprolol tartrate 100 milliGRAM(s) Oral two times a day  pantoprazole   Suspension 40 milliGRAM(s) Enteral Tube daily  sodium chloride 0.9% lock flush 20 milliLiter(s) IV Push once      MEDICATIONS  (PRN):  acetaminophen    Suspension 650 milliGRAM(s) Oral every 6 hours PRN For Temp greater than 38 C (100.4 F)  dextrose 40% Gel 15 Gram(s) Oral once PRN Blood Glucose LESS THAN 70 milliGRAM(s)/deciLiter  glucagon  Injectable 1 milliGRAM(s) IntraMuscular once PRN Glucose <70 milliGRAM(s)/deciLiter  LORazepam   Injectable 1 milliGRAM(s) IV Push every 6 hours PRN combative /agitation  sodium chloride 0.9% lock flush 10 milliLiter(s) IV Push every 1 hour PRN After each medication administration  sodium chloride 0.9% lock flush 10 milliLiter(s) IV Push every 12 hours PRN Lumen of catheter NOT used       Medications up to date at time of exam.    ROS: No fever, chills, cough, congestion, Hypoxia.   PHYSICAL EXAMINATION:  Vital Signs Last 24 Hrs  T(C): 36.7 (24 Jul 2018 08:25), Max: 36.8 (23 Jul 2018 13:28)  T(F): 98.1 (24 Jul 2018 08:25), Max: 98.3 (23 Jul 2018 13:28)  HR: 63 (24 Jul 2018 08:34) (52 - 70)  BP: 126/60 (24 Jul 2018 08:25) (116/59 - 169/83)  BP(mean): 76 (24 Jul 2018 08:25) (72 - 76)  RR: 15 (24 Jul 2018 08:25) (15 - 18)  SpO2: 100% (24 Jul 2018 08:34) (95% - 100%)   (if applicable)    General; Non verbal. Eyes does not follow verbal and tactile stimuli. Total care. No acute distress.      HEENT: Normocephalic and atraumatic. No nasal tenderness. Moist mucosa.     NECK: Has Trach#6, non infected.    LUNGS: Clear to auscultation B/L.  No use of accessory muscle.     HEART: S1 S2 Regular rate and no click/ rub.     ABDOMEN: Soft, nontender, and nondistended. Active bowel sounds. + Peg.     EXTREMITIES: Without any cyanosis, clubbing, rash, lesions or edema.    NEUROLOGIC: Non verbal. Cognitive impaired.      SKIN: Warm and moist. Non diaphoretic.       LABS:                        12.5   6.2   )-----------( 152      ( 24 Jul 2018 10:56 )             39.8     07-24    139  |  104  |  6<L>  ----------------------------<  207<H>  3.8   |  31  |  0.83    Ca    9.3      24 Jul 2018 10:56    RADIOLOGY & ADDITIONAL STUDIES:  EKG:   Ct chest:  < from: CT Chest No Cont (07.15.18 @ 11:14) >    EXAM:  CT ABDOMEN AND PELVIS                          EXAM:  CT CHEST                            PROCEDURE DATE:  07/15/2018          INTERPRETATION:  CLINICAL HISTORY:  Respiratory failure; sepsis    Multiple axial images of the chest, abdomen andpelvis were obtained from   the lung apices through symphysis pubis without the administration of   oral or intravascular contrast limiting the sensitivity of evaluation.   Reformatted coronal and sagittal images are submitted.    COMPARISON: CT evaluation abdomen/pelvis July 23, 2016.    FINDINGS: Mid to distal esophageal wall thickening is suggested.   Correlate with endoscopic evaluation.    A midline tracheostomy is identified.    A PEG tube is identified, the distal aspect of which is identified in the   region of the pylorus/duodenal bulb; correlate clinically as to   appropriate positioning.    No abnormally enlarged mediastinal or hilar lymph nodes are noted. There   is no evidence for axillary lymphadenopathy. The heart size appears   within normal limits. No pericardial effusion is identified. Thoracic   aortic caliber demonstrates unremarkable caliber.    The central bronchial anatomy appears patent.    Elevation of the right hemidiaphragm is identified. There is opacity   identified within the right lower lobe lung parenchyma, likely   representative of a combination of both atelectasis and consolidation. No   additional regions of lung parenchymal infiltrate or consolidation   identified bilaterally. A small right pleuraleffusion is noted. There is   no evidence for left pleural effusion. There is no evidence of   pneumothorax. No mediastinal shift is noted.    The liver is normal in size. No focal hepatic masses are identified.   There is no evidence for intrahepatic or extrahepatic biliary dilatation.   No gallstones, gallbladder wall thickening or pericholecystic fluid   identified.    The spleen, pancreas and adrenal glands are unremarkable.    The kidneys enhance bilaterally, without evidence for space-occupying   lesion or hydronephrosis. A small less than 3 mm calculus is noted within   the upper pole region of the left kidney. No right renal calculi are   identified. The abdominal aorta is normal in course and caliber. No   abnormally enlarged retroperitoneal or pelvic lymphadenopathy is noted.    Fecal material is scattered throughout the colon. There is no evidence   for mechanical bowel obstruction. No colonic wall thickening is   identified. There is no evidence for free intraperitoneal air orfluid.   There is no evidence for acute appendicitis.    The uterus and urinary bladder appear unremarkable.     No acute osseous fractures are evident.    IMPRESSION:    1. Mid to distal esophageal wall thickening is suggested. Correlate with   endoscopic evaluation.  2. A PEG tube is identified, the distal aspect of which is identified in   the region of the pylorus/duodenal bulb; correlate clinically as to   appropriate positioning.  3. There is opacity identified within the right lower lobe lung   parenchyma, likely representative of a combination of both atelectasis   and consolidation. Small right pleural effusion.     PAST MEDICAL & SURGICAL HISTORY:  No pertinent past medical history  Hypertension  Schizophrenia  Diabetic coma  Diabetes mellitus  No significant past surgical history  S/P percutaneous endoscopic gastrostomy (PEG) tube placement  H/O tracheostomy    Impression; 62 Y/O Female with prior mentioned multiple chronic conditions. Presented with Cough, SOB and Vomiting. Leukocytosis from WBC 23.1 to WBC 15.4 with RLL Opacity secondary to Aspiration PNA due to pseudomonas ( CRE) Oxygen supplementation FIO2 40% via Trach collar due to Chronic Hypoxic respiratory failure. Leukocytosis WBC from 23 to WBC 6.2. Been Afebrile. Will need antibx until 7/28.. double coverage     Suggestion:     Continue DuoNeb Q 6 hours.  Oxygen supplementation via Trach collar.  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    Aspiration precautions. HOB elevation especially during Peg feeding.   Turn and reposition in bed. Time of Visit:  Patient seen and examined.     MEDICATIONS  (STANDING):  ALBUTerol/ipratropium for Nebulization 3 milliLiter(s) Nebulizer every 6 hours  amiKACIN  IVPB 600 milliGRAM(s) IV Intermittent every 12 hours  amLODIPine   Tablet 2.5 milliGRAM(s) Oral daily  atorvastatin 20 milliGRAM(s) Oral at bedtime  cefepime   IVPB 2000 milliGRAM(s) IV Intermittent every 8 hours  cefepime   IVPB      dextrose 5% + sodium chloride 0.45%. 1000 milliLiter(s) (50 mL/Hr) IV Continuous <Continuous>  dextrose 5% + sodium chloride 0.45%. 1000 milliLiter(s) (75 mL/Hr) IV Continuous <Continuous>  dextrose 5%. 1000 milliLiter(s) (50 mL/Hr) IV Continuous <Continuous>  dextrose 50% Injectable 12.5 Gram(s) IV Push once  dextrose 50% Injectable 25 Gram(s) IV Push once  dextrose 50% Injectable 25 Gram(s) IV Push once  heparin  Injectable 5000 Unit(s) SubCutaneous every 8 hours  insulin glargine Injectable (LANTUS) 15 Unit(s) SubCutaneous at bedtime  insulin lispro (HumaLOG) corrective regimen sliding scale   SubCutaneous every 6 hours  levETIRAcetam  Solution 1000 milliGRAM(s) Oral two times a day  LORazepam     Tablet 1 milliGRAM(s) Oral every 6 hours  LORazepam   Injectable 1 milliGRAM(s) IV Push once  metoprolol tartrate 100 milliGRAM(s) Oral two times a day  pantoprazole   Suspension 40 milliGRAM(s) Enteral Tube daily  sodium chloride 0.9% lock flush 20 milliLiter(s) IV Push once      MEDICATIONS  (PRN):  acetaminophen    Suspension 650 milliGRAM(s) Oral every 6 hours PRN For Temp greater than 38 C (100.4 F)  dextrose 40% Gel 15 Gram(s) Oral once PRN Blood Glucose LESS THAN 70 milliGRAM(s)/deciLiter  glucagon  Injectable 1 milliGRAM(s) IntraMuscular once PRN Glucose <70 milliGRAM(s)/deciLiter  LORazepam   Injectable 1 milliGRAM(s) IV Push every 6 hours PRN combative /agitation  sodium chloride 0.9% lock flush 10 milliLiter(s) IV Push every 1 hour PRN After each medication administration  sodium chloride 0.9% lock flush 10 milliLiter(s) IV Push every 12 hours PRN Lumen of catheter NOT used       Medications up to date at time of exam.    ROS: No fever, chills, cough, congestion, Hypoxia.   PHYSICAL EXAMINATION:  Vital Signs Last 24 Hrs  T(C): 36.7 (24 Jul 2018 08:25), Max: 36.8 (23 Jul 2018 13:28)  T(F): 98.1 (24 Jul 2018 08:25), Max: 98.3 (23 Jul 2018 13:28)  HR: 63 (24 Jul 2018 08:34) (52 - 70)  BP: 126/60 (24 Jul 2018 08:25) (116/59 - 169/83)  BP(mean): 76 (24 Jul 2018 08:25) (72 - 76)  RR: 15 (24 Jul 2018 08:25) (15 - 18)  SpO2: 100% (24 Jul 2018 08:34) (95% - 100%)   (if applicable)    General; Non verbal. Eyes does not follow verbal and tactile stimuli. Total care. No acute distress.      HEENT: Normocephalic and atraumatic. No nasal tenderness. Moist mucosa.     NECK: Has Trach#6, non infected.    LUNGS: Clear to auscultation B/L.  No use of accessory muscle.     HEART: S1 S2 Regular rate and no click/ rub.     ABDOMEN: Soft, nontender, and nondistended. Active bowel sounds. + Peg.     EXTREMITIES: Without any cyanosis, clubbing, rash, lesions or edema.    NEUROLOGIC: Non verbal. Cognitive impaired.      SKIN: Warm and moist. Non diaphoretic.       LABS:                        12.5   6.2   )-----------( 152      ( 24 Jul 2018 10:56 )             39.8     07-24    139  |  104  |  6<L>  ----------------------------<  207<H>  3.8   |  31  |  0.83    Ca    9.3      24 Jul 2018 10:56    RADIOLOGY & ADDITIONAL STUDIES:  EKG:   Ct chest:  < from: CT Chest No Cont (07.15.18 @ 11:14) >    EXAM:  CT ABDOMEN AND PELVIS                          EXAM:  CT CHEST                            PROCEDURE DATE:  07/15/2018          INTERPRETATION:  CLINICAL HISTORY:  Respiratory failure; sepsis    Multiple axial images of the chest, abdomen andpelvis were obtained from   the lung apices through symphysis pubis without the administration of   oral or intravascular contrast limiting the sensitivity of evaluation.   Reformatted coronal and sagittal images are submitted.    COMPARISON: CT evaluation abdomen/pelvis July 23, 2016.    FINDINGS: Mid to distal esophageal wall thickening is suggested.   Correlate with endoscopic evaluation.    A midline tracheostomy is identified.    A PEG tube is identified, the distal aspect of which is identified in the   region of the pylorus/duodenal bulb; correlate clinically as to   appropriate positioning.    No abnormally enlarged mediastinal or hilar lymph nodes are noted. There   is no evidence for axillary lymphadenopathy. The heart size appears   within normal limits. No pericardial effusion is identified. Thoracic   aortic caliber demonstrates unremarkable caliber.    The central bronchial anatomy appears patent.    Elevation of the right hemidiaphragm is identified. There is opacity   identified within the right lower lobe lung parenchyma, likely   representative of a combination of both atelectasis and consolidation. No   additional regions of lung parenchymal infiltrate or consolidation   identified bilaterally. A small right pleuraleffusion is noted. There is   no evidence for left pleural effusion. There is no evidence of   pneumothorax. No mediastinal shift is noted.    The liver is normal in size. No focal hepatic masses are identified.   There is no evidence for intrahepatic or extrahepatic biliary dilatation.   No gallstones, gallbladder wall thickening or pericholecystic fluid   identified.    The spleen, pancreas and adrenal glands are unremarkable.    The kidneys enhance bilaterally, without evidence for space-occupying   lesion or hydronephrosis. A small less than 3 mm calculus is noted within   the upper pole region of the left kidney. No right renal calculi are   identified. The abdominal aorta is normal in course and caliber. No   abnormally enlarged retroperitoneal or pelvic lymphadenopathy is noted.    Fecal material is scattered throughout the colon. There is no evidence   for mechanical bowel obstruction. No colonic wall thickening is   identified. There is no evidence for free intraperitoneal air orfluid.   There is no evidence for acute appendicitis.    The uterus and urinary bladder appear unremarkable.     No acute osseous fractures are evident.    IMPRESSION:    1. Mid to distal esophageal wall thickening is suggested. Correlate with   endoscopic evaluation.  2. A PEG tube is identified, the distal aspect of which is identified in   the region of the pylorus/duodenal bulb; correlate clinically as to   appropriate positioning.  3. There is opacity identified within the right lower lobe lung   parenchyma, likely representative of a combination of both atelectasis   and consolidation. Small right pleural effusion.     PAST MEDICAL & SURGICAL HISTORY:  No pertinent past medical history  Hypertension  Schizophrenia  Diabetic coma  Diabetes mellitus  No significant past surgical history  S/P percutaneous endoscopic gastrostomy (PEG) tube placement  H/O tracheostomy    Impression; 64 Y/O Female with prior mentioned multiple chronic conditions. Presented with Cough, SOB and Vomiting. Leukocytosis from WBC 23.1 to WBC 15.4 with RLL Opacity secondary to Aspiration PNA due to pseudomonas ( CRE) Oxygen supplementation FIO2 40% via Trach collar due to Chronic Hypoxic respiratory failure. Leukocytosis WBC from 23 to WBC 6.2. Been Afebrile. Will need antibx until 7/28.. double coverage     Suggestion:     Continue DuoNeb Q 6 hours.  Oxygen supplementation via Trach collar.  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    Aspiration precautions. HOB elevation especially during Peg feeding.   Turn and reposition in bed.      Agree with above assessment and plan as transcribed.

## 2018-07-24 NOTE — PROGRESS NOTE ADULT - SUBJECTIVE AND OBJECTIVE BOX
Patient is a 63y old  Female who presents with a chief complaint of SOB (14 Jul 2018 18:51)    angiotensin converting enzyme inhibitors (Unknown)      INTERVAL HPI/OVERNIGHT EVENTS:    T(C): 36.7 (07-24-18 @ 08:25), Max: 36.8 (07-23-18 @ 13:28)  HR: 63 (07-24-18 @ 08:34) (52 - 70)  BP: 126/60 (07-24-18 @ 08:25) (116/59 - 169/83)  RR: 15 (07-24-18 @ 08:25) (15 - 18)  SpO2: 100% (07-24-18 @ 08:34) (95% - 100%)  I&O's Summary    Vital Signs Last 24 Hrs  T(C): 36.7 (24 Jul 2018 08:25), Max: 36.8 (23 Jul 2018 13:28)  T(F): 98.1 (24 Jul 2018 08:25), Max: 98.3 (23 Jul 2018 13:28)  HR: 63 (24 Jul 2018 08:34) (52 - 70)  BP: 126/60 (24 Jul 2018 08:25) (116/59 - 169/83)  BP(mean): 76 (24 Jul 2018 08:25) (72 - 76)  RR: 15 (24 Jul 2018 08:25) (15 - 18)  SpO2: 100% (24 Jul 2018 08:34) (95% - 100%)  PAST MEDICAL & SURGICAL HISTORY:  No pertinent past medical history  Hypertension  Schizophrenia  Diabetic coma  Diabetes mellitus  No significant past surgical history  S/P percutaneous endoscopic gastrostomy (PEG) tube placement  H/O tracheostomy          LABS:                        12.5   6.2   )-----------( 152      ( 24 Jul 2018 10:56 )             39.8     07-24    139  |  104  |  6<L>  ----------------------------<  207<H>  3.8   |  31  |  0.83    Ca    9.3      24 Jul 2018 10:56        CAPILLARY BLOOD GLUCOSE      POCT Blood Glucose.: 235 mg/dL (24 Jul 2018 11:57)  POCT Blood Glucose.: 120 mg/dL (24 Jul 2018 06:36)  POCT Blood Glucose.: 216 mg/dL (23 Jul 2018 23:13)  POCT Blood Glucose.: 110 mg/dL (23 Jul 2018 17:32)            MEDICATIONS  (STANDING):  ALBUTerol/ipratropium for Nebulization 3 milliLiter(s) Nebulizer every 6 hours  amiKACIN  IVPB 600 milliGRAM(s) IV Intermittent every 12 hours  amLODIPine   Tablet 2.5 milliGRAM(s) Oral daily  atorvastatin 20 milliGRAM(s) Oral at bedtime  cefepime   IVPB 2000 milliGRAM(s) IV Intermittent every 8 hours  cefepime   IVPB      dextrose 5% + sodium chloride 0.45%. 1000 milliLiter(s) (50 mL/Hr) IV Continuous <Continuous>  dextrose 5% + sodium chloride 0.45%. 1000 milliLiter(s) (75 mL/Hr) IV Continuous <Continuous>  dextrose 5%. 1000 milliLiter(s) (50 mL/Hr) IV Continuous <Continuous>  dextrose 50% Injectable 12.5 Gram(s) IV Push once  dextrose 50% Injectable 25 Gram(s) IV Push once  dextrose 50% Injectable 25 Gram(s) IV Push once  heparin  Injectable 5000 Unit(s) SubCutaneous every 8 hours  insulin glargine Injectable (LANTUS) 15 Unit(s) SubCutaneous at bedtime  insulin lispro (HumaLOG) corrective regimen sliding scale   SubCutaneous every 6 hours  levETIRAcetam  Solution 1000 milliGRAM(s) Oral two times a day  LORazepam     Tablet 1 milliGRAM(s) Oral every 6 hours  LORazepam   Injectable 1 milliGRAM(s) IV Push once  metoprolol tartrate 100 milliGRAM(s) Oral two times a day  pantoprazole   Suspension 40 milliGRAM(s) Enteral Tube daily  sodium chloride 0.9% lock flush 20 milliLiter(s) IV Push once    MEDICATIONS  (PRN):  acetaminophen    Suspension 650 milliGRAM(s) Oral every 6 hours PRN For Temp greater than 38 C (100.4 F)  dextrose 40% Gel 15 Gram(s) Oral once PRN Blood Glucose LESS THAN 70 milliGRAM(s)/deciLiter  glucagon  Injectable 1 milliGRAM(s) IntraMuscular once PRN Glucose <70 milliGRAM(s)/deciLiter  LORazepam   Injectable 1 milliGRAM(s) IV Push every 6 hours PRN combative /agitation  sodium chloride 0.9% lock flush 10 milliLiter(s) IV Push every 1 hour PRN After each medication administration  sodium chloride 0.9% lock flush 10 milliLiter(s) IV Push every 12 hours PRN Lumen of catheter NOT used      REVIEW OF SYSTEMS:  unable, non verbal  RADIOLOGY & ADDITIONAL TESTS:      Consultant(s) Notes Reviewed:  [ ] YES  [ ] NO    PHYSICAL EXAM:  GENERAL: NAD, well-groomed,   HEAD:  Atraumatic, Normocephalic  EYES: , conjunctiva and sclera clear  ENMT: No tonsillar erythema, exudate from trach,  Moist mucous membranes,   NECK: Supple, No JVD, Normal thyroid  NERVOUS SYSTEM:  Agitated, combative ,  worm like tongue motion  CHEST/LUNG: decreased BS   HEART: S1, S2; No murmurs, rubs, or gallops  ABDOMEN: Soft, Nontender, Nondistended; Bowel sounds present. Peg noted, no further diarrhea  EXTREMITIES:  2+ Peripheral Pulses, No clubbing, cyanosis, or edema  LYMPH: No lymphadenopathy noted  SKIN: No rashes or lesions    Care Collaborated Discussed with Consultants/Other Providers [Y ] YES  [ ] NO

## 2018-07-24 NOTE — PROGRESS NOTE ADULT - SUBJECTIVE AND OBJECTIVE BOX
Patient is seen and examined at the bed side, is afebrile.  She is less restless today.      REVIEW OF SYSTEMS: Unable to obtained since nonverbal      ALLERGIES: ACIs      Vital Signs Last 24 Hrs  T(C): 37.1 (24 Jul 2018 20:26), Max: 37.2 (24 Jul 2018 13:55)  T(F): 98.7 (24 Jul 2018 20:26), Max: 99 (24 Jul 2018 13:55)  HR: 56 (24 Jul 2018 20:26) (52 - 70)  BP: 120/53 (24 Jul 2018 20:26) (118/65 - 169/83)  BP(mean): 69 (24 Jul 2018 17:58) (69 - 80)  RR: 17 (24 Jul 2018 20:26) (15 - 17)  SpO2: 99% (24 Jul 2018 20:26) (96% - 100%)      PHYSICAL EXAM:  GENERAL: Not in acute distress, having involuntary movements of face  HEENT: Trach in placed  CVS: S1 and s2 present  RESP: Air entry B/L  GI: Abdomen nondistended, NT and PEG in placed  EXT: No pedal edema, hands contracted   CNS: Awake but not alert        LABS:                        12.5   6.2   )-----------( 152      ( 24 Jul 2018 10:56 )             39.8                  11.8   15.4  )-----------( 170      ( 16 Jul 2018 07:12 )             38.1                           12.2   23.0  )-----------( 180      ( 15 Jul 2018 12:45 )             38.7         07-24    139  |  104  |  6<L>  ----------------------------<  207<H>  3.8   |  31  |  0.83    Ca    9.3      24 Jul 2018 10:56      07-22    144  |  108  |  4<L>  ----------------------------<  173<H>  4.0   |  27  |  0.74    Ca    9.1      22 Jul 2018 07:06  Phos  3.0     07-22  Mg     1.7     07-22    TPro  7.4  /  Alb  3.4<L>  /  TBili  0.9  /  DBili  x   /  AST  26  /  ALT  44  /  AlkPhos  108  07-14        CAPILLARY BLOOD GLUCOSE      POCT Blood Glucose.: 211 mg/dL (15 Jul 2018 17:22)  POCT Blood Glucose.: 123 mg/dL (15 Jul 2018 11:44)  POCT Blood Glucose.: 353 mg/dL (15 Jul 2018 07:56)  POCT Blood Glucose.: 254 mg/dL (15 Jul 2018 00:42)  POCT Blood Glucose.: 230 mg/dL (14 Jul 2018 22:22)        MEDICATIONS  (STANDING):  ALBUTerol/ipratropium for Nebulization 3 milliLiter(s) Nebulizer every 6 hours  amiKACIN  IVPB 600 milliGRAM(s) IV Intermittent every 12 hours  amLODIPine   Tablet 2.5 milliGRAM(s) Oral daily  atorvastatin 20 milliGRAM(s) Oral at bedtime  benztropine 1 milliGRAM(s) Oral two times a day  cefepime   IVPB 2000 milliGRAM(s) IV Intermittent every 8 hours  cefepime   IVPB      dextrose 5% + sodium chloride 0.45%. 1000 milliLiter(s) (50 mL/Hr) IV Continuous <Continuous>  dextrose 5% + sodium chloride 0.45%. 1000 milliLiter(s) (75 mL/Hr) IV Continuous <Continuous>  dextrose 5%. 1000 milliLiter(s) (50 mL/Hr) IV Continuous <Continuous>  heparin  Injectable 5000 Unit(s) SubCutaneous every 8 hours  insulin glargine Injectable (LANTUS) 15 Unit(s) SubCutaneous at bedtime  insulin lispro (HumaLOG) corrective regimen sliding scale   SubCutaneous every 6 hours  levETIRAcetam  Solution 1000 milliGRAM(s) Oral two times a day  LORazepam   Injectable 1 milliGRAM(s) IV Push once  metoprolol tartrate 100 milliGRAM(s) Oral two times a day  pantoprazole   Suspension 40 milliGRAM(s) Enteral Tube daily  sodium chloride 0.9% lock flush 20 milliLiter(s) IV Push once    MEDICATIONS  (PRN):  acetaminophen    Suspension 650 milliGRAM(s) Oral every 6 hours PRN For Temp greater than 38 C (100.4 F)  benztropine Injectable 2 milliGRAM(s) IV Push once PRN Extrapyramidal symptoms  dextrose 40% Gel 15 Gram(s) Oral once PRN Blood Glucose LESS THAN 70 milliGRAM(s)/deciLiter  glucagon  Injectable 1 milliGRAM(s) IntraMuscular once PRN Glucose <70 milliGRAM(s)/deciLiter  LORazepam   Injectable 1 milliGRAM(s) IV Push every 6 hours PRN combative /agitation  sodium chloride 0.9% lock flush 10 milliLiter(s) IV Push every 1 hour PRN After each medication administration  sodium chloride 0.9% lock flush 10 milliLiter(s) IV Push every 12 hours PRN Lumen of catheter NOT used          RADIOLOGY & ADDITIONAL TESTS:    7/19/18: Xray Abdomen 1 View PORTABLE -Urgent (07.19.18 @ 02:39) Gastrostomy evaluation without evidence for extravasation as described.      7/15/18: CT Chest No Cont (07.15.18 @ 11:14) 1. Mid to distal esophageal wall thickening is suggested. Correlate with endoscopic evaluation.  2. A PEG tube is identified, the distal aspect of which is identified in the region of the pylorus/duodenal bulb; correlate clinically as to appropriate positioning.  3. There is opacity identified within the right lower lobe lung parenchyma, likely representative of a combination of both atelectasis and consolidation. Small right pleural effusion.    7/14/18: Xray Chest 1 View AP/PA (07.14.18 @ 15:34)No consolidation. Platelike atelectasis in the right lower lobe.        MICROBIOLOGY DATA:        Culture - Sputum . (07.16.18 @ 10:36)    -  Gentamicin: R >8    -  Imipenem: R >8    -  Levofloxacin: R >4    -  Meropenem: R >8    -  Piperacillin/Tazobactam: R >64    -  Tobramycin: R >8    -  Amikacin: S 16    Gram Stain:   Moderate polymorphonuclear leukocytes seen per low power field  Moderate Squamous epithelial cells seen per low power field  Moderate Gram Negative Rods seen per oil power field  Few Gram Positive Cocci seen per oil power field    -  Aztreonam: R >16    -  Cefepime: I 16    -  Ceftazidime: R >16    -  Ciprofloxacin: R >2    Specimen Source: .Sputum Sputum trap    Culture Results:   Three or more mixed gram negative rods including  Moderate Pseudomonas aeruginosa (Carbapenem Resistant)    Organism Identification: Pseudomonas aeruginosa (Carbapenem Resistant)    Organism: Pseudomonas aeruginosa (Carbapenem Resistant)    Method Type: ESTIVEN        MRSA/MSSA PCR (07.16.18 @ 10:15)    MRSA PCR Result.: [facility]: MRSA/MSSA PCR assay is a qualitative in vitro diagnostic test for the  direct detection and differentiation of methicillin-resistant  Staphylococcus aureus (MRSA) from Staphylococcus aureus (SA). The assay  detects DNA from nasal swabs in patients atrisk for nasal colonization.  It is not intended to diagnose MRSA or SA infections nor guide or monitor  treatment for MRSA/SA infections. A negative result does not preclude  nasal colonization. The assay is FDA-approved and its performance has  been established by [employer], [place] and the [facility], [place], [place].    Staph Aureus PCR Result: [facility]    Urine Microscopic-Add On (NC) (07.15.18 @ 23:04)    Bacteria: Many /HPF    Epithelial Cells: Many /HPF    Red Blood Cell - Urine: 5-10 /HPF    White Blood Cell - Urine: 11-25 /HPF

## 2018-07-24 NOTE — PROGRESS NOTE ADULT - ASSESSMENT
A 63 yo Female with multiple co-morbidities including non-verbal, bed bound, Trach/ PEG tube, sent in to the ER from NH for evaluation of shortness of breath and cough after vomiting. On admission, she has no fever or chills but Leukocytosis. The CXR  shows No consolidation but CT chest shows RLL opacity. She has  started on Vancomycin and Zosyn and The ID consult  requested to assist with further evaluation and antibiotic management.     # Aspiration Pneumonia - Pseudomonas ( CRE)  # Hematemesis  # Procalcitonin level elevated    Would recommend:    1. Aspiration precaution  2. Continue  Bronchial/Oral  suctioning  3. Continue Amikacin and Cefepime until 7/28/18  4. Contact Isolation     d/w nursing staff     will follow the patient with you

## 2018-07-24 NOTE — PROGRESS NOTE ADULT - SUBJECTIVE AND OBJECTIVE BOX
Patient seen and examined at bedside  Case discussed at [facility] with the sister at bedside.  all questions answered appropriately.   improving diarrhea  Case discussed with medical team    HPI:  61 Y/O F, non-verbal, bed bound, Trach/ PEG tube, from NH, PMhx of HTN, asthma, convulsion disorder, bipolar, DVT legs, DM 2, sent from NH for vomiting and cough.  Pt unable to provide history.  History given by sister who states pt was vomiting coffee ground emesis yesterday.  Pt had 2 episodes of reported vomiting, and has not had an episode since.  Pt also developed cough with clear sputum.  Sister states pt is combative at baseline, and her current behavior is baseline.  No reported fever, discomfort, diarrhea, or SOB. (14 Jul 2018 18:51)      PAST MEDICAL & SURGICAL HISTORY:  No pertinent past medical history  Hypertension  Schizophrenia  Diabetic coma  Diabetes mellitus  No significant past surgical history  S/P percutaneous endoscopic gastrostomy (PEG) tube placement  H/O tracheostomy      angiotensin converting enzyme inhibitors (Unknown)       MEDICATIONS  (STANDING):  ALBUTerol/ipratropium for Nebulization 3 milliLiter(s) Nebulizer every 6 hours  amiKACIN  IVPB 600 milliGRAM(s) IV Intermittent every 12 hours  amLODIPine   Tablet 2.5 milliGRAM(s) Oral daily  atorvastatin 20 milliGRAM(s) Oral at bedtime  benztropine 1 milliGRAM(s) Oral two times a day  cefepime   IVPB 2000 milliGRAM(s) IV Intermittent every 8 hours  cefepime   IVPB      dextrose 5% + sodium chloride 0.45%. 1000 milliLiter(s) (50 mL/Hr) IV Continuous <Continuous>  dextrose 5% + sodium chloride 0.45%. 1000 milliLiter(s) (75 mL/Hr) IV Continuous <Continuous>  dextrose 5%. 1000 milliLiter(s) (50 mL/Hr) IV Continuous <Continuous>  dextrose 50% Injectable 12.5 Gram(s) IV Push once  dextrose 50% Injectable 25 Gram(s) IV Push once  dextrose 50% Injectable 25 Gram(s) IV Push once  heparin  Injectable 5000 Unit(s) SubCutaneous every 8 hours  insulin glargine Injectable (LANTUS) 15 Unit(s) SubCutaneous at bedtime  insulin lispro (HumaLOG) corrective regimen sliding scale   SubCutaneous every 6 hours  levETIRAcetam  Solution 1000 milliGRAM(s) Oral two times a day  LORazepam   Injectable 1 milliGRAM(s) IV Push once  metoprolol tartrate 100 milliGRAM(s) Oral two times a day  pantoprazole   Suspension 40 milliGRAM(s) Enteral Tube daily  sodium chloride 0.9% lock flush 20 milliLiter(s) IV Push once    MEDICATIONS  (PRN):  acetaminophen    Suspension 650 milliGRAM(s) Oral every 6 hours PRN For Temp greater than 38 C (100.4 F)  benztropine Injectable 2 milliGRAM(s) IV Push once PRN Extrapyramidal symptoms  dextrose 40% Gel 15 Gram(s) Oral once PRN Blood Glucose LESS THAN 70 milliGRAM(s)/deciLiter  glucagon  Injectable 1 milliGRAM(s) IntraMuscular once PRN Glucose <70 milliGRAM(s)/deciLiter  LORazepam   Injectable 1 milliGRAM(s) IV Push every 6 hours PRN combative /agitation  sodium chloride 0.9% lock flush 10 milliLiter(s) IV Push every 1 hour PRN After each medication administration  sodium chloride 0.9% lock flush 10 milliLiter(s) IV Push every 12 hours PRN Lumen of catheter NOT used      REVIEW OF SYSTEMS:  limited 2/2 mental status    T(C): 37.2 (07-24-18 @ 13:55), Max: 37.2 (07-24-18 @ 13:55)  HR: 64 (07-24-18 @ 17:58) (52 - 70)  BP: 127/5 (07-24-18 @ 17:58) (118/65 - 169/83)  RR: 17 (07-24-18 @ 13:55) (15 - 17)  SpO2: 100% (07-24-18 @ 13:55) (96% - 100%)    PHYSICAL EXAMINATION:   Constitutional:NAD  HEENT: NC, AT  Neck:  Supple  Respiratory:  trach intact. Adequate airflow b/l. Not using accessory muscles of respiration.  Cardiovascular:  S1 & S2 intact, no R/G, 2+ radial pulses b/l  Gastrointestinal: peg intact, soft, ND, normoactive b.s., no organomegaly/RT/rigidity  Extremities: WWP  Neurological:  awake.  Crude sensation intact.     Labs and imaging reviewed    LABS:                        12.5   6.2   )-----------( 152      ( 24 Jul 2018 10:56 )             39.8     07-24    139  |  104  |  6<L>  ----------------------------<  207<H>  3.8   |  31  |  0.83    Ca    9.3      24 Jul 2018 10:56              CAPILLARY BLOOD GLUCOSE      POCT Blood Glucose.: 84 mg/dL (24 Jul 2018 16:55)  POCT Blood Glucose.: 235 mg/dL (24 Jul 2018 11:57)  POCT Blood Glucose.: 120 mg/dL (24 Jul 2018 06:36)  POCT Blood Glucose.: 216 mg/dL (23 Jul 2018 23:13)              RADIOLOGY & ADDITIONAL STUDIES:

## 2018-07-24 NOTE — BEHAVIORAL HEALTH ASSESSMENT NOTE - SUMMARY
This is a 64 y/o  female with PMH of PEG/Trach tube,Asthma,DM,Seizure disorder was admitted with aspiration PNA.  Patient has Trach/PEG tube on,nonverbal state.  Patient was on Ativan at NH.  While in the hospital patient was started on Seroquel and started having constant leg and arm movement.  Seroquel was d/c on 07/24/18.  Patient is on Ativan 1mg po q6 h and also on PRN ativan    Patient is nonverbal.constanly moving her extremities and has lip smacking.  Unable to do formal MSE and MMse at this time due to nonverbal,confusional state.

## 2018-07-24 NOTE — PROGRESS NOTE ADULT - ASSESSMENT
61 Y/O F, non-verbal, bed bound, Trach/ PEG tube, from NH, PMhx of HTN, asthma, convulsion disorder, bipolar, DVT legs, DM, sent from NH for vomiting and cough.  In the ED, pt presents in no acute distress, and vitals WNL.  Pt is afebrile with leukocytosis of 19.  CXR concerning for RLL infiltrate likely 2/2 asp pneumonia.  Abdominal x-ray sig for non-specific gas pattern and intact PEG tube.  Pt will be admitted to medicine for suspected Aspiration pneumonia.    Plan:   - Diarrhea improved. (-) Cdiff pcr  - Complicated Aspiration PNA/HCAP 2/2 CRE Pseudomonas & UTI improving. d/c back to facility with completion of iv abx   - Hematemesis - improved  - PEG Dislodgement - improved   - Uncontrolled Type 2 DM with complications of hyperglycemia - improved. C/w lantus, moderate ihss, monitor acks  - Need for prophylactic measure: dvt/gi ppx

## 2018-07-25 DIAGNOSIS — R40.3 PERSISTENT VEGETATIVE STATE: ICD-10-CM

## 2018-07-25 DIAGNOSIS — R56.9 UNSPECIFIED CONVULSIONS: ICD-10-CM

## 2018-07-25 DIAGNOSIS — G25.9 EXTRAPYRAMIDAL AND MOVEMENT DISORDER, UNSPECIFIED: ICD-10-CM

## 2018-07-25 LAB
CK MB BLD-MCNC: 2 % — SIGNIFICANT CHANGE UP (ref 0–3.5)
CK MB CFR SERPL CALC: 1.5 NG/ML — SIGNIFICANT CHANGE UP (ref 0–3.6)
CK SERPL-CCNC: 76 U/L — SIGNIFICANT CHANGE UP (ref 21–215)
D DIMER BLD IA.RAPID-MCNC: 269 NG/ML DDU — HIGH
TROPONIN I SERPL-MCNC: <0.015 NG/ML — SIGNIFICANT CHANGE UP (ref 0–0.04)

## 2018-07-25 PROCEDURE — 99223 1ST HOSP IP/OBS HIGH 75: CPT

## 2018-07-25 PROCEDURE — 71045 X-RAY EXAM CHEST 1 VIEW: CPT | Mod: 26

## 2018-07-25 RX ORDER — MORPHINE SULFATE 50 MG/1
2 CAPSULE, EXTENDED RELEASE ORAL ONCE
Qty: 0 | Refills: 0 | Status: DISCONTINUED | OUTPATIENT
Start: 2018-07-25 | End: 2018-07-25

## 2018-07-25 RX ORDER — BENZTROPINE MESYLATE 1 MG
2 TABLET ORAL
Qty: 0 | Refills: 0 | Status: DISCONTINUED | OUTPATIENT
Start: 2018-07-25 | End: 2018-07-31

## 2018-07-25 RX ADMIN — ATORVASTATIN CALCIUM 20 MILLIGRAM(S): 80 TABLET, FILM COATED ORAL at 21:26

## 2018-07-25 RX ADMIN — LEVETIRACETAM 1000 MILLIGRAM(S): 250 TABLET, FILM COATED ORAL at 05:23

## 2018-07-25 RX ADMIN — CEFEPIME 100 MILLIGRAM(S): 1 INJECTION, POWDER, FOR SOLUTION INTRAMUSCULAR; INTRAVENOUS at 21:26

## 2018-07-25 RX ADMIN — Medication 1 MILLIGRAM(S): at 23:52

## 2018-07-25 RX ADMIN — LEVETIRACETAM 1000 MILLIGRAM(S): 250 TABLET, FILM COATED ORAL at 18:38

## 2018-07-25 RX ADMIN — AMIKACIN SULFATE 102.4 MILLIGRAM(S): 250 INJECTION, SOLUTION INTRAMUSCULAR; INTRAVENOUS at 18:59

## 2018-07-25 RX ADMIN — Medication 3 MILLILITER(S): at 02:10

## 2018-07-25 RX ADMIN — PANTOPRAZOLE SODIUM 40 MILLIGRAM(S): 20 TABLET, DELAYED RELEASE ORAL at 12:03

## 2018-07-25 RX ADMIN — HEPARIN SODIUM 5000 UNIT(S): 5000 INJECTION INTRAVENOUS; SUBCUTANEOUS at 05:22

## 2018-07-25 RX ADMIN — HEPARIN SODIUM 5000 UNIT(S): 5000 INJECTION INTRAVENOUS; SUBCUTANEOUS at 21:26

## 2018-07-25 RX ADMIN — MORPHINE SULFATE 2 MILLIGRAM(S): 50 CAPSULE, EXTENDED RELEASE ORAL at 17:45

## 2018-07-25 RX ADMIN — Medication 100 MILLIGRAM(S): at 18:41

## 2018-07-25 RX ADMIN — Medication 1 MILLIGRAM(S): at 05:22

## 2018-07-25 RX ADMIN — HEPARIN SODIUM 5000 UNIT(S): 5000 INJECTION INTRAVENOUS; SUBCUTANEOUS at 13:55

## 2018-07-25 RX ADMIN — MORPHINE SULFATE 2 MILLIGRAM(S): 50 CAPSULE, EXTENDED RELEASE ORAL at 17:28

## 2018-07-25 RX ADMIN — Medication 3 MILLILITER(S): at 20:08

## 2018-07-25 RX ADMIN — Medication 4: at 23:09

## 2018-07-25 RX ADMIN — Medication 4: at 06:20

## 2018-07-25 RX ADMIN — Medication 3 MILLILITER(S): at 15:25

## 2018-07-25 RX ADMIN — Medication 2 MILLIGRAM(S): at 11:28

## 2018-07-25 RX ADMIN — CEFEPIME 100 MILLIGRAM(S): 1 INJECTION, POWDER, FOR SOLUTION INTRAMUSCULAR; INTRAVENOUS at 05:22

## 2018-07-25 RX ADMIN — Medication 3 MILLILITER(S): at 09:27

## 2018-07-25 RX ADMIN — Medication 100 MILLIGRAM(S): at 05:22

## 2018-07-25 RX ADMIN — INSULIN GLARGINE 15 UNIT(S): 100 INJECTION, SOLUTION SUBCUTANEOUS at 23:31

## 2018-07-25 RX ADMIN — Medication 2 MILLIGRAM(S): at 18:40

## 2018-07-25 RX ADMIN — Medication 1 MILLIGRAM(S): at 18:44

## 2018-07-25 RX ADMIN — Medication 4: at 12:02

## 2018-07-25 RX ADMIN — Medication 2: at 18:40

## 2018-07-25 RX ADMIN — CEFEPIME 100 MILLIGRAM(S): 1 INJECTION, POWDER, FOR SOLUTION INTRAMUSCULAR; INTRAVENOUS at 13:55

## 2018-07-25 RX ADMIN — AMLODIPINE BESYLATE 2.5 MILLIGRAM(S): 2.5 TABLET ORAL at 05:22

## 2018-07-25 RX ADMIN — AMIKACIN SULFATE 102.4 MILLIGRAM(S): 250 INJECTION, SOLUTION INTRAMUSCULAR; INTRAVENOUS at 06:20

## 2018-07-25 NOTE — CONSULT NOTE ADULT - SUBJECTIVE AND OBJECTIVE BOX
History of Present Illness:    HPI:  61 Y/O F, non-verbal, bed bound, Trach/ PEG tube, from NH, PMhx of HTN, asthma, convulsion disorder, bipolar, DVT legs, DM 2, sent from NH for vomiting and cough.  Pt unable to provide history.  History given by sister who states pt was vomiting coffee ground emesis yesterday.  Pt had 2 episodes of reported vomiting, and has not had an episode since.  Pt also developed cough with clear sputum.  Sister states pt is combative at baseline, and her current behavior is baseline.  No reported fever, discomfort, diarrhea, or SOB. (14 Jul 2018 18:51)    Later on the floor: Pt is non-verbal, has trach and PEG after she had diabetic coma. She developed a lot of spasticity/?gaganhaltun and has involuntary movements in her both hands/legs. Psych has already been consulted and has been prescribed Cogentin. Now I have been asked to see her for possible seizure or something. Her body has been contracted.     Allergies    angiotensin converting enzyme inhibitors (Unknown)    Intolerances    PAST MEDICAL & SURGICAL HISTORY:  No pertinent past medical history  Hypertension  Schizophrenia  Diabetic coma  Diabetes mellitus  No significant past surgical history  S/P percutaneous endoscopic gastrostomy (PEG) tube placement  H/O tracheostomy    Social History: [ ] Tobacco use, [ ] Alcohol use.  unable to find out      MEDICATIONS  (STANDING):  ALBUTerol/ipratropium for Nebulization 3 milliLiter(s) Nebulizer every 6 hours  amiKACIN  IVPB 600 milliGRAM(s) IV Intermittent every 12 hours  amLODIPine   Tablet 2.5 milliGRAM(s) Oral daily  atorvastatin 20 milliGRAM(s) Oral at bedtime  benztropine 2 milliGRAM(s) Oral two times a day  cefepime   IVPB 2000 milliGRAM(s) IV Intermittent every 8 hours  cefepime   IVPB      dextrose 5% + sodium chloride 0.45%. 1000 milliLiter(s) (50 mL/Hr) IV Continuous <Continuous>  dextrose 5% + sodium chloride 0.45%. 1000 milliLiter(s) (75 mL/Hr) IV Continuous <Continuous>  dextrose 5%. 1000 milliLiter(s) (50 mL/Hr) IV Continuous <Continuous>  dextrose 50% Injectable 12.5 Gram(s) IV Push once  dextrose 50% Injectable 25 Gram(s) IV Push once  dextrose 50% Injectable 25 Gram(s) IV Push once  heparin  Injectable 5000 Unit(s) SubCutaneous every 8 hours  insulin glargine Injectable (LANTUS) 15 Unit(s) SubCutaneous at bedtime  insulin lispro (HumaLOG) corrective regimen sliding scale   SubCutaneous every 6 hours  levETIRAcetam  Solution 1000 milliGRAM(s) Oral two times a day  LORazepam     Tablet 1 milliGRAM(s) Oral every 6 hours  LORazepam   Injectable 1 milliGRAM(s) IV Push once  metoprolol tartrate 100 milliGRAM(s) Oral two times a day  pantoprazole   Suspension 40 milliGRAM(s) Enteral Tube daily  sodium chloride 0.9% lock flush 20 milliLiter(s) IV Push once    Family:  FAMILY HISTORY:  No pertinent family history in first degree relatives        Review of Systems:  Pt is non-verbal    Vital Signs:    T(C): 36.7 (07-25-18 @ 14:14), Max: 37.2 (07-24-18 @ 13:55)  HR: 98 (07-25-18 @ 14:14) (52 - 127)  BP: 100/76 (07-25-18 @ 14:14) (100/76 - 169/83)  RR: 18 (07-25-18 @ 14:14) (15 - 18)  SpO2: 94% (07-25-18 @ 14:14) (94% - 100%)    Physical Exam:  General:  General Appearance -Contracted  Head and Neck:  Head - normocephalic, atraumatic with no lesions or palpable masses    Chest and Lung exam:  Quiet, even and easy respiratory effort with no use of accessory muscles and on ausculation, normal breath sounds, no adventitious sounds and normal vocal resonance    Cardiovascular:  Auscultation: Normal heart sounds  Murmurs & Other heart sounds: Auscultation of the heart reveals- no murmurs  Carotid arteris: No Carotid bruits    Abdomen:  Palpation/Percussion: Non Tender, No Rebound tenderness, No hepatosplenomegaly, and No palpable abdominal masses    Peripheral Vascular:  Lower extremity: Inspection - Bilateral - No varicose veins  Palpation: Tenderness - bilateral - Non tender  Dorsalis pedis pulse - bilateral - normal  Edema - bilateral - no edema    Neurologic Exam:  Mental Status - aphasic, does not follow any commends.  Cranial Nerves: Limited study  II Optic: Pupils- reacting  Face looks symmetrical  other cranial nerves are difficult to examine.  Motor:  Bulk and Contour: Normal  Tone: Gaganhaulten.  Strength:   sponataneous moments.  General Assessment of Reflexes: Right Wrist - 3+ ;  Left Wrist - 3+ ; Right Elbow - 3+ ;  Left Elbow - 3+ ;  Right Knee - 3+ ;  Left Knee - 3+ ;   Right Ankle – 3+ ; Left Ankle – 3+  Plantar Reflexes(Babinski) (L4-S2) – Bilateral – withdrawal

## 2018-07-25 NOTE — PROGRESS NOTE ADULT - SUBJECTIVE AND OBJECTIVE BOX
Patient is seen and examined at the bed side, is afebrile.  She is less restless today.      REVIEW OF SYSTEMS: Unable to obtained since nonverbal      ALLERGIES: ACIs      Vital Signs Last 24 Hrs  T(C): 36.7 (25 Jul 2018 14:14), Max: 37.1 (24 Jul 2018 20:26)  T(F): 98 (25 Jul 2018 14:14), Max: 98.7 (24 Jul 2018 20:26)  HR: 96 (25 Jul 2018 18:20) (56 - 127)  BP: 121/72 (25 Jul 2018 18:20) (100/76 - 121/72)  BP(mean): --  RR: 18 (25 Jul 2018 14:14) (17 - 18)  SpO2: 94% (25 Jul 2018 14:14) (94% - 99%)        PHYSICAL EXAM:  GENERAL: Not in acute distress, having involuntary movements of face  HEENT: Trach in placed  CVS: S1 and s2 present  RESP: Air entry B/L  GI: Abdomen nondistended, NT and PEG in placed  EXT: No pedal edema, hands contracted   CNS: Awake but not alert        LABS: No new labs                        12.5   6.2   )-----------( 152      ( 24 Jul 2018 10:56 )             39.8                11.8   15.4  )-----------( 170      ( 16 Jul 2018 07:12 )             38.1                           12.2   23.0  )-----------( 180      ( 15 Jul 2018 12:45 )             38.7           07-24    139  |  104  |  6<L>  ----------------------------<  207<H>  3.8   |  31  |  0.83    Ca    9.3      24 Jul 2018 10:56        07-22    144  |  108  |  4<L>  ----------------------------<  173<H>  4.0   |  27  |  0.74    Ca    9.1      22 Jul 2018 07:06  Phos  3.0     07-22  Mg     1.7     07-22    TPro  7.4  /  Alb  3.4<L>  /  TBili  0.9  /  DBili  x   /  AST  26  /  ALT  44  /  AlkPhos  108  07-14        CAPILLARY BLOOD GLUCOSE      POCT Blood Glucose.: 211 mg/dL (15 Jul 2018 17:22)  POCT Blood Glucose.: 123 mg/dL (15 Jul 2018 11:44)  POCT Blood Glucose.: 353 mg/dL (15 Jul 2018 07:56)  POCT Blood Glucose.: 254 mg/dL (15 Jul 2018 00:42)  POCT Blood Glucose.: 230 mg/dL (14 Jul 2018 22:22)        MEDICATIONS  (STANDING):  ALBUTerol/ipratropium for Nebulization 3 milliLiter(s) Nebulizer every 6 hours  amiKACIN  IVPB 600 milliGRAM(s) IV Intermittent every 12 hours  amLODIPine   Tablet 2.5 milliGRAM(s) Oral daily  atorvastatin 20 milliGRAM(s) Oral at bedtime  benztropine 2 milliGRAM(s) Oral two times a day  cefepime   IVPB 2000 milliGRAM(s) IV Intermittent every 8 hours  cefepime   IVPB      dextrose 5% + sodium chloride 0.45%. 1000 milliLiter(s) (50 mL/Hr) IV Continuous <Continuous>  dextrose 5% + sodium chloride 0.45%. 1000 milliLiter(s) (75 mL/Hr) IV Continuous <Continuous>  dextrose 5%. 1000 milliLiter(s) (50 mL/Hr) IV Continuous <Continuous>  dextrose 50% Injectable 12.5 Gram(s) IV Push once  dextrose 50% Injectable 25 Gram(s) IV Push once  dextrose 50% Injectable 25 Gram(s) IV Push once  heparin  Injectable 5000 Unit(s) SubCutaneous every 8 hours  insulin glargine Injectable (LANTUS) 15 Unit(s) SubCutaneous at bedtime  insulin lispro (HumaLOG) corrective regimen sliding scale   SubCutaneous every 6 hours  levETIRAcetam  Solution 1000 milliGRAM(s) Oral two times a day  LORazepam     Tablet 1 milliGRAM(s) Oral every 6 hours  LORazepam   Injectable 1 milliGRAM(s) IV Push once  metoprolol tartrate 100 milliGRAM(s) Oral two times a day  pantoprazole   Suspension 40 milliGRAM(s) Enteral Tube daily  sodium chloride 0.9% lock flush 20 milliLiter(s) IV Push once    MEDICATIONS  (PRN):  acetaminophen    Suspension 650 milliGRAM(s) Oral every 6 hours PRN For Temp greater than 38 C (100.4 F)  benztropine Injectable 2 milliGRAM(s) IV Push once PRN Extrapyramidal symptoms  dextrose 40% Gel 15 Gram(s) Oral once PRN Blood Glucose LESS THAN 70 milliGRAM(s)/deciLiter  glucagon  Injectable 1 milliGRAM(s) IntraMuscular once PRN Glucose <70 milliGRAM(s)/deciLiter  LORazepam   Injectable 1 milliGRAM(s) IV Push every 6 hours PRN combative /agitation  sodium chloride 0.9% lock flush 10 milliLiter(s) IV Push every 1 hour PRN After each medication administration  sodium chloride 0.9% lock flush 10 milliLiter(s) IV Push every 12 hours PRN Lumen of catheter NOT used          RADIOLOGY & ADDITIONAL TESTS:    7/19/18: Xray Abdomen 1 View PORTABLE -Urgent (07.19.18 @ 02:39) Gastrostomy evaluation without evidence for extravasation as described.      7/15/18: CT Chest No Cont (07.15.18 @ 11:14) 1. Mid to distal esophageal wall thickening is suggested. Correlate with endoscopic evaluation.  2. A PEG tube is identified, the distal aspect of which is identified in the region of the pylorus/duodenal bulb; correlate clinically as to appropriate positioning.  3. There is opacity identified within the right lower lobe lung parenchyma, likely representative of a combination of both atelectasis and consolidation. Small right pleural effusion.    7/14/18: Xray Chest 1 View AP/PA (07.14.18 @ 15:34)No consolidation. Platelike atelectasis in the right lower lobe.        MICROBIOLOGY DATA:        Culture - Sputum . (07.16.18 @ 10:36)    -  Gentamicin: R >8    -  Imipenem: R >8    -  Levofloxacin: R >4    -  Meropenem: R >8    -  Piperacillin/Tazobactam: R >64    -  Tobramycin: R >8    -  Amikacin: S 16    Gram Stain:   Moderate polymorphonuclear leukocytes seen per low power field  Moderate Squamous epithelial cells seen per low power field  Moderate Gram Negative Rods seen per oil power field  Few Gram Positive Cocci seen per oil power field    -  Aztreonam: R >16    -  Cefepime: I 16    -  Ceftazidime: R >16    -  Ciprofloxacin: R >2    Specimen Source: .Sputum Sputum trap    Culture Results:   Three or more mixed gram negative rods including  Moderate Pseudomonas aeruginosa (Carbapenem Resistant)    Organism Identification: Pseudomonas aeruginosa (Carbapenem Resistant)    Organism: Pseudomonas aeruginosa (Carbapenem Resistant)    Method Type: ESTIVEN        MRSA/MSSA PCR (07.16.18 @ 10:15)    MRSA PCR Result.: [place]: MRSA/MSSA PCR assay is a qualitative in vitro diagnostic test for the  direct detection and differentiation of methicillin-resistant  Staphylococcus aureus (MRSA) from Staphylococcus aureus (SA). The assay  detects DNA from nasal swabs in patients atrisk for nasal colonization.  It is not intended to diagnose MRSA or SA infections nor guide or monitor  treatment for MRSA/SA infections. A negative result does not preclude  nasal colonization. The assay is FDA-approved and its performance has  been established by [employer], [place] and the [facility], [place], [place].    Staph Aureus PCR Result: NotDetec    Urine Microscopic-Add On (NC) (07.15.18 @ 23:04)    Bacteria: Many /HPF    Epithelial Cells: Many /HPF    Red Blood Cell - Urine: 5-10 /HPF    White Blood Cell - Urine: 11-25 /HPF Patient is seen and examined at the bed side, is afebrile.  She is doing okay, no new events.      REVIEW OF SYSTEMS: Unable to obtained since nonverbal      ALLERGIES: ACIs      Vital Signs Last 24 Hrs  T(C): 36.7 (25 Jul 2018 14:14), Max: 37.1 (24 Jul 2018 20:26)  T(F): 98 (25 Jul 2018 14:14), Max: 98.7 (24 Jul 2018 20:26)  HR: 96 (25 Jul 2018 18:20) (56 - 127)  BP: 121/72 (25 Jul 2018 18:20) (100/76 - 121/72)  BP(mean): --  RR: 18 (25 Jul 2018 14:14) (17 - 18)  SpO2: 94% (25 Jul 2018 14:14) (94% - 99%)        PHYSICAL EXAM:  GENERAL: Not in acute distress  HEENT: Trach in placed  CVS: S1 and s2 present  RESP: Air entry B/L  GI: Abdomen nondistended, NT and PEG in placed  EXT: No pedal edema, hands contracted   CNS: Awake but not alert        LABS: No new labs                        12.5   6.2   )-----------( 152      ( 24 Jul 2018 10:56 )             39.8                11.8   15.4  )-----------( 170      ( 16 Jul 2018 07:12 )             38.1                           12.2   23.0  )-----------( 180      ( 15 Jul 2018 12:45 )             38.7           07-24    139  |  104  |  6<L>  ----------------------------<  207<H>  3.8   |  31  |  0.83    Ca    9.3      24 Jul 2018 10:56        07-22    144  |  108  |  4<L>  ----------------------------<  173<H>  4.0   |  27  |  0.74    Ca    9.1      22 Jul 2018 07:06  Phos  3.0     07-22  Mg     1.7     07-22    TPro  7.4  /  Alb  3.4<L>  /  TBili  0.9  /  DBili  x   /  AST  26  /  ALT  44  /  AlkPhos  108  07-14        CAPILLARY BLOOD GLUCOSE      POCT Blood Glucose.: 211 mg/dL (15 Jul 2018 17:22)  POCT Blood Glucose.: 123 mg/dL (15 Jul 2018 11:44)  POCT Blood Glucose.: 353 mg/dL (15 Jul 2018 07:56)  POCT Blood Glucose.: 254 mg/dL (15 Jul 2018 00:42)  POCT Blood Glucose.: 230 mg/dL (14 Jul 2018 22:22)        MEDICATIONS  (STANDING):  ALBUTerol/ipratropium for Nebulization 3 milliLiter(s) Nebulizer every 6 hours  amiKACIN  IVPB 600 milliGRAM(s) IV Intermittent every 12 hours  amLODIPine   Tablet 2.5 milliGRAM(s) Oral daily  atorvastatin 20 milliGRAM(s) Oral at bedtime  benztropine 2 milliGRAM(s) Oral two times a day  cefepime   IVPB 2000 milliGRAM(s) IV Intermittent every 8 hours  cefepime   IVPB      heparin  Injectable 5000 Unit(s) SubCutaneous every 8 hours  insulin glargine Injectable (LANTUS) 15 Unit(s) SubCutaneous at bedtime  insulin lispro (HumaLOG) corrective regimen sliding scale   SubCutaneous every 6 hours  levETIRAcetam  Solution 1000 milliGRAM(s) Oral two times a day  LORazepam     Tablet 1 milliGRAM(s) Oral every 6 hours  LORazepam   Injectable 1 milliGRAM(s) IV Push once  metoprolol tartrate 100 milliGRAM(s) Oral two times a day  pantoprazole   Suspension 40 milliGRAM(s) Enteral Tube daily  sodium chloride 0.9% lock flush 20 milliLiter(s) IV Push once    MEDICATIONS  (PRN):  acetaminophen    Suspension 650 milliGRAM(s) Oral every 6 hours PRN For Temp greater than 38 C (100.4 F)  benztropine Injectable 2 milliGRAM(s) IV Push once PRN Extrapyramidal symptoms  dextrose 40% Gel 15 Gram(s) Oral once PRN Blood Glucose LESS THAN 70 milliGRAM(s)/deciLiter  glucagon  Injectable 1 milliGRAM(s) IntraMuscular once PRN Glucose <70 milliGRAM(s)/deciLiter  LORazepam   Injectable 1 milliGRAM(s) IV Push every 6 hours PRN combative /agitation  sodium chloride 0.9% lock flush 10 milliLiter(s) IV Push every 1 hour PRN After each medication administration  sodium chloride 0.9% lock flush 10 milliLiter(s) IV Push every 12 hours PRN Lumen of catheter NOT used          RADIOLOGY & ADDITIONAL TESTS:    7/19/18: Xray Abdomen 1 View PORTABLE -Urgent (07.19.18 @ 02:39) Gastrostomy evaluation without evidence for extravasation as described.      7/15/18: CT Chest No Cont (07.15.18 @ 11:14) 1. Mid to distal esophageal wall thickening is suggested. Correlate with endoscopic evaluation.  2. A PEG tube is identified, the distal aspect of which is identified in the region of the pylorus/duodenal bulb; correlate clinically as to appropriate positioning.  3. There is opacity identified within the right lower lobe lung parenchyma, likely representative of a combination of both atelectasis and consolidation. Small right pleural effusion.    7/14/18: Xray Chest 1 View AP/PA (07.14.18 @ 15:34)No consolidation. Platelike atelectasis in the right lower lobe.        MICROBIOLOGY DATA:        Culture - Sputum . (07.16.18 @ 10:36)    -  Gentamicin: R >8    -  Imipenem: R >8    -  Levofloxacin: R >4    -  Meropenem: R >8    -  Piperacillin/Tazobactam: R >64    -  Tobramycin: R >8    -  Amikacin: S 16    Gram Stain:   Moderate polymorphonuclear leukocytes seen per low power field  Moderate Squamous epithelial cells seen per low power field  Moderate Gram Negative Rods seen per oil power field  Few Gram Positive Cocci seen per oil power field    -  Aztreonam: R >16    -  Cefepime: I 16    -  Ceftazidime: R >16    -  Ciprofloxacin: R >2    Specimen Source: .Sputum Sputum trap    Culture Results:   Three or more mixed gram negative rods including  Moderate Pseudomonas aeruginosa (Carbapenem Resistant)    Organism Identification: Pseudomonas aeruginosa (Carbapenem Resistant)    Organism: Pseudomonas aeruginosa (Carbapenem Resistant)    Method Type: ESTIVEN        MRSA/MSSA PCR (07.16.18 @ 10:15)    MRSA PCR Result.: NotDetec: MRSA/MSSA PCR assay is a qualitative in vitro diagnostic test for the  direct detection and differentiation of methicillin-resistant  Staphylococcus aureus (MRSA) from Staphylococcus aureus (SA). The assay  detects DNA from nasal swabs in patients atrisk for nasal colonization.  It is not intended to diagnose MRSA or SA infections nor guide or monitor  treatment for MRSA/SA infections. A negative result does not preclude  nasal colonization. The assay is FDA-approved and its performance has  been established by [employer] [place], [place] and the [facility], [place], [place].    Staph Aureus PCR Result: NotDete    Urine Microscopic-Add On (NC) (07.15.18 @ 23:04)    Bacteria: Many /HPF    Epithelial Cells: Many /HPF    Red Blood Cell - Urine: 5-10 /HPF    White Blood Cell - Urine: 11-25 /HPF

## 2018-07-25 NOTE — PROGRESS NOTE ADULT - ASSESSMENT
A 61 yo Female with multiple co-morbidities including non-verbal, bed bound, Trach/ PEG tube, sent in to the ER from NH for evaluation of shortness of breath and cough after vomiting. On admission, she has no fever or chills but Leukocytosis. The CXR  shows No consolidation but CT chest shows RLL opacity. She has  started on Vancomycin and Zosyn and The ID consult  requested to assist with further evaluation and antibiotic management.     # Aspiration Pneumonia - Pseudomonas ( CRE)  # Hematemesis  # Procalcitonin level elevated    Would recommend:    1. Aspiration precaution  2. Continue  Bronchial/Oral  suctioning  3. Continue Amikacin and Cefepime until 7/28/18  4. Contact Isolation     d/w nursing staff     will follow the patient with you A 61 yo Female with multiple co-morbidities including non-verbal, bed bound, Trach/ PEG tube, sent in to the ER from NH for evaluation of shortness of breath and cough after vomiting. On admission, she has no fever or chills but Leukocytosis. The CXR  shows No consolidation but CT chest shows RLL opacity. She has  started on Vancomycin and Zosyn and The ID consult  requested to assist with further evaluation and antibiotic management.     # Aspiration Pneumonia - Pseudomonas ( CRE)  # Hematemesis  # Procalcitonin level elevated    Would recommend:    1. Continue  Bronchial/Oral  suctioning  2. Aspiration precaution  3. Continue Amikacin and Cefepime until 7/28/18  4. Contact Isolation     d/w nursing staff     - D/C planning as per Primary team

## 2018-07-25 NOTE — CHART NOTE - NSCHARTNOTEFT_GEN_A_CORE
Assessment:   Patient reports [ ] nausea  [ ] vomiting [ ] diarrhea [ ] constipation  [ ]chewing problems [ ] swallowing issues  [ ] other: .  Pt visited. Pt with tracheostomy. D/W RN TF is on Hold d/t pt with increased agitation and Physical movements.     PO intake:   Source for PO intake [ ] Patient/family [ ] chart [ ] staff (x)peg    Current Weight:   % Weight Change 122 lb without scd device     Pertinent Medications: MEDICATIONS  (STANDING):  ALBUTerol/ipratropium for Nebulization 3 milliLiter(s) Nebulizer every 6 hours  amiKACIN  IVPB 600 milliGRAM(s) IV Intermittent every 12 hours  amLODIPine   Tablet 2.5 milliGRAM(s) Oral daily  atorvastatin 20 milliGRAM(s) Oral at bedtime  benztropine 2 milliGRAM(s) Oral two times a day  cefepime   IVPB 2000 milliGRAM(s) IV Intermittent every 8 hours  cefepime   IVPB      dextrose 5% + sodium chloride 0.45%. 1000 milliLiter(s) (50 mL/Hr) IV Continuous <Continuous>  dextrose 5% + sodium chloride 0.45%. 1000 milliLiter(s) (75 mL/Hr) IV Continuous <Continuous>  dextrose 5%. 1000 milliLiter(s) (50 mL/Hr) IV Continuous <Continuous>  dextrose 50% Injectable 12.5 Gram(s) IV Push once  dextrose 50% Injectable 25 Gram(s) IV Push once  dextrose 50% Injectable 25 Gram(s) IV Push once  heparin  Injectable 5000 Unit(s) SubCutaneous every 8 hours  insulin glargine Injectable (LANTUS) 15 Unit(s) SubCutaneous at bedtime  insulin lispro (HumaLOG) corrective regimen sliding scale   SubCutaneous every 6 hours  levETIRAcetam  Solution 1000 milliGRAM(s) Oral two times a day  LORazepam     Tablet 1 milliGRAM(s) Oral every 6 hours  LORazepam   Injectable 1 milliGRAM(s) IV Push once  metoprolol tartrate 100 milliGRAM(s) Oral two times a day  pantoprazole   Suspension 40 milliGRAM(s) Enteral Tube daily  sodium chloride 0.9% lock flush 20 milliLiter(s) IV Push once    MEDICATIONS  (PRN):  acetaminophen    Suspension 650 milliGRAM(s) Oral every 6 hours PRN For Temp greater than 38 C (100.4 F)  benztropine Injectable 2 milliGRAM(s) IV Push once PRN Extrapyramidal symptoms  dextrose 40% Gel 15 Gram(s) Oral once PRN Blood Glucose LESS THAN 70 milliGRAM(s)/deciLiter  glucagon  Injectable 1 milliGRAM(s) IntraMuscular once PRN Glucose <70 milliGRAM(s)/deciLiter  LORazepam   Injectable 1 milliGRAM(s) IV Push every 6 hours PRN combative /agitation  sodium chloride 0.9% lock flush 10 milliLiter(s) IV Push every 1 hour PRN After each medication administration  sodium chloride 0.9% lock flush 10 milliLiter(s) IV Push every 12 hours PRN Lumen of catheter NOT used    Pertinent Labs:  07-24 Na139 mmol/L Glu 207 mg/dL<H> K+ 3.8 mmol/L Cr  0.83 mg/dL BUN 6 mg/dL<L> 07-22 Phos 3.0 mg/dL 07-15 [id number] 9.8 %<H> 07-15 Chol 124 mg/dL LDL 60 mg/dL HDL 49 mg/dL Trig 77 mg/dL      Skin: no PU    Estimated Needs:   [x ] no change since previous assessment  [ ] recalculated:       Previous Nutrition Diagnosis:   [ ] Inadequate Energy Intake [ ]Inadequate Oral Intake [ ] Excessive Energy Intake   [ ] Underweight [ ] Increased Nutrient Needs [ ] Overweight/Obesity   [ ] Altered GI Function [ ] Unintended Weight Loss [ ] Food & Nutrition Related Knowledge Deficit [ ] Malnutrition   (x) swallowing difficulty continous  Nutrition Diagnosis is [ ] ongoing  [ ] resolved [ ] not applicable     New Nutrition Diagnosis: [ ] not applicable  [ ] Inadequate Energy Intake [ ]Inadequate Oral Intake [ ] Excessive Energy Intake   [ ] Underweight [ ] Increased Nutrient Needs [ ] Overweight/Obesity   [ ] Altered GI Function [ ] Unintended Weight Loss [ ] Food & Nutrition Related Knowledge Deficit [ ] Malnutrition     Related to:     As evidenced by:     Interventions:   Recommend  [ ] Change Diet To:  [ ] Nutrition Supplement  [ x] Nutrition Support when TF restarted Recc Glucerna 1.2 initial Goal  rate @ 80 ml/hr x 16 hr as tolerated to Provide 1280 ml,1536 calories, protein 77 gms, free water 1030 ml/day)  [ ] Other:     Monitoring and Evaluation:   [ ] PO intake [x ] Tolerance to diet prescription [ ] weights [ ] follow up per protocol  [ ] other:

## 2018-07-25 NOTE — PROGRESS NOTE ADULT - SUBJECTIVE AND OBJECTIVE BOX
Patient is a 63y old  Female who presents with a chief complaint of SOB (14 Jul 2018 18:51)    Allergies:angiotensin converting enzyme inhibitors (Unknown)      INTERVAL HPI/OVERNIGHT EVENTS:    T(C): 36.7 (07-25-18 @ 14:14), Max: 37.1 (07-24-18 @ 20:26)  HR: 98 (07-25-18 @ 14:14) (56 - 127)  BP: 100/76 (07-25-18 @ 14:14) (100/76 - 127/5)  RR: 18 (07-25-18 @ 14:14) (17 - 18)  SpO2: 94% (07-25-18 @ 14:14) (94% - 99%)  I&O's Summary    Vital Signs Last 24 Hrs  T(C): 36.7 (25 Jul 2018 14:14), Max: 37.1 (24 Jul 2018 20:26)  T(F): 98 (25 Jul 2018 14:14), Max: 98.7 (24 Jul 2018 20:26)  HR: 98 (25 Jul 2018 14:14) (56 - 127)  BP: 100/76 (25 Jul 2018 14:14) (100/76 - 127/5)  BP(mean): 69 (24 Jul 2018 17:58) (69 - 69)  RR: 18 (25 Jul 2018 14:14) (17 - 18)  SpO2: 94% (25 Jul 2018 14:14) (94% - 99%)  PAST MEDICAL & SURGICAL HISTORY:  No pertinent past medical history  Hypertension  Schizophrenia  Diabetic coma  Diabetes mellitus  No significant past surgical history  S/P percutaneous endoscopic gastrostomy (PEG) tube placement  H/O tracheostomy          LABS:                        12.5   6.2   )-----------( 152      ( 24 Jul 2018 10:56 )             39.8     07-24    139  |  104  |  6<L>  ----------------------------<  207<H>  3.8   |  31  |  0.83    Ca    9.3      24 Jul 2018 10:56        CAPILLARY BLOOD GLUCOSE      POCT Blood Glucose.: 213 mg/dL (25 Jul 2018 11:45)  POCT Blood Glucose.: 211 mg/dL (25 Jul 2018 06:17)  POCT Blood Glucose.: 236 mg/dL (24 Jul 2018 22:31)  POCT Blood Glucose.: 84 mg/dL (24 Jul 2018 16:55)            MEDICATIONS  (STANDING):  ALBUTerol/ipratropium for Nebulization 3 milliLiter(s) Nebulizer every 6 hours  amiKACIN  IVPB 600 milliGRAM(s) IV Intermittent every 12 hours  amLODIPine   Tablet 2.5 milliGRAM(s) Oral daily  atorvastatin 20 milliGRAM(s) Oral at bedtime  benztropine 2 milliGRAM(s) Oral two times a day  cefepime   IVPB 2000 milliGRAM(s) IV Intermittent every 8 hours  cefepime   IVPB      dextrose 5% + sodium chloride 0.45%. 1000 milliLiter(s) (50 mL/Hr) IV Continuous <Continuous>  dextrose 5% + sodium chloride 0.45%. 1000 milliLiter(s) (75 mL/Hr) IV Continuous <Continuous>  dextrose 5%. 1000 milliLiter(s) (50 mL/Hr) IV Continuous <Continuous>  dextrose 50% Injectable 12.5 Gram(s) IV Push once  dextrose 50% Injectable 25 Gram(s) IV Push once  dextrose 50% Injectable 25 Gram(s) IV Push once  heparin  Injectable 5000 Unit(s) SubCutaneous every 8 hours  insulin glargine Injectable (LANTUS) 15 Unit(s) SubCutaneous at bedtime  insulin lispro (HumaLOG) corrective regimen sliding scale   SubCutaneous every 6 hours  levETIRAcetam  Solution 1000 milliGRAM(s) Oral two times a day  LORazepam     Tablet 1 milliGRAM(s) Oral every 6 hours  LORazepam   Injectable 1 milliGRAM(s) IV Push once  metoprolol tartrate 100 milliGRAM(s) Oral two times a day  pantoprazole   Suspension 40 milliGRAM(s) Enteral Tube daily  sodium chloride 0.9% lock flush 20 milliLiter(s) IV Push once    MEDICATIONS  (PRN):  acetaminophen    Suspension 650 milliGRAM(s) Oral every 6 hours PRN For Temp greater than 38 C (100.4 F)  benztropine Injectable 2 milliGRAM(s) IV Push once PRN Extrapyramidal symptoms  dextrose 40% Gel 15 Gram(s) Oral once PRN Blood Glucose LESS THAN 70 milliGRAM(s)/deciLiter  glucagon  Injectable 1 milliGRAM(s) IntraMuscular once PRN Glucose <70 milliGRAM(s)/deciLiter  LORazepam   Injectable 1 milliGRAM(s) IV Push every 6 hours PRN combative /agitation  sodium chloride 0.9% lock flush 10 milliLiter(s) IV Push every 1 hour PRN After each medication administration  sodium chloride 0.9% lock flush 10 milliLiter(s) IV Push every 12 hours PRN Lumen of catheter NOT used        RADIOLOGY & ADDITIONAL TESTS:      Consultant(s) Notes Reviewed:  [Y ] YES  [ ] NO    PHYSICAL EXAM:  GENERAL: NAD, well-groomed, well-developed  HEAD:  Atraumatic, Normocephalic  EYES:  conjunctiva and sclera clear  ENMT: Moist mucous membranes, excess secretions  NECK: Supple, No JVD, Normal thyroid  NERVOUS SYSTEM: as per neurology pt is in a chronic vegetative state.  CHEST/LUNG: Decreased BS  HEART: S1, S2;   ABDOMEN: Soft, Nontender, Nondistended; Bowel sounds present. Peg in place  EXTREMITIES:  2+ Peripheral Pulses, No clubbing, cyanosis, or edema  LYMPH: No lymphadenopathy noted      Care Collaborated Discussed with Consultants/Other Providers [ ] YES  [ ] NO

## 2018-07-25 NOTE — PROGRESS NOTE ADULT - SUBJECTIVE AND OBJECTIVE BOX
Patient seen and examined at bedside, case discussed at great length with sister at bedside  pt tachypneic, in distress, restless, agitated, tachycardic  Case discussed with medical team    HPI:  63 Y/O F, non-verbal, bed bound, Trach/ PEG tube, from NH, PMhx of HTN, asthma, convulsion disorder, bipolar, DVT legs, DM 2, sent from NH for vomiting and cough.  Pt unable to provide history.  History given by sister who states pt was vomiting coffee ground emesis yesterday.  Pt had 2 episodes of reported vomiting, and has not had an episode since.  Pt also developed cough with clear sputum.  Sister states pt is combative at baseline, and her current behavior is baseline.  No reported fever, discomfort, diarrhea, or SOB. (14 Jul 2018 18:51)      PAST MEDICAL & SURGICAL HISTORY:  No pertinent past medical history  Hypertension  Schizophrenia  Diabetic coma  Diabetes mellitus  No significant past surgical history  S/P percutaneous endoscopic gastrostomy (PEG) tube placement  H/O tracheostomy      angiotensin converting enzyme inhibitors (Unknown)       MEDICATIONS  (STANDING):  ALBUTerol/ipratropium for Nebulization 3 milliLiter(s) Nebulizer every 6 hours  amiKACIN  IVPB 600 milliGRAM(s) IV Intermittent every 12 hours  amLODIPine   Tablet 2.5 milliGRAM(s) Oral daily  atorvastatin 20 milliGRAM(s) Oral at bedtime  benztropine 2 milliGRAM(s) Oral two times a day  cefepime   IVPB 2000 milliGRAM(s) IV Intermittent every 8 hours  cefepime   IVPB      dextrose 5% + sodium chloride 0.45%. 1000 milliLiter(s) (50 mL/Hr) IV Continuous <Continuous>  dextrose 5% + sodium chloride 0.45%. 1000 milliLiter(s) (75 mL/Hr) IV Continuous <Continuous>  dextrose 5%. 1000 milliLiter(s) (50 mL/Hr) IV Continuous <Continuous>  dextrose 50% Injectable 12.5 Gram(s) IV Push once  dextrose 50% Injectable 25 Gram(s) IV Push once  dextrose 50% Injectable 25 Gram(s) IV Push once  heparin  Injectable 5000 Unit(s) SubCutaneous every 8 hours  insulin glargine Injectable (LANTUS) 15 Unit(s) SubCutaneous at bedtime  insulin lispro (HumaLOG) corrective regimen sliding scale   SubCutaneous every 6 hours  levETIRAcetam  Solution 1000 milliGRAM(s) Oral two times a day  LORazepam     Tablet 1 milliGRAM(s) Oral every 6 hours  LORazepam   Injectable 1 milliGRAM(s) IV Push once  metoprolol tartrate 100 milliGRAM(s) Oral two times a day  morphine  - Injectable 2 milliGRAM(s) IV Push once  pantoprazole   Suspension 40 milliGRAM(s) Enteral Tube daily  sodium chloride 0.9% lock flush 20 milliLiter(s) IV Push once    MEDICATIONS  (PRN):  acetaminophen    Suspension 650 milliGRAM(s) Oral every 6 hours PRN For Temp greater than 38 C (100.4 F)  benztropine Injectable 2 milliGRAM(s) IV Push once PRN Extrapyramidal symptoms  dextrose 40% Gel 15 Gram(s) Oral once PRN Blood Glucose LESS THAN 70 milliGRAM(s)/deciLiter  glucagon  Injectable 1 milliGRAM(s) IntraMuscular once PRN Glucose <70 milliGRAM(s)/deciLiter  LORazepam   Injectable 1 milliGRAM(s) IV Push every 6 hours PRN combative /agitation  sodium chloride 0.9% lock flush 10 milliLiter(s) IV Push every 1 hour PRN After each medication administration  sodium chloride 0.9% lock flush 10 milliLiter(s) IV Push every 12 hours PRN Lumen of catheter NOT used      REVIEW OF SYSTEMS: limited 2/2 mental status    T(C): 36.7 (07-25-18 @ 14:14), Max: 37.1 (07-24-18 @ 20:26)  HR: 98 (07-25-18 @ 14:14) (56 - 127)  BP: 100/76 (07-25-18 @ 14:14) (100/76 - 127/5)  RR: 18 (07-25-18 @ 14:14) (17 - 18)  SpO2: 94% (07-25-18 @ 14:14) (94% - 99%)    PHYSICAL EXAMINATION:   Constitutional: pt tachypneic, in distress, restless, agitated, tachycardic, in distress  HEENT: poor dentition, tongue protruding in and out, AT  Neck: trach intact. Supple  Respiratory: tachypneic, short shallow breaths. Adequate airflow b/l. Not using accessory muscles of respiration.  Cardiovascular:  tachycardic. S1 & S2 intact, no R/G, 2+ radial pulses b/l  Gastrointestinal: Soft, ND, normoactive b.s., no organomegaly/RT/rigidity  Extremities: restless. WWP  Neurological:  awake. stable.     Labs and imaging reviewed    LABS:                        12.5   6.2   )-----------( 152      ( 24 Jul 2018 10:56 )             39.8     07-24    139  |  104  |  6<L>  ----------------------------<  207<H>  3.8   |  31  |  0.83    Ca    9.3      24 Jul 2018 10:56              CAPILLARY BLOOD GLUCOSE      POCT Blood Glucose.: 213 mg/dL (25 Jul 2018 11:45)  POCT Blood Glucose.: 211 mg/dL (25 Jul 2018 06:17)  POCT Blood Glucose.: 236 mg/dL (24 Jul 2018 22:31)              RADIOLOGY & ADDITIONAL STUDIES:

## 2018-07-25 NOTE — PROGRESS NOTE ADULT - SUBJECTIVE AND OBJECTIVE BOX
Patient seen and examined.     MEDICATIONS  (STANDING):  ALBUTerol/ipratropium for Nebulization 3 milliLiter(s) Nebulizer every 6 hours  amiKACIN  IVPB 600 milliGRAM(s) IV Intermittent every 12 hours  amLODIPine   Tablet 2.5 milliGRAM(s) Oral daily  atorvastatin 20 milliGRAM(s) Oral at bedtime  benztropine 2 milliGRAM(s) Oral two times a day  cefepime   IVPB 2000 milliGRAM(s) IV Intermittent every 8 hours  cefepime   IVPB      dextrose 5% + sodium chloride 0.45%. 1000 milliLiter(s) (50 mL/Hr) IV Continuous <Continuous>  dextrose 5% + sodium chloride 0.45%. 1000 milliLiter(s) (75 mL/Hr) IV Continuous <Continuous>  dextrose 5%. 1000 milliLiter(s) (50 mL/Hr) IV Continuous <Continuous>  dextrose 50% Injectable 12.5 Gram(s) IV Push once  dextrose 50% Injectable 25 Gram(s) IV Push once  dextrose 50% Injectable 25 Gram(s) IV Push once  heparin  Injectable 5000 Unit(s) SubCutaneous every 8 hours  insulin glargine Injectable (LANTUS) 15 Unit(s) SubCutaneous at bedtime  insulin lispro (HumaLOG) corrective regimen sliding scale   SubCutaneous every 6 hours  levETIRAcetam  Solution 1000 milliGRAM(s) Oral two times a day  LORazepam     Tablet 1 milliGRAM(s) Oral every 6 hours  LORazepam   Injectable 1 milliGRAM(s) IV Push once  metoprolol tartrate 100 milliGRAM(s) Oral two times a day  pantoprazole   Suspension 40 milliGRAM(s) Enteral Tube daily  sodium chloride 0.9% lock flush 20 milliLiter(s) IV Push once      MEDICATIONS  (PRN):  acetaminophen    Suspension 650 milliGRAM(s) Oral every 6 hours PRN For Temp greater than 38 C (100.4 F)  benztropine Injectable 2 milliGRAM(s) IV Push once PRN Extrapyramidal symptoms  dextrose 40% Gel 15 Gram(s) Oral once PRN Blood Glucose LESS THAN 70 milliGRAM(s)/deciLiter  glucagon  Injectable 1 milliGRAM(s) IntraMuscular once PRN Glucose <70 milliGRAM(s)/deciLiter  LORazepam   Injectable 1 milliGRAM(s) IV Push every 6 hours PRN combative /agitation  sodium chloride 0.9% lock flush 10 milliLiter(s) IV Push every 1 hour PRN After each medication administration  sodium chloride 0.9% lock flush 10 milliLiter(s) IV Push every 12 hours PRN Lumen of catheter NOT used     Medications up to date at time of exam.    PHYSICAL EXAMINATION:  Patient has no new complaints.  GENERAL: The patient is a well-developed, well-nourished, in no apparent distress.     Vital Signs Last 24 Hrs  T(C): 36.7 (25 Jul 2018 14:14), Max: 37.1 (24 Jul 2018 20:26)  T(F): 98 (25 Jul 2018 14:14), Max: 98.7 (24 Jul 2018 20:26)  HR: 98 (25 Jul 2018 14:14) (56 - 127)  BP: 100/76 (25 Jul 2018 14:14) (100/76 - 127/5)  BP(mean): 69 (24 Jul 2018 17:58) (69 - 69)  RR: 18 (25 Jul 2018 14:14) (17 - 18)  SpO2: 94% (25 Jul 2018 14:14) (94% - 99%)   (if applicable)    Chest Tube (if applicable)    HEENT: Head is normocephalic and atraumatic.     NECK: Supple, no palpable adenopathy. trach    LUNGS: Clear to auscultation, no wheezing, rales, or rhonchi.    HEART: Regular rate and rhythm without murmur.    ABDOMEN: Soft, nontender, and nondistended.  PEG    EXTREMITIES: Without any cyanosis, clubbing, rash, lesions or edema.    NEUROLOGIC: Awake, alert.    SKIN: Warm, dry, good turgor.    LABS:                        12.5   6.2   )-----------( 152      ( 24 Jul 2018 10:56 )             39.8     07-24    139  |  104  |  6<L>  ----------------------------<  207<H>  3.8   |  31  |  0.83    Ca    9.3      24 Jul 2018 10:56                          MICROBIOLOGY: (if applicable)    RADIOLOGY & ADDITIONAL STUDIES:  EKG:   CXR:  ECHO:    IMPRESSION: 63y Female PAST MEDICAL & SURGICAL HISTORY:  No pertinent past medical history  Hypertension  Schizophrenia  Diabetic coma  Diabetes mellitus  No significant past surgical history  S/P percutaneous endoscopic gastrostomy (PEG) tube placement  H/O tracheostomy       Impression; 64 Y/O Female with prior mentioned multiple chronic conditions. Presented with Cough, SOB and Vomiting.    RLL Opacity secondary to Aspiration PNA due to pseudomonas ( CRE) Oxygen supplementation FIO2 40% via Trach collar due to Chronic Hypoxic respiratory failure.      leukocytosis resolved     Suggestion:     Continue DuoNeb Q 6 hours.  Oxygen supplementation via Trach collar.  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    Aspiration precautions. HOB elevation especially during Peg feeding.   Turn and reposition in bed.  for CT head, EEG per neurology to r/o seizures Patient seen and examined.     MEDICATIONS  (STANDING):  ALBUTerol/ipratropium for Nebulization 3 milliLiter(s) Nebulizer every 6 hours  amiKACIN  IVPB 600 milliGRAM(s) IV Intermittent every 12 hours  amLODIPine   Tablet 2.5 milliGRAM(s) Oral daily  atorvastatin 20 milliGRAM(s) Oral at bedtime  benztropine 2 milliGRAM(s) Oral two times a day  cefepime   IVPB 2000 milliGRAM(s) IV Intermittent every 8 hours  cefepime   IVPB      dextrose 5% + sodium chloride 0.45%. 1000 milliLiter(s) (50 mL/Hr) IV Continuous <Continuous>  dextrose 5% + sodium chloride 0.45%. 1000 milliLiter(s) (75 mL/Hr) IV Continuous <Continuous>  dextrose 5%. 1000 milliLiter(s) (50 mL/Hr) IV Continuous <Continuous>  dextrose 50% Injectable 12.5 Gram(s) IV Push once  dextrose 50% Injectable 25 Gram(s) IV Push once  dextrose 50% Injectable 25 Gram(s) IV Push once  heparin  Injectable 5000 Unit(s) SubCutaneous every 8 hours  insulin glargine Injectable (LANTUS) 15 Unit(s) SubCutaneous at bedtime  insulin lispro (HumaLOG) corrective regimen sliding scale   SubCutaneous every 6 hours  levETIRAcetam  Solution 1000 milliGRAM(s) Oral two times a day  LORazepam     Tablet 1 milliGRAM(s) Oral every 6 hours  LORazepam   Injectable 1 milliGRAM(s) IV Push once  metoprolol tartrate 100 milliGRAM(s) Oral two times a day  pantoprazole   Suspension 40 milliGRAM(s) Enteral Tube daily  sodium chloride 0.9% lock flush 20 milliLiter(s) IV Push once      MEDICATIONS  (PRN):  acetaminophen    Suspension 650 milliGRAM(s) Oral every 6 hours PRN For Temp greater than 38 C (100.4 F)  benztropine Injectable 2 milliGRAM(s) IV Push once PRN Extrapyramidal symptoms  dextrose 40% Gel 15 Gram(s) Oral once PRN Blood Glucose LESS THAN 70 milliGRAM(s)/deciLiter  glucagon  Injectable 1 milliGRAM(s) IntraMuscular once PRN Glucose <70 milliGRAM(s)/deciLiter  LORazepam   Injectable 1 milliGRAM(s) IV Push every 6 hours PRN combative /agitation  sodium chloride 0.9% lock flush 10 milliLiter(s) IV Push every 1 hour PRN After each medication administration  sodium chloride 0.9% lock flush 10 milliLiter(s) IV Push every 12 hours PRN Lumen of catheter NOT used     Medications up to date at time of exam.    PHYSICAL EXAMINATION:  Patient has no new complaints.  GENERAL: The patient is a well-developed, well-nourished, in no apparent distress.     Vital Signs Last 24 Hrs  T(C): 36.7 (25 Jul 2018 14:14), Max: 37.1 (24 Jul 2018 20:26)  T(F): 98 (25 Jul 2018 14:14), Max: 98.7 (24 Jul 2018 20:26)  HR: 98 (25 Jul 2018 14:14) (56 - 127)  BP: 100/76 (25 Jul 2018 14:14) (100/76 - 127/5)  BP(mean): 69 (24 Jul 2018 17:58) (69 - 69)  RR: 18 (25 Jul 2018 14:14) (17 - 18)  SpO2: 94% (25 Jul 2018 14:14) (94% - 99%)   (if applicable)    Chest Tube (if applicable)    HEENT: Head is normocephalic and atraumatic.     NECK: Supple, no palpable adenopathy. trach    LUNGS: Clear to auscultation, no wheezing, rales, or rhonchi.    HEART: Regular rate and rhythm without murmur.    ABDOMEN: Soft, nontender, and nondistended.  PEG    EXTREMITIES: Without any cyanosis, clubbing, rash, lesions or edema.    NEUROLOGIC: Awake, alert.    SKIN: Warm, dry, good turgor.    LABS:                        12.5   6.2   )-----------( 152      ( 24 Jul 2018 10:56 )             39.8     07-24    139  |  104  |  6<L>  ----------------------------<  207<H>  3.8   |  31  |  0.83    Ca    9.3      24 Jul 2018 10:56                          MICROBIOLOGY: (if applicable)    RADIOLOGY & ADDITIONAL STUDIES:  EKG:   CXR:  ECHO:    IMPRESSION: 63y Female PAST MEDICAL & SURGICAL HISTORY:  No pertinent past medical history  Hypertension  Schizophrenia  Diabetic coma  Diabetes mellitus  No significant past surgical history  S/P percutaneous endoscopic gastrostomy (PEG) tube placement  H/O tracheostomy       Impression; 62 Y/O Female with prior mentioned multiple chronic conditions. Presented with Cough, SOB and Vomiting.    RLL Opacity secondary to Aspiration PNA due to pseudomonas ( CRE) Oxygen supplementation FIO2 40% via Trach collar due to Chronic Hypoxic respiratory failure.      leukocytosis resolved     Suggestion:     Continue DuoNeb Q 6 hours.  Oxygen supplementation via Trach collar.  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    Aspiration precautions. HOB elevation especially during Peg feeding.   Turn and reposition in bed.  for CT head, EEG per neurology to r/o seizures    Agree with above assessment and plan as transcribed.

## 2018-07-25 NOTE — CONSULT NOTE ADULT - ASSESSMENT
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 gaganhaulten spaticity. - consider Ct-head and EEG to r/o non-convulsive status epilepticus. continue with psych help.  3) H/o seizure disorder- on Keppra.

## 2018-07-25 NOTE — PROGRESS NOTE ADULT - ASSESSMENT
63 Y/O F, non-verbal, bed bound, Trach/ PEG tube, from NH, PMhx of HTN, asthma, convulsion disorder, bipolar, DVT legs, DM, sent from NH for vomiting and cough.  In the ED, pt presents in no acute distress, and vitals WNL.  Pt is afebrile with leukocytosis of 19.  CXR concerning for RLL infiltrate likely 2/2 asp pneumonia.  Abdominal x-ray sig for non-specific gas pattern and intact PEG tube.  Pt will be admitted to medicine for suspected Aspiration pneumonia.    Plan:   - Tachypnea: mild respiratory distress, possible multifactorial etiology. S/p ativan 2 mg ivp x 1. Administer morphine 2 mg ivp x 1.  no signs of urinary retention.  Possible reaspiration.  A/w tachycardia.  F/u cxr, abg, cardiac enzymes, ekg.    - Diarrhea improved. (-) Cdiff pcr  - Complicated Aspiration PNA/HCAP 2/2 CRE Pseudomonas & UTI - abx. Overall improving. Repeat labs.  - PEG Dislodgement - improved   - Uncontrolled Type 2 DM with complications of hyperglycemia - improved. C/w lantus, moderate ihss, monitor acks  - Need for prophylactic measure: dvt/gi ppx

## 2018-07-26 DIAGNOSIS — R79.89 OTHER SPECIFIED ABNORMAL FINDINGS OF BLOOD CHEMISTRY: ICD-10-CM

## 2018-07-26 DIAGNOSIS — R00.0 TACHYCARDIA, UNSPECIFIED: ICD-10-CM

## 2018-07-26 LAB
ANION GAP SERPL CALC-SCNC: 5 MMOL/L — SIGNIFICANT CHANGE UP (ref 5–17)
BUN SERPL-MCNC: 8 MG/DL — SIGNIFICANT CHANGE UP (ref 7–18)
CALCIUM SERPL-MCNC: 9.6 MG/DL — SIGNIFICANT CHANGE UP (ref 8.4–10.5)
CHLORIDE SERPL-SCNC: 106 MMOL/L — SIGNIFICANT CHANGE UP (ref 96–108)
CO2 SERPL-SCNC: 30 MMOL/L — SIGNIFICANT CHANGE UP (ref 22–31)
CREAT SERPL-MCNC: 0.89 MG/DL — SIGNIFICANT CHANGE UP (ref 0.5–1.3)
GLUCOSE SERPL-MCNC: 134 MG/DL — HIGH (ref 70–99)
HCT VFR BLD CALC: 39.5 % — SIGNIFICANT CHANGE UP (ref 34.5–45)
HGB BLD-MCNC: 12.3 G/DL — SIGNIFICANT CHANGE UP (ref 11.5–15.5)
LACTATE SERPL-SCNC: 0.9 MMOL/L — SIGNIFICANT CHANGE UP (ref 0.7–2)
LACTATE SERPL-SCNC: 4.2 MMOL/L — CRITICAL HIGH (ref 0.7–2)
MAGNESIUM SERPL-MCNC: 1.8 MG/DL — SIGNIFICANT CHANGE UP (ref 1.6–2.6)
MCHC RBC-ENTMCNC: 25.2 PG — LOW (ref 27–34)
MCHC RBC-ENTMCNC: 31.1 GM/DL — LOW (ref 32–36)
MCV RBC AUTO: 81.1 FL — SIGNIFICANT CHANGE UP (ref 80–100)
PHOSPHATE SERPL-MCNC: 3.2 MG/DL — SIGNIFICANT CHANGE UP (ref 2.5–4.5)
PLATELET # BLD AUTO: 146 K/UL — LOW (ref 150–400)
POTASSIUM SERPL-MCNC: 4.4 MMOL/L — SIGNIFICANT CHANGE UP (ref 3.5–5.3)
POTASSIUM SERPL-SCNC: 4.4 MMOL/L — SIGNIFICANT CHANGE UP (ref 3.5–5.3)
PROCALCITONIN SERPL-MCNC: 0.03 NG/ML — SIGNIFICANT CHANGE UP (ref 0.02–0.1)
RBC # BLD: 4.87 M/UL — SIGNIFICANT CHANGE UP (ref 3.8–5.2)
RBC # FLD: 14.4 % — SIGNIFICANT CHANGE UP (ref 10.3–14.5)
SODIUM SERPL-SCNC: 141 MMOL/L — SIGNIFICANT CHANGE UP (ref 135–145)
WBC # BLD: 7.9 K/UL — SIGNIFICANT CHANGE UP (ref 3.8–10.5)
WBC # FLD AUTO: 7.9 K/UL — SIGNIFICANT CHANGE UP (ref 3.8–10.5)

## 2018-07-26 PROCEDURE — 70450 CT HEAD/BRAIN W/O DYE: CPT | Mod: 26

## 2018-07-26 PROCEDURE — 71250 CT THORAX DX C-: CPT | Mod: 26

## 2018-07-26 RX ORDER — DEXTROSE MONOHYDRATE, SODIUM CHLORIDE, AND POTASSIUM CHLORIDE 50; .745; 4.5 G/1000ML; G/1000ML; G/1000ML
1000 INJECTION, SOLUTION INTRAVENOUS
Qty: 0 | Refills: 0 | Status: DISCONTINUED | OUTPATIENT
Start: 2018-07-26 | End: 2018-07-26

## 2018-07-26 RX ORDER — QUETIAPINE FUMARATE 200 MG/1
50 TABLET, FILM COATED ORAL EVERY 12 HOURS
Qty: 0 | Refills: 0 | Status: DISCONTINUED | OUTPATIENT
Start: 2018-07-26 | End: 2018-07-31

## 2018-07-26 RX ORDER — SODIUM CHLORIDE 9 MG/ML
1000 INJECTION, SOLUTION INTRAVENOUS
Qty: 0 | Refills: 0 | Status: DISCONTINUED | OUTPATIENT
Start: 2018-07-26 | End: 2018-07-27

## 2018-07-26 RX ORDER — SODIUM CHLORIDE 9 MG/ML
500 INJECTION INTRAMUSCULAR; INTRAVENOUS; SUBCUTANEOUS
Qty: 0 | Refills: 0 | Status: DISCONTINUED | OUTPATIENT
Start: 2018-07-26 | End: 2018-07-26

## 2018-07-26 RX ORDER — SODIUM CHLORIDE 9 MG/ML
1000 INJECTION, SOLUTION INTRAVENOUS
Qty: 0 | Refills: 0 | Status: DISCONTINUED | OUTPATIENT
Start: 2018-07-26 | End: 2018-07-26

## 2018-07-26 RX ADMIN — PANTOPRAZOLE SODIUM 40 MILLIGRAM(S): 20 TABLET, DELAYED RELEASE ORAL at 12:18

## 2018-07-26 RX ADMIN — CEFEPIME 100 MILLIGRAM(S): 1 INJECTION, POWDER, FOR SOLUTION INTRAMUSCULAR; INTRAVENOUS at 05:36

## 2018-07-26 RX ADMIN — HEPARIN SODIUM 5000 UNIT(S): 5000 INJECTION INTRAVENOUS; SUBCUTANEOUS at 13:31

## 2018-07-26 RX ADMIN — DEXTROSE MONOHYDRATE, SODIUM CHLORIDE, AND POTASSIUM CHLORIDE 60 MILLILITER(S): 50; .745; 4.5 INJECTION, SOLUTION INTRAVENOUS at 11:38

## 2018-07-26 RX ADMIN — CEFEPIME 100 MILLIGRAM(S): 1 INJECTION, POWDER, FOR SOLUTION INTRAMUSCULAR; INTRAVENOUS at 13:35

## 2018-07-26 RX ADMIN — LEVETIRACETAM 1000 MILLIGRAM(S): 250 TABLET, FILM COATED ORAL at 05:47

## 2018-07-26 RX ADMIN — AMIKACIN SULFATE 102.4 MILLIGRAM(S): 250 INJECTION, SOLUTION INTRAMUSCULAR; INTRAVENOUS at 06:12

## 2018-07-26 RX ADMIN — Medication 1 MILLIGRAM(S): at 18:17

## 2018-07-26 RX ADMIN — LEVETIRACETAM 1000 MILLIGRAM(S): 250 TABLET, FILM COATED ORAL at 17:54

## 2018-07-26 RX ADMIN — Medication 2: at 12:53

## 2018-07-26 RX ADMIN — HEPARIN SODIUM 5000 UNIT(S): 5000 INJECTION INTRAVENOUS; SUBCUTANEOUS at 21:08

## 2018-07-26 RX ADMIN — Medication 1 MILLIGRAM(S): at 05:33

## 2018-07-26 RX ADMIN — Medication 1 MILLIGRAM(S): at 16:32

## 2018-07-26 RX ADMIN — ATORVASTATIN CALCIUM 20 MILLIGRAM(S): 80 TABLET, FILM COATED ORAL at 21:08

## 2018-07-26 RX ADMIN — Medication 2 MILLIGRAM(S): at 05:33

## 2018-07-26 RX ADMIN — AMIKACIN SULFATE 102.4 MILLIGRAM(S): 250 INJECTION, SOLUTION INTRAMUSCULAR; INTRAVENOUS at 18:18

## 2018-07-26 RX ADMIN — Medication 1 MILLIGRAM(S): at 10:17

## 2018-07-26 RX ADMIN — Medication 2: at 17:54

## 2018-07-26 RX ADMIN — Medication 1 MILLIGRAM(S): at 09:10

## 2018-07-26 RX ADMIN — Medication 2 MILLIGRAM(S): at 17:55

## 2018-07-26 RX ADMIN — INSULIN GLARGINE 15 UNIT(S): 100 INJECTION, SOLUTION SUBCUTANEOUS at 23:44

## 2018-07-26 RX ADMIN — Medication 1 MILLIGRAM(S): at 12:16

## 2018-07-26 RX ADMIN — Medication 3 MILLILITER(S): at 20:15

## 2018-07-26 RX ADMIN — Medication 2: at 23:44

## 2018-07-26 RX ADMIN — Medication 3 MILLILITER(S): at 15:51

## 2018-07-26 RX ADMIN — SODIUM CHLORIDE 500 MILLILITER(S): 9 INJECTION INTRAMUSCULAR; INTRAVENOUS; SUBCUTANEOUS at 11:37

## 2018-07-26 RX ADMIN — Medication 100 MILLIGRAM(S): at 05:33

## 2018-07-26 RX ADMIN — HEPARIN SODIUM 5000 UNIT(S): 5000 INJECTION INTRAVENOUS; SUBCUTANEOUS at 05:33

## 2018-07-26 RX ADMIN — Medication 3 MILLILITER(S): at 03:33

## 2018-07-26 RX ADMIN — AMLODIPINE BESYLATE 2.5 MILLIGRAM(S): 2.5 TABLET ORAL at 05:33

## 2018-07-26 RX ADMIN — Medication 1 MILLIGRAM(S): at 23:49

## 2018-07-26 RX ADMIN — QUETIAPINE FUMARATE 50 MILLIGRAM(S): 200 TABLET, FILM COATED ORAL at 13:29

## 2018-07-26 RX ADMIN — CEFEPIME 100 MILLIGRAM(S): 1 INJECTION, POWDER, FOR SOLUTION INTRAMUSCULAR; INTRAVENOUS at 21:08

## 2018-07-26 RX ADMIN — Medication 1 MILLIGRAM(S): at 08:33

## 2018-07-26 RX ADMIN — Medication 3 MILLILITER(S): at 08:18

## 2018-07-26 NOTE — PROGRESS NOTE ADULT - SUBJECTIVE AND OBJECTIVE BOX
Patient is seen and examined at the bed side, is afebrile.  No new events.      REVIEW OF SYSTEMS: Unable to obtained since nonverbal      ALLERGIES: ACIs      Vital Signs Last 24 Hrs  T(C): 37 (26 Jul 2018 14:17), Max: 37.1 (25 Jul 2018 20:43)  T(F): 98.6 (26 Jul 2018 14:17), Max: 98.8 (25 Jul 2018 20:43)  HR: 71 (26 Jul 2018 14:17) (61 - 96)  BP: 110/49 (26 Jul 2018 14:23) (110/49 - 176/126)  BP(mean): --  RR: 18 (26 Jul 2018 14:17) (18 - 20)  SpO2: 100% (26 Jul 2018 14:17) (96% - 100%)      PHYSICAL EXAM:  GENERAL: Not in acute distress  HEENT: Trach in placed  CVS: S1 and s2 present  RESP: Air entry B/L  GI: Abdomen nondistended, NT and PEG in placed  EXT: No pedal edema, hands contracted   CNS: Awake but not alert        LABS:                         12.3   7.9   )-----------( 146      ( 26 Jul 2018 07:44 )             39.5                           12.5   6.2   )-----------( 152      ( 24 Jul 2018 10:56 )             39.8                11.8   15.4  )-----------( 170      ( 16 Jul 2018 07:12 )             38.1                           12.2   23.0  )-----------( 180      ( 15 Jul 2018 12:45 )             38.7         07-26    141  |  106  |  8   ----------------------------<  134<H>  4.4   |  30  |  0.89    Ca    9.6      26 Jul 2018 07:44  Phos  3.2     07-26  Mg     1.8     07-26 07-24    139  |  104  |  6<L>  ----------------------------<  207<H>  3.8   |  31  |  0.83    Ca    9.3      24 Jul 2018 10:56    TPro  7.4  /  Alb  3.4<L>  /  TBili  0.9  /  DBili  x   /  AST  26  /  ALT  44  /  AlkPhos  108  07-14        CAPILLARY BLOOD GLUCOSE      POCT Blood Glucose.: 211 mg/dL (15 Jul 2018 17:22)  POCT Blood Glucose.: 123 mg/dL (15 Jul 2018 11:44)  POCT Blood Glucose.: 353 mg/dL (15 Jul 2018 07:56)  POCT Blood Glucose.: 254 mg/dL (15 Jul 2018 00:42)  POCT Blood Glucose.: 230 mg/dL (14 Jul 2018 22:22)        MEDICATIONS  (STANDING):  ALBUTerol/ipratropium for Nebulization 3 milliLiter(s) Nebulizer every 6 hours  amiKACIN  IVPB 600 milliGRAM(s) IV Intermittent every 12 hours  amLODIPine   Tablet 2.5 milliGRAM(s) Oral daily  atorvastatin 20 milliGRAM(s) Oral at bedtime  benztropine 2 milliGRAM(s) Oral two times a day  cefepime   IVPB 2000 milliGRAM(s) IV Intermittent every 8 hours  cefepime   IVPB      heparin  Injectable 5000 Unit(s) SubCutaneous every 8 hours  insulin glargine Injectable (LANTUS) 15 Unit(s) SubCutaneous at bedtime  insulin lispro (HumaLOG) corrective regimen sliding scale   SubCutaneous every 6 hours  levETIRAcetam  Solution 1000 milliGRAM(s) Oral two times a day  LORazepam     Tablet 1 milliGRAM(s) Oral every 6 hours  metoprolol tartrate 100 milliGRAM(s) Oral two times a day  pantoprazole   Suspension 40 milliGRAM(s) Enteral Tube daily  QUEtiapine 50 milliGRAM(s) Oral every 12 hours  sodium chloride 0.45%. 1000 milliLiter(s) (50 mL/Hr) IV Continuous <Continuous>  sodium chloride 0.9% lock flush 20 milliLiter(s) IV Push once    MEDICATIONS  (PRN):  acetaminophen    Suspension 650 milliGRAM(s) Oral every 6 hours PRN For Temp greater than 38 C (100.4 F)  benztropine Injectable 2 milliGRAM(s) IV Push once PRN Extrapyramidal symptoms  dextrose 40% Gel 15 Gram(s) Oral once PRN Blood Glucose LESS THAN 70 milliGRAM(s)/deciLiter  glucagon  Injectable 1 milliGRAM(s) IntraMuscular once PRN Glucose <70 milliGRAM(s)/deciLiter  LORazepam   Injectable 1 milliGRAM(s) IV Push every 6 hours PRN combative /agitation  sodium chloride 0.9% lock flush 10 milliLiter(s) IV Push every 1 hour PRN After each medication administration  sodium chloride 0.9% lock flush 10 milliLiter(s) IV Push every 12 hours PRN Lumen of catheter NOT used          RADIOLOGY & ADDITIONAL TESTS:    7/19/18: Xray Abdomen 1 View PORTABLE -Urgent (07.19.18 @ 02:39) Gastrostomy evaluation without evidence for extravasation as described.      7/15/18: CT Chest No Cont (07.15.18 @ 11:14) 1. Mid to distal esophageal wall thickening is suggested. Correlate with endoscopic evaluation.  2. A PEG tube is identified, the distal aspect of which is identified in the region of the pylorus/duodenal bulb; correlate clinically as to appropriate positioning.  3. There is opacity identified within the right lower lobe lung parenchyma, likely representative of a combination of both atelectasis and consolidation. Small right pleural effusion.    7/14/18: Xray Chest 1 View AP/PA (07.14.18 @ 15:34)No consolidation. Platelike atelectasis in the right lower lobe.        MICROBIOLOGY DATA:      Culture - Sputum . (07.16.18 @ 10:36)    -  Gentamicin: R >8    -  Imipenem: R >8    -  Levofloxacin: R >4    -  Meropenem: R >8    -  Piperacillin/Tazobactam: R >64    -  Tobramycin: R >8    -  Amikacin: S 16    Gram Stain:   Moderate polymorphonuclear leukocytes seen per low power field  Moderate Squamous epithelial cells seen per low power field  Moderate Gram Negative Rods seen per oil power field  Few Gram Positive Cocci seen per oil power field    -  Aztreonam: R >16    -  Cefepime: I 16    -  Ceftazidime: R >16    -  Ciprofloxacin: R >2    Specimen Source: .Sputum Sputum trap    Culture Results:   Three or more mixed gram negative rods including  Moderate Pseudomonas aeruginosa (Carbapenem Resistant)    Organism Identification: Pseudomonas aeruginosa (Carbapenem Resistant)    Organism: Pseudomonas aeruginosa (Carbapenem Resistant)    Method Type: ESTIVEN        MRSA/MSSA PCR (07.16.18 @ 10:15)    MRSA PCR Result.: NotDetec: MRSA/MSSA PCR assay is a qualitative in vitro diagnostic test for the  direct detection and differentiation of methicillin-resistant  Staphylococcus aureus (MRSA) from Staphylococcus aureus (SA). The assay  detects DNA from nasal swabs in patients atrisk for nasal colonization.  It is not intended to diagnose MRSA or SA infections nor guide or monitor  treatment for MRSA/SA infections. A negative result does not preclude  nasal colonization. The assay is FDA-approved and its performance has  been established by [employer], [place] and the [facility], [place], [place].    Staph Aureus PCR Result: [facility]    Urine Microscopic-Add On (NC) (07.15.18 @ 23:04)    Bacteria: Many /HPF    Epithelial Cells: Many /HPF    Red Blood Cell - Urine: 5-10 /HPF    White Blood Cell - Urine: 11-25 /HPF

## 2018-07-26 NOTE — PROGRESS NOTE ADULT - SUBJECTIVE AND OBJECTIVE BOX
Patient seen and examined at bedside  Case discussed with medical team    HPI:  63 Y/O F, non-verbal, bed bound, Trach/ PEG tube, from NH, PMhx of HTN, asthma, convulsion disorder, bipolar, DVT legs, DM 2, sent from NH for vomiting and cough.  Pt unable to provide history.  History given by sister who states pt was vomiting coffee ground emesis yesterday.  Pt had 2 episodes of reported vomiting, and has not had an episode since.  Pt also developed cough with clear sputum.  Sister states pt is combative at baseline, and her current behavior is baseline.  No reported fever, discomfort, diarrhea, or SOB. (14 Jul 2018 18:51)      PAST MEDICAL & SURGICAL HISTORY:  No pertinent past medical history  Hypertension  Schizophrenia  Diabetic coma  Diabetes mellitus  No significant past surgical history  S/P percutaneous endoscopic gastrostomy (PEG) tube placement  H/O tracheostomy      angiotensin converting enzyme inhibitors (Unknown)       MEDICATIONS  (STANDING):  ALBUTerol/ipratropium for Nebulization 3 milliLiter(s) Nebulizer every 6 hours  amiKACIN  IVPB 600 milliGRAM(s) IV Intermittent every 12 hours  amLODIPine   Tablet 2.5 milliGRAM(s) Oral daily  atorvastatin 20 milliGRAM(s) Oral at bedtime  benztropine 2 milliGRAM(s) Oral two times a day  cefepime   IVPB 2000 milliGRAM(s) IV Intermittent every 8 hours  cefepime   IVPB      dextrose 5% + sodium chloride 0.45%. 1000 milliLiter(s) (50 mL/Hr) IV Continuous <Continuous>  dextrose 5%. 1000 milliLiter(s) (50 mL/Hr) IV Continuous <Continuous>  dextrose 50% Injectable 12.5 Gram(s) IV Push once  dextrose 50% Injectable 25 Gram(s) IV Push once  dextrose 50% Injectable 25 Gram(s) IV Push once  heparin  Injectable 5000 Unit(s) SubCutaneous every 8 hours  insulin glargine Injectable (LANTUS) 15 Unit(s) SubCutaneous at bedtime  insulin lispro (HumaLOG) corrective regimen sliding scale   SubCutaneous every 6 hours  levETIRAcetam  Solution 1000 milliGRAM(s) Oral two times a day  LORazepam     Tablet 1 milliGRAM(s) Oral every 6 hours  metoprolol tartrate 100 milliGRAM(s) Oral two times a day  pantoprazole   Suspension 40 milliGRAM(s) Enteral Tube daily  QUEtiapine 50 milliGRAM(s) Oral every 12 hours  sodium chloride 0.9% lock flush 20 milliLiter(s) IV Push once    MEDICATIONS  (PRN):  acetaminophen    Suspension 650 milliGRAM(s) Oral every 6 hours PRN For Temp greater than 38 C (100.4 F)  benztropine Injectable 2 milliGRAM(s) IV Push once PRN Extrapyramidal symptoms  dextrose 40% Gel 15 Gram(s) Oral once PRN Blood Glucose LESS THAN 70 milliGRAM(s)/deciLiter  glucagon  Injectable 1 milliGRAM(s) IntraMuscular once PRN Glucose <70 milliGRAM(s)/deciLiter  LORazepam   Injectable 1 milliGRAM(s) IV Push every 6 hours PRN combative /agitation  sodium chloride 0.9% lock flush 10 milliLiter(s) IV Push every 1 hour PRN After each medication administration  sodium chloride 0.9% lock flush 10 milliLiter(s) IV Push every 12 hours PRN Lumen of catheter NOT used      REVIEW OF SYSTEMS:  limited 2/2 mental status 	    T(C): 37 (07-26-18 @ 14:17), Max: 37.1 (07-25-18 @ 20:43)  HR: 71 (07-26-18 @ 14:17) (61 - 96)  BP: 110/49 (07-26-18 @ 14:23) (110/49 - 176/126)  RR: 18 (07-26-18 @ 14:17) (18 - 20)  SpO2: 100% (07-26-18 @ 14:17) (96% - 100%)    PHYSICAL EXAMINATION:   Constitutional: greatly improved appearance. NAD  HEENT: AT  Neck:  Supple  Respiratory:  greatly improved resp status. trach intact. Adequate airflow b/l. Not using accessory muscles of respiration.  Cardiovascular:  S1 & S2 intact, no R/G, 2+ radial pulses b/l  Gastrointestinal: Soft, NT, ND, normoactive b.s., no organomegaly/RT/rigidity  Extremities: WWP  Neurological: awake. Crude sensation intact.     Labs and imaging reviewed    LABS:                        12.3   7.9   )-----------( 146      ( 26 Jul 2018 07:44 )             39.5     07-26    141  |  106  |  8   ----------------------------<  134<H>  4.4   |  30  |  0.89    Ca    9.6      26 Jul 2018 07:44  Phos  3.2     07-26  Mg     1.8     07-26      CARDIAC MARKERS ( 25 Jul 2018 18:15 )  <0.015 ng/mL / x     / 76 U/L / x     / 1.5 ng/mL          CAPILLARY BLOOD GLUCOSE      POCT Blood Glucose.: 163 mg/dL (26 Jul 2018 12:16)  POCT Blood Glucose.: 98 mg/dL (26 Jul 2018 05:35)  POCT Blood Glucose.: 250 mg/dL (25 Jul 2018 22:45)  POCT Blood Glucose.: 185 mg/dL (25 Jul 2018 18:19)  POCT Blood Glucose.: 171 mg/dL (25 Jul 2018 17:24)              RADIOLOGY & ADDITIONAL STUDIES:

## 2018-07-26 NOTE — PROGRESS NOTE ADULT - ASSESSMENT
63 Y/O F, non-verbal, bed bound, Trach/ PEG tube, from NH, PMhx of HTN, asthma, convulsion disorder, bipolar, DVT legs, DM, sent from NH for vomiting and cough.  In the ED, pt presents in no acute distress, and vitals WNL.  Pt is afebrile with leukocytosis of 19.  CXR concerning for RLL infiltrate likely 2/2 asp pneumonia.  Abdominal x-ray sig for non-specific gas pattern and intact PEG tube.  Pt will be admitted to medicine for suspected Aspiration pneumonia.    Plan:   - Tachypnea: greatly improved tachpnea/resp status and ill appearance after restarting pt's Quetiapine.  In addition, pt given morphine yesterday evening and ativan prior to CT.  Likely 2/2 rx discontinuation, as infectious workup is negative.   - Lactate elevation - ivf, trend lactate to wnl.   - Diarrhea improved. (-) Cdiff pcr  - Complicated Aspiration PNA/HCAP 2/2 CRE Pseudomonas & UTI - abx. Overall improving. abx per id   - PEG Dislodgement - improved   - Uncontrolled Type 2 DM with complications of hyperglycemia - improved. C/w lantus, moderate ihss, monitor acks  - Need for prophylactic measure: dvt/gi ppx   d/c planning

## 2018-07-26 NOTE — PROGRESS NOTE ADULT - SUBJECTIVE AND OBJECTIVE BOX
Time of Visit:  Patient seen and examined.     MEDICATIONS  (STANDING):  ALBUTerol/ipratropium for Nebulization 3 milliLiter(s) Nebulizer every 6 hours  amiKACIN  IVPB 600 milliGRAM(s) IV Intermittent every 12 hours  amLODIPine   Tablet 2.5 milliGRAM(s) Oral daily  atorvastatin 20 milliGRAM(s) Oral at bedtime  benztropine 2 milliGRAM(s) Oral two times a day  cefepime   IVPB 2000 milliGRAM(s) IV Intermittent every 8 hours  cefepime   IVPB      dextrose 5% + sodium chloride 0.45%. 1000 milliLiter(s) (50 mL/Hr) IV Continuous <Continuous>  dextrose 5% + sodium chloride 0.45%. 1000 milliLiter(s) (75 mL/Hr) IV Continuous <Continuous>  dextrose 5%. 1000 milliLiter(s) (50 mL/Hr) IV Continuous <Continuous>  dextrose 50% Injectable 12.5 Gram(s) IV Push once  dextrose 50% Injectable 25 Gram(s) IV Push once  dextrose 50% Injectable 25 Gram(s) IV Push once  heparin  Injectable 5000 Unit(s) SubCutaneous every 8 hours  insulin glargine Injectable (LANTUS) 15 Unit(s) SubCutaneous at bedtime  insulin lispro (HumaLOG) corrective regimen sliding scale   SubCutaneous every 6 hours  levETIRAcetam  Solution 1000 milliGRAM(s) Oral two times a day  LORazepam     Tablet 1 milliGRAM(s) Oral every 6 hours  metoprolol tartrate 100 milliGRAM(s) Oral two times a day  pantoprazole   Suspension 40 milliGRAM(s) Enteral Tube daily  sodium chloride 0.9% lock flush 20 milliLiter(s) IV Push once  sodium chloride 0.9% with potassium chloride 20 mEq/L 1000 milliLiter(s) (60 mL/Hr) IV Continuous <Continuous>  sodium chloride 0.9%. 500 milliLiter(s) (500 mL/Hr) IV Continuous <Continuous>      MEDICATIONS  (PRN):  acetaminophen    Suspension 650 milliGRAM(s) Oral every 6 hours PRN For Temp greater than 38 C (100.4 F)  benztropine Injectable 2 milliGRAM(s) IV Push once PRN Extrapyramidal symptoms  dextrose 40% Gel 15 Gram(s) Oral once PRN Blood Glucose LESS THAN 70 milliGRAM(s)/deciLiter  glucagon  Injectable 1 milliGRAM(s) IntraMuscular once PRN Glucose <70 milliGRAM(s)/deciLiter  LORazepam   Injectable 1 milliGRAM(s) IV Push every 6 hours PRN combative /agitation  sodium chloride 0.9% lock flush 10 milliLiter(s) IV Push every 1 hour PRN After each medication administration  sodium chloride 0.9% lock flush 10 milliLiter(s) IV Push every 12 hours PRN Lumen of catheter NOT used       Medications up to date at time of exam.    ROS; No fever, chills, cough, congestion on exam. Non verbal. No Hypoxia.   PHYSICAL EXAMINATION:    Vital Signs Last 24 Hrs  T(C): 37.1 (26 Jul 2018 05:26), Max: 37.1 (25 Jul 2018 20:43)  T(F): 98.7 (26 Jul 2018 05:26), Max: 98.8 (25 Jul 2018 20:43)  HR: 61 (26 Jul 2018 10:18) (61 - 98)  BP: 127/61 (26 Jul 2018 10:18) (100/76 - 155/81)  BP(mean): --  RR: 18 (26 Jul 2018 10:18) (18 - 20)  SpO2: 96% (26 Jul 2018 10:18) (94% - 98%)   (if applicable)    General; Non verbal. Eyes does not follow verbal and tactile stimuli. Total care. No acute distress.      HEENT: Normocephalic and atraumatic. No nasal tenderness. Moist mucosa.     NECK: Has Trach#6, non infected.    LUNGS: Clear to auscultation B/L. No wheeze/ rales. No use of accessory muscle.     HEART: S1 S2 Regular rate and no click/ rub.     ABDOMEN: Soft, nontender, and nondistended. Active bowel sounds. + Peg.     EXTREMITIES: Without any cyanosis, clubbing, rash, lesions or edema.    NEUROLOGIC: Non verbal. Cognitive impaired.      SKIN: Warm and moist. Non diaphoretic.     LABS:                        12.3   7.9   )-----------( 146      ( 26 Jul 2018 07:44 )             39.5     07-26    141  |  106  |  8   ----------------------------<  134<H>  4.4   |  30  |  0.89    Ca    9.6      26 Jul 2018 07:44  Phos  3.2     07-26  Mg     1.8     07-26            CARDIAC MARKERS ( 25 Jul 2018 18:15 )  <0.015 ng/mL / x     / 76 U/L / x     / 1.5 ng/mL      D-Dimer Assay, Quantitative: 269 ng/mL DDU (07-25-18 @ 18:15)      Lactate, Blood: 4.2 mmol/L (07-26-18 @ 07:43)    RADIOLOGY & ADDITIONAL STUDIES:  EKG:   CXR: < from: Xray Chest 1 View- PORTABLE-Urgent (07.25.18 @ 18:58) >  EXAM:  XR CHEST PORTABLE URGENT 1V                            PROCEDURE DATE:  07/25/2018          INTERPRETATION:  Portable chest x-ray    Indication: Tachypnea. Respiratory distress.    Portable chest x-ray is compared to a previous examination dated   7/14/2018.    Impression: Stable tracheostomy. New left PICC line terminates at the SVC.    The left lung base is obscured by the patient's left hand. No gross   pulmonary CONSOLIDATION, PLEURAL EFFUSION OR PNEUMOTHORAX.    STABLE MILD RIGHT BASILAR ATELECTASIS.    STABLE ELEVATION OF THE RIGHT HEMIDIAPHRAGM.    THE TRACHEA IS MIDLINE.    STABLE CARDIAC SILHOUETTE.     PAST MEDICAL & SURGICAL HISTORY:  No pertinent past medical history  Hypertension  Schizophrenia  Diabetic coma  Diabetes mellitus  No significant past surgical history  S/P percutaneous endoscopic gastrostomy (PEG) tube placement  H/O tracheostomy     Impression; 62 Y/O Female with prior mentioned multiple chronic conditions. Presented with Cough, SOB and Vomiting. Leukocytosis from WBC 23.1 to WBC 15.4 with RLL Opacity secondary to Aspiration PNA due to pseudomonas ( CRE) Oxygen supplementation FIO2 40% via Trach collar due to Chronic Hypoxic respiratory failure. Leukocytosis WBC from 23 to WBC 7.9. Been Afebrile. Will need antibx until 7/28. double coverage     Suggestion:     Continue DuoNeb Q 6 hours.  Oxygen supplementation via Trach collar.  Continue antibiotic as per ID recommendation until 7/28  Contact precautions due to CRE Pseudomonas Sputum,   Aspiration precautions. HOB elevation especially during Peg feeding.   Turn and reposition in bed.  DVT/ GI prophylactic. Time of Visit:  Patient seen and examined.     MEDICATIONS  (STANDING):  ALBUTerol/ipratropium for Nebulization 3 milliLiter(s) Nebulizer every 6 hours  amiKACIN  IVPB 600 milliGRAM(s) IV Intermittent every 12 hours  amLODIPine   Tablet 2.5 milliGRAM(s) Oral daily  atorvastatin 20 milliGRAM(s) Oral at bedtime  benztropine 2 milliGRAM(s) Oral two times a day  cefepime   IVPB 2000 milliGRAM(s) IV Intermittent every 8 hours  cefepime   IVPB      dextrose 5% + sodium chloride 0.45%. 1000 milliLiter(s) (50 mL/Hr) IV Continuous <Continuous>  dextrose 5% + sodium chloride 0.45%. 1000 milliLiter(s) (75 mL/Hr) IV Continuous <Continuous>  dextrose 5%. 1000 milliLiter(s) (50 mL/Hr) IV Continuous <Continuous>  dextrose 50% Injectable 12.5 Gram(s) IV Push once  dextrose 50% Injectable 25 Gram(s) IV Push once  dextrose 50% Injectable 25 Gram(s) IV Push once  heparin  Injectable 5000 Unit(s) SubCutaneous every 8 hours  insulin glargine Injectable (LANTUS) 15 Unit(s) SubCutaneous at bedtime  insulin lispro (HumaLOG) corrective regimen sliding scale   SubCutaneous every 6 hours  levETIRAcetam  Solution 1000 milliGRAM(s) Oral two times a day  LORazepam     Tablet 1 milliGRAM(s) Oral every 6 hours  metoprolol tartrate 100 milliGRAM(s) Oral two times a day  pantoprazole   Suspension 40 milliGRAM(s) Enteral Tube daily  sodium chloride 0.9% lock flush 20 milliLiter(s) IV Push once  sodium chloride 0.9% with potassium chloride 20 mEq/L 1000 milliLiter(s) (60 mL/Hr) IV Continuous <Continuous>  sodium chloride 0.9%. 500 milliLiter(s) (500 mL/Hr) IV Continuous <Continuous>      MEDICATIONS  (PRN):  acetaminophen    Suspension 650 milliGRAM(s) Oral every 6 hours PRN For Temp greater than 38 C (100.4 F)  benztropine Injectable 2 milliGRAM(s) IV Push once PRN Extrapyramidal symptoms  dextrose 40% Gel 15 Gram(s) Oral once PRN Blood Glucose LESS THAN 70 milliGRAM(s)/deciLiter  glucagon  Injectable 1 milliGRAM(s) IntraMuscular once PRN Glucose <70 milliGRAM(s)/deciLiter  LORazepam   Injectable 1 milliGRAM(s) IV Push every 6 hours PRN combative /agitation  sodium chloride 0.9% lock flush 10 milliLiter(s) IV Push every 1 hour PRN After each medication administration  sodium chloride 0.9% lock flush 10 milliLiter(s) IV Push every 12 hours PRN Lumen of catheter NOT used       Medications up to date at time of exam.    ROS; No fever, chills, cough, congestion on exam. Non verbal. No Hypoxia.   PHYSICAL EXAMINATION:    Vital Signs Last 24 Hrs  T(C): 37.1 (26 Jul 2018 05:26), Max: 37.1 (25 Jul 2018 20:43)  T(F): 98.7 (26 Jul 2018 05:26), Max: 98.8 (25 Jul 2018 20:43)  HR: 61 (26 Jul 2018 10:18) (61 - 98)  BP: 127/61 (26 Jul 2018 10:18) (100/76 - 155/81)  BP(mean): --  RR: 18 (26 Jul 2018 10:18) (18 - 20)  SpO2: 96% (26 Jul 2018 10:18) (94% - 98%)   (if applicable)    General; Non verbal. Eyes does not follow verbal and tactile stimuli. Total care. No acute distress.      HEENT: Normocephalic and atraumatic. No nasal tenderness. Moist mucosa.     NECK: Has Trach#6, non infected.    LUNGS: Clear to auscultation B/L. No wheeze/ rales. No use of accessory muscle.     HEART: S1 S2 Regular rate and no click/ rub.     ABDOMEN: Soft, nontender, and nondistended. Active bowel sounds. + Peg.     EXTREMITIES: Without any cyanosis, clubbing, rash, lesions or edema.    NEUROLOGIC: Non verbal. Cognitive impaired.      SKIN: Warm and moist. Non diaphoretic.     LABS:                        12.3   7.9   )-----------( 146      ( 26 Jul 2018 07:44 )             39.5     07-26    141  |  106  |  8   ----------------------------<  134<H>  4.4   |  30  |  0.89    Ca    9.6      26 Jul 2018 07:44  Phos  3.2     07-26  Mg     1.8     07-26            CARDIAC MARKERS ( 25 Jul 2018 18:15 )  <0.015 ng/mL / x     / 76 U/L / x     / 1.5 ng/mL      D-Dimer Assay, Quantitative: 269 ng/mL DDU (07-25-18 @ 18:15)      Lactate, Blood: 4.2 mmol/L (07-26-18 @ 07:43)    RADIOLOGY & ADDITIONAL STUDIES:  EKG:   CXR: < from: Xray Chest 1 View- PORTABLE-Urgent (07.25.18 @ 18:58) >  EXAM:  XR CHEST PORTABLE URGENT 1V                            PROCEDURE DATE:  07/25/2018          INTERPRETATION:  Portable chest x-ray    Indication: Tachypnea. Respiratory distress.    Portable chest x-ray is compared to a previous examination dated   7/14/2018.    Impression: Stable tracheostomy. New left PICC line terminates at the SVC.    The left lung base is obscured by the patient's left hand. No gross   pulmonary CONSOLIDATION, PLEURAL EFFUSION OR PNEUMOTHORAX.    STABLE MILD RIGHT BASILAR ATELECTASIS.    STABLE ELEVATION OF THE RIGHT HEMIDIAPHRAGM.    THE TRACHEA IS MIDLINE.    STABLE CARDIAC SILHOUETTE.     PAST MEDICAL & SURGICAL HISTORY:  No pertinent past medical history  Hypertension  Schizophrenia  Diabetic coma  Diabetes mellitus  No significant past surgical history  S/P percutaneous endoscopic gastrostomy (PEG) tube placement  H/O tracheostomy     Impression; 64 Y/O Female with prior mentioned multiple chronic conditions. Presented with Cough, SOB and Vomiting. Leukocytosis from WBC 23.1 to WBC 15.4 with RLL Opacity secondary to Aspiration PNA due to pseudomonas ( CRE) Oxygen supplementation FIO2 40% via Trach collar due to Chronic Hypoxic respiratory failure. Leukocytosis WBC from 23 to WBC 7.9. Been Afebrile. Will need antibx until 7/28. double coverage     Suggestion:     Continue DuoNeb Q 6 hours.  Oxygen supplementation via Trach collar.  Continue antibiotic as per ID recommendation until 7/28  Contact precautions due to CRE Pseudomonas Sputum,   Aspiration precautions. HOB elevation especially during Peg feeding.   Turn and reposition in bed.    Agree with above assessment and plan as transcribed.   DVT/ GI prophylactic.

## 2018-07-26 NOTE — PROGRESS NOTE ADULT - ASSESSMENT
A 63 yo Female with multiple co-morbidities including non-verbal, bed bound, Trach/ PEG tube, sent in to the ER from NH for evaluation of shortness of breath and cough after vomiting. On admission, she has no fever or chills but Leukocytosis. The CXR  shows No consolidation but CT chest shows RLL opacity. She has  started on Vancomycin and Zosyn and The ID consult  requested to assist with further evaluation and antibiotic management.     # Aspiration Pneumonia - Pseudomonas ( CRE)  # Hematemesis  # Procalcitonin level elevated    Would recommend:    1. Continue Amikacin and Cefepime until 7/28/18  2. Continue  Bronchial/Oral  suctioning  3. Aspiration precaution  4. Contact Isolation     d/w nursing staff     - D/C planning as per Primary team

## 2018-07-26 NOTE — PROGRESS NOTE ADULT - PROBLEM SELECTOR PLAN 6
[] Previous VTE                                                3  [] Thrombophilia                                             2  [] Lower limb paralysis                                   2    [] Current Cancer                                             2   [x] Immobilization > 24 hrs                              1  [] ICU/CCU stay > 24 hours                             1  [x] Age > 60                                                         1    IMPROVE VTE Score: 2; Lovenox for DVT prophy
Lactate 4.2 d/w ATTG. NS bolus  500cc when returns from CT head and chest then will start NS @ 60 cc/hr with 20 meq KCL/ltr
diarrhea resolved

## 2018-07-26 NOTE — PROGRESS NOTE ADULT - SUBJECTIVE AND OBJECTIVE BOX
Patient is a 63y old  Female who presents with a chief complaint of SOB (14 Jul 2018 18:51)  Pt is scheduled for Head CT as per Dr. Lau, CT chest was requested by Dr. Lau to r/o reaspiration as CXR 7/25 was negative. Pt is afebrile, WBC wnl, RR was 18 taken by me and HR was 68 and regular. BP wnl. Pt was calm after sedation with lorazepam 2 mg prior to CT of chest and head.    Allergies: angiotensin converting enzyme inhibitors (Unknown)      INTERVAL HPI/OVERNIGHT EVENTS:    T(C): 37.1 (07-26-18 @ 05:26), Max: 37.1 (07-25-18 @ 20:43)  HR: 70 (07-26-18 @ 05:26) (70 - 98)  BP: 118/74 (07-26-18 @ 05:26) (100/76 - 155/81)  RR: 20 (07-26-18 @ 05:26) (18 - 20)  SpO2: 98% (07-26-18 @ 06:51) (94% - 98%)  I&O's Summary    Vital Signs Last 24 Hrs  T(C): 37.1 (26 Jul 2018 05:26), Max: 37.1 (25 Jul 2018 20:43)  T(F): 98.7 (26 Jul 2018 05:26), Max: 98.8 (25 Jul 2018 20:43)  HR: 70 (26 Jul 2018 05:26) (70 - 98)  BP: 118/74 (26 Jul 2018 05:26) (100/76 - 155/81)  BP(mean): --  RR: 20 (26 Jul 2018 05:26) (18 - 20)  SpO2: 98% (26 Jul 2018 06:51) (94% - 98%)  PAST MEDICAL & SURGICAL HISTORY:  No pertinent past medical history  Hypertension  Schizophrenia  Diabetic coma  Diabetes mellitus  No significant past surgical history  S/P percutaneous endoscopic gastrostomy (PEG) tube placement  H/O tracheostomy          LABS:                        12.3   7.9   )-----------( 146      ( 26 Jul 2018 07:44 )             39.5     07-26    141  |  106  |  8   ----------------------------<  134<H>  4.4   |  30  |  0.89    Ca    9.6      26 Jul 2018 07:44  Phos  3.2     07-26  Mg     1.8     07-26        CAPILLARY BLOOD GLUCOSE      POCT Blood Glucose.: 98 mg/dL (26 Jul 2018 05:35)  POCT Blood Glucose.: 250 mg/dL (25 Jul 2018 22:45)  POCT Blood Glucose.: 185 mg/dL (25 Jul 2018 18:19)  POCT Blood Glucose.: 171 mg/dL (25 Jul 2018 17:24)  POCT Blood Glucose.: 213 mg/dL (25 Jul 2018 11:45)            MEDICATIONS  (STANDING):  ALBUTerol/ipratropium for Nebulization 3 milliLiter(s) Nebulizer every 6 hours  amiKACIN  IVPB 600 milliGRAM(s) IV Intermittent every 12 hours  amLODIPine   Tablet 2.5 milliGRAM(s) Oral daily  atorvastatin 20 milliGRAM(s) Oral at bedtime  benztropine 2 milliGRAM(s) Oral two times a day  cefepime   IVPB 2000 milliGRAM(s) IV Intermittent every 8 hours  cefepime   IVPB      dextrose 5% + sodium chloride 0.45%. 1000 milliLiter(s) (50 mL/Hr) IV Continuous <Continuous>  dextrose 5% + sodium chloride 0.45%. 1000 milliLiter(s) (75 mL/Hr) IV Continuous <Continuous>  dextrose 5%. 1000 milliLiter(s) (50 mL/Hr) IV Continuous <Continuous>  dextrose 50% Injectable 12.5 Gram(s) IV Push once  dextrose 50% Injectable 25 Gram(s) IV Push once  dextrose 50% Injectable 25 Gram(s) IV Push once  heparin  Injectable 5000 Unit(s) SubCutaneous every 8 hours  insulin glargine Injectable (LANTUS) 15 Unit(s) SubCutaneous at bedtime  insulin lispro (HumaLOG) corrective regimen sliding scale   SubCutaneous every 6 hours  levETIRAcetam  Solution 1000 milliGRAM(s) Oral two times a day  LORazepam     Tablet 1 milliGRAM(s) Oral every 6 hours  metoprolol tartrate 100 milliGRAM(s) Oral two times a day  pantoprazole   Suspension 40 milliGRAM(s) Enteral Tube daily  sodium chloride 0.9% lock flush 20 milliLiter(s) IV Push once  sodium chloride 0.9% with potassium chloride 20 mEq/L 1000 milliLiter(s) (60 mL/Hr) IV Continuous <Continuous>  sodium chloride 0.9%. 500 milliLiter(s) (500 mL/Hr) IV Continuous <Continuous>    MEDICATIONS  (PRN):  acetaminophen    Suspension 650 milliGRAM(s) Oral every 6 hours PRN For Temp greater than 38 C (100.4 F)  benztropine Injectable 2 milliGRAM(s) IV Push once PRN Extrapyramidal symptoms  dextrose 40% Gel 15 Gram(s) Oral once PRN Blood Glucose LESS THAN 70 milliGRAM(s)/deciLiter  glucagon  Injectable 1 milliGRAM(s) IntraMuscular once PRN Glucose <70 milliGRAM(s)/deciLiter  LORazepam   Injectable 1 milliGRAM(s) IV Push every 6 hours PRN combative /agitation  sodium chloride 0.9% lock flush 10 milliLiter(s) IV Push every 1 hour PRN After each medication administration  sodium chloride 0.9% lock flush 10 milliLiter(s) IV Push every 12 hours PRN Lumen of catheter NOT used          RADIOLOGY & ADDITIONAL TESTS:      Consultant(s) Notes Reviewed:  [ ] YES  [ ] NO    PHYSICAL EXAM:  GENERAL: NAD, calm , asleep and not restless  HEAD:  Atraumatic, Normocephalic  EYES:  conjunctiva and sclera clear  ENMT:  Moist mucous membranes, excess secretions  NECK: Supple, No JVD,  trach in place  NERVOUS SYSTEM:  vegetative state  CHEST/LUNG: decreased BS, no rales trach collar in place  HEART: S1, S2;  ABDOMEN: Soft, Nontender, Nondistended; Bowel sounds present peg insitu , clamped for transport  EXTREMITIES:  2+ Peripheral Pulses, No clubbing, cyanosis, or edema  SKIN: No rashes or lesions    Care Collaborated Discussed with Consultants/Other Providers [Y ] YES  [ ] NO

## 2018-07-27 DIAGNOSIS — Z02.9 ENCOUNTER FOR ADMINISTRATIVE EXAMINATIONS, UNSPECIFIED: ICD-10-CM

## 2018-07-27 LAB
ANION GAP SERPL CALC-SCNC: 5 MMOL/L — SIGNIFICANT CHANGE UP (ref 5–17)
BUN SERPL-MCNC: 8 MG/DL — SIGNIFICANT CHANGE UP (ref 7–18)
CALCIUM SERPL-MCNC: 8.8 MG/DL — SIGNIFICANT CHANGE UP (ref 8.4–10.5)
CHLORIDE SERPL-SCNC: 110 MMOL/L — HIGH (ref 96–108)
CO2 SERPL-SCNC: 29 MMOL/L — SIGNIFICANT CHANGE UP (ref 22–31)
CREAT SERPL-MCNC: 0.74 MG/DL — SIGNIFICANT CHANGE UP (ref 0.5–1.3)
GLUCOSE SERPL-MCNC: 163 MG/DL — HIGH (ref 70–99)
POTASSIUM SERPL-MCNC: 3.9 MMOL/L — SIGNIFICANT CHANGE UP (ref 3.5–5.3)
POTASSIUM SERPL-SCNC: 3.9 MMOL/L — SIGNIFICANT CHANGE UP (ref 3.5–5.3)
SODIUM SERPL-SCNC: 144 MMOL/L — SIGNIFICANT CHANGE UP (ref 135–145)

## 2018-07-27 RX ADMIN — Medication 1 MILLIGRAM(S): at 12:13

## 2018-07-27 RX ADMIN — Medication 100 MILLIGRAM(S): at 05:43

## 2018-07-27 RX ADMIN — INSULIN GLARGINE 15 UNIT(S): 100 INJECTION, SOLUTION SUBCUTANEOUS at 21:33

## 2018-07-27 RX ADMIN — HEPARIN SODIUM 5000 UNIT(S): 5000 INJECTION INTRAVENOUS; SUBCUTANEOUS at 13:18

## 2018-07-27 RX ADMIN — AMIKACIN SULFATE 102.4 MILLIGRAM(S): 250 INJECTION, SOLUTION INTRAMUSCULAR; INTRAVENOUS at 18:06

## 2018-07-27 RX ADMIN — Medication 2: at 12:13

## 2018-07-27 RX ADMIN — AMIKACIN SULFATE 102.4 MILLIGRAM(S): 250 INJECTION, SOLUTION INTRAMUSCULAR; INTRAVENOUS at 06:35

## 2018-07-27 RX ADMIN — Medication 2: at 23:42

## 2018-07-27 RX ADMIN — Medication 3 MILLILITER(S): at 03:06

## 2018-07-27 RX ADMIN — LEVETIRACETAM 1000 MILLIGRAM(S): 250 TABLET, FILM COATED ORAL at 05:43

## 2018-07-27 RX ADMIN — PANTOPRAZOLE SODIUM 40 MILLIGRAM(S): 20 TABLET, DELAYED RELEASE ORAL at 12:13

## 2018-07-27 RX ADMIN — Medication 2 MILLIGRAM(S): at 05:43

## 2018-07-27 RX ADMIN — LEVETIRACETAM 1000 MILLIGRAM(S): 250 TABLET, FILM COATED ORAL at 17:25

## 2018-07-27 RX ADMIN — HEPARIN SODIUM 5000 UNIT(S): 5000 INJECTION INTRAVENOUS; SUBCUTANEOUS at 05:43

## 2018-07-27 RX ADMIN — ATORVASTATIN CALCIUM 20 MILLIGRAM(S): 80 TABLET, FILM COATED ORAL at 21:32

## 2018-07-27 RX ADMIN — Medication 3 MILLILITER(S): at 20:31

## 2018-07-27 RX ADMIN — CEFEPIME 100 MILLIGRAM(S): 1 INJECTION, POWDER, FOR SOLUTION INTRAMUSCULAR; INTRAVENOUS at 21:33

## 2018-07-27 RX ADMIN — Medication 1 MILLIGRAM(S): at 05:43

## 2018-07-27 RX ADMIN — Medication 2 MILLIGRAM(S): at 17:27

## 2018-07-27 RX ADMIN — Medication 1 MILLIGRAM(S): at 23:42

## 2018-07-27 RX ADMIN — CEFEPIME 100 MILLIGRAM(S): 1 INJECTION, POWDER, FOR SOLUTION INTRAMUSCULAR; INTRAVENOUS at 05:43

## 2018-07-27 RX ADMIN — HEPARIN SODIUM 5000 UNIT(S): 5000 INJECTION INTRAVENOUS; SUBCUTANEOUS at 21:32

## 2018-07-27 RX ADMIN — AMLODIPINE BESYLATE 2.5 MILLIGRAM(S): 2.5 TABLET ORAL at 05:43

## 2018-07-27 RX ADMIN — Medication 1 MILLIGRAM(S): at 17:25

## 2018-07-27 RX ADMIN — QUETIAPINE FUMARATE 50 MILLIGRAM(S): 200 TABLET, FILM COATED ORAL at 05:43

## 2018-07-27 RX ADMIN — Medication 3 MILLILITER(S): at 08:47

## 2018-07-27 RX ADMIN — CEFEPIME 100 MILLIGRAM(S): 1 INJECTION, POWDER, FOR SOLUTION INTRAMUSCULAR; INTRAVENOUS at 13:17

## 2018-07-27 RX ADMIN — QUETIAPINE FUMARATE 50 MILLIGRAM(S): 200 TABLET, FILM COATED ORAL at 17:26

## 2018-07-27 RX ADMIN — Medication 3 MILLILITER(S): at 14:16

## 2018-07-27 RX ADMIN — Medication 2: at 17:27

## 2018-07-27 RX ADMIN — Medication 100 MILLIGRAM(S): at 17:26

## 2018-07-27 NOTE — PROGRESS NOTE ADULT - PROBLEM SELECTOR PROBLEM 5
Convulsion disorder
Diabetes mellitus
Aspiration pneumonia of right upper lobe due to regurgitated food
Aspiration pneumonia of right upper lobe due to regurgitated food
Diabetes mellitus
Discharge planning issues
Type 2 diabetes mellitus without complication, unspecified whether long term insulin use

## 2018-07-27 NOTE — PROGRESS NOTE ADULT - PROBLEM SELECTOR PROBLEM 4
Diabetes mellitus
Seizure
Agitation
Aspiration pneumonia
Aspiration pneumonia of right upper lobe due to regurgitated food
Aspiration pneumonia of right upper lobe due to regurgitated food
Diabetes mellitus
PEG (percutaneous endoscopic gastrostomy) status
Type 2 diabetes mellitus without complication, with long-term current use of insulin

## 2018-07-27 NOTE — PROGRESS NOTE ADULT - PROBLEM SELECTOR PLAN 3
-c/w home dose amlodipine and lopressor   -Monitor BP
Resolved on cogentin
BP acceptable.  IVF complete today.  Continue TFs.
Continue ativan and Seroquel. Cab be combative
Cogentin started and as per Dr. Bills to increase cogentin to 2 MG 2 x daily
LAYLA resolved  most likely 2/2 dehydration and fluid loss from vomiting
No further vomiting. Has been NPO
Nystatin cream ordered to irwin area and medial thighs
Nystatin to groin for fungal rash
PSYCH consult for side effects of side effect symptoms from Seroquel and agitation. Seroquel d/c'd . Lorazepam prn
continue nystatin  cream

## 2018-07-27 NOTE — PROGRESS NOTE ADULT - PROBLEM SELECTOR PROBLEM 1
Agitation
Diarrhea, unspecified type
Lactate blood increase
PEG (percutaneous endoscopic gastrostomy) status
Vegetative state
Aspiration pneumonia of right upper lobe due to regurgitated food
Acute kidney injury
Aspiration pneumonia
Aspiration pneumonia

## 2018-07-27 NOTE — PROGRESS NOTE ADULT - PROBLEM SELECTOR PROBLEM 3
Agitation
Essential hypertension, benign
Acute kidney injury
Agitation
Extrapyramidal syndrome
Fungal infection of the groin
Vomiting
Hypertension
Extrapyramidal syndrome

## 2018-07-27 NOTE — PROGRESS NOTE ADULT - SUBJECTIVE AND OBJECTIVE BOX
Patient seen and examined at bedside  Case discussed with medical team, and medical administration, and pt's sister at bedside    HPI:  63 Y/O F, non-verbal, bed bound, Trach/ PEG tube, from NH, PMhx of HTN, asthma, convulsion disorder, bipolar, DVT legs, DM 2, sent from NH for vomiting and cough.  Pt unable to provide history.  History given by sister who states pt was vomiting coffee ground emesis yesterday.  Pt had 2 episodes of reported vomiting, and has not had an episode since.  Pt also developed cough with clear sputum.  Sister states pt is combative at baseline, and her current behavior is baseline.  No reported fever, discomfort, diarrhea, or SOB. (14 Jul 2018 18:51)      PAST MEDICAL & SURGICAL HISTORY:  No pertinent past medical history  Hypertension  Schizophrenia  Diabetic coma  Diabetes mellitus  No significant past surgical history  S/P percutaneous endoscopic gastrostomy (PEG) tube placement  H/O tracheostomy      angiotensin converting enzyme inhibitors (Unknown)       MEDICATIONS  (STANDING):  ALBUTerol/ipratropium for Nebulization 3 milliLiter(s) Nebulizer every 6 hours  amiKACIN  IVPB 600 milliGRAM(s) IV Intermittent every 12 hours  amLODIPine   Tablet 2.5 milliGRAM(s) Oral daily  atorvastatin 20 milliGRAM(s) Oral at bedtime  benztropine 2 milliGRAM(s) Oral two times a day  cefepime   IVPB 2000 milliGRAM(s) IV Intermittent every 8 hours  cefepime   IVPB      dextrose 5% + sodium chloride 0.45%. 1000 milliLiter(s) (50 mL/Hr) IV Continuous <Continuous>  dextrose 5%. 1000 milliLiter(s) (50 mL/Hr) IV Continuous <Continuous>  dextrose 50% Injectable 12.5 Gram(s) IV Push once  dextrose 50% Injectable 25 Gram(s) IV Push once  dextrose 50% Injectable 25 Gram(s) IV Push once  heparin  Injectable 5000 Unit(s) SubCutaneous every 8 hours  insulin glargine Injectable (LANTUS) 15 Unit(s) SubCutaneous at bedtime  insulin lispro (HumaLOG) corrective regimen sliding scale   SubCutaneous every 6 hours  levETIRAcetam  Solution 1000 milliGRAM(s) Oral two times a day  LORazepam     Tablet 1 milliGRAM(s) Oral every 6 hours  metoprolol tartrate 100 milliGRAM(s) Oral two times a day  pantoprazole   Suspension 40 milliGRAM(s) Enteral Tube daily  QUEtiapine 50 milliGRAM(s) Oral every 12 hours  sodium chloride 0.9% lock flush 20 milliLiter(s) IV Push once    MEDICATIONS  (PRN):  acetaminophen    Suspension 650 milliGRAM(s) Oral every 6 hours PRN For Temp greater than 38 C (100.4 F)  benztropine Injectable 2 milliGRAM(s) IV Push once PRN Extrapyramidal symptoms  dextrose 40% Gel 15 Gram(s) Oral once PRN Blood Glucose LESS THAN 70 milliGRAM(s)/deciLiter  glucagon  Injectable 1 milliGRAM(s) IntraMuscular once PRN Glucose <70 milliGRAM(s)/deciLiter  LORazepam   Injectable 1 milliGRAM(s) IV Push every 6 hours PRN combative /agitation  sodium chloride 0.9% lock flush 10 milliLiter(s) IV Push every 1 hour PRN After each medication administration  sodium chloride 0.9% lock flush 10 milliLiter(s) IV Push every 12 hours PRN Lumen of catheter NOT used      REVIEW OF SYSTEMS:  limited 2/2 mental status   	  Vital Signs Last 24 Hrs  T(C): 36.6 (27 Jul 2018 13:41), Max: 36.9 (26 Jul 2018 20:51)  T(F): 97.8 (27 Jul 2018 13:41), Max: 98.4 (26 Jul 2018 20:51)  HR: 67 (27 Jul 2018 13:41) (58 - 68)  BP: 105/61 (27 Jul 2018 13:41) (80/47 - 135/58)  BP(mean): --  RR: 18 (27 Jul 2018 13:41) (16 - 18)  SpO2: 100% (27 Jul 2018 13:41) (95% - 100%)    PHYSICAL EXAMINATION:   Constitutional: greatly improved and stable appearance. NAD  HEENT: AT  Neck:  Supple  Respiratory:  greatly improved resp status. trach intact. Adequate airflow b/l. Not using accessory muscles of respiration.  Cardiovascular:  S1 & S2 intact, no R/G, 2+ radial pulses b/l  Gastrointestinal: Soft, NT, ND, normoactive b.s., no organomegaly/RT/rigidity  Extremities: WWP  Neurological: awake. Crude sensation intact.     Labs and imaging reviewed    LABS:                        12.3   7.9   )-----------( 146      ( 26 Jul 2018 07:44 )             39.5     07-26    141  |  106  |  8   ----------------------------<  134<H>  4.4   |  30  |  0.89    Ca    9.6      26 Jul 2018 07:44  Phos  3.2     07-26  Mg     1.8     07-26      CARDIAC MARKERS ( 25 Jul 2018 18:15 )  <0.015 ng/mL / x     / 76 U/L / x     / 1.5 ng/mL          CAPILLARY BLOOD GLUCOSE      POCT Blood Glucose.: 163 mg/dL (26 Jul 2018 12:16)  POCT Blood Glucose.: 98 mg/dL (26 Jul 2018 05:35)  POCT Blood Glucose.: 250 mg/dL (25 Jul 2018 22:45)  POCT Blood Glucose.: 185 mg/dL (25 Jul 2018 18:19)  POCT Blood Glucose.: 171 mg/dL (25 Jul 2018 17:24)              RADIOLOGY & ADDITIONAL STUDIES:

## 2018-07-27 NOTE — PROGRESS NOTE ADULT - PROBLEM SELECTOR PROBLEM 2
Agitation
Aspiration pneumonia of right upper lobe due to regurgitated food
Aspiration pneumonia of right upper lobe due to regurgitated food
Essential hypertension, benign
Seizure
Tachycardia
Type 2 diabetes mellitus without complication, with long-term current use of insulin
Fungal infection of the groin
Dehydration
Vomiting
Agitation

## 2018-07-27 NOTE — PROGRESS NOTE ADULT - PROBLEM SELECTOR PLAN 5
-c/w keppra therapy  -f/u keppra level
continue Lantus HS and Lispro Q6
Continue Cefipime for aspiration and Amikacin for Pseudomonas in sputum
Continue Lantus at HS and sliding scale
NO bed available at 4 PM friday. Will finish IV abx tomorrow and remove PICC before discharge Monday
cont basal and bolus insulin and POCT. BS in low 100s
continue cefepime and amikacin . Afebrile wbc 6 K

## 2018-07-27 NOTE — PROGRESS NOTE ADULT - SUBJECTIVE AND OBJECTIVE BOX
Time of Visit:  Patient seen and examined.     MEDICATIONS  (STANDING):  ALBUTerol/ipratropium for Nebulization 3 milliLiter(s) Nebulizer every 6 hours  amiKACIN  IVPB 600 milliGRAM(s) IV Intermittent every 12 hours  amLODIPine   Tablet 2.5 milliGRAM(s) Oral daily  atorvastatin 20 milliGRAM(s) Oral at bedtime  benztropine 2 milliGRAM(s) Oral two times a day  cefepime   IVPB 2000 milliGRAM(s) IV Intermittent every 8 hours  cefepime   IVPB      dextrose 5%. 1000 milliLiter(s) (50 mL/Hr) IV Continuous <Continuous>  dextrose 50% Injectable 12.5 Gram(s) IV Push once  dextrose 50% Injectable 25 Gram(s) IV Push once  dextrose 50% Injectable 25 Gram(s) IV Push once  heparin  Injectable 5000 Unit(s) SubCutaneous every 8 hours  insulin glargine Injectable (LANTUS) 15 Unit(s) SubCutaneous at bedtime  insulin lispro (HumaLOG) corrective regimen sliding scale   SubCutaneous every 6 hours  levETIRAcetam  Solution 1000 milliGRAM(s) Oral two times a day  LORazepam     Tablet 1 milliGRAM(s) Oral every 6 hours  metoprolol tartrate 100 milliGRAM(s) Oral two times a day  pantoprazole   Suspension 40 milliGRAM(s) Enteral Tube daily  QUEtiapine 50 milliGRAM(s) Oral every 12 hours  sodium chloride 0.45%. 1000 milliLiter(s) (50 mL/Hr) IV Continuous <Continuous>  sodium chloride 0.9% lock flush 20 milliLiter(s) IV Push once      MEDICATIONS  (PRN):  acetaminophen    Suspension 650 milliGRAM(s) Oral every 6 hours PRN For Temp greater than 38 C (100.4 F)  benztropine Injectable 2 milliGRAM(s) IV Push once PRN Extrapyramidal symptoms  dextrose 40% Gel 15 Gram(s) Oral once PRN Blood Glucose LESS THAN 70 milliGRAM(s)/deciLiter  glucagon  Injectable 1 milliGRAM(s) IntraMuscular once PRN Glucose <70 milliGRAM(s)/deciLiter  LORazepam   Injectable 1 milliGRAM(s) IV Push every 6 hours PRN combative /agitation  sodium chloride 0.9% lock flush 10 milliLiter(s) IV Push every 1 hour PRN After each medication administration  sodium chloride 0.9% lock flush 10 milliLiter(s) IV Push every 12 hours PRN Lumen of catheter NOT used       Medications up to date at time of exam.    ROS; Limited due to patient is Non verbal. No fever, chills, cough, congestion, hypoxia on exam. Had episode of Hypotension BP 80/47 current 118/54.   PHYSICAL EXAMINATION:    Vital Signs Last 24 Hrs  T(C): 36.7 (27 Jul 2018 04:40), Max: 37 (26 Jul 2018 14:17)  T(F): 98.1 (27 Jul 2018 04:40), Max: 98.6 (26 Jul 2018 14:17)  HR: 58 (27 Jul 2018 11:22) (58 - 71)  BP: 118/54 (27 Jul 2018 11:22) (80/47 - 176/126)  BP(mean): --  RR: 16 (27 Jul 2018 04:40) (16 - 18)  SpO2: 100% (27 Jul 2018 04:40) (95% - 100%)   (if applicable)    General; Non verbal. No acute distress. Eyes does not follow verbal and tactile stimuli. Total care.       HEENT: Normocephalic and atraumatic. No nasal tenderness. Moist mucosa.     NECK: Has Trach#6, non infected.    LUNGS: Clear to auscultation B/L. No wheeze/ rales. No use of accessory muscle.     HEART: S1 S2 Regular rate and no click/ rub.     ABDOMEN: Soft, nontender, and nondistended. Active bowel sounds. + Peg.     EXTREMITIES: Without any cyanosis, clubbing, rash, lesions or edema.    NEUROLOGIC: Non verbal. Cognitive impaired.      SKIN: Warm and moist. Non diaphoretic.         LABS:                        12.3   7.9   )-----------( 146      ( 26 Jul 2018 07:44 )             39.5     07-27    144  |  110<H>  |  8   ----------------------------<  163<H>  3.9   |  29  |  0.74    Ca    8.8      27 Jul 2018 07:27  Phos  3.2     07-26  Mg     1.8     07-26            CARDIAC MARKERS ( 25 Jul 2018 18:15 )  <0.015 ng/mL / x     / 76 U/L / x     / 1.5 ng/mL          Lactate, Blood: 0.9 mmol/L (07-26-18 @ 15:13)    Procalcitonin, Serum: 0.03 ng/mL (07-26-18 @ 19:32)      MICROBIOLOGY: (if applicable)    RADIOLOGY & ADDITIONAL STUDIES:  CXR: < from: Xray Chest 1 View- PORTABLE-Urgent (07.25.18 @ 18:58) >  INTERPRETATION:  Portable chest x-ray    Indication: Tachypnea. Respiratory distress.    Portable chest x-ray is compared to a previous examination dated   7/14/2018.    Impression: Stable tracheostomy. New left PICC line terminates at the SVC.    The left lung base is obscured by the patient's left hand. No gross   pulmonary CONSOLIDATION, PLEURAL EFFUSION OR PNEUMOTHORAX.    STABLE MILD RIGHT BASILAR ATELECTASIS.    STABLE ELEVATION OF THE RIGHT HEMIDIAPHRAGM.    THE TRACHEA IS MIDLINE.    STABLE CARDIAC SILHOUETTE.    < end of copied text >     Ct chest:  < from: CT Chest No Cont (07.26.18 @ 11:15) >  INTERPRETATION:  EXAMINATION: CT CHEST    CLINICAL INDICATION: Tachycardia.    TECHNIQUE: Noncontrast CT of the chest was obtained.      COMPARISON: 7/15/2018.    FINDINGS:     AIRWAYS AND LUNGS: Tracheostomy tube.  Elevation right hemidiaphragm with   adjacent subsegmental atelectasis.    MEDIASTINUM AND PLEURA: There are no enlarged mediastinal, hilar or   axillary lymph nodes.     There is no pleural effusion. There is no   pneumothorax.      HEART AND VESSELS: The heart is normal in size.  There are   atherosclerotic calcifications of the aorta and coronary arteries.  There   is no pericardial effusion.         UPPER ABDOMEN: Images of the upper abdomen demonstrate no abnormality.     BONES AND SOFT TISSUES: The bones are unremarkable.  The soft tissues are   unremarkable.    TUBES/LINES: Left-sided PICC with tip in SVC    IMPRESSION:   Elevation right hemidiaphragm with adjacent subsegmental atelectasis.   Lungs otherwise clear.        IMPRESSION: 63y Female PAST MEDICAL & SURGICAL HISTORY:  No pertinent past medical history  Hypertension  Schizophrenia  Diabetic coma  Diabetes mellitus  No significant past surgical history  S/P percutaneous endoscopic gastrostomy (PEG) tube placement  H/O tracheostomy    Impression; 64 Y/O Female with prior mentioned multiple chronic conditions. Presented with Cough, SOB and Vomiting. Leukocytosis from WBC 23.1 to WBC 15.4 with RLL Opacity secondary to Aspiration PNA due to pseudomonas ( CRE) Oxygen supplementation FIO2 40% via Trach collar due to Chronic Hypoxic respiratory failure. Leukocytosis WBC from 23 to WBC 7.9. Been Afebrile.     Suggestion:     Continue DuoNeb Q 6 hours.  Oxygen supplementation via Trach collar.  Continue antibiotic as per ID recommendation until 7/28  Contact precautions due to CRE Pseudomonas Sputum,   Aspiration precautions. HOB elevation especially during Peg feeding.   Turn and reposition in bed. Time of Visit:  Patient seen and examined.     MEDICATIONS  (STANDING):  ALBUTerol/ipratropium for Nebulization 3 milliLiter(s) Nebulizer every 6 hours  amiKACIN  IVPB 600 milliGRAM(s) IV Intermittent every 12 hours  amLODIPine   Tablet 2.5 milliGRAM(s) Oral daily  atorvastatin 20 milliGRAM(s) Oral at bedtime  benztropine 2 milliGRAM(s) Oral two times a day  cefepime   IVPB 2000 milliGRAM(s) IV Intermittent every 8 hours  cefepime   IVPB      dextrose 5%. 1000 milliLiter(s) (50 mL/Hr) IV Continuous <Continuous>  dextrose 50% Injectable 12.5 Gram(s) IV Push once  dextrose 50% Injectable 25 Gram(s) IV Push once  dextrose 50% Injectable 25 Gram(s) IV Push once  heparin  Injectable 5000 Unit(s) SubCutaneous every 8 hours  insulin glargine Injectable (LANTUS) 15 Unit(s) SubCutaneous at bedtime  insulin lispro (HumaLOG) corrective regimen sliding scale   SubCutaneous every 6 hours  levETIRAcetam  Solution 1000 milliGRAM(s) Oral two times a day  LORazepam     Tablet 1 milliGRAM(s) Oral every 6 hours  metoprolol tartrate 100 milliGRAM(s) Oral two times a day  pantoprazole   Suspension 40 milliGRAM(s) Enteral Tube daily  QUEtiapine 50 milliGRAM(s) Oral every 12 hours  sodium chloride 0.45%. 1000 milliLiter(s) (50 mL/Hr) IV Continuous <Continuous>  sodium chloride 0.9% lock flush 20 milliLiter(s) IV Push once      MEDICATIONS  (PRN):  acetaminophen    Suspension 650 milliGRAM(s) Oral every 6 hours PRN For Temp greater than 38 C (100.4 F)  benztropine Injectable 2 milliGRAM(s) IV Push once PRN Extrapyramidal symptoms  dextrose 40% Gel 15 Gram(s) Oral once PRN Blood Glucose LESS THAN 70 milliGRAM(s)/deciLiter  glucagon  Injectable 1 milliGRAM(s) IntraMuscular once PRN Glucose <70 milliGRAM(s)/deciLiter  LORazepam   Injectable 1 milliGRAM(s) IV Push every 6 hours PRN combative /agitation  sodium chloride 0.9% lock flush 10 milliLiter(s) IV Push every 1 hour PRN After each medication administration  sodium chloride 0.9% lock flush 10 milliLiter(s) IV Push every 12 hours PRN Lumen of catheter NOT used       Medications up to date at time of exam.    ROS; Limited due to patient is Non verbal. No fever, chills, cough, congestion, hypoxia on exam. Had episode of Hypotension BP 80/47 current 118/54.   PHYSICAL EXAMINATION:    Vital Signs Last 24 Hrs  T(C): 36.7 (27 Jul 2018 04:40), Max: 37 (26 Jul 2018 14:17)  T(F): 98.1 (27 Jul 2018 04:40), Max: 98.6 (26 Jul 2018 14:17)  HR: 58 (27 Jul 2018 11:22) (58 - 71)  BP: 118/54 (27 Jul 2018 11:22) (80/47 - 176/126)  BP(mean): --  RR: 16 (27 Jul 2018 04:40) (16 - 18)  SpO2: 100% (27 Jul 2018 04:40) (95% - 100%)   (if applicable)    General; Non verbal. No acute distress. Eyes does not follow verbal and tactile stimuli. Total care.       HEENT: Normocephalic and atraumatic. No nasal tenderness. Moist mucosa.     NECK: Has Trach#6, non infected.    LUNGS: Clear to auscultation B/L. No wheeze/ rales. No use of accessory muscle.     HEART: S1 S2 Regular rate and no click/ rub.     ABDOMEN: Soft, nontender, and nondistended. Active bowel sounds. + Peg.     EXTREMITIES: Without any cyanosis, clubbing, rash, lesions or edema.    NEUROLOGIC: Non verbal. Cognitive impaired.      SKIN: Warm and moist. Non diaphoretic.         LABS:                        12.3   7.9   )-----------( 146      ( 26 Jul 2018 07:44 )             39.5     07-27    144  |  110<H>  |  8   ----------------------------<  163<H>  3.9   |  29  |  0.74    Ca    8.8      27 Jul 2018 07:27  Phos  3.2     07-26  Mg     1.8     07-26            CARDIAC MARKERS ( 25 Jul 2018 18:15 )  <0.015 ng/mL / x     / 76 U/L / x     / 1.5 ng/mL          Lactate, Blood: 0.9 mmol/L (07-26-18 @ 15:13)    Procalcitonin, Serum: 0.03 ng/mL (07-26-18 @ 19:32)      MICROBIOLOGY: (if applicable)    RADIOLOGY & ADDITIONAL STUDIES:  CXR: < from: Xray Chest 1 View- PORTABLE-Urgent (07.25.18 @ 18:58) >  INTERPRETATION:  Portable chest x-ray    Indication: Tachypnea. Respiratory distress.    Portable chest x-ray is compared to a previous examination dated   7/14/2018.    Impression: Stable tracheostomy. New left PICC line terminates at the SVC.    The left lung base is obscured by the patient's left hand. No gross   pulmonary CONSOLIDATION, PLEURAL EFFUSION OR PNEUMOTHORAX.    STABLE MILD RIGHT BASILAR ATELECTASIS.    STABLE ELEVATION OF THE RIGHT HEMIDIAPHRAGM.    THE TRACHEA IS MIDLINE.    STABLE CARDIAC SILHOUETTE.    < end of copied text >     Ct chest:  < from: CT Chest No Cont (07.26.18 @ 11:15) >  INTERPRETATION:  EXAMINATION: CT CHEST    CLINICAL INDICATION: Tachycardia.    TECHNIQUE: Noncontrast CT of the chest was obtained.      COMPARISON: 7/15/2018.    FINDINGS:     AIRWAYS AND LUNGS: Tracheostomy tube.  Elevation right hemidiaphragm with   adjacent subsegmental atelectasis.    MEDIASTINUM AND PLEURA: There are no enlarged mediastinal, hilar or   axillary lymph nodes.     There is no pleural effusion. There is no   pneumothorax.      HEART AND VESSELS: The heart is normal in size.  There are   atherosclerotic calcifications of the aorta and coronary arteries.  There   is no pericardial effusion.         UPPER ABDOMEN: Images of the upper abdomen demonstrate no abnormality.     BONES AND SOFT TISSUES: The bones are unremarkable.  The soft tissues are   unremarkable.    TUBES/LINES: Left-sided PICC with tip in SVC    IMPRESSION:   Elevation right hemidiaphragm with adjacent subsegmental atelectasis.   Lungs otherwise clear.        IMPRESSION: 63y Female PAST MEDICAL & SURGICAL HISTORY:  No pertinent past medical history  Hypertension  Schizophrenia  Diabetic coma  Diabetes mellitus  No significant past surgical history  S/P percutaneous endoscopic gastrostomy (PEG) tube placement  H/O tracheostomy    Impression; 62 Y/O Female with prior mentioned multiple chronic conditions. Presented with Cough, SOB and Vomiting. Leukocytosis from WBC 23.1 to WBC 15.4 with RLL Opacity secondary to Aspiration PNA due to pseudomonas ( CRE) Oxygen supplementation FIO2 40% via Trach collar due to Chronic Hypoxic respiratory failure. Leukocytosis WBC from 23 to WBC 7.9. Been Afebrile.     Suggestion:     Continue DuoNeb Q 6 hours.  Oxygen supplementation via Trach collar.  Continue antibiotic as per ID recommendation until 7/28  Contact precautions due to CRE Pseudomonas Sputum,   Aspiration precautions. HOB elevation especially during Peg feeding.   Turn and reposition in bed.  Agree with above assessment and plan as transcribed.

## 2018-07-27 NOTE — PROGRESS NOTE ADULT - ASSESSMENT
A 63 yo Female with multiple co-morbidities including non-verbal, bed bound, Trach/ PEG tube, sent in to the ER from NH for evaluation of shortness of breath and cough after vomiting. On admission, she has no fever or chills but Leukocytosis. The CXR  shows No consolidation but CT chest shows RLL opacity. She has  started on Vancomycin and Zosyn and The ID consult  requested to assist with further evaluation and antibiotic management.     # Aspiration Pneumonia - Pseudomonas ( CRE)  # Hematemesis  # Procalcitonin level elevated    Would recommend:    1. Continue  Bronchial/Oral  suctioning  2. Continue Amikacin and Cefepime until 7/28/18  3. Aspiration precaution  4. Contact Isolation     d/w nursing staff     - D/C planning as per Primary team

## 2018-07-27 NOTE — PROGRESS NOTE ADULT - SUBJECTIVE AND OBJECTIVE BOX
Patient is seen and examined at the bed side, is afebrile.  She is tolerating antibiotic.      REVIEW OF SYSTEMS: Unable to obtained since nonverbal      ALLERGIES: ACIs      Vital Signs Last 24 Hrs  T(C): 36.6 (27 Jul 2018 13:41), Max: 36.9 (26 Jul 2018 20:51)  T(F): 97.8 (27 Jul 2018 13:41), Max: 98.4 (26 Jul 2018 20:51)  HR: 67 (27 Jul 2018 13:41) (58 - 68)  BP: 105/61 (27 Jul 2018 13:41) (80/47 - 135/58)  BP(mean): --  RR: 18 (27 Jul 2018 13:41) (16 - 18)  SpO2: 100% (27 Jul 2018 13:41) (95% - 100%)          PHYSICAL EXAM:  GENERAL: Not in acute distress  HEENT: Trach in placed  CVS: S1 and s2 present  RESP: Air entry B/L  GI: Abdomen nondistended, NT and PEG in placed  EXT: No pedal edema, hands contracted   CNS: Awake but not alert        LABS:  No new Labs                        12.3   7.9   )-----------( 146      ( 26 Jul 2018 07:44 )             39.5                      11.8   15.4  )-----------( 170      ( 16 Jul 2018 07:12 )             38.1                           12.2   23.0  )-----------( 180      ( 15 Jul 2018 12:45 )             38.7         07-27    144  |  110<H>  |  8   ----------------------------<  163<H>  3.9   |  29  |  0.74    Ca    8.8      27 Jul 2018 07:27  Phos  3.2     07-26  Mg     1.8     07-26      07-26    141  |  106  |  8   ----------------------------<  134<H>  4.4   |  30  |  0.89    Ca    9.6      26 Jul 2018 07:44  Phos  3.2     07-26  Mg     1.8     07-26      TPro  7.4  /  Alb  3.4<L>  /  TBili  0.9  /  DBili  x   /  AST  26  /  ALT  44  /  AlkPhos  108  07-14        CAPILLARY BLOOD GLUCOSE      POCT Blood Glucose.: 211 mg/dL (15 Jul 2018 17:22)  POCT Blood Glucose.: 123 mg/dL (15 Jul 2018 11:44)  POCT Blood Glucose.: 353 mg/dL (15 Jul 2018 07:56)  POCT Blood Glucose.: 254 mg/dL (15 Jul 2018 00:42)  POCT Blood Glucose.: 230 mg/dL (14 Jul 2018 22:22)        MEDICATIONS  (STANDING):  ALBUTerol/ipratropium for Nebulization 3 milliLiter(s) Nebulizer every 6 hours  amiKACIN  IVPB 600 milliGRAM(s) IV Intermittent every 12 hours  amLODIPine   Tablet 2.5 milliGRAM(s) Oral daily  atorvastatin 20 milliGRAM(s) Oral at bedtime  benztropine 2 milliGRAM(s) Oral two times a day  cefepime   IVPB 2000 milliGRAM(s) IV Intermittent every 8 hours  cefepime   IVPB      dextrose 5%. 1000 milliLiter(s) (50 mL/Hr) IV Continuous <Continuous>  dextrose 50% Injectable 12.5 Gram(s) IV Push once  dextrose 50% Injectable 25 Gram(s) IV Push once  dextrose 50% Injectable 25 Gram(s) IV Push once  heparin  Injectable 5000 Unit(s) SubCutaneous every 8 hours  insulin glargine Injectable (LANTUS) 15 Unit(s) SubCutaneous at bedtime  insulin lispro (HumaLOG) corrective regimen sliding scale   SubCutaneous every 6 hours  levETIRAcetam  Solution 1000 milliGRAM(s) Oral two times a day  LORazepam     Tablet 1 milliGRAM(s) Oral every 6 hours  metoprolol tartrate 100 milliGRAM(s) Oral two times a day  pantoprazole   Suspension 40 milliGRAM(s) Enteral Tube daily  QUEtiapine 50 milliGRAM(s) Oral every 12 hours  sodium chloride 0.45%. 1000 milliLiter(s) (50 mL/Hr) IV Continuous <Continuous>  sodium chloride 0.9% lock flush 20 milliLiter(s) IV Push once    MEDICATIONS  (PRN):  acetaminophen    Suspension 650 milliGRAM(s) Oral every 6 hours PRN For Temp greater than 38 C (100.4 F)  benztropine Injectable 2 milliGRAM(s) IV Push once PRN Extrapyramidal symptoms  dextrose 40% Gel 15 Gram(s) Oral once PRN Blood Glucose LESS THAN 70 milliGRAM(s)/deciLiter  glucagon  Injectable 1 milliGRAM(s) IntraMuscular once PRN Glucose <70 milliGRAM(s)/deciLiter  LORazepam   Injectable 1 milliGRAM(s) IV Push every 6 hours PRN combative /agitation  sodium chloride 0.9% lock flush 10 milliLiter(s) IV Push every 1 hour PRN After each medication administration  sodium chloride 0.9% lock flush 10 milliLiter(s) IV Push every 12 hours PRN Lumen of catheter NOT used        RADIOLOGY & ADDITIONAL TESTS:    7/19/18: Xray Abdomen 1 View PORTABLE -Urgent (07.19.18 @ 02:39) Gastrostomy evaluation without evidence for extravasation as described.      7/15/18: CT Chest No Cont (07.15.18 @ 11:14) 1. Mid to distal esophageal wall thickening is suggested. Correlate with endoscopic evaluation.  2. A PEG tube is identified, the distal aspect of which is identified in the region of the pylorus/duodenal bulb; correlate clinically as to appropriate positioning.  3. There is opacity identified within the right lower lobe lung parenchyma, likely representative of a combination of both atelectasis and consolidation. Small right pleural effusion.    7/14/18: Xray Chest 1 View AP/PA (07.14.18 @ 15:34)No consolidation. Platelike atelectasis in the right lower lobe.        MICROBIOLOGY DATA:      Culture - Sputum . (07.16.18 @ 10:36)    -  Gentamicin: R >8    -  Imipenem: R >8    -  Levofloxacin: R >4    -  Meropenem: R >8    -  Piperacillin/Tazobactam: R >64    -  Tobramycin: R >8    -  Amikacin: S 16    Gram Stain:   Moderate polymorphonuclear leukocytes seen per low power field  Moderate Squamous epithelial cells seen per low power field  Moderate Gram Negative Rods seen per oil power field  Few Gram Positive Cocci seen per oil power field    -  Aztreonam: R >16    -  Cefepime: I 16    -  Ceftazidime: R >16    -  Ciprofloxacin: R >2    Specimen Source: .Sputum Sputum trap    Culture Results:   Three or more mixed gram negative rods including  Moderate Pseudomonas aeruginosa (Carbapenem Resistant)    Organism Identification: Pseudomonas aeruginosa (Carbapenem Resistant)    Organism: Pseudomonas aeruginosa (Carbapenem Resistant)    Method Type: ESTIVEN        MRSA/MSSA PCR (07.16.18 @ 10:15)    MRSA PCR Result.: NotDetec: MRSA/MSSA PCR assay is a qualitative in vitro diagnostic test for the  direct detection and differentiation of methicillin-resistant  Staphylococcus aureus (MRSA) from Staphylococcus aureus (SA). The assay  detects DNA from nasal swabs in patients atrisk for nasal colonization.  It is not intended to diagnose MRSA or SA infections nor guide or monitor  treatment for MRSA/SA infections. A negative result does not preclude  nasal colonization. The assay is FDA-approved and its performance has  been established by [employer], [place] and the [facility], [place], [place].    Staph Aureus PCR Result: NotDete    Urine Microscopic-Add On (NC) (07.15.18 @ 23:04)    Bacteria: Many /HPF    Epithelial Cells: Many /HPF    Red Blood Cell - Urine: 5-10 /HPF    White Blood Cell - Urine: 11-25 /HPF

## 2018-07-27 NOTE — PROGRESS NOTE ADULT - ASSESSMENT
63 Y/O F, non-verbal, bed bound, Trach/ PEG tube, from NH, PMhx of HTN, asthma, convulsion disorder, bipolar, DVT legs, DM, sent from NH for vomiting and cough.  In the ED, pt presents in no acute distress, and vitals WNL.  Pt is afebrile with leukocytosis of 19.  CXR concerning for RLL infiltrate likely 2/2 asp pneumonia.  Abdominal x-ray sig for non-specific gas pattern and intact PEG tube.  Pt will be admitted to medicine for suspected Aspiration pneumonia.    Plan:   - Tachypnea: greatly improved tachpnea/resp status and improved overall ill appearance - with new infectious process r/o, the pt's prior acute s/s were 2/2 removal of psych medication.  Last day of abx tmrw. Discharge.    - Lactate elevation - improved  - Diarrhea improved  - Complicated Aspiration PNA/HCAP 2/2 CRE Pseudomonas & UTI - tmrw is last day abx. Discharge.    - Uncontrolled Type 2 DM with complications of hyperglycemia - improved. C/w lantus, moderate ihss, monitor acks  - Need for prophylactic measure: dvt/gi ppx   d/c

## 2018-07-27 NOTE — PROGRESS NOTE ADULT - SUBJECTIVE AND OBJECTIVE BOX
Patient is a 63y old  Female who presents with a chief complaint of SOB (14 Jul 2018 18:51)    Allergies  angiotensin converting enzyme inhibitors (Unknown)      INTERVAL HPI / OVERNIGHT EVENTS:    T(C): 36.6 (07-27-18 @ 13:41), Max: 36.9 (07-26-18 @ 20:51)  HR: 67 (07-27-18 @ 13:41) (58 - 68)  BP: 105/61 (07-27-18 @ 13:41) (80/47 - 135/58)  RR: 18 (07-27-18 @ 13:41) (16 - 18)  SpO2: 100% (07-27-18 @ 13:41) (95% - 100%)  I&O's Summary    Vital Signs Last 24 Hrs  T(C): 36.6 (27 Jul 2018 13:41), Max: 36.9 (26 Jul 2018 20:51)  T(F): 97.8 (27 Jul 2018 13:41), Max: 98.4 (26 Jul 2018 20:51)  HR: 67 (27 Jul 2018 13:41) (58 - 68)  BP: 105/61 (27 Jul 2018 13:41) (80/47 - 135/58)  BP(mean): --  RR: 18 (27 Jul 2018 13:41) (16 - 18)  SpO2: 100% (27 Jul 2018 13:41) (95% - 100%)  PAST MEDICAL & SURGICAL HISTORY:  No pertinent past medical history  Hypertension  Schizophrenia  Diabetic coma  Diabetes mellitus  No significant past surgical history  S/P percutaneous endoscopic gastrostomy (PEG) tube placement  H/O tracheostomy          LABS:                        12.3   7.9   )-----------( 146      ( 26 Jul 2018 07:44 )             39.5     07-27    144  |  110<H>  |  8   ----------------------------<  163<H>  3.9   |  29  |  0.74    Ca    8.8      27 Jul 2018 07:27  Phos  3.2     07-26  Mg     1.8     07-26        CAPILLARY BLOOD GLUCOSE      POCT Blood Glucose.: 197 mg/dL (27 Jul 2018 12:02)  POCT Blood Glucose.: 111 mg/dL (27 Jul 2018 05:47)  POCT Blood Glucose.: 169 mg/dL (26 Jul 2018 23:29)  POCT Blood Glucose.: 152 mg/dL (26 Jul 2018 17:34)            MEDICATIONS  (STANDING):  ALBUTerol/ipratropium for Nebulization 3 milliLiter(s) Nebulizer every 6 hours  amiKACIN  IVPB 600 milliGRAM(s) IV Intermittent every 12 hours  amLODIPine   Tablet 2.5 milliGRAM(s) Oral daily  atorvastatin 20 milliGRAM(s) Oral at bedtime  benztropine 2 milliGRAM(s) Oral two times a day  cefepime   IVPB 2000 milliGRAM(s) IV Intermittent every 8 hours  cefepime   IVPB      dextrose 5%. 1000 milliLiter(s) (50 mL/Hr) IV Continuous <Continuous>  dextrose 50% Injectable 12.5 Gram(s) IV Push once  dextrose 50% Injectable 25 Gram(s) IV Push once  dextrose 50% Injectable 25 Gram(s) IV Push once  heparin  Injectable 5000 Unit(s) SubCutaneous every 8 hours  insulin glargine Injectable (LANTUS) 15 Unit(s) SubCutaneous at bedtime  insulin lispro (HumaLOG) corrective regimen sliding scale   SubCutaneous every 6 hours  levETIRAcetam  Solution 1000 milliGRAM(s) Oral two times a day  LORazepam     Tablet 1 milliGRAM(s) Oral every 6 hours  metoprolol tartrate 100 milliGRAM(s) Oral two times a day  pantoprazole   Suspension 40 milliGRAM(s) Enteral Tube daily  QUEtiapine 50 milliGRAM(s) Oral every 12 hours  sodium chloride 0.45%. 1000 milliLiter(s) (50 mL/Hr) IV Continuous <Continuous>  sodium chloride 0.9% lock flush 20 milliLiter(s) IV Push once    MEDICATIONS  (PRN):  acetaminophen    Suspension 650 milliGRAM(s) Oral every 6 hours PRN For Temp greater than 38 C (100.4 F)  benztropine Injectable 2 milliGRAM(s) IV Push once PRN Extrapyramidal symptoms  dextrose 40% Gel 15 Gram(s) Oral once PRN Blood Glucose LESS THAN 70 milliGRAM(s)/deciLiter  glucagon  Injectable 1 milliGRAM(s) IntraMuscular once PRN Glucose <70 milliGRAM(s)/deciLiter  LORazepam   Injectable 1 milliGRAM(s) IV Push every 6 hours PRN combative /agitation  sodium chloride 0.9% lock flush 10 milliLiter(s) IV Push every 1 hour PRN After each medication administration  sodium chloride 0.9% lock flush 10 milliLiter(s) IV Push every 12 hours PRN Lumen of catheter NOT used        RADIOLOGY & ADDITIONAL TESTS:      Consultant(s) Notes Reviewed:  [Y ] YES  [ ] NO    PHYSICAL EXAM:  GENERAL: NAD,   HEAD:  Atraumatic, Normocephalic  EYES:  conjunctiva and sclera clear  ENMT: Moist mucous membranes, trach in place  NECK: Supple, No JVD, Normal thyroid  NERVOUS SYSTEM: restless in bed. EDMONDS  CHEST/LUNG: No rales, rhonchi, wheezing, or rubs  HEART: S1, S2; ABDOMEN: Soft, Nontender, Nondistended; Bowel sounds present  EXTREMITIES:  2+ Peripheral Pulses, No clubbing, cyanosis, or edema  LYMPH: No lymphadenopathy noted  SKIN:  Fungal groin  rash    Care Collaborated Discussed with Consultants/Other Providers [ ] YES  [ ] NO

## 2018-07-28 RX ADMIN — INSULIN GLARGINE 15 UNIT(S): 100 INJECTION, SOLUTION SUBCUTANEOUS at 23:14

## 2018-07-28 RX ADMIN — QUETIAPINE FUMARATE 50 MILLIGRAM(S): 200 TABLET, FILM COATED ORAL at 18:53

## 2018-07-28 RX ADMIN — PANTOPRAZOLE SODIUM 40 MILLIGRAM(S): 20 TABLET, DELAYED RELEASE ORAL at 13:45

## 2018-07-28 RX ADMIN — CEFEPIME 100 MILLIGRAM(S): 1 INJECTION, POWDER, FOR SOLUTION INTRAMUSCULAR; INTRAVENOUS at 05:31

## 2018-07-28 RX ADMIN — Medication 2 MILLIGRAM(S): at 05:31

## 2018-07-28 RX ADMIN — Medication 3 MILLILITER(S): at 02:11

## 2018-07-28 RX ADMIN — Medication 2 MILLIGRAM(S): at 18:54

## 2018-07-28 RX ADMIN — QUETIAPINE FUMARATE 50 MILLIGRAM(S): 200 TABLET, FILM COATED ORAL at 05:31

## 2018-07-28 RX ADMIN — AMIKACIN SULFATE 102.4 MILLIGRAM(S): 250 INJECTION, SOLUTION INTRAMUSCULAR; INTRAVENOUS at 05:42

## 2018-07-28 RX ADMIN — CEFEPIME 100 MILLIGRAM(S): 1 INJECTION, POWDER, FOR SOLUTION INTRAMUSCULAR; INTRAVENOUS at 13:45

## 2018-07-28 RX ADMIN — Medication 4: at 11:49

## 2018-07-28 RX ADMIN — Medication 2: at 23:13

## 2018-07-28 RX ADMIN — Medication 100 MILLIGRAM(S): at 05:31

## 2018-07-28 RX ADMIN — AMIKACIN SULFATE 102.4 MILLIGRAM(S): 250 INJECTION, SOLUTION INTRAMUSCULAR; INTRAVENOUS at 19:24

## 2018-07-28 RX ADMIN — ATORVASTATIN CALCIUM 20 MILLIGRAM(S): 80 TABLET, FILM COATED ORAL at 23:14

## 2018-07-28 RX ADMIN — Medication 1 MILLIGRAM(S): at 18:53

## 2018-07-28 RX ADMIN — Medication 1 MILLIGRAM(S): at 23:19

## 2018-07-28 RX ADMIN — Medication 3 MILLILITER(S): at 20:03

## 2018-07-28 RX ADMIN — HEPARIN SODIUM 5000 UNIT(S): 5000 INJECTION INTRAVENOUS; SUBCUTANEOUS at 23:14

## 2018-07-28 RX ADMIN — HEPARIN SODIUM 5000 UNIT(S): 5000 INJECTION INTRAVENOUS; SUBCUTANEOUS at 05:31

## 2018-07-28 RX ADMIN — Medication 3 MILLILITER(S): at 09:47

## 2018-07-28 RX ADMIN — CEFEPIME 100 MILLIGRAM(S): 1 INJECTION, POWDER, FOR SOLUTION INTRAMUSCULAR; INTRAVENOUS at 23:15

## 2018-07-28 RX ADMIN — Medication 1 MILLIGRAM(S): at 11:51

## 2018-07-28 RX ADMIN — Medication 1 MILLIGRAM(S): at 05:31

## 2018-07-28 RX ADMIN — LEVETIRACETAM 1000 MILLIGRAM(S): 250 TABLET, FILM COATED ORAL at 18:54

## 2018-07-28 RX ADMIN — LEVETIRACETAM 1000 MILLIGRAM(S): 250 TABLET, FILM COATED ORAL at 05:30

## 2018-07-28 RX ADMIN — Medication 3 MILLILITER(S): at 15:07

## 2018-07-28 RX ADMIN — AMLODIPINE BESYLATE 2.5 MILLIGRAM(S): 2.5 TABLET ORAL at 05:31

## 2018-07-28 RX ADMIN — Medication 100 MILLIGRAM(S): at 18:53

## 2018-07-28 RX ADMIN — HEPARIN SODIUM 5000 UNIT(S): 5000 INJECTION INTRAVENOUS; SUBCUTANEOUS at 13:44

## 2018-07-28 NOTE — PROGRESS NOTE ADULT - SUBJECTIVE AND OBJECTIVE BOX
Time of Visit:  Patient seen and examined.  laying in bed on medical floor with TC 40%    MEDICATIONS  (STANDING):  ALBUTerol/ipratropium for Nebulization 3 milliLiter(s) Nebulizer every 6 hours  amiKACIN  IVPB 600 milliGRAM(s) IV Intermittent every 12 hours  amLODIPine   Tablet 2.5 milliGRAM(s) Oral daily  atorvastatin 20 milliGRAM(s) Oral at bedtime  benztropine 2 milliGRAM(s) Oral two times a day  cefepime   IVPB 2000 milliGRAM(s) IV Intermittent every 8 hours  cefepime   IVPB      dextrose 5%. 1000 milliLiter(s) (50 mL/Hr) IV Continuous <Continuous>  dextrose 50% Injectable 12.5 Gram(s) IV Push once  dextrose 50% Injectable 25 Gram(s) IV Push once  dextrose 50% Injectable 25 Gram(s) IV Push once  heparin  Injectable 5000 Unit(s) SubCutaneous every 8 hours  insulin glargine Injectable (LANTUS) 15 Unit(s) SubCutaneous at bedtime  insulin lispro (HumaLOG) corrective regimen sliding scale   SubCutaneous every 6 hours  levETIRAcetam  Solution 1000 milliGRAM(s) Oral two times a day  LORazepam     Tablet 1 milliGRAM(s) Oral every 6 hours  metoprolol tartrate 100 milliGRAM(s) Oral two times a day  pantoprazole   Suspension 40 milliGRAM(s) Enteral Tube daily  QUEtiapine 50 milliGRAM(s) Oral every 12 hours  sodium chloride 0.9% lock flush 20 milliLiter(s) IV Push once      MEDICATIONS  (PRN):  acetaminophen    Suspension 650 milliGRAM(s) Oral every 6 hours PRN For Temp greater than 38 C (100.4 F)  benztropine Injectable 2 milliGRAM(s) IV Push once PRN Extrapyramidal symptoms  dextrose 40% Gel 15 Gram(s) Oral once PRN Blood Glucose LESS THAN 70 milliGRAM(s)/deciLiter  glucagon  Injectable 1 milliGRAM(s) IntraMuscular once PRN Glucose <70 milliGRAM(s)/deciLiter  LORazepam   Injectable 1 milliGRAM(s) IV Push every 6 hours PRN combative /agitation  sodium chloride 0.9% lock flush 10 milliLiter(s) IV Push every 1 hour PRN After each medication administration  sodium chloride 0.9% lock flush 10 milliLiter(s) IV Push every 12 hours PRN Lumen of catheter NOT used       Medications up to date at time of exam.      PHYSICAL EXAMINATION:  Patient has no new complaints.  GENERAL: The patient is a well-developed, well-nourished, in no apparent distress.     Vital Signs Last 24 Hrs  T(C): 37.4 (28 Jul 2018 12:55), Max: 37.4 (28 Jul 2018 12:55)  T(F): 99.3 (28 Jul 2018 12:55), Max: 99.3 (28 Jul 2018 12:55)  HR: 75 (28 Jul 2018 12:55) (75 - 117)  BP: 132/82 (28 Jul 2018 12:55) (129/64 - 159/65)  BP(mean): --  RR: 19 (28 Jul 2018 12:55) (16 - 19)  SpO2: 100% (28 Jul 2018 12:55) (94% - 100%)   (if applicable)    Chest Tube (if applicable)    HEENT: Head is normocephalic and atraumatic. Extraocular muscles are intact. Mucous membranes are moist.      NECK: Supple, no palpable adenopathy. + trach .. clean site    LUNGS: Clear to auscultation, no wheezing, rales, or rhonchi.    HEART: Regular rate and rhythm without murmur.    ABDOMEN: Soft, nontender, and nondistended.  No hepatosplenomegaly is noted.    EXTREMITIES: Without any cyanosis, clubbing, rash, lesions or edema.    NEUROLOGIC: eyes open    SKIN: Warm, dry, good turgor.      LABS:    07-27    144  |  110<H>  |  8   ----------------------------<  163<H>  3.9   |  29  |  0.74    Ca    8.8      27 Jul 2018 07:27                      Procalcitonin, Serum: 0.03 ng/mL (07-26-18 @ 19:32)      MICROBIOLOGY: (if applicable)    RADIOLOGY & ADDITIONAL STUDIES:  EKG:   CXR:  ECHO:    IMPRESSION: 63y Female PAST MEDICAL & SURGICAL HISTORY:  No pertinent past medical history  Hypertension  Schizophrenia  Diabetic coma  Diabetes mellitus  No significant past surgical history  S/P percutaneous endoscopic gastrostomy (PEG) tube placement  H/O tracheostomy   p/w     Impression; 62 Y/O Female with prior mentioned multiple chronic conditions. Presented with Cough, SOB with RLL Opacity secondary to Aspiration PNA due to pseudomonas ( CRE) Oxygen supplementation FIO2 40% via Trach collar due to Chronic Hypoxic respiratory failure. ID recommended antibx until 7/28    Suggestion:     -Continue DuoNeb Q 6 hours.  -Oxygen supplementation via Trach collar. 40% via TC  -Continue antibiotic as per ID recommendation until 7/28  -Contact precautions due to CRE Pseudomonas Sputum,   -Aspiration precautions. HOB elevation especially during Peg feeding.   -Turn and reposition in bed.  -frequent pul hygiene

## 2018-07-28 NOTE — PROGRESS NOTE ADULT - ASSESSMENT
63 Y/O F, non-verbal, bed bound, Trach/ PEG tube, from NH, PMhx of HTN, asthma, convulsion disorder, bipolar, DVT legs, DM, sent from NH for vomiting and cough.  In the ED, pt presents in no acute distress, and vitals WNL.  Pt is afebrile with leukocytosis of 19.  CXR concerning for RLL infiltrate likely 2/2 asp pneumonia.  Abdominal x-ray sig for non-specific gas pattern and intact PEG tube.  Pt will be admitted to medicine for suspected Aspiration pneumonia.    Plan:   - Tachypnea: improved. F/u case management for safe discharge asap.    - Lactate elevation - improved  - Diarrhea- improved  - Complicated Aspiration PNA/HCAP 2/2 CRE Pseudomonas & UTI - improving, last day abx.    - Uncontrolled Type 2 DM with complications of hyperglycemia - improved. C/w lantus, moderate ihss, monitor acks  - Need for prophylactic measure: dvt/gi ppx   d/c

## 2018-07-28 NOTE — PROGRESS NOTE ADULT - SUBJECTIVE AND OBJECTIVE BOX
Patient seen and examined at bedside  No acute events noted overnight  Case discussed with medical team    HPI:  61 Y/O F, non-verbal, bed bound, Trach/ PEG tube, from NH, PMhx of HTN, asthma, convulsion disorder, bipolar, DVT legs, DM 2, sent from NH for vomiting and cough.  Pt unable to provide history.  History given by sister who states pt was vomiting coffee ground emesis yesterday.  Pt had 2 episodes of reported vomiting, and has not had an episode since.  Pt also developed cough with clear sputum.  Sister states pt is combative at baseline, and her current behavior is baseline.  No reported fever, discomfort, diarrhea, or SOB. (14 Jul 2018 18:51)      PAST MEDICAL & SURGICAL HISTORY:  No pertinent past medical history  Hypertension  Schizophrenia  Diabetic coma  Diabetes mellitus  No significant past surgical history  S/P percutaneous endoscopic gastrostomy (PEG) tube placement  H/O tracheostomy      angiotensin converting enzyme inhibitors (Unknown)       MEDICATIONS  (STANDING):  ALBUTerol/ipratropium for Nebulization 3 milliLiter(s) Nebulizer every 6 hours  amiKACIN  IVPB 600 milliGRAM(s) IV Intermittent every 12 hours  amLODIPine   Tablet 2.5 milliGRAM(s) Oral daily  atorvastatin 20 milliGRAM(s) Oral at bedtime  benztropine 2 milliGRAM(s) Oral two times a day  cefepime   IVPB 2000 milliGRAM(s) IV Intermittent every 8 hours  cefepime   IVPB      dextrose 5%. 1000 milliLiter(s) (50 mL/Hr) IV Continuous <Continuous>  dextrose 50% Injectable 12.5 Gram(s) IV Push once  dextrose 50% Injectable 25 Gram(s) IV Push once  dextrose 50% Injectable 25 Gram(s) IV Push once  heparin  Injectable 5000 Unit(s) SubCutaneous every 8 hours  insulin glargine Injectable (LANTUS) 15 Unit(s) SubCutaneous at bedtime  insulin lispro (HumaLOG) corrective regimen sliding scale   SubCutaneous every 6 hours  levETIRAcetam  Solution 1000 milliGRAM(s) Oral two times a day  LORazepam     Tablet 1 milliGRAM(s) Oral every 6 hours  metoprolol tartrate 100 milliGRAM(s) Oral two times a day  pantoprazole   Suspension 40 milliGRAM(s) Enteral Tube daily  QUEtiapine 50 milliGRAM(s) Oral every 12 hours  sodium chloride 0.9% lock flush 20 milliLiter(s) IV Push once    MEDICATIONS  (PRN):  acetaminophen    Suspension 650 milliGRAM(s) Oral every 6 hours PRN For Temp greater than 38 C (100.4 F)  benztropine Injectable 2 milliGRAM(s) IV Push once PRN Extrapyramidal symptoms  dextrose 40% Gel 15 Gram(s) Oral once PRN Blood Glucose LESS THAN 70 milliGRAM(s)/deciLiter  glucagon  Injectable 1 milliGRAM(s) IntraMuscular once PRN Glucose <70 milliGRAM(s)/deciLiter  LORazepam   Injectable 1 milliGRAM(s) IV Push every 6 hours PRN combative /agitation  sodium chloride 0.9% lock flush 10 milliLiter(s) IV Push every 1 hour PRN After each medication administration  sodium chloride 0.9% lock flush 10 milliLiter(s) IV Push every 12 hours PRN Lumen of catheter NOT used      REVIEW OF SYSTEMS:  limited 2/2 pts mental state	    T(C): 36.6 (07-28-18 @ 04:45), Max: 36.6 (07-27-18 @ 13:41)  HR: 117 (07-28-18 @ 04:45) (58 - 117)  BP: 159/65 (07-28-18 @ 04:45) (105/61 - 159/65)  RR: 16 (07-28-18 @ 04:45) (16 - 18)  SpO2: 100% (07-28-18 @ 04:45) (94% - 100%)    PHYSICAL EXAMINATION:   Constitutional: NAD  HEENT: NC, AT  Neck:  Supple  Respiratory:  Adequate airflow b/l. Not using accessory muscles of respiration.  Cardiovascular:  S1 & S2 intact, no R/G, 2+ radial pulses b/l  Gastrointestinal: Soft, ND, normoactive b.s., no organomegaly/RT/rigidity  Extremities: WWP  Neurological:  awake.  Crude sensation intact.     Labs and imaging reviewed    LABS:    07-27    144  |  110<H>  |  8   ----------------------------<  163<H>  3.9   |  29  |  0.74    Ca    8.8      27 Jul 2018 07:27              CAPILLARY BLOOD GLUCOSE      POCT Blood Glucose.: 144 mg/dL (28 Jul 2018 05:14)  POCT Blood Glucose.: 180 mg/dL (27 Jul 2018 23:32)  POCT Blood Glucose.: 202 mg/dL (27 Jul 2018 21:29)  POCT Blood Glucose.: 196 mg/dL (27 Jul 2018 17:22)  POCT Blood Glucose.: 197 mg/dL (27 Jul 2018 12:02)              RADIOLOGY & ADDITIONAL STUDIES:

## 2018-07-29 RX ADMIN — Medication 4: at 17:37

## 2018-07-29 RX ADMIN — Medication 1 MILLIGRAM(S): at 18:09

## 2018-07-29 RX ADMIN — Medication 100 MILLIGRAM(S): at 18:09

## 2018-07-29 RX ADMIN — Medication 100 MILLIGRAM(S): at 06:06

## 2018-07-29 RX ADMIN — HEPARIN SODIUM 5000 UNIT(S): 5000 INJECTION INTRAVENOUS; SUBCUTANEOUS at 14:22

## 2018-07-29 RX ADMIN — Medication 3 MILLILITER(S): at 14:56

## 2018-07-29 RX ADMIN — AMIKACIN SULFATE 102.4 MILLIGRAM(S): 250 INJECTION, SOLUTION INTRAMUSCULAR; INTRAVENOUS at 06:05

## 2018-07-29 RX ADMIN — Medication 3 MILLILITER(S): at 03:41

## 2018-07-29 RX ADMIN — ATORVASTATIN CALCIUM 20 MILLIGRAM(S): 80 TABLET, FILM COATED ORAL at 22:32

## 2018-07-29 RX ADMIN — LEVETIRACETAM 1000 MILLIGRAM(S): 250 TABLET, FILM COATED ORAL at 18:09

## 2018-07-29 RX ADMIN — QUETIAPINE FUMARATE 50 MILLIGRAM(S): 200 TABLET, FILM COATED ORAL at 06:07

## 2018-07-29 RX ADMIN — INSULIN GLARGINE 15 UNIT(S): 100 INJECTION, SOLUTION SUBCUTANEOUS at 22:32

## 2018-07-29 RX ADMIN — Medication 2 MILLIGRAM(S): at 18:09

## 2018-07-29 RX ADMIN — Medication 2 MILLIGRAM(S): at 06:06

## 2018-07-29 RX ADMIN — HEPARIN SODIUM 5000 UNIT(S): 5000 INJECTION INTRAVENOUS; SUBCUTANEOUS at 06:06

## 2018-07-29 RX ADMIN — Medication 1 MILLIGRAM(S): at 22:32

## 2018-07-29 RX ADMIN — LEVETIRACETAM 1000 MILLIGRAM(S): 250 TABLET, FILM COATED ORAL at 06:05

## 2018-07-29 RX ADMIN — PANTOPRAZOLE SODIUM 40 MILLIGRAM(S): 20 TABLET, DELAYED RELEASE ORAL at 12:23

## 2018-07-29 RX ADMIN — QUETIAPINE FUMARATE 50 MILLIGRAM(S): 200 TABLET, FILM COATED ORAL at 18:09

## 2018-07-29 RX ADMIN — Medication 3 MILLILITER(S): at 20:54

## 2018-07-29 RX ADMIN — Medication 1 MILLIGRAM(S): at 12:22

## 2018-07-29 RX ADMIN — CEFEPIME 100 MILLIGRAM(S): 1 INJECTION, POWDER, FOR SOLUTION INTRAMUSCULAR; INTRAVENOUS at 06:06

## 2018-07-29 RX ADMIN — HEPARIN SODIUM 5000 UNIT(S): 5000 INJECTION INTRAVENOUS; SUBCUTANEOUS at 22:32

## 2018-07-29 RX ADMIN — Medication 1 MILLIGRAM(S): at 06:06

## 2018-07-29 RX ADMIN — AMLODIPINE BESYLATE 2.5 MILLIGRAM(S): 2.5 TABLET ORAL at 06:06

## 2018-07-29 RX ADMIN — Medication 3 MILLILITER(S): at 08:22

## 2018-07-29 NOTE — PROGRESS NOTE ADULT - ASSESSMENT
A 61 yo Female with multiple co-morbidities including non-verbal, bed bound, Trach/ PEG tube, sent in to the ER from NH for evaluation of shortness of breath and cough after vomiting. On admission, she has no fever or chills but Leukocytosis. The CXR  shows No consolidation but CT chest shows RLL opacity. She has  started on Vancomycin and Zosyn and The ID consult  requested to assist with further evaluation and antibiotic management.     # Aspiration Pneumonia - Pseudomonas ( CRE)  # Hematemesis  # Procalcitonin level elevated    Would recommend:    1. Monitor OFF antibiotics  2. Continue  Bronchial/Oral  suctioning  3. Aspiration precaution  4. Contact Isolation     d/w nursing staff     - D/C planning as per Primary team A 61 yo Female with multiple co-morbidities including non-verbal, bed bound, Trach/ PEG tube, sent in to the ER from NH for evaluation of shortness of breath and cough after vomiting. On admission, she has no fever or chills but Leukocytosis. The CXR  shows No consolidation but CT chest shows RLL opacity. She has  started on Vancomycin and Zosyn and The ID consult  requested to assist with further evaluation and antibiotic management.     # Aspiration Pneumonia - Pseudomonas ( CRE)  # Hematemesis  # Procalcitonin level elevated    Would recommend:    1. Continue  Bronchial/Oral  suctioning  2. Monitor OFF antibiotics  3. Aspiration precaution  4. Contact Isolation     d/w nursing staff     - D/C planning as per Primary team

## 2018-07-29 NOTE — PROGRESS NOTE ADULT - SUBJECTIVE AND OBJECTIVE BOX
Patient is seen and examined at the bed side, is afebrile.  She is tolerating antibiotic.      REVIEW OF SYSTEMS: Unable to obtained since nonverbal      ALLERGIES: ACIs      Vital Signs Last 24 Hrs  T(C): 36.6 (29 Jul 2018 14:52), Max: 37.1 (28 Jul 2018 20:38)  T(F): 97.8 (29 Jul 2018 14:52), Max: 98.7 (28 Jul 2018 20:38)  HR: 83 (29 Jul 2018 18:17) (57 - 89)  BP: 115/65 (29 Jul 2018 18:17) (105/74 - 141/91)  BP(mean): --  RR: 17 (29 Jul 2018 18:17) (16 - 20)  SpO2: 100% (29 Jul 2018 18:17) (97% - 100%)        PHYSICAL EXAM:  GENERAL: Not in acute distress  HEENT: Trach in placed  CVS: S1 and s2 present  RESP: Air entry B/L  GI: Abdomen nondistended, NT and PEG in placed  EXT: No pedal edema, hands contracted   CNS: Awake but not alert        LABS:  No new Labs                          12.3   7.9   )-----------( 146      ( 26 Jul 2018 07:44 )             39.5                      11.8   15.4  )-----------( 170      ( 16 Jul 2018 07:12 )             38.1                           12.2   23.0  )-----------( 180      ( 15 Jul 2018 12:45 )             38.7           07-27    144  |  110<H>  |  8   ----------------------------<  163<H>  3.9   |  29  |  0.74    Ca    8.8      27 Jul 2018 07:27  Phos  3.2     07-26  Mg     1.8     07-26      07-26    141  |  106  |  8   ----------------------------<  134<H>  4.4   |  30  |  0.89    Ca    9.6      26 Jul 2018 07:44  Phos  3.2     07-26  Mg     1.8     07-26      TPro  7.4  /  Alb  3.4<L>  /  TBili  0.9  /  DBili  x   /  AST  26  /  ALT  44  /  AlkPhos  108  07-14        CAPILLARY BLOOD GLUCOSE      POCT Blood Glucose.: 211 mg/dL (15 Jul 2018 17:22)  POCT Blood Glucose.: 123 mg/dL (15 Jul 2018 11:44)  POCT Blood Glucose.: 353 mg/dL (15 Jul 2018 07:56)  POCT Blood Glucose.: 254 mg/dL (15 Jul 2018 00:42)  POCT Blood Glucose.: 230 mg/dL (14 Jul 2018 22:22)      MEDICATIONS  (STANDING):  ALBUTerol/ipratropium for Nebulization 3 milliLiter(s) Nebulizer every 6 hours  amLODIPine   Tablet 2.5 milliGRAM(s) Oral daily  atorvastatin 20 milliGRAM(s) Oral at bedtime  benztropine 2 milliGRAM(s) Oral two times a day  dextrose 5%. 1000 milliLiter(s) (50 mL/Hr) IV Continuous <Continuous>  dextrose 50% Injectable 12.5 Gram(s) IV Push once  dextrose 50% Injectable 25 Gram(s) IV Push once  dextrose 50% Injectable 25 Gram(s) IV Push once  heparin  Injectable 5000 Unit(s) SubCutaneous every 8 hours  insulin glargine Injectable (LANTUS) 15 Unit(s) SubCutaneous at bedtime  insulin lispro (HumaLOG) corrective regimen sliding scale   SubCutaneous every 6 hours  levETIRAcetam  Solution 1000 milliGRAM(s) Oral two times a day  LORazepam     Tablet 1 milliGRAM(s) Oral every 6 hours  metoprolol tartrate 100 milliGRAM(s) Oral two times a day  pantoprazole   Suspension 40 milliGRAM(s) Enteral Tube daily  QUEtiapine 50 milliGRAM(s) Oral every 12 hours  sodium chloride 0.9% lock flush 20 milliLiter(s) IV Push once    MEDICATIONS  (PRN):  acetaminophen    Suspension 650 milliGRAM(s) Oral every 6 hours PRN For Temp greater than 38 C (100.4 F)  benztropine Injectable 2 milliGRAM(s) IV Push once PRN Extrapyramidal symptoms  dextrose 40% Gel 15 Gram(s) Oral once PRN Blood Glucose LESS THAN 70 milliGRAM(s)/deciLiter  glucagon  Injectable 1 milliGRAM(s) IntraMuscular once PRN Glucose <70 milliGRAM(s)/deciLiter  LORazepam   Injectable 1 milliGRAM(s) IV Push every 6 hours PRN combative /agitation  sodium chloride 0.9% lock flush 10 milliLiter(s) IV Push every 1 hour PRN After each medication administration  sodium chloride 0.9% lock flush 10 milliLiter(s) IV Push every 12 hours PRN Lumen of catheter NOT used        RADIOLOGY & ADDITIONAL TESTS:    7/19/18: Xray Abdomen 1 View PORTABLE -Urgent (07.19.18 @ 02:39) Gastrostomy evaluation without evidence for extravasation as described.      7/15/18: CT Chest No Cont (07.15.18 @ 11:14) 1. Mid to distal esophageal wall thickening is suggested. Correlate with endoscopic evaluation.  2. A PEG tube is identified, the distal aspect of which is identified in the region of the pylorus/duodenal bulb; correlate clinically as to appropriate positioning.  3. There is opacity identified within the right lower lobe lung parenchyma, likely representative of a combination of both atelectasis and consolidation. Small right pleural effusion.    7/14/18: Xray Chest 1 View AP/PA (07.14.18 @ 15:34)No consolidation. Platelike atelectasis in the right lower lobe.        MICROBIOLOGY DATA:      Culture - Sputum . (07.16.18 @ 10:36)    -  Gentamicin: R >8    -  Imipenem: R >8    -  Levofloxacin: R >4    -  Meropenem: R >8    -  Piperacillin/Tazobactam: R >64    -  Tobramycin: R >8    -  Amikacin: S 16    Gram Stain:   Moderate polymorphonuclear leukocytes seen per low power field  Moderate Squamous epithelial cells seen per low power field  Moderate Gram Negative Rods seen per oil power field  Few Gram Positive Cocci seen per oil power field    -  Aztreonam: R >16    -  Cefepime: I 16    -  Ceftazidime: R >16    -  Ciprofloxacin: R >2    Specimen Source: .Sputum Sputum trap    Culture Results:   Three or more mixed gram negative rods including  Moderate Pseudomonas aeruginosa (Carbapenem Resistant)    Organism Identification: Pseudomonas aeruginosa (Carbapenem Resistant)    Organism: Pseudomonas aeruginosa (Carbapenem Resistant)    Method Type: ESTIVEN        MRSA/MSSA PCR (07.16.18 @ 10:15)    MRSA PCR Result.: NotDetec: MRSA/MSSA PCR assay is a qualitative in vitro diagnostic test for the  direct detection and differentiation of methicillin-resistant  Staphylococcus aureus (MRSA) from Staphylococcus aureus (SA). The assay  detects DNA from nasal swabs in patients atrisk for nasal colonization.  It is not intended to diagnose MRSA or SA infections nor guide or monitor  treatment for MRSA/SA infections. A negative result does not preclude  nasal colonization. The assay is FDA-approved and its performance has  been established by [employer], [place] and the [facility], [place], [place].    Staph Aureus PCR Result: [facility]    Urine Microscopic-Add On (NC) (07.15.18 @ 23:04)    Bacteria: Many /HPF    Epithelial Cells: Many /HPF    Red Blood Cell - Urine: 5-10 /HPF    White Blood Cell - Urine: 11-25 /HPF Patient is seen and examined at the bed side, is afebrile.  She has no new complaints.       REVIEW OF SYSTEMS: Unable to obtained since nonverbal      ALLERGIES: ACIs      Vital Signs Last 24 Hrs  T(C): 36.6 (29 Jul 2018 14:52), Max: 37.1 (28 Jul 2018 20:38)  T(F): 97.8 (29 Jul 2018 14:52), Max: 98.7 (28 Jul 2018 20:38)  HR: 83 (29 Jul 2018 18:17) (57 - 89)  BP: 115/65 (29 Jul 2018 18:17) (105/74 - 141/91)  BP(mean): --  RR: 17 (29 Jul 2018 18:17) (16 - 20)  SpO2: 100% (29 Jul 2018 18:17) (97% - 100%)        PHYSICAL EXAM:  GENERAL: Not in acute distress  HEENT: Trach in placed  CVS: S1 and s2 present  RESP: Air entry B/L  GI: Abdomen nondistended, NT and PEG in placed  EXT: No pedal edema, hands contracted   CNS: Awake but not alert        LABS:  No new Labs                          12.3   7.9   )-----------( 146      ( 26 Jul 2018 07:44 )             39.5                      11.8   15.4  )-----------( 170      ( 16 Jul 2018 07:12 )             38.1                           12.2   23.0  )-----------( 180      ( 15 Jul 2018 12:45 )             38.7           07-27    144  |  110<H>  |  8   ----------------------------<  163<H>  3.9   |  29  |  0.74    Ca    8.8      27 Jul 2018 07:27  Phos  3.2     07-26  Mg     1.8     07-26      07-26    141  |  106  |  8   ----------------------------<  134<H>  4.4   |  30  |  0.89    Ca    9.6      26 Jul 2018 07:44  Phos  3.2     07-26  Mg     1.8     07-26      TPro  7.4  /  Alb  3.4<L>  /  TBili  0.9  /  DBili  x   /  AST  26  /  ALT  44  /  AlkPhos  108  07-14        CAPILLARY BLOOD GLUCOSE      POCT Blood Glucose.: 211 mg/dL (15 Jul 2018 17:22)  POCT Blood Glucose.: 123 mg/dL (15 Jul 2018 11:44)  POCT Blood Glucose.: 353 mg/dL (15 Jul 2018 07:56)  POCT Blood Glucose.: 254 mg/dL (15 Jul 2018 00:42)  POCT Blood Glucose.: 230 mg/dL (14 Jul 2018 22:22)      MEDICATIONS  (STANDING):  ALBUTerol/ipratropium for Nebulization 3 milliLiter(s) Nebulizer every 6 hours  amLODIPine   Tablet 2.5 milliGRAM(s) Oral daily  atorvastatin 20 milliGRAM(s) Oral at bedtime  benztropine 2 milliGRAM(s) Oral two times a day  dextrose 5%. 1000 milliLiter(s) (50 mL/Hr) IV Continuous <Continuous>  dextrose 50% Injectable 12.5 Gram(s) IV Push once  dextrose 50% Injectable 25 Gram(s) IV Push once  dextrose 50% Injectable 25 Gram(s) IV Push once  heparin  Injectable 5000 Unit(s) SubCutaneous every 8 hours  insulin glargine Injectable (LANTUS) 15 Unit(s) SubCutaneous at bedtime  insulin lispro (HumaLOG) corrective regimen sliding scale   SubCutaneous every 6 hours  levETIRAcetam  Solution 1000 milliGRAM(s) Oral two times a day  LORazepam     Tablet 1 milliGRAM(s) Oral every 6 hours  metoprolol tartrate 100 milliGRAM(s) Oral two times a day  pantoprazole   Suspension 40 milliGRAM(s) Enteral Tube daily  QUEtiapine 50 milliGRAM(s) Oral every 12 hours  sodium chloride 0.9% lock flush 20 milliLiter(s) IV Push once    MEDICATIONS  (PRN):  acetaminophen    Suspension 650 milliGRAM(s) Oral every 6 hours PRN For Temp greater than 38 C (100.4 F)  benztropine Injectable 2 milliGRAM(s) IV Push once PRN Extrapyramidal symptoms  dextrose 40% Gel 15 Gram(s) Oral once PRN Blood Glucose LESS THAN 70 milliGRAM(s)/deciLiter  glucagon  Injectable 1 milliGRAM(s) IntraMuscular once PRN Glucose <70 milliGRAM(s)/deciLiter  LORazepam   Injectable 1 milliGRAM(s) IV Push every 6 hours PRN combative /agitation  sodium chloride 0.9% lock flush 10 milliLiter(s) IV Push every 1 hour PRN After each medication administration  sodium chloride 0.9% lock flush 10 milliLiter(s) IV Push every 12 hours PRN Lumen of catheter NOT used        RADIOLOGY & ADDITIONAL TESTS:    7/19/18: Xray Abdomen 1 View PORTABLE -Urgent (07.19.18 @ 02:39) Gastrostomy evaluation without evidence for extravasation as described.      7/15/18: CT Chest No Cont (07.15.18 @ 11:14) 1. Mid to distal esophageal wall thickening is suggested. Correlate with endoscopic evaluation.  2. A PEG tube is identified, the distal aspect of which is identified in the region of the pylorus/duodenal bulb; correlate clinically as to appropriate positioning.  3. There is opacity identified within the right lower lobe lung parenchyma, likely representative of a combination of both atelectasis and consolidation. Small right pleural effusion.    7/14/18: Xray Chest 1 View AP/PA (07.14.18 @ 15:34)No consolidation. Platelike atelectasis in the right lower lobe.        MICROBIOLOGY DATA:      Culture - Sputum . (07.16.18 @ 10:36)    -  Gentamicin: R >8    -  Imipenem: R >8    -  Levofloxacin: R >4    -  Meropenem: R >8    -  Piperacillin/Tazobactam: R >64    -  Tobramycin: R >8    -  Amikacin: S 16    Gram Stain:   Moderate polymorphonuclear leukocytes seen per low power field  Moderate Squamous epithelial cells seen per low power field  Moderate Gram Negative Rods seen per oil power field  Few Gram Positive Cocci seen per oil power field    -  Aztreonam: R >16    -  Cefepime: I 16    -  Ceftazidime: R >16    -  Ciprofloxacin: R >2    Specimen Source: .Sputum Sputum trap    Culture Results:   Three or more mixed gram negative rods including  Moderate Pseudomonas aeruginosa (Carbapenem Resistant)    Organism Identification: Pseudomonas aeruginosa (Carbapenem Resistant)    Organism: Pseudomonas aeruginosa (Carbapenem Resistant)    Method Type: ESTIVEN        MRSA/MSSA PCR (07.16.18 @ 10:15)    MRSA PCR Result.: NotDetec: MRSA/MSSA PCR assay is a qualitative in vitro diagnostic test for the  direct detection and differentiation of methicillin-resistant  Staphylococcus aureus (MRSA) from Staphylococcus aureus (SA). The assay  detects DNA from nasal swabs in patients atrisk for nasal colonization.  It is not intended to diagnose MRSA or SA infections nor guide or monitor  treatment for MRSA/SA infections. A negative result does not preclude  nasal colonization. The assay is FDA-approved and its performance has  been established by [employer], [place] and the [facility], [place], [place].    Staph Aureus PCR Result: [facility]    Urine Microscopic-Add On (NC) (07.15.18 @ 23:04)    Bacteria: Many /HPF    Epithelial Cells: Many /HPF    Red Blood Cell - Urine: 5-10 /HPF    White Blood Cell - Urine: 11-25 /HPF

## 2018-07-29 NOTE — PROGRESS NOTE ADULT - SUBJECTIVE AND OBJECTIVE BOX
Patient seen and examined at bedside  No acute events noted overnight  Case discussed with medical team    HPI:  63 Y/O F, non-verbal, bed bound, Trach/ PEG tube, from NH, PMhx of HTN, asthma, convulsion disorder, bipolar, DVT legs, DM 2, sent from NH for vomiting and cough.  Pt unable to provide history.  History given by sister who states pt was vomiting coffee ground emesis yesterday.  Pt had 2 episodes of reported vomiting, and has not had an episode since.  Pt also developed cough with clear sputum.  Sister states pt is combative at baseline, and her current behavior is baseline.  No reported fever, discomfort, diarrhea, or SOB. (14 Jul 2018 18:51)      PAST MEDICAL & SURGICAL HISTORY:  No pertinent past medical history  Hypertension  Schizophrenia  Diabetic coma  Diabetes mellitus  No significant past surgical history  S/P percutaneous endoscopic gastrostomy (PEG) tube placement  H/O tracheostomy      angiotensin converting enzyme inhibitors (Unknown)     MEDICATIONS  (STANDING):  ALBUTerol/ipratropium for Nebulization 3 milliLiter(s) Nebulizer every 6 hours  amLODIPine   Tablet 2.5 milliGRAM(s) Oral daily  atorvastatin 20 milliGRAM(s) Oral at bedtime  benztropine 2 milliGRAM(s) Oral two times a day  dextrose 5%. 1000 milliLiter(s) (50 mL/Hr) IV Continuous <Continuous>  dextrose 50% Injectable 12.5 Gram(s) IV Push once  dextrose 50% Injectable 25 Gram(s) IV Push once  dextrose 50% Injectable 25 Gram(s) IV Push once  heparin  Injectable 5000 Unit(s) SubCutaneous every 8 hours  insulin glargine Injectable (LANTUS) 15 Unit(s) SubCutaneous at bedtime  insulin lispro (HumaLOG) corrective regimen sliding scale   SubCutaneous every 6 hours  levETIRAcetam  Solution 1000 milliGRAM(s) Oral two times a day  LORazepam     Tablet 1 milliGRAM(s) Oral every 6 hours  metoprolol tartrate 100 milliGRAM(s) Oral two times a day  pantoprazole   Suspension 40 milliGRAM(s) Enteral Tube daily  QUEtiapine 50 milliGRAM(s) Oral every 12 hours  sodium chloride 0.9% lock flush 20 milliLiter(s) IV Push once    MEDICATIONS  (PRN):  acetaminophen    Suspension 650 milliGRAM(s) Oral every 6 hours PRN For Temp greater than 38 C (100.4 F)  benztropine Injectable 2 milliGRAM(s) IV Push once PRN Extrapyramidal symptoms  dextrose 40% Gel 15 Gram(s) Oral once PRN Blood Glucose LESS THAN 70 milliGRAM(s)/deciLiter  glucagon  Injectable 1 milliGRAM(s) IntraMuscular once PRN Glucose <70 milliGRAM(s)/deciLiter  LORazepam   Injectable 1 milliGRAM(s) IV Push every 6 hours PRN combative /agitation  sodium chloride 0.9% lock flush 10 milliLiter(s) IV Push every 1 hour PRN After each medication administration  sodium chloride 0.9% lock flush 10 milliLiter(s) IV Push every 12 hours PRN Lumen of catheter NOT used        REVIEW OF SYSTEMS:  limited 2/2 pts mental state  	  Vital Signs Last 24 Hrs  T(C): 36.6 (29 Jul 2018 14:52), Max: 37.1 (28 Jul 2018 20:38)  T(F): 97.8 (29 Jul 2018 14:52), Max: 98.7 (28 Jul 2018 20:38)  HR: 89 (29 Jul 2018 14:52) (57 - 89)  BP: 131/95 (29 Jul 2018 14:52) (105/74 - 141/91)  BP(mean): --  RR: 16 (29 Jul 2018 14:52) (16 - 20)  SpO2: 99% (29 Jul 2018 14:52) (97% - 100%)    PHYSICAL EXAMINATION:   Constitutional: NAD  HEENT: NC, AT  Neck:  Supple  Respiratory:  Adequate airflow b/l. Not using accessory muscles of respiration.  Cardiovascular:  S1 & S2 intact, no R/G, 2+ radial pulses b/l  Gastrointestinal: Soft, ND, normoactive b.s., no organomegaly/RT/rigidity  Extremities: WWP  Neurological:  awake.  Crude sensation intact.     Labs and imaging reviewed  RADIOLOGY & ADDITIONAL STUDIES:

## 2018-07-29 NOTE — PROGRESS NOTE ADULT - ASSESSMENT
63 Y/O F, non-verbal, bed bound, Trach/ PEG tube, from NH, PMhx of HTN, asthma, convulsion disorder, bipolar, DVT legs, DM, sent from NH for vomiting and cough.  In the ED, pt presents in no acute distress, and vitals WNL.  Pt is afebrile with leukocytosis of 19.  CXR concerning for RLL infiltrate likely 2/2 asp pneumonia.  Abdominal x-ray sig for non-specific gas pattern and intact PEG tube.  Pt will be admitted to medicine for suspected Aspiration pneumonia.    Plan:   - Tachypnea: improved. F/u case management for safe discharge asap.    - Lactate elevation - improved  - Diarrhea- improved  - Complicated Aspiration PNA/HCAP 2/2 CRE Pseudomonas & UTI - improved, completed abx.  - Uncontrolled Type 2 DM with complications of hyperglycemia - improved. C/w lantus, moderate ihss, monitor acks  - Need for prophylactic measure: dvt/gi ppx   d/c

## 2018-07-30 ENCOUNTER — TRANSCRIPTION ENCOUNTER (OUTPATIENT)
Age: 63
End: 2018-07-30

## 2018-07-30 DIAGNOSIS — S06.9X9S UNSPECIFIED INTRACRANIAL INJURY WITH LOSS OF CONSCIOUSNESS OF UNSPECIFIED DURATION, SEQUELA: ICD-10-CM

## 2018-07-30 DIAGNOSIS — F20.1 DISORGANIZED SCHIZOPHRENIA: ICD-10-CM

## 2018-07-30 PROCEDURE — 99233 SBSQ HOSP IP/OBS HIGH 50: CPT

## 2018-07-30 RX ORDER — PANTOPRAZOLE SODIUM 20 MG/1
40 TABLET, DELAYED RELEASE ORAL
Qty: 0 | Refills: 0 | DISCHARGE
Start: 2018-07-30

## 2018-07-30 RX ORDER — HEPARIN SODIUM 5000 [USP'U]/ML
5000 INJECTION INTRAVENOUS; SUBCUTANEOUS
Qty: 0 | Refills: 0 | DISCHARGE
Start: 2018-07-30

## 2018-07-30 RX ORDER — LEVETIRACETAM 250 MG/1
10 TABLET, FILM COATED ORAL
Qty: 0 | Refills: 0 | COMMUNITY
Start: 2018-07-30

## 2018-07-30 RX ORDER — LEVETIRACETAM 250 MG/1
10 TABLET, FILM COATED ORAL
Qty: 0 | Refills: 0 | DISCHARGE
Start: 2018-07-30

## 2018-07-30 RX ORDER — QUETIAPINE FUMARATE 200 MG/1
1 TABLET, FILM COATED ORAL
Qty: 0 | Refills: 0 | COMMUNITY
Start: 2018-07-30

## 2018-07-30 RX ORDER — ACETAMINOPHEN 500 MG
20.31 TABLET ORAL
Qty: 0 | Refills: 0 | DISCHARGE
Start: 2018-07-30

## 2018-07-30 RX ORDER — BACITRACIN ZINC 500 UNIT/G
1 OINTMENT IN PACKET (EA) TOPICAL
Qty: 0 | Refills: 0 | COMMUNITY

## 2018-07-30 RX ORDER — ENOXAPARIN SODIUM 100 MG/ML
0.4 INJECTION SUBCUTANEOUS
Qty: 0 | Refills: 0 | COMMUNITY

## 2018-07-30 RX ORDER — LEVETIRACETAM 250 MG/1
10 TABLET, FILM COATED ORAL
Qty: 0 | Refills: 0 | COMMUNITY

## 2018-07-30 RX ORDER — BENZTROPINE MESYLATE 1 MG
1 TABLET ORAL
Qty: 0 | Refills: 0 | DISCHARGE
Start: 2018-07-30

## 2018-07-30 RX ADMIN — QUETIAPINE FUMARATE 50 MILLIGRAM(S): 200 TABLET, FILM COATED ORAL at 17:53

## 2018-07-30 RX ADMIN — Medication 3 MILLILITER(S): at 09:08

## 2018-07-30 RX ADMIN — Medication 3 MILLILITER(S): at 21:09

## 2018-07-30 RX ADMIN — Medication 1 MILLIGRAM(S): at 17:58

## 2018-07-30 RX ADMIN — Medication 3 MILLILITER(S): at 14:17

## 2018-07-30 RX ADMIN — Medication 100 MILLIGRAM(S): at 17:52

## 2018-07-30 RX ADMIN — Medication 2 MILLIGRAM(S): at 06:34

## 2018-07-30 RX ADMIN — INSULIN GLARGINE 15 UNIT(S): 100 INJECTION, SOLUTION SUBCUTANEOUS at 22:52

## 2018-07-30 RX ADMIN — LEVETIRACETAM 1000 MILLIGRAM(S): 250 TABLET, FILM COATED ORAL at 06:33

## 2018-07-30 RX ADMIN — QUETIAPINE FUMARATE 50 MILLIGRAM(S): 200 TABLET, FILM COATED ORAL at 06:34

## 2018-07-30 RX ADMIN — Medication 1 MILLIGRAM(S): at 06:35

## 2018-07-30 RX ADMIN — HEPARIN SODIUM 5000 UNIT(S): 5000 INJECTION INTRAVENOUS; SUBCUTANEOUS at 21:56

## 2018-07-30 RX ADMIN — Medication 1 MILLIGRAM(S): at 11:49

## 2018-07-30 RX ADMIN — ATORVASTATIN CALCIUM 20 MILLIGRAM(S): 80 TABLET, FILM COATED ORAL at 21:56

## 2018-07-30 RX ADMIN — Medication 2: at 11:50

## 2018-07-30 RX ADMIN — Medication 4: at 23:01

## 2018-07-30 RX ADMIN — HEPARIN SODIUM 5000 UNIT(S): 5000 INJECTION INTRAVENOUS; SUBCUTANEOUS at 06:34

## 2018-07-30 RX ADMIN — Medication 1 MILLIGRAM(S): at 23:01

## 2018-07-30 RX ADMIN — Medication 100 MILLIGRAM(S): at 06:34

## 2018-07-30 RX ADMIN — LEVETIRACETAM 1000 MILLIGRAM(S): 250 TABLET, FILM COATED ORAL at 17:55

## 2018-07-30 RX ADMIN — HEPARIN SODIUM 5000 UNIT(S): 5000 INJECTION INTRAVENOUS; SUBCUTANEOUS at 13:39

## 2018-07-30 RX ADMIN — Medication 3 MILLILITER(S): at 02:22

## 2018-07-30 RX ADMIN — Medication 2 MILLIGRAM(S): at 17:52

## 2018-07-30 RX ADMIN — Medication 4: at 18:19

## 2018-07-30 RX ADMIN — PANTOPRAZOLE SODIUM 40 MILLIGRAM(S): 20 TABLET, DELAYED RELEASE ORAL at 11:51

## 2018-07-30 RX ADMIN — AMLODIPINE BESYLATE 2.5 MILLIGRAM(S): 2.5 TABLET ORAL at 06:34

## 2018-07-30 RX ADMIN — Medication 4: at 00:07

## 2018-07-30 NOTE — DISCHARGE NOTE ADULT - PLAN OF CARE
To remain afebrile IV  ABX completed . PICC line removed Continue cogentin and lorazepam and Seroquel Continue POCT and Lantus HS and Sliding scale resolved, tolerating feeds continue Nystatin continue metoprolol and amlodipine IV  ABX completed . PICC line to be  removed prior to d/c Awaiting authorization from insurance co. Continue cogentin ,  lorazepam and Seroquel. D/W Psych.

## 2018-07-30 NOTE — PROGRESS NOTE BEHAVIORAL HEALTH - NSBHFUPINTERVALHXFT_PSY_A_CORE
63 Y/O F, non-verbal, bed bound, Trach/ PEG tube, from NH, PMhx of HTN, asthma, convulsion disorder, bipolar, DVT legs, DM 2, sent from NH for vomiting and cough.  Pt unable to provide history.  History given by sister who states pt was vomiting coffee ground emesis yesterday.  Pt had 2 episodes of reported vomiting, and has not had an episode since.  Pt also developed cough with clear sputum.  Sister states pt is combative at baseline, and her current behavior is baseline.  No reported fever, discomfort, diarrhea, or SOB.    Saw pt today after follow up, pt was having significant tardative which Seroquel was stopped and Cogentin 2mg BID was started as well as increasing Ativan 1mg QID. At that point pt became increasely agitated and rechallenged on Seroquel 50 mg PO BID . As per nursing this is the calmest they have seen the patient. Pt does still have significant tongue movements but otherwise has been calm and redirectable , not requiring any prns.

## 2018-07-30 NOTE — DISCHARGE NOTE ADULT - HOSPITAL COURSE
Pt came from NH s/p episode of vomiting  with tube feeds. CT was done and revealed RLL opacity c/w aspiration pneumonia. Sputum culture revealed Pseudomonas via trach and required Amikacin and Cefipime. A PICC  line was placed to enable pt to go back to [facility] home and receive ABX there. Her PEG had been dislodged prior to coming to the floor . A Mallory had been inserted to maintain open tract and new peg was placed. Pt soon developed diarrhea after resuming feeds, and rectal tube was placed, diarrhea stopped and rectal tube was removed. Pt was seen by Psychiatry for ? EPS 2/2 to Seroquel Seroquel was stopped and pt was placed on Cogentin by psychiatry. Attg MD felt EPS was not noted and restarted Seroquel.  Pt stable for discharge back to [facility].

## 2018-07-30 NOTE — PROGRESS NOTE ADULT - SUBJECTIVE AND OBJECTIVE BOX
Patient seen and examined at bedside  No acute events noted overnight  Case discussed with medical team    HPI:  61 Y/O F, non-verbal, bed bound, Trach/ PEG tube, from NH, PMhx of HTN, asthma, convulsion disorder, bipolar, DVT legs, DM 2, sent from NH for vomiting and cough.  Pt unable to provide history.  History given by sister who states pt was vomiting coffee ground emesis yesterday.  Pt had 2 episodes of reported vomiting, and has not had an episode since.  Pt also developed cough with clear sputum.  Sister states pt is combative at baseline, and her current behavior is baseline.  No reported fever, discomfort, diarrhea, or SOB. (14 Jul 2018 18:51)      PAST MEDICAL & SURGICAL HISTORY:  No pertinent past medical history  Hypertension  Schizophrenia  Diabetic coma  Diabetes mellitus  No significant past surgical history  S/P percutaneous endoscopic gastrostomy (PEG) tube placement  H/O tracheostomy      angiotensin converting enzyme inhibitors (Unknown)       MEDICATIONS  (STANDING):  ALBUTerol/ipratropium for Nebulization 3 milliLiter(s) Nebulizer every 6 hours  amLODIPine   Tablet 2.5 milliGRAM(s) Oral daily  atorvastatin 20 milliGRAM(s) Oral at bedtime  benztropine 2 milliGRAM(s) Oral two times a day  dextrose 5%. 1000 milliLiter(s) (50 mL/Hr) IV Continuous <Continuous>  dextrose 50% Injectable 12.5 Gram(s) IV Push once  dextrose 50% Injectable 25 Gram(s) IV Push once  dextrose 50% Injectable 25 Gram(s) IV Push once  heparin  Injectable 5000 Unit(s) SubCutaneous every 8 hours  insulin glargine Injectable (LANTUS) 15 Unit(s) SubCutaneous at bedtime  insulin lispro (HumaLOG) corrective regimen sliding scale   SubCutaneous every 6 hours  levETIRAcetam  Solution 1000 milliGRAM(s) Oral two times a day  LORazepam     Tablet 1 milliGRAM(s) Oral every 6 hours  metoprolol tartrate 100 milliGRAM(s) Oral two times a day  pantoprazole   Suspension 40 milliGRAM(s) Enteral Tube daily  QUEtiapine 50 milliGRAM(s) Oral every 12 hours  sodium chloride 0.9% lock flush 20 milliLiter(s) IV Push once    MEDICATIONS  (PRN):  acetaminophen    Suspension 650 milliGRAM(s) Oral every 6 hours PRN For Temp greater than 38 C (100.4 F)  benztropine Injectable 2 milliGRAM(s) IV Push once PRN Extrapyramidal symptoms  dextrose 40% Gel 15 Gram(s) Oral once PRN Blood Glucose LESS THAN 70 milliGRAM(s)/deciLiter  glucagon  Injectable 1 milliGRAM(s) IntraMuscular once PRN Glucose <70 milliGRAM(s)/deciLiter  LORazepam   Injectable 1 milliGRAM(s) IV Push every 6 hours PRN combative /agitation  sodium chloride 0.9% lock flush 10 milliLiter(s) IV Push every 1 hour PRN After each medication administration  sodium chloride 0.9% lock flush 10 milliLiter(s) IV Push every 12 hours PRN Lumen of catheter NOT used      REVIEW OF SYSTEMS: limited 2/2 mental status    T(C): 36.8 (07-30-18 @ 05:12), Max: 37.1 (07-29-18 @ 20:49)  HR: 80 (07-30-18 @ 05:12) (59 - 89)  BP: 147/98 (07-30-18 @ 05:12) (107/52 - 147/98)  RR: 19 (07-30-18 @ 05:12) (16 - 19)  SpO2: 99% (07-30-18 @ 05:12) (99% - 100%)    PHYSICAL EXAMINATION:   Constitutional: stable. NAD  HEENT: AT  Neck:  Supple  Respiratory: trach intact. Adequate airflow b/l. Not using accessory muscles of respiration.  Cardiovascular:  S1 & S2 intact, no R/G, 2+ radial pulses b/l  Gastrointestinal: Soft, ND, normoactive b.s.,  Extremities: WWP  Neurological:  awake.      Labs and imaging reviewed      CAPILLARY BLOOD GLUCOSE      POCT Blood Glucose.: 84 mg/dL (30 Jul 2018 06:11)  POCT Blood Glucose.: 209 mg/dL (29 Jul 2018 23:59)  POCT Blood Glucose.: 197 mg/dL (29 Jul 2018 21:55)  POCT Blood Glucose.: 209 mg/dL (29 Jul 2018 17:03)  POCT Blood Glucose.: 83 mg/dL (29 Jul 2018 11:59)      RADIOLOGY & ADDITIONAL STUDIES:

## 2018-07-30 NOTE — PROGRESS NOTE ADULT - ASSESSMENT
A 63 yo Female with multiple co-morbidities including non-verbal, bed bound, Trach/ PEG tube, sent in to the ER from NH for evaluation of shortness of breath and cough after vomiting. On admission, she has no fever or chills but Leukocytosis. The CXR  shows No consolidation but CT chest shows RLL opacity. She has  started on Vancomycin and Zosyn and The ID consult  requested to assist with further evaluation and antibiotic management.     # Aspiration Pneumonia - Pseudomonas ( CRE)  # Hematemesis  # Procalcitonin level elevated    Would recommend:    1.  Aspiration precaution  2. Continue  Bronchial/Oral  suctioning  3. Monitor OFF antibiotics  4. Contact Isolation     d/w nursing staff     -awaiting for rehab placement

## 2018-07-30 NOTE — PROGRESS NOTE BEHAVIORAL HEALTH - [facility]_PSY_A_CORE FT
Increase Cogentin to 2mg po bid.  Neurology consult to r/o seizure activity.
would keep patient on Seroquel 50 mg BID   Cogentin 2mg PO BID   Ativan 1mg QID

## 2018-07-30 NOTE — PROGRESS NOTE ADULT - SUBJECTIVE AND OBJECTIVE BOX
Patient is seen and examined at the bed side, remains afebrile.  No new events.      REVIEW OF SYSTEMS: Unable to obtained since nonverbal      ALLERGIES: ACIs      Vital Signs Last 24 Hrs  T(C): 36.8 (30 Jul 2018 13:54), Max: 37.1 (29 Jul 2018 20:49)  T(F): 98.3 (30 Jul 2018 13:54), Max: 98.7 (29 Jul 2018 20:49)  HR: 75 (30 Jul 2018 17:40) (59 - 86)  BP: 122/70 (30 Jul 2018 17:40) (107/52 - 147/98)  BP(mean): 90 (30 Jul 2018 13:54) (90 - 90)  RR: 18 (30 Jul 2018 13:54) (18 - 19)  SpO2: 98% (30 Jul 2018 13:54) (98% - 100%)        PHYSICAL EXAM:  GENERAL: Not in acute distress  HEENT: Trach in placed  CVS: S1 and s2 present  RESP: Air entry B/L  GI: Abdomen nondistended, NT and PEG in placed  EXT: No pedal edema, hands contracted   CNS: Awake but not alert        LABS:  No new Labs                            12.3   7.9   )-----------( 146      ( 26 Jul 2018 07:44 )             39.5                      11.8   15.4  )-----------( 170      ( 16 Jul 2018 07:12 )             38.1                           12.2   23.0  )-----------( 180      ( 15 Jul 2018 12:45 )             38.7           07-27    144  |  110<H>  |  8   ----------------------------<  163<H>  3.9   |  29  |  0.74    Ca    8.8      27 Jul 2018 07:27  Phos  3.2     07-26  Mg     1.8     07-26      07-26    141  |  106  |  8   ----------------------------<  134<H>  4.4   |  30  |  0.89    Ca    9.6      26 Jul 2018 07:44  Phos  3.2     07-26  Mg     1.8     07-26      TPro  7.4  /  Alb  3.4<L>  /  TBili  0.9  /  DBili  x   /  AST  26  /  ALT  44  /  AlkPhos  108  07-14        CAPILLARY BLOOD GLUCOSE      POCT Blood Glucose.: 211 mg/dL (15 Jul 2018 17:22)  POCT Blood Glucose.: 123 mg/dL (15 Jul 2018 11:44)  POCT Blood Glucose.: 353 mg/dL (15 Jul 2018 07:56)  POCT Blood Glucose.: 254 mg/dL (15 Jul 2018 00:42)  POCT Blood Glucose.: 230 mg/dL (14 Jul 2018 22:22)      MEDICATIONS  (STANDING):  ALBUTerol/ipratropium for Nebulization 3 milliLiter(s) Nebulizer every 6 hours  amLODIPine   Tablet 2.5 milliGRAM(s) Oral daily  atorvastatin 20 milliGRAM(s) Oral at bedtime  benztropine 2 milliGRAM(s) Oral two times a day  dextrose 5%. 1000 milliLiter(s) (50 mL/Hr) IV Continuous <Continuous>  dextrose 50% Injectable 12.5 Gram(s) IV Push once  dextrose 50% Injectable 25 Gram(s) IV Push once  dextrose 50% Injectable 25 Gram(s) IV Push once  heparin  Injectable 5000 Unit(s) SubCutaneous every 8 hours  insulin glargine Injectable (LANTUS) 15 Unit(s) SubCutaneous at bedtime  insulin lispro (HumaLOG) corrective regimen sliding scale   SubCutaneous every 6 hours  levETIRAcetam  Solution 1000 milliGRAM(s) Oral two times a day  LORazepam     Tablet 1 milliGRAM(s) Oral every 6 hours  metoprolol tartrate 100 milliGRAM(s) Oral two times a day  pantoprazole   Suspension 40 milliGRAM(s) Enteral Tube daily  QUEtiapine 50 milliGRAM(s) Oral every 12 hours  sodium chloride 0.9% lock flush 20 milliLiter(s) IV Push once    MEDICATIONS  (PRN):  acetaminophen    Suspension 650 milliGRAM(s) Oral every 6 hours PRN For Temp greater than 38 C (100.4 F)  benztropine Injectable 2 milliGRAM(s) IV Push once PRN Extrapyramidal symptoms  dextrose 40% Gel 15 Gram(s) Oral once PRN Blood Glucose LESS THAN 70 milliGRAM(s)/deciLiter  glucagon  Injectable 1 milliGRAM(s) IntraMuscular once PRN Glucose <70 milliGRAM(s)/deciLiter  LORazepam   Injectable 1 milliGRAM(s) IV Push every 6 hours PRN combative /agitation  sodium chloride 0.9% lock flush 10 milliLiter(s) IV Push every 1 hour PRN After each medication administration  sodium chloride 0.9% lock flush 10 milliLiter(s) IV Push every 12 hours PRN Lumen of catheter NOT used        RADIOLOGY & ADDITIONAL TESTS:    7/19/18: Xray Abdomen 1 View PORTABLE -Urgent (07.19.18 @ 02:39) Gastrostomy evaluation without evidence for extravasation as described.      7/15/18: CT Chest No Cont (07.15.18 @ 11:14) 1. Mid to distal esophageal wall thickening is suggested. Correlate with endoscopic evaluation.  2. A PEG tube is identified, the distal aspect of which is identified in the region of the pylorus/duodenal bulb; correlate clinically as to appropriate positioning.  3. There is opacity identified within the right lower lobe lung parenchyma, likely representative of a combination of both atelectasis and consolidation. Small right pleural effusion.    7/14/18: Xray Chest 1 View AP/PA (07.14.18 @ 15:34)No consolidation. Platelike atelectasis in the right lower lobe.        MICROBIOLOGY DATA:      Culture - Sputum . (07.16.18 @ 10:36)    -  Gentamicin: R >8    -  Imipenem: R >8    -  Levofloxacin: R >4    -  Meropenem: R >8    -  Piperacillin/Tazobactam: R >64    -  Tobramycin: R >8    -  Amikacin: S 16    Gram Stain:   Moderate polymorphonuclear leukocytes seen per low power field  Moderate Squamous epithelial cells seen per low power field  Moderate Gram Negative Rods seen per oil power field  Few Gram Positive Cocci seen per oil power field    -  Aztreonam: R >16    -  Cefepime: I 16    -  Ceftazidime: R >16    -  Ciprofloxacin: R >2    Specimen Source: .Sputum Sputum trap    Culture Results:   Three or more mixed gram negative rods including  Moderate Pseudomonas aeruginosa (Carbapenem Resistant)    Organism Identification: Pseudomonas aeruginosa (Carbapenem Resistant)    Organism: Pseudomonas aeruginosa (Carbapenem Resistant)    Method Type: ESTIVEN        MRSA/MSSA PCR (07.16.18 @ 10:15)    MRSA PCR Result.: NotDetec: MRSA/MSSA PCR assay is a qualitative in vitro diagnostic test for the  direct detection and differentiation of methicillin-resistant  Staphylococcus aureus (MRSA) from Staphylococcus aureus (SA). The assay  detects DNA from nasal swabs in patients atrisk for nasal colonization.  It is not intended to diagnose MRSA or SA infections nor guide or monitor  treatment for MRSA/SA infections. A negative result does not preclude  nasal colonization. The assay is FDA-approved and its performance has  been established by [employer], [place] and the [facility], [place], [place].    Staph Aureus PCR Result: [facility]    Urine Microscopic-Add On (NC) (07.15.18 @ 23:04)    Bacteria: Many /HPF    Epithelial Cells: Many /HPF    Red Blood Cell - Urine: 5-10 /HPF    White Blood Cell - Urine: 11-25 /HPF

## 2018-07-30 NOTE — DISCHARGE NOTE ADULT - MEDICATION SUMMARY - MEDICATIONS TO STOP TAKING
I will STOP taking the medications listed below when I get home from the hospital:    Lovenox 40 mg/0.4 mL injectable solution  -- 0.4 milliliter(s) injectable 2 times a day    LORazepam 2 mg/mL oral concentrate  -- 0.5 milliliter(s) by mouth 3 times a day

## 2018-07-30 NOTE — DISCHARGE NOTE ADULT - CARE PLAN
Principal Discharge DX:	Aspiration pneumonia of right lower lobe due to regurgitated food  Goal:	To remain afebrile  Assessment and plan of treatment:	IV  ABX completed . 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, without long-term current use of insulin  Assessment and plan of treatment:	Continue POCT and Lantus HS and Sliding scale  Secondary Diagnosis:	Diarrhea, unspecified type  Assessment and plan of treatment:	resolved, tolerating feeds  Secondary Diagnosis:	Fungal infection of the groin  Assessment and plan of treatment:	continue Nystatin  Secondary Diagnosis:	Hypertension, unspecified type  Assessment and plan of treatment:	continue metoprolol and amlodipine Principal Discharge DX:	Aspiration pneumonia of right lower lobe due to regurgitated food  Goal:	To remain afebrile  Assessment and plan of treatment:	IV  ABX completed . PICC line to be  removed prior to d/c Awaiting authorization from insurance co.  Secondary Diagnosis:	Agitation  Assessment and plan of treatment:	Continue cogentin ,  lorazepam and Seroquel. D/W Psych.  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

## 2018-07-30 NOTE — PROGRESS NOTE BEHAVIORAL HEALTH - SUMMARY
Patient was evaluated,chart was reviewed,case was discussed with MD.  Patient is awake,nonverbal,has constant legs and arm movements.  Patient is on Cogentin 1mg po bid.  Patient is also on Keppra for seizure disorder.  Unable to do MSE and MMSE due to nonverbal state.
62 year old female with TBI and schizophrenia who we were called to see for agitation and tardive Dyskynetic movements

## 2018-07-30 NOTE — DISCHARGE NOTE ADULT - SECONDARY DIAGNOSIS.
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

## 2018-07-30 NOTE — DISCHARGE NOTE ADULT - PATIENT PORTAL LINK FT
You can access the [facility] Patient Portal, offered by [facility], by registering with the following website: [email or web address]

## 2018-07-30 NOTE — DISCHARGE NOTE ADULT - MEDICATION SUMMARY - MEDICATIONS TO TAKE
I will START or STAY ON the medications listed below when I get home from the hospital:    acetaminophen 160 mg/5 mL oral suspension  -- 20.31 milliliter(s) by mouth every 6 hours, As needed, For Temp greater than 38 C (100.4 F)  -- Indication: For Apin, fever    heparin  -- 5000 unit(s) subcutaneous 1 to 3 times a day  -- Indication: For DVT prevention    levETIRAcetam 100 mg/mL oral solution  -- 10 milliliter(s) by mouth 2 times a day  -- Indication: For Seizures    LORazepam 1 mg oral tablet  -- 1 tab(s) by mouth every 6 hours  -- Indication: For Agitation    insulin lispro 100 units/mL subcutaneous solution  --  subcutaneous 3 times a day (before meals); 1 Unit(s) if Glucose 151 - 200  2 Unit(s) if Glucose 201 - 250  3 Unit(s) if Glucose 251 - 300  4 Unit(s) if Glucose 301 - 350  5 Unit(s) if Glucose 351 - 400  6 Unit(s) if Glucose GREATER THAN 400  -- Indication: For DM    Lantus 100 units/mL subcutaneous solution  -- 30 unit(s) subcutaneous once a day (at bedtime)  -- Indication: For Diabetes    nystatin 100,000 units/g topical powder (obsolete)  -- Indication: For Fungal rash    atorvastatin 20 mg oral tablet  -- 1 tab(s) by mouth once a day  -- Indication: For HLD    benztropine 2 mg oral tablet  -- 1 tab(s) by mouth 2 times a day  -- Indication: For EPS    QUEtiapine 50 mg oral tablet  -- 1 tab(s) by mouth every 12 hours  -- Indication: For Delirium due to another medical condition, acute, hyperactive    metoprolol tartrate 100 mg oral tablet  -- 1 tab(s) by mouth 2 times a day  -- Indication: For BP    ipratropium-albuterol 0.5 mg-2.5 mg/3 mLinhalation solution  -- 3 milliliter(s) inhaled 4 times a day, As Needed  -- Indication: For CoPD    amLODIPine 2.5 mg oral tablet  -- 1 tab(s) by mouth once a day  -- Indication: For BP    Senna 8.6 mg oral tablet  -- 1 tab(s) by mouth once a day (at bedtime)  -- Indication: For Constipation    pantoprazole 40 mg oral granule, delayed release  -- 40  by gastrostomy tube once a day  -- Indication: For GERD

## 2018-07-30 NOTE — PROGRESS NOTE ADULT - SUBJECTIVE AND OBJECTIVE BOX
Patient seen and examined.     MEDICATIONS  (STANDING):  ALBUTerol/ipratropium for Nebulization 3 milliLiter(s) Nebulizer every 6 hours  amLODIPine   Tablet 2.5 milliGRAM(s) Oral daily  atorvastatin 20 milliGRAM(s) Oral at bedtime  benztropine 2 milliGRAM(s) Oral two times a day  dextrose 5%. 1000 milliLiter(s) (50 mL/Hr) IV Continuous <Continuous>  dextrose 50% Injectable 12.5 Gram(s) IV Push once  dextrose 50% Injectable 25 Gram(s) IV Push once  dextrose 50% Injectable 25 Gram(s) IV Push once  heparin  Injectable 5000 Unit(s) SubCutaneous every 8 hours  insulin glargine Injectable (LANTUS) 15 Unit(s) SubCutaneous at bedtime  insulin lispro (HumaLOG) corrective regimen sliding scale   SubCutaneous every 6 hours  levETIRAcetam  Solution 1000 milliGRAM(s) Oral two times a day  LORazepam     Tablet 1 milliGRAM(s) Oral every 6 hours  metoprolol tartrate 100 milliGRAM(s) Oral two times a day  pantoprazole   Suspension 40 milliGRAM(s) Enteral Tube daily  QUEtiapine 50 milliGRAM(s) Oral every 12 hours  sodium chloride 0.9% lock flush 20 milliLiter(s) IV Push once      MEDICATIONS  (PRN):  acetaminophen    Suspension 650 milliGRAM(s) Oral every 6 hours PRN For Temp greater than 38 C (100.4 F)  benztropine Injectable 2 milliGRAM(s) IV Push once PRN Extrapyramidal symptoms  dextrose 40% Gel 15 Gram(s) Oral once PRN Blood Glucose LESS THAN 70 milliGRAM(s)/deciLiter  glucagon  Injectable 1 milliGRAM(s) IntraMuscular once PRN Glucose <70 milliGRAM(s)/deciLiter  LORazepam   Injectable 1 milliGRAM(s) IV Push every 6 hours PRN combative /agitation  sodium chloride 0.9% lock flush 10 milliLiter(s) IV Push every 1 hour PRN After each medication administration  sodium chloride 0.9% lock flush 10 milliLiter(s) IV Push every 12 hours PRN Lumen of catheter NOT used     Medications up to date at time of exam.    PHYSICAL EXAMINATION:  Patient non verbal  GENERAL: The patient is a well-developed, well-nourished, in no apparent distress.     Vital Signs Last 24 Hrs  T(C): 36.8 (30 Jul 2018 05:12), Max: 37.1 (29 Jul 2018 20:49)  T(F): 98.2 (30 Jul 2018 05:12), Max: 98.7 (29 Jul 2018 20:49)  HR: 80 (30 Jul 2018 05:12) (59 - 89)  BP: 147/98 (30 Jul 2018 05:12) (107/52 - 147/98)  BP(mean): --  RR: 19 (30 Jul 2018 05:12) (16 - 19)  SpO2: 99% (30 Jul 2018 05:12) (99% - 100%)   (if applicable)    Chest Tube (if applicable)    HEENT: Head is normocephalic and atraumatic. trach, protruding tongue    NECK: Supple, no palpable adenopathy.    LUNGS: Clear to auscultation, no wheezing, rales, or +rhonchi.    HEART: Regular rate and rhythm without murmur.    ABDOMEN: Soft, nontender, and nondistended.      EXTREMITIES: Without any cyanosis, clubbing, rash, lesions or edema.    NEUROLOGIC: Awake, alert.    SKIN: Warm, dry, good turgor.    LABS:      MICROBIOLOGY: (if applicable)    RADIOLOGY & ADDITIONAL STUDIES:  EKG:   CXR:  ECHO:    IMPRESSION: 63y Female PAST MEDICAL & SURGICAL HISTORY:  No pertinent past medical history  Hypertension  Schizophrenia  Diabetic coma  Diabetes mellitus  No significant past surgical history  S/P percutaneous endoscopic gastrostomy (PEG) tube placement  H/O tracheostomy     Impression; 64 Y/O Female with prior mentioned multiple chronic conditions. Presented with Cough, SOB with RLL Opacity secondary to Aspiration PNA due to pseudomonas ( CRE) Oxygen supplementation FIO2 40% via Trach collar due to Chronic Hypoxic respiratory failure. ID recommended antibx until 7/28    Suggestion:     -Continue DuoNeb Q 6 hours.  -Oxygen supplementation via Trach collar. 40% via TC  -Continue antibiotic as per ID recommendation until 7/28  -Contact precautions due to CRE Pseudomonas Sputum,   -Aspiration precautions. HOB elevation especially during Peg feeding.   -Turn and reposition in bed.  -frequent pul hygiene  d/c planning Patient seen and examined.     MEDICATIONS  (STANDING):  ALBUTerol/ipratropium for Nebulization 3 milliLiter(s) Nebulizer every 6 hours  amLODIPine   Tablet 2.5 milliGRAM(s) Oral daily  atorvastatin 20 milliGRAM(s) Oral at bedtime  benztropine 2 milliGRAM(s) Oral two times a day  dextrose 5%. 1000 milliLiter(s) (50 mL/Hr) IV Continuous <Continuous>  dextrose 50% Injectable 12.5 Gram(s) IV Push once  dextrose 50% Injectable 25 Gram(s) IV Push once  dextrose 50% Injectable 25 Gram(s) IV Push once  heparin  Injectable 5000 Unit(s) SubCutaneous every 8 hours  insulin glargine Injectable (LANTUS) 15 Unit(s) SubCutaneous at bedtime  insulin lispro (HumaLOG) corrective regimen sliding scale   SubCutaneous every 6 hours  levETIRAcetam  Solution 1000 milliGRAM(s) Oral two times a day  LORazepam     Tablet 1 milliGRAM(s) Oral every 6 hours  metoprolol tartrate 100 milliGRAM(s) Oral two times a day  pantoprazole   Suspension 40 milliGRAM(s) Enteral Tube daily  QUEtiapine 50 milliGRAM(s) Oral every 12 hours  sodium chloride 0.9% lock flush 20 milliLiter(s) IV Push once      MEDICATIONS  (PRN):  acetaminophen    Suspension 650 milliGRAM(s) Oral every 6 hours PRN For Temp greater than 38 C (100.4 F)  benztropine Injectable 2 milliGRAM(s) IV Push once PRN Extrapyramidal symptoms  dextrose 40% Gel 15 Gram(s) Oral once PRN Blood Glucose LESS THAN 70 milliGRAM(s)/deciLiter  glucagon  Injectable 1 milliGRAM(s) IntraMuscular once PRN Glucose <70 milliGRAM(s)/deciLiter  LORazepam   Injectable 1 milliGRAM(s) IV Push every 6 hours PRN combative /agitation  sodium chloride 0.9% lock flush 10 milliLiter(s) IV Push every 1 hour PRN After each medication administration  sodium chloride 0.9% lock flush 10 milliLiter(s) IV Push every 12 hours PRN Lumen of catheter NOT used     Medications up to date at time of exam.    PHYSICAL EXAMINATION:  Patient non verbal  GENERAL: The patient is a well-developed, well-nourished, in no apparent distress.     Vital Signs Last 24 Hrs  T(C): 36.8 (30 Jul 2018 05:12), Max: 37.1 (29 Jul 2018 20:49)  T(F): 98.2 (30 Jul 2018 05:12), Max: 98.7 (29 Jul 2018 20:49)  HR: 80 (30 Jul 2018 05:12) (59 - 89)  BP: 147/98 (30 Jul 2018 05:12) (107/52 - 147/98)  BP(mean): --  RR: 19 (30 Jul 2018 05:12) (16 - 19)  SpO2: 99% (30 Jul 2018 05:12) (99% - 100%)   (if applicable)    Chest Tube (if applicable)    HEENT: Head is normocephalic and atraumatic. trach, protruding tongue    NECK: Supple, no palpable adenopathy.    LUNGS: Clear to auscultation, no wheezing, rales, or +rhonchi.    HEART: Regular rate and rhythm without murmur.    ABDOMEN: Soft, nontender, and nondistended.      EXTREMITIES: Without any cyanosis, clubbing, rash, lesions or edema.    NEUROLOGIC: Awake, alert.    SKIN: Warm, dry, good turgor.    LABS:      MICROBIOLOGY: (if applicable)    RADIOLOGY & ADDITIONAL STUDIES:  EKG:   CXR:  ECHO:    IMPRESSION: 63y Female PAST MEDICAL & SURGICAL HISTORY:  No pertinent past medical history  Hypertension  Schizophrenia  Diabetic coma  Diabetes mellitus  No significant past surgical history  S/P percutaneous endoscopic gastrostomy (PEG) tube placement  H/O tracheostomy     Impression; 62 Y/O Female with prior mentioned multiple chronic conditions. Presented with Cough, SOB with RLL Opacity secondary to Aspiration PNA due to pseudomonas ( CRE) Oxygen supplementation FIO2 40% via Trach collar due to Chronic Hypoxic respiratory failure. ID recommended antibx until 7/28    Suggestion:     -Continue DuoNeb Q 6 hours.  -Oxygen supplementation via Trach collar. 40% via TC  -Contact precautions due to CRE Pseudomonas Sputum,   -Aspiration precautions. HOB elevation especially during Peg feeding.   -Turn and reposition in bed.  -frequent pul hygiene  d/c planning

## 2018-07-31 VITALS
RESPIRATION RATE: 16 BRPM | TEMPERATURE: 98 F | HEART RATE: 86 BPM | DIASTOLIC BLOOD PRESSURE: 65 MMHG | SYSTOLIC BLOOD PRESSURE: 118 MMHG | OXYGEN SATURATION: 100 %

## 2018-07-31 PROCEDURE — 80053 COMPREHEN METABOLIC PANEL: CPT

## 2018-07-31 PROCEDURE — 80061 LIPID PANEL: CPT

## 2018-07-31 PROCEDURE — 85379 FIBRIN DEGRADATION QUANT: CPT

## 2018-07-31 PROCEDURE — 36569 INSJ PICC 5 YR+ W/O IMAGING: CPT

## 2018-07-31 PROCEDURE — 84443 ASSAY THYROID STIM HORMONE: CPT

## 2018-07-31 PROCEDURE — 87640 STAPH A DNA AMP PROBE: CPT

## 2018-07-31 PROCEDURE — 87641 MR-STAPH DNA AMP PROBE: CPT

## 2018-07-31 PROCEDURE — 71045 X-RAY EXAM CHEST 1 VIEW: CPT

## 2018-07-31 PROCEDURE — 83605 ASSAY OF LACTIC ACID: CPT

## 2018-07-31 PROCEDURE — 94640 AIRWAY INHALATION TREATMENT: CPT

## 2018-07-31 PROCEDURE — 99284 EMERGENCY DEPT VISIT MOD MDM: CPT | Mod: 25

## 2018-07-31 PROCEDURE — 87186 SC STD MICRODIL/AGAR DIL: CPT

## 2018-07-31 PROCEDURE — 82550 ASSAY OF CK (CPK): CPT

## 2018-07-31 PROCEDURE — 76937 US GUIDE VASCULAR ACCESS: CPT

## 2018-07-31 PROCEDURE — 87070 CULTURE OTHR SPECIMN AEROBIC: CPT

## 2018-07-31 PROCEDURE — 74018 RADEX ABDOMEN 1 VIEW: CPT

## 2018-07-31 PROCEDURE — 83036 HEMOGLOBIN GLYCOSYLATED A1C: CPT

## 2018-07-31 PROCEDURE — 70450 CT HEAD/BRAIN W/O DYE: CPT

## 2018-07-31 PROCEDURE — 74176 CT ABD & PELVIS W/O CONTRAST: CPT

## 2018-07-31 PROCEDURE — 71250 CT THORAX DX C-: CPT

## 2018-07-31 PROCEDURE — 82962 GLUCOSE BLOOD TEST: CPT

## 2018-07-31 PROCEDURE — 85027 COMPLETE CBC AUTOMATED: CPT

## 2018-07-31 PROCEDURE — 77001 FLUOROGUIDE FOR VEIN DEVICE: CPT

## 2018-07-31 PROCEDURE — 70360 X-RAY EXAM OF NECK: CPT

## 2018-07-31 PROCEDURE — 82553 CREATINE MB FRACTION: CPT

## 2018-07-31 PROCEDURE — 93005 ELECTROCARDIOGRAM TRACING: CPT

## 2018-07-31 PROCEDURE — 84100 ASSAY OF PHOSPHORUS: CPT

## 2018-07-31 PROCEDURE — 84145 PROCALCITONIN (PCT): CPT

## 2018-07-31 PROCEDURE — C1751: CPT

## 2018-07-31 PROCEDURE — 80202 ASSAY OF VANCOMYCIN: CPT

## 2018-07-31 PROCEDURE — 49465 FLUORO EXAM OF G/COLON TUBE: CPT

## 2018-07-31 PROCEDURE — 81001 URINALYSIS AUTO W/SCOPE: CPT

## 2018-07-31 PROCEDURE — 80048 BASIC METABOLIC PNL TOTAL CA: CPT

## 2018-07-31 PROCEDURE — 82607 VITAMIN B-12: CPT

## 2018-07-31 PROCEDURE — 83735 ASSAY OF MAGNESIUM: CPT

## 2018-07-31 PROCEDURE — 84484 ASSAY OF TROPONIN QUANT: CPT

## 2018-07-31 PROCEDURE — 99285 EMERGENCY DEPT VISIT HI MDM: CPT | Mod: 25

## 2018-07-31 PROCEDURE — 80177 DRUG SCRN QUAN LEVETIRACETAM: CPT

## 2018-07-31 RX ADMIN — QUETIAPINE FUMARATE 50 MILLIGRAM(S): 200 TABLET, FILM COATED ORAL at 05:51

## 2018-07-31 RX ADMIN — Medication 2 MILLIGRAM(S): at 05:51

## 2018-07-31 RX ADMIN — Medication 3 MILLILITER(S): at 14:02

## 2018-07-31 RX ADMIN — Medication 1 MILLIGRAM(S): at 05:51

## 2018-07-31 RX ADMIN — Medication 3 MILLILITER(S): at 02:17

## 2018-07-31 RX ADMIN — Medication 6: at 12:21

## 2018-07-31 RX ADMIN — Medication 1 MILLIGRAM(S): at 12:21

## 2018-07-31 RX ADMIN — PANTOPRAZOLE SODIUM 40 MILLIGRAM(S): 20 TABLET, DELAYED RELEASE ORAL at 12:22

## 2018-07-31 RX ADMIN — HEPARIN SODIUM 5000 UNIT(S): 5000 INJECTION INTRAVENOUS; SUBCUTANEOUS at 05:51

## 2018-07-31 RX ADMIN — Medication 3 MILLILITER(S): at 08:19

## 2018-07-31 RX ADMIN — Medication 100 MILLIGRAM(S): at 05:52

## 2018-07-31 RX ADMIN — HEPARIN SODIUM 5000 UNIT(S): 5000 INJECTION INTRAVENOUS; SUBCUTANEOUS at 14:54

## 2018-07-31 RX ADMIN — LEVETIRACETAM 1000 MILLIGRAM(S): 250 TABLET, FILM COATED ORAL at 05:52

## 2018-07-31 RX ADMIN — AMLODIPINE BESYLATE 2.5 MILLIGRAM(S): 2.5 TABLET ORAL at 05:51

## 2018-07-31 NOTE — PROGRESS NOTE ADULT - SUBJECTIVE AND OBJECTIVE BOX
Patient seen and examined at bedside  no new acute events noted   Case discussed with medical team    HPI:  63 Y/O F, non-verbal, bed bound, Trach/ PEG tube, from NH, PMhx of HTN, asthma, convulsion disorder, bipolar, DVT legs, DM 2, sent from NH for vomiting and cough.  Pt unable to provide history.  History given by sister who states pt was vomiting coffee ground emesis yesterday.  Pt had 2 episodes of reported vomiting, and has not had an episode since.  Pt also developed cough with clear sputum.  Sister states pt is combative at baseline, and her current behavior is baseline.  No reported fever, discomfort, diarrhea, or SOB. (14 Jul 2018 18:51)      PAST MEDICAL & SURGICAL HISTORY:  No pertinent past medical history  Hypertension  Schizophrenia  Diabetic coma  Diabetes mellitus  No significant past surgical history  S/P percutaneous endoscopic gastrostomy (PEG) tube placement  H/O tracheostomy      angiotensin converting enzyme inhibitors (Unknown)       MEDICATIONS  (STANDING):  ALBUTerol/ipratropium for Nebulization 3 milliLiter(s) Nebulizer every 6 hours  amLODIPine   Tablet 2.5 milliGRAM(s) Oral daily  atorvastatin 20 milliGRAM(s) Oral at bedtime  benztropine 2 milliGRAM(s) Oral two times a day  dextrose 5%. 1000 milliLiter(s) (50 mL/Hr) IV Continuous <Continuous>  dextrose 50% Injectable 12.5 Gram(s) IV Push once  dextrose 50% Injectable 25 Gram(s) IV Push once  dextrose 50% Injectable 25 Gram(s) IV Push once  heparin  Injectable 5000 Unit(s) SubCutaneous every 8 hours  insulin glargine Injectable (LANTUS) 15 Unit(s) SubCutaneous at bedtime  insulin lispro (HumaLOG) corrective regimen sliding scale   SubCutaneous every 6 hours  levETIRAcetam  Solution 1000 milliGRAM(s) Oral two times a day  LORazepam     Tablet 1 milliGRAM(s) Oral every 6 hours  metoprolol tartrate 100 milliGRAM(s) Oral two times a day  pantoprazole   Suspension 40 milliGRAM(s) Enteral Tube daily  QUEtiapine 50 milliGRAM(s) Oral every 12 hours  sodium chloride 0.9% lock flush 20 milliLiter(s) IV Push once    MEDICATIONS  (PRN):  acetaminophen    Suspension 650 milliGRAM(s) Oral every 6 hours PRN For Temp greater than 38 C (100.4 F)  benztropine Injectable 2 milliGRAM(s) IV Push once PRN Extrapyramidal symptoms  dextrose 40% Gel 15 Gram(s) Oral once PRN Blood Glucose LESS THAN 70 milliGRAM(s)/deciLiter  glucagon  Injectable 1 milliGRAM(s) IntraMuscular once PRN Glucose <70 milliGRAM(s)/deciLiter  sodium chloride 0.9% lock flush 10 milliLiter(s) IV Push every 1 hour PRN After each medication administration  sodium chloride 0.9% lock flush 10 milliLiter(s) IV Push every 12 hours PRN Lumen of catheter NOT used      REVIEW OF SYSTEMS: limited 2/2 mental status     T(C): 36.7 (07-31-18 @ 04:45), Max: 36.8 (07-30-18 @ 13:54)  HR: 63 (07-31-18 @ 04:45) (63 - 86)  BP: 147/53 (07-31-18 @ 04:45) (118/81 - 147/53)  RR: 16 (07-31-18 @ 04:45) (16 - 18)  SpO2: 99% (07-31-18 @ 04:45) (95% - 100%)    PHYSICAL EXAMINATION:   Constitutional: NAD  HEENT:  AT  Neck:  Supple  Respiratory:  Adequate airflow b/l. Not using accessory muscles of respiration.  Cardiovascular:  S1 & S2 intact, no R/G, 2+ radial pulses b/l  Gastrointestinal: Soft, ND, normoactive b.s., no organomegaly/RT/rigidity  Extremities:  WWP  Neurological:  Awake.    Labs and imaging reviewed    LABS:                  CAPILLARY BLOOD GLUCOSE      POCT Blood Glucose.: 138 mg/dL (31 Jul 2018 06:08)  POCT Blood Glucose.: 210 mg/dL (30 Jul 2018 22:47)  POCT Blood Glucose.: 237 mg/dL (30 Jul 2018 18:10)  POCT Blood Glucose.: 254 mg/dL (30 Jul 2018 16:51)  POCT Blood Glucose.: 193 mg/dL (30 Jul 2018 11:23)              RADIOLOGY & ADDITIONAL STUDIES:

## 2018-07-31 NOTE — PROGRESS NOTE ADULT - SUBJECTIVE AND OBJECTIVE BOX
Time of Visit:  Patient seen and examined.     MEDICATIONS  (STANDING):  ALBUTerol/ipratropium for Nebulization 3 milliLiter(s) Nebulizer every 6 hours  amLODIPine   Tablet 2.5 milliGRAM(s) Oral daily  atorvastatin 20 milliGRAM(s) Oral at bedtime  benztropine 2 milliGRAM(s) Oral two times a day  dextrose 5%. 1000 milliLiter(s) (50 mL/Hr) IV Continuous <Continuous>  dextrose 50% Injectable 12.5 Gram(s) IV Push once  dextrose 50% Injectable 25 Gram(s) IV Push once  dextrose 50% Injectable 25 Gram(s) IV Push once  heparin  Injectable 5000 Unit(s) SubCutaneous every 8 hours  insulin glargine Injectable (LANTUS) 15 Unit(s) SubCutaneous at bedtime  insulin lispro (HumaLOG) corrective regimen sliding scale   SubCutaneous every 6 hours  levETIRAcetam  Solution 1000 milliGRAM(s) Oral two times a day  LORazepam     Tablet 1 milliGRAM(s) Oral every 6 hours  metoprolol tartrate 100 milliGRAM(s) Oral two times a day  pantoprazole   Suspension 40 milliGRAM(s) Enteral Tube daily  QUEtiapine 50 milliGRAM(s) Oral every 12 hours  sodium chloride 0.9% lock flush 20 milliLiter(s) IV Push once      MEDICATIONS  (PRN):  acetaminophen    Suspension 650 milliGRAM(s) Oral every 6 hours PRN For Temp greater than 38 C (100.4 F)  benztropine Injectable 2 milliGRAM(s) IV Push once PRN Extrapyramidal symptoms  dextrose 40% Gel 15 Gram(s) Oral once PRN Blood Glucose LESS THAN 70 milliGRAM(s)/deciLiter  glucagon  Injectable 1 milliGRAM(s) IntraMuscular once PRN Glucose <70 milliGRAM(s)/deciLiter  sodium chloride 0.9% lock flush 10 milliLiter(s) IV Push every 1 hour PRN After each medication administration  sodium chloride 0.9% lock flush 10 milliLiter(s) IV Push every 12 hours PRN Lumen of catheter NOT used       Medications up to date at time of exam.    ROS; No fever, chills, hypoxia, cough, congestion on exam.  PHYSICAL EXAMINATION:  Vital Signs Last 24 Hrs  T(C): 36.7 (31 Jul 2018 04:45), Max: 36.8 (30 Jul 2018 13:54)  T(F): 98.1 (31 Jul 2018 04:45), Max: 98.3 (30 Jul 2018 13:54)  HR: 65 (31 Jul 2018 09:07) (63 - 82)  BP: 147/53 (31 Jul 2018 04:45) (118/81 - 147/53)  BP(mean): 90 (30 Jul 2018 13:54) (90 - 90)  RR: 16 (31 Jul 2018 04:45) (16 - 18)  SpO2: 100% (31 Jul 2018 09:07) (95% - 100%)   (if applicable)    General; Total care. Non verbal. No acute distress. Eyes does not follow verbal and tactile stimuli.        HEENT: Normocephalic and atraumatic. No nasal tenderness. Moist mucosa.     NECK: Has Trach#6, non infected.    LUNGS: Clear to auscultation B/L. No wheeze/ rales. No use of accessory muscle.     HEART: S1 S2 Regular rate and no click/ rub.     ABDOMEN: Soft, nontender, and nondistended. Active bowel sounds. + Peg.     EXTREMITIES: Without any cyanosis, clubbing, rash, lesions or edema.    NEUROLOGIC: Cognitive impaired.      SKIN: Warm and moist. Non diaphoretic.       LABS:      RADIOLOGY & ADDITIONAL STUDIES:  EKG:   Ct chest: < from: CT Chest No Cont (07.26.18 @ 11:15) >    EXAM:  CT CHEST                            PROCEDURE DATE:  07/26/2018          INTERPRETATION:  EXAMINATION: CT CHEST    CLINICAL INDICATION: Tachycardia.    TECHNIQUE: Noncontrast CT of the chest was obtained.      COMPARISON: 7/15/2018.    FINDINGS:     AIRWAYS AND LUNGS: Tracheostomy tube.  Elevation right hemidiaphragm with   adjacent subsegmental atelectasis.    MEDIASTINUM AND PLEURA: There are no enlarged mediastinal, hilar or   axillary lymph nodes.     There is no pleural effusion. There is no   pneumothorax.      HEART AND VESSELS: The heart is normal in size.  There are   atherosclerotic calcifications of the aorta and coronary arteries.  There   is no pericardial effusion.         UPPER ABDOMEN: Images of the upper abdomen demonstrate no abnormality.     BONES AND SOFT TISSUES: The bones are unremarkable.  The soft tissues are   unremarkable.    TUBES/LINES: Left-sided PICC with tip in SVC    IMPRESSION:   Elevation right hemidiaphragm with adjacent subsegmental atelectasis.   Lungs otherwise shruthi     PAST MEDICAL & SURGICAL HISTORY:  No pertinent past medical history  Hypertension  Schizophrenia  Diabetic coma  Diabetes mellitus  No significant past surgical history  S/P percutaneous endoscopic gastrostomy (PEG) tube placement  H/O tracheostomy       Impression; 62 Y/O Female with prior mentioned multiple chronic conditions. Presented with Cough, SOB and Vomiting. Leukocytosis from WBC 23.1 to WBC 15.4 with RLL Opacity secondary to Aspiration PNA due to pseudomonas ( CRE), completed antibiotic.  Oxygen supplementation FIO2 40% via Trach collar due to Chronic Hypoxic respiratory failure.     Suggestion:     Continue DuoNeb Q 6 hours.  Oxygen supplementation via Trach collar.  Contact precautions due to CRE Pseudomonas Sputum,   Aspiration precautions. HOB elevation especially during Peg feeding.   Turn and reposition in bed.  D'cd planning per Medicine back to nursing Home. Time of Visit:  Patient seen and examined.     MEDICATIONS  (STANDING):  ALBUTerol/ipratropium for Nebulization 3 milliLiter(s) Nebulizer every 6 hours  amLODIPine   Tablet 2.5 milliGRAM(s) Oral daily  atorvastatin 20 milliGRAM(s) Oral at bedtime  benztropine 2 milliGRAM(s) Oral two times a day  dextrose 5%. 1000 milliLiter(s) (50 mL/Hr) IV Continuous <Continuous>  dextrose 50% Injectable 12.5 Gram(s) IV Push once  dextrose 50% Injectable 25 Gram(s) IV Push once  dextrose 50% Injectable 25 Gram(s) IV Push once  heparin  Injectable 5000 Unit(s) SubCutaneous every 8 hours  insulin glargine Injectable (LANTUS) 15 Unit(s) SubCutaneous at bedtime  insulin lispro (HumaLOG) corrective regimen sliding scale   SubCutaneous every 6 hours  levETIRAcetam  Solution 1000 milliGRAM(s) Oral two times a day  LORazepam     Tablet 1 milliGRAM(s) Oral every 6 hours  metoprolol tartrate 100 milliGRAM(s) Oral two times a day  pantoprazole   Suspension 40 milliGRAM(s) Enteral Tube daily  QUEtiapine 50 milliGRAM(s) Oral every 12 hours  sodium chloride 0.9% lock flush 20 milliLiter(s) IV Push once      MEDICATIONS  (PRN):  acetaminophen    Suspension 650 milliGRAM(s) Oral every 6 hours PRN For Temp greater than 38 C (100.4 F)  benztropine Injectable 2 milliGRAM(s) IV Push once PRN Extrapyramidal symptoms  dextrose 40% Gel 15 Gram(s) Oral once PRN Blood Glucose LESS THAN 70 milliGRAM(s)/deciLiter  glucagon  Injectable 1 milliGRAM(s) IntraMuscular once PRN Glucose <70 milliGRAM(s)/deciLiter  sodium chloride 0.9% lock flush 10 milliLiter(s) IV Push every 1 hour PRN After each medication administration  sodium chloride 0.9% lock flush 10 milliLiter(s) IV Push every 12 hours PRN Lumen of catheter NOT used       Medications up to date at time of exam.    ROS; No fever, chills, hypoxia, cough, congestion on exam.  PHYSICAL EXAMINATION:  Vital Signs Last 24 Hrs  T(C): 36.7 (31 Jul 2018 04:45), Max: 36.8 (30 Jul 2018 13:54)  T(F): 98.1 (31 Jul 2018 04:45), Max: 98.3 (30 Jul 2018 13:54)  HR: 65 (31 Jul 2018 09:07) (63 - 82)  BP: 147/53 (31 Jul 2018 04:45) (118/81 - 147/53)  BP(mean): 90 (30 Jul 2018 13:54) (90 - 90)  RR: 16 (31 Jul 2018 04:45) (16 - 18)  SpO2: 100% (31 Jul 2018 09:07) (95% - 100%)   (if applicable)    General; Total care. Non verbal. No acute distress. Eyes does not follow verbal and tactile stimuli.        HEENT: Normocephalic and atraumatic. No nasal tenderness. Moist mucosa.     NECK: Has Trach#6, non infected.    LUNGS: Clear to auscultation B/L. No wheeze/ rales. No use of accessory muscle.     HEART: S1 S2 Regular rate and no click/ rub.     ABDOMEN: Soft, nontender, and nondistended. Active bowel sounds. + Peg.     EXTREMITIES: Without any cyanosis, clubbing, rash, lesions or edema.    NEUROLOGIC: Cognitive impaired.      SKIN: Warm and moist. Non diaphoretic.       LABS:      RADIOLOGY & ADDITIONAL STUDIES:  EKG:   Ct chest: < from: CT Chest No Cont (07.26.18 @ 11:15) >    EXAM:  CT CHEST                            PROCEDURE DATE:  07/26/2018          INTERPRETATION:  EXAMINATION: CT CHEST    CLINICAL INDICATION: Tachycardia.    TECHNIQUE: Noncontrast CT of the chest was obtained.      COMPARISON: 7/15/2018.    FINDINGS:     AIRWAYS AND LUNGS: Tracheostomy tube.  Elevation right hemidiaphragm with   adjacent subsegmental atelectasis.    MEDIASTINUM AND PLEURA: There are no enlarged mediastinal, hilar or   axillary lymph nodes.     There is no pleural effusion. There is no   pneumothorax.      HEART AND VESSELS: The heart is normal in size.  There are   atherosclerotic calcifications of the aorta and coronary arteries.  There   is no pericardial effusion.         UPPER ABDOMEN: Images of the upper abdomen demonstrate no abnormality.     BONES AND SOFT TISSUES: The bones are unremarkable.  The soft tissues are   unremarkable.    TUBES/LINES: Left-sided PICC with tip in SVC    IMPRESSION:   Elevation right hemidiaphragm with adjacent subsegmental atelectasis.   Lungs otherwise shruthi     PAST MEDICAL & SURGICAL HISTORY:  No pertinent past medical history  Hypertension  Schizophrenia  Diabetic coma  Diabetes mellitus  No significant past surgical history  S/P percutaneous endoscopic gastrostomy (PEG) tube placement  H/O tracheostomy       Impression; 64 Y/O Female with prior mentioned multiple chronic conditions. Presented with Cough, SOB and Vomiting. Leukocytosis from WBC 23.1 to WBC 15.4 with RLL Opacity secondary to Aspiration PNA due to pseudomonas ( CRE), completed antibiotic.  Oxygen supplementation FIO2 40% via Trach collar due to Chronic Hypoxic respiratory failure.     Suggestion:     Continue DuoNeb Q 6 hours.  Oxygen supplementation via Trach collar.  Contact precautions due to CRE Pseudomonas Sputum,   Aspiration precautions. HOB elevation especially during Peg feeding.   Turn and reposition in bed.  D'cd planning per Medicine back to nursing Home.   Agree with above assessment and plan as transcribed.

## 2018-09-13 ENCOUNTER — INPATIENT (INPATIENT)
Facility: HOSPITAL | Age: 63
LOS: 20 days | Discharge: NURSING, REHAB OR LONG-TERM CARE | DRG: 177 | End: 2018-10-04
Attending: INTERNAL MEDICINE | Admitting: INTERNAL MEDICINE
Payer: MEDICAID

## 2018-09-13 VITALS — WEIGHT: 160.06 LBS

## 2018-09-13 DIAGNOSIS — R11.10 VOMITING, UNSPECIFIED: ICD-10-CM

## 2018-09-13 DIAGNOSIS — K92.0 HEMATEMESIS: ICD-10-CM

## 2018-09-13 DIAGNOSIS — Z93.1 GASTROSTOMY STATUS: Chronic | ICD-10-CM

## 2018-09-13 DIAGNOSIS — J69.0 PNEUMONITIS DUE TO INHALATION OF FOOD AND VOMIT: ICD-10-CM

## 2018-09-13 DIAGNOSIS — E11.9 TYPE 2 DIABETES MELLITUS WITHOUT COMPLICATIONS: ICD-10-CM

## 2018-09-13 DIAGNOSIS — I10 ESSENTIAL (PRIMARY) HYPERTENSION: ICD-10-CM

## 2018-09-13 DIAGNOSIS — Z29.9 ENCOUNTER FOR PROPHYLACTIC MEASURES, UNSPECIFIED: ICD-10-CM

## 2018-09-13 DIAGNOSIS — Z98.89 OTHER SPECIFIED POSTPROCEDURAL STATES: Chronic | ICD-10-CM

## 2018-09-13 LAB
ALBUMIN SERPL ELPH-MCNC: 2.7 G/DL — LOW (ref 3.5–5)
ALP SERPL-CCNC: 97 U/L — SIGNIFICANT CHANGE UP (ref 40–120)
ALT FLD-CCNC: 24 U/L DA — SIGNIFICANT CHANGE UP (ref 10–60)
ANION GAP SERPL CALC-SCNC: 11 MMOL/L — SIGNIFICANT CHANGE UP (ref 5–17)
APTT BLD: 31.6 SEC — SIGNIFICANT CHANGE UP (ref 27.5–37.4)
AST SERPL-CCNC: 17 U/L — SIGNIFICANT CHANGE UP (ref 10–40)
BASE EXCESS BLDV CALC-SCNC: 3.4 MMOL/L — HIGH (ref -2–2)
BASOPHILS # BLD AUTO: 0.1 K/UL — SIGNIFICANT CHANGE UP (ref 0–0.2)
BASOPHILS NFR BLD AUTO: 0.5 % — SIGNIFICANT CHANGE UP (ref 0–2)
BILIRUB SERPL-MCNC: 0.7 MG/DL — SIGNIFICANT CHANGE UP (ref 0.2–1.2)
BUN SERPL-MCNC: 42 MG/DL — HIGH (ref 7–18)
CALCIUM SERPL-MCNC: 9.5 MG/DL — SIGNIFICANT CHANGE UP (ref 8.4–10.5)
CHLORIDE SERPL-SCNC: 98 MMOL/L — SIGNIFICANT CHANGE UP (ref 96–108)
CO2 SERPL-SCNC: 27 MMOL/L — SIGNIFICANT CHANGE UP (ref 22–31)
CREAT SERPL-MCNC: 1.08 MG/DL — SIGNIFICANT CHANGE UP (ref 0.5–1.3)
EOSINOPHIL # BLD AUTO: 0 K/UL — SIGNIFICANT CHANGE UP (ref 0–0.5)
EOSINOPHIL NFR BLD AUTO: 0 % — SIGNIFICANT CHANGE UP (ref 0–6)
GLUCOSE SERPL-MCNC: 428 MG/DL — HIGH (ref 70–99)
HCO3 BLDV-SCNC: 28 MMOL/L — SIGNIFICANT CHANGE UP (ref 21–29)
HCT VFR BLD CALC: 39.2 % — SIGNIFICANT CHANGE UP (ref 34.5–45)
HGB BLD-MCNC: 12.2 G/DL — SIGNIFICANT CHANGE UP (ref 11.5–15.5)
HOROWITZ INDEX BLDV+IHG-RTO: 21 — SIGNIFICANT CHANGE UP
INR BLD: 1.04 RATIO — SIGNIFICANT CHANGE UP (ref 0.88–1.16)
LYMPHOCYTES # BLD AUTO: 1.7 K/UL — SIGNIFICANT CHANGE UP (ref 1–3.3)
LYMPHOCYTES # BLD AUTO: 9.4 % — LOW (ref 13–44)
MCHC RBC-ENTMCNC: 24.9 PG — LOW (ref 27–34)
MCHC RBC-ENTMCNC: 31.1 GM/DL — LOW (ref 32–36)
MCV RBC AUTO: 80.1 FL — SIGNIFICANT CHANGE UP (ref 80–100)
MONOCYTES # BLD AUTO: 0.7 K/UL — SIGNIFICANT CHANGE UP (ref 0–0.9)
MONOCYTES NFR BLD AUTO: 3.8 % — SIGNIFICANT CHANGE UP (ref 2–14)
NEUTROPHILS # BLD AUTO: 15.2 K/UL — HIGH (ref 1.8–7.4)
NEUTROPHILS NFR BLD AUTO: 86.2 % — HIGH (ref 43–77)
PCO2 BLDV: 46 MMHG — SIGNIFICANT CHANGE UP (ref 35–50)
PH BLDV: 7.4 — SIGNIFICANT CHANGE UP (ref 7.35–7.45)
PLATELET # BLD AUTO: 275 K/UL — SIGNIFICANT CHANGE UP (ref 150–400)
PO2 BLDV: <44 MMHG — SIGNIFICANT CHANGE UP (ref 25–45)
POTASSIUM SERPL-MCNC: 5 MMOL/L — SIGNIFICANT CHANGE UP (ref 3.5–5.3)
POTASSIUM SERPL-SCNC: 5 MMOL/L — SIGNIFICANT CHANGE UP (ref 3.5–5.3)
PROT SERPL-MCNC: 7.3 G/DL — SIGNIFICANT CHANGE UP (ref 6–8.3)
PROTHROM AB SERPL-ACNC: 11.4 SEC — SIGNIFICANT CHANGE UP (ref 9.8–12.7)
RBC # BLD: 4.89 M/UL — SIGNIFICANT CHANGE UP (ref 3.8–5.2)
RBC # FLD: 13.4 % — SIGNIFICANT CHANGE UP (ref 10.3–14.5)
SAO2 % BLDV: 67 % — SIGNIFICANT CHANGE UP (ref 67–88)
SODIUM SERPL-SCNC: 136 MMOL/L — SIGNIFICANT CHANGE UP (ref 135–145)
TROPONIN I SERPL-MCNC: <0.015 NG/ML — SIGNIFICANT CHANGE UP (ref 0–0.04)
WBC # BLD: 17.6 K/UL — HIGH (ref 3.8–10.5)
WBC # FLD AUTO: 17.6 K/UL — HIGH (ref 3.8–10.5)

## 2018-09-13 PROCEDURE — 99285 EMERGENCY DEPT VISIT HI MDM: CPT

## 2018-09-13 PROCEDURE — 71045 X-RAY EXAM CHEST 1 VIEW: CPT | Mod: 26

## 2018-09-13 RX ORDER — PIPERACILLIN AND TAZOBACTAM 4; .5 G/20ML; G/20ML
3.38 INJECTION, POWDER, LYOPHILIZED, FOR SOLUTION INTRAVENOUS EVERY 8 HOURS
Qty: 0 | Refills: 0 | Status: DISCONTINUED | OUTPATIENT
Start: 2018-09-13 | End: 2018-09-16

## 2018-09-13 RX ORDER — INSULIN LISPRO 100/ML
6 VIAL (ML) SUBCUTANEOUS ONCE
Qty: 0 | Refills: 0 | Status: COMPLETED | OUTPATIENT
Start: 2018-09-13 | End: 2018-09-13

## 2018-09-13 RX ORDER — INSULIN LISPRO 100/ML
VIAL (ML) SUBCUTANEOUS EVERY 6 HOURS
Qty: 0 | Refills: 0 | Status: DISCONTINUED | OUTPATIENT
Start: 2018-09-13 | End: 2018-10-04

## 2018-09-13 RX ORDER — PANTOPRAZOLE SODIUM 20 MG/1
40 TABLET, DELAYED RELEASE ORAL
Qty: 0 | Refills: 0 | Status: DISCONTINUED | OUTPATIENT
Start: 2018-09-13 | End: 2018-09-14

## 2018-09-13 RX ORDER — METOPROLOL TARTRATE 50 MG
100 TABLET ORAL
Qty: 0 | Refills: 0 | Status: DISCONTINUED | OUTPATIENT
Start: 2018-09-13 | End: 2018-10-04

## 2018-09-13 RX ORDER — SODIUM CHLORIDE 9 MG/ML
1000 INJECTION, SOLUTION INTRAVENOUS
Qty: 0 | Refills: 0 | Status: DISCONTINUED | OUTPATIENT
Start: 2018-09-13 | End: 2018-09-13

## 2018-09-13 RX ORDER — QUETIAPINE FUMARATE 200 MG/1
50 TABLET, FILM COATED ORAL EVERY 12 HOURS
Qty: 0 | Refills: 0 | Status: DISCONTINUED | OUTPATIENT
Start: 2018-09-13 | End: 2018-10-04

## 2018-09-13 RX ORDER — ATORVASTATIN CALCIUM 80 MG/1
20 TABLET, FILM COATED ORAL AT BEDTIME
Qty: 0 | Refills: 0 | Status: DISCONTINUED | OUTPATIENT
Start: 2018-09-13 | End: 2018-10-04

## 2018-09-13 RX ORDER — PANTOPRAZOLE SODIUM 20 MG/1
40 TABLET, DELAYED RELEASE ORAL EVERY 12 HOURS
Qty: 0 | Refills: 0 | Status: DISCONTINUED | OUTPATIENT
Start: 2018-09-13 | End: 2018-09-13

## 2018-09-13 RX ORDER — PANTOPRAZOLE SODIUM 20 MG/1
40 TABLET, DELAYED RELEASE ORAL
Qty: 0 | Refills: 0 | Status: DISCONTINUED | OUTPATIENT
Start: 2018-09-13 | End: 2018-09-13

## 2018-09-13 RX ORDER — INSULIN GLARGINE 100 [IU]/ML
30 INJECTION, SOLUTION SUBCUTANEOUS AT BEDTIME
Qty: 0 | Refills: 0 | Status: DISCONTINUED | OUTPATIENT
Start: 2018-09-13 | End: 2018-09-14

## 2018-09-13 RX ORDER — BENZTROPINE MESYLATE 1 MG
2 TABLET ORAL
Qty: 0 | Refills: 0 | Status: DISCONTINUED | OUTPATIENT
Start: 2018-09-13 | End: 2018-10-04

## 2018-09-13 RX ORDER — LEVETIRACETAM 250 MG/1
1000 TABLET, FILM COATED ORAL
Qty: 0 | Refills: 0 | Status: DISCONTINUED | OUTPATIENT
Start: 2018-09-13 | End: 2018-10-04

## 2018-09-13 RX ORDER — SENNA PLUS 8.6 MG/1
2 TABLET ORAL AT BEDTIME
Qty: 0 | Refills: 0 | Status: DISCONTINUED | OUTPATIENT
Start: 2018-09-13 | End: 2018-10-04

## 2018-09-13 RX ORDER — HEPARIN SODIUM 5000 [USP'U]/ML
5000 INJECTION INTRAVENOUS; SUBCUTANEOUS EVERY 8 HOURS
Qty: 0 | Refills: 0 | Status: DISCONTINUED | OUTPATIENT
Start: 2018-09-13 | End: 2018-10-04

## 2018-09-13 RX ORDER — AMLODIPINE BESYLATE 2.5 MG/1
2.5 TABLET ORAL DAILY
Qty: 0 | Refills: 0 | Status: DISCONTINUED | OUTPATIENT
Start: 2018-09-13 | End: 2018-10-04

## 2018-09-13 RX ORDER — INSULIN GLARGINE 100 [IU]/ML
30 INJECTION, SOLUTION SUBCUTANEOUS
Qty: 0 | Refills: 0 | COMMUNITY

## 2018-09-13 RX ADMIN — PANTOPRAZOLE SODIUM 40 MILLIGRAM(S): 20 TABLET, DELAYED RELEASE ORAL at 19:47

## 2018-09-13 RX ADMIN — PIPERACILLIN AND TAZOBACTAM 25 GRAM(S): 4; .5 INJECTION, POWDER, LYOPHILIZED, FOR SOLUTION INTRAVENOUS at 22:23

## 2018-09-13 RX ADMIN — Medication 1 MILLIGRAM(S): at 18:31

## 2018-09-13 RX ADMIN — Medication 2 MILLIGRAM(S): at 22:11

## 2018-09-13 RX ADMIN — Medication 100 MILLIGRAM(S): at 19:47

## 2018-09-13 RX ADMIN — Medication 6 UNIT(S): at 22:26

## 2018-09-13 RX ADMIN — QUETIAPINE FUMARATE 50 MILLIGRAM(S): 200 TABLET, FILM COATED ORAL at 22:12

## 2018-09-13 RX ADMIN — ATORVASTATIN CALCIUM 20 MILLIGRAM(S): 80 TABLET, FILM COATED ORAL at 22:22

## 2018-09-13 RX ADMIN — HEPARIN SODIUM 5000 UNIT(S): 5000 INJECTION INTRAVENOUS; SUBCUTANEOUS at 22:22

## 2018-09-13 RX ADMIN — SENNA PLUS 2 TABLET(S): 8.6 TABLET ORAL at 22:23

## 2018-09-13 RX ADMIN — INSULIN GLARGINE 30 UNIT(S): 100 INJECTION, SOLUTION SUBCUTANEOUS at 22:27

## 2018-09-13 NOTE — ED ADULT NURSE NOTE - NS ED NURSE LEVEL OF CONSCIOUSNESS MENTAL STATUS
[facility] Emergency Department    [address]    [facility] [address]    Phone:  [phone]    Fax:  [phone]                                       Jordan Warner   MRN: [id number]    Department:  [facility] Emergency Department   Date of Visit:  2/5/2017           After Visit Summary Signature Page     I have received my discharge instructions, and my questions have been answered. I have discussed any challenges I see with this plan with the nurse or doctor.    ..........................................................................................................................................  Patient/Patient Representative Signature      ..........................................................................................................................................  Patient Representative Print Name and Relationship to Patient    ..................................................               ................................................  Date                                            Time    ..........................................................................................................................................  Reviewed by Signature/Title    ...................................................              ..............................................  Date                                                            Time           Confused/Awake/Combative

## 2018-09-13 NOTE — H&P ADULT - ATTENDING COMMENTS
I agree with the above informatin, changes made above as needed, case discussed with medical team. of note, pt p/w coffee ground emesis, imaging c/w RLL consolidation and b/l pleural effusions.  Patient admitted for complicated HCAP, likely aspiration pneumonia, and upper gi bleeding.  Zosyn IVPB.  May need Vancomycin ivpb added.  Protonix.  Monitor h/h and clinical status including vitals closely.  All medical consultant recs appreciated.

## 2018-09-13 NOTE — CONSULT NOTE ADULT - ASSESSMENT
63F nonverbal w/reported coffee ground emesis.  -pt is DNR  -coffee ground emesis not observed on this admission  -Hb stable and at pt's baseline  -no overt signs of bleeding  -given current clinical situation would tx pt conservatively by checking daily CBC and cont Protonix 40 mg daily  -please call back if pt develops overt signs of GI bleeding

## 2018-09-13 NOTE — ED PROVIDER NOTE - OBJECTIVE STATEMENT
63F, non-verbal, bed bound, Trach/ PEG tube, from [facility], PMhx of HTN, asthma, convulsion disorder, bipolar, DVT legs, DM, sent from NH for vomiting, x 3 episodes, coffee ground. No Fever, diarrhea or changes in oxygen requirement or behavior. Sent in for admission for serial cbc and monitoring/eval for possible gi bleeding. On Vanc/Zosyn for RLL pna.     DNR, paperwork in chart

## 2018-09-13 NOTE — H&P ADULT - NSHPLABSRESULTS_GEN_ALL_CORE
Comprehensive Metabolic Panel (09.13.18 @ 11:31)    Sodium, Serum: 136 mmol/L    Potassium, Serum: 5.0 mmol/L    Chloride, Serum: 98 mmol/L    Carbon Dioxide, Serum: 27 mmol/L    Anion Gap, Serum: 11 mmol/L    Blood Urea Nitrogen, Serum: 42 mg/dL    Creatinine, Serum: 1.08 mg/dL    Glucose, Serum: 428 mg/dL    Calcium, Total Serum: 9.5 mg/dL    Protein Total, Serum: 7.3 g/dL    Albumin, Serum: 2.7 g/dL    Bilirubin Total, Serum: 0.7 mg/dL    Alkaline Phosphatase, Serum: 97 U/L    Aspartate Aminotransferase (AST/SGOT): 17 U/L    Alanine Aminotransferase (ALT/SGPT): 24 U/L DA    eGFR if Non : 55: Interpretative comment  The units for eGFR are ml/min/1.73m2 (normalized body surface area). The  eGFR is calculated from a serum creatinine using the CKD-EPI equation.  Other variables required for calculation are race, age and sex. Among  patients with chronic kidney disease (CKD), the eGFR is useful in  determining the stage of disease according to KDOQI CKD classification.  All eGFR results are reported numerically with the following  interpretation.          GFR                    With                 Without     (ml/min/1.73 m2)    Kidney Damage       Kidney Damage        >= 90                    Stage 1                     Normal        60-89                    Stage 2                     Decreased GFR        30-59     Stage 3                     Stage 3        15-29                    Stage 4                     Stage 4        < 15                      Stage 5                     Stage 5  Each stage of CKD assumes that the associated GFR level has been in  effect for at least 3 months. Determination of stages one and two (with  eGFR > 59 ml/min/m2) requires estimation of kidney damage for at least 3  months as defined by structural or functional abnormalities.  Limitations: All estimates of GFR will be less accurate for patients at  extremes of muscle mass (including but not limited to frail elderly,  critically ill, or cancer patients), those with unusual diets, and those  with conditions associated with reduced secretion or extrarenal  elimination of creatinine. The eGFR equation is not recommended for use  in patients with unstable creatinine levels. mL/min/1.73M2    eGFR if African American: 63 mL/min/1.73M2      Complete Blood Count in AM (07.24.18 @ 10:56)    WBC Count: 6.2 K/uL    RBC Count: 4.96 M/uL    Hemoglobin: 12.5 g/dL    Hematocrit: 39.8 %    Mean Cell Volume: 80.3 fl    Mean Cell Hemoglobin: 25.2 pg    Mean Cell Hemoglobin Conc: 31.4 gm/dL    Red Cell Distrib Width: 14.2 %    Platelet Count - Automated: 152 K/uL Comprehensive Metabolic Panel (09.13.18 @ 11:31)    Sodium, Serum: 136 mmol/L    Potassium, Serum: 5.0 mmol/L    Chloride, Serum: 98 mmol/L    Carbon Dioxide, Serum: 27 mmol/L    Anion Gap, Serum: 11 mmol/L    Blood Urea Nitrogen, Serum: 42 mg/dL    Creatinine, Serum: 1.08 mg/dL    Glucose, Serum: 428 mg/dL    Calcium, Total Serum: 9.5 mg/dL    Protein Total, Serum: 7.3 g/dL    Albumin, Serum: 2.7 g/dL    Bilirubin Total, Serum: 0.7 mg/dL    Alkaline Phosphatase, Serum: 97 U/L    Aspartate Aminotransferase (AST/SGOT): 17 U/L    Alanine Aminotransferase (ALT/SGPT): 24 U/L DA    eGFR if Non : 55: Interpretative comment  The units for eGFR are ml/min/1.73m2 (normalized body surface area). The  eGFR is calculated from a serum creatinine using the CKD-EPI equation.  Other variables required for calculation are race, age and sex. Among  patients with chronic kidney disease (CKD), the eGFR is useful in  determining the stage of disease according to KDOQI CKD classification.  All eGFR results are reported numerically with the following  interpretation.          GFR                    With                 Without     (ml/min/1.73 m2)    Kidney Damage       Kidney Damage        >= 90                    Stage 1                     Normal        60-89                    Stage 2                     Decreased GFR        30-59     Stage 3                     Stage 3        15-29                    Stage 4                     Stage 4        < 15                      Stage 5                     Stage 5  Each stage of CKD assumes that the associated GFR level has been in  effect for at least 3 months. Determination of stages one and two (with  eGFR > 59 ml/min/m2) requires estimation of kidney damage for at least 3  months as defined by structural or functional abnormalities.  Limitations: All estimates of GFR will be less accurate for patients at  extremes of muscle mass (including but not limited to frail elderly,  critically ill, or cancer patients), those with unusual diets, and those  with conditions associated with reduced secretion or extrarenal  elimination of creatinine. The eGFR equation is not recommended for use  in patients with unstable creatinine levels. mL/min/1.73M2    eGFR if African American: 63 mL/min/1.73M2      Complete Blood Count in AM (07.24.18 @ 10:56)    WBC Count: 6.2 K/uL    RBC Count: 4.96 M/uL    Hemoglobin: 12.5 g/dL    Hematocrit: 39.8 %    Mean Cell Volume: 80.3 fl    Mean Cell Hemoglobin: 25.2 pg    Mean Cell Hemoglobin Conc: 31.4 gm/dL    Red Cell Distrib Width: 14.2 %    Platelet Count - Automated: 152 K/uL    < from: Xray Chest 1 View-PORTABLE IMMEDIATE (09.13.18 @ 13:37) >    FINDINGS:     A tracheostomy tube is noted. There are bilateral pleural effusions and   associated atelectasis. There is no pneumothorax. The cardiac silhouette   is stable in appearance.    IMPRESSION:     Bilateral pleural effusions and associated atelectasis.    < end of copied text >

## 2018-09-13 NOTE — H&P ADULT - NS MD HP PULSE POSTERIOR
Chief complaint:   Chief Complaint   Patient presents with   • Rash     RASH LEGS / ARMS  X 3-4 WKS.    • Return to Work/duty     NEEDS A WORK SLIP       Vitals:  Visit Vitals  /68 (BP Location: [facility], Patient Position: Sitting, Cuff Size: Regular)   Pulse 77   Temp 97.8 °F (36.6 °C) (Temporal Artery)   Resp 14   Ht 4' 10\" (1.473 m)   Wt 65.3 kg   SpO2 98%   BMI 30.10 kg/m²       HISTORY OF PRESENT ILLNESS     43 year old female presents to family practice at the [facility] with complaints of rash that has been present to her arms and legs and right lower torso for the past 2 weeks. States that initially she thought she has sustained bug bites after working outdoors but as time has gone on she has had increased rash. Lesion appear as small red raised bumps that look like bug bites and itch intensely. She states that she has had open areas to her skin from constant scratching. She has tried OTC benadryl without much benefit. States that she did buy a new laundry detergent but has since switched back. She also states that her father is \"allergic\" to the sun and experiences rash when outdoors. She questions if this may be the cause. No new foods, fluids or body sprays. She has not been under any unusual stress and lives alone. Her mattress is fairly new and she has not had any recent travel.     Patient also states that she has had difficulties with heavy wax build up in her ears and is looking for suggestions on how to treat this at home. No difficulties today.        Other significant problems:  Patient Active Problem List    Diagnosis Date Noted   • Anxiety and depression 05/08/2018     Priority: Low   • Mirena placed 5-8-18 05/08/2018     Priority: Low   • Right shoulder injury 09/07/2017     Priority: Low   • Depression, major, recurrent, moderate (CMS/[facility]) 05/31/2017     Priority: Low   • Astigmatism, unspecified 05/15/2013     Priority: Low   • Myopia 05/15/2013     Priority: Low   • Allergic  conjunctivitis 2013     Priority: Low   • Allergic rhinitis 2013     Priority: Low       PAST MEDICAL, FAMILY AND SOCIAL HISTORY     Medications:  Current Outpatient Prescriptions   Medication   • azelastine (ASTELIN) 0.1 % nasal spray   • levonorgestrel (MIRENA, 52 MG,) (52 MG) 20 MCG/DAY intrauterine device   • predniSONE (DELTASONE) 20 MG tablet   • permethrin (ACTICIN) 5 % cream   • FLUoxetine (PROZAC) 20 MG capsule     No current facility-administered medications for this visit.        Allergies:  ALLERGIES:   Allergen Reactions   • Seasonal Other (See Comments)     Sinus headaches, itchy eyes, irritated throat       Past Medical  History/Surgeries:  Past Medical History:   Diagnosis Date   • Allergic conjunctivitis    • Allergic rhinitis    • Myopia        Past Surgical History:   Procedure Laterality Date   •  section, low transverse  2000       Family History:  Family History   Problem Relation Age of Onset   • Cataracts Mother    • Glaucoma Mother    • Cataracts Father    • Cataracts Sister    • Glaucoma Sister    • Cataracts Maternal Grandmother    • Glaucoma Maternal Grandfather    • Cataracts Sister        Social History:  Social History   Substance Use Topics   • Smoking status: Never Smoker   • Smokeless tobacco: Never Used   • Alcohol use No       REVIEW OF SYSTEMS     Review of Systems   Constitutional: Negative for fatigue and fever.   Skin: Positive for rash.       PHYSICAL EXAM     Physical Exam   Constitutional: She is oriented to person, place, and time. She appears well-developed and well-nourished. No distress.   HENT:   Right Ear: Hearing, tympanic membrane, external ear and ear canal normal.   Left Ear: Hearing, tympanic membrane, external ear and ear canal normal.   Cardiovascular: Normal rate, regular rhythm and normal heart sounds.    Pulmonary/Chest: Effort normal and breath sounds normal. No respiratory distress.   Lymphadenopathy:     She has no cervical  adenopathy.   Neurological: She is alert and oriented to person, place, and time.   Skin: Skin is warm and dry.   Visual inspection of the skin reveals excoriated patches with underlying scabbed areas to the arms, legs and right lower anterior torso.   Psychiatric: She has a normal mood and affect. Her behavior is normal.       ASSESSMENT/PLAN     1. Rash and nonspecific skin eruption    2. Excessive cerumen in both ear canals      Rash etiology is difficult to decipher due to the excoriation from scratching which has obliterated the rash. Will attempt to give a short burst of oral prednisone to assist with itch and permetherin cream due to the intense night time itch rasing the suspicion of parasite such as scabies. She will call if symptoms do not improve. Discussion regarding home treatment to reduce wax buildup.    right normal/left normal

## 2018-09-13 NOTE — H&P ADULT - ASSESSMENT
64 yo F from [facility], non-verbal, bed bound, S/P Trach/ PEG tube, PMH of HTN, asthma, convulsion disorder, bipolar, DVT legs, DM 2, sent from NH for coffee ground vomiting X3 this morning and cough.  Pt unable to provide history.  History given by sister who states pt was vomiting coffee ground emesis this morning. Pt had no reported vomiting since in ED.  Pt also developed cough with clear sputum.  Sister states pt is combative at baseline, and her current behavior is baseline. 62 yo F from [facility], non-verbal, bed bound, S/P Trach/ PEG tube, PMH of HTN, asthma, convulsion disorder, bipolar, DVT legs, DM 2, sent from NH for coffee ground vomiting X3 this morning and cough.  Pt unable to provide history.  History given by sister who states pt was vomiting coffee ground emesis this morning. Pt had no reported vomiting since in ED.  Pt also developed cough with clear sputum.

## 2018-09-13 NOTE — ED PROVIDER NOTE - MEDICAL DECISION MAKING DETAILS
iv, labs, fluids, cxr, reassess  will need admission for serial cbc and monitoring for possible upper gi bleed. continue to tx for pna

## 2018-09-13 NOTE — H&P ADULT - PROBLEM SELECTOR PLAN 1
afebrile , WBC 17.6 K   CXR shows Bilateral pleural effusions and associated atelectasis  Pt was supposed to be given IV vanco and zosyn for 7 day by PCP for PNA , which supposed to start from 09/13-09/19.   started zosyn for now   f/u blood culture, UA  repeat CBC in am c/o coffee ground vomiting x3 at NH, no episode of vomiting in ED  Hb 12 at baseline , less likely UGI Bleeding  started protonix 40mg bid IV  repeat CBC q8hrs  keep NPO , on IVF  GI consulted Dr. Harris

## 2018-09-13 NOTE — H&P ADULT - NSHPPHYSICALEXAM_GEN_ALL_CORE
Vital Signs Last 24 Hrs  T(C): 36.7 (13 Sep 2018 15:40), Max: 37.2 (13 Sep 2018 10:55)  T(F): 98 (13 Sep 2018 15:40), Max: 98.9 (13 Sep 2018 10:55)  HR: 112 (13 Sep 2018 15:40) (112 - 116)  BP: 142/99 (13 Sep 2018 15:40) (142/99 - 157/101)  BP(mean): --  RR: 18 (13 Sep 2018 15:40) (18 - 19)  SpO2: 99% (13 Sep 2018 15:40) (95% - 99%)

## 2018-09-13 NOTE — H&P ADULT - PROBLEM SELECTOR PLAN 5
IMPROVE VTE Individual Risk Assessment    RISK                                                          Points  [] Previous VTE                                           3  [] Thrombophilia                                        2  [] Lower limb paralysis                              2   [] Current Cancer                                       2   [1] Immobilization > 24 hrs                        1  [] ICU/CCU stay > 24 hours                       1  [1] Age > 60                                                   1    IMPROVE VTE Score: 2  need DVT ppx, on heparin sq  need GI ppx, on protonix

## 2018-09-13 NOTE — H&P ADULT - PROBLEM SELECTOR PLAN 2
c/o coffee ground vomiting x3 at NH, no episode of vomiting in ED  Hb 12 at baseline , less likely UGI Bleeding  repeat CBC q12hrs afebrile , WBC 17.6 K   CXR shows Bilateral pleural effusions and associated atelectasis  Pt was supposed to be given IV vanco and zosyn for 7 day by PCP for PNA , which supposed to start from 09/13-09/19.   started zosyn for now   f/u blood culture, UA, sputum culture  f/u CT chest  repeat CBC in am Complicated Aspiration PNA  afebrile , WBC 17.6 K   CXR shows Bilateral pleural effusions and associated atelectasis  Pt was supposed to be given IV vanco and zosyn for 7 day by PCP for PNA , which supposed to start from 09/13-09/19.   started zosyn for now   f/u blood culture, UA, sputum culture  f/u CT chest  repeat CBC in am

## 2018-09-13 NOTE — CONSULT NOTE ADULT - SUBJECTIVE AND OBJECTIVE BOX
GI INITIAL CONSULT    HPI: 63F w/stated PMH presented from SNF for reported coffee-ground emesis and was found to have elevated leukocytosis and some sort of infection. Hematemesis was NOT witnessed by staff at [facility]. Hb remains stable and at pt's baseline. Unable to obtain any history from pt as she is nonverbal. Of note, pt was admitted for the same reason in Jul 2018. During that admission I replaced the pt's PEG tube at bedside. Presently no PEG issues reported by pt's caregiver.    PMH: HTN, asthma, seizure d/o, DM2, DVT, bipolar d/o, nonberbal  PSH: trach/PEG    Meds: MEDICATIONS  (STANDING):  amLODIPine   Tablet 2.5 milliGRAM(s) Oral daily  atorvastatin 20 milliGRAM(s) Oral at bedtime  benztropine 2 milliGRAM(s) Oral two times a day  dextrose 5% + sodium chloride 0.9%. 1000 milliLiter(s) (70 mL/Hr) IV Continuous <Continuous>  heparin  Injectable 5000 Unit(s) SubCutaneous every 8 hours  insulin glargine Injectable (LANTUS) 30 Unit(s) SubCutaneous at bedtime  insulin lispro (HumaLOG) corrective regimen sliding scale   SubCutaneous every 6 hours  levETIRAcetam  Solution 1000 milliGRAM(s) Oral two times a day  metoprolol tartrate 100 milliGRAM(s) Oral two times a day  pantoprazole  Injectable 40 milliGRAM(s) IV Push every 12 hours  piperacillin/tazobactam IVPB. 3.375 Gram(s) IV Intermittent every 8 hours  QUEtiapine 50 milliGRAM(s) Oral every 12 hours  senna 2 Tablet(s) Oral at bedtime    SH: unable to obtain given pt's MS  FH: unable to obtain given pt's MS  ROS: unable to obtain given pt's MS    Vitals: T(C): 36.7 (09-13-18 @ 15:40)  T(F): 98 (09-13-18 @ 15:40), Max: 98.9 (09-13-18 @ 10:55)  HR: 112 (09-13-18 @ 15:40) (112 - 116)  BP: 142/99 (09-13-18 @ 15:40) (142/99 - 157/101)    Gen: pt has stereotypical movements, tongue is out, lip-smacking present; does not answer questions and is unable to do so  CVS: RRR; S1/S2  Chest: coarse BS B/L  Abd: S/NT/ND; PEG site clean, nonerythematous, no discharge; replacement balloon PEG tube seen                        12.2   17.6  )-----------( 275      ( 13 Sep 2018 11:31 )             39.2   09-13    136  |  98  |  42<H>  ----------------------------<  428<H>  5.0   |  27  |  1.08    Ca    9.5      13 Sep 2018 11:31    TPro  7.3  /  Alb  2.7<L>  /  TBili  0.7  /  DBili  x   /  AST  17  /  ALT  24  /  AlkPhos  97  09-13    Imaging reviewed

## 2018-09-13 NOTE — H&P ADULT - HISTORY OF PRESENT ILLNESS
62 yo F from [facility], non-verbal, bed bound, S/P Trach/ PEG tube, PMH of HTN, asthma, convulsion disorder, bipolar, DVT legs, DM 2, sent from NH for coffee ground vomiting X3 this morning and cough.  Pt unable to provide history.  History given by sister who states pt was vomiting coffee ground emesis this morning. Pt had no reported vomiting since in ED.  Pt also developed cough with clear sputum.  Sister states pt is combative at baseline, and her current behavior is baseline.  No reported fever, discomfort, diarrhea, or SOB.  Pt was recently admitted to [facility] in 07/2018 for same reason and treated for aspiration PNA and UTI during last admission.  Pt was supposed to be given IV vanco and zosyn for 7 day by PCP for PNA , which supposed to start from 09/13-09/19. 64 yo F from [facility], non-verbal, bed bound, S/P Trach/ PEG tube, PMH of HTN, asthma, convulsion disorder, bipolar, DVT legs, DM 2, sent from NH for coffee ground vomiting X3 this morning and cough.  Pt unable to provide history.  History given by sister (Sharon [phone])who states pt was vomiting coffee ground emesis this morning. Pt had no reported vomiting since in ED.  Pt also developed cough with clear sputum.  Sister states pt is combative at baseline, and her current behavior is baseline.  No reported fever, discomfort, diarrhea, or SOB.  Pt was recently admitted to [facility] in 07/2018 for same reason and treated for aspiration PNA and UTI during last admission.  Pt was supposed to be given IV vanco and zosyn for 7 day by PCP for PNA , which supposed to start from 09/13-09/19. Pt is DNR, MOSLT form in charts.

## 2018-09-13 NOTE — ED ADULT NURSE NOTE - NSINTERVENTIONOPT_GEN_ALL_ED
Stretcher Alarms/Move Toilet (commode/urinal) to patient using "arms reach"/Chair Alarms/Hourly Rounding/Enhanced Supervision

## 2018-09-13 NOTE — H&P ADULT - PROBLEM SELECTOR PLAN 4
IMPROVE VTE Individual Risk Assessment    RISK                                                          Points  [] Previous VTE                                           3  [] Thrombophilia                                        2  [] Lower limb paralysis                              2   [] Current Cancer                                       2   [1] Immobilization > 24 hrs                        1  [] ICU/CCU stay > 24 hours                       1  [1] Age > 60                                                   1    IMPROVE VTE Score: 2  need DVT ppx, on heparin sq  need GI ppx, on protonix pt on lantus 58U at  and HISS at NH  resumed lantus 30U hs and hiss  monitor FS q6hr  f/u A1C

## 2018-09-14 DIAGNOSIS — J96.10 CHRONIC RESPIRATORY FAILURE, UNSPECIFIED WHETHER WITH HYPOXIA OR HYPERCAPNIA: ICD-10-CM

## 2018-09-14 LAB
ANION GAP SERPL CALC-SCNC: 8 MMOL/L — SIGNIFICANT CHANGE UP (ref 5–17)
APPEARANCE UR: CLEAR — SIGNIFICANT CHANGE UP
BASOPHILS # BLD AUTO: 0.1 K/UL — SIGNIFICANT CHANGE UP (ref 0–0.2)
BASOPHILS NFR BLD AUTO: 0.5 % — SIGNIFICANT CHANGE UP (ref 0–2)
BILIRUB UR-MCNC: NEGATIVE — SIGNIFICANT CHANGE UP
BUN SERPL-MCNC: 41 MG/DL — HIGH (ref 7–18)
CALCIUM SERPL-MCNC: 9.9 MG/DL — SIGNIFICANT CHANGE UP (ref 8.4–10.5)
CHLORIDE SERPL-SCNC: 107 MMOL/L — SIGNIFICANT CHANGE UP (ref 96–108)
CHOLEST SERPL-MCNC: 145 MG/DL — SIGNIFICANT CHANGE UP (ref 10–199)
CO2 SERPL-SCNC: 29 MMOL/L — SIGNIFICANT CHANGE UP (ref 22–31)
COLOR SPEC: YELLOW — SIGNIFICANT CHANGE UP
CREAT SERPL-MCNC: 0.9 MG/DL — SIGNIFICANT CHANGE UP (ref 0.5–1.3)
DIFF PNL FLD: NEGATIVE — SIGNIFICANT CHANGE UP
EOSINOPHIL # BLD AUTO: 0.1 K/UL — SIGNIFICANT CHANGE UP (ref 0–0.5)
EOSINOPHIL NFR BLD AUTO: 0.3 % — SIGNIFICANT CHANGE UP (ref 0–6)
GLUCOSE SERPL-MCNC: 58 MG/DL — LOW (ref 70–99)
GLUCOSE UR QL: 1000 MG/DL
GRAM STN FLD: SIGNIFICANT CHANGE UP
HBA1C BLD-MCNC: 11.4 % — HIGH (ref 4–5.6)
HCT VFR BLD CALC: 39.1 % — SIGNIFICANT CHANGE UP (ref 34.5–45)
HDLC SERPL-MCNC: 43 MG/DL — LOW
HGB BLD-MCNC: 12.8 G/DL — SIGNIFICANT CHANGE UP (ref 11.5–15.5)
KETONES UR-MCNC: NEGATIVE — SIGNIFICANT CHANGE UP
LEUKOCYTE ESTERASE UR-ACNC: NEGATIVE — SIGNIFICANT CHANGE UP
LIPID PNL WITH DIRECT LDL SERPL: 69 MG/DL — SIGNIFICANT CHANGE UP
LYMPHOCYTES # BLD AUTO: 17.8 % — SIGNIFICANT CHANGE UP (ref 13–44)
LYMPHOCYTES # BLD AUTO: 3.7 K/UL — HIGH (ref 1–3.3)
MCHC RBC-ENTMCNC: 25.8 PG — LOW (ref 27–34)
MCHC RBC-ENTMCNC: 32.7 GM/DL — SIGNIFICANT CHANGE UP (ref 32–36)
MCV RBC AUTO: 78.7 FL — LOW (ref 80–100)
MONOCYTES # BLD AUTO: 1.4 K/UL — HIGH (ref 0–0.9)
MONOCYTES NFR BLD AUTO: 6.7 % — SIGNIFICANT CHANGE UP (ref 2–14)
NEUTROPHILS # BLD AUTO: 15.3 K/UL — HIGH (ref 1.8–7.4)
NEUTROPHILS NFR BLD AUTO: 74.7 % — SIGNIFICANT CHANGE UP (ref 43–77)
NITRITE UR-MCNC: NEGATIVE — SIGNIFICANT CHANGE UP
PH UR: 6 — SIGNIFICANT CHANGE UP (ref 5–8)
PLATELET # BLD AUTO: 311 K/UL — SIGNIFICANT CHANGE UP (ref 150–400)
POTASSIUM SERPL-MCNC: 4.1 MMOL/L — SIGNIFICANT CHANGE UP (ref 3.5–5.3)
POTASSIUM SERPL-SCNC: 4.1 MMOL/L — SIGNIFICANT CHANGE UP (ref 3.5–5.3)
PROCALCITONIN SERPL-MCNC: 0.16 NG/ML — HIGH (ref 0.02–0.1)
PROT UR-MCNC: 100
RBC # BLD: 4.97 M/UL — SIGNIFICANT CHANGE UP (ref 3.8–5.2)
RBC # FLD: 13.4 % — SIGNIFICANT CHANGE UP (ref 10.3–14.5)
SODIUM SERPL-SCNC: 144 MMOL/L — SIGNIFICANT CHANGE UP (ref 135–145)
SP GR SPEC: 1.02 — SIGNIFICANT CHANGE UP (ref 1.01–1.02)
SPECIMEN SOURCE: SIGNIFICANT CHANGE UP
TOTAL CHOLESTEROL/HDL RATIO MEASUREMENT: 3.4 RATIO — SIGNIFICANT CHANGE UP (ref 3.3–7.1)
TRIGL SERPL-MCNC: 167 MG/DL — HIGH (ref 10–149)
TSH SERPL-MCNC: 1.65 UU/ML — SIGNIFICANT CHANGE UP (ref 0.34–4.82)
UROBILINOGEN FLD QL: NEGATIVE — SIGNIFICANT CHANGE UP
VIT B12 SERPL-MCNC: 1421 PG/ML — HIGH (ref 232–1245)
WBC # BLD: 20.5 K/UL — HIGH (ref 3.8–10.5)
WBC # FLD AUTO: 20.5 K/UL — HIGH (ref 3.8–10.5)

## 2018-09-14 PROCEDURE — 71250 CT THORAX DX C-: CPT | Mod: 26

## 2018-09-14 RX ORDER — PANTOPRAZOLE SODIUM 20 MG/1
40 TABLET, DELAYED RELEASE ORAL DAILY
Qty: 0 | Refills: 0 | Status: DISCONTINUED | OUTPATIENT
Start: 2018-09-14 | End: 2018-10-04

## 2018-09-14 RX ORDER — INSULIN GLARGINE 100 [IU]/ML
26 INJECTION, SOLUTION SUBCUTANEOUS AT BEDTIME
Qty: 0 | Refills: 0 | Status: DISCONTINUED | OUTPATIENT
Start: 2018-09-14 | End: 2018-09-16

## 2018-09-14 RX ORDER — DEXTROSE 50 % IN WATER 50 %
50 SYRINGE (ML) INTRAVENOUS ONCE
Qty: 0 | Refills: 0 | Status: COMPLETED | OUTPATIENT
Start: 2018-09-14 | End: 2018-09-14

## 2018-09-14 RX ORDER — VANCOMYCIN HCL 1 G
750 VIAL (EA) INTRAVENOUS EVERY 12 HOURS
Qty: 0 | Refills: 0 | Status: DISCONTINUED | OUTPATIENT
Start: 2018-09-14 | End: 2018-09-16

## 2018-09-14 RX ORDER — INSULIN LISPRO 100/ML
8 VIAL (ML) SUBCUTANEOUS ONCE
Qty: 0 | Refills: 0 | Status: COMPLETED | OUTPATIENT
Start: 2018-09-14 | End: 2018-09-14

## 2018-09-14 RX ORDER — IPRATROPIUM/ALBUTEROL SULFATE 18-103MCG
3 AEROSOL WITH ADAPTER (GRAM) INHALATION EVERY 6 HOURS
Qty: 0 | Refills: 0 | Status: DISCONTINUED | OUTPATIENT
Start: 2018-09-14 | End: 2018-10-04

## 2018-09-14 RX ADMIN — LEVETIRACETAM 1000 MILLIGRAM(S): 250 TABLET, FILM COATED ORAL at 06:50

## 2018-09-14 RX ADMIN — ATORVASTATIN CALCIUM 20 MILLIGRAM(S): 80 TABLET, FILM COATED ORAL at 22:29

## 2018-09-14 RX ADMIN — Medication 2 MILLIGRAM(S): at 06:07

## 2018-09-14 RX ADMIN — AMLODIPINE BESYLATE 2.5 MILLIGRAM(S): 2.5 TABLET ORAL at 06:08

## 2018-09-14 RX ADMIN — HEPARIN SODIUM 5000 UNIT(S): 5000 INJECTION INTRAVENOUS; SUBCUTANEOUS at 14:20

## 2018-09-14 RX ADMIN — Medication 50 MILLILITER(S): at 06:50

## 2018-09-14 RX ADMIN — SENNA PLUS 2 TABLET(S): 8.6 TABLET ORAL at 22:29

## 2018-09-14 RX ADMIN — Medication 6: at 00:34

## 2018-09-14 RX ADMIN — Medication 3 MILLILITER(S): at 15:56

## 2018-09-14 RX ADMIN — PIPERACILLIN AND TAZOBACTAM 25 GRAM(S): 4; .5 INJECTION, POWDER, LYOPHILIZED, FOR SOLUTION INTRAVENOUS at 06:08

## 2018-09-14 RX ADMIN — INSULIN GLARGINE 26 UNIT(S): 100 INJECTION, SOLUTION SUBCUTANEOUS at 22:50

## 2018-09-14 RX ADMIN — PANTOPRAZOLE SODIUM 40 MILLIGRAM(S): 20 TABLET, DELAYED RELEASE ORAL at 11:57

## 2018-09-14 RX ADMIN — Medication 1 MILLIGRAM(S): at 02:31

## 2018-09-14 RX ADMIN — HEPARIN SODIUM 5000 UNIT(S): 5000 INJECTION INTRAVENOUS; SUBCUTANEOUS at 22:29

## 2018-09-14 RX ADMIN — Medication 8 UNIT(S): at 02:21

## 2018-09-14 RX ADMIN — Medication 3: at 17:17

## 2018-09-14 RX ADMIN — Medication 250 MILLIGRAM(S): at 17:18

## 2018-09-14 RX ADMIN — Medication 100 MILLIGRAM(S): at 17:18

## 2018-09-14 RX ADMIN — Medication 3 MILLILITER(S): at 22:29

## 2018-09-14 RX ADMIN — QUETIAPINE FUMARATE 50 MILLIGRAM(S): 200 TABLET, FILM COATED ORAL at 06:07

## 2018-09-14 RX ADMIN — Medication 100 MILLIGRAM(S): at 06:07

## 2018-09-14 RX ADMIN — LEVETIRACETAM 1000 MILLIGRAM(S): 250 TABLET, FILM COATED ORAL at 00:37

## 2018-09-14 RX ADMIN — Medication 2 MILLIGRAM(S): at 17:10

## 2018-09-14 RX ADMIN — PIPERACILLIN AND TAZOBACTAM 25 GRAM(S): 4; .5 INJECTION, POWDER, LYOPHILIZED, FOR SOLUTION INTRAVENOUS at 22:30

## 2018-09-14 RX ADMIN — HEPARIN SODIUM 5000 UNIT(S): 5000 INJECTION INTRAVENOUS; SUBCUTANEOUS at 06:07

## 2018-09-14 RX ADMIN — QUETIAPINE FUMARATE 50 MILLIGRAM(S): 200 TABLET, FILM COATED ORAL at 17:18

## 2018-09-14 RX ADMIN — PIPERACILLIN AND TAZOBACTAM 25 GRAM(S): 4; .5 INJECTION, POWDER, LYOPHILIZED, FOR SOLUTION INTRAVENOUS at 14:20

## 2018-09-14 RX ADMIN — LEVETIRACETAM 1000 MILLIGRAM(S): 250 TABLET, FILM COATED ORAL at 17:10

## 2018-09-14 NOTE — CONSULT NOTE ADULT - SUBJECTIVE AND OBJECTIVE BOX
Time of visit:    CHIEF COMPLAINT: Patient is a 63y old  Female who presents with a chief complaint of vomiting (15 Sep 2018 16:50)      HPI:  64 yo F from [facility], non-verbal, bed bound, S/P Trach/ PEG tube, PMH of HTN, asthma, convulsion disorder, bipolar, DVT legs, DM 2, sent from NH for coffee ground vomiting X3 this morning and cough.  Pt unable to provide history.  History given by sister (Sharon [phone])who states pt was vomiting coffee ground emesis this morning. Pt had no reported vomiting since in ED.  Pt also developed cough with clear sputum.  Sister states pt is combative at baseline, and her current behavior is baseline.  No reported fever, discomfort, diarrhea, or SOB.  Pt was recently admitted to [facility] in 2018 for same reason and treated for aspiration PNA and UTI during last admission.  Pt was supposed to be given IV vanco and zosyn for 7 day by PCP for PNA , which supposed to start from -. Pt is DNR, MOSLT form in charts. (13 Sep 2018 15:49)   Patient seen and examined.     PAST MEDICAL & SURGICAL HISTORY:  No pertinent past medical history  Hypertension  Schizophrenia  Diabetic coma  Diabetes mellitus  No significant past surgical history  S/P percutaneous endoscopic gastrostomy (PEG) tube placement  H/O tracheostomy      Allergies    angiotensin converting enzyme inhibitors (Unknown)    Intolerances        MEDICATIONS  (STANDING):  ALBUTerol/ipratropium for Nebulization 3 milliLiter(s) Nebulizer every 6 hours  amLODIPine   Tablet 2.5 milliGRAM(s) Oral daily  atorvastatin 20 milliGRAM(s) Oral at bedtime  benztropine 2 milliGRAM(s) Oral two times a day  heparin  Injectable 5000 Unit(s) SubCutaneous every 8 hours  insulin glargine Injectable (LANTUS) 26 Unit(s) SubCutaneous at bedtime  insulin lispro (HumaLOG) corrective regimen sliding scale   SubCutaneous every 6 hours  levETIRAcetam  Solution 1000 milliGRAM(s) Oral two times a day  metoprolol tartrate 100 milliGRAM(s) Oral two times a day  pantoprazole   Suspension 40 milliGRAM(s) Oral daily  piperacillin/tazobactam IVPB. 3.375 Gram(s) IV Intermittent every 8 hours  QUEtiapine 50 milliGRAM(s) Oral every 12 hours  senna 2 Tablet(s) Oral at bedtime  vancomycin  IVPB 750 milliGRAM(s) IV Intermittent every 12 hours      MEDICATIONS  (PRN):  LORazepam     Tablet 1 milliGRAM(s) Oral every 6 hours PRN Agitation       Medications up to date at time of exam.    FAMILY HISTORY:  No pertinent family history in first degree relatives      SOCIAL HISTORY non verbal  Smoking History: [   ] smoking/smoke exposure, [   ] former smoker  Living Condition: [   ] apartment, [   ] private house  Work History:   Travel History: denies recent travel  Illicit Substance Use: denies  Alcohol Use: denies    REVIEW OF SYSTEMS: non verbal    CONSTITUTIONAL:  denies fevers, chills, sweats, weight loss    HEENT:  denies diplopia or blurred vision, sore throat or runny nose.    CARDIOVASCULAR:  denies pressure, squeezing, tightness, or heaviness about the chest; no palpitations.    RESPIRATORY:  denies SOB, cough, DIXON, wheezing.    GASTROINTESTINAL:  denies abdominal pain, nausea, vomiting or diarrhea.    GENITOURINARY: denies dysuria, frequency or urgency.    NEUROLOGIC:  denies numbness, tingling, seizures or weakness.    PSYCHIATRIC:  denies disorder of thought or mood.    MSK: denies swelling, redness      PHYSICAL EXAMINATION:    GENERAL: The patient is a well-developed, well-nourished, in no apparent distress.     Vital Signs Last 24 Hrs  T(C): 36.7 (15 Sep 2018 11:41), Max: 36.7 (14 Sep 2018 21:57)  T(F): 98.1 (15 Sep 2018 11:41), Max: 98.1 (15 Sep 2018 11:41)  HR: 86 (15 Sep 2018 11:41) (75 - 90)  BP: 135/60 (15 Sep 2018 11:41) (109/62 - 138/75)  BP(mean): --  RR: 18 (15 Sep 2018 11:41) (18 - 18)  SpO2: 99% (15 Sep 2018 11:41) (98% - 99%)    HEENT: head is normocephalic and atraumatic. mucous membranes are moist.     NECK: supple, no palpable adenopathy trach    LUNGS: clear to auscultation, no wheezing, rales, or +scattered rhonchi.    HEART: regular rate and rhythm without murmur.    ABDOMEN: soft, nontender, and nondistended. PEG    EXTREMITIES: without any cyanosis, clubbing, rash, lesions or edema.    NEUROLOGIC: awake, alert, oriented.     SKIN: warm, dry, good turgor.      LABS:                        12.0   9.7   )-----------( 261      ( 15 Sep 2018 06:05 )             39.2     09-15    141  |  104  |  35<H>  ----------------------------<  204<H>  4.2   |  30  |  1.02    Ca    9.5      15 Sep 2018 06:05        Urinalysis Basic - ( 14 Sep 2018 03:56 )    Color: Yellow / Appearance: Clear / S.020 / pH: x  Gluc: x / Ketone: Negative  / Bili: Negative / Urobili: Negative   Blood: x / Protein: 100 / Nitrite: Negative   Leuk Esterase: Negative / RBC: 0-2 /HPF / WBC 0-2 /HPF   Sq Epi: x / Non Sq Epi: Occasional /HPF / Bacteria: Trace /HPF                  Procalcitonin, Serum: 0.16 ng/mL (18 @ 11:31)      .    MICROBIOLOGY: (if applicable)    RADIOLOGY & ADDITIONAL STUDIES:  EKG:   CXR:  ECHO:  TELE:    IMPRESSION: 63y Female PAST MEDICAL & SURGICAL HISTORY:  No pertinent past medical history  Hypertension  Schizophrenia  Diabetic coma  Diabetes mellitus  No significant past surgical history  S/P percutaneous endoscopic gastrostomy (PEG) tube placement  H/O tracheostomy    64 yo F from [facility], non-verbal, bed bound, S/P Trach/ PEG tube, PMH of HTN, asthma, convulsion disorder, bipolar, DVT legs, DM 2, sent from NH for coffee ground vomiting X3 this morning and cough.  Pt unable to provide history.  History given by sister (Sharon [phone])who states pt was vomiting coffee ground emesis this morning. Pt had no reported vomiting since in ED.  Pt also developed cough with clear sputum.  Sister states pt is combative at baseline, and her current behavior is baseline.  No reported fever, discomfort, diarrhea, or SOB.  Pt was recently admitted to [facility] in 2018 for same reason and treated for aspiration PNA and UTI during last admission.  Pt was supposed to be given IV vanco and zosyn for 7 day by PCP for PNA , which supposed to start from -. Pt is DNR, MOSLT form in charts.     RECOMMENDATIONS:    Patient presents with coffee ground emesis from NH. Also with PNA. Tachycardia in the setting of infection.   Continue to monitor HR. BP controlled at present time. Continue with meds. Will follow with you.

## 2018-09-14 NOTE — CONSULT NOTE ADULT - ASSESSMENT
A 64 yo Female  with  non-verbal, bed bound, S/P Trach/ PEG tube, [facility]  for evaluation of coffee ground vomiting  and cough. On arrival, she found to have tachycardia, Leukocytosis, and RLL consolidation. She has started on Zosyn and Vancomycin and the ID consult requested to assist with further evaluation and antibiotic management.     # Aspiration pneumonia - RLL consolidation     Would recommend:    1. Obtain Sputum culture  2. Obtain  MRSA/MSSA PCR   3. Continue Zosyn and Vancomycin until work up is done  4. Monitor and Keep Vancomycin level between 15 to 20  5. Bronchial suctioning  and Aspiration  precaution   6. Monitor WBC count    d/w ER Nursing staff    will follow the patient with you and make further recommendation based on the clinical course and Lab results  Thank you for the opportunity to participate in Ms. PRAJAPATI's care

## 2018-09-14 NOTE — PROGRESS NOTE ADULT - PROBLEM SELECTOR PLAN 1
Complicated Aspiration PNA, HCAP  leukocytosis  C/w IV Zosyn. Add Vancomycin IVPB.    F/u cultures to final date. Aspiration precautions. Nebs. Monitor clinical status closely and adjust rx prn.  All medical consultant recs/management appreciated

## 2018-09-14 NOTE — PROGRESS NOTE ADULT - SUBJECTIVE AND OBJECTIVE BOX
Patient seen and examined at bedside  No new acute events reported overnight  Case discussed with medical team    HPI:  64 yo F from [facility], non-verbal, bed bound, S/P Trach/ PEG tube, PMH of HTN, asthma, convulsion disorder, bipolar, DVT legs, DM 2, sent from NH for coffee ground vomiting X3 this morning and cough.  Pt unable to provide history.  History given by sister (Sharon [phone])who states pt was vomiting coffee ground emesis this morning. Pt had no reported vomiting since in ED.  Pt also developed cough with clear sputum.  Sister states pt is combative at baseline, and her current behavior is baseline.  No reported fever, discomfort, diarrhea, or SOB.  Pt was recently admitted to [facility] in 2018 for same reason and treated for aspiration PNA and UTI during last admission.  Pt was supposed to be given IV vanco and zosyn for 7 day by PCP for PNA , which supposed to start from -. Pt is DNR, MOSLT form in charts. (13 Sep 2018 15:49)      PAST MEDICAL & SURGICAL HISTORY:  No pertinent past medical history  Hypertension  Schizophrenia  Diabetic coma  Diabetes mellitus  No significant past surgical history  S/P percutaneous endoscopic gastrostomy (PEG) tube placement  H/O tracheostomy      angiotensin converting enzyme inhibitors (Unknown)       MEDICATIONS  (STANDING):  amLODIPine   Tablet 2.5 milliGRAM(s) Oral daily  atorvastatin 20 milliGRAM(s) Oral at bedtime  benztropine 2 milliGRAM(s) Oral two times a day  heparin  Injectable 5000 Unit(s) SubCutaneous every 8 hours  insulin glargine Injectable (LANTUS) 30 Unit(s) SubCutaneous at bedtime  insulin lispro (HumaLOG) corrective regimen sliding scale   SubCutaneous every 6 hours  levETIRAcetam  Solution 1000 milliGRAM(s) Oral two times a day  metoprolol tartrate 100 milliGRAM(s) Oral two times a day  pantoprazole   Suspension 40 milliGRAM(s) Oral daily  piperacillin/tazobactam IVPB. 3.375 Gram(s) IV Intermittent every 8 hours  QUEtiapine 50 milliGRAM(s) Oral every 12 hours  senna 2 Tablet(s) Oral at bedtime    MEDICATIONS  (PRN):  LORazepam     Tablet 1 milliGRAM(s) Oral every 6 hours PRN Agitation      REVIEW OF SYSTEMS: limited 2/2 mental status  	    T(C): 36.8 (18 @ 10:43), Max: 37 (18 @ 04:01)  HR: 94 (18 @ 10:43) (79 - 120)  BP: 134/81 (18 @ 10:43) (128/82 - 142/99)  RR: 20 (18 @ 10:43) (18 - 20)  SpO2: 100% (18 @ 10:43) (98% - 100%)    PHYSICAL EXAMINATION:   Constitutional: NAD  HEENT: AT  Neck:  Supple  Respiratory: trach intact. rll rhonchi, mild basilar inspiratoyr rales. Adequate airflow b/l. Not using accessory muscles of respiration.  Cardiovascular:  S1 & S2 intact, no R/G, 2+ radial pulses b/l  Gastrointestinal: Soft, ND  Extremities: WWP  Neurological: nonverbal, stable neuro status,.     Labs and imaging reviewed    LABS:                        12.8   20.5  )-----------( 311      ( 14 Sep 2018 05:20 )             39.1     -    144  |  107  |  41<H>  ----------------------------<  58<L>  4.1   |  29  |  0.90    Ca    9.9      14 Sep 2018 05:20    TPro  7.3  /  Alb  2.7<L>  /  TBili  0.7  /  DBili  x   /  AST  17  /  ALT  24  /  AlkPhos  97  09-13    CARDIAC MARKERS ( 13 Sep 2018 11:31 )  <0.015 ng/mL / x     / x     / x     / x          PT/INR - ( 13 Sep 2018 11:31 )   PT: 11.4 sec;   INR: 1.04 ratio         PTT - ( 13 Sep 2018 11:31 )  PTT:31.6 sec  Urinalysis Basic - ( 14 Sep 2018 03:56 )    Color: Yellow / Appearance: Clear / S.020 / pH: x  Gluc: x / Ketone: Negative  / Bili: Negative / Urobili: Negative   Blood: x / Protein: 100 / Nitrite: Negative   Leuk Esterase: Negative / RBC: 0-2 /HPF / WBC 0-2 /HPF   Sq Epi: x / Non Sq Epi: Occasional /HPF / Bacteria: Trace /HPF      CAPILLARY BLOOD GLUCOSE      POCT Blood Glucose.: 202 mg/dL (14 Sep 2018 08:09)  POCT Blood Glucose.: 277 mg/dL (14 Sep 2018 06:58)  POCT Blood Glucose.: 68 mg/dL (14 Sep 2018 06:03)  POCT Blood Glucose.: 316 mg/dL (14 Sep 2018 02:11)  POCT Blood Glucose.: 405 mg/dL (14 Sep 2018 00:28)  POCT Blood Glucose.: 386 mg/dL (13 Sep 2018 22:26)  POCT Blood Glucose.: 403 mg/dL (13 Sep 2018 19:09)        LIVER FUNCTIONS - ( 13 Sep 2018 11:31 )  Alb: 2.7 g/dL / Pro: 7.3 g/dL / ALK PHOS: 97 U/L / ALT: 24 U/L DA / AST: 17 U/L / GGT: x               RADIOLOGY & ADDITIONAL STUDIES:

## 2018-09-14 NOTE — CONSULT NOTE ADULT - SUBJECTIVE AND OBJECTIVE BOX
HPI:  64 yo F from [facility], non-verbal, bed bound, S/P Trach/ PEG tube, PMH of HTN, asthma, convulsion disorder, bipolar, DVT legs, DM 2, sent from NH for coffee ground vomiting X3 this morning and cough.  Pt unable to provide history.  History given by sister (Sharon [phone])who states pt was vomiting coffee ground emesis this morning. Pt had no reported vomiting since in ED.  Pt also developed cough with clear sputum.  Sister states pt is combative at baseline, and her current behavior is baseline.  No reported fever, discomfort, diarrhea, or SOB.  Pt was recently admitted to [facility] in 07/2018 for same reason and treated for aspiration PNA and UTI during last admission.        PAST MEDICAL & SURGICAL HISTORY:  No pertinent past medical history  Hypertension  Schizophrenia  Diabetic coma  Diabetes mellitus  No significant past surgical history  S/P percutaneous endoscopic gastrostomy (PEG) tube placement  H/O tracheostomy      FAMILY HISTORY:  No pertinent family history in first degree relatives      Social History:    Outpatient Medications:    MEDICATIONS  (STANDING):  ALBUTerol/ipratropium for Nebulization 3 milliLiter(s) Nebulizer every 6 hours  amLODIPine   Tablet 2.5 milliGRAM(s) Oral daily  atorvastatin 20 milliGRAM(s) Oral at bedtime  benztropine 2 milliGRAM(s) Oral two times a day  heparin  Injectable 5000 Unit(s) SubCutaneous every 8 hours  insulin glargine Injectable (LANTUS) 30 Unit(s) SubCutaneous at bedtime  insulin lispro (HumaLOG) corrective regimen sliding scale   SubCutaneous every 6 hours  levETIRAcetam  Solution 1000 milliGRAM(s) Oral two times a day  metoprolol tartrate 100 milliGRAM(s) Oral two times a day  pantoprazole   Suspension 40 milliGRAM(s) Oral daily  piperacillin/tazobactam IVPB. 3.375 Gram(s) IV Intermittent every 8 hours  QUEtiapine 50 milliGRAM(s) Oral every 12 hours  senna 2 Tablet(s) Oral at bedtime  vancomycin  IVPB 750 milliGRAM(s) IV Intermittent every 12 hours    MEDICATIONS  (PRN):  LORazepam     Tablet 1 milliGRAM(s) Oral every 6 hours PRN Agitation      Allergies    angiotensin converting enzyme inhibitors (Unknown)    Intolerances      Review of Systems:  unable to obtain      VITALS: T(C): 36.8 (09-14-18 @ 10:43)  T(F): 98.2 (09-14-18 @ 10:43), Max: 98.6 (09-14-18 @ 04:01)  HR: 94 (09-14-18 @ 10:43) (79 - 120)  BP: 134/81 (09-14-18 @ 10:43) (128/82 - 142/99)  RR:  (18 - 20)  SpO2:  (98% - 100%)  Wt(kg): --    Physical Exam:    Constitutional: NAD  HEENT: AT  Neck:  Supple  Respiratory: trach intact. rll rhonchi, mild basilar inspiratoyr rales. Adequate airflow b/l. Not using accessory muscles of respiration.  Cardiovascular:  S1 & S2 intact, no R/G, 2+ radial pulses b/l  Gastrointestinal: Soft, ND  Extremities: WWP  Neurological: nonverbal, stable neuro status,.       POCT Blood Glucose.: 144 mg/dL (09-14-18 @ 11:07)  POCT Blood Glucose.: 202 mg/dL (09-14-18 @ 08:09)  POCT Blood Glucose.: 277 mg/dL (09-14-18 @ 06:58)  POCT Blood Glucose.: 68 mg/dL (09-14-18 @ 06:03)  POCT Blood Glucose.: 316 mg/dL (09-14-18 @ 02:11)  POCT Blood Glucose.: 405 mg/dL (09-14-18 @ 00:28)  POCT Blood Glucose.: 386 mg/dL (09-13-18 @ 22:26)  POCT Blood Glucose.: 403 mg/dL (09-13-18 @ 19:09)                            12.8   20.5  )-----------( 311      ( 14 Sep 2018 05:20 )             39.1       09-14    144  |  107  |  41<H>  ----------------------------<  58<L>  4.1   |  29  |  0.90    EGFR if : 79  EGFR if non : 68    Ca    9.9      09-14    TPro  7.3  /  Alb  2.7<L>  /  TBili  0.7  /  DBili  x   /  AST  17  /  ALT  24  /  AlkPhos  97  09-13      Thyroid Function Tests:  09-14 @ 05:20 TSH 1.65 FreeT4 -- T3 -- Anti TPO -- Anti Thyroglobulin Ab -- TSI --      Hemoglobin A1C, Whole Blood: 11.4 % <H> [4.0 - 5.6] (09-14-18 @ 09:23)  Hemoglobin A1C, Whole Blood: 9.8 % <H> [4.0 - 5.6] (07-15-18 @ 21:31)      09-14 Chol 145 LDL 69 HDL 43<L> Trig 167<H>      Radiology:

## 2018-09-14 NOTE — CONSULT NOTE ADULT - ASSESSMENT
62 yo F from [facility], non-verbal, bed bound, S/P Trach/ PEG tube, PMH of HTN, asthma, convulsion disorder, bipolar, DVT legs, DM 2, sent from NH for coffee ground vomiting, patient admitted for coffee-ground emesis and aspiration PNA. endo consult requested for elevated A1C of 11.4, currently on lantus 30U QHS    recommendations:  change lantus 26U QHS and HiSS  Add humalog when tube feeds are optimized 62 yo F from [facility], non-verbal, bed bound, S/P Trach/ PEG tube, PMH of HTN, asthma, convulsion disorder, bipolar, DVT legs, DM 2, sent from NH for coffee ground vomiting, patient admitted for coffee-ground emesis and aspiration PNA. endo consult requested for elevated A1C of 11.4, currently on lantus 30U QHS with episodes of hypoglycemia.    recommendations:  change lantus 26U QHS and HiSS  Add humalog when tube feeds are optimized  fsg q6 hrs  aim fsg 150-<200

## 2018-09-14 NOTE — PROGRESS NOTE ADULT - ASSESSMENT
62 yo F from [facility], non-verbal, bed bound, S/P Trach/ PEG tube, PMH of HTN, asthma, convulsion disorder, bipolar, DVT legs, DM 2, sent from NH for coffee ground vomiting X3

## 2018-09-14 NOTE — CONSULT NOTE ADULT - SUBJECTIVE AND OBJECTIVE BOX
CHIEF COMPLAINT: Patient is a 63y old  Female who presents with a chief complaint of vomiting (14 Sep 2018 11:04)      HPI:  62 yo F from [facility], non-verbal, bed bound, S/P Trach/ PEG tube, PMH of HTN, asthma, convulsion disorder, bipolar, DVT legs, DM 2, sent from NH for coffee ground vomiting X3 this morning and cough.  Pt unable to provide history.  History given by sister (Sharon [phone])who states pt was vomiting coffee ground emesis this morning. Pt had no reported vomiting since in ED.  Pt also developed cough with clear sputum.  Sister states pt is combative at baseline, and her current behavior is baseline.  No reported fever, discomfort, diarrhea, or SOB.  Pt was recently admitted to [facility] in 2018 for same reason and treated for aspiration PNA and UTI during last admission.  Pt was supposed to be given IV vanco and zosyn for 7 day by PCP for PNA , which supposed to start from -. Pt is DNR, MOSLT form in charts. (13 Sep 2018 15:49)   Patient seen and examined.     PAST MEDICAL & SURGICAL HISTORY:  No pertinent past medical history  Hypertension  Schizophrenia  Diabetic coma  Diabetes mellitus  No significant past surgical history  S/P percutaneous endoscopic gastrostomy (PEG) tube placement  H/O tracheostomy      Allergies    angiotensin converting enzyme inhibitors (Unknown)    Intolerances        MEDICATIONS  (STANDING):  ALBUTerol/ipratropium for Nebulization 3 milliLiter(s) Nebulizer every 6 hours  amLODIPine   Tablet 2.5 milliGRAM(s) Oral daily  atorvastatin 20 milliGRAM(s) Oral at bedtime  benztropine 2 milliGRAM(s) Oral two times a day  heparin  Injectable 5000 Unit(s) SubCutaneous every 8 hours  insulin glargine Injectable (LANTUS) 30 Unit(s) SubCutaneous at bedtime  insulin lispro (HumaLOG) corrective regimen sliding scale   SubCutaneous every 6 hours  levETIRAcetam  Solution 1000 milliGRAM(s) Oral two times a day  metoprolol tartrate 100 milliGRAM(s) Oral two times a day  pantoprazole   Suspension 40 milliGRAM(s) Oral daily  piperacillin/tazobactam IVPB. 3.375 Gram(s) IV Intermittent every 8 hours  QUEtiapine 50 milliGRAM(s) Oral every 12 hours  senna 2 Tablet(s) Oral at bedtime  vancomycin  IVPB 750 milliGRAM(s) IV Intermittent every 12 hours      MEDICATIONS  (PRN):  LORazepam     Tablet 1 milliGRAM(s) Oral every 6 hours PRN Agitation   Medications up to date at time of exam.    FAMILY HISTORY: unable to obtain  No pertinent family history in first degree relatives      SOCIAL HISTORY: unable to obtain  Smoking History: [   ] smoking/smoke exposure, [   ] former smoker, [  ] denies smoking  Living Condition: [   ] apartment, [   ] private house  Work History:   Travel History: denies recent travel  Illicit Substance Use: denies  Alcohol Use: denies    REVIEW OF SYSTEMS:    CONSTITUTIONAL:  denies fevers, chills, sweats, weight loss    HEENT:  denies diplopia or blurred vision, sore throat or runny nose.    CARDIOVASCULAR:  denies pressure, squeezing, tightness, or heaviness about the chest; no palpitations.    RESPIRATORY:  denies SOB, cough, DIXON, wheezing.    GASTROINTESTINAL:  denies abdominal pain, nausea, vomiting or diarrhea.    GENITOURINARY: denies dysuria, frequency or urgency.    NEUROLOGIC:  denies numbness, tingling, seizures or weakness.    PSYCHIATRIC:  denies disorder of thought or mood.    MSK: denies swelling, redness      PHYSICAL EXAMINATION:    GENERAL: The patient is a well-developed, well-nourished, in no apparent distress.     Vital Signs Last 24 Hrs  T(C): 36.8 (14 Sep 2018 10:43), Max: 37 (14 Sep 2018 04:01)  T(F): 98.2 (14 Sep 2018 10:43), Max: 98.6 (14 Sep 2018 04:01)  HR: 94 (14 Sep 2018 10:43) (79 - 120)  BP: 134/81 (14 Sep 2018 10:43) (128/82 - 142/99)  BP(mean): --  RR: 20 (14 Sep 2018 10:43) (18 - 20)  SpO2: 100% (14 Sep 2018 10:43) (98% - 100%)   (if applicable)    Chest Tube (if applicable)    HEENT: Head is normocephalic and atraumatic. .    NECK: Supple, no palpable adenopathy.    LUNGS: Clear to auscultation, no wheezing, rales, or rhonchi.    HEART: Regular rate and rhythm without murmur.    ABDOMEN: Soft, nontender, and nondistended.  No hepatosplenomegaly is noted.    EXTREMITIES: Without any cyanosis, clubbing, rash, lesions or edema.    NEUROLOGIC: Awake, alert.    SKIN: Warm, dry, good turgor.      LABS:                        12.8   20.5  )-----------( 311      ( 14 Sep 2018 05:20 )             39.1         144  |  107  |  41<H>  ----------------------------<  58<L>  4.1   |  29  |  0.90    Ca    9.9      14 Sep 2018 05:20    TPro  7.3  /  Alb  2.7<L>  /  TBili  0.7  /  DBili  x   /  AST  17  /  ALT  24  /  AlkPhos  97      PT/INR - ( 13 Sep 2018 11:31 )   PT: 11.4 sec;   INR: 1.04 ratio         PTT - ( 13 Sep 2018 11:31 )  PTT:31.6 sec  Urinalysis Basic - ( 14 Sep 2018 03:56 )    Color: Yellow / Appearance: Clear / S.020 / pH: x  Gluc: x / Ketone: Negative  / Bili: Negative / Urobili: Negative   Blood: x / Protein: 100 / Nitrite: Negative   Leuk Esterase: Negative / RBC: 0-2 /HPF / WBC 0-2 /HPF   Sq Epi: x / Non Sq Epi: Occasional /HPF / Bacteria: Trace /HPF        CARDIAC MARKERS ( 13 Sep 2018 11:31 )  <0.015 ng/mL / x     / x     / x     / x                Procalcitonin, Serum: 0.16 ng/mL (18 @ 11:31)      MICROBIOLOGY: (if applicable)    RADIOLOGY & ADDITIONAL STUDIES:  EKG:   CXR:  < from: CT Chest No Cont (18 @ 03:41) >    EXAM:  CT CHEST                            PROCEDURE DATE:  2018          INTERPRETATION:  Clinical information: History of pneumonia. Exam is   compared to previous study of 2018.    CT scan of the chest was obtained without administration of intravenous   contrast.    No hilar and/or mediastinal adenopathy is noted.     Heart is normal in size. Calcification the coronary arteries is noted. No   pericardial effusion is noted.     No endobronchial lesions are noted. Tracheostomy tubeis noted in place.   Marked consolidation is noted involving most of the right lower lobe.   Small bilateral pleural effusions are noted, right more than left. There   is elevation of the right hemidiaphragm.    Below the diaphragm, visualized portions of the abdomen demonstrate   punctate calcification within the left kidney.     Visualized osseous structures are within normal limits.    Impression: Consolidation involving the right lower lobe is of uncertain   etiology. It is unclear whether this represents compressive atelectasis   secondary to right pleural effusion/elevation of the right hemidiaphragm   and/or represents infection.                GHANSHYAM WHITTINGTON M.D., ATTENDING RADIOLOGIST  This document has been electronically signed. 2018  9:00AM                < end of copied text >    < from: CT Chest No Cont (18 @ 11:15) >    EXAM:  CT CHEST                            PROCEDURE DATE:  2018          INTERPRETATION:  EXAMINATION: CT CHEST    CLINICAL INDICATION: Tachycardia.    TECHNIQUE: Noncontrast CT of the chest was obtained.      COMPARISON: 7/15/2018.    FINDINGS:     AIRWAYS AND LUNGS: Tracheostomy tube.  Elevation right hemidiaphragm with   adjacent subsegmental atelectasis.    MEDIASTINUM AND PLEURA: There are no enlarged mediastinal, hilar or   axillary lymph nodes.     There is no pleural effusion. There is no   pneumothorax.      HEART AND VESSELS: The heart is normal in size.  There are   atherosclerotic calcifications of the aorta and coronary arteries.  There   is no pericardial effusion.         UPPER ABDOMEN: Images of the upper abdomen demonstrate no abnormality.     BONES AND SOFT TISSUES: The bones are unremarkable.  The soft tissues are   unremarkable.    TUBES/LINES: Left-sided PICC with tip in SVC    IMPRESSION:   Elevation right hemidiaphragm with adjacent subsegmental atelectasis.   Lungs otherwise clear.                NEY HERRERA M.D., ATTENDING RADIOLOGIST  This document has been electronically signed. 2018  1:46PM                < end of copied text >    ECHO:    IMPRESSION: 63y Female PAST MEDICAL & SURGICAL HISTORY:  No pertinent past medical history  Hypertension  Schizophrenia  Diabetic coma  Diabetes mellitus  No significant past surgical history  S/P percutaneous endoscopic gastrostomy (PEG) tube placement  H/O tracheostomy         62 yo F from [facility], non-verbal, bed bound, S/P Trach/ PEG tube, PMH of HTN, asthma, convulsion disorder, bipolar, DVT legs, DM 2, sent from NH for coffee ground vomiting X3 this morning and cough.  Pt unable to provide history.  History given by sister (Sharon DOZIER [phone])who states pt was vomiting coffee ground emesis this morning. Pt had no reported vomiting since in ED.  Pt also developed cough with clear sputum.  Sister states pt is combative at baseline, and her current behavior is baseline.  No reported fever, discomfort, diarrhea, or SOB.  Pt was recently admitted to [facility] in 2018 for same reason and treated for aspiration PNA and UTI during last admission.  Pt was supposed to be given IV vanco and zosyn for 7 day by PCP for PNA , which supposed to start from -. Pt is DNR, MOSLT form in charts.    --    Patient presents with coffee ground vomiting, SOB, due to PNA, aspiration.    SUGGESTION:   - con't with antibiotics as per ID recommendations.    - f/u bcx RVP   - suction prn   - DVT and GI prophylaxis. CHIEF COMPLAINT: Patient is a 63y old  Female who presents with a chief complaint of vomiting (14 Sep 2018 11:04)      HPI:  62 yo F from [facility], non-verbal, bed bound, S/P Trach/ PEG tube, PMH of HTN, asthma, convulsion disorder, bipolar, DVT legs, DM 2, sent from NH for coffee ground vomiting X3 this morning and cough.  Pt unable to provide history.  History given by sister (Sharon [phone])who states pt was vomiting coffee ground emesis this morning. Pt had no reported vomiting since in ED.  Pt also developed cough with clear sputum.  Sister states pt is combative at baseline, and her current behavior is baseline.  No reported fever, discomfort, diarrhea, or SOB.  Pt was recently admitted to [facility] in 2018 for same reason and treated for aspiration PNA and UTI during last admission.  Pt was supposed to be given IV vanco and zosyn for 7 day by PCP for PNA , which supposed to start from -. Pt is DNR, MOSLT form in charts. (13 Sep 2018 15:49)   Patient seen and examined.     PAST MEDICAL & SURGICAL HISTORY:  No pertinent past medical history  Hypertension  Schizophrenia  Diabetic coma  Diabetes mellitus  No significant past surgical history  S/P percutaneous endoscopic gastrostomy (PEG) tube placement  H/O tracheostomy      Allergies    angiotensin converting enzyme inhibitors (Unknown)    Intolerances        MEDICATIONS  (STANDING):  ALBUTerol/ipratropium for Nebulization 3 milliLiter(s) Nebulizer every 6 hours  amLODIPine   Tablet 2.5 milliGRAM(s) Oral daily  atorvastatin 20 milliGRAM(s) Oral at bedtime  benztropine 2 milliGRAM(s) Oral two times a day  heparin  Injectable 5000 Unit(s) SubCutaneous every 8 hours  insulin glargine Injectable (LANTUS) 30 Unit(s) SubCutaneous at bedtime  insulin lispro (HumaLOG) corrective regimen sliding scale   SubCutaneous every 6 hours  levETIRAcetam  Solution 1000 milliGRAM(s) Oral two times a day  metoprolol tartrate 100 milliGRAM(s) Oral two times a day  pantoprazole   Suspension 40 milliGRAM(s) Oral daily  piperacillin/tazobactam IVPB. 3.375 Gram(s) IV Intermittent every 8 hours  QUEtiapine 50 milliGRAM(s) Oral every 12 hours  senna 2 Tablet(s) Oral at bedtime  vancomycin  IVPB 750 milliGRAM(s) IV Intermittent every 12 hours      MEDICATIONS  (PRN):  LORazepam     Tablet 1 milliGRAM(s) Oral every 6 hours PRN Agitation   Medications up to date at time of exam.    FAMILY HISTORY: unable to obtain  No pertinent family history in first degree relatives      SOCIAL HISTORY: unable to obtain  Smoking History: [   ] smoking/smoke exposure, [   ] former smoker, [  ] denies smoking  Living Condition: [   ] apartment, [   ] private house  Work History:   Travel History: denies recent travel  Illicit Substance Use: denies  Alcohol Use: denies    REVIEW OF SYSTEMS:    CONSTITUTIONAL:  denies fevers, chills, sweats, weight loss    HEENT:  denies diplopia or blurred vision, sore throat or runny nose.    CARDIOVASCULAR:  denies pressure, squeezing, tightness, or heaviness about the chest; no palpitations.    RESPIRATORY:  denies SOB, cough, DIXON, wheezing.    GASTROINTESTINAL:  denies abdominal pain, nausea, vomiting or diarrhea.    GENITOURINARY: denies dysuria, frequency or urgency.    NEUROLOGIC:  denies numbness, tingling, seizures or weakness.    PSYCHIATRIC:  denies disorder of thought or mood.    MSK: denies swelling, redness      PHYSICAL EXAMINATION:    GENERAL: The patient is a well-developed, well-nourished, in no apparent distress.     Vital Signs Last 24 Hrs  T(C): 36.8 (14 Sep 2018 10:43), Max: 37 (14 Sep 2018 04:01)  T(F): 98.2 (14 Sep 2018 10:43), Max: 98.6 (14 Sep 2018 04:01)  HR: 94 (14 Sep 2018 10:43) (79 - 120)  BP: 134/81 (14 Sep 2018 10:43) (128/82 - 142/99)  BP(mean): --  RR: 20 (14 Sep 2018 10:43) (18 - 20)  SpO2: 100% (14 Sep 2018 10:43) (98% - 100%)   (if applicable)    Chest Tube (if applicable)    HEENT: Head is normocephalic and atraumatic. .    NECK: Supple, no palpable adenopathy.    LUNGS: Clear to auscultation, no wheezing, rales, or rhonchi.    HEART: Regular rate and rhythm without murmur.    ABDOMEN: Soft, nontender, and nondistended.  No hepatosplenomegaly is noted.    EXTREMITIES: Without any cyanosis, clubbing, rash, lesions or edema.    NEUROLOGIC: Awake, alert.    SKIN: Warm, dry, good turgor.      LABS:                        12.8   20.5  )-----------( 311      ( 14 Sep 2018 05:20 )             39.1         144  |  107  |  41<H>  ----------------------------<  58<L>  4.1   |  29  |  0.90    Ca    9.9      14 Sep 2018 05:20    TPro  7.3  /  Alb  2.7<L>  /  TBili  0.7  /  DBili  x   /  AST  17  /  ALT  24  /  AlkPhos  97      PT/INR - ( 13 Sep 2018 11:31 )   PT: 11.4 sec;   INR: 1.04 ratio         PTT - ( 13 Sep 2018 11:31 )  PTT:31.6 sec  Urinalysis Basic - ( 14 Sep 2018 03:56 )    Color: Yellow / Appearance: Clear / S.020 / pH: x  Gluc: x / Ketone: Negative  / Bili: Negative / Urobili: Negative   Blood: x / Protein: 100 / Nitrite: Negative   Leuk Esterase: Negative / RBC: 0-2 /HPF / WBC 0-2 /HPF   Sq Epi: x / Non Sq Epi: Occasional /HPF / Bacteria: Trace /HPF        CARDIAC MARKERS ( 13 Sep 2018 11:31 )  <0.015 ng/mL / x     / x     / x     / x                Procalcitonin, Serum: 0.16 ng/mL (18 @ 11:31)      MICROBIOLOGY: (if applicable)    RADIOLOGY & ADDITIONAL STUDIES:  EKG:   CXR:  < from: CT Chest No Cont (18 @ 03:41) >    EXAM:  CT CHEST                            PROCEDURE DATE:  2018          INTERPRETATION:  Clinical information: History of pneumonia. Exam is   compared to previous study of 2018.    CT scan of the chest was obtained without administration of intravenous   contrast.    No hilar and/or mediastinal adenopathy is noted.     Heart is normal in size. Calcification the coronary arteries is noted. No   pericardial effusion is noted.     No endobronchial lesions are noted. Tracheostomy tubeis noted in place.   Marked consolidation is noted involving most of the right lower lobe.   Small bilateral pleural effusions are noted, right more than left. There   is elevation of the right hemidiaphragm.    Below the diaphragm, visualized portions of the abdomen demonstrate   punctate calcification within the left kidney.     Visualized osseous structures are within normal limits.    Impression: Consolidation involving the right lower lobe is of uncertain   etiology. It is unclear whether this represents compressive atelectasis   secondary to right pleural effusion/elevation of the right hemidiaphragm   and/or represents infection.                GHANSHYAM WHITTINGTON M.D., ATTENDING RADIOLOGIST  This document has been electronically signed. 2018  9:00AM                < end of copied text >    < from: CT Chest No Cont (18 @ 11:15) >    EXAM:  CT CHEST                            PROCEDURE DATE:  2018          INTERPRETATION:  EXAMINATION: CT CHEST    CLINICAL INDICATION: Tachycardia.    TECHNIQUE: Noncontrast CT of the chest was obtained.      COMPARISON: 7/15/2018.    FINDINGS:     AIRWAYS AND LUNGS: Tracheostomy tube.  Elevation right hemidiaphragm with   adjacent subsegmental atelectasis.    MEDIASTINUM AND PLEURA: There are no enlarged mediastinal, hilar or   axillary lymph nodes.     There is no pleural effusion. There is no   pneumothorax.      HEART AND VESSELS: The heart is normal in size.  There are   atherosclerotic calcifications of the aorta and coronary arteries.  There   is no pericardial effusion.         UPPER ABDOMEN: Images of the upper abdomen demonstrate no abnormality.     BONES AND SOFT TISSUES: The bones are unremarkable.  The soft tissues are   unremarkable.    TUBES/LINES: Left-sided PICC with tip in SVC    IMPRESSION:   Elevation right hemidiaphragm with adjacent subsegmental atelectasis.   Lungs otherwise clear.                NEY HERRERA M.D., ATTENDING RADIOLOGIST  This document has been electronically signed. 2018  1:46PM                < end of copied text >    ECHO:    IMPRESSION: 63y Female PAST MEDICAL & SURGICAL HISTORY:  No pertinent past medical history  Hypertension  Schizophrenia  Diabetic coma  Diabetes mellitus  No significant past surgical history  S/P percutaneous endoscopic gastrostomy (PEG) tube placement  H/O tracheostomy         62 yo F from [facility], non-verbal, bed bound, S/P Trach/ PEG tube, PMH of HTN, asthma, convulsion disorder, bipolar, DVT legs, DM 2, sent from NH for coffee ground vomiting X3 this morning and cough.  Pt unable to provide history.  History given by sister (Sharon DOZIER [phone])who states pt was vomiting coffee ground emesis this morning. Pt had no reported vomiting since in ED.  Pt also developed cough with clear sputum.  Sister states pt is combative at baseline, and her current behavior is baseline.  No reported fever, discomfort, diarrhea, or SOB.  Pt was recently admitted to [facility] in 2018 for same reason and treated for aspiration PNA and UTI during last admission.  Pt was supposed to be given IV vanco and zosyn for 7 day by PCP for PNA , which supposed to start from -. Pt is DNR, MOSLT form in charts.    --    Patient presents with coffee ground vomiting, SOB, due to PNA, aspiration.    SUGGESTION:   - con't with antibiotics as per ID recommendations.    - f/u bcx RVP   - suction prn   - DVT and GI prophylaxis.     Agree with above assessment and plan as transcribed.

## 2018-09-14 NOTE — CONSULT NOTE ADULT - SUBJECTIVE AND OBJECTIVE BOX
64 yo F from [facility], non-verbal, bed bound, S/P Trach/ PEG tube, PMH of HTN, asthma, convulsion disorder, bipolar, DVT legs, DM 2, sent from NH for coffee ground vomiting X3 this morning and cough.  Pt unable to provide history.  History given by sister (Sharon [phone])who states pt was vomiting coffee ground emesis this morning. Pt had no reported vomiting since in ED.  Pt also developed cough with clear sputum.  Sister states pt is combative at baseline, and her current behavior is baseline.  No reported fever, discomfort, diarrhea, or SOB.  Pt was recently admitted to [facility] in 2018 for same reason and treated for aspiration PNA and UTI during last admission.  Pt was supposed to be given IV vanco and zosyn for 7 day by PCP for PNA , which supposed to start from -. Pt is DNR, MOSLT form in charts. (13 Sep 2018 15:49)   Patient seen and examined.     Patient is a 63y old  Female who presents with a chief complaint of vomiting (14 Sep 2018 13:48)      INTERVAL HPI/OVERNIGHT EVENTS:  T(C): 36.8 (18 @ 10:43), Max: 37 (18 @ 04:01)  HR: 94 (18 @ 10:43) (79 - 120)  BP: 134/81 (18 @ 10:43) (128/82 - 139/91)  RR: 20 (18 @ 10:43) (18 - 20)  SpO2: 100% (18 @ 10:43) (98% - 100%)  Wt(kg): --  I&O's Summary      PAST MEDICAL & SURGICAL HISTORY:  No pertinent past medical history  Hypertension  Schizophrenia  Diabetic coma  Diabetes mellitus  No significant past surgical history  S/P percutaneous endoscopic gastrostomy (PEG) tube placement  H/O tracheostomy      SOCIAL HISTORY  Alcohol:  Tobacco:  Illicit substance use:      FAMILY HISTORY:      LABS:                        12.8   20.5  )-----------( 311      ( 14 Sep 2018 05:20 )             39.1     -    144  |  107  |  41<H>  ----------------------------<  58<L>  4.1   |  29  |  0.90    Ca    9.9      14 Sep 2018 05:20    TPro  7.3  /  Alb  2.7<L>  /  TBili  0.7  /  DBili  x   /  AST  17  /  ALT  24  /  AlkPhos  97  09-13    PT/INR - ( 13 Sep 2018 11:31 )   PT: 11.4 sec;   INR: 1.04 ratio         PTT - ( 13 Sep 2018 11:31 )  PTT:31.6 sec  Urinalysis Basic - ( 14 Sep 2018 03:56 )    Color: Yellow / Appearance: Clear / S.020 / pH: x  Gluc: x / Ketone: Negative  / Bili: Negative / Urobili: Negative   Blood: x / Protein: 100 / Nitrite: Negative   Leuk Esterase: Negative / RBC: 0-2 /HPF / WBC 0-2 /HPF   Sq Epi: x / Non Sq Epi: Occasional /HPF / Bacteria: Trace /HPF      CAPILLARY BLOOD GLUCOSE      POCT Blood Glucose.: 144 mg/dL (14 Sep 2018 11:07)  POCT Blood Glucose.: 202 mg/dL (14 Sep 2018 08:09)  POCT Blood Glucose.: 277 mg/dL (14 Sep 2018 06:58)  POCT Blood Glucose.: 68 mg/dL (14 Sep 2018 06:03)  POCT Blood Glucose.: 316 mg/dL (14 Sep 2018 02:11)  POCT Blood Glucose.: 405 mg/dL (14 Sep 2018 00:28)  POCT Blood Glucose.: 386 mg/dL (13 Sep 2018 22:26)  POCT Blood Glucose.: 403 mg/dL (13 Sep 2018 19:09)        Urinalysis Basic - ( 14 Sep 2018 03:56 )    Color: Yellow / Appearance: Clear / S.020 / pH: x  Gluc: x / Ketone: Negative  / Bili: Negative / Urobili: Negative   Blood: x / Protein: 100 / Nitrite: Negative   Leuk Esterase: Negative / RBC: 0-2 /HPF / WBC 0-2 /HPF   Sq Epi: x / Non Sq Epi: Occasional /HPF / Bacteria: Trace /HPF        MEDICATIONS  (STANDING):  ALBUTerol/ipratropium for Nebulization 3 milliLiter(s) Nebulizer every 6 hours  amLODIPine   Tablet 2.5 milliGRAM(s) Oral daily  atorvastatin 20 milliGRAM(s) Oral at bedtime  benztropine 2 milliGRAM(s) Oral two times a day  heparin  Injectable 5000 Unit(s) SubCutaneous every 8 hours  insulin glargine Injectable (LANTUS) 26 Unit(s) SubCutaneous at bedtime  insulin lispro (HumaLOG) corrective regimen sliding scale   SubCutaneous every 6 hours  levETIRAcetam  Solution 1000 milliGRAM(s) Oral two times a day  metoprolol tartrate 100 milliGRAM(s) Oral two times a day  pantoprazole   Suspension 40 milliGRAM(s) Oral daily  piperacillin/tazobactam IVPB. 3.375 Gram(s) IV Intermittent every 8 hours  QUEtiapine 50 milliGRAM(s) Oral every 12 hours  senna 2 Tablet(s) Oral at bedtime  vancomycin  IVPB 750 milliGRAM(s) IV Intermittent every 12 hours    MEDICATIONS  (PRN):  LORazepam     Tablet 1 milliGRAM(s) Oral every 6 hours PRN Agitation      REVIEW OF SYSTEMS:  CONSTITUTIONAL: No fever, weight loss, or fatigue  EYES: No eye pain, visual disturbances, or discharge  ENMT:  No difficulty hearing, tinnitus, vertigo; No sinus or throat pain  NECK: No pain or stiffness  RESPIRATORY: No cough, wheezing, chills or hemoptysis; No shortness of breath  CARDIOVASCULAR: No chest pain, palpitations, dizziness, or leg swelling  GASTROINTESTINAL: No abdominal or epigastric pain. No nausea, vomiting, or hematemesis; No diarrhea or constipation. No melena or hematochezia.  GENITOURINARY: No dysuria, frequency, hematuria, or incontinence  NEUROLOGICAL: No headaches, memory loss, loss of strength, numbness, or tremors  SKIN: No itching, burning, rashes, or lesions   LYMPH NODES: No enlarged glands  ENDOCRINE: No heat or cold intolerance; No hair loss  MUSCULOSKELETAL: No joint pain or swelling; No muscle, back, or extremity pain  PSYCHIATRIC: No depression, anxiety, mood swings, or difficulty sleeping  HEME/LYMPH: No easy bruising, or bleeding gums  ALLERY AND IMMUNOLOGIC: No hives or eczema    RADIOLOGY & ADDITIONAL TESTS:    Imaging Personally Reviewed:  [ ] YES  [ ] NO    Consultant(s) Notes Reviewed:  [ ] YES  [ ] NO    PHYSICAL EXAM:  GENERAL: NAD, well-groomed, well-developed  HEAD:  Atraumatic, Normocephalic  EYES: EOMI, PERRLA, conjunctiva and sclera clear  ENMT: No tonsillar erythema, exudates, or enlargement; Moist mucous membranes, Good dentition, No lesions  NECK: Supple, No JVD, Normal thyroid  NERVOUS SYSTEM:  Alert & Oriented X3, Good concentration; Motor Strength 5/5 B/L upper and lower extremities; DTRs 2+ intact and symmetric  CHEST/LUNG: Clear to percussion bilaterally; No rales, rhonchi, wheezing, or rubs  HEART: Regular rate and rhythm; No murmurs, rubs, or gallops  ABDOMEN: Soft, Nontender, Nondistended; Bowel sounds present  EXTREMITIES:  2+ Peripheral Pulses, No clubbing, cyanosis, or edema  LYMPH: No lymphadenopathy noted  SKIN: No rashes or lesions    Care Discussed with Consultants/Other Providers [ ] YES  [ ] NO 62 yo F from [facility], non-verbal, bed bound, S/P Trach/ PEG tube, PMH of HTN, asthma, convulsion disorder, bipolar, DVT legs, DM 2, sent from NH for coffee ground vomiting X3 this morning and cough.  Pt unable to provide history.  History given by sister (Sharon [phone])who states pt was vomiting coffee ground emesis this morning. Pt had no reported vomiting since in ED.  Pt also developed cough with clear sputum.  Sister states pt is combative at baseline, and her current behavior is baseline.  No reported fever, discomfort, diarrhea, or SOB.  Pt was recently admitted to [facility] in 2018 for same reason and treated for aspiration PNA and UTI during last admission.  Pt was supposed to be given IV vanco and zosyn for 7 day by PCP for PNA , which supposed to start from -. Pt is DNR,     REVIEW OF SYSTEMS: Unable to obtain due to mental status        PAST MEDICAL & SURGICAL HISTORY:  No pertinent past medical history  Hypertension  Schizophrenia  Diabetic coma  Diabetes mellitus  No significant past surgical history  S/P percutaneous endoscopic gastrostomy (PEG) tube placement  H/O tracheostomy        SOCIAL HISTORY  Alcohol: Does not drink  Tobacco: Does not smoke  Illicit substance use: None        FAMILY HISTORY: Non contributory to the present illness        ALLERGIES : NKDA        T(C): 36.8 (18 @ 10:43), Max: 37 (18 @ 04:01)  HR: 94 (18 @ 10:43) (79 - 120)  BP: 134/81 (18 @ 10:43) (128/82 - 139/91)  RR: 20 (18 @ 10:43) (18 - 20)  SpO2: 100% (18 @ 10:43) (98% - 100%)  Wt(kg): --  I&O's Summary              LABS:                        12.8   20.5  )-----------( 311      ( 14 Sep 2018 05:20 )             39.1               144  |  107  |  41<H>  ----------------------------<  58<L>  4.1   |  29  |  0.90    Ca    9.9      14 Sep 2018 05:20    TPro  7.3  /  Alb  2.7<L>  /  TBili  0.7  /  DBili  x   /  AST  17  /  ALT  24  /  AlkPhos  97  09-13    PT/INR - ( 13 Sep 2018 11:31 )   PT: 11.4 sec;   INR: 1.04 ratio         PTT - ( 13 Sep 2018 11:31 )  PTT:31.6 sec  Urinalysis Basic - ( 14 Sep 2018 03:56 )    Color: Yellow / Appearance: Clear / S.020 / pH: x  Gluc: x / Ketone: Negative  / Bili: Negative / Urobili: Negative   Blood: x / Protein: 100 / Nitrite: Negative   Leuk Esterase: Negative / RBC: 0-2 /HPF / WBC 0-2 /HPF   Sq Epi: x / Non Sq Epi: Occasional /HPF / Bacteria: Trace /HPF      CAPILLARY BLOOD GLUCOSE      POCT Blood Glucose.: 144 mg/dL (14 Sep 2018 11:07)  POCT Blood Glucose.: 202 mg/dL (14 Sep 2018 08:09)  POCT Blood Glucose.: 277 mg/dL (14 Sep 2018 06:58)  POCT Blood Glucose.: 68 mg/dL (14 Sep 2018 06:03)  POCT Blood Glucose.: 316 mg/dL (14 Sep 2018 02:11)  POCT Blood Glucose.: 405 mg/dL (14 Sep 2018 00:28)  POCT Blood Glucose.: 386 mg/dL (13 Sep 2018 22:26)  POCT Blood Glucose.: 403 mg/dL (13 Sep 2018 19:09)        MEDICATIONS  (STANDING):  ALBUTerol/ipratropium for Nebulization 3 milliLiter(s) Nebulizer every 6 hours  amLODIPine   Tablet 2.5 milliGRAM(s) Oral daily  atorvastatin 20 milliGRAM(s) Oral at bedtime  benztropine 2 milliGRAM(s) Oral two times a day  heparin  Injectable 5000 Unit(s) SubCutaneous every 8 hours  insulin glargine Injectable (LANTUS) 26 Unit(s) SubCutaneous at bedtime  insulin lispro (HumaLOG) corrective regimen sliding scale   SubCutaneous every 6 hours  levETIRAcetam  Solution 1000 milliGRAM(s) Oral two times a day  metoprolol tartrate 100 milliGRAM(s) Oral two times a day  pantoprazole   Suspension 40 milliGRAM(s) Oral daily  piperacillin/tazobactam IVPB. 3.375 Gram(s) IV Intermittent every 8 hours  QUEtiapine 50 milliGRAM(s) Oral every 12 hours  senna 2 Tablet(s) Oral at bedtime  vancomycin  IVPB 750 milliGRAM(s) IV Intermittent every 12 hours    MEDICATIONS  (PRN):  LORazepam     Tablet 1 milliGRAM(s) Oral every 6 hours PRN Agitation        RADIOLOGY & ADDITIONAL TESTS:        PHYSICAL EXAM:  GENERAL: NAD, well-groomed, well-developed  HEAD:  Atraumatic, Normocephalic  EYES: EOMI, PERRLA, conjunctiva and sclera clear  ENMT: No tonsillar erythema, exudates, or enlargement; Moist mucous membranes, Good dentition, No lesions  NECK: Supple, No JVD, Normal thyroid  NERVOUS SYSTEM:  Alert & Oriented X3, Good concentration; Motor Strength 5/5 B/L upper and lower extremities; DTRs 2+ intact and symmetric  CHEST/LUNG: Clear to percussion bilaterally; No rales, rhonchi, wheezing, or rubs  HEART: Regular rate and rhythm; No murmurs, rubs, or gallops  ABDOMEN: Soft, Nontender, Nondistended; Bowel sounds present  EXTREMITIES:  2+ Peripheral Pulses, No clubbing, cyanosis, or edema  LYMPH: No lymphadenopathy noted  SKIN: No rashes or lesions    Care Discussed with Consultants/Other Providers [ ] YES  [ ] NO A 62 yo Female  with  non-verbal, bed bound, S/P Trach/ PEG tube, sent in to the ER from [facility]  for evaluation of  coffee ground vomiting  and cough.          REVIEW OF SYSTEMS: Unable to obtain due to mental status        PAST MEDICAL & SURGICAL HISTORY:  Hypertension, DVT   Schizophrenia   Diabetes mellitus  S/P percutaneous endoscopic gastrostomy (PEG) tube placement  H/O tracheostomy        SOCIAL HISTORY  Alcohol: Does not drink  Tobacco: Does not smoke  Illicit substance use: None        FAMILY HISTORY: Non contributory to the present illness        ALLERGIES : NKDA        T(C): 36.8 (18 @ 10:43), Max: 37 (18 @ 04:01)  HR: 94 (18 @ 10:43) (79 - 120)  BP: 134/81 (18 @ 10:43) (128/82 - 139/91)  RR: 20 (18 @ 10:43) (18 - 20)  SpO2: 100% (18 @ 10:43) (98% - 100%)  Wt(kg): --  I&O's Summary        PHYSICAL EXAM:  GENERAL: Not in distress  HEENT: Trach in placed  CHEST/LUNG: Air entry B/L  HEART: s1 and s2 present  ABDOMEN: Nontender, and Nondistended  EXTREMITIES:  No pedal  edema  CNS: Awake but not Alert      LABS:                        12.8   20.5  )-----------( 311      ( 14 Sep 2018 05:20 )             39.1           -    144  |  107  |  41<H>  ----------------------------<  58<L>  4.1   |  29  |  0.90    Ca    9.9      14 Sep 2018 05:20    TPro  7.3  /  Alb  2.7<L>  /  TBili  0.7  /  DBili  x   /  AST  17  /  ALT  24  /  AlkPhos  97  -    PT/INR - ( 13 Sep 2018 11:31 )   PT: 11.4 sec;   INR: 1.04 ratio         PTT - ( 13 Sep 2018 11:31 )  PTT:31.6 sec  Urinalysis Basic - ( 14 Sep 2018 03:56 )    Color: Yellow / Appearance: Clear / S.020 / pH: x  Gluc: x / Ketone: Negative  / Bili: Negative / Urobili: Negative   Blood: x / Protein: 100 / Nitrite: Negative   Leuk Esterase: Negative / RBC: 0-2 /HPF / WBC 0-2 /HPF   Sq Epi: x / Non Sq Epi: Occasional /HPF / Bacteria: Trace /HPF      CAPILLARY BLOOD GLUCOSE      POCT Blood Glucose.: 144 mg/dL (14 Sep 2018 11:07)  POCT Blood Glucose.: 202 mg/dL (14 Sep 2018 08:09)  POCT Blood Glucose.: 277 mg/dL (14 Sep 2018 06:58)  POCT Blood Glucose.: 68 mg/dL (14 Sep 2018 06:03)  POCT Blood Glucose.: 316 mg/dL (14 Sep 2018 02:11)  POCT Blood Glucose.: 405 mg/dL (14 Sep 2018 00:28)  POCT Blood Glucose.: 386 mg/dL (13 Sep 2018 22:26)  POCT Blood Glucose.: 403 mg/dL (13 Sep 2018 19:09)        MEDICATIONS  (STANDING):  ALBUTerol/ipratropium for Nebulization 3 milliLiter(s) Nebulizer every 6 hours  amLODIPine   Tablet 2.5 milliGRAM(s) Oral daily  atorvastatin 20 milliGRAM(s) Oral at bedtime  benztropine 2 milliGRAM(s) Oral two times a day  heparin  Injectable 5000 Unit(s) SubCutaneous every 8 hours  insulin glargine Injectable (LANTUS) 26 Unit(s) SubCutaneous at bedtime  insulin lispro (HumaLOG) corrective regimen sliding scale   SubCutaneous every 6 hours  levETIRAcetam  Solution 1000 milliGRAM(s) Oral two times a day  metoprolol tartrate 100 milliGRAM(s) Oral two times a day  pantoprazole   Suspension 40 milliGRAM(s) Oral daily  piperacillin/tazobactam IVPB. 3.375 Gram(s) IV Intermittent every 8 hours  QUEtiapine 50 milliGRAM(s) Oral every 12 hours  senna 2 Tablet(s) Oral at bedtime  vancomycin  IVPB 750 milliGRAM(s) IV Intermittent every 12 hours    MEDICATIONS  (PRN):  LORazepam     Tablet 1 milliGRAM(s) Oral every 6 hours PRN Agitation        RADIOLOGY & ADDITIONAL TESTS: A 62 yo Female  with  non-verbal, bed bound, S/P Trach/ PEG tube, sent in to the ER from [facility]  for evaluation of coffee ground vomiting  and cough. On arrival, she found to have tachycardia, Leukocytosis, and RLL consolidation. She has started on Zosyn and Vancomycin and the ID consult requested to assist with further evaluation and antibiotic management.         REVIEW OF SYSTEMS: Unable to obtain due to mental status        PAST MEDICAL & SURGICAL HISTORY:  Hypertension, DVT   Schizophrenia   Diabetes mellitus  S/P percutaneous endoscopic gastrostomy (PEG) tube placement  H/O tracheostomy        SOCIAL HISTORY  Alcohol: Does not drink  Tobacco: Does not smoke  Illicit substance use: None        FAMILY HISTORY: Non contributory to the present illness        ALLERGIES : NKDA        T(C): 36.8 (18 @ 10:43), Max: 37 (18 @ 04:01)  HR: 94 (18 @ 10:43) (79 - 120)  BP: 134/81 (18 @ 10:43) (128/82 - 139/91)  RR: 20 (18 @ 10:43) (18 - 20)  SpO2: 100% (18 @ 10:43) (98% - 100%)  Wt(kg): --  I&O's Summary        PHYSICAL EXAM:  GENERAL: Not in distress  HEENT: Trach in placed  CHEST/LUNG: Air entry B/L  HEART: s1 and s2 present  ABDOMEN: Nontender, and Nondistended  EXTREMITIES:  No pedal  edema  CNS: Awake but not Alert        LABS:                        12.8   20.5  )-----------( 311      ( 14 Sep 2018 05:20 )             39.1               144  |  107  |  41<H>  ----------------------------<  58<L>  4.1   |  29  |  0.90    Ca    9.9      14 Sep 2018 05:20    TPro  7.3  /  Alb  2.7<L>  /  TBili  0.7  /  DBili  x   /  AST  17  /  ALT  24  /  AlkPhos  97  09-    PT/INR - ( 13 Sep 2018 11:31 )   PT: 11.4 sec;   INR: 1.04 ratio         PTT - ( 13 Sep 2018 11:31 )  PTT:31.6 sec  Urinalysis Basic - ( 14 Sep 2018 03:56 )    Color: Yellow / Appearance: Clear / S.020 / pH: x  Gluc: x / Ketone: Negative  / Bili: Negative / Urobili: Negative   Blood: x / Protein: 100 / Nitrite: Negative   Leuk Esterase: Negative / RBC: 0-2 /HPF / WBC 0-2 /HPF   Sq Epi: x / Non Sq Epi: Occasional /HPF / Bacteria: Trace /HPF      CAPILLARY BLOOD GLUCOSE      POCT Blood Glucose.: 144 mg/dL (14 Sep 2018 11:07)  POCT Blood Glucose.: 202 mg/dL (14 Sep 2018 08:09)  POCT Blood Glucose.: 277 mg/dL (14 Sep 2018 06:58)  POCT Blood Glucose.: 68 mg/dL (14 Sep 2018 06:03)  POCT Blood Glucose.: 316 mg/dL (14 Sep 2018 02:11)  POCT Blood Glucose.: 405 mg/dL (14 Sep 2018 00:28)  POCT Blood Glucose.: 386 mg/dL (13 Sep 2018 22:26)  POCT Blood Glucose.: 403 mg/dL (13 Sep 2018 19:09)        MEDICATIONS  (STANDING):  ALBUTerol/ipratropium for Nebulization 3 milliLiter(s) Nebulizer every 6 hours  amLODIPine   Tablet 2.5 milliGRAM(s) Oral daily  atorvastatin 20 milliGRAM(s) Oral at bedtime  benztropine 2 milliGRAM(s) Oral two times a day  heparin  Injectable 5000 Unit(s) SubCutaneous every 8 hours  insulin glargine Injectable (LANTUS) 26 Unit(s) SubCutaneous at bedtime  insulin lispro (HumaLOG) corrective regimen sliding scale   SubCutaneous every 6 hours  levETIRAcetam  Solution 1000 milliGRAM(s) Oral two times a day  metoprolol tartrate 100 milliGRAM(s) Oral two times a day  pantoprazole   Suspension 40 milliGRAM(s) Oral daily  piperacillin/tazobactam IVPB. 3.375 Gram(s) IV Intermittent every 8 hours  QUEtiapine 50 milliGRAM(s) Oral every 12 hours  senna 2 Tablet(s) Oral at bedtime  vancomycin  IVPB 750 milliGRAM(s) IV Intermittent every 12 hours    MEDICATIONS  (PRN):  LORazepam     Tablet 1 milliGRAM(s) Oral every 6 hours PRN Agitation        RADIOLOGY & ADDITIONAL TESTS:    18: CT Chest No Cont (18 @ 03:41) Impression: Consolidation involving the right lower lobe is of uncertain etiology. It is unclear whether this represents compressive atelectasis secondary to right pleural effusion/elevation of the right hemidiaphragm   and/or represents infection.    18: Xray Chest 1 View-PORTABLE IMMEDIATE (18 @ 13:37) Bilateral pleural effusions and associated atelectasis.

## 2018-09-15 LAB
ANION GAP SERPL CALC-SCNC: 7 MMOL/L — SIGNIFICANT CHANGE UP (ref 5–17)
BUN SERPL-MCNC: 35 MG/DL — HIGH (ref 7–18)
CALCIUM SERPL-MCNC: 9.5 MG/DL — SIGNIFICANT CHANGE UP (ref 8.4–10.5)
CHLORIDE SERPL-SCNC: 104 MMOL/L — SIGNIFICANT CHANGE UP (ref 96–108)
CO2 SERPL-SCNC: 30 MMOL/L — SIGNIFICANT CHANGE UP (ref 22–31)
CREAT SERPL-MCNC: 1.02 MG/DL — SIGNIFICANT CHANGE UP (ref 0.5–1.3)
GLUCOSE SERPL-MCNC: 204 MG/DL — HIGH (ref 70–99)
GRAM STN FLD: SIGNIFICANT CHANGE UP
HCT VFR BLD CALC: 39.2 % — SIGNIFICANT CHANGE UP (ref 34.5–45)
HGB BLD-MCNC: 12 G/DL — SIGNIFICANT CHANGE UP (ref 11.5–15.5)
MCHC RBC-ENTMCNC: 24.9 PG — LOW (ref 27–34)
MCHC RBC-ENTMCNC: 30.7 GM/DL — LOW (ref 32–36)
MCV RBC AUTO: 81.2 FL — SIGNIFICANT CHANGE UP (ref 80–100)
PLATELET # BLD AUTO: 261 K/UL — SIGNIFICANT CHANGE UP (ref 150–400)
POTASSIUM SERPL-MCNC: 4.2 MMOL/L — SIGNIFICANT CHANGE UP (ref 3.5–5.3)
POTASSIUM SERPL-SCNC: 4.2 MMOL/L — SIGNIFICANT CHANGE UP (ref 3.5–5.3)
RBC # BLD: 4.83 M/UL — SIGNIFICANT CHANGE UP (ref 3.8–5.2)
RBC # FLD: 13.3 % — SIGNIFICANT CHANGE UP (ref 10.3–14.5)
SODIUM SERPL-SCNC: 141 MMOL/L — SIGNIFICANT CHANGE UP (ref 135–145)
SPECIMEN SOURCE: SIGNIFICANT CHANGE UP
WBC # BLD: 9.7 K/UL — SIGNIFICANT CHANGE UP (ref 3.8–10.5)
WBC # FLD AUTO: 9.7 K/UL — SIGNIFICANT CHANGE UP (ref 3.8–10.5)

## 2018-09-15 RX ADMIN — HEPARIN SODIUM 5000 UNIT(S): 5000 INJECTION INTRAVENOUS; SUBCUTANEOUS at 22:17

## 2018-09-15 RX ADMIN — PIPERACILLIN AND TAZOBACTAM 25 GRAM(S): 4; .5 INJECTION, POWDER, LYOPHILIZED, FOR SOLUTION INTRAVENOUS at 14:48

## 2018-09-15 RX ADMIN — SENNA PLUS 2 TABLET(S): 8.6 TABLET ORAL at 22:17

## 2018-09-15 RX ADMIN — Medication 5: at 01:46

## 2018-09-15 RX ADMIN — INSULIN GLARGINE 26 UNIT(S): 100 INJECTION, SOLUTION SUBCUTANEOUS at 22:27

## 2018-09-15 RX ADMIN — Medication 3 MILLILITER(S): at 14:48

## 2018-09-15 RX ADMIN — QUETIAPINE FUMARATE 50 MILLIGRAM(S): 200 TABLET, FILM COATED ORAL at 18:15

## 2018-09-15 RX ADMIN — Medication 2 MILLIGRAM(S): at 06:10

## 2018-09-15 RX ADMIN — Medication 250 MILLIGRAM(S): at 18:44

## 2018-09-15 RX ADMIN — LEVETIRACETAM 1000 MILLIGRAM(S): 250 TABLET, FILM COATED ORAL at 18:44

## 2018-09-15 RX ADMIN — Medication 250 MILLIGRAM(S): at 06:10

## 2018-09-15 RX ADMIN — HEPARIN SODIUM 5000 UNIT(S): 5000 INJECTION INTRAVENOUS; SUBCUTANEOUS at 14:35

## 2018-09-15 RX ADMIN — Medication 2: at 06:13

## 2018-09-15 RX ADMIN — QUETIAPINE FUMARATE 50 MILLIGRAM(S): 200 TABLET, FILM COATED ORAL at 06:10

## 2018-09-15 RX ADMIN — Medication 1 MILLIGRAM(S): at 23:50

## 2018-09-15 RX ADMIN — Medication 2 MILLIGRAM(S): at 18:14

## 2018-09-15 RX ADMIN — Medication 4: at 18:44

## 2018-09-15 RX ADMIN — AMLODIPINE BESYLATE 2.5 MILLIGRAM(S): 2.5 TABLET ORAL at 06:13

## 2018-09-15 RX ADMIN — Medication 1: at 12:45

## 2018-09-15 RX ADMIN — Medication 3 MILLILITER(S): at 09:42

## 2018-09-15 RX ADMIN — Medication 100 MILLIGRAM(S): at 06:11

## 2018-09-15 RX ADMIN — Medication 3 MILLILITER(S): at 22:17

## 2018-09-15 RX ADMIN — ATORVASTATIN CALCIUM 20 MILLIGRAM(S): 80 TABLET, FILM COATED ORAL at 22:18

## 2018-09-15 RX ADMIN — Medication 3 MILLILITER(S): at 04:40

## 2018-09-15 RX ADMIN — PIPERACILLIN AND TAZOBACTAM 25 GRAM(S): 4; .5 INJECTION, POWDER, LYOPHILIZED, FOR SOLUTION INTRAVENOUS at 05:44

## 2018-09-15 RX ADMIN — HEPARIN SODIUM 5000 UNIT(S): 5000 INJECTION INTRAVENOUS; SUBCUTANEOUS at 06:13

## 2018-09-15 RX ADMIN — LEVETIRACETAM 1000 MILLIGRAM(S): 250 TABLET, FILM COATED ORAL at 06:10

## 2018-09-15 RX ADMIN — PANTOPRAZOLE SODIUM 40 MILLIGRAM(S): 20 TABLET, DELAYED RELEASE ORAL at 16:45

## 2018-09-15 RX ADMIN — PIPERACILLIN AND TAZOBACTAM 25 GRAM(S): 4; .5 INJECTION, POWDER, LYOPHILIZED, FOR SOLUTION INTRAVENOUS at 22:18

## 2018-09-15 NOTE — DIETITIAN INITIAL EVALUATION ADULT. - PROBLEM SELECTOR PLAN 1
c/o coffee ground vomiting x3 at NH, no episode of vomiting in ED  Hb 12 at baseline , less likely UGI Bleeding  started protonix 40mg bid IV  repeat CBC q8hrs  keep NPO , on IVF  GI consulted Dr. Harris

## 2018-09-15 NOTE — DIETITIAN INITIAL EVALUATION ADULT. - PROBLEM SELECTOR PLAN 2
Complicated Aspiration PNA  afebrile , WBC 17.6 K   CXR shows Bilateral pleural effusions and associated atelectasis  Pt was supposed to be given IV vanco and zosyn for 7 day by PCP for PNA , which supposed to start from 09/13-09/19.   started zosyn for now   f/u blood culture, UA, sputum culture  f/u CT chest  repeat CBC in am

## 2018-09-15 NOTE — PROGRESS NOTE ADULT - SUBJECTIVE AND OBJECTIVE BOX
Patient seen and examined at bedside earlier today  No new acute events noted overnight  Case discussed with medical team    HPI:  64 yo F from [facility], non-verbal, bed bound, S/P Trach/ PEG tube, PMH of HTN, asthma, convulsion disorder, bipolar, DVT legs, DM 2, sent from NH for coffee ground vomiting X3 this morning and cough.  Pt unable to provide history.  History given by sister (Sharon [phone])who states pt was vomiting coffee ground emesis this morning. Pt had no reported vomiting since in ED.  Pt also developed cough with clear sputum.  Sister states pt is combative at baseline, and her current behavior is baseline.  No reported fever, discomfort, diarrhea, or SOB.  Pt was recently admitted to [facility] in 2018 for same reason and treated for aspiration PNA and UTI during last admission.  Pt was supposed to be given IV vanco and zosyn for 7 day by PCP for PNA , which supposed to start from -. Pt is DNR, MOSLT form in charts. (13 Sep 2018 15:49)      PAST MEDICAL & SURGICAL HISTORY:  No pertinent past medical history  Hypertension  Schizophrenia  Diabetic coma  Diabetes mellitus  No significant past surgical history  S/P percutaneous endoscopic gastrostomy (PEG) tube placement  H/O tracheostomy    angiotensin converting enzyme inhibitors (Unknown)     MEDICATIONS  (STANDING):  ALBUTerol/ipratropium for Nebulization 3 milliLiter(s) Nebulizer every 6 hours  amLODIPine   Tablet 2.5 milliGRAM(s) Oral daily  atorvastatin 20 milliGRAM(s) Oral at bedtime  benztropine 2 milliGRAM(s) Oral two times a day  heparin  Injectable 5000 Unit(s) SubCutaneous every 8 hours  insulin glargine Injectable (LANTUS) 26 Unit(s) SubCutaneous at bedtime  insulin lispro (HumaLOG) corrective regimen sliding scale   SubCutaneous every 6 hours  levETIRAcetam  Solution 1000 milliGRAM(s) Oral two times a day  metoprolol tartrate 100 milliGRAM(s) Oral two times a day  pantoprazole   Suspension 40 milliGRAM(s) Oral daily  piperacillin/tazobactam IVPB. 3.375 Gram(s) IV Intermittent every 8 hours  QUEtiapine 50 milliGRAM(s) Oral every 12 hours  senna 2 Tablet(s) Oral at bedtime  vancomycin  IVPB 750 milliGRAM(s) IV Intermittent every 12 hours    MEDICATIONS  (PRN):  LORazepam     Tablet 1 milliGRAM(s) Oral every 6 hours PRN Agitation      REVIEW OF SYSTEMS:  limited 2/2 mental status and nonverbal    T(C): 36.6 (09-15-18 @ 07:39), Max: 36.8 (18 @ 10:43)  HR: 90 (09-15-18 @ 07:39) (75 - 111)  BP: 109/62 (09-15-18 @ 07:39) (109/62 - 147/88)  RR: 18 (09-15-18 @ 07:39) (18 - 20)  SpO2: 98% (09-15-18 @ 07:39) (98% - 100%)    PHYSICAL EXAMINATION:   Constitutional: NAD  HEENT: AT  Neck:  Supple  Respiratory: basilar rales/rhonchi. Adequate airflow b/l. Not using accessory muscles of respiration.  Cardiovascular:  S1 & S2 intact, no R/G, 2+ radial pulses b/l  Gastrointestinal: Soft, ND, normoactive b.s.,  Extremities: WWP  Neurological:  awake. stable    Labs and imaging reviewed    LABS:                        12.0   9.7   )-----------( 261      ( 15 Sep 2018 06:05 )             39.2     09-15    141  |  104  |  35<H>  ----------------------------<  204<H>  4.2   |  30  |  1.02    Ca    9.5      15 Sep 2018 06:05    TPro  7.3  /  Alb  2.7<L>  /  TBili  0.7  /  DBili  x   /  AST  17  /  ALT  24  /  AlkPhos  97  09-13    CARDIAC MARKERS ( 13 Sep 2018 11:31 )  <0.015 ng/mL / x     / x     / x     / x          PT/INR - ( 13 Sep 2018 11:31 )   PT: 11.4 sec;   INR: 1.04 ratio         PTT - ( 13 Sep 2018 11:31 )  PTT:31.6 sec  Urinalysis Basic - ( 14 Sep 2018 03:56 )    Color: Yellow / Appearance: Clear / S.020 / pH: x  Gluc: x / Ketone: Negative  / Bili: Negative / Urobili: Negative   Blood: x / Protein: 100 / Nitrite: Negative   Leuk Esterase: Negative / RBC: 0-2 /HPF / WBC 0-2 /HPF   Sq Epi: x / Non Sq Epi: Occasional /HPF / Bacteria: Trace /HPF      CAPILLARY BLOOD GLUCOSE      POCT Blood Glucose.: 204 mg/dL (15 Sep 2018 06:10)  POCT Blood Glucose.: 366 mg/dL (15 Sep 2018 01:41)  POCT Blood Glucose.: 276 mg/dL (14 Sep 2018 22:21)  POCT Blood Glucose.: 289 mg/dL (14 Sep 2018 16:07)  POCT Blood Glucose.: 144 mg/dL (14 Sep 2018 11:07)        Culture - Sputum (collected 18 @ 18:26)  Source: .Sputum Sputum  Gram Stain (18 @ 21:32):    Rare polymorphonuclear leukocytes per low power field    No Squamous epithelial cells per low power field    Numerous Gram Negative Rods per oil power field    Few Gram Positive Rods per oil power field    Few Yeast like cells per oil power field      LIVER FUNCTIONS - ( 13 Sep 2018 11:31 )  Alb: 2.7 g/dL / Pro: 7.3 g/dL / ALK PHOS: 97 U/L / ALT: 24 U/L DA / AST: 17 U/L / GGT: x               RADIOLOGY & ADDITIONAL STUDIES:

## 2018-09-15 NOTE — PROGRESS NOTE ADULT - PROBLEM SELECTOR PLAN 1
Complicated Aspiration PNA, HCAP  C/w Zosyn and Vancomycin IVPB    F/u cultures to final date. Aspiration precautions. Nebs. Monitor clinical status closely and adjust rx prn.  All medical consultant recs/management appreciated

## 2018-09-15 NOTE — DIETITIAN INITIAL EVALUATION ADULT. - DIET TYPE
Glucerna 1.2 at 25ml/hx16, goal of 35x16 ( 560ml,672kcal,33g Protein, 451ml free water )/NPO with tube feedings

## 2018-09-15 NOTE — PROGRESS NOTE ADULT - ASSESSMENT
A 64 yo Female  with  non-verbal, bed bound, S/P Trach/ PEG tube, sent in to the ER from [facility]  for evaluation of coffee ground vomiting  and cough. On arrival, she found to have tachycardia, Leukocytosis, and RLL consolidation. She has started on Zosyn and Vancomycin and the ID consult requested to assist with further evaluation and antibiotic management.     # Aspiration pneumonia - RLL consolidation     Would recommend:    1. Follow up  Sputum culture  2. Obtain  MRSA/MSSA PCR   3. Continue Zosyn and Vancomycin until work up is done  4. Monitor and Keep Vancomycin level between 15 to 20  5. Bronchial suctioning  and Aspiration  precaution       d/w ER Nursing staff    will follow the patient with you

## 2018-09-15 NOTE — DIETITIAN INITIAL EVALUATION ADULT. - MD RECOMMEND
suggest to increase Glucerna 1.2 to new goal of 65x16 ( 1040ml,1248kcal,62g Protein, 837ml free water), MD to monitor/assess fluid status as needed.

## 2018-09-15 NOTE — DIETITIAN INITIAL EVALUATION ADULT. - PERTINENT LABORATORY DATA
09-15 Na141 mmol/L Glu 204 mg/dL<H> K+ 4.2 mmol/L Cr  1.02 mg/dL BUN 35 mg/dL<H> 09-13 Alb 2.7 g/dL<L> 09-14 [id number] 11.4 %<H> 09-14 Chol 145 mg/dL LDL 69 mg/dL HDL 43 mg/dL<L> Trig 167 mg/dL<H>

## 2018-09-15 NOTE — PROGRESS NOTE ADULT - SUBJECTIVE AND OBJECTIVE BOX
Patient is seen and examined at the bed side, is afebrile. The Leukocytosis trended down to normal. The Sputum culture is pending.       REVIEW OF SYSTEMS: Unable to obtain due to mental status      ALLERGIES : NKDA      Vital Signs Last 24 Hrs  T(C): 36.7 (15 Sep 2018 11:41), Max: 36.8 (14 Sep 2018 17:04)  T(F): 98.1 (15 Sep 2018 11:41), Max: 98.3 (14 Sep 2018 17:04)  HR: 86 (15 Sep 2018 11:41) (75 - 111)  BP: 135/60 (15 Sep 2018 11:41) (109/62 - 147/88)  BP(mean): --  RR: 18 (15 Sep 2018 11:41) (18 - 20)  SpO2: 99% (15 Sep 2018 11:41) (98% - 100%)      PHYSICAL EXAM:  GENERAL: Not in distress  HEENT: Trach in placed  CHEST/LUNG: Air entry B/L  HEART: s1 and s2 present  ABDOMEN: Nontender, and Nondistended  EXTREMITIES:  No pedal  edema  CNS: Awake but not Alert        LABS:                        12.0   9.7   )-----------( 261      ( 15 Sep 2018 06:05 )             39.2                           12.8   20.5  )-----------( 311      ( 14 Sep 2018 05:20 )             39.1         15    141  |  104  |  35<H>  ----------------------------<  204<H>  4.2   |  30  |  1.02    Ca    9.5      15 Sep 2018 06:05      09-14    144  |  107  |  41<H>  ----------------------------<  58<L>  4.1   |  29  |  0.90    Ca    9.9      14 Sep 2018 05:20    TPro  7.3  /  Alb  2.7<L>  /  TBili  0.7  /  DBili  x   /  AST  17  /  ALT  24  /  AlkPhos  97  09-13    PT/INR - ( 13 Sep 2018 11:31 )   PT: 11.4 sec;   INR: 1.04 ratio         PTT - ( 13 Sep 2018 11:31 )  PTT:31.6 sec  Urinalysis Basic - ( 14 Sep 2018 03:56 )    Color: Yellow / Appearance: Clear / S.020 / pH: x  Gluc: x / Ketone: Negative  / Bili: Negative / Urobili: Negative   Blood: x / Protein: 100 / Nitrite: Negative   Leuk Esterase: Negative / RBC: 0-2 /HPF / WBC 0-2 /HPF   Sq Epi: x / Non Sq Epi: Occasional /HPF / Bacteria: Trace /HPF      CAPILLARY BLOOD GLUCOSE      POCT Blood Glucose.: 144 mg/dL (14 Sep 2018 11:07)  POCT Blood Glucose.: 202 mg/dL (14 Sep 2018 08:09)  POCT Blood Glucose.: 277 mg/dL (14 Sep 2018 06:58)  POCT Blood Glucose.: 68 mg/dL (14 Sep 2018 06:03)  POCT Blood Glucose.: 316 mg/dL (14 Sep 2018 02:11)  POCT Blood Glucose.: 405 mg/dL (14 Sep 2018 00:28)  POCT Blood Glucose.: 386 mg/dL (13 Sep 2018 22:26)  POCT Blood Glucose.: 403 mg/dL (13 Sep 2018 19:09)        MEDICATIONS  (STANDING):  ALBUTerol/ipratropium for Nebulization 3 milliLiter(s) Nebulizer every 6 hours  amLODIPine   Tablet 2.5 milliGRAM(s) Oral daily  atorvastatin 20 milliGRAM(s) Oral at bedtime  benztropine 2 milliGRAM(s) Oral two times a day  heparin  Injectable 5000 Unit(s) SubCutaneous every 8 hours  insulin glargine Injectable (LANTUS) 26 Unit(s) SubCutaneous at bedtime  insulin lispro (HumaLOG) corrective regimen sliding scale   SubCutaneous every 6 hours  levETIRAcetam  Solution 1000 milliGRAM(s) Oral two times a day  metoprolol tartrate 100 milliGRAM(s) Oral two times a day  pantoprazole   Suspension 40 milliGRAM(s) Oral daily  piperacillin/tazobactam IVPB. 3.375 Gram(s) IV Intermittent every 8 hours  QUEtiapine 50 milliGRAM(s) Oral every 12 hours  senna 2 Tablet(s) Oral at bedtime  vancomycin  IVPB 750 milliGRAM(s) IV Intermittent every 12 hours    MEDICATIONS  (PRN):  LORazepam     Tablet 1 milliGRAM(s) Oral every 6 hours PRN Agitation          RADIOLOGY & ADDITIONAL TESTS:    18: CT Chest No Cont (18 @ 03:41) Impression: Consolidation involving the right lower lobe is of uncertain etiology. It is unclear whether this represents compressive atelectasis secondary to right pleural effusion/elevation of the right hemidiaphragm   and/or represents infection.    18: Xray Chest 1 View-PORTABLE IMMEDIATE (18 @ 13:37) Bilateral pleural effusions and associated atelectasis.        MICROBIOLOGY DATA:    Culture - Sputum . (09.15.18 @ 09:28)    Gram Stain:   Rare polymorphonuclear leukocytes per low power field  Few Squamous epithelial cells per low power field  Moderate Gram Negative Rods per oil power field  Few Gram Positive Rods per oil power field  Few Yeast like cells per oil power field  Rare Gram Negative Coccobacilli per oil power field    Specimen Source: .Sputum Sputum        Culture - Sputum . (18 @ 18:26)    Gram Stain:   Rare polymorphonuclear leukocytes per low power field  No Squamous epithelial cells per low power field  Numerous Gram Negative Rods per oil power field  Few Gram Positive Rods per oil power field  Few Yeast like cells per oil power field    Specimen Source: .Sputum Sputum      Culture - Urine (18 @ 09:47)    Specimen Source: .Urine Catheterized    Culture Results:   10,000 - 49,000 CFU/mL Yeast

## 2018-09-16 DIAGNOSIS — E11.65 TYPE 2 DIABETES MELLITUS WITH HYPERGLYCEMIA: ICD-10-CM

## 2018-09-16 DIAGNOSIS — R46.89 OTHER SYMPTOMS AND SIGNS INVOLVING APPEARANCE AND BEHAVIOR: ICD-10-CM

## 2018-09-16 LAB
-  AMIKACIN: SIGNIFICANT CHANGE UP
-  AZTREONAM: SIGNIFICANT CHANGE UP
-  CEFEPIME: SIGNIFICANT CHANGE UP
-  CEFTAZIDIME: SIGNIFICANT CHANGE UP
-  CIPROFLOXACIN: SIGNIFICANT CHANGE UP
-  GENTAMICIN: SIGNIFICANT CHANGE UP
-  IMIPENEM: SIGNIFICANT CHANGE UP
-  LEVOFLOXACIN: SIGNIFICANT CHANGE UP
-  MEROPENEM: SIGNIFICANT CHANGE UP
-  PIPERACILLIN/TAZOBACTAM: SIGNIFICANT CHANGE UP
-  TOBRAMYCIN: SIGNIFICANT CHANGE UP
CULTURE RESULTS: SIGNIFICANT CHANGE UP
CULTURE RESULTS: SIGNIFICANT CHANGE UP
METHOD TYPE: SIGNIFICANT CHANGE UP
MRSA PCR RESULT.: SIGNIFICANT CHANGE UP
ORGANISM # SPEC MICROSCOPIC CNT: SIGNIFICANT CHANGE UP
ORGANISM # SPEC MICROSCOPIC CNT: SIGNIFICANT CHANGE UP
S AUREUS DNA NOSE QL NAA+PROBE: SIGNIFICANT CHANGE UP
SPECIMEN SOURCE: SIGNIFICANT CHANGE UP
SPECIMEN SOURCE: SIGNIFICANT CHANGE UP

## 2018-09-16 PROCEDURE — 71045 X-RAY EXAM CHEST 1 VIEW: CPT | Mod: 26

## 2018-09-16 RX ORDER — AMPICILLIN SODIUM AND SULBACTAM SODIUM 250; 125 MG/ML; MG/ML
3 INJECTION, POWDER, FOR SUSPENSION INTRAMUSCULAR; INTRAVENOUS ONCE
Qty: 0 | Refills: 0 | Status: COMPLETED | OUTPATIENT
Start: 2018-09-16 | End: 2018-09-16

## 2018-09-16 RX ORDER — INSULIN LISPRO 100/ML
4 VIAL (ML) SUBCUTANEOUS
Qty: 0 | Refills: 0 | Status: DISCONTINUED | OUTPATIENT
Start: 2018-09-16 | End: 2018-09-18

## 2018-09-16 RX ORDER — AMPICILLIN SODIUM AND SULBACTAM SODIUM 250; 125 MG/ML; MG/ML
3 INJECTION, POWDER, FOR SUSPENSION INTRAMUSCULAR; INTRAVENOUS EVERY 6 HOURS
Qty: 0 | Refills: 0 | Status: DISCONTINUED | OUTPATIENT
Start: 2018-09-17 | End: 2018-09-17

## 2018-09-16 RX ORDER — INSULIN GLARGINE 100 [IU]/ML
30 INJECTION, SOLUTION SUBCUTANEOUS AT BEDTIME
Qty: 0 | Refills: 0 | Status: DISCONTINUED | OUTPATIENT
Start: 2018-09-16 | End: 2018-09-17

## 2018-09-16 RX ORDER — POLYMYXIN B SULFATE 500000 [USP'U]/1
720000 INJECTION, POWDER, LYOPHILIZED, FOR SOLUTION INTRAMUSCULAR; INTRATHECAL; INTRAVENOUS; OPHTHALMIC EVERY 12 HOURS
Qty: 0 | Refills: 0 | Status: DISCONTINUED | OUTPATIENT
Start: 2018-09-16 | End: 2018-09-16

## 2018-09-16 RX ORDER — POLYMYXIN B SULFATE 500000 [USP'U]/1
560000 INJECTION, POWDER, LYOPHILIZED, FOR SOLUTION INTRAMUSCULAR; INTRATHECAL; INTRAVENOUS; OPHTHALMIC EVERY 12 HOURS
Qty: 0 | Refills: 0 | Status: DISCONTINUED | OUTPATIENT
Start: 2018-09-16 | End: 2018-09-26

## 2018-09-16 RX ORDER — AMPICILLIN SODIUM AND SULBACTAM SODIUM 250; 125 MG/ML; MG/ML
INJECTION, POWDER, FOR SUSPENSION INTRAMUSCULAR; INTRAVENOUS
Qty: 0 | Refills: 0 | Status: DISCONTINUED | OUTPATIENT
Start: 2018-09-16 | End: 2018-09-17

## 2018-09-16 RX ADMIN — Medication 4 UNIT(S): at 17:40

## 2018-09-16 RX ADMIN — AMLODIPINE BESYLATE 2.5 MILLIGRAM(S): 2.5 TABLET ORAL at 06:45

## 2018-09-16 RX ADMIN — INSULIN GLARGINE 30 UNIT(S): 100 INJECTION, SOLUTION SUBCUTANEOUS at 23:22

## 2018-09-16 RX ADMIN — Medication 3 MILLILITER(S): at 20:05

## 2018-09-16 RX ADMIN — Medication 3 MILLILITER(S): at 10:27

## 2018-09-16 RX ADMIN — Medication 1 MILLIGRAM(S): at 05:50

## 2018-09-16 RX ADMIN — AMPICILLIN SODIUM AND SULBACTAM SODIUM 200 GRAM(S): 250; 125 INJECTION, POWDER, FOR SUSPENSION INTRAMUSCULAR; INTRAVENOUS at 18:45

## 2018-09-16 RX ADMIN — Medication 100 MILLIGRAM(S): at 06:46

## 2018-09-16 RX ADMIN — QUETIAPINE FUMARATE 50 MILLIGRAM(S): 200 TABLET, FILM COATED ORAL at 05:46

## 2018-09-16 RX ADMIN — Medication 3: at 23:22

## 2018-09-16 RX ADMIN — HEPARIN SODIUM 5000 UNIT(S): 5000 INJECTION INTRAVENOUS; SUBCUTANEOUS at 05:49

## 2018-09-16 RX ADMIN — LEVETIRACETAM 1000 MILLIGRAM(S): 250 TABLET, FILM COATED ORAL at 05:46

## 2018-09-16 RX ADMIN — PIPERACILLIN AND TAZOBACTAM 25 GRAM(S): 4; .5 INJECTION, POWDER, LYOPHILIZED, FOR SOLUTION INTRAVENOUS at 13:57

## 2018-09-16 RX ADMIN — HEPARIN SODIUM 5000 UNIT(S): 5000 INJECTION INTRAVENOUS; SUBCUTANEOUS at 13:57

## 2018-09-16 RX ADMIN — Medication 3: at 11:22

## 2018-09-16 RX ADMIN — AMPICILLIN SODIUM AND SULBACTAM SODIUM 200 GRAM(S): 250; 125 INJECTION, POWDER, FOR SUSPENSION INTRAMUSCULAR; INTRAVENOUS at 23:58

## 2018-09-16 RX ADMIN — ATORVASTATIN CALCIUM 20 MILLIGRAM(S): 80 TABLET, FILM COATED ORAL at 22:22

## 2018-09-16 RX ADMIN — QUETIAPINE FUMARATE 50 MILLIGRAM(S): 200 TABLET, FILM COATED ORAL at 17:39

## 2018-09-16 RX ADMIN — Medication 2 MILLIGRAM(S): at 17:39

## 2018-09-16 RX ADMIN — SENNA PLUS 2 TABLET(S): 8.6 TABLET ORAL at 22:22

## 2018-09-16 RX ADMIN — Medication 2 MILLIGRAM(S): at 05:46

## 2018-09-16 RX ADMIN — Medication 3 MILLILITER(S): at 18:29

## 2018-09-16 RX ADMIN — POLYMYXIN B SULFATE 500 UNIT(S): 500000 INJECTION, POWDER, LYOPHILIZED, FOR SOLUTION INTRAMUSCULAR; INTRATHECAL; INTRAVENOUS; OPHTHALMIC at 20:35

## 2018-09-16 RX ADMIN — Medication 3 MILLILITER(S): at 03:41

## 2018-09-16 RX ADMIN — Medication 1 MILLIGRAM(S): at 22:22

## 2018-09-16 RX ADMIN — HEPARIN SODIUM 5000 UNIT(S): 5000 INJECTION INTRAVENOUS; SUBCUTANEOUS at 22:22

## 2018-09-16 RX ADMIN — Medication 3: at 01:54

## 2018-09-16 RX ADMIN — PANTOPRAZOLE SODIUM 40 MILLIGRAM(S): 20 TABLET, DELAYED RELEASE ORAL at 13:23

## 2018-09-16 RX ADMIN — Medication 100 MILLIGRAM(S): at 17:39

## 2018-09-16 RX ADMIN — Medication 1 MILLIGRAM(S): at 12:28

## 2018-09-16 RX ADMIN — LEVETIRACETAM 1000 MILLIGRAM(S): 250 TABLET, FILM COATED ORAL at 17:39

## 2018-09-16 RX ADMIN — PIPERACILLIN AND TAZOBACTAM 25 GRAM(S): 4; .5 INJECTION, POWDER, LYOPHILIZED, FOR SOLUTION INTRAVENOUS at 05:49

## 2018-09-16 NOTE — PROGRESS NOTE ADULT - SUBJECTIVE AND OBJECTIVE BOX
Patient is seen and examined at the bed side, is afebrile. The  Sputum culture is growing Pseudomonas and Acinetobacter, sensitivity is pending. The MRSA PCR is negative.      REVIEW OF SYSTEMS: Unable to obtain due to mental status      ALLERGIES : NKDA      Vital Signs Last 24 Hrs  T(C): 36.7 (16 Sep 2018 16:41), Max: 36.8 (16 Sep 2018 00:00)  T(F): 98 (16 Sep 2018 16:41), Max: 98.3 (16 Sep 2018 00:00)  HR: 87 (16 Sep 2018 16:41) (77 - 92)  BP: 122/96 (16 Sep 2018 16:41) (122/65 - 143/77)  BP(mean): --  RR: 16 (16 Sep 2018 16:41) (16 - 18)  SpO2: 97% (16 Sep 2018 16:41) (97% - 100%)      PHYSICAL EXAM:  GENERAL: Not in distress  HEENT: Trach in placed  CHEST/LUNG: Air entry B/L  HEART: s1 and s2 present  ABDOMEN: Nontender, and Nondistended  EXTREMITIES:  No pedal  edema  CNS: Awake but not Alert        LABS: No new Labs                        12.0   9.7   )-----------( 261      ( 15 Sep 2018 06:05 )             39.2                           12.8   20.5  )-----------( 311      ( 14 Sep 2018 05:20 )             39.1         09-15    141  |  104  |  35<H>  ----------------------------<  204<H>  4.2   |  30  |  1.02    Ca    9.5      15 Sep 2018 06:05      09-14    144  |  107  |  41<H>  ----------------------------<  58<L>  4.1   |  29  |  0.90    Ca    9.9      14 Sep 2018 05:20    TPro  7.3  /  Alb  2.7<L>  /  TBili  0.7  /  DBili  x   /  AST  17  /  ALT  24  /  AlkPhos  97  09-13    PT/INR - ( 13 Sep 2018 11:31 )   PT: 11.4 sec;   INR: 1.04 ratio         PTT - ( 13 Sep 2018 11:31 )  PTT:31.6 sec  Urinalysis Basic - ( 14 Sep 2018 03:56 )    Color: Yellow / Appearance: Clear / S.020 / pH: x  Gluc: x / Ketone: Negative  / Bili: Negative / Urobili: Negative   Blood: x / Protein: 100 / Nitrite: Negative   Leuk Esterase: Negative / RBC: 0-2 /HPF / WBC 0-2 /HPF   Sq Epi: x / Non Sq Epi: Occasional /HPF / Bacteria: Trace /HPF      CAPILLARY BLOOD GLUCOSE      POCT Blood Glucose.: 144 mg/dL (14 Sep 2018 11:07)  POCT Blood Glucose.: 202 mg/dL (14 Sep 2018 08:09)  POCT Blood Glucose.: 277 mg/dL (14 Sep 2018 06:58)  POCT Blood Glucose.: 68 mg/dL (14 Sep 2018 06:03)  POCT Blood Glucose.: 316 mg/dL (14 Sep 2018 02:11)  POCT Blood Glucose.: 405 mg/dL (14 Sep 2018 00:28)  POCT Blood Glucose.: 386 mg/dL (13 Sep 2018 22:26)  POCT Blood Glucose.: 403 mg/dL (13 Sep 2018 19:09)      MEDICATIONS  (STANDING):  ALBUTerol/ipratropium for Nebulization 3 milliLiter(s) Nebulizer every 6 hours  amLODIPine   Tablet 2.5 milliGRAM(s) Oral daily  atorvastatin 20 milliGRAM(s) Oral at bedtime  benztropine 2 milliGRAM(s) Oral two times a day  heparin  Injectable 5000 Unit(s) SubCutaneous every 8 hours  insulin glargine Injectable (LANTUS) 30 Unit(s) SubCutaneous at bedtime  insulin lispro (HumaLOG) corrective regimen sliding scale   SubCutaneous every 6 hours  insulin lispro Injectable (HumaLOG) 4 Unit(s) SubCutaneous three times a day with meals  levETIRAcetam  Solution 1000 milliGRAM(s) Oral two times a day  metoprolol tartrate 100 milliGRAM(s) Oral two times a day  pantoprazole   Suspension 40 milliGRAM(s) Oral daily  piperacillin/tazobactam IVPB. 3.375 Gram(s) IV Intermittent every 8 hours  QUEtiapine 50 milliGRAM(s) Oral every 12 hours  senna 2 Tablet(s) Oral at bedtime  vancomycin  IVPB 750 milliGRAM(s) IV Intermittent every 12 hours    MEDICATIONS  (PRN):  LORazepam     Tablet 1 milliGRAM(s) Oral every 6 hours PRN Agitation  LORazepam   Injectable 1 milliGRAM(s) IV Push every 12 hours PRN Breakthrough Agitation/Assaultive Behavior that is uncontrolled with oral ativan          RADIOLOGY & ADDITIONAL TESTS:    18: CT Chest No Cont (18 @ 03:41) Impression: Consolidation involving the right lower lobe is of uncertain etiology. It is unclear whether this represents compressive atelectasis secondary to right pleural effusion/elevation of the right hemidiaphragm   and/or represents infection.    18: Xray Chest 1 View-PORTABLE IMMEDIATE (18 @ 13:37) Bilateral pleural effusions and associated atelectasis.        MICROBIOLOGY DATA:    MRSA/MSSA PCR (18 @ 11:26)    MRSA PCR Result.: Andrew: MRSA/MSSA PCR assay is a qualitative in vitro diagnostic test for the  direct detection and differentiation of methicillin-resistant  Staphylococcus aureus (MRSA) from Staphylococcus aureus (SA). The assay  detects DNA from nasal swabs in patients atrisk for nasal colonization.  It is not intended to diagnose MRSA or SA infections nor guide or monitor  treatment for MRSA/SA infections. A negative result does not preclude  nasal colonization. The assay is FDA-approved and its performance has  been established by [employer] [place] and the [facility], [place], [place].    Staph Aureus PCR Result: [place]    Culture - Sputum . (09.15.18 @ 09:28)    Gram Stain:   Rare polymorphonuclear leukocytes per low power field  Few Squamous epithelial cells per low power field  Moderate Gram Negative Rods per oil power field  Few Gram Positive Rods per oil power field  Few Yeast like cells per oil power field  Rare Gram Negative Coccobacilli per oil power field    Specimen Source: .Sputum Sputum    Culture Results:   Numerous Pseudomonas aeruginosa  Moderate Acinetobacter baumannii Complex  Normal Respiratory Moraima present      Culture - Sputum . (18 @ 18:26)    Gram Stain:   Rare polymorphonuclear leukocytes per low power field  No Squamous epithelial cells per low power field  Numerous Gram Negative Rods per oil power field  Few Gram Positive Rods per oil power field  Few Yeast like cells per oil power field    Specimen Source: .Sputum Sputum      Culture - Urine (18 @ 09:47)    Specimen Source: .Urine Catheterized    Culture Results:   10,000 - 49,000 CFU/mL Yeast

## 2018-09-16 NOTE — PROGRESS NOTE ADULT - PROBLEM SELECTOR PLAN 2
c/w seroquel and benztropine  ativan po q6h, ativan ivp prn breakthrough combative/assaultive behavior  avoid haldol and/or QTc prolongation rx

## 2018-09-16 NOTE — ED ADULT NURSE REASSESSMENT NOTE - NS ED NURSE REASSESS COMMENT FT1
Pt started on Glucerna 1.2 sari 25 ml/hr no residual aspirated 30 cc flush prior no resistance. Sputum sample collected from trach, full am care and irwin care provided.
Pt. cleaned and turned. Pt. suctioned through out the shift. Pt. is combative and kicks staff when attempting to provide treatment. Ativan PRN given for agitation. Will continue to monitor closely.
Pt. is combative and kicks staff when attempting to draw blood. Unable to draw Vanco troph at this time. Ativan 1 mg PO given with no effect. Pt. cleaned and changed. AM shift will attempt to draw blood.
secretions suctioned as needed, changed position to sides, medicated as ordered, will cont. care.
no changes noted V/S WNL, attached to O2 via tracheostomy,  feeding in process no episode ov vomiting in ED noted awaiting for bed availability safety and comfort maintained Pt transferred to [facility] endorsed to Malaika TEMPLETON

## 2018-09-16 NOTE — PROGRESS NOTE ADULT - SUBJECTIVE AND OBJECTIVE BOX
Interval Events:  pt agitated  on tube feeds    Allergies    angiotensin converting enzyme inhibitors (Unknown)    Intolerances      Endocrine/Metabolic Medications:  atorvastatin 20 milliGRAM(s) Oral at bedtime  insulin glargine Injectable (LANTUS) 30 Unit(s) SubCutaneous at bedtime  insulin lispro (HumaLOG) corrective regimen sliding scale   SubCutaneous every 6 hours      Vital Signs Last 24 Hrs  T(C): 36.7 (16 Sep 2018 07:50), Max: 36.8 (16 Sep 2018 00:00)  T(F): 98 (16 Sep 2018 07:50), Max: 98.3 (16 Sep 2018 00:00)  HR: 77 (16 Sep 2018 07:50) (77 - 91)  BP: 125/62 (16 Sep 2018 07:50) (125/62 - 143/77)  BP(mean): --  RR: 16 (16 Sep 2018 07:50) (16 - 18)  SpO2: 97% (16 Sep 2018 07:50) (97% - 98%)      PHYSICAL EXAM  All physical exam findings normal, except those marked:  General:	Alert, active, cooperative, NAD, well hydrated  .		[] Abnormal:  Neck		Normal: supple, no cervical adenopathy, no palpable thyroid  .		[] Abnormal:  Cardiovascular	Normal: regular rate, normal S1, S2, no murmurs  .		[] Abnormal:  Respiratory	Normal: no chest wall deformity, normal respiratory pattern, CTA B/L  .		[] Abnormal:  Abdominal	Normal: soft, ND, NT, bowel sounds present, no masses, no organomegaly  .		[] Abnormal:  		Normal normal genitalia, testes descended, circumcised/uncircumcised  .		Bob stage:			Breast bob:  .		Menstrual history:  .		[] Abnormal:  Extremities	Normal: FROM x4  .		[] Abnormal:  Skin		Normal: intact and not indurated, no rash, no acanthosis nigricans  .		[] Abnormal:  Neurologic	Normal: grossly intact  .		[] Abnormal:    LABS        CAPILLARY BLOOD GLUCOSE      POCT Blood Glucose.: 251 mg/dL (16 Sep 2018 08:15)  POCT Blood Glucose.: 295 mg/dL (16 Sep 2018 01:48)  POCT Blood Glucose.: 292 mg/dL (15 Sep 2018 22:23)  POCT Blood Glucose.: 322 mg/dL (15 Sep 2018 18:37)        Assesment/plan    Dm- better but remains un cont  agree with lantus 30units  add Humalog tid 4 units  hold if tube feeds are held  fsg q6

## 2018-09-16 NOTE — PROGRESS NOTE ADULT - ASSESSMENT
A 64 yo Female  with  non-verbal, bed bound, S/P Trach/ PEG tube, sent in to the ER from [facility]  for evaluation of coffee ground vomiting  and cough. On arrival, she found to have tachycardia, Leukocytosis, and RLL consolidation. She has started on Zosyn and Vancomycin and the ID consult requested to assist with further evaluation and antibiotic management.     # Aspiration pneumonia - RLL consolidation - Pseudomonas and Acinetobacter   # MRSA PCR is negative    Would recommend:    1. Follow up sensitivity of Pseudomonas and Acinetobacter   2. Discontinue Vancomycin  3. Change Zosyn to Unasyn and Add Polymixin and Rifampin based on pervious C & S until current culture finalized  4. Change Trach collar  5. Bronchial suctioning  and Aspiration  precaution       d/w  Nursing staff    will follow the patient with you

## 2018-09-16 NOTE — PROGRESS NOTE ADULT - SUBJECTIVE AND OBJECTIVE BOX
Patient seen and examined at bedside earlier today  combative overnight uncontrolled with ativan via peg  Case discussed with medical team    HPI:  62 yo F from [facility], non-verbal, bed bound, S/P Trach/ PEG tube, PMH of HTN, asthma, convulsion disorder, bipolar, DVT legs, DM 2, sent from NH for coffee ground vomiting X3 this morning and cough.  Pt unable to provide history.  History given by sister (Sharon [phone])who states pt was vomiting coffee ground emesis this morning. Pt had no reported vomiting since in ED.  Pt also developed cough with clear sputum.  Sister states pt is combative at baseline, and her current behavior is baseline.  No reported fever, discomfort, diarrhea, or SOB.  Pt was recently admitted to [facility] in 07/2018 for same reason and treated for aspiration PNA and UTI during last admission.  Pt was supposed to be given IV vanco and zosyn for 7 day by PCP for PNA , which supposed to start from 09/13-09/19. Pt is DNR, MOSLT form in charts. (13 Sep 2018 15:49)      PAST MEDICAL & SURGICAL HISTORY:  No pertinent past medical history  Hypertension  Schizophrenia  Diabetic coma  Diabetes mellitus  No significant past surgical history  S/P percutaneous endoscopic gastrostomy (PEG) tube placement  H/O tracheostomy    angiotensin converting enzyme inhibitors (Unknown)     MEDICATIONS  (STANDING):  ALBUTerol/ipratropium for Nebulization 3 milliLiter(s) Nebulizer every 6 hours  amLODIPine   Tablet 2.5 milliGRAM(s) Oral daily  atorvastatin 20 milliGRAM(s) Oral at bedtime  benztropine 2 milliGRAM(s) Oral two times a day  heparin  Injectable 5000 Unit(s) SubCutaneous every 8 hours  insulin glargine Injectable (LANTUS) 26 Unit(s) SubCutaneous at bedtime  insulin lispro (HumaLOG) corrective regimen sliding scale   SubCutaneous every 6 hours  levETIRAcetam  Solution 1000 milliGRAM(s) Oral two times a day  metoprolol tartrate 100 milliGRAM(s) Oral two times a day  pantoprazole   Suspension 40 milliGRAM(s) Oral daily  piperacillin/tazobactam IVPB. 3.375 Gram(s) IV Intermittent every 8 hours  QUEtiapine 50 milliGRAM(s) Oral every 12 hours  senna 2 Tablet(s) Oral at bedtime  vancomycin  IVPB 750 milliGRAM(s) IV Intermittent every 12 hours    MEDICATIONS  (PRN):  LORazepam     Tablet 1 milliGRAM(s) Oral every 6 hours PRN Agitation    REVIEW OF SYSTEMS:  limited 2/2 mental status and nonverbal    Vital Signs Last 24 Hrs  T(C): 36.7 (16 Sep 2018 07:50), Max: 36.8 (16 Sep 2018 00:00)  T(F): 98 (16 Sep 2018 07:50), Max: 98.3 (16 Sep 2018 00:00)  HR: 77 (16 Sep 2018 07:50) (77 - 91)  BP: 125/62 (16 Sep 2018 07:50) (125/62 - 143/77)  BP(mean): --  RR: 16 (16 Sep 2018 07:50) (16 - 18)  SpO2: 97% (16 Sep 2018 07:50) (97% - 99%)    PHYSICAL EXAMINATION:   Constitutional: stable appearance in NAD  HEENT: AT  Neck:  Supple  Respiratory: stable trach and stable basilar rales/rhonchi. Adequate airflow b/l. Not using accessory muscles of respiration.  Cardiovascular:  S1 & S2 intact, no R/G, 2+ radial pulses b/l  Gastrointestinal: Soft, ND, normoactive b.s.,  Extremities: WWP  Neurological:  awake. stable    Labs and imaging reviewed  POCT  Blood Glucose (09.16.18 @ 08:15)    POCT Blood Glucose.: 251 mg/dL      Complete Blood Count (09.15.18 @ 06:05)    WBC Count: 9.7: Test repeated. K/uL    RBC Count: 4.83 M/uL    Hemoglobin: 12.0 g/dL    Hematocrit: 39.2 %    Mean Cell Volume: 81.2 fl    Mean Cell Hemoglobin: 24.9 pg    Mean Cell Hemoglobin Conc: 30.7 gm/dL    Red Cell Distrib Width: 13.3 %    Platelet Count - Automated: 261 K/uL        Culture - Sputum . (09.15.18 @ 09:28)    Gram Stain:   Rare polymorphonuclear leukocytes per low power field  Few Squamous epithelial cells per low power field  Moderate Gram Negative Rods per oil power field  Few Gram Positive Rods per oil power field  Few Yeast like cells per oil power field  Rare Gram Negative Coccobacilli per oil power field    Specimen Source: .Sputum Sputum

## 2018-09-17 DIAGNOSIS — A49.8 OTHER BACTERIAL INFECTIONS OF UNSPECIFIED SITE: ICD-10-CM

## 2018-09-17 DIAGNOSIS — F20.9 SCHIZOPHRENIA, UNSPECIFIED: ICD-10-CM

## 2018-09-17 LAB
-  AMIKACIN: SIGNIFICANT CHANGE UP
-  AMIKACIN: SIGNIFICANT CHANGE UP
-  AMPICILLIN/SULBACTAM: SIGNIFICANT CHANGE UP
-  AZTREONAM: SIGNIFICANT CHANGE UP
-  CEFEPIME: SIGNIFICANT CHANGE UP
-  CEFEPIME: SIGNIFICANT CHANGE UP
-  CEFTAZIDIME: SIGNIFICANT CHANGE UP
-  CEFTAZIDIME: SIGNIFICANT CHANGE UP
-  CIPROFLOXACIN: SIGNIFICANT CHANGE UP
-  CIPROFLOXACIN: SIGNIFICANT CHANGE UP
-  GENTAMICIN: SIGNIFICANT CHANGE UP
-  GENTAMICIN: SIGNIFICANT CHANGE UP
-  IMIPENEM: SIGNIFICANT CHANGE UP
-  IMIPENEM: SIGNIFICANT CHANGE UP
-  LEVOFLOXACIN: SIGNIFICANT CHANGE UP
-  LEVOFLOXACIN: SIGNIFICANT CHANGE UP
-  MEROPENEM: SIGNIFICANT CHANGE UP
-  MEROPENEM: SIGNIFICANT CHANGE UP
-  PIPERACILLIN/TAZOBACTAM: SIGNIFICANT CHANGE UP
-  PIPERACILLIN/TAZOBACTAM: SIGNIFICANT CHANGE UP
-  TOBRAMYCIN: SIGNIFICANT CHANGE UP
-  TOBRAMYCIN: SIGNIFICANT CHANGE UP
-  TRIMETHOPRIM/SULFAMETHOXAZOLE: SIGNIFICANT CHANGE UP
ANION GAP SERPL CALC-SCNC: 7 MMOL/L — SIGNIFICANT CHANGE UP (ref 5–17)
BASOPHILS # BLD AUTO: 0.1 K/UL — SIGNIFICANT CHANGE UP (ref 0–0.2)
BASOPHILS NFR BLD AUTO: 1.3 % — SIGNIFICANT CHANGE UP (ref 0–2)
BUN SERPL-MCNC: 20 MG/DL — HIGH (ref 7–18)
CALCIUM SERPL-MCNC: 8.9 MG/DL — SIGNIFICANT CHANGE UP (ref 8.4–10.5)
CHLORIDE SERPL-SCNC: 104 MMOL/L — SIGNIFICANT CHANGE UP (ref 96–108)
CO2 SERPL-SCNC: 29 MMOL/L — SIGNIFICANT CHANGE UP (ref 22–31)
CREAT SERPL-MCNC: 0.81 MG/DL — SIGNIFICANT CHANGE UP (ref 0.5–1.3)
CULTURE RESULTS: SIGNIFICANT CHANGE UP
EOSINOPHIL # BLD AUTO: 0.1 K/UL — SIGNIFICANT CHANGE UP (ref 0–0.5)
EOSINOPHIL NFR BLD AUTO: 1.7 % — SIGNIFICANT CHANGE UP (ref 0–6)
GLUCOSE SERPL-MCNC: 132 MG/DL — HIGH (ref 70–99)
HCT VFR BLD CALC: 37.1 % — SIGNIFICANT CHANGE UP (ref 34.5–45)
HGB BLD-MCNC: 11.6 G/DL — SIGNIFICANT CHANGE UP (ref 11.5–15.5)
LYMPHOCYTES # BLD AUTO: 2.2 K/UL — SIGNIFICANT CHANGE UP (ref 1–3.3)
LYMPHOCYTES # BLD AUTO: 30.6 % — SIGNIFICANT CHANGE UP (ref 13–44)
MAGNESIUM SERPL-MCNC: 2.1 MG/DL — SIGNIFICANT CHANGE UP (ref 1.6–2.6)
MCHC RBC-ENTMCNC: 25.3 PG — LOW (ref 27–34)
MCHC RBC-ENTMCNC: 31.3 GM/DL — LOW (ref 32–36)
MCV RBC AUTO: 80.9 FL — SIGNIFICANT CHANGE UP (ref 80–100)
METHOD TYPE: SIGNIFICANT CHANGE UP
MONOCYTES # BLD AUTO: 0.6 K/UL — SIGNIFICANT CHANGE UP (ref 0–0.9)
MONOCYTES NFR BLD AUTO: 8.1 % — SIGNIFICANT CHANGE UP (ref 2–14)
NEUTROPHILS # BLD AUTO: 4.3 K/UL — SIGNIFICANT CHANGE UP (ref 1.8–7.4)
NEUTROPHILS NFR BLD AUTO: 58.4 % — SIGNIFICANT CHANGE UP (ref 43–77)
ORGANISM # SPEC MICROSCOPIC CNT: SIGNIFICANT CHANGE UP
PHOSPHATE SERPL-MCNC: 2.9 MG/DL — SIGNIFICANT CHANGE UP (ref 2.5–4.5)
PLATELET # BLD AUTO: 242 K/UL — SIGNIFICANT CHANGE UP (ref 150–400)
POTASSIUM SERPL-MCNC: 3.6 MMOL/L — SIGNIFICANT CHANGE UP (ref 3.5–5.3)
POTASSIUM SERPL-SCNC: 3.6 MMOL/L — SIGNIFICANT CHANGE UP (ref 3.5–5.3)
RBC # BLD: 4.58 M/UL — SIGNIFICANT CHANGE UP (ref 3.8–5.2)
RBC # FLD: 13.1 % — SIGNIFICANT CHANGE UP (ref 10.3–14.5)
SODIUM SERPL-SCNC: 140 MMOL/L — SIGNIFICANT CHANGE UP (ref 135–145)
SPECIMEN SOURCE: SIGNIFICANT CHANGE UP
WBC # BLD: 7.4 K/UL — SIGNIFICANT CHANGE UP (ref 3.8–10.5)
WBC # FLD AUTO: 7.4 K/UL — SIGNIFICANT CHANGE UP (ref 3.8–10.5)

## 2018-09-17 RX ORDER — INSULIN GLARGINE 100 [IU]/ML
25 INJECTION, SOLUTION SUBCUTANEOUS AT BEDTIME
Qty: 0 | Refills: 0 | Status: DISCONTINUED | OUTPATIENT
Start: 2018-09-17 | End: 2018-09-18

## 2018-09-17 RX ORDER — AMIKACIN SULFATE 250 MG/ML
560 INJECTION, SOLUTION INTRAMUSCULAR; INTRAVENOUS EVERY 12 HOURS
Qty: 0 | Refills: 0 | Status: DISCONTINUED | OUTPATIENT
Start: 2018-09-17 | End: 2018-09-26

## 2018-09-17 RX ADMIN — Medication 2: at 11:36

## 2018-09-17 RX ADMIN — PANTOPRAZOLE SODIUM 40 MILLIGRAM(S): 20 TABLET, DELAYED RELEASE ORAL at 13:39

## 2018-09-17 RX ADMIN — AMIKACIN SULFATE 102.24 MILLIGRAM(S): 250 INJECTION, SOLUTION INTRAMUSCULAR; INTRAVENOUS at 22:20

## 2018-09-17 RX ADMIN — Medication 2 MILLIGRAM(S): at 17:13

## 2018-09-17 RX ADMIN — AMPICILLIN SODIUM AND SULBACTAM SODIUM 200 GRAM(S): 250; 125 INJECTION, POWDER, FOR SUSPENSION INTRAMUSCULAR; INTRAVENOUS at 11:35

## 2018-09-17 RX ADMIN — LEVETIRACETAM 1000 MILLIGRAM(S): 250 TABLET, FILM COATED ORAL at 05:43

## 2018-09-17 RX ADMIN — Medication 3 MILLILITER(S): at 09:12

## 2018-09-17 RX ADMIN — AMPICILLIN SODIUM AND SULBACTAM SODIUM 200 GRAM(S): 250; 125 INJECTION, POWDER, FOR SUSPENSION INTRAMUSCULAR; INTRAVENOUS at 17:12

## 2018-09-17 RX ADMIN — Medication 4 UNIT(S): at 11:37

## 2018-09-17 RX ADMIN — ATORVASTATIN CALCIUM 20 MILLIGRAM(S): 80 TABLET, FILM COATED ORAL at 22:21

## 2018-09-17 RX ADMIN — HEPARIN SODIUM 5000 UNIT(S): 5000 INJECTION INTRAVENOUS; SUBCUTANEOUS at 05:43

## 2018-09-17 RX ADMIN — INSULIN GLARGINE 25 UNIT(S): 100 INJECTION, SOLUTION SUBCUTANEOUS at 22:08

## 2018-09-17 RX ADMIN — HEPARIN SODIUM 5000 UNIT(S): 5000 INJECTION INTRAVENOUS; SUBCUTANEOUS at 13:39

## 2018-09-17 RX ADMIN — SENNA PLUS 2 TABLET(S): 8.6 TABLET ORAL at 22:21

## 2018-09-17 RX ADMIN — POLYMYXIN B SULFATE 500 UNIT(S): 500000 INJECTION, POWDER, LYOPHILIZED, FOR SOLUTION INTRAMUSCULAR; INTRATHECAL; INTRAVENOUS; OPHTHALMIC at 05:43

## 2018-09-17 RX ADMIN — POLYMYXIN B SULFATE 500 UNIT(S): 500000 INJECTION, POWDER, LYOPHILIZED, FOR SOLUTION INTRAMUSCULAR; INTRATHECAL; INTRAVENOUS; OPHTHALMIC at 17:43

## 2018-09-17 RX ADMIN — Medication 3 MILLILITER(S): at 20:07

## 2018-09-17 RX ADMIN — Medication 1 MILLIGRAM(S): at 22:21

## 2018-09-17 RX ADMIN — HEPARIN SODIUM 5000 UNIT(S): 5000 INJECTION INTRAVENOUS; SUBCUTANEOUS at 22:22

## 2018-09-17 RX ADMIN — Medication 2 MILLIGRAM(S): at 05:42

## 2018-09-17 RX ADMIN — QUETIAPINE FUMARATE 50 MILLIGRAM(S): 200 TABLET, FILM COATED ORAL at 05:43

## 2018-09-17 RX ADMIN — Medication 100 MILLIGRAM(S): at 17:13

## 2018-09-17 RX ADMIN — Medication 3 MILLILITER(S): at 02:03

## 2018-09-17 RX ADMIN — AMPICILLIN SODIUM AND SULBACTAM SODIUM 200 GRAM(S): 250; 125 INJECTION, POWDER, FOR SUSPENSION INTRAMUSCULAR; INTRAVENOUS at 05:44

## 2018-09-17 RX ADMIN — LEVETIRACETAM 1000 MILLIGRAM(S): 250 TABLET, FILM COATED ORAL at 17:13

## 2018-09-17 RX ADMIN — Medication 3 MILLILITER(S): at 15:10

## 2018-09-17 RX ADMIN — QUETIAPINE FUMARATE 50 MILLIGRAM(S): 200 TABLET, FILM COATED ORAL at 17:13

## 2018-09-17 NOTE — PROGRESS NOTE ADULT - SUBJECTIVE AND OBJECTIVE BOX
Interval Events:    on tube feeds at 35cc/hr, patient is calm    Allergies    angiotensin converting enzyme inhibitors (Unknown)    Intolerances    MEDICATIONS  (STANDING):  ALBUTerol/ipratropium for Nebulization 3 milliLiter(s) Nebulizer every 6 hours  amLODIPine   Tablet 2.5 milliGRAM(s) Oral daily  ampicillin/sulbactam  IVPB      ampicillin/sulbactam  IVPB 3 Gram(s) IV Intermittent every 6 hours  atorvastatin 20 milliGRAM(s) Oral at bedtime  benztropine 2 milliGRAM(s) Oral two times a day  heparin  Injectable 5000 Unit(s) SubCutaneous every 8 hours  insulin glargine Injectable (LANTUS) 25 Unit(s) SubCutaneous at bedtime  insulin lispro (HumaLOG) corrective regimen sliding scale   SubCutaneous every 6 hours  insulin lispro Injectable (HumaLOG) 4 Unit(s) SubCutaneous three times a day with meals  levETIRAcetam  Solution 1000 milliGRAM(s) Oral two times a day  metoprolol tartrate 100 milliGRAM(s) Oral two times a day  pantoprazole   Suspension 40 milliGRAM(s) Oral daily  polymyxin B IVPB [id number] Unit(s) IV Intermittent every 12 hours  QUEtiapine 50 milliGRAM(s) Oral every 12 hours  rifampin IVPB      rifampin IVPB 600 milliGRAM(s) IV Intermittent every 24 hours  senna 2 Tablet(s) Oral at bedtime    MEDICATIONS  (PRN):  LORazepam     Tablet 1 milliGRAM(s) Oral every 6 hours PRN Agitation  LORazepam   Injectable 1 milliGRAM(s) IV Push every 12 hours PRN Breakthrough Agitation/Assaultive Behavior that is uncontrolled with oral ativan        Vital Signs Last 24 Hrs  T(C): 36.4 (17 Sep 2018 06:28), Max: 36.8 (16 Sep 2018 15:43)  T(F): 97.6 (17 Sep 2018 06:28), Max: 98.2 (16 Sep 2018 15:43)  HR: 109 (17 Sep 2018 06:28) (73 - 109)  BP: 106/54 (17 Sep 2018 06:28) (106/54 - 122/96)  BP(mean): --  RR: 16 (17 Sep 2018 06:28) (16 - 18)  SpO2: 98% (17 Sep 2018 06:28) (97% - 100%)      PHYSICAL EXAM  All physical exam findings normal, except those marked:  General:	Alert, active, cooperative, NAD, well hydrated  .		[] Abnormal:  Neck		Normal: supple, no cervical adenopathy, no palpable thyroid  .		[] Abnormal:  Cardiovascular	Normal: regular rate, normal S1, S2, no murmurs  .		[] Abnormal:  Respiratory	Normal: no chest wall deformity, normal respiratory pattern, CTA B/L  .		[] Abnormal:  Abdominal	Normal: soft, ND, NT, bowel sounds present, no masses, no organomegaly  .		[] Abnormal:  		Normal normal genitalia, testes descended, circumcised/uncircumcised  .		Bob stage:			Breast bob:  .		Menstrual history:  .		[] Abnormal:  Extremities	Normal: FROM x4  .		[] Abnormal:  Skin		Normal: intact and not indurated, no rash, no acanthosis nigricans  .		[] Abnormal:  Neurologic	Normal: grossly intact  .		[] Abnormal:    LABS        CAPILLARY BLOOD GLUCOSE      POCT Blood Glucose.: 80 mg/dL (17 Sep 2018 06:35)  POCT Blood Glucose.: 299 mg/dL (16 Sep 2018 23:15)  POCT Blood Glucose.: 162 mg/dL (16 Sep 2018 16:49)  POCT Blood Glucose.: 118 mg/dL (16 Sep 2018 12:51)        Assesment/plan    Dm- decrease lantus to 25U QHS  c/w Humalog tid 4 units  hold if tube feeds are held  fsg q6 Interval Events:    on tube feeds at 35cc/hr, patient is calm    Allergies    angiotensin converting enzyme inhibitors (Unknown)    Intolerances    MEDICATIONS  (STANDING):  ALBUTerol/ipratropium for Nebulization 3 milliLiter(s) Nebulizer every 6 hours  amLODIPine   Tablet 2.5 milliGRAM(s) Oral daily  ampicillin/sulbactam  IVPB      ampicillin/sulbactam  IVPB 3 Gram(s) IV Intermittent every 6 hours  atorvastatin 20 milliGRAM(s) Oral at bedtime  benztropine 2 milliGRAM(s) Oral two times a day  heparin  Injectable 5000 Unit(s) SubCutaneous every 8 hours  insulin glargine Injectable (LANTUS) 25 Unit(s) SubCutaneous at bedtime  insulin lispro (HumaLOG) corrective regimen sliding scale   SubCutaneous every 6 hours  insulin lispro Injectable (HumaLOG) 4 Unit(s) SubCutaneous three times a day with meals  levETIRAcetam  Solution 1000 milliGRAM(s) Oral two times a day  metoprolol tartrate 100 milliGRAM(s) Oral two times a day  pantoprazole   Suspension 40 milliGRAM(s) Oral daily  polymyxin B IVPB [id number] Unit(s) IV Intermittent every 12 hours  QUEtiapine 50 milliGRAM(s) Oral every 12 hours  rifampin IVPB      rifampin IVPB 600 milliGRAM(s) IV Intermittent every 24 hours  senna 2 Tablet(s) Oral at bedtime    MEDICATIONS  (PRN):  LORazepam     Tablet 1 milliGRAM(s) Oral every 6 hours PRN Agitation  LORazepam   Injectable 1 milliGRAM(s) IV Push every 12 hours PRN Breakthrough Agitation/Assaultive Behavior that is uncontrolled with oral ativan                          11.6   7.4   )-----------( 242      ( 17 Sep 2018 07:47 )             37.1   09-17    140  |  104  |  20<H>  ----------------------------<  132<H>  3.6   |  29  |  0.81    Ca    8.9      17 Sep 2018 07:47  Phos  2.9     09-17  Mg     2.1     09-17            Vital Signs Last 24 Hrs  T(C): 36.4 (17 Sep 2018 06:28), Max: 36.8 (16 Sep 2018 15:43)  T(F): 97.6 (17 Sep 2018 06:28), Max: 98.2 (16 Sep 2018 15:43)  HR: 109 (17 Sep 2018 06:28) (73 - 109)  BP: 106/54 (17 Sep 2018 06:28) (106/54 - 122/96)  BP(mean): --  RR: 16 (17 Sep 2018 06:28) (16 - 18)  SpO2: 98% (17 Sep 2018 06:28) (97% - 100%)      PHYSICAL EXAM  All physical exam findings normal, except those marked:  General:	Alert, active, cooperative, NAD, well hydrated  .		[] Abnormal:  Neck		Normal: supple, no cervical adenopathy, no palpable thyroid  .		[] Abnormal:  Cardiovascular	Normal: regular rate, normal S1, S2, no murmurs  .		[] Abnormal:  Respiratory	Normal: no chest wall deformity, normal respiratory pattern, CTA B/L  .		[] Abnormal:  Abdominal	Normal: soft, ND, NT, bowel sounds present, no masses, no organomegaly  .		[] Abnormal:  		Normal normal genitalia, testes descended, circumcised/uncircumcised  .		Bob stage:			Breast bob:  .		Menstrual history:  .		[] Abnormal:  Extremities	Normal: FROM x4  .		[] Abnormal:  Skin		Normal: intact and not indurated, no rash, no acanthosis nigricans  .		[] Abnormal:  Neurologic	Normal: grossly intact  .		[] Abnormal:    LABS        CAPILLARY BLOOD GLUCOSE      POCT Blood Glucose.: 80 mg/dL (17 Sep 2018 06:35)  POCT Blood Glucose.: 299 mg/dL (16 Sep 2018 23:15)  POCT Blood Glucose.: 162 mg/dL (16 Sep 2018 16:49)  POCT Blood Glucose.: 118 mg/dL (16 Sep 2018 12:51)        Assesment/plan    Dm- decrease lantus to 25U QHS  c/w Humalog tid 4 units  hold if tube feeds are held  fsg q6

## 2018-09-17 NOTE — PROGRESS NOTE ADULT - ASSESSMENT
HPI:  64 yo F from [facility], non-verbal, bed bound, S/P Trach/ PEG tube, PMH of HTN, asthma, convulsion disorder, bipolar, DVT legs, DM 2, sent from NH for coffee ground vomiting X3 this morning and cough.  Pt unable to provide history.  History given by sister (Sharon [phone])who states pt was vomiting coffee ground emesis this morning. Pt had no reported vomiting since in ED.  Pt also developed cough with clear sputum.  Sister states pt is combative at baseline, and her current behavior is baseline.  No reported fever, discomfort, diarrhea, or SOB.  Pt was recently admitted to [facility] in 07/2018 for same reason and treated for aspiration PNA and UTI during last admission.  Pt was supposed to be given IV vanco and zosyn for 7 day by PCP for PNA , which supposed to start from 09/13-09/19. Pt is DNR, MOSLT form in charts. (13 Sep 2018 15:49)

## 2018-09-17 NOTE — PROGRESS NOTE ADULT - ASSESSMENT
64 yo F from [facility], non-verbal, bed bound, S/P Trach/ PEG tube, PMH of HTN, asthma, convulsion disorder, bipolar, DVT legs, DM 2, sent from NH for coffee ground vomiting X3

## 2018-09-17 NOTE — PROGRESS NOTE ADULT - SUBJECTIVE AND OBJECTIVE BOX
Patient seen and examined.     MEDICATIONS  (STANDING):  ALBUTerol/ipratropium for Nebulization 3 milliLiter(s) Nebulizer every 6 hours  amLODIPine   Tablet 2.5 milliGRAM(s) Oral daily  ampicillin/sulbactam  IVPB      ampicillin/sulbactam  IVPB 3 Gram(s) IV Intermittent every 6 hours  atorvastatin 20 milliGRAM(s) Oral at bedtime  benztropine 2 milliGRAM(s) Oral two times a day  heparin  Injectable 5000 Unit(s) SubCutaneous every 8 hours  insulin glargine Injectable (LANTUS) 25 Unit(s) SubCutaneous at bedtime  insulin lispro (HumaLOG) corrective regimen sliding scale   SubCutaneous every 6 hours  insulin lispro Injectable (HumaLOG) 4 Unit(s) SubCutaneous three times a day with meals  levETIRAcetam  Solution 1000 milliGRAM(s) Oral two times a day  metoprolol tartrate 100 milliGRAM(s) Oral two times a day  pantoprazole   Suspension 40 milliGRAM(s) Oral daily  polymyxin B IVPB [id number] Unit(s) IV Intermittent every 12 hours  QUEtiapine 50 milliGRAM(s) Oral every 12 hours  rifampin IVPB      rifampin IVPB 600 milliGRAM(s) IV Intermittent every 24 hours  senna 2 Tablet(s) Oral at bedtime      MEDICATIONS  (PRN):  LORazepam     Tablet 1 milliGRAM(s) Oral every 6 hours PRN Agitation  LORazepam   Injectable 1 milliGRAM(s) IV Push every 12 hours PRN Breakthrough Agitation/Assaultive Behavior that is uncontrolled with oral ativan     Medications up to date at time of exam.    PHYSICAL EXAMINATION:  Patient has no new complaints.  GENERAL: The patient is a well-developed, well-nourished, in no apparent distress.     Vital Signs Last 24 Hrs  T(C): 36.4 (17 Sep 2018 06:28), Max: 36.8 (16 Sep 2018 15:43)  T(F): 97.6 (17 Sep 2018 06:28), Max: 98.2 (16 Sep 2018 15:43)  HR: 109 (17 Sep 2018 06:28) (73 - 109)  BP: 106/54 (17 Sep 2018 06:28) (106/54 - 122/96)  BP(mean): --  RR: 16 (17 Sep 2018 06:28) (16 - 18)  SpO2: 98% (17 Sep 2018 06:28) (97% - 100%)   (if applicable)    Chest Tube (if applicable)    HEENT: Head is normocephalic and atraumatic.     NECK: Supple, no palpable adenopathy. trach    LUNGS: Clear to auscultation, no wheezing, rales, or +rhonchi.    HEART: Regular rate and rhythm without murmur.    ABDOMEN: Soft, nontender, and nondistended.  PEG    EXTREMITIES: Without any cyanosis, clubbing, rash, lesions or edema.    NEUROLOGIC: Awake, alert.    SKIN: Warm, dry, good turgor.    LABS:                        11.6   7.4   )-----------( 242      ( 17 Sep 2018 07:47 )             37.1     09-17    140  |  104  |  20<H>  ----------------------------<  132<H>  3.6   |  29  |  0.81    Ca    8.9      17 Sep 2018 07:47  Phos  2.9     09-17  Mg     2.1     09-17      MICROBIOLOGY: (if applicable)    RADIOLOGY & ADDITIONAL STUDIES:  EKG:   CXR:  ECHO:    IMPRESSION: 63y Female PAST MEDICAL & SURGICAL HISTORY:  No pertinent past medical history  Hypertension  Schizophrenia  Diabetic coma  Diabetes mellitus  No significant past surgical history  S/P percutaneous endoscopic gastrostomy (PEG) tube placement  H/O tracheostomy       64 yo F from [facility], non-verbal, bed bound, S/P Trach/ PEG tube, PMH of HTN, asthma, convulsion disorder, bipolar, DVT legs, DM 2, sent from NH for coffee ground vomiting X3 this morning and cough.  Pt unable to provide history.  History given by sister (Sharon [facility] [phone])who states pt was vomiting coffee ground emesis this morning. Pt had no reported vomiting since in ED.  Pt also developed cough with clear sputum.  Sister states pt is combative at baseline, and her current behavior is baseline.  No reported fever, discomfort, diarrhea, or SOB.  Pt was recently admitted to [facility] in 07/2018 for same reason and treated for aspiration PNA and UTI during last admission.  Pt was supposed to be given IV vanco and zosyn for 7 day by PCP for PNA , which supposed to start from 09/13-09/19. Pt is DNR, MOSLT form in charts.    --    Patient presents with coffee ground vomiting, SOB, due to PNA, aspiration.  +pleural effusion R side  +pseudomonas on sputum, contact    SUGGESTION:   - con't with antibiotics as per ID recommendations.    - con't monitor effusion, patient with o2 sat >97%   - pt is poor candidate for thoracentesis as she is unable to stay still.   - suction prn   - DVT and GI prophylaxis. Patient seen and examined.     MEDICATIONS  (STANDING):  ALBUTerol/ipratropium for Nebulization 3 milliLiter(s) Nebulizer every 6 hours  amLODIPine   Tablet 2.5 milliGRAM(s) Oral daily  ampicillin/sulbactam  IVPB      ampicillin/sulbactam  IVPB 3 Gram(s) IV Intermittent every 6 hours  atorvastatin 20 milliGRAM(s) Oral at bedtime  benztropine 2 milliGRAM(s) Oral two times a day  heparin  Injectable 5000 Unit(s) SubCutaneous every 8 hours  insulin glargine Injectable (LANTUS) 25 Unit(s) SubCutaneous at bedtime  insulin lispro (HumaLOG) corrective regimen sliding scale   SubCutaneous every 6 hours  insulin lispro Injectable (HumaLOG) 4 Unit(s) SubCutaneous three times a day with meals  levETIRAcetam  Solution 1000 milliGRAM(s) Oral two times a day  metoprolol tartrate 100 milliGRAM(s) Oral two times a day  pantoprazole   Suspension 40 milliGRAM(s) Oral daily  polymyxin B IVPB [id number] Unit(s) IV Intermittent every 12 hours  QUEtiapine 50 milliGRAM(s) Oral every 12 hours  rifampin IVPB      rifampin IVPB 600 milliGRAM(s) IV Intermittent every 24 hours  senna 2 Tablet(s) Oral at bedtime      MEDICATIONS  (PRN):  LORazepam     Tablet 1 milliGRAM(s) Oral every 6 hours PRN Agitation  LORazepam   Injectable 1 milliGRAM(s) IV Push every 12 hours PRN Breakthrough Agitation/Assaultive Behavior that is uncontrolled with oral ativan     Medications up to date at time of exam.    PHYSICAL EXAMINATION:  Patient has no new complaints.  GENERAL: The patient is a well-developed, well-nourished, in no apparent distress.     Vital Signs Last 24 Hrs  T(C): 36.4 (17 Sep 2018 06:28), Max: 36.8 (16 Sep 2018 15:43)  T(F): 97.6 (17 Sep 2018 06:28), Max: 98.2 (16 Sep 2018 15:43)  HR: 109 (17 Sep 2018 06:28) (73 - 109)  BP: 106/54 (17 Sep 2018 06:28) (106/54 - 122/96)  BP(mean): --  RR: 16 (17 Sep 2018 06:28) (16 - 18)  SpO2: 98% (17 Sep 2018 06:28) (97% - 100%)   (if applicable)    Chest Tube (if applicable)    HEENT: Head is normocephalic and atraumatic.     NECK: Supple, no palpable adenopathy. trach    LUNGS: Clear to auscultation, no wheezing, rales, or +rhonchi.    HEART: Regular rate and rhythm without murmur.    ABDOMEN: Soft, nontender, and nondistended.  PEG    EXTREMITIES: Without any cyanosis, clubbing, rash, lesions or edema.    NEUROLOGIC: Awake, alert.    SKIN: Warm, dry, good turgor.    LABS:                        11.6   7.4   )-----------( 242      ( 17 Sep 2018 07:47 )             37.1     09-17    140  |  104  |  20<H>  ----------------------------<  132<H>  3.6   |  29  |  0.81    Ca    8.9      17 Sep 2018 07:47  Phos  2.9     09-17  Mg     2.1     09-17      MICROBIOLOGY: (if applicable)    RADIOLOGY & ADDITIONAL STUDIES:  EKG:   CXR:  ECHO:    IMPRESSION: 63y Female PAST MEDICAL & SURGICAL HISTORY:  No pertinent past medical history  Hypertension  Schizophrenia  Diabetic coma  Diabetes mellitus  No significant past surgical history  S/P percutaneous endoscopic gastrostomy (PEG) tube placement  H/O tracheostomy       64 [facility] F from [facility], non-verbal, bed bound, S/P Trach/ PEG tube, PMH of HTN, asthma, convulsion disorder, bipolar, DVT legs, DM 2, sent from NH for coffee ground vomiting X3 this morning and cough.  Pt unable to provide history.  History given by sister (Sharon [facility] [phone])who states pt was vomiting coffee ground emesis this morning. Pt had no reported vomiting since in ED.  Pt also developed cough with clear sputum.  Sister states pt is combative at baseline, and her current behavior is baseline.  No reported fever, discomfort, diarrhea, or SOB.  Pt was recently admitted to [facility] in 07/2018 for same reason and treated for aspiration PNA and UTI during last admission.  Pt was supposed to be given IV vanco and zosyn for 7 day by PCP for PNA , which supposed to start from 09/13-09/19. Pt is DNR, MOSLT form in charts.    --    Patient presents with coffee ground vomiting, SOB, due to PNA, aspiration.  +pleural effusion R side  +pseudomonas on sputum, contact    SUGGESTION:   - con't with antibiotics as per ID recommendations.    - con't monitor effusion, patient with o2 sat >97%   - pt is poor candidate for thoracentesis as she is unable to stay still.   - suction prn   - DVT and GI prophylaxis.    Agree with above assessment and plan as transcribed.

## 2018-09-17 NOTE — PROVIDER CONTACT NOTE (CRITICAL VALUE NOTIFICATION) - SITUATION
sputum culture collected on 9/14/18- mix gram negative rods including moderate pseudomonas aeruginosa (carbapenem resistant) and normal respiratory liyah present

## 2018-09-17 NOTE — PROGRESS NOTE ADULT - SUBJECTIVE AND OBJECTIVE BOX
Patient is a 63y old  Female who presents with a chief complaint of vomiting (17 Sep 2018 12:06)      SUBJECTIVE / OVERNIGHT EVENTS: Vomting resolved, pt non verbal and cognitively impaired. recent cultures from sputum revealed carbapenem resistant pseudomonas ID follow up    MEDICATIONS  (STANDING):  ALBUTerol/ipratropium for Nebulization 3 milliLiter(s) Nebulizer every 6 hours  amLODIPine   Tablet 2.5 milliGRAM(s) Oral daily  ampicillin/sulbactam  IVPB      ampicillin/sulbactam  IVPB 3 Gram(s) IV Intermittent every 6 hours  atorvastatin 20 milliGRAM(s) Oral at bedtime  benztropine 2 milliGRAM(s) Oral two times a day  heparin  Injectable 5000 Unit(s) SubCutaneous every 8 hours  insulin glargine Injectable (LANTUS) 25 Unit(s) SubCutaneous at bedtime  insulin lispro (HumaLOG) corrective regimen sliding scale   SubCutaneous every 6 hours  insulin lispro Injectable (HumaLOG) 4 Unit(s) SubCutaneous three times a day with meals  levETIRAcetam  Solution 1000 milliGRAM(s) Oral two times a day  metoprolol tartrate 100 milliGRAM(s) Oral two times a day  pantoprazole   Suspension 40 milliGRAM(s) Oral daily  polymyxin B IVPB 926922 Unit(s) IV Intermittent every 12 hours  QUEtiapine 50 milliGRAM(s) Oral every 12 hours  rifampin IVPB      rifampin IVPB 600 milliGRAM(s) IV Intermittent every 24 hours  senna 2 Tablet(s) Oral at bedtime    MEDICATIONS  (PRN):  LORazepam     Tablet 1 milliGRAM(s) Oral every 6 hours PRN Agitation  LORazepam   Injectable 1 milliGRAM(s) IV Push every 12 hours PRN Breakthrough Agitation/Assaultive Behavior that is uncontrolled with oral ativan        CAPILLARY BLOOD GLUCOSE      POCT Blood Glucose.: 209 mg/dL (17 Sep 2018 11:29)  POCT Blood Glucose.: 80 mg/dL (17 Sep 2018 06:35)  POCT Blood Glucose.: 299 mg/dL (16 Sep 2018 23:15)  POCT Blood Glucose.: 162 mg/dL (16 Sep 2018 16:49)    I&O's Summary      PHYSICAL EXAM:   GENERAL: awake and moving hands  in a fidget manner  HEAD:  Atraumatic, Normocephalic  EYES: EOMI, PERRLA, conjunctiva and sclera clear  NECK: Supple, No JVD, trachea in situ  CHEST/LUNG: Clear to auscultation bilaterally; scattered rhonchi  HEART: Regular rate and rhythm; No murmurs, rubs, or gallops  ABDOMEN: Soft, Nontender, Nondistended; Bowel sounds present, GT in place , site with dressing no drainage  EXTREMITIES:  2+ Peripheral Pulses, No clubbing, cyanosis, or edema  PSYCH: non verbal  NEUROLOGY: non verbal   SKIN: No rashes or lesions    LABS:                        11.6   7.4   )-----------( 242      ( 17 Sep 2018 07:47 )             37.1     09-17    140  |  104  |  20<H>  ----------------------------<  132<H>  3.6   |  29  |  0.81    Ca    8.9      17 Sep 2018 07:47  Phos  2.9     09-17  Mg     2.1     09-17                RADIOLOGY & ADDITIONAL TESTS:    Imaging Personally Reviewed:    Consultant(s) Notes Reviewed:      Care Discussed with Consultants/Other Providers:

## 2018-09-17 NOTE — PROGRESS NOTE ADULT - PROBLEM SELECTOR PLAN 1
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

## 2018-09-17 NOTE — PROGRESS NOTE ADULT - SUBJECTIVE AND OBJECTIVE BOX
Patient seen and examined at bedside, with sister at bedside  CRE noted in sputum cx, abx adjusted  Case discussed with medical team    HPI:  62 yo F from [facility], non-verbal, bed bound, S/P Trach/ PEG tube, PMH of HTN, asthma, convulsion disorder, bipolar, DVT legs, DM 2, sent from NH for coffee ground vomiting X3 this morning and cough.  Pt unable to provide history.  History given by sister (Sharon [phone])who states pt was vomiting coffee ground emesis this morning. Pt had no reported vomiting since in ED.  Pt also developed cough with clear sputum.  Sister states pt is combative at baseline, and her current behavior is baseline.  No reported fever, discomfort, diarrhea, or SOB.  Pt was recently admitted to [facility] in 07/2018 for same reason and treated for aspiration PNA and UTI during last admission.  Pt was supposed to be given IV vanco and zosyn for 7 day by PCP for PNA , which supposed to start from 09/13-09/19. Pt is DNR, MOSLT form in charts. (13 Sep 2018 15:49)      PAST MEDICAL & SURGICAL HISTORY:  No pertinent past medical history  Hypertension  Schizophrenia  Diabetic coma  Diabetes mellitus  No significant past surgical history  S/P percutaneous endoscopic gastrostomy (PEG) tube placement  H/O tracheostomy      angiotensin converting enzyme inhibitors (Unknown)       MEDICATIONS  (STANDING):  ALBUTerol/ipratropium for Nebulization 3 milliLiter(s) Nebulizer every 6 hours  amLODIPine   Tablet 2.5 milliGRAM(s) Oral daily  ampicillin/sulbactam  IVPB      ampicillin/sulbactam  IVPB 3 Gram(s) IV Intermittent every 6 hours  atorvastatin 20 milliGRAM(s) Oral at bedtime  benztropine 2 milliGRAM(s) Oral two times a day  heparin  Injectable 5000 Unit(s) SubCutaneous every 8 hours  insulin glargine Injectable (LANTUS) 25 Unit(s) SubCutaneous at bedtime  insulin lispro (HumaLOG) corrective regimen sliding scale   SubCutaneous every 6 hours  insulin lispro Injectable (HumaLOG) 4 Unit(s) SubCutaneous three times a day with meals  levETIRAcetam  Solution 1000 milliGRAM(s) Oral two times a day  metoprolol tartrate 100 milliGRAM(s) Oral two times a day  pantoprazole   Suspension 40 milliGRAM(s) Oral daily  polymyxin B IVPB 004756 Unit(s) IV Intermittent every 12 hours  QUEtiapine 50 milliGRAM(s) Oral every 12 hours  rifampin IVPB      rifampin IVPB 600 milliGRAM(s) IV Intermittent every 24 hours  senna 2 Tablet(s) Oral at bedtime    MEDICATIONS  (PRN):  LORazepam     Tablet 1 milliGRAM(s) Oral every 6 hours PRN Agitation  LORazepam   Injectable 1 milliGRAM(s) IV Push every 12 hours PRN Breakthrough Agitation/Assaultive Behavior that is uncontrolled with oral ativan      REVIEW OF SYSTEMS: limited 2/2 mental status      T(C): 36.4 (09-17-18 @ 06:28), Max: 36.8 (09-16-18 @ 15:43)  HR: 109 (09-17-18 @ 06:28) (73 - 109)  BP: 106/54 (09-17-18 @ 06:28) (106/54 - 122/96)  RR: 16 (09-17-18 @ 06:28) (16 - 18)  SpO2: 98% (09-17-18 @ 06:28) (97% - 100%)    PHYSICAL EXAMINATION:   Constitutional: restless. NAD  HEENT: AT  Neck: trach intact. Supple  Respiratory:  Adequate airflow b/l. Not using accessory muscles of respiration.  Cardiovascular:  S1 & S2 intact, no R/G, 2+ radial pulses b/l  Gastrointestinal: Soft, normoactive b.s.  Extremities: WWP  Neurological: awake. stable mental status.     Labs and imaging reviewed    LABS:                        11.6   7.4   )-----------( 242      ( 17 Sep 2018 07:47 )             37.1     09-17    140  |  104  |  20<H>  ----------------------------<  132<H>  3.6   |  29  |  0.81    Ca    8.9      17 Sep 2018 07:47  Phos  2.9     09-17  Mg     2.1     09-17              CAPILLARY BLOOD GLUCOSE      POCT Blood Glucose.: 80 mg/dL (17 Sep 2018 06:35)  POCT Blood Glucose.: 299 mg/dL (16 Sep 2018 23:15)  POCT Blood Glucose.: 162 mg/dL (16 Sep 2018 16:49)  POCT Blood Glucose.: 118 mg/dL (16 Sep 2018 12:51)              RADIOLOGY & ADDITIONAL STUDIES:

## 2018-09-17 NOTE — PROGRESS NOTE ADULT - PROBLEM SELECTOR PLAN 1
ID follow up, recent cultures show Ertapenem resistant, ID follow up. Continue Unasyn Rifampin and polymixin.

## 2018-09-17 NOTE — PROGRESS NOTE ADULT - ASSESSMENT
A 62 yo Female  with  non-verbal, bed bound, S/P Trach/ PEG tube, sent in to the ER from [facility]  for evaluation of coffee ground vomiting  and cough. On arrival, she found to have tachycardia, Leukocytosis, and RLL consolidation. She has started on Zosyn and Vancomycin and the ID consult requested to assist with further evaluation and antibiotic management.     # Aspiration pneumonia - RLL consolidation - Pseudomonas and Acinetobacter   # MRSA PCR is negative    Would recommend:    1. Follow up sensitivity of Pseudomonas and Acinetobacter   2. Discontinue Vancomycin  3. Change Zosyn to Unasyn and Add Polymixin and Rifampin based on pervious C & S until current culture finalized  4. Change Trach collar  5. Bronchial suctioning  and Aspiration  precaution       d/w  Nursing staff    will follow the patient with you A 62 yo Female  with  non-verbal, bed bound, S/P Trach/ PEG tube, sent in to the ER from [facility]  for evaluation of coffee ground vomiting  and cough. On arrival, she found to have tachycardia, Leukocytosis, and RLL consolidation. She has started on Zosyn and Vancomycin and the ID consult requested to assist with further evaluation and antibiotic management.     # Aspiration pneumonia - RLL consolidation - Pseudomonas and Acinetobacter   # MRSA PCR is negative    Would recommend:    1. Change Unasyn to Amikacin since sensitive to both,  Pseudomonas and Acinetobacter   2. Continue Polymixin and Rifampin along with amikacin to cover CRE  3. Change Trach collar  4. Bronchial suctioning  and Aspiration  precaution       d/w  Nursing staff    will follow the patient with you

## 2018-09-18 RX ORDER — INSULIN GLARGINE 100 [IU]/ML
10 INJECTION, SOLUTION SUBCUTANEOUS AT BEDTIME
Qty: 0 | Refills: 0 | Status: DISCONTINUED | OUTPATIENT
Start: 2018-09-18 | End: 2018-09-18

## 2018-09-18 RX ORDER — INSULIN LISPRO 100/ML
3 VIAL (ML) SUBCUTANEOUS
Qty: 0 | Refills: 0 | Status: DISCONTINUED | OUTPATIENT
Start: 2018-09-18 | End: 2018-09-21

## 2018-09-18 RX ORDER — INSULIN GLARGINE 100 [IU]/ML
10 INJECTION, SOLUTION SUBCUTANEOUS AT BEDTIME
Qty: 0 | Refills: 0 | Status: DISCONTINUED | OUTPATIENT
Start: 2018-09-18 | End: 2018-09-21

## 2018-09-18 RX ORDER — INSULIN GLARGINE 100 [IU]/ML
22 INJECTION, SOLUTION SUBCUTANEOUS AT BEDTIME
Qty: 0 | Refills: 0 | Status: DISCONTINUED | OUTPATIENT
Start: 2018-09-18 | End: 2018-09-19

## 2018-09-18 RX ADMIN — QUETIAPINE FUMARATE 50 MILLIGRAM(S): 200 TABLET, FILM COATED ORAL at 06:03

## 2018-09-18 RX ADMIN — POLYMYXIN B SULFATE 500 UNIT(S): 500000 INJECTION, POWDER, LYOPHILIZED, FOR SOLUTION INTRAMUSCULAR; INTRATHECAL; INTRAVENOUS; OPHTHALMIC at 18:30

## 2018-09-18 RX ADMIN — HEPARIN SODIUM 5000 UNIT(S): 5000 INJECTION INTRAVENOUS; SUBCUTANEOUS at 15:03

## 2018-09-18 RX ADMIN — QUETIAPINE FUMARATE 50 MILLIGRAM(S): 200 TABLET, FILM COATED ORAL at 17:20

## 2018-09-18 RX ADMIN — Medication 2: at 11:46

## 2018-09-18 RX ADMIN — LEVETIRACETAM 1000 MILLIGRAM(S): 250 TABLET, FILM COATED ORAL at 17:20

## 2018-09-18 RX ADMIN — Medication 1 MILLIGRAM(S): at 15:44

## 2018-09-18 RX ADMIN — LEVETIRACETAM 1000 MILLIGRAM(S): 250 TABLET, FILM COATED ORAL at 06:07

## 2018-09-18 RX ADMIN — AMIKACIN SULFATE 102.24 MILLIGRAM(S): 250 INJECTION, SOLUTION INTRAMUSCULAR; INTRAVENOUS at 17:19

## 2018-09-18 RX ADMIN — Medication 3 MILLILITER(S): at 15:30

## 2018-09-18 RX ADMIN — Medication 3 UNIT(S): at 17:42

## 2018-09-18 RX ADMIN — Medication 1 MILLIGRAM(S): at 11:50

## 2018-09-18 RX ADMIN — Medication 3 MILLILITER(S): at 09:32

## 2018-09-18 RX ADMIN — AMIKACIN SULFATE 102.24 MILLIGRAM(S): 250 INJECTION, SOLUTION INTRAMUSCULAR; INTRAVENOUS at 06:02

## 2018-09-18 RX ADMIN — Medication 100 MILLIGRAM(S): at 06:03

## 2018-09-18 RX ADMIN — Medication 2 MILLIGRAM(S): at 06:03

## 2018-09-18 RX ADMIN — POLYMYXIN B SULFATE 500 UNIT(S): 500000 INJECTION, POWDER, LYOPHILIZED, FOR SOLUTION INTRAMUSCULAR; INTRATHECAL; INTRAVENOUS; OPHTHALMIC at 07:08

## 2018-09-18 RX ADMIN — PANTOPRAZOLE SODIUM 40 MILLIGRAM(S): 20 TABLET, DELAYED RELEASE ORAL at 11:47

## 2018-09-18 RX ADMIN — Medication 100 MILLIGRAM(S): at 17:20

## 2018-09-18 RX ADMIN — Medication 3 UNIT(S): at 11:46

## 2018-09-18 RX ADMIN — HEPARIN SODIUM 5000 UNIT(S): 5000 INJECTION INTRAVENOUS; SUBCUTANEOUS at 06:04

## 2018-09-18 RX ADMIN — Medication 1: at 17:41

## 2018-09-18 RX ADMIN — Medication 3 MILLILITER(S): at 20:19

## 2018-09-18 RX ADMIN — AMLODIPINE BESYLATE 2.5 MILLIGRAM(S): 2.5 TABLET ORAL at 06:03

## 2018-09-18 RX ADMIN — Medication 2 MILLIGRAM(S): at 17:19

## 2018-09-18 RX ADMIN — Medication 3 MILLILITER(S): at 02:02

## 2018-09-18 NOTE — PROGRESS NOTE ADULT - PROBLEM SELECTOR PLAN 1
Pt continued with Amikacin (per sensitivity), rifampin, Polymixin  dr. Kwon, ID, following  isolation precautions

## 2018-09-18 NOTE — PROGRESS NOTE ADULT - SUBJECTIVE AND OBJECTIVE BOX
Patient is seen and examined at the bed side, is afebrile. She is doing better, no new evets.      REVIEW OF SYSTEMS: Unable to obtain due to mental status      ALLERGIES : NKDA        Vital Signs Last 24 Hrs  T(C): 36.6 (18 Sep 2018 14:22), Max: 36.8 (17 Sep 2018 20:55)  T(F): 97.9 (18 Sep 2018 14:22), Max: 98.2 (17 Sep 2018 20:55)  HR: 83 (18 Sep 2018 14:22) (83 - 101)  BP: 131/70 (18 Sep 2018 14:22) (130/78 - 156/36)  BP(mean): --  RR: 16 (18 Sep 2018 14:22) (16 - 18)  SpO2: 100% (18 Sep 2018 14:) (98% - 100%)        PHYSICAL EXAM:  GENERAL: Not in distress  HEENT: Trach in placed  CHEST/LUNG: Air entry B/L  HEART: s1 and s2 present  ABDOMEN: Nontender, and Nondistended  EXTREMITIES:  No pedal  edema  CNS: Awake but not Alert        LABS: No new Labs                       11.6   7.4   )-----------( 242      ( 17 Sep 2018 07:47 )             37.1                       12.0   9.7   )-----------( 261      ( 15 Sep 2018 06:05 )             39.2                           12.8   20.5  )-----------( 311      ( 14 Sep 2018 05:20 )             39.1               140  |  104  |  20<H>  ----------------------------<  132<H>  3.6   |  29  |  0.81    Ca    8.9      17 Sep 2018 07:47  Phos  2.9     -  Mg     2.1     -15    141  |  104  |  35<H>  ----------------------------<  204<H>  4.2   |  30  |  1.02    Ca    9.5      15 Sep 2018 06:05      TPro  7.3  /  Alb  2.7<L>  /  TBili  0.7  /  DBili  x   /  AST  17  /  ALT  24  /  AlkPhos  97  09-13    PT/INR - ( 13 Sep 2018 11:31 )   PT: 11.4 sec;   INR: 1.04 ratio         PTT - ( 13 Sep 2018 11:31 )  PTT:31.6 sec  Urinalysis Basic - ( 14 Sep 2018 03:56 )    Color: Yellow / Appearance: Clear / S.020 / pH: x  Gluc: x / Ketone: Negative  / Bili: Negative / Urobili: Negative   Blood: x / Protein: 100 / Nitrite: Negative   Leuk Esterase: Negative / RBC: 0-2 /HPF / WBC 0-2 /HPF   Sq Epi: x / Non Sq Epi: Occasional /HPF / Bacteria: Trace /HPF      CAPILLARY BLOOD GLUCOSE      POCT Blood Glucose.: 144 mg/dL (14 Sep 2018 11:07)  POCT Blood Glucose.: 202 mg/dL (14 Sep 2018 08:09)  POCT Blood Glucose.: 277 mg/dL (14 Sep 2018 06:58)  POCT Blood Glucose.: 68 mg/dL (14 Sep 2018 06:03)  POCT Blood Glucose.: 316 mg/dL (14 Sep 2018 02:11)  POCT Blood Glucose.: 405 mg/dL (14 Sep 2018 00:28)  POCT Blood Glucose.: 386 mg/dL (13 Sep 2018 22:26)  POCT Blood Glucose.: 403 mg/dL (13 Sep 2018 19:09)        MEDICATIONS  (STANDING):  ALBUTerol/ipratropium for Nebulization 3 milliLiter(s) Nebulizer every 6 hours  amiKACIN  IVPB 560 milliGRAM(s) IV Intermittent every 12 hours  amLODIPine   Tablet 2.5 milliGRAM(s) Oral daily  atorvastatin 20 milliGRAM(s) Oral at bedtime  benztropine 2 milliGRAM(s) Oral two times a day  heparin  Injectable 5000 Unit(s) SubCutaneous every 8 hours  insulin glargine Injectable (LANTUS) 22 Unit(s) SubCutaneous at bedtime  insulin lispro (HumaLOG) corrective regimen sliding scale   SubCutaneous every 6 hours  insulin lispro Injectable (HumaLOG) 3 Unit(s) SubCutaneous three times a day with meals  levETIRAcetam  Solution 1000 milliGRAM(s) Oral two times a day  metoprolol tartrate 100 milliGRAM(s) Oral two times a day  pantoprazole   Suspension 40 milliGRAM(s) Oral daily  polymyxin B IVPB 395230 Unit(s) IV Intermittent every 12 hours  QUEtiapine 50 milliGRAM(s) Oral every 12 hours  rifampin IVPB      rifampin IVPB 600 milliGRAM(s) IV Intermittent every 24 hours  senna 2 Tablet(s) Oral at bedtime    MEDICATIONS  (PRN):  LORazepam     Tablet 1 milliGRAM(s) Oral every 6 hours PRN Agitation  LORazepam   Injectable 1 milliGRAM(s) IV Push every 12 hours PRN Breakthrough Agitation/Assaultive Behavior that is uncontrolled with oral ativan          RADIOLOGY & ADDITIONAL TESTS:    18: CT Chest No Cont (18 @ 03:41) Impression: Consolidation involving the right lower lobe is of uncertain etiology. It is unclear whether this represents compressive atelectasis secondary to right pleural effusion/elevation of the right hemidiaphragm   and/or represents infection.    18: Xray Chest 1 View-PORTABLE IMMEDIATE (18 @ 13:37) Bilateral pleural effusions and associated atelectasis.        MICROBIOLOGY DATA:    Culture - Sputum . (09.15.18 @ 09:28)    Gram Stain:   Rare polymorphonuclear leukocytes per low power field  Few Squamous epithelial cells per low power field  Moderate Gram Negative Rods per oil power field  Few Gram Positive Rods per oil power field  Few Yeast like cells per oil power field  Rare Gram Negative Coccobacilli per oil power field    -  Amikacin: S <=8    -  Amikacin: S <=8    -  Ampicillin/Sulbactam: R >16/8    -  Aztreonam: S 8    -  Cefepime: I 16    -  Cefepime: I 16    -  Ceftazidime: R >16    -  Ceftazidime: I 16    -  Ciprofloxacin: R >2    -  Ciprofloxacin: R >2    -  Gentamicin: R >8    -  Gentamicin: R >8    -  Imipenem: R >8    -  Imipenem: R    -  Levofloxacin: R >4    -  Levofloxacin: R >4    -  Meropenem: R >8    -  Meropenem: I 8    -  Piperacillin/Tazobactam: R >64    -  Piperacillin/Tazobactam: R    -  Tobramycin: R >8    -  Tobramycin: R >8    -  Trimethoprim/Sulfamethoxazole: R >2/38    Specimen Source: .Sputum Sputum    Culture Results:   Numerous Pseudomonas aeruginosa (Carbapenem Resistant)  Moderate Acinetobacter baumannii/haemolyticus (Carbapenem Resistant)  Complex  Normal Respiratory Moraima present    Organism Identification: Pseudomonas aeruginosa (Carbapenem Resistant)  Acinetobacter baumannii/haemolyticus (Carbapenem Resistant)    Organism: Acinetobacter baumannii/haemolyticus (Carbapenem Resistant)    Organism: Pseudomonas aeruginosa (Carbapenem Resistant)    Organism: Acinetobacter baumannii/haemolyticus (Carbapenem Resistant)    Method Type: ESTIVEN    Method Type: ESTIVEN    Method Type: KB        MRSA/MSSA PCR (18 @ 11:26)    MRSA PCR Result.: NotDetec: MRSA/MSSA PCR assay is a qualitative in vitro diagnostic test for the  direct detection and differentiation of methicillin-resistant  Staphylococcus aureus (MRSA) from Staphylococcus aureus (SA). The assay  detects DNA from nasal swabs in patients atrisk for nasal colonization.  It is not intended to diagnose MRSA or SA infections nor guide or monitor  treatment for MRSA/SA infections. A negative result does not preclude  nasal colonization. The assay is FDA-approved and its performance has  been established by [employer] [place] and the [facility], [place], [place].    Staph Aureus PCR Result: NotDetec      Culture - Urine (18 @ 09:47)    Specimen Source: .Urine Catheterized    Culture Results:   10,000 - 49,000 CFU/mL Yeast

## 2018-09-18 NOTE — PROGRESS NOTE ADULT - SUBJECTIVE AND OBJECTIVE BOX
Interval Events:  pt in nad    Allergies    angiotensin converting enzyme inhibitors (Unknown)    Intolerances      Endocrine/Metabolic Medications:  atorvastatin 20 milliGRAM(s) Oral at bedtime  insulin glargine Injectable (LANTUS) 25 Unit(s) SubCutaneous at bedtime  insulin lispro (HumaLOG) corrective regimen sliding scale   SubCutaneous every 6 hours  insulin lispro Injectable (HumaLOG) 4 Unit(s) SubCutaneous three times a day with meals      Vital Signs Last 24 Hrs  T(C): 36.2 (18 Sep 2018 05:11), Max: 36.8 (17 Sep 2018 20:55)  T(F): 97.2 (18 Sep 2018 05:11), Max: 98.2 (17 Sep 2018 20:55)  HR: 95 (18 Sep 2018 05:11) (95 - 101)  BP: 131/98 (18 Sep 2018 05:11) (129/89 - 156/36)  BP(mean): --  RR: 18 (18 Sep 2018 05:11) (18 - 18)  SpO2: 98% (18 Sep 2018 05:11) (97% - 98%)      PHYSICAL EXAM  All physical exam findings normal, except those marked:  General:	Alert, active, cooperative, NAD, well hydrated  .		[] Abnormal:  Neck		Normal: supple, no cervical adenopathy, no palpable thyroid  .		[] Abnormal:  Cardiovascular	Normal: regular rate, normal S1, S2, no murmurs  .		[] Abnormal:  Respiratory	Normal: no chest wall deformity, normal respiratory pattern, CTA B/L  .		[] Abnormal:  Abdominal	Normal: soft, ND, NT, bowel sounds present, no masses, no organomegaly  .		[] Abnormal:  		Normal normal genitalia, testes descended, circumcised/uncircumcised  .		Bob stage:			Breast bob:  .		Menstrual history:  .		[] Abnormal:  Extremities	Normal: FROM x4  .		[] Abnormal:  Skin		Normal: intact and not indurated, no rash, no acanthosis nigricans  .		[] Abnormal:  Neurologic	Normal: grossly intact  .		[] Abnormal:    LABS        CAPILLARY BLOOD GLUCOSE      POCT Blood Glucose.: 118 mg/dL (18 Sep 2018 06:39)  POCT Blood Glucose.: 106 mg/dL (17 Sep 2018 23:36)  POCT Blood Glucose.: 154 mg/dL (17 Sep 2018 21:21)  POCT Blood Glucose.: 78 mg/dL (17 Sep 2018 17:26)  POCT Blood Glucose.: 209 mg/dL (17 Sep 2018 11:29)        Assesment/plan    Dm- good control with mild hypos  change lantus to 22  humalog to 3 tid  consider changing insulin as peg feeds are titrated  fsg q6  aim fsg 140-180

## 2018-09-18 NOTE — PROGRESS NOTE ADULT - SUBJECTIVE AND OBJECTIVE BOX
Patient seen and examined at bedside  agitated/combative s/p ativan  Case discussed with medical team    HPI:  62 yo F from [facility], non-verbal, bed bound, S/P Trach/ PEG tube, PMH of HTN, asthma, convulsion disorder, bipolar, DVT legs, DM 2, sent from NH for coffee ground vomiting X3 this morning and cough.  Pt unable to provide history.  History given by sister (Sharon [phone])who states pt was vomiting coffee ground emesis this morning. Pt had no reported vomiting since in ED.  Pt also developed cough with clear sputum.  Sister states pt is combative at baseline, and her current behavior is baseline.  No reported fever, discomfort, diarrhea, or SOB.  Pt was recently admitted to [facility] in 07/2018 for same reason and treated for aspiration PNA and UTI during last admission.  Pt was supposed to be given IV vanco and zosyn for 7 day by PCP for PNA , which supposed to start from 09/13-09/19. Pt is DNR, MOSLT form in charts. (13 Sep 2018 15:49)      PAST MEDICAL & SURGICAL HISTORY:  No pertinent past medical history  Hypertension  Schizophrenia  Diabetic coma  Diabetes mellitus  No significant past surgical history  S/P percutaneous endoscopic gastrostomy (PEG) tube placement  H/O tracheostomy      angiotensin converting enzyme inhibitors (Unknown)       MEDICATIONS  (STANDING):  ALBUTerol/ipratropium for Nebulization 3 milliLiter(s) Nebulizer every 6 hours  amiKACIN  IVPB 560 milliGRAM(s) IV Intermittent every 12 hours  amLODIPine   Tablet 2.5 milliGRAM(s) Oral daily  atorvastatin 20 milliGRAM(s) Oral at bedtime  benztropine 2 milliGRAM(s) Oral two times a day  heparin  Injectable 5000 Unit(s) SubCutaneous every 8 hours  insulin glargine Injectable (LANTUS) 22 Unit(s) SubCutaneous at bedtime  insulin lispro (HumaLOG) corrective regimen sliding scale   SubCutaneous every 6 hours  insulin lispro Injectable (HumaLOG) 3 Unit(s) SubCutaneous three times a day with meals  levETIRAcetam  Solution 1000 milliGRAM(s) Oral two times a day  metoprolol tartrate 100 milliGRAM(s) Oral two times a day  pantoprazole   Suspension 40 milliGRAM(s) Oral daily  polymyxin B IVPB [id number] Unit(s) IV Intermittent every 12 hours  QUEtiapine 50 milliGRAM(s) Oral every 12 hours  rifampin IVPB      rifampin IVPB 600 milliGRAM(s) IV Intermittent every 24 hours  senna 2 Tablet(s) Oral at bedtime    MEDICATIONS  (PRN):  LORazepam     Tablet 1 milliGRAM(s) Oral every 6 hours PRN Agitation  LORazepam   Injectable 1 milliGRAM(s) IV Push every 12 hours PRN Breakthrough Agitation/Assaultive Behavior that is uncontrolled with oral ativan      REVIEW OF SYSTEMS: limited 2/2 mental status    T(C): 36.2 (09-18-18 @ 05:11), Max: 36.8 (09-17-18 @ 20:55)  HR: 95 (09-18-18 @ 05:11) (95 - 101)  BP: 131/98 (09-18-18 @ 05:11) (129/89 - 156/36)  RR: 18 (09-18-18 @ 05:11) (18 - 18)  SpO2: 98% (09-18-18 @ 05:11) (97% - 98%)    PHYSICAL EXAMINATION:   Constitutional:NAD  HEENT: AT  Neck:  Supple  Respiratory:  Adequate airflow b/l. Not using accessory muscles of respiration.  Cardiovascular:  S1 & S2 intact, no R/G, 2+ radial pulses b/l  Gastrointestinal: Soft, ND, normoactive b.s.  Extremities: WWP  Neurological:   awake. stable    Labs and imaging reviewed    LABS:                        11.6   7.4   )-----------( 242      ( 17 Sep 2018 07:47 )             37.1     09-17    140  |  104  |  20<H>  ----------------------------<  132<H>  3.6   |  29  |  0.81    Ca    8.9      17 Sep 2018 07:47  Phos  2.9     09-17  Mg     2.1     09-17              CAPILLARY BLOOD GLUCOSE      POCT Blood Glucose.: 216 mg/dL (18 Sep 2018 11:44)  POCT Blood Glucose.: 118 mg/dL (18 Sep 2018 06:39)  POCT Blood Glucose.: 106 mg/dL (17 Sep 2018 23:36)  POCT Blood Glucose.: 154 mg/dL (17 Sep 2018 21:21)  POCT Blood Glucose.: 78 mg/dL (17 Sep 2018 17:26)              RADIOLOGY & ADDITIONAL STUDIES:

## 2018-09-18 NOTE — PROGRESS NOTE ADULT - ASSESSMENT
A 64 yo Female  with  non-verbal, bed bound, S/P Trach/ PEG tube, sent in to the ER from [facility]  for evaluation of coffee ground vomiting  and cough. On arrival, she found to have tachycardia, Leukocytosis, and RLL consolidation. She has started on Zosyn and Vancomycin and the ID consult requested to assist with further evaluation and antibiotic management.     # Aspiration pneumonia - RLL consolidation - Pseudomonas and Acinetobacter   # MRSA PCR is negative    Would recommend:    1. Bronchial suctioning  and Aspiration  precaution   2. Continue Amikacin since sensitive to both,  Pseudomonas and Acinetobacter   3. Continue Polymixin and Rifampin along with amikacin to cover CRE  4. Change Trach collar      d/w  Nursing staff    will follow the patient with you

## 2018-09-18 NOTE — PROGRESS NOTE ADULT - PROBLEM SELECTOR PLAN 3
c/w seroquel and benztropine  ativan po q6h, ativan ivp prn breakthrough combative/assaultive behavior  avoid haldol and/or QTc prolongation rx 2/2 hx QTc prolongation

## 2018-09-18 NOTE — PROGRESS NOTE ADULT - PROBLEM SELECTOR PLAN 3
c/w seroquel and benztropine  ativan po q6h, ativan ivp prn breakthrough combative/assaultive behavior  haldol avoided  for QTc prolongation rx 2/2 hx QTc prolongation

## 2018-09-18 NOTE — PROGRESS NOTE ADULT - PROBLEM SELECTOR PLAN 1
Complicated Aspiration PNA/HCAP 2/2 Carbapenem Resistent Enterobacter (CRE)   c/w abx  isolation precautions  all medical consultant recs/management appreciated

## 2018-09-18 NOTE — PROGRESS NOTE ADULT - SUBJECTIVE AND OBJECTIVE BOX
NP Note discussed with  Primary Attending    Patient is a 63y old  Female who presents with a chief complaint of vomiting (18 Sep 2018 11:48)      INTERVAL HPI/OVERNIGHT EVENTS: no new complaints    MEDICATIONS  (STANDING):  ALBUTerol/ipratropium for Nebulization 3 milliLiter(s) Nebulizer every 6 hours  amiKACIN  IVPB 560 milliGRAM(s) IV Intermittent every 12 hours  amLODIPine   Tablet 2.5 milliGRAM(s) Oral daily  atorvastatin 20 milliGRAM(s) Oral at bedtime  benztropine 2 milliGRAM(s) Oral two times a day  heparin  Injectable 5000 Unit(s) SubCutaneous every 8 hours  insulin glargine Injectable (LANTUS) 22 Unit(s) SubCutaneous at bedtime  insulin lispro (HumaLOG) corrective regimen sliding scale   SubCutaneous every 6 hours  insulin lispro Injectable (HumaLOG) 3 Unit(s) SubCutaneous three times a day with meals  levETIRAcetam  Solution 1000 milliGRAM(s) Oral two times a day  metoprolol tartrate 100 milliGRAM(s) Oral two times a day  pantoprazole   Suspension 40 milliGRAM(s) Oral daily  polymyxin B IVPB 668234 Unit(s) IV Intermittent every 12 hours  QUEtiapine 50 milliGRAM(s) Oral every 12 hours  rifampin IVPB      rifampin IVPB 600 milliGRAM(s) IV Intermittent every 24 hours  senna 2 Tablet(s) Oral at bedtime    MEDICATIONS  (PRN):  LORazepam     Tablet 1 milliGRAM(s) Oral every 6 hours PRN Agitation  LORazepam   Injectable 1 milliGRAM(s) IV Push every 12 hours PRN Breakthrough Agitation/Assaultive Behavior that is uncontrolled with oral ativan      __________________________________________________  REVIEW OF SYSTEMS:  Unable to obtain/pt non-verbal.  agitated      Vital Signs Last 24 Hrs  T(C): 36.6 (18 Sep 2018 14:22), Max: 36.8 (17 Sep 2018 20:55)  T(F): 97.9 (18 Sep 2018 14:22), Max: 98.2 (17 Sep 2018 20:55)  HR: 83 (18 Sep 2018 14:22) (83 - 101)  BP: 131/70 (18 Sep 2018 14:22) (130/78 - 156/36)  BP(mean): --  RR: 16 (18 Sep 2018 14:22) (16 - 18)  SpO2: 100% (18 Sep 2018 14:22) (98% - 100%)    ________________________________________________  PHYSICAL EXAM:  HEENT: Normocephalic;  conjunctivae and sclerae clear; moist mucous membranes;   NECK : supple/trach  CHEST/LUNG: RLL rales/scattered wheeze  HEART: S1 S2  regular; no murmurs, gallops or rubs  ABDOMEN: Soft, Nontender, Nondistended; Bowel sounds present.  No emesis/resolved  EXTREMITIES: no cyanosis; no edema; no calf tenderness  SKIN: warm and dry; no rash  NERVOUS SYSTEM:  Alert/agitated    _________________________________________________  LABS:                        11.6   7.4   )-----------( 242      ( 17 Sep 2018 07:47 )             37.1     09-17    140  |  104  |  20<H>  ----------------------------<  132<H>  3.6   |  29  |  0.81    Ca    8.9      17 Sep 2018 07:47  Phos  2.9     09-17  Mg     2.1     09-17          CAPILLARY BLOOD GLUCOSE      POCT Blood Glucose.: 216 mg/dL (18 Sep 2018 11:44)  POCT Blood Glucose.: 118 mg/dL (18 Sep 2018 06:39)  POCT Blood Glucose.: 106 mg/dL (17 Sep 2018 23:36)  POCT Blood Glucose.: 154 mg/dL (17 Sep 2018 21:21)  POCT Blood Glucose.: 78 mg/dL (17 Sep 2018 17:26)        RADIOLOGY & ADDITIONAL TESTS:    Imaging Personally Reviewed:  YES    Consultant(s) Notes Reviewed:   YES    Care Discussed with Consultants :     Plan of care was discussed with patient and /or primary care giver; all questions and concerns were addressed and care was aligned with patient's wishes.

## 2018-09-18 NOTE — CHART NOTE - NSCHARTNOTEFT_GEN_A_CORE
Was called by nurse to evaluate patient for pinkish froth from mouth. Vitals stable. Rales on RL. Will send CBC.     Will continue to monitor if any changes. Primary team to follow.

## 2018-09-19 LAB
ANION GAP SERPL CALC-SCNC: 8 MMOL/L — SIGNIFICANT CHANGE UP (ref 5–17)
BUN SERPL-MCNC: 11 MG/DL — SIGNIFICANT CHANGE UP (ref 7–18)
CALCIUM SERPL-MCNC: 8.5 MG/DL — SIGNIFICANT CHANGE UP (ref 8.4–10.5)
CHLORIDE SERPL-SCNC: 95 MMOL/L — LOW (ref 96–108)
CO2 SERPL-SCNC: 28 MMOL/L — SIGNIFICANT CHANGE UP (ref 22–31)
CREAT SERPL-MCNC: 0.76 MG/DL — SIGNIFICANT CHANGE UP (ref 0.5–1.3)
CULTURE RESULTS: SIGNIFICANT CHANGE UP
GLUCOSE SERPL-MCNC: 214 MG/DL — HIGH (ref 70–99)
HCT VFR BLD CALC: 33.1 % — LOW (ref 34.5–45)
HGB BLD-MCNC: 10.5 G/DL — LOW (ref 11.5–15.5)
MAGNESIUM SERPL-MCNC: 1.5 MG/DL — LOW (ref 1.6–2.6)
MCHC RBC-ENTMCNC: 25.2 PG — LOW (ref 27–34)
MCHC RBC-ENTMCNC: 31.8 GM/DL — LOW (ref 32–36)
MCV RBC AUTO: 79.5 FL — LOW (ref 80–100)
PHOSPHATE SERPL-MCNC: 3.3 MG/DL — SIGNIFICANT CHANGE UP (ref 2.5–4.5)
PLATELET # BLD AUTO: 217 K/UL — SIGNIFICANT CHANGE UP (ref 150–400)
POTASSIUM SERPL-MCNC: 3.8 MMOL/L — SIGNIFICANT CHANGE UP (ref 3.5–5.3)
POTASSIUM SERPL-SCNC: 3.8 MMOL/L — SIGNIFICANT CHANGE UP (ref 3.5–5.3)
RBC # BLD: 4.16 M/UL — SIGNIFICANT CHANGE UP (ref 3.8–5.2)
RBC # FLD: 13.2 % — SIGNIFICANT CHANGE UP (ref 10.3–14.5)
SODIUM SERPL-SCNC: 131 MMOL/L — LOW (ref 135–145)
SPECIMEN SOURCE: SIGNIFICANT CHANGE UP
WBC # BLD: 20.1 K/UL — HIGH (ref 3.8–10.5)
WBC # FLD AUTO: 20.1 K/UL — HIGH (ref 3.8–10.5)

## 2018-09-19 RX ORDER — MAGNESIUM SULFATE 500 MG/ML
1 VIAL (ML) INJECTION ONCE
Qty: 0 | Refills: 0 | Status: COMPLETED | OUTPATIENT
Start: 2018-09-19 | End: 2018-09-19

## 2018-09-19 RX ORDER — SODIUM CHLORIDE 9 MG/ML
1000 INJECTION INTRAMUSCULAR; INTRAVENOUS; SUBCUTANEOUS
Qty: 0 | Refills: 0 | Status: DISCONTINUED | OUTPATIENT
Start: 2018-09-19 | End: 2018-09-22

## 2018-09-19 RX ADMIN — PANTOPRAZOLE SODIUM 40 MILLIGRAM(S): 20 TABLET, DELAYED RELEASE ORAL at 12:10

## 2018-09-19 RX ADMIN — HEPARIN SODIUM 5000 UNIT(S): 5000 INJECTION INTRAVENOUS; SUBCUTANEOUS at 14:41

## 2018-09-19 RX ADMIN — ATORVASTATIN CALCIUM 20 MILLIGRAM(S): 80 TABLET, FILM COATED ORAL at 21:30

## 2018-09-19 RX ADMIN — Medication 3 UNIT(S): at 17:46

## 2018-09-19 RX ADMIN — POLYMYXIN B SULFATE 500 UNIT(S): 500000 INJECTION, POWDER, LYOPHILIZED, FOR SOLUTION INTRAMUSCULAR; INTRATHECAL; INTRAVENOUS; OPHTHALMIC at 17:48

## 2018-09-19 RX ADMIN — Medication 3 MILLILITER(S): at 15:03

## 2018-09-19 RX ADMIN — Medication 100 GRAM(S): at 14:41

## 2018-09-19 RX ADMIN — AMIKACIN SULFATE 102.24 MILLIGRAM(S): 250 INJECTION, SOLUTION INTRAMUSCULAR; INTRAVENOUS at 18:07

## 2018-09-19 RX ADMIN — Medication 2 MILLIGRAM(S): at 06:22

## 2018-09-19 RX ADMIN — INSULIN GLARGINE 10 UNIT(S): 100 INJECTION, SOLUTION SUBCUTANEOUS at 00:36

## 2018-09-19 RX ADMIN — SENNA PLUS 2 TABLET(S): 8.6 TABLET ORAL at 21:30

## 2018-09-19 RX ADMIN — POLYMYXIN B SULFATE 500 UNIT(S): 500000 INJECTION, POWDER, LYOPHILIZED, FOR SOLUTION INTRAMUSCULAR; INTRATHECAL; INTRAVENOUS; OPHTHALMIC at 06:20

## 2018-09-19 RX ADMIN — Medication 100 MILLIGRAM(S): at 17:47

## 2018-09-19 RX ADMIN — QUETIAPINE FUMARATE 50 MILLIGRAM(S): 200 TABLET, FILM COATED ORAL at 17:47

## 2018-09-19 RX ADMIN — Medication 3 UNIT(S): at 12:10

## 2018-09-19 RX ADMIN — Medication 3 MILLILITER(S): at 20:16

## 2018-09-19 RX ADMIN — LEVETIRACETAM 1000 MILLIGRAM(S): 250 TABLET, FILM COATED ORAL at 06:26

## 2018-09-19 RX ADMIN — HEPARIN SODIUM 5000 UNIT(S): 5000 INJECTION INTRAVENOUS; SUBCUTANEOUS at 21:30

## 2018-09-19 RX ADMIN — Medication 2 MILLIGRAM(S): at 17:49

## 2018-09-19 RX ADMIN — Medication 3: at 12:09

## 2018-09-19 RX ADMIN — Medication 3 MILLILITER(S): at 02:04

## 2018-09-19 RX ADMIN — LEVETIRACETAM 1000 MILLIGRAM(S): 250 TABLET, FILM COATED ORAL at 17:47

## 2018-09-19 RX ADMIN — HEPARIN SODIUM 5000 UNIT(S): 5000 INJECTION INTRAVENOUS; SUBCUTANEOUS at 06:26

## 2018-09-19 RX ADMIN — AMIKACIN SULFATE 102.24 MILLIGRAM(S): 250 INJECTION, SOLUTION INTRAMUSCULAR; INTRAVENOUS at 05:13

## 2018-09-19 RX ADMIN — Medication 3 MILLILITER(S): at 08:29

## 2018-09-19 RX ADMIN — Medication 2: at 17:46

## 2018-09-19 RX ADMIN — QUETIAPINE FUMARATE 50 MILLIGRAM(S): 200 TABLET, FILM COATED ORAL at 06:22

## 2018-09-19 NOTE — PROGRESS NOTE ADULT - SUBJECTIVE AND OBJECTIVE BOX
NP Note discussed with  Primary Attending    Patient is a 63y old  Female who presents with a chief complaint of vomiting (19 Sep 2018 09:25)      INTERVAL HPI/OVERNIGHT EVENTS: Pt had one episode overnight of pink frothy sputum--no further episodes.  Hemoglobin stable.  Pt with WBC today of 20.1 (7.4)/afebrile.  Contacted Dr. Kwon, ID/aware.  Pt with no diarrhea; however, had four episodes of pasty BM's overnight.  CXR ordered due to RLL rales to auscultation.  Pt continues on Amikacin, Rifampin, and Polymixin.    MEDICATIONS  (STANDING):  ALBUTerol/ipratropium for Nebulization 3 milliLiter(s) Nebulizer every 6 hours  amiKACIN  IVPB 560 milliGRAM(s) IV Intermittent every 12 hours  amLODIPine   Tablet 2.5 milliGRAM(s) Oral daily  atorvastatin 20 milliGRAM(s) Oral at bedtime  benztropine 2 milliGRAM(s) Oral two times a day  heparin  Injectable 5000 Unit(s) SubCutaneous every 8 hours  insulin glargine Injectable (LANTUS) 10 Unit(s) SubCutaneous at bedtime  insulin lispro (HumaLOG) corrective regimen sliding scale   SubCutaneous every 6 hours  insulin lispro Injectable (HumaLOG) 3 Unit(s) SubCutaneous three times a day with meals  levETIRAcetam  Solution 1000 milliGRAM(s) Oral two times a day  metoprolol tartrate 100 milliGRAM(s) Oral two times a day  pantoprazole   Suspension 40 milliGRAM(s) Oral daily  polymyxin B IVPB 428656 Unit(s) IV Intermittent every 12 hours  QUEtiapine 50 milliGRAM(s) Oral every 12 hours  rifampin IVPB      rifampin IVPB 600 milliGRAM(s) IV Intermittent every 24 hours  senna 2 Tablet(s) Oral at bedtime  sodium chloride 0.9%. 1000 milliLiter(s) (75 mL/Hr) IV Continuous <Continuous>    MEDICATIONS  (PRN):  LORazepam     Tablet 1 milliGRAM(s) Oral every 6 hours PRN Agitation  LORazepam   Injectable 1 milliGRAM(s) IV Push every 12 hours PRN Breakthrough Agitation/Assaultive Behavior that is uncontrolled with oral ativan      __________________________________________________  REVIEW OF SYSTEMS:  Pt nonverbal/unable to obtain ROS.        Vital Signs Last 24 Hrs  T(C): 37.1 (19 Sep 2018 14:46), Max: 37.2 (19 Sep 2018 05:05)  T(F): 98.8 (19 Sep 2018 14:46), Max: 98.9 (19 Sep 2018 05:05)  HR: 78 (19 Sep 2018 14:46) (78 - 96)  BP: 142/86 (19 Sep 2018 14:46) (101/86 - 142/86)  BP(mean): --  RR: 16 (19 Sep 2018 14:46) (16 - 18)  SpO2: 100% (19 Sep 2018 14:46) (98% - 100%)    ________________________________________________  PHYSICAL EXAM:  GENERAL: NAD  HEENT: Normocephalic;  conjunctivae and sclerae clear; moist mucous membranes;   NECK : supple  CHEST/LUNG: RLL rales  HEART: S1 S2  regular; no murmurs, gallops or rubs  ABDOMEN: Soft, Nontender, Nondistended; Bowel sounds present  EXTREMITIES: no cyanosis; no edema; no calf tenderness.  Contracted hands  SKIN: warm and dry; no rash  NERVOUS SYSTEM:  Alert/calm    _________________________________________________  LABS:                        10.5   20.1  )-----------( 217      ( 19 Sep 2018 07:47 )             33.1     09-19    131<L>  |  95<L>  |  11  ----------------------------<  214<H>  3.8   |  28  |  0.76    Ca    8.5      19 Sep 2018 07:47  Phos  3.3     09-19  Mg     1.5     09-19          CAPILLARY BLOOD GLUCOSE      POCT Blood Glucose.: 300 mg/dL (19 Sep 2018 11:58)  POCT Blood Glucose.: 131 mg/dL (19 Sep 2018 05:46)  POCT Blood Glucose.: 101 mg/dL (19 Sep 2018 00:02)  POCT Blood Glucose.: 89 mg/dL (18 Sep 2018 21:36)  POCT Blood Glucose.: 164 mg/dL (18 Sep 2018 17:38)        RADIOLOGY & ADDITIONAL TESTS:    Imaging Personally Reviewed:  YES    Consultant(s) Notes Reviewed:   YES    Care Discussed with Consultants :     Plan of care was discussed with patient and /or primary care giver; all questions and concerns were addressed and care was aligned with patient's wishes.

## 2018-09-19 NOTE — PROGRESS NOTE ADULT - PROBLEM SELECTOR PLAN 1
Pt continued with Amikacin (per sensitivity), rifampin, Polymixin  WBC 20.1/afebrile.  RLL rales to auscultation/CXR ordered/awaiting  HOWIE Echeverria, following  isolation precautions

## 2018-09-19 NOTE — PROGRESS NOTE ADULT - SUBJECTIVE AND OBJECTIVE BOX
HPI:  62 yo F from [facility], non-verbal, bed bound, S/P Trach/ PEG tube, PMH of HTN, asthma, convulsion disorder, bipolar, DVT legs, DM 2, sent from NH for coffee ground vomiting X3 this morning and cough.  Pt unable to provide history.  History given by sister (Sharon [phone])who states pt was vomiting coffee ground emesis this morning. Pt had no reported vomiting since in ED.  Pt also developed cough with clear sputum.  Sister states pt is combative at baseline, and her current behavior is baseline.  No reported fever, discomfort, diarrhea, or SOB.  Pt was recently admitted to [facility] in 07/2018 for same reason and treated for aspiration PNA and UTI during last admission.  Pt was supposed to be given IV vanco and zosyn for 7 day by PCP for PNA , which supposed to start from 09/13-09/19. Pt is DNR, MOSLT form in charts. (13 Sep 2018 15:49)      Patient is a 63y old  Female who presents with a chief complaint of vomiting (19 Sep 2018 16:52)      INTERVAL HPI/OVERNIGHT EVENTS:  T(C): 37.1 (09-19-18 @ 14:46), Max: 37.2 (09-19-18 @ 05:05)  HR: 78 (09-19-18 @ 14:46) (78 - 96)  BP: 142/86 (09-19-18 @ 14:46) (101/86 - 142/86)  RR: 16 (09-19-18 @ 14:46) (16 - 18)  SpO2: 100% (09-19-18 @ 14:46) (98% - 100%)  Wt(kg): --  I&O's Summary      REVIEW OF SYSTEMS: denies fever, chills, SOB, palpitations, chest pain, abdominal pain, nausea, vomitting, diarrhea, constipation, dizziness    MEDICATIONS  (STANDING):  ALBUTerol/ipratropium for Nebulization 3 milliLiter(s) Nebulizer every 6 hours  amiKACIN  IVPB 560 milliGRAM(s) IV Intermittent every 12 hours  amLODIPine   Tablet 2.5 milliGRAM(s) Oral daily  atorvastatin 20 milliGRAM(s) Oral at bedtime  benztropine 2 milliGRAM(s) Oral two times a day  heparin  Injectable 5000 Unit(s) SubCutaneous every 8 hours  insulin glargine Injectable (LANTUS) 10 Unit(s) SubCutaneous at bedtime  insulin lispro (HumaLOG) corrective regimen sliding scale   SubCutaneous every 6 hours  insulin lispro Injectable (HumaLOG) 3 Unit(s) SubCutaneous three times a day with meals  levETIRAcetam  Solution 1000 milliGRAM(s) Oral two times a day  metoprolol tartrate 100 milliGRAM(s) Oral two times a day  pantoprazole   Suspension 40 milliGRAM(s) Oral daily  polymyxin B IVPB [id number] Unit(s) IV Intermittent every 12 hours  QUEtiapine 50 milliGRAM(s) Oral every 12 hours  rifampin IVPB      rifampin IVPB 600 milliGRAM(s) IV Intermittent every 24 hours  senna 2 Tablet(s) Oral at bedtime  sodium chloride 0.9%. 1000 milliLiter(s) (75 mL/Hr) IV Continuous <Continuous>    MEDICATIONS  (PRN):  LORazepam     Tablet 1 milliGRAM(s) Oral every 6 hours PRN Agitation  LORazepam   Injectable 1 milliGRAM(s) IV Push every 12 hours PRN Breakthrough Agitation/Assaultive Behavior that is uncontrolled with oral ativan      PHYSICAL EXAM:  GENERAL: NAD, well-groomed, well-developed  HEAD:  Atraumatic, Normocephalic  EYES: EOMI, PERRLA, conjunctiva and sclera clear  ENMT: No tonsillar erythema, exudates, or enlargement; Moist mucous membranes, Good dentition, No lesions  NECK: Supple, No JVD, Normal thyroid  NERVOUS SYSTEM:  Alert & Oriented X3, Good concentration; Motor Strength 5/5 B/L upper and lower extremities; DTRs 2+ intact and symmetric  CHEST/LUNG: Clear to percussion bilaterally; No rales, rhonchi, wheezing, or rubs  HEART: Regular rate and rhythm; No murmurs, rubs, or gallops  ABDOMEN: Soft, Nontender, Nondistended; Bowel sounds present  EXTREMITIES:  2+ Peripheral Pulses, No clubbing, cyanosis, or edema  LYMPH: No lymphadenopathy noted  SKIN: No rashes or lesions  LABS:                        10.5   20.1  )-----------( 217      ( 19 Sep 2018 07:47 )             33.1     09-19    131<L>  |  95<L>  |  11  ----------------------------<  214<H>  3.8   |  28  |  0.76    Ca    8.5      19 Sep 2018 07:47  Phos  3.3     09-19  Mg     1.5     09-19          CAPILLARY BLOOD GLUCOSE      POCT Blood Glucose.: 300 mg/dL (19 Sep 2018 11:58)  POCT Blood Glucose.: 131 mg/dL (19 Sep 2018 05:46)  POCT Blood Glucose.: 101 mg/dL (19 Sep 2018 00:02)  POCT Blood Glucose.: 89 mg/dL (18 Sep 2018 21:36)  POCT Blood Glucose.: 164 mg/dL (18 Sep 2018 17:38)

## 2018-09-19 NOTE — CHART NOTE - NSCHARTNOTEFT_GEN_A_CORE
Her BS was 89 so reduced dose of lantus to 10 units from 22 units for tonight. Primary team to follow.

## 2018-09-19 NOTE — PROGRESS NOTE ADULT - SUBJECTIVE AND OBJECTIVE BOX
Patient is seen and examined at the bed side, is afebrile. She is doing better, no new evets.      REVIEW OF SYSTEMS: Unable to obtain due to mental status      ALLERGIES : NKDA        Vital Signs Last 24 Hrs  T(C): 37.1 (19 Sep 2018 14:46), Max: 37.2 (19 Sep 2018 05:05)  T(F): 98.8 (19 Sep 2018 14:46), Max: 98.9 (19 Sep 2018 05:05)  HR: 78 (19 Sep 2018 14:46) (78 - 96)  BP: 142/86 (19 Sep 2018 14:46) (101/86 - 142/86)  BP(mean): --  RR: 16 (19 Sep 2018 14:46) (16 - 18)  SpO2: 100% (19 Sep 2018 14:46) (98% - 100%)      PHYSICAL EXAM:  GENERAL: Not in distress  HEENT: Trach in placed  CHEST/LUNG: Air entry B/L  HEART: s1 and s2 present  ABDOMEN: Nontender, and Nondistended  EXTREMITIES:  No pedal  edema  CNS: Awake but not Alert        LABS:                            10.5   20.1  )-----------( 217      ( 19 Sep 2018 07:47 )             33.1                       11.6   7.4   )-----------( 242      ( 17 Sep 2018 07:47 )             37.1                        12.8   20.5  )-----------( 311      ( 14 Sep 2018 05:20 )             39.1         09-    131<L>  |  95<L>  |  11  ----------------------------<  214<H>  3.8   |  28  |  0.76    Ca    8.5      19 Sep 2018 07:47  Phos  3.3     09-19  Mg     1.5     --17    140  |  104  |  20<H>  ----------------------------<  132<H>  3.6   |  29  |  0.81    Ca    8.9      17 Sep 2018 07:47  Phos  2.9     09-17  Mg     2.1     -17      TPro  7.3  /  Alb  2.7<L>  /  TBili  0.7  /  DBili  x   /  AST  17  /  ALT  24  /  AlkPhos  97  09-13    PT/INR - ( 13 Sep 2018 11:31 )   PT: 11.4 sec;   INR: 1.04 ratio         PTT - ( 13 Sep 2018 11:31 )  PTT:31.6 sec  Urinalysis Basic - ( 14 Sep 2018 03:56 )    Color: Yellow / Appearance: Clear / S.020 / pH: x  Gluc: x / Ketone: Negative  / Bili: Negative / Urobili: Negative   Blood: x / Protein: 100 / Nitrite: Negative   Leuk Esterase: Negative / RBC: 0-2 /HPF / WBC 0-2 /HPF   Sq Epi: x / Non Sq Epi: Occasional /HPF / Bacteria: Trace /HPF      CAPILLARY BLOOD GLUCOSE      POCT Blood Glucose.: 144 mg/dL (14 Sep 2018 11:07)  POCT Blood Glucose.: 202 mg/dL (14 Sep 2018 08:09)  POCT Blood Glucose.: 277 mg/dL (14 Sep 2018 06:58)  POCT Blood Glucose.: 68 mg/dL (14 Sep 2018 06:03)  POCT Blood Glucose.: 316 mg/dL (14 Sep 2018 02:11)  POCT Blood Glucose.: 405 mg/dL (14 Sep 2018 00:28)  POCT Blood Glucose.: 386 mg/dL (13 Sep 2018 22:26)  POCT Blood Glucose.: 403 mg/dL (13 Sep 2018 19:09)        MEDICATIONS  (STANDING):  ALBUTerol/ipratropium for Nebulization 3 milliLiter(s) Nebulizer every 6 hours  amiKACIN  IVPB 560 milliGRAM(s) IV Intermittent every 12 hours  amLODIPine   Tablet 2.5 milliGRAM(s) Oral daily  atorvastatin 20 milliGRAM(s) Oral at bedtime  benztropine 2 milliGRAM(s) Oral two times a day  heparin  Injectable 5000 Unit(s) SubCutaneous every 8 hours  insulin glargine Injectable (LANTUS) 10 Unit(s) SubCutaneous at bedtime  insulin lispro (HumaLOG) corrective regimen sliding scale   SubCutaneous every 6 hours  insulin lispro Injectable (HumaLOG) 3 Unit(s) SubCutaneous three times a day with meals  levETIRAcetam  Solution 1000 milliGRAM(s) Oral two times a day  metoprolol tartrate 100 milliGRAM(s) Oral two times a day  pantoprazole   Suspension 40 milliGRAM(s) Oral daily  polymyxin B IVPB [id number] Unit(s) IV Intermittent every 12 hours  QUEtiapine 50 milliGRAM(s) Oral every 12 hours  rifampin IVPB      rifampin IVPB 600 milliGRAM(s) IV Intermittent every 24 hours  senna 2 Tablet(s) Oral at bedtime  sodium chloride 0.9%. 1000 milliLiter(s) (75 mL/Hr) IV Continuous <Continuous>    MEDICATIONS  (PRN):  LORazepam     Tablet 1 milliGRAM(s) Oral every 6 hours PRN Agitation  LORazepam   Injectable 1 milliGRAM(s) IV Push every 12 hours PRN Breakthrough Agitation/Assaultive Behavior that is uncontrolled with oral ativan            RADIOLOGY & ADDITIONAL TESTS:    18: CT Chest No Cont (18 @ 03:41) Impression: Consolidation involving the right lower lobe is of uncertain etiology. It is unclear whether this represents compressive atelectasis secondary to right pleural effusion/elevation of the right hemidiaphragm   and/or represents infection.    18: Xray Chest 1 View-PORTABLE IMMEDIATE (18 @ 13:37) Bilateral pleural effusions and associated atelectasis.        MICROBIOLOGY DATA:    Culture - Sputum . (09.15.18 @ 09:28)    Gram Stain:   Rare polymorphonuclear leukocytes per low power field  Few Squamous epithelial cells per low power field  Moderate Gram Negative Rods per oil power field  Few Gram Positive Rods per oil power field  Few Yeast like cells per oil power field  Rare Gram Negative Coccobacilli per oil power field    -  Amikacin: S <=8    -  Amikacin: S <=8    -  Ampicillin/Sulbactam: R >16/8    -  Aztreonam: S 8    -  Cefepime: I 16    -  Cefepime: I 16    -  Ceftazidime: R >16    -  Ceftazidime: I 16    -  Ciprofloxacin: R >2    -  Ciprofloxacin: R >2    -  Gentamicin: R >8    -  Gentamicin: R >8    -  Imipenem: R >8    -  Imipenem: R    -  Levofloxacin: R >4    -  Levofloxacin: R >4    -  Meropenem: R >8    -  Meropenem: I 8    -  Piperacillin/Tazobactam: R >64    -  Piperacillin/Tazobactam: R    -  Tobramycin: R >8    -  Tobramycin: R >8    -  Trimethoprim/Sulfamethoxazole: R >2/38    Specimen Source: .Sputum Sputum    Culture Results:   Numerous Pseudomonas aeruginosa (Carbapenem Resistant)  Moderate Acinetobacter baumannii/haemolyticus (Carbapenem Resistant)  Complex  Normal Respiratory Moraima present    Organism Identification: Pseudomonas aeruginosa (Carbapenem Resistant)  Acinetobacter baumannii/haemolyticus (Carbapenem Resistant)    Organism: Acinetobacter baumannii/haemolyticus (Carbapenem Resistant)    Organism: Pseudomonas aeruginosa (Carbapenem Resistant)    Organism: Acinetobacter baumannii/haemolyticus (Carbapenem Resistant)    Method Type: ESTIVEN    Method Type: ESTIVEN    Method Type: ISMA        MRSA/MSSA PCR (18 @ 11:26)    MRSA PCR Result.: NotDetec: MRSA/MSSA PCR assay is a qualitative in vitro diagnostic test for the  direct detection and differentiation of methicillin-resistant  Staphylococcus aureus (MRSA) from Staphylococcus aureus (SA). The assay  detects DNA from nasal swabs in patients atrisk for nasal colonization.  It is not intended to diagnose MRSA or SA infections nor guide or monitor  treatment for MRSA/SA infections. A negative result does not preclude  nasal colonization. The assay is FDA-approved and its performance has  been established by [employer], [place] and the [facility], [place], [place].    Staph Aureus PCR Result: NotDetec      Culture - Urine (18 @ 09:47)    Specimen Source: .Urine Catheterized    Culture Results:   10,000 - 49,000 CFU/mL Yeast Patient is seen and examined at the bed side, is afebrile. The Leukocytosis is trending back up today.        REVIEW OF SYSTEMS: Unable to obtain due to mental status      ALLERGIES : NKDA        Vital Signs Last 24 Hrs  T(C): 37.1 (19 Sep 2018 14:46), Max: 37.2 (19 Sep 2018 05:05)  T(F): 98.8 (19 Sep 2018 14:46), Max: 98.9 (19 Sep 2018 05:05)  HR: 78 (19 Sep 2018 14:46) (78 - 96)  BP: 142/86 (19 Sep 2018 14:46) (101/86 - 142/86)  BP(mean): --  RR: 16 (19 Sep 2018 14:46) (16 - 18)  SpO2: 100% (19 Sep 2018 14:46) (98% - 100%)      PHYSICAL EXAM:  GENERAL: Not in distress  HEENT: Trach in placed  CHEST/LUNG: Air entry B/L  HEART: s1 and s2 present  ABDOMEN: Nontender, and Nondistended  EXTREMITIES:  No pedal  edema  CNS: Awake but not Alert        LABS:                            10.5   20.1  )-----------( 217      ( 19 Sep 2018 07:47 )             33.1                       11.6   7.4   )-----------( 242      ( 17 Sep 2018 07:47 )             37.1                        12.8   20.5  )-----------( 311      ( 14 Sep 2018 05:20 )             39.1         09-    131<L>  |  95<L>  |  11  ----------------------------<  214<H>  3.8   |  28  |  0.76    Ca    8.5      19 Sep 2018 07:47  Phos  3.3     09-19  Mg     1.5     -      09-17    140  |  104  |  20<H>  ----------------------------<  132<H>  3.6   |  29  |  0.81    Ca    8.9      17 Sep 2018 07:47  Phos  2.9     09-17  Mg     2.1     -17      TPro  7.3  /  Alb  2.7<L>  /  TBili  0.7  /  DBili  x   /  AST  17  /  ALT  24  /  AlkPhos  97  09-13    PT/INR - ( 13 Sep 2018 11:31 )   PT: 11.4 sec;   INR: 1.04 ratio         PTT - ( 13 Sep 2018 11:31 )  PTT:31.6 sec  Urinalysis Basic - ( 14 Sep 2018 03:56 )    Color: Yellow / Appearance: Clear / S.020 / pH: x  Gluc: x / Ketone: Negative  / Bili: Negative / Urobili: Negative   Blood: x / Protein: 100 / Nitrite: Negative   Leuk Esterase: Negative / RBC: 0-2 /HPF / WBC 0-2 /HPF   Sq Epi: x / Non Sq Epi: Occasional /HPF / Bacteria: Trace /HPF      CAPILLARY BLOOD GLUCOSE      POCT Blood Glucose.: 144 mg/dL (14 Sep 2018 11:07)  POCT Blood Glucose.: 202 mg/dL (14 Sep 2018 08:09)  POCT Blood Glucose.: 277 mg/dL (14 Sep 2018 06:58)  POCT Blood Glucose.: 68 mg/dL (14 Sep 2018 06:03)  POCT Blood Glucose.: 316 mg/dL (14 Sep 2018 02:11)  POCT Blood Glucose.: 405 mg/dL (14 Sep 2018 00:28)  POCT Blood Glucose.: 386 mg/dL (13 Sep 2018 22:26)  POCT Blood Glucose.: 403 mg/dL (13 Sep 2018 19:09)        MEDICATIONS  (STANDING):  ALBUTerol/ipratropium for Nebulization 3 milliLiter(s) Nebulizer every 6 hours  amiKACIN  IVPB 560 milliGRAM(s) IV Intermittent every 12 hours  amLODIPine   Tablet 2.5 milliGRAM(s) Oral daily  atorvastatin 20 milliGRAM(s) Oral at bedtime  benztropine 2 milliGRAM(s) Oral two times a day  heparin  Injectable 5000 Unit(s) SubCutaneous every 8 hours  insulin glargine Injectable (LANTUS) 10 Unit(s) SubCutaneous at bedtime  insulin lispro (HumaLOG) corrective regimen sliding scale   SubCutaneous every 6 hours  insulin lispro Injectable (HumaLOG) 3 Unit(s) SubCutaneous three times a day with meals  levETIRAcetam  Solution 1000 milliGRAM(s) Oral two times a day  metoprolol tartrate 100 milliGRAM(s) Oral two times a day  pantoprazole   Suspension 40 milliGRAM(s) Oral daily  polymyxin B IVPB 445659 Unit(s) IV Intermittent every 12 hours  QUEtiapine 50 milliGRAM(s) Oral every 12 hours  rifampin IVPB      rifampin IVPB 600 milliGRAM(s) IV Intermittent every 24 hours  senna 2 Tablet(s) Oral at bedtime  sodium chloride 0.9%. 1000 milliLiter(s) (75 mL/Hr) IV Continuous <Continuous>    MEDICATIONS  (PRN):  LORazepam     Tablet 1 milliGRAM(s) Oral every 6 hours PRN Agitation  LORazepam   Injectable 1 milliGRAM(s) IV Push every 12 hours PRN Breakthrough Agitation/Assaultive Behavior that is uncontrolled with oral ativan        RADIOLOGY & ADDITIONAL TESTS:    18: Xray Chest 1 View- PORTABLE-Routine (18 @ 08:22) Tracheostomy cannula reidentified in position. No change heart mediastinum. No change in the right basilar opacity with loss of definition of the ipsilateral hemidiaphragm and costophrenic angle likely   representing a combination of pleural fluid and underlying  consolidation/atelectasis.      18: CT Chest No Cont (18 @ 03:41) Impression: Consolidation involving the right lower lobe is of uncertain etiology. It is unclear whether this represents compressive atelectasis secondary to right pleural effusion/elevation of the right hemidiaphragm   and/or represents infection.    18: Xray Chest 1 View-PORTABLE IMMEDIATE (18 @ 13:37) Bilateral pleural effusions and associated atelectasis.        MICROBIOLOGY DATA:    Culture - Sputum . (09.15.18 @ 09:28)    Gram Stain:   Rare polymorphonuclear leukocytes per low power field  Few Squamous epithelial cells per low power field  Moderate Gram Negative Rods per oil power field  Few Gram Positive Rods per oil power field  Few Yeast like cells per oil power field  Rare Gram Negative Coccobacilli per oil power field    -  Amikacin: S <=8    -  Amikacin: S <=8    -  Ampicillin/Sulbactam: R >16/8    -  Aztreonam: S 8    -  Cefepime: I 16    -  Cefepime: I 16    -  Ceftazidime: R >16    -  Ceftazidime: I 16    -  Ciprofloxacin: R >2    -  Ciprofloxacin: R >2    -  Gentamicin: R >8    -  Gentamicin: R >8    -  Imipenem: R >8    -  Imipenem: R    -  Levofloxacin: R >4    -  Levofloxacin: R >4    -  Meropenem: R >8    -  Meropenem: I 8    -  Piperacillin/Tazobactam: R >64    -  Piperacillin/Tazobactam: R    -  Tobramycin: R >8    -  Tobramycin: R >8    -  Trimethoprim/Sulfamethoxazole: R >2/38    Specimen Source: .Sputum Sputum    Culture Results:   Numerous Pseudomonas aeruginosa (Carbapenem Resistant)  Moderate Acinetobacter baumannii/haemolyticus (Carbapenem Resistant)  Complex  Normal Respiratory Moraima present    Organism Identification: Pseudomonas aeruginosa (Carbapenem Resistant)  Acinetobacter baumannii/haemolyticus (Carbapenem Resistant)    Organism: Acinetobacter baumannii/haemolyticus (Carbapenem Resistant)    Organism: Pseudomonas aeruginosa (Carbapenem Resistant)    Organism: Acinetobacter baumannii/haemolyticus (Carbapenem Resistant)    Method Type: ESTIVEN    Method Type: ESTIVEN    Method Type: KB        MRSA/MSSA PCR (18 @ 11:26)    MRSA PCR Result.: NotDetec: MRSA/MSSA PCR assay is a qualitative in vitro diagnostic test for the  direct detection and differentiation of methicillin-resistant  Staphylococcus aureus (MRSA) from Staphylococcus aureus (SA). The assay  detects DNA from nasal swabs in patients atrisk for nasal colonization.  It is not intended to diagnose MRSA or SA infections nor guide or monitor  treatment for MRSA/SA infections. A negative result does not preclude  nasal colonization. The assay is FDA-approved and its performance has  been established by [employer], [place] and the [facility], [place], [place].    Staph Aureus PCR Result: NotDetec      Culture - Urine (18 @ 09:47)    Specimen Source: .Urine Catheterized    Culture Results:   10,000 - 49,000 CFU/mL Yeast

## 2018-09-19 NOTE — PROGRESS NOTE ADULT - SUBJECTIVE AND OBJECTIVE BOX
Interval Events:  pt in nad    Allergies    angiotensin converting enzyme inhibitors (Unknown)    Intolerances      Endocrine/Metabolic Medications:  atorvastatin 20 milliGRAM(s) Oral at bedtime  insulin glargine Injectable (LANTUS) 10 Unit(s) SubCutaneous at bedtime  insulin lispro (HumaLOG) corrective regimen sliding scale   SubCutaneous every 6 hours  insulin lispro Injectable (HumaLOG) 3 Unit(s) SubCutaneous three times a day with meals      Vital Signs Last 24 Hrs  T(C): 37.2 (19 Sep 2018 05:05), Max: 37.2 (19 Sep 2018 05:05)  T(F): 98.9 (19 Sep 2018 05:05), Max: 98.9 (19 Sep 2018 05:05)  HR: 96 (19 Sep 2018 05:05) (83 - 96)  BP: 101/86 (19 Sep 2018 05:05) (101/86 - 142/77)  BP(mean): --  RR: 18 (19 Sep 2018 05:05) (16 - 18)  SpO2: 98% (19 Sep 2018 05:05) (98% - 100%)      PHYSICAL EXAM  All physical exam findings normal, except those marked:  General:	Alert, active, cooperative, NAD, well hydrated  .		[] Abnormal:  Neck		Normal: supple, no cervical adenopathy, no palpable thyroid  .		[] Abnormal:  Cardiovascular	Normal: regular rate, normal S1, S2, no murmurs  .		[] Abnormal:  Respiratory	Normal: no chest wall deformity, normal respiratory pattern, CTA B/L  .		[] Abnormal:  Abdominal	Normal: soft, ND, NT, bowel sounds present, no masses, no organomegaly  .		[] Abnormal:  		Normal normal genitalia, testes descended, circumcised/uncircumcised  .		Bob stage:			Breast bob:  .		Menstrual history:  .		[] Abnormal:  Extremities	Normal: FROM x4  .		[] Abnormal:  Skin		Normal: intact and not indurated, no rash, no acanthosis nigricans  .		[] Abnormal:  Neurologic	Normal: grossly intact  .		[] Abnormal:    LABS                        10.5   20.1  )-----------( 217      ( 19 Sep 2018 07:47 )             33.1                               131    |  95     |  11                  Calcium: 8.5   / iCa: x      (09-19 @ 07:47)    ----------------------------<  214       Magnesium: 1.5                              3.8     |  28     |  0.76             Phosphorous: 3.3        CAPILLARY BLOOD GLUCOSE      POCT Blood Glucose.: 131 mg/dL (19 Sep 2018 05:46)  POCT Blood Glucose.: 101 mg/dL (19 Sep 2018 00:02)  POCT Blood Glucose.: 89 mg/dL (18 Sep 2018 21:36)  POCT Blood Glucose.: 164 mg/dL (18 Sep 2018 17:38)  POCT Blood Glucose.: 216 mg/dL (18 Sep 2018 11:44)        Assesment/plan    Dm- good control  agree with lower dose of lantus   cont low dose humalog hold if gt feeds on hold  fsg q6

## 2018-09-19 NOTE — PROGRESS NOTE ADULT - PROBLEM SELECTOR PLAN 2
Lantus 10 units HS/Humalog 3 units/AC/tid/HSS  Dr. Lopez [facility], following

## 2018-09-19 NOTE — CHART NOTE - NSCHARTNOTEFT_GEN_A_CORE
Assessment:   Patient reports [ ] nausea  [ ] vomiting [ ] diarrhea [ ] constipation  [ ]chewing problems [ ] swallowing issues  [ ] other:   Pt visited. Pt with tracheostomy. TF Glucerna 1.2 infusing @ 35 ml/hr providing 560 ml,672 calories, protein 34 gms protein, free water 451/day. D/W RN pt C/o Diarrhea  x 4 yesterday.  But today so far No diarrhea.     PO intake:   Source for PO intake [ ] Patient/family [ ] chart [ ] staff (x) PEG     Current Weight: Weight (kg): 56.5 (09-16 @ 16:41)  % Weight Change bed scale 127  lb    Pertinent Medications: MEDICATIONS  (STANDING):  ALBUTerol/ipratropium for Nebulization 3 milliLiter(s) Nebulizer every 6 hours  amiKACIN  IVPB 560 milliGRAM(s) IV Intermittent every 12 hours  amLODIPine   Tablet 2.5 milliGRAM(s) Oral daily  atorvastatin 20 milliGRAM(s) Oral at bedtime  benztropine 2 milliGRAM(s) Oral two times a day  heparin  Injectable 5000 Unit(s) SubCutaneous every 8 hours  insulin glargine Injectable (LANTUS) 10 Unit(s) SubCutaneous at bedtime  insulin lispro (HumaLOG) corrective regimen sliding scale   SubCutaneous every 6 hours  insulin lispro Injectable (HumaLOG) 3 Unit(s) SubCutaneous three times a day with meals  levETIRAcetam  Solution 1000 milliGRAM(s) Oral two times a day  metoprolol tartrate 100 milliGRAM(s) Oral two times a day  pantoprazole   Suspension 40 milliGRAM(s) Oral daily  polymyxin B IVPB [id number] Unit(s) IV Intermittent every 12 hours  QUEtiapine 50 milliGRAM(s) Oral every 12 hours  rifampin IVPB      rifampin IVPB 600 milliGRAM(s) IV Intermittent every 24 hours  senna 2 Tablet(s) Oral at bedtime  sodium chloride 0.9%. 1000 milliLiter(s) (75 mL/Hr) IV Continuous <Continuous>    MEDICATIONS  (PRN):  LORazepam     Tablet 1 milliGRAM(s) Oral every 6 hours PRN Agitation  LORazepam   Injectable 1 milliGRAM(s) IV Push every 12 hours PRN Breakthrough Agitation/Assaultive Behavior that is uncontrolled with oral ativan    Pertinent Labs:  09-19 Na131 mmol/L<L> Glu 214 mg/dL<H> K+ 3.8 mmol/L Cr  0.76 mg/dL BUN 11 mg/dL 09-19 Phos 3.3 mg/dL 09-13 Alb 2.7 g/dL<L> 09-14 [id number] 11.4 %<H> 09-14 Chol 145 mg/dL LDL 69 mg/dL HDL 43 mg/dL<L> Trig 167 mg/dL<H>      Skin: NO Pressure ulcer    Estimated Needs:   [x ] no change since previous assessment  [ ] recalculated:       Previous Nutrition Diagnosis:   [ ] Inadequate Energy Intake [ x]Inadequate Oral Intake [ ] Excessive Energy Intake   [ ] Underweight [ ] Increased Nutrient Needs [ ] Overweight/Obesity   [ ] Altered GI Function [ ] Unintended Weight Loss [ ] Food & Nutrition Related Knowledge Deficit [ ] Malnutrition     Nutrition Diagnosis is [x ] ongoing  [ ] resolved [ ] not applicable     New Nutrition Diagnosis: [ ] not applicable  [ ] Inadequate Energy Intake [ ]Inadequate Oral Intake [ ] Excessive Energy Intake   [ ] Underweight [ ] Increased Nutrient Needs [ ] Overweight/Obesity   [ ] Altered GI Function [ ] Unintended Weight Loss [ ] Food & Nutrition Related Knowledge Deficit [ ] Malnutrition     Related to:     As evidenced by:     Interventions:   Recommend  [ ] Change Diet To:  [ ] Nutrition Supplement  [ ] Nutrition Support  [ ] Other:     Monitoring and Evaluation:   [ ] PO intake [ ] Tolerance to diet prescription [ ] weights [ ] follow up per protocol  [ ] other: Assessment:   Patient reports [ ] nausea  [ ] vomiting [ ] diarrhea [ ] constipation  [ ]chewing problems [ ] swallowing issues  [ ] other:   Pt visited. Pt with tracheostomy. TF Glucerna 1.2 infusing @ 35 ml/hr providing 560 ml,672 calories, protein 34 gms protein, free water 451/day. D/W RN pt C/o Diarrhea  x 4 yesterday.  But today so far No diarrhea.     PO intake:   Source for PO intake [ ] Patient/family [ ] chart [ ] staff (x) PEG     Current Weight: Weight (kg): 56.5 (09-16 @ 16:41)  % Weight Change bed scale 127  lb    Pertinent Medications: MEDICATIONS  (STANDING):  ALBUTerol/ipratropium for Nebulization 3 milliLiter(s) Nebulizer every 6 hours  amiKACIN  IVPB 560 milliGRAM(s) IV Intermittent every 12 hours  amLODIPine   Tablet 2.5 milliGRAM(s) Oral daily  atorvastatin 20 milliGRAM(s) Oral at bedtime  benztropine 2 milliGRAM(s) Oral two times a day  heparin  Injectable 5000 Unit(s) SubCutaneous every 8 hours  insulin glargine Injectable (LANTUS) 10 Unit(s) SubCutaneous at bedtime  insulin lispro (HumaLOG) corrective regimen sliding scale   SubCutaneous every 6 hours  insulin lispro Injectable (HumaLOG) 3 Unit(s) SubCutaneous three times a day with meals  levETIRAcetam  Solution 1000 milliGRAM(s) Oral two times a day  metoprolol tartrate 100 milliGRAM(s) Oral two times a day  pantoprazole   Suspension 40 milliGRAM(s) Oral daily  polymyxin B IVPB [id number] Unit(s) IV Intermittent every 12 hours  QUEtiapine 50 milliGRAM(s) Oral every 12 hours  rifampin IVPB      rifampin IVPB 600 milliGRAM(s) IV Intermittent every 24 hours  senna 2 Tablet(s) Oral at bedtime  sodium chloride 0.9%. 1000 milliLiter(s) (75 mL/Hr) IV Continuous <Continuous>    MEDICATIONS  (PRN):  LORazepam     Tablet 1 milliGRAM(s) Oral every 6 hours PRN Agitation  LORazepam   Injectable 1 milliGRAM(s) IV Push every 12 hours PRN Breakthrough Agitation/Assaultive Behavior that is uncontrolled with oral ativan    Pertinent Labs:  09-19 Na131 mmol/L<L> Glu 214 mg/dL<H> K+ 3.8 mmol/L Cr  0.76 mg/dL BUN 11 mg/dL 09-19 Phos 3.3 mg/dL 09-13 Alb 2.7 g/dL<L> 09-14 [id number] 11.4 %<H> 09-14 Chol 145 mg/dL LDL 69 mg/dL HDL 43 mg/dL<L> Trig 167 mg/dL<H>      Skin: NO Pressure ulcer    Estimated Needs:   [x ] no change since previous assessment  [ ] recalculated:       Previous Nutrition Diagnosis:   [ ] Inadequate Energy Intake [ x]Inadequate Oral Intake [ ] Excessive Energy Intake   [ ] Underweight [ ] Increased Nutrient Needs [ ] Overweight/Obesity   [ ] Altered GI Function [ ] Unintended Weight Loss [ ] Food & Nutrition Related Knowledge Deficit [ ] Malnutrition     Nutrition Diagnosis is [x ] ongoing  [ ] resolved [ ] not applicable     New Nutrition Diagnosis: [ ] not applicable  [ ] Inadequate Energy Intake [ ]Inadequate Oral Intake [ ] Excessive Energy Intake   [ ] Underweight [ ] Increased Nutrient Needs [ ] Overweight/Obesity   [ ] Altered GI Function [ ] Unintended Weight Loss [ ] Food & Nutrition Related Knowledge Deficit [ ] Malnutrition     Related to:     As evidenced by:     Interventions:   Recommend  [ ] Change Diet To:  [ ] Nutrition Supplement  [x ] Nutrition Support  [ x] Other: Glucerna 1.2 increase to goal rate @ 65 ml/hr x 16 hr as tolearted to provide 1040 ml, 1248 calories, protein 62 gms, free water 837 ml/day)    Monitoring and Evaluation:   [ ] PO intake [x ] Tolerance to diet prescription [ ] weights [ ] follow up per protocol  [ ] other: Assessment:   Patient reports [ ] nausea  [ ] vomiting [ ] diarrhea [ ] constipation  [ ]chewing problems [ ] swallowing issues  [ ] other:   Pt visited. Pt with tracheostomy. TF Glucerna 1.2 infusing @ 35 ml/hr providing 560 ml,672 calories, protein 34 gms protein, free water 451/day. D/W RN pt C/o Diarrhea  x 4 yesterday.  But today so far No diarrhea.     PO intake:   Source for PO intake [ ] Patient/family [ ] chart [ ] staff (x) PEG     Current Weight: Weight (kg): 56.5 (09-16 @ 16:41)  % Weight Change bed scale 127  lb    Pertinent Medications: MEDICATIONS  (STANDING):  ALBUTerol/ipratropium for Nebulization 3 milliLiter(s) Nebulizer every 6 hours  amiKACIN  IVPB 560 milliGRAM(s) IV Intermittent every 12 hours  amLODIPine   Tablet 2.5 milliGRAM(s) Oral daily  atorvastatin 20 milliGRAM(s) Oral at bedtime  benztropine 2 milliGRAM(s) Oral two times a day  heparin  Injectable 5000 Unit(s) SubCutaneous every 8 hours  insulin glargine Injectable (LANTUS) 10 Unit(s) SubCutaneous at bedtime  insulin lispro (HumaLOG) corrective regimen sliding scale   SubCutaneous every 6 hours  insulin lispro Injectable (HumaLOG) 3 Unit(s) SubCutaneous three times a day with meals  levETIRAcetam  Solution 1000 milliGRAM(s) Oral two times a day  metoprolol tartrate 100 milliGRAM(s) Oral two times a day  pantoprazole   Suspension 40 milliGRAM(s) Oral daily  polymyxin B IVPB [id number] Unit(s) IV Intermittent every 12 hours  QUEtiapine 50 milliGRAM(s) Oral every 12 hours  rifampin IVPB      rifampin IVPB 600 milliGRAM(s) IV Intermittent every 24 hours  senna 2 Tablet(s) Oral at bedtime  sodium chloride 0.9%. 1000 milliLiter(s) (75 mL/Hr) IV Continuous <Continuous>    MEDICATIONS  (PRN):  LORazepam     Tablet 1 milliGRAM(s) Oral every 6 hours PRN Agitation  LORazepam   Injectable 1 milliGRAM(s) IV Push every 12 hours PRN Breakthrough Agitation/Assaultive Behavior that is uncontrolled with oral ativan    Pertinent Labs:  09-19 Na131 mmol/L<L> Glu 214 mg/dL<H> K+ 3.8 mmol/L Cr  0.76 mg/dL BUN 11 mg/dL 09-19 Phos 3.3 mg/dL 09-13 Alb 2.7 g/dL<L> 09-14 [id number] 11.4 %<H> 09-14 Chol 145 mg/dL LDL 69 mg/dL HDL 43 mg/dL<L> Trig 167 mg/dL<H>      Skin: NO Pressure ulcer    Estimated Needs:   [x ] no change since previous assessment  [ ] recalculated:       Previous Nutrition Diagnosis:   [ ] Inadequate Energy Intake [ x]Inadequate Oral Intake [ ] Excessive Energy Intake   [ ] Underweight [ ] Increased Nutrient Needs [ ] Overweight/Obesity   [ ] Altered GI Function [ ] Unintended Weight Loss [ ] Food & Nutrition Related Knowledge Deficit [ ] Malnutrition     Nutrition Diagnosis is [x ] ongoing  [ ] resolved [ ] not applicable     New Nutrition Diagnosis: [ ] not applicable  [ ] Inadequate Energy Intake [ ]Inadequate Oral Intake [ ] Excessive Energy Intake   [ ] Underweight [ ] Increased Nutrient Needs [ ] Overweight/Obesity   [ ] Altered GI Function [ ] Unintended Weight Loss [ ] Food & Nutrition Related Knowledge Deficit [ ] Malnutrition     Related to:     As evidenced by:     Interventions:   Recommend  [ ] Change Diet To:  [ ] Nutrition Supplement  [x ] Nutrition Support  [ x] Other: Glucerna 1.2 increase to goal rate @ 65 ml/hr x 16 hr as tolearted to provide 1040 ml, 1248 calories, protein 62 gms, free water 837 ml/day)  (x) Banana flakes Tid   Monitoring and Evaluation:   [ ] PO intake [x ] Tolerance to diet prescription [ ] weights [ ] follow up per protocol  [ ] other:

## 2018-09-19 NOTE — PROGRESS NOTE ADULT - ASSESSMENT
zahraa nd examined  pt is from [facility] non verbal, trach, peg  admitted for pneumonia   has lot of secretions on multiple antibxs including polymyxine   seen and examined  vs stable afebrile  non verbal lungs a/e fair no added sounds except some b/l rhonci  wbc 20 from 7  but without fever.   repeat cxr  sputum  Id

## 2018-09-19 NOTE — PROGRESS NOTE ADULT - ASSESSMENT
A 64 yo Female  with  non-verbal, bed bound, S/P Trach/ PEG tube, sent in to the ER from [facility]  for evaluation of coffee ground vomiting  and cough. On arrival, she found to have tachycardia, Leukocytosis, and RLL consolidation. She has started on Zosyn and Vancomycin and the ID consult requested to assist with further evaluation and antibiotic management.     # Aspiration pneumonia - RLL consolidation - Pseudomonas and Acinetobacter   # MRSA PCR is negative    Would recommend:    1. Bronchial suctioning  and Aspiration  precaution   2. Continue Amikacin since sensitive to both,  Pseudomonas and Acinetobacter   3. Continue Polymixin and Rifampin along with amikacin to cover CRE  4. Change Trach collar      d/w  Nursing staff    will follow the patient with you A 62 yo Female  with  non-verbal, bed bound, S/P Trach/ PEG tube, sent in to the ER from [facility]  for evaluation of coffee ground vomiting  and cough. On arrival, she found to have tachycardia, Leukocytosis, and RLL consolidation. She has started on Zosyn and Vancomycin and the ID consult requested to assist with further evaluation and antibiotic management.     # Aspiration pneumonia - RLL consolidation - Pseudomonas and Acinetobacter   # MRSA PCR is negative    Would recommend:    1. Monitor WBC count, is trending up , and if it has worsens then ADD oral Vamcomycin  2. Bronchial suctioning  and Aspiration  precaution   3. Continue Polymixin, Rifampin and amikacin to cover CRE  4. Change Trach collar      d/w Covering NP and  Nursing staff    will follow the patient with you

## 2018-09-20 DIAGNOSIS — R11.2 NAUSEA WITH VOMITING, UNSPECIFIED: ICD-10-CM

## 2018-09-20 DIAGNOSIS — J90 PLEURAL EFFUSION, NOT ELSEWHERE CLASSIFIED: ICD-10-CM

## 2018-09-20 DIAGNOSIS — A49.8 OTHER BACTERIAL INFECTIONS OF UNSPECIFIED SITE: ICD-10-CM

## 2018-09-20 LAB
ALBUMIN SERPL ELPH-MCNC: 2.2 G/DL — LOW (ref 3.5–5)
ALP SERPL-CCNC: 86 U/L — SIGNIFICANT CHANGE UP (ref 40–120)
ALT FLD-CCNC: 20 U/L DA — SIGNIFICANT CHANGE UP (ref 10–60)
ANION GAP SERPL CALC-SCNC: 10 MMOL/L — SIGNIFICANT CHANGE UP (ref 5–17)
ANION GAP SERPL CALC-SCNC: 12 MMOL/L — SIGNIFICANT CHANGE UP (ref 5–17)
AST SERPL-CCNC: 24 U/L — SIGNIFICANT CHANGE UP (ref 10–40)
BILIRUB DIRECT SERPL-MCNC: 0.3 MG/DL — HIGH (ref 0–0.2)
BILIRUB INDIRECT FLD-MCNC: 0.6 MG/DL — SIGNIFICANT CHANGE UP (ref 0.2–1)
BILIRUB SERPL-MCNC: 0.9 MG/DL — SIGNIFICANT CHANGE UP (ref 0.2–1.2)
BUN SERPL-MCNC: 10 MG/DL — SIGNIFICANT CHANGE UP (ref 7–18)
BUN SERPL-MCNC: 11 MG/DL — SIGNIFICANT CHANGE UP (ref 7–18)
CALCIUM SERPL-MCNC: 8.8 MG/DL — SIGNIFICANT CHANGE UP (ref 8.4–10.5)
CALCIUM SERPL-MCNC: 8.9 MG/DL — SIGNIFICANT CHANGE UP (ref 8.4–10.5)
CHLORIDE SERPL-SCNC: 98 MMOL/L — SIGNIFICANT CHANGE UP (ref 96–108)
CHLORIDE SERPL-SCNC: 99 MMOL/L — SIGNIFICANT CHANGE UP (ref 96–108)
CO2 SERPL-SCNC: 22 MMOL/L — SIGNIFICANT CHANGE UP (ref 22–31)
CO2 SERPL-SCNC: 27 MMOL/L — SIGNIFICANT CHANGE UP (ref 22–31)
CREAT SERPL-MCNC: 1.02 MG/DL — SIGNIFICANT CHANGE UP (ref 0.5–1.3)
CREAT SERPL-MCNC: 1.07 MG/DL — SIGNIFICANT CHANGE UP (ref 0.5–1.3)
GLUCOSE SERPL-MCNC: 188 MG/DL — HIGH (ref 70–99)
GLUCOSE SERPL-MCNC: 190 MG/DL — HIGH (ref 70–99)
GRAM STN FLD: SIGNIFICANT CHANGE UP
HCT VFR BLD CALC: 28.6 % — LOW (ref 34.5–45)
HCT VFR BLD CALC: 28.8 % — LOW (ref 34.5–45)
HGB BLD-MCNC: 9 G/DL — LOW (ref 11.5–15.5)
HGB BLD-MCNC: 9.4 G/DL — LOW (ref 11.5–15.5)
MAGNESIUM SERPL-MCNC: 1.5 MG/DL — LOW (ref 1.6–2.6)
MCHC RBC-ENTMCNC: 25.1 PG — LOW (ref 27–34)
MCHC RBC-ENTMCNC: 25.5 PG — LOW (ref 27–34)
MCHC RBC-ENTMCNC: 31.6 GM/DL — LOW (ref 32–36)
MCHC RBC-ENTMCNC: 32.4 GM/DL — SIGNIFICANT CHANGE UP (ref 32–36)
MCV RBC AUTO: 78.7 FL — LOW (ref 80–100)
MCV RBC AUTO: 79.4 FL — LOW (ref 80–100)
PLATELET # BLD AUTO: 202 K/UL — SIGNIFICANT CHANGE UP (ref 150–400)
PLATELET # BLD AUTO: 212 K/UL — SIGNIFICANT CHANGE UP (ref 150–400)
POTASSIUM SERPL-MCNC: 3.2 MMOL/L — LOW (ref 3.5–5.3)
POTASSIUM SERPL-MCNC: 3.4 MMOL/L — LOW (ref 3.5–5.3)
POTASSIUM SERPL-SCNC: 3.2 MMOL/L — LOW (ref 3.5–5.3)
POTASSIUM SERPL-SCNC: 3.4 MMOL/L — LOW (ref 3.5–5.3)
PROT SERPL-MCNC: 6.2 G/DL — SIGNIFICANT CHANGE UP (ref 6–8.3)
RBC # BLD: 3.6 M/UL — LOW (ref 3.8–5.2)
RBC # BLD: 3.67 M/UL — LOW (ref 3.8–5.2)
RBC # FLD: 12.7 % — SIGNIFICANT CHANGE UP (ref 10.3–14.5)
RBC # FLD: 13.3 % — SIGNIFICANT CHANGE UP (ref 10.3–14.5)
SODIUM SERPL-SCNC: 132 MMOL/L — LOW (ref 135–145)
SODIUM SERPL-SCNC: 136 MMOL/L — SIGNIFICANT CHANGE UP (ref 135–145)
SPECIMEN SOURCE: SIGNIFICANT CHANGE UP
WBC # BLD: 16.7 K/UL — HIGH (ref 3.8–10.5)
WBC # BLD: 21.8 K/UL — HIGH (ref 3.8–10.5)
WBC # FLD AUTO: 16.7 K/UL — HIGH (ref 3.8–10.5)
WBC # FLD AUTO: 21.8 K/UL — HIGH (ref 3.8–10.5)

## 2018-09-20 PROCEDURE — 71045 X-RAY EXAM CHEST 1 VIEW: CPT | Mod: 26

## 2018-09-20 PROCEDURE — 71045 X-RAY EXAM CHEST 1 VIEW: CPT | Mod: 26,77

## 2018-09-20 RX ORDER — LACTOBACILLUS ACIDOPHILUS 100MM CELL
1 CAPSULE ORAL
Qty: 0 | Refills: 0 | Status: DISCONTINUED | OUTPATIENT
Start: 2018-09-20 | End: 2018-09-20

## 2018-09-20 RX ORDER — VANCOMYCIN HCL 1 G
125 VIAL (EA) INTRAVENOUS EVERY 6 HOURS
Qty: 0 | Refills: 0 | Status: DISCONTINUED | OUTPATIENT
Start: 2018-09-20 | End: 2018-09-21

## 2018-09-20 RX ORDER — HALOPERIDOL DECANOATE 100 MG/ML
5 INJECTION INTRAMUSCULAR ONCE
Qty: 0 | Refills: 0 | Status: DISCONTINUED | OUTPATIENT
Start: 2018-09-20 | End: 2018-09-20

## 2018-09-20 RX ORDER — HALOPERIDOL DECANOATE 100 MG/ML
5 INJECTION INTRAMUSCULAR ONCE
Qty: 0 | Refills: 0 | Status: DISCONTINUED | OUTPATIENT
Start: 2018-09-20 | End: 2018-10-04

## 2018-09-20 RX ORDER — LACTOBACILLUS ACIDOPHILUS 100MM CELL
1 CAPSULE ORAL EVERY 8 HOURS
Qty: 0 | Refills: 0 | Status: DISCONTINUED | OUTPATIENT
Start: 2018-09-20 | End: 2018-10-04

## 2018-09-20 RX ORDER — MORPHINE SULFATE 50 MG/1
4 CAPSULE, EXTENDED RELEASE ORAL ONCE
Qty: 0 | Refills: 0 | Status: DISCONTINUED | OUTPATIENT
Start: 2018-09-20 | End: 2018-09-20

## 2018-09-20 RX ADMIN — Medication 2 MILLIGRAM(S): at 06:03

## 2018-09-20 RX ADMIN — QUETIAPINE FUMARATE 50 MILLIGRAM(S): 200 TABLET, FILM COATED ORAL at 17:48

## 2018-09-20 RX ADMIN — AMLODIPINE BESYLATE 2.5 MILLIGRAM(S): 2.5 TABLET ORAL at 06:03

## 2018-09-20 RX ADMIN — MORPHINE SULFATE 4 MILLIGRAM(S): 50 CAPSULE, EXTENDED RELEASE ORAL at 10:57

## 2018-09-20 RX ADMIN — Medication 3 MILLILITER(S): at 15:50

## 2018-09-20 RX ADMIN — Medication 2 MILLIGRAM(S): at 17:46

## 2018-09-20 RX ADMIN — Medication 1 MILLIGRAM(S): at 09:44

## 2018-09-20 RX ADMIN — Medication 125 MILLIGRAM(S): at 12:41

## 2018-09-20 RX ADMIN — Medication 3 UNIT(S): at 17:40

## 2018-09-20 RX ADMIN — Medication 1 MILLIGRAM(S): at 22:09

## 2018-09-20 RX ADMIN — Medication 100 MILLIGRAM(S): at 06:03

## 2018-09-20 RX ADMIN — Medication 1 TABLET(S): at 14:33

## 2018-09-20 RX ADMIN — ATORVASTATIN CALCIUM 20 MILLIGRAM(S): 80 TABLET, FILM COATED ORAL at 21:48

## 2018-09-20 RX ADMIN — Medication 3 UNIT(S): at 12:20

## 2018-09-20 RX ADMIN — HEPARIN SODIUM 5000 UNIT(S): 5000 INJECTION INTRAVENOUS; SUBCUTANEOUS at 21:48

## 2018-09-20 RX ADMIN — MORPHINE SULFATE 4 MILLIGRAM(S): 50 CAPSULE, EXTENDED RELEASE ORAL at 10:00

## 2018-09-20 RX ADMIN — LEVETIRACETAM 1000 MILLIGRAM(S): 250 TABLET, FILM COATED ORAL at 17:41

## 2018-09-20 RX ADMIN — SODIUM CHLORIDE 75 MILLILITER(S): 9 INJECTION INTRAMUSCULAR; INTRAVENOUS; SUBCUTANEOUS at 21:48

## 2018-09-20 RX ADMIN — Medication 2: at 17:40

## 2018-09-20 RX ADMIN — PANTOPRAZOLE SODIUM 40 MILLIGRAM(S): 20 TABLET, DELAYED RELEASE ORAL at 12:20

## 2018-09-20 RX ADMIN — Medication 2 MILLIGRAM(S): at 10:57

## 2018-09-20 RX ADMIN — Medication 1: at 06:10

## 2018-09-20 RX ADMIN — HEPARIN SODIUM 5000 UNIT(S): 5000 INJECTION INTRAVENOUS; SUBCUTANEOUS at 06:03

## 2018-09-20 RX ADMIN — Medication 1 TABLET(S): at 21:48

## 2018-09-20 RX ADMIN — POLYMYXIN B SULFATE 500 UNIT(S): 500000 INJECTION, POWDER, LYOPHILIZED, FOR SOLUTION INTRAMUSCULAR; INTRATHECAL; INTRAVENOUS; OPHTHALMIC at 18:18

## 2018-09-20 RX ADMIN — LEVETIRACETAM 1000 MILLIGRAM(S): 250 TABLET, FILM COATED ORAL at 06:04

## 2018-09-20 RX ADMIN — Medication 3 MILLILITER(S): at 02:55

## 2018-09-20 RX ADMIN — Medication 1 MILLIGRAM(S): at 04:43

## 2018-09-20 RX ADMIN — Medication 1: at 12:20

## 2018-09-20 RX ADMIN — AMIKACIN SULFATE 102.24 MILLIGRAM(S): 250 INJECTION, SOLUTION INTRAMUSCULAR; INTRAVENOUS at 18:18

## 2018-09-20 RX ADMIN — HEPARIN SODIUM 5000 UNIT(S): 5000 INJECTION INTRAVENOUS; SUBCUTANEOUS at 14:33

## 2018-09-20 RX ADMIN — INSULIN GLARGINE 10 UNIT(S): 100 INJECTION, SOLUTION SUBCUTANEOUS at 23:24

## 2018-09-20 RX ADMIN — Medication 3 MILLILITER(S): at 20:48

## 2018-09-20 RX ADMIN — Medication 125 MILLIGRAM(S): at 17:49

## 2018-09-20 RX ADMIN — SENNA PLUS 2 TABLET(S): 8.6 TABLET ORAL at 21:48

## 2018-09-20 RX ADMIN — Medication 3 UNIT(S): at 08:33

## 2018-09-20 RX ADMIN — QUETIAPINE FUMARATE 50 MILLIGRAM(S): 200 TABLET, FILM COATED ORAL at 06:04

## 2018-09-20 RX ADMIN — Medication 3 MILLILITER(S): at 09:29

## 2018-09-20 RX ADMIN — Medication 100 MILLIGRAM(S): at 17:41

## 2018-09-20 NOTE — PROGRESS NOTE ADULT - PROBLEM SELECTOR PLAN 1
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

## 2018-09-20 NOTE — PROGRESS NOTE ADULT - SUBJECTIVE AND OBJECTIVE BOX
Interval Events:  pt in nad  on gt feeds 55cc/hr    Allergies    angiotensin converting enzyme inhibitors (Unknown)    Intolerances      Endocrine/Metabolic Medications:  atorvastatin 20 milliGRAM(s) Oral at bedtime  insulin glargine Injectable (LANTUS) 10 Unit(s) SubCutaneous at bedtime  insulin lispro (HumaLOG) corrective regimen sliding scale   SubCutaneous every 6 hours  insulin lispro Injectable (HumaLOG) 3 Unit(s) SubCutaneous three times a day with meals      Vital Signs Last 24 Hrs  T(C): 36.6 (20 Sep 2018 14:36), Max: 37.2 (19 Sep 2018 20:31)  T(F): 97.9 (20 Sep 2018 14:36), Max: 99 (19 Sep 2018 20:31)  HR: 81 (20 Sep 2018 14:36) (73 - 81)  BP: 145/105 (20 Sep 2018 14:36) (133/64 - 145/105)  BP(mean): --  RR: 18 (20 Sep 2018 14:36) (18 - 18)  SpO2: 94% (20 Sep 2018 14:36) (94% - 100%)      PHYSICAL EXAM  All physical exam findings normal, except those marked:  General:	Alert, active, cooperative, NAD, well hydrated  .		[] Abnormal:  Neck		Normal: supple, no cervical adenopathy, no palpable thyroid  .		[] Abnormal:  Cardiovascular	Normal: regular rate, normal S1, S2, no murmurs  .		[] Abnormal:  Respiratory	Normal: no chest wall deformity, normal respiratory pattern, CTA B/L  .		[] Abnormal:  Abdominal	Normal: soft, ND, NT, bowel sounds present, no masses, no organomegaly  .		[] Abnormal:  		Normal normal genitalia, testes descended, circumcised/uncircumcised  .		Bob stage:			Breast bob:  .		Menstrual history:  .		[] Abnormal:  Extremities	Normal: FROM x4  .		[] Abnormal:  Skin		Normal: intact and not indurated, no rash, no acanthosis nigricans  .		[] Abnormal:  Neurologic	Normal: grossly intact  .		[] Abnormal:    LABS                        9.0    16.7  )-----------( 202      ( 20 Sep 2018 13:38 )             28.6                               136    |  99     |  11                  Calcium: 8.9   / iCa: x      (09-20 @ 13:38)    ----------------------------<  190       Magnesium: x                                3.4     |  27     |  1.02             Phosphorous: x        TPro  6.2    /  Alb  2.2    /  TBili  0.9    /  DBili  0.3    /  AST  24     /  ALT  20     /  AlkPhos  86     20 Sep 2018 07:12    CAPILLARY BLOOD GLUCOSE      POCT Blood Glucose.: 188 mg/dL (20 Sep 2018 12:10)  POCT Blood Glucose.: 185 mg/dL (20 Sep 2018 08:06)  POCT Blood Glucose.: 154 mg/dL (20 Sep 2018 05:37)  POCT Blood Glucose.: 93 mg/dL (19 Sep 2018 23:38)  POCT Blood Glucose.: 229 mg/dL (19 Sep 2018 17:14)        Assesment/plan    dm- better controlled  cont lantus 10 units   humalog 3 tid  now on higher rate of gt feeds ? need for more insulin  fsg q6  iv abx for asp pna

## 2018-09-20 NOTE — PROGRESS NOTE ADULT - SUBJECTIVE AND OBJECTIVE BOX
Patient is seen and examined at the bed side, is afebrile. The Leukocytosis is trending back down.        REVIEW OF SYSTEMS: Unable to obtain due to mental status      ALLERGIES : NKDA      Vital Signs Last 24 Hrs  T(C): 37.2 (19 Sep 2018 20:31), Max: 37.2 (19 Sep 2018 20:31)  T(F): 99 (19 Sep 2018 20:31), Max: 99 (19 Sep 2018 20:31)  HR: 80 (19 Sep 2018 20:45) (73 - 81)  BP: 133/64 (20 Sep 2018 04:40) (133/64 - 144/78)  BP(mean): --  RR: 18 (20 Sep 2018 04:40) (16 - 18)  SpO2: 100% (20 Sep 2018 04:40) (97% - 100%)      PHYSICAL EXAM:  GENERAL: Not in distress  HEENT: Trach in placed  CHEST/LUNG: Air entry B/L  HEART: s1 and s2 present  ABDOMEN: Nontender, and Nondistended  EXTREMITIES:  No pedal  edema  CNS: Awake but not Alert        LABS:                          9.0    16.7  )-----------( 202      ( 20 Sep 2018 13:38 )             28.6                           10.5   20.1  )-----------( 217      ( 19 Sep 2018 07:47 )             33.1                       11.6   7.4   )-----------( 242      ( 17 Sep 2018 07:47 )             37.1                        12.8   20.5  )-----------( 311      ( 14 Sep 2018 05:20 )             39.1             132<L>  |  98  |  10  ----------------------------<  188<H>  3.2<L>   |  22  |  1.07    Ca    8.8      20 Sep 2018 07:12  Phos  3.3       Mg     1.5         TPro  6.2  /  Alb  2.2<L>  /  TBili  0.9  /  DBili  0.3<H>  /  AST  24  /  ALT  20  /  AlkPhos  86      131<L>  |  95<L>  |  11  ----------------------------<  214<H>  3.8   |  28  |  0.76    Ca    8.5      19 Sep 2018 07:47  Phos  3.3     -  Mg     1.5         TPro  7.3  /  Alb  2.7<L>  /  TBili  0.7  /  DBili  x   /  AST  17  /  ALT  24  /  AlkPhos  97  09-13    PT/INR - ( 13 Sep 2018 11:31 )   PT: 11.4 sec;   INR: 1.04 ratio         PTT - ( 13 Sep 2018 11:31 )  PTT:31.6 sec  Urinalysis Basic - ( 14 Sep 2018 03:56 )    Color: Yellow / Appearance: Clear / S.020 / pH: x  Gluc: x / Ketone: Negative  / Bili: Negative / Urobili: Negative   Blood: x / Protein: 100 / Nitrite: Negative   Leuk Esterase: Negative / RBC: 0-2 /HPF / WBC 0-2 /HPF   Sq Epi: x / Non Sq Epi: Occasional /HPF / Bacteria: Trace /HPF      CAPILLARY BLOOD GLUCOSE      POCT Blood Glucose.: 144 mg/dL (14 Sep 2018 11:07)  POCT Blood Glucose.: 202 mg/dL (14 Sep 2018 08:09)  POCT Blood Glucose.: 277 mg/dL (14 Sep 2018 06:58)  POCT Blood Glucose.: 68 mg/dL (14 Sep 2018 06:03)  POCT Blood Glucose.: 316 mg/dL (14 Sep 2018 02:11)  POCT Blood Glucose.: 405 mg/dL (14 Sep 2018 00:28)  POCT Blood Glucose.: 386 mg/dL (13 Sep 2018 22:26)  POCT Blood Glucose.: 403 mg/dL (13 Sep 2018 19:09)        MEDICATIONS  (STANDING):  ALBUTerol/ipratropium for Nebulization 3 milliLiter(s) Nebulizer every 6 hours  amiKACIN  IVPB 560 milliGRAM(s) IV Intermittent every 12 hours  amLODIPine   Tablet 2.5 milliGRAM(s) Oral daily  atorvastatin 20 milliGRAM(s) Oral at bedtime  benztropine 2 milliGRAM(s) Oral two times a day  heparin  Injectable 5000 Unit(s) SubCutaneous every 8 hours  insulin glargine Injectable (LANTUS) 10 Unit(s) SubCutaneous at bedtime  insulin lispro (HumaLOG) corrective regimen sliding scale   SubCutaneous every 6 hours  insulin lispro Injectable (HumaLOG) 3 Unit(s) SubCutaneous three times a day with meals  lactobacillus acidophilus 1 Tablet(s) Oral every 8 hours  levETIRAcetam  Solution 1000 milliGRAM(s) Oral two times a day  metoprolol tartrate 100 milliGRAM(s) Oral two times a day  pantoprazole   Suspension 40 milliGRAM(s) Oral daily  polymyxin B IVPB 186326 Unit(s) IV Intermittent every 12 hours  QUEtiapine 50 milliGRAM(s) Oral every 12 hours  rifampin IVPB      rifampin IVPB 600 milliGRAM(s) IV Intermittent every 24 hours  senna 2 Tablet(s) Oral at bedtime  sodium chloride 0.9%. 1000 milliLiter(s) (75 mL/Hr) IV Continuous <Continuous>  vancomycin    Solution 125 milliGRAM(s) Oral every 6 hours    MEDICATIONS  (PRN):  haloperidol    Injectable 5 milliGRAM(s) IntraMuscular once PRN Agitation  LORazepam     Tablet 1 milliGRAM(s) Oral every 6 hours PRN Agitation  LORazepam   Injectable 1 milliGRAM(s) IntraMuscular every 8 hours PRN breakthrough agitation/anxiety/combative          RADIOLOGY & ADDITIONAL TESTS:    18: Xray Chest 1 View- PORTABLE-Routine (18 @ 08:22) Tracheostomy cannula reidentified in position. No change heart mediastinum. No change in the right basilar opacity with loss of definition of the ipsilateral hemidiaphragm and costophrenic angle likely   representing a combination of pleural fluid and underlying  consolidation/atelectasis.      18: CT Chest No Cont (18 @ 03:41) Impression: Consolidation involving the right lower lobe is of uncertain etiology. It is unclear whether this represents compressive atelectasis secondary to right pleural effusion/elevation of the right hemidiaphragm   and/or represents infection.    18: Xray Chest 1 View-PORTABLE IMMEDIATE (18 @ 13:37) Bilateral pleural effusions and associated atelectasis.        MICROBIOLOGY DATA:    Culture - Sputum . (09.15.18 @ 09:28)    Gram Stain:   Rare polymorphonuclear leukocytes per low power field  Few Squamous epithelial cells per low power field  Moderate Gram Negative Rods per oil power field  Few Gram Positive Rods per oil power field  Few Yeast like cells per oil power field  Rare Gram Negative Coccobacilli per oil power field    -  Amikacin: S <=8    -  Amikacin: S <=8    -  Ampicillin/Sulbactam: R >16/8    -  Aztreonam: S 8    -  Cefepime: I 16    -  Cefepime: I 16    -  Ceftazidime: R >16    -  Ceftazidime: I 16    -  Ciprofloxacin: R >2    -  Ciprofloxacin: R >2    -  Gentamicin: R >8    -  Gentamicin: R >8    -  Imipenem: R >8    -  Imipenem: R    -  Levofloxacin: R >4    -  Levofloxacin: R >4    -  Meropenem: R >8    -  Meropenem: I 8    -  Piperacillin/Tazobactam: R >64    -  Piperacillin/Tazobactam: R    -  Tobramycin: R >8    -  Tobramycin: R >8    -  Trimethoprim/Sulfamethoxazole: R >2/38    Specimen Source: .Sputum Sputum    Culture Results:   Numerous Pseudomonas aeruginosa (Carbapenem Resistant)  Moderate Acinetobacter baumannii/haemolyticus (Carbapenem Resistant)  Complex  Normal Respiratory Moraima present    Organism Identification: Pseudomonas aeruginosa (Carbapenem Resistant)  Acinetobacter baumannii/haemolyticus (Carbapenem Resistant)    Organism: Acinetobacter baumannii/haemolyticus (Carbapenem Resistant)    Organism: Pseudomonas aeruginosa (Carbapenem Resistant)    Organism: Acinetobacter baumannii/haemolyticus (Carbapenem Resistant)    Method Type: ESTIVEN    Method Type: ESTIVEN    Method Type: KB        MRSA/MSSA PCR (18 @ 11:26)    MRSA PCR Result.: NotDetec: MRSA/MSSA PCR assay is a qualitative in vitro diagnostic test for the  direct detection and differentiation of methicillin-resistant  Staphylococcus aureus (MRSA) from Staphylococcus aureus (SA). The assay  detects DNA from nasal swabs in patients atrisk for nasal colonization.  It is not intended to diagnose MRSA or SA infections nor guide or monitor  treatment for MRSA/SA infections. A negative result does not preclude  nasal colonization. The assay is FDA-approved and its performance has  been established by [employer] [place], [place] and the [facility], [place], [place].    Staph Aureus PCR Result: NotDetec      Culture - Urine (18 @ 09:47)    Specimen Source: .Urine Catheterized    Culture Results:   10,000 - 49,000 CFU/mL Yeast

## 2018-09-20 NOTE — PROGRESS NOTE ADULT - SUBJECTIVE AND OBJECTIVE BOX
Patient seen and examined at bedside  agitated overnight  Case discussed with medical team    HPI:  62 yo F from [facility], non-verbal, bed bound, S/P Trach/ PEG tube, PMH of HTN, asthma, convulsion disorder, bipolar, DVT legs, DM 2, sent from NH for coffee ground vomiting X3 this morning and cough.  Pt unable to provide history.  History given by sister (Sharon [phone])who states pt was vomiting coffee ground emesis this morning. Pt had no reported vomiting since in ED.  Pt also developed cough with clear sputum.  Sister states pt is combative at baseline, and her current behavior is baseline.  No reported fever, discomfort, diarrhea, or SOB.  Pt was recently admitted to [facility] in 07/2018 for same reason and treated for aspiration PNA and UTI during last admission.  Pt was supposed to be given IV vanco and zosyn for 7 day by PCP for PNA , which supposed to start from 09/13-09/19. Pt is DNR, MOSLT form in charts. (13 Sep 2018 15:49)      PAST MEDICAL & SURGICAL HISTORY:  No pertinent past medical history  Hypertension  Schizophrenia  Diabetic coma  Diabetes mellitus  No significant past surgical history  S/P percutaneous endoscopic gastrostomy (PEG) tube placement  H/O tracheostomy      angiotensin converting enzyme inhibitors (Unknown)       MEDICATIONS  (STANDING):  ALBUTerol/ipratropium for Nebulization 3 milliLiter(s) Nebulizer every 6 hours  amiKACIN  IVPB 560 milliGRAM(s) IV Intermittent every 12 hours  amLODIPine   Tablet 2.5 milliGRAM(s) Oral daily  atorvastatin 20 milliGRAM(s) Oral at bedtime  benztropine 2 milliGRAM(s) Oral two times a day  heparin  Injectable 5000 Unit(s) SubCutaneous every 8 hours  insulin glargine Injectable (LANTUS) 10 Unit(s) SubCutaneous at bedtime  insulin lispro (HumaLOG) corrective regimen sliding scale   SubCutaneous every 6 hours  insulin lispro Injectable (HumaLOG) 3 Unit(s) SubCutaneous three times a day with meals  levETIRAcetam  Solution 1000 milliGRAM(s) Oral two times a day  metoprolol tartrate 100 milliGRAM(s) Oral two times a day  pantoprazole   Suspension 40 milliGRAM(s) Oral daily  polymyxin B IVPB 022978 Unit(s) IV Intermittent every 12 hours  QUEtiapine 50 milliGRAM(s) Oral every 12 hours  rifampin IVPB      rifampin IVPB 600 milliGRAM(s) IV Intermittent every 24 hours  senna 2 Tablet(s) Oral at bedtime  sodium chloride 0.9%. 1000 milliLiter(s) (75 mL/Hr) IV Continuous <Continuous>  vancomycin    Solution 125 milliGRAM(s) Oral every 6 hours    MEDICATIONS  (PRN):  LORazepam     Tablet 1 milliGRAM(s) Oral every 6 hours PRN Agitation  LORazepam   Injectable 1 milliGRAM(s) IntraMuscular every 8 hours PRN breakthrough agitation/anxiety/combative      REVIEW OF SYSTEMS:  unable to obtain 2/2 mental status    T(C): 37.2 (09-19-18 @ 20:31), Max: 37.2 (09-19-18 @ 20:31)  HR: 80 (09-19-18 @ 20:45) (73 - 81)  BP: 133/64 (09-20-18 @ 04:40) (133/64 - 144/78)  RR: 18 (09-20-18 @ 04:40) (16 - 18)  SpO2: 100% (09-20-18 @ 04:40) (97% - 100%)    PHYSICAL EXAMINATION:   Constitutional: NAD  HEENT: AT  Neck:  Supple  Respiratory: Adequate airflow b/l. Not using accessory muscles of respiration.  Cardiovascular:  S1 & S2 intact, no R/G, 2+ radial pulses b/l  Gastrointestinal: Soft, NT, ND, normoactive b.s., no organomegaly/RT/rigidity  Extremities: WWP  Neurological:  awake. Crude sensation intact.     Labs and imaging reviewed    LABS:                        9.4    21.8  )-----------( 212      ( 20 Sep 2018 07:12 )             28.8     09-19    131<L>  |  95<L>  |  11  ----------------------------<  214<H>  3.8   |  28  |  0.76    Ca    8.5      19 Sep 2018 07:47  Phos  3.3     09-19  Mg     1.5     09-19              CAPILLARY BLOOD GLUCOSE      POCT Blood Glucose.: 154 mg/dL (20 Sep 2018 05:37)  POCT Blood Glucose.: 93 mg/dL (19 Sep 2018 23:38)  POCT Blood Glucose.: 229 mg/dL (19 Sep 2018 17:14)  POCT Blood Glucose.: 300 mg/dL (19 Sep 2018 11:58)        Culture - Sputum (collected 09-20-18 @ 00:28)  Source: .Sputum Sputum  Gram Stain (09-20-18 @ 02:42):    Few polymorphonuclear leukocytes per low power field    Few Squamous epithelial cells per low power field    Moderate Gram Negative Rods seen per oil power field    Moderate Yeast like cells seen per oil power field            RADIOLOGY & ADDITIONAL STUDIES:

## 2018-09-20 NOTE — PROGRESS NOTE ADULT - ASSESSMENT
A 64 yo Female  with  non-verbal, bed bound, S/P Trach/ PEG tube, sent in to the ER from [facility]  for evaluation of coffee ground vomiting  and cough. On arrival, she found to have tachycardia, Leukocytosis, and RLL consolidation. She has started on Zosyn and Vancomycin and the ID consult requested to assist with further evaluation and antibiotic management.     # Aspiration pneumonia - RLL consolidation - Pseudomonas and Acinetobacter   # MRSA PCR is negative    Would recommend:    1. Monitor WBC count, is trending back down  2. Bronchial suctioning  and Aspiration  precaution   3. Continue Polymixin, Rifampin and amikacin to cover CRE  4. Frequent repositioning      d/w  Nursing staff    will follow the patient with you

## 2018-09-20 NOTE — PROGRESS NOTE ADULT - SUBJECTIVE AND OBJECTIVE BOX
Patient is a 63y old  Female who presents with a chief complaint of vomiting (20 Sep 2018 13:59)    History of Present Illness:  Reason for Admission: vomiting	  History of Present Illness: 	  62 yo F from [facility], non-verbal, bed bound, S/P Trach/ PEG tube, PMH of HTN, asthma, convulsion disorder, bipolar, DVT legs, DM 2, sent from NH for coffee ground vomiting X3 this morning and cough.  Pt unable to provide history.  History given by sister (Sharon [phone])who states pt was vomiting coffee ground emesis this morning. Pt had no reported vomiting since in ED.  Pt also developed cough with clear sputum.  Sister states pt is combative at baseline, and her current behavior is baseline.  No reported fever, discomfort, diarrhea, or SOB.  Pt was recently admitted to [facility] in 07/2018 for same reason and treated for aspiration PNA and UTI during last admission.  Pt was supposed to be given IV vanco and zosyn for 7 day by PCP for PNA , which supposed to start from 09/13-09/19. Pt is DNR, MOSLT form in charts.       INTERVAL HPI/OVERNIGHT EVENTS: As above. Awake, not alert, lying in bed in NAD. Remains on trach collar.      VITAL SIGNS:  T(F): 99 (09-19-18 @ 20:31)  HR: 80 (09-19-18 @ 20:45)  BP: 133/64 (09-20-18 @ 04:40)  RR: 18 (09-20-18 @ 04:40)  SpO2: 100% (09-20-18 @ 04:40)  Wt(kg): --  I&O's Detail          REVIEW OF SYSTEMS:    CONSTITUTIONAL:  No fevers, chills, sweats    HEENT:  Eyes:  No diplopia or blurred vision. ENT:  No earache, sore throat or runny nose.    CARDIOVASCULAR:  No pressure, squeezing, tightness, or heaviness about the chest; no palpitations.    RESPIRATORY:  Per HPI    GASTROINTESTINAL:  No abdominal pain, nausea, vomiting or diarrhea.    GENITOURINARY:  No dysuria, frequency or urgency.    NEUROLOGIC:  No paresthesias, fasciculations, seizures or weakness.    PSYCHIATRIC:  No disorder of thought or mood.      PHYSICAL EXAM:    Constitutional: Well developed and nourished  Eyes:Perrla  ENMT: normal  Neck:supple  Respiratory: Occasional ronchi bilaterally  Cardiovascular: S1 S2 regular  Gastrointestinal: Soft, Non tender  Extremities: No edema  Vascular:normal  Neurological:Awake, alert,Ox3  Musculoskeletal:Normal      MEDICATIONS  (STANDING):  ALBUTerol/ipratropium for Nebulization 3 milliLiter(s) Nebulizer every 6 hours  amiKACIN  IVPB 560 milliGRAM(s) IV Intermittent every 12 hours  amLODIPine   Tablet 2.5 milliGRAM(s) Oral daily  atorvastatin 20 milliGRAM(s) Oral at bedtime  benztropine 2 milliGRAM(s) Oral two times a day  heparin  Injectable 5000 Unit(s) SubCutaneous every 8 hours  insulin glargine Injectable (LANTUS) 10 Unit(s) SubCutaneous at bedtime  insulin lispro (HumaLOG) corrective regimen sliding scale   SubCutaneous every 6 hours  insulin lispro Injectable (HumaLOG) 3 Unit(s) SubCutaneous three times a day with meals  lactobacillus acidophilus 1 Tablet(s) Oral every 8 hours  levETIRAcetam  Solution 1000 milliGRAM(s) Oral two times a day  metoprolol tartrate 100 milliGRAM(s) Oral two times a day  pantoprazole   Suspension 40 milliGRAM(s) Oral daily  polymyxin B IVPB 837891 Unit(s) IV Intermittent every 12 hours  QUEtiapine 50 milliGRAM(s) Oral every 12 hours  rifampin IVPB      rifampin IVPB 600 milliGRAM(s) IV Intermittent every 24 hours  senna 2 Tablet(s) Oral at bedtime  sodium chloride 0.9%. 1000 milliLiter(s) (75 mL/Hr) IV Continuous <Continuous>  vancomycin    Solution 125 milliGRAM(s) Oral every 6 hours    MEDICATIONS  (PRN):  haloperidol    Injectable 5 milliGRAM(s) IntraMuscular once PRN Agitation  LORazepam     Tablet 1 milliGRAM(s) Oral every 6 hours PRN Agitation  LORazepam   Injectable 1 milliGRAM(s) IntraMuscular every 8 hours PRN breakthrough agitation/anxiety/combative      Allergies    angiotensin converting enzyme inhibitors (Unknown)    Intolerances        LABS:                        9.0    16.7  )-----------( 202      ( 20 Sep 2018 13:38 )             28.6     09-20    136  |  99  |  11  ----------------------------<  190<H>  3.4<L>   |  27  |  1.02    Ca    8.9      20 Sep 2018 13:38  Phos  3.3     09-19  Mg     1.5     09-20    TPro  6.2  /  Alb  2.2<L>  /  TBili  0.9  /  DBili  0.3<H>  /  AST  24  /  ALT  20  /  AlkPhos  86  09-20              CAPILLARY BLOOD GLUCOSE      POCT Blood Glucose.: 188 mg/dL (20 Sep 2018 12:10)  POCT Blood Glucose.: 185 mg/dL (20 Sep 2018 08:06)  POCT Blood Glucose.: 154 mg/dL (20 Sep 2018 05:37)  POCT Blood Glucose.: 93 mg/dL (19 Sep 2018 23:38)  POCT Blood Glucose.: 229 mg/dL (19 Sep 2018 17:14)        RADIOLOGY & ADDITIONAL TESTS:    CXR:  < from: Xray Chest 1 View- PORTABLE-Urgent (09.20.18 @ 13:27) >    Bilateral pleural effusions and pulmonary edema are worsening since the   prior exam. No evidence of pneumothorax.      < end of copied text >    Ct scan chest:    ekg;    echo:

## 2018-09-20 NOTE — PROGRESS NOTE ADULT - SUBJECTIVE AND OBJECTIVE BOX
NP Note discussed with  Primary Attending    Patient is a 63y old  Female who presents with a chief complaint of vomiting (20 Sep 2018 07:47)  INTERVAL HPI/OVERNIGHT EVENTS: Combative.  Dr. Kwon added Vancomycin orally due to progressive leukocytosis.  VSS.  Due to poor IV access central line inserted by Dr. Jones, Collaborated with Dr. Lau, attending     MEDICATIONS  (STANDING):  ALBUTerol/ipratropium for Nebulization 3 milliLiter(s) Nebulizer every 6 hours  amiKACIN  IVPB 560 milliGRAM(s) IV Intermittent every 12 hours  amLODIPine   Tablet 2.5 milliGRAM(s) Oral daily  atorvastatin 20 milliGRAM(s) Oral at bedtime  benztropine 2 milliGRAM(s) Oral two times a day  heparin  Injectable 5000 Unit(s) SubCutaneous every 8 hours  insulin glargine Injectable (LANTUS) 10 Unit(s) SubCutaneous at bedtime  insulin lispro (HumaLOG) corrective regimen sliding scale   SubCutaneous every 6 hours  insulin lispro Injectable (HumaLOG) 3 Unit(s) SubCutaneous three times a day with meals  lactobacillus acidophilus 1 Tablet(s) Oral every 8 hours  levETIRAcetam  Solution 1000 milliGRAM(s) Oral two times a day  metoprolol tartrate 100 milliGRAM(s) Oral two times a day  pantoprazole   Suspension 40 milliGRAM(s) Oral daily  polymyxin B IVPB 729254 Unit(s) IV Intermittent every 12 hours  QUEtiapine 50 milliGRAM(s) Oral every 12 hours  rifampin IVPB      rifampin IVPB 600 milliGRAM(s) IV Intermittent every 24 hours  senna 2 Tablet(s) Oral at bedtime  sodium chloride 0.9%. 1000 milliLiter(s) (75 mL/Hr) IV Continuous <Continuous>  vancomycin    Solution 125 milliGRAM(s) Oral every 6 hours    MEDICATIONS  (PRN):  haloperidol    Injectable 5 milliGRAM(s) IntraMuscular once PRN Agitation  LORazepam     Tablet 1 milliGRAM(s) Oral every 6 hours PRN Agitation  LORazepam   Injectable 1 milliGRAM(s) IntraMuscular every 8 hours PRN breakthrough agitation/anxiety/combative    __________________________________________________  REVIEW OF SYSTEMS:    CONSTITUTIONAL: No fever,   EYES: no acute visual disturbances  NECK: No pain or stiffness  RESPIRATORY: No cough; No shortness of breath  CARDIOVASCULAR: No chest pain, no palpitations  GASTROINTESTINAL: No pain. No nausea or vomiting; No diarrhea   NEUROLOGICAL: No headache or numbness, no tremors  MUSCULOSKELETAL: No joint pain, no muscle pain  GENITOURINARY: no dysuria, no frequency, no hesitancy  PSYCHIATRY: no depression , no anxiety  ALL OTHER  ROS negative        Vital Signs Last 24 Hrs  T(C): 37.2 (19 Sep 2018 20:31), Max: 37.2 (19 Sep 2018 20:31)  T(F): 99 (19 Sep 2018 20:31), Max: 99 (19 Sep 2018 20:31)  HR: 80 (19 Sep 2018 20:45) (73 - 81)  BP: 133/64 (20 Sep 2018 04:40) (133/64 - 144/78)  BP(mean): --  RR: 18 (20 Sep 2018 04:40) (16 - 18)  SpO2: 100% (20 Sep 2018 04:40) (97% - 100%)    ________________________________________________  PHYSICAL EXAM:  GENERAL: NAD  HEENT: Trach  NECK : supple  CHEST/LUNG: Clear to auscultation bilaterally with good air entry   HEART: S1 S2  regular; no murmurs, gallops or rubs  ABDOMEN: Soft, Nontender, Nondistended; Bowel sounds present  EXTREMITIES: no cyanosis; no edema; no calf tenderness  SKIN: warm and dry; no rash  NERVOUS SYSTEM:  Awake and alert; Oriented  to place, person and time ; no new deficits    _________________________________________________  LABS:                        9.4    21.8  )-----------( 212      ( 20 Sep 2018 07:12 )             28.8     09-20    132<L>  |  98  |  10  ----------------------------<  188<H>  3.2<L>   |  22  |  1.07    Ca    8.8      20 Sep 2018 07:12  Phos  3.3     09-19  Mg     1.5     09-20    TPro  6.2  /  Alb  2.2<L>  /  TBili  0.9  /  DBili  0.3<H>  /  AST  24  /  ALT  20  /  AlkPhos  86  09-20    CAPILLARY BLOOD GLUCOSE    POCT Blood Glucose.: 185 mg/dL (20 Sep 2018 08:06)  POCT Blood Glucose.: 154 mg/dL (20 Sep 2018 05:37)  POCT Blood Glucose.: 93 mg/dL (19 Sep 2018 23:38)  POCT Blood Glucose.: 229 mg/dL (19 Sep 2018 17:14)    RADIOLOGY & ADDITIONAL TESTS:    Imaging Personally Reviewed:  YES    Consultant(s) Notes Reviewed:   YES    Plan of care was discussed with primary care giver; all questions and concerns were addressed and care was aligned with patient's wishes.

## 2018-09-21 ENCOUNTER — TRANSCRIPTION ENCOUNTER (OUTPATIENT)
Age: 63
End: 2018-09-21

## 2018-09-21 DIAGNOSIS — F20.9 SCHIZOPHRENIA, UNSPECIFIED: ICD-10-CM

## 2018-09-21 LAB
-  AMIKACIN: SIGNIFICANT CHANGE UP
-  AMIKACIN: SIGNIFICANT CHANGE UP
-  AMOXICILLIN/CLAVULANIC ACID: SIGNIFICANT CHANGE UP
-  AMOXICILLIN/CLAVULANIC ACID: SIGNIFICANT CHANGE UP
-  AMPICILLIN/SULBACTAM: SIGNIFICANT CHANGE UP
-  AMPICILLIN/SULBACTAM: SIGNIFICANT CHANGE UP
-  AMPICILLIN: SIGNIFICANT CHANGE UP
-  AMPICILLIN: SIGNIFICANT CHANGE UP
-  AZTREONAM: SIGNIFICANT CHANGE UP
-  AZTREONAM: SIGNIFICANT CHANGE UP
-  CEFAZOLIN: SIGNIFICANT CHANGE UP
-  CEFAZOLIN: SIGNIFICANT CHANGE UP
-  CEFEPIME: SIGNIFICANT CHANGE UP
-  CEFEPIME: SIGNIFICANT CHANGE UP
-  CEFOTAXIME: SIGNIFICANT CHANGE UP
-  CEFOXITIN: SIGNIFICANT CHANGE UP
-  CEFOXITIN: SIGNIFICANT CHANGE UP
-  CEFTAZIDIME: SIGNIFICANT CHANGE UP
-  CEFTRIAXONE: SIGNIFICANT CHANGE UP
-  CEFTRIAXONE: SIGNIFICANT CHANGE UP
-  CEFUROXIME: SIGNIFICANT CHANGE UP
-  CIPROFLOXACIN: SIGNIFICANT CHANGE UP
-  CIPROFLOXACIN: SIGNIFICANT CHANGE UP
-  ERTAPENEM: SIGNIFICANT CHANGE UP
-  ERTAPENEM: SIGNIFICANT CHANGE UP
-  GENTAMICIN: SIGNIFICANT CHANGE UP
-  GENTAMICIN: SIGNIFICANT CHANGE UP
-  IMIPENEM: SIGNIFICANT CHANGE UP
-  IMIPENEM: SIGNIFICANT CHANGE UP
-  LEVOFLOXACIN: SIGNIFICANT CHANGE UP
-  LEVOFLOXACIN: SIGNIFICANT CHANGE UP
-  MEROPENEM: SIGNIFICANT CHANGE UP
-  MEROPENEM: SIGNIFICANT CHANGE UP
-  MOXIFLOXACIN(AEROBIC): SIGNIFICANT CHANGE UP
-  PIPERACILLIN/TAZOBACTAM: SIGNIFICANT CHANGE UP
-  PIPERACILLIN/TAZOBACTAM: SIGNIFICANT CHANGE UP
-  TETRACYCLINE: SIGNIFICANT CHANGE UP
-  TIGECYCLINE: SIGNIFICANT CHANGE UP
-  TOBRAMYCIN: SIGNIFICANT CHANGE UP
-  TOBRAMYCIN: SIGNIFICANT CHANGE UP
-  TRIMETHOPRIM/SULFAMETHOXAZOLE: SIGNIFICANT CHANGE UP
-  TRIMETHOPRIM/SULFAMETHOXAZOLE: SIGNIFICANT CHANGE UP
ANION GAP SERPL CALC-SCNC: 10 MMOL/L — SIGNIFICANT CHANGE UP (ref 5–17)
BUN SERPL-MCNC: 10 MG/DL — SIGNIFICANT CHANGE UP (ref 7–18)
CALCIUM SERPL-MCNC: 8.7 MG/DL — SIGNIFICANT CHANGE UP (ref 8.4–10.5)
CHLORIDE SERPL-SCNC: 101 MMOL/L — SIGNIFICANT CHANGE UP (ref 96–108)
CO2 SERPL-SCNC: 27 MMOL/L — SIGNIFICANT CHANGE UP (ref 22–31)
CREAT SERPL-MCNC: 1.04 MG/DL — SIGNIFICANT CHANGE UP (ref 0.5–1.3)
CULTURE RESULTS: SIGNIFICANT CHANGE UP
GLUCOSE SERPL-MCNC: 148 MG/DL — HIGH (ref 70–99)
HCT VFR BLD CALC: 26.7 % — LOW (ref 34.5–45)
HGB BLD-MCNC: 8.6 G/DL — LOW (ref 11.5–15.5)
MCHC RBC-ENTMCNC: 25.3 PG — LOW (ref 27–34)
MCHC RBC-ENTMCNC: 32.1 GM/DL — SIGNIFICANT CHANGE UP (ref 32–36)
MCV RBC AUTO: 78.8 FL — LOW (ref 80–100)
METHOD TYPE: SIGNIFICANT CHANGE UP
METHOD TYPE: SIGNIFICANT CHANGE UP
ORGANISM # SPEC MICROSCOPIC CNT: SIGNIFICANT CHANGE UP
PLATELET # BLD AUTO: 195 K/UL — SIGNIFICANT CHANGE UP (ref 150–400)
POTASSIUM SERPL-MCNC: 3.2 MMOL/L — LOW (ref 3.5–5.3)
POTASSIUM SERPL-SCNC: 3.2 MMOL/L — LOW (ref 3.5–5.3)
RBC # BLD: 3.38 M/UL — LOW (ref 3.8–5.2)
RBC # FLD: 13.1 % — SIGNIFICANT CHANGE UP (ref 10.3–14.5)
SODIUM SERPL-SCNC: 138 MMOL/L — SIGNIFICANT CHANGE UP (ref 135–145)
SPECIMEN SOURCE: SIGNIFICANT CHANGE UP
WBC # BLD: 13 K/UL — HIGH (ref 3.8–10.5)
WBC # FLD AUTO: 13 K/UL — HIGH (ref 3.8–10.5)

## 2018-09-21 RX ORDER — CHLORHEXIDINE GLUCONATE 213 G/1000ML
1 SOLUTION TOPICAL EVERY 12 HOURS
Qty: 0 | Refills: 0 | Status: DISCONTINUED | OUTPATIENT
Start: 2018-09-21 | End: 2018-10-02

## 2018-09-21 RX ORDER — FUROSEMIDE 40 MG
40 TABLET ORAL DAILY
Qty: 0 | Refills: 0 | Status: DISCONTINUED | OUTPATIENT
Start: 2018-09-22 | End: 2018-09-24

## 2018-09-21 RX ORDER — INSULIN LISPRO 100/ML
5 VIAL (ML) SUBCUTANEOUS
Qty: 0 | Refills: 0 | Status: DISCONTINUED | OUTPATIENT
Start: 2018-09-21 | End: 2018-09-24

## 2018-09-21 RX ORDER — POTASSIUM CHLORIDE 20 MEQ
40 PACKET (EA) ORAL ONCE
Qty: 0 | Refills: 0 | Status: COMPLETED | OUTPATIENT
Start: 2018-09-21 | End: 2018-09-21

## 2018-09-21 RX ORDER — INSULIN GLARGINE 100 [IU]/ML
12 INJECTION, SOLUTION SUBCUTANEOUS AT BEDTIME
Qty: 0 | Refills: 0 | Status: DISCONTINUED | OUTPATIENT
Start: 2018-09-21 | End: 2018-09-24

## 2018-09-21 RX ORDER — POTASSIUM CHLORIDE 20 MEQ
40 PACKET (EA) ORAL ONCE
Qty: 0 | Refills: 0 | Status: DISCONTINUED | OUTPATIENT
Start: 2018-09-21 | End: 2018-09-21

## 2018-09-21 RX ORDER — FUROSEMIDE 40 MG
40 TABLET ORAL ONCE
Qty: 0 | Refills: 0 | Status: COMPLETED | OUTPATIENT
Start: 2018-09-21 | End: 2018-09-21

## 2018-09-21 RX ADMIN — CHLORHEXIDINE GLUCONATE 1 APPLICATION(S): 213 SOLUTION TOPICAL at 18:23

## 2018-09-21 RX ADMIN — HEPARIN SODIUM 5000 UNIT(S): 5000 INJECTION INTRAVENOUS; SUBCUTANEOUS at 14:18

## 2018-09-21 RX ADMIN — POLYMYXIN B SULFATE 500 UNIT(S): 500000 INJECTION, POWDER, LYOPHILIZED, FOR SOLUTION INTRAMUSCULAR; INTRATHECAL; INTRAVENOUS; OPHTHALMIC at 18:47

## 2018-09-21 RX ADMIN — Medication 3 MILLILITER(S): at 02:06

## 2018-09-21 RX ADMIN — Medication 2 MILLIGRAM(S): at 05:57

## 2018-09-21 RX ADMIN — ATORVASTATIN CALCIUM 20 MILLIGRAM(S): 80 TABLET, FILM COATED ORAL at 22:30

## 2018-09-21 RX ADMIN — AMLODIPINE BESYLATE 2.5 MILLIGRAM(S): 2.5 TABLET ORAL at 05:57

## 2018-09-21 RX ADMIN — Medication 1 TABLET(S): at 05:57

## 2018-09-21 RX ADMIN — Medication 125 MILLIGRAM(S): at 00:41

## 2018-09-21 RX ADMIN — Medication 3 MILLILITER(S): at 14:56

## 2018-09-21 RX ADMIN — Medication 125 MILLIGRAM(S): at 05:57

## 2018-09-21 RX ADMIN — Medication 1 TABLET(S): at 22:30

## 2018-09-21 RX ADMIN — Medication 3 UNIT(S): at 10:21

## 2018-09-21 RX ADMIN — HEPARIN SODIUM 5000 UNIT(S): 5000 INJECTION INTRAVENOUS; SUBCUTANEOUS at 05:57

## 2018-09-21 RX ADMIN — Medication 1: at 06:51

## 2018-09-21 RX ADMIN — Medication 2 MILLIGRAM(S): at 18:26

## 2018-09-21 RX ADMIN — Medication 3 MILLILITER(S): at 09:07

## 2018-09-21 RX ADMIN — Medication 125 MILLIGRAM(S): at 14:04

## 2018-09-21 RX ADMIN — Medication 100 MILLIGRAM(S): at 05:57

## 2018-09-21 RX ADMIN — Medication 100 MILLIGRAM(S): at 18:27

## 2018-09-21 RX ADMIN — POLYMYXIN B SULFATE 500 UNIT(S): 500000 INJECTION, POWDER, LYOPHILIZED, FOR SOLUTION INTRAMUSCULAR; INTRATHECAL; INTRAVENOUS; OPHTHALMIC at 05:57

## 2018-09-21 RX ADMIN — AMIKACIN SULFATE 102.24 MILLIGRAM(S): 250 INJECTION, SOLUTION INTRAMUSCULAR; INTRAVENOUS at 18:47

## 2018-09-21 RX ADMIN — AMIKACIN SULFATE 102.24 MILLIGRAM(S): 250 INJECTION, SOLUTION INTRAMUSCULAR; INTRAVENOUS at 05:57

## 2018-09-21 RX ADMIN — Medication 5 UNIT(S): at 12:36

## 2018-09-21 RX ADMIN — QUETIAPINE FUMARATE 50 MILLIGRAM(S): 200 TABLET, FILM COATED ORAL at 05:57

## 2018-09-21 RX ADMIN — Medication 1 TABLET(S): at 14:23

## 2018-09-21 RX ADMIN — HEPARIN SODIUM 5000 UNIT(S): 5000 INJECTION INTRAVENOUS; SUBCUTANEOUS at 22:30

## 2018-09-21 RX ADMIN — QUETIAPINE FUMARATE 50 MILLIGRAM(S): 200 TABLET, FILM COATED ORAL at 18:26

## 2018-09-21 RX ADMIN — Medication 40 MILLIGRAM(S): at 14:17

## 2018-09-21 RX ADMIN — Medication 5 UNIT(S): at 18:49

## 2018-09-21 RX ADMIN — PANTOPRAZOLE SODIUM 40 MILLIGRAM(S): 20 TABLET, DELAYED RELEASE ORAL at 12:35

## 2018-09-21 RX ADMIN — Medication 3 MILLILITER(S): at 20:19

## 2018-09-21 RX ADMIN — Medication 40 MILLIEQUIVALENT(S): at 12:34

## 2018-09-21 RX ADMIN — LEVETIRACETAM 1000 MILLIGRAM(S): 250 TABLET, FILM COATED ORAL at 05:57

## 2018-09-21 RX ADMIN — Medication 3: at 12:36

## 2018-09-21 RX ADMIN — LEVETIRACETAM 1000 MILLIGRAM(S): 250 TABLET, FILM COATED ORAL at 18:27

## 2018-09-21 NOTE — DISCHARGE NOTE ADULT - PATIENT PORTAL LINK FT
You can access the [facility] Patient Portal, offered by [facility], by registering with the following website: [email or web address]

## 2018-09-21 NOTE — PROGRESS NOTE ADULT - SUBJECTIVE AND OBJECTIVE BOX
Patient is a 63y old  Female who presents with a chief complaint of vomiting (21 Sep 2018 11:15)    Awake, not alert, lying in bed in NAD. Remains on trach collar. Denies vomiting and nausea today.    INTERVAL HPI/OVERNIGHT EVENTS:      VITAL SIGNS:  T(F): 97.6 (09-21-18 @ 05:04)  HR: 103 (09-21-18 @ 05:04)  BP: 129/62 (09-21-18 @ 05:04)  RR: 18 (09-21-18 @ 05:04)  SpO2: 98% (09-21-18 @ 05:04)  Wt(kg): --  I&O's Detail          REVIEW OF SYSTEMS:    CONSTITUTIONAL:  No fevers, chills, sweats    HEENT:  Eyes:  No diplopia or blurred vision. ENT:  No earache, sore throat or runny nose.    CARDIOVASCULAR:  No pressure, squeezing, tightness, or heaviness about the chest; no palpitations.    RESPIRATORY:  Per HPI    GASTROINTESTINAL:  No abdominal pain, nausea, vomiting or diarrhea.    GENITOURINARY:  No dysuria, frequency or urgency.    NEUROLOGIC:  No paresthesias, fasciculations, seizures or weakness.    PSYCHIATRIC:  No disorder of thought or mood.      PHYSICAL EXAM:    Constitutional: Well developed and nourished  Eyes:Perrla  ENMT: normal  Neck:supple  Respiratory: + Occasional ronchi bilaterally  Cardiovascular: S1 S2 regular  Gastrointestinal: Soft, Non tender  Extremities: No edema  Vascular:normal  Neurological:Awake, alert,Ox3  Musculoskeletal:Normal      MEDICATIONS  (STANDING):  ALBUTerol/ipratropium for Nebulization 3 milliLiter(s) Nebulizer every 6 hours  amiKACIN  IVPB 560 milliGRAM(s) IV Intermittent every 12 hours  amLODIPine   Tablet 2.5 milliGRAM(s) Oral daily  atorvastatin 20 milliGRAM(s) Oral at bedtime  benztropine 2 milliGRAM(s) Oral two times a day  heparin  Injectable 5000 Unit(s) SubCutaneous every 8 hours  insulin glargine Injectable (LANTUS) 12 Unit(s) SubCutaneous at bedtime  insulin lispro (HumaLOG) corrective regimen sliding scale   SubCutaneous every 6 hours  insulin lispro Injectable (HumaLOG) 5 Unit(s) SubCutaneous three times a day with meals  lactobacillus acidophilus 1 Tablet(s) Oral every 8 hours  levETIRAcetam  Solution 1000 milliGRAM(s) Oral two times a day  metoprolol tartrate 100 milliGRAM(s) Oral two times a day  pantoprazole   Suspension 40 milliGRAM(s) Oral daily  polymyxin B IVPB 496198 Unit(s) IV Intermittent every 12 hours  potassium chloride   Powder 40 milliEquivalent(s) Oral once  QUEtiapine 50 milliGRAM(s) Oral every 12 hours  rifampin IVPB      rifampin IVPB 600 milliGRAM(s) IV Intermittent every 24 hours  senna 2 Tablet(s) Oral at bedtime  sodium chloride 0.9%. 1000 milliLiter(s) (75 mL/Hr) IV Continuous <Continuous>  vancomycin    Solution 125 milliGRAM(s) Oral every 6 hours    MEDICATIONS  (PRN):  haloperidol    Injectable 5 milliGRAM(s) IntraMuscular once PRN Agitation  LORazepam   Injectable 1 milliGRAM(s) IV Push every 8 hours PRN for breathrough agitation/anxiety/combative behavior      Allergies    angiotensin converting enzyme inhibitors (Unknown)    Intolerances        LABS:                        8.6    13.0  )-----------( 195      ( 21 Sep 2018 04:46 )             26.7     09-21    138  |  101  |  10  ----------------------------<  148<H>  3.2<L>   |  27  |  1.04    Ca    8.7      21 Sep 2018 04:46  Mg     1.5     09-20    TPro  6.2  /  Alb  2.2<L>  /  TBili  0.9  /  DBili  0.3<H>  /  AST  24  /  ALT  20  /  AlkPhos  86  09-20              CAPILLARY BLOOD GLUCOSE      POCT Blood Glucose.: 328 mg/dL (21 Sep 2018 10:11)  POCT Blood Glucose.: 200 mg/dL (21 Sep 2018 06:41)  POCT Blood Glucose.: 128 mg/dL (20 Sep 2018 23:21)  POCT Blood Glucose.: 215 mg/dL (20 Sep 2018 17:01)  POCT Blood Glucose.: 188 mg/dL (20 Sep 2018 12:10)        RADIOLOGY & ADDITIONAL TESTS:    CXR:  < from: Xray Chest 1 View- PORTABLE-Urgent (09.20.18 @ 13:27) >    Bilateral pleural effusions and pulmonary edema are worsening since the   prior exam. No evidence of pneumothorax.      < end of copied text >    Ct scan chest:    ekg;    echo: Patient is a 63y old  Female who presents with a chief complaint of vomiting (21 Sep 2018 11:15)    Awake, not alert, lying in bed in NAD. Remains on trach collar. No further vomiting. Had CVP line in right groin because of lack of IV access.    INTERVAL HPI/OVERNIGHT EVENTS:      VITAL SIGNS:  T(F): 97.6 (09-21-18 @ 05:04)  HR: 103 (09-21-18 @ 05:04)  BP: 129/62 (09-21-18 @ 05:04)  RR: 18 (09-21-18 @ 05:04)  SpO2: 98% (09-21-18 @ 05:04)  Wt(kg): --  I&O's Detail          REVIEW OF SYSTEMS:    CONSTITUTIONAL:  No fevers, chills, sweats    HEENT:  Eyes:  No diplopia or blurred vision. ENT:  No earache, sore throat or runny nose.    CARDIOVASCULAR:  No pressure, squeezing, tightness, or heaviness about the chest; no palpitations.    RESPIRATORY:  Per HPI    GASTROINTESTINAL:  No abdominal pain, nausea, vomiting or diarrhea.    GENITOURINARY:  No dysuria, frequency or urgency.    NEUROLOGIC:  No paresthesias, fasciculations, seizures or weakness.    PSYCHIATRIC:  No disorder of thought or mood.      PHYSICAL EXAM:    Constitutional: Well developed and nourished  Eyes:Perrla  ENMT: normal  Neck:supple  Respiratory: + Occasional ronchi bilaterally  Cardiovascular: S1 S2 regular  Gastrointestinal: Soft, Non tender  Extremities: No edema  Vascular:normal  Neurological:Awake, not alert  Musculoskeletal:Normal      MEDICATIONS  (STANDING):  ALBUTerol/ipratropium for Nebulization 3 milliLiter(s) Nebulizer every 6 hours  amiKACIN  IVPB 560 milliGRAM(s) IV Intermittent every 12 hours  amLODIPine   Tablet 2.5 milliGRAM(s) Oral daily  atorvastatin 20 milliGRAM(s) Oral at bedtime  benztropine 2 milliGRAM(s) Oral two times a day  heparin  Injectable 5000 Unit(s) SubCutaneous every 8 hours  insulin glargine Injectable (LANTUS) 12 Unit(s) SubCutaneous at bedtime  insulin lispro (HumaLOG) corrective regimen sliding scale   SubCutaneous every 6 hours  insulin lispro Injectable (HumaLOG) 5 Unit(s) SubCutaneous three times a day with meals  lactobacillus acidophilus 1 Tablet(s) Oral every 8 hours  levETIRAcetam  Solution 1000 milliGRAM(s) Oral two times a day  metoprolol tartrate 100 milliGRAM(s) Oral two times a day  pantoprazole   Suspension 40 milliGRAM(s) Oral daily  polymyxin B IVPB 458831 Unit(s) IV Intermittent every 12 hours  potassium chloride   Powder 40 milliEquivalent(s) Oral once  QUEtiapine 50 milliGRAM(s) Oral every 12 hours  rifampin IVPB      rifampin IVPB 600 milliGRAM(s) IV Intermittent every 24 hours  senna 2 Tablet(s) Oral at bedtime  sodium chloride 0.9%. 1000 milliLiter(s) (75 mL/Hr) IV Continuous <Continuous>  vancomycin    Solution 125 milliGRAM(s) Oral every 6 hours    MEDICATIONS  (PRN):  haloperidol    Injectable 5 milliGRAM(s) IntraMuscular once PRN Agitation  LORazepam   Injectable 1 milliGRAM(s) IV Push every 8 hours PRN for breathrough agitation/anxiety/combative behavior      Allergies    angiotensin converting enzyme inhibitors (Unknown)    Intolerances        LABS:                        8.6    13.0  )-----------( 195      ( 21 Sep 2018 04:46 )             26.7     09-21    138  |  101  |  10  ----------------------------<  148<H>  3.2<L>   |  27  |  1.04    Ca    8.7      21 Sep 2018 04:46  Mg     1.5     09-20    TPro  6.2  /  Alb  2.2<L>  /  TBili  0.9  /  DBili  0.3<H>  /  AST  24  /  ALT  20  /  AlkPhos  86  09-20              CAPILLARY BLOOD GLUCOSE      POCT Blood Glucose.: 328 mg/dL (21 Sep 2018 10:11)  POCT Blood Glucose.: 200 mg/dL (21 Sep 2018 06:41)  POCT Blood Glucose.: 128 mg/dL (20 Sep 2018 23:21)  POCT Blood Glucose.: 215 mg/dL (20 Sep 2018 17:01)  POCT Blood Glucose.: 188 mg/dL (20 Sep 2018 12:10)        RADIOLOGY & ADDITIONAL TESTS:    CXR:  < from: Xray Chest 1 View- PORTABLE-Urgent (09.20.18 @ 13:27) >    Bilateral pleural effusions and pulmonary edema are worsening since the   prior exam. No evidence of pneumothorax.      < end of copied text >    Ct scan chest:    ekg;    echo:

## 2018-09-21 NOTE — DISCHARGE NOTE ADULT - MEDICATION SUMMARY - MEDICATIONS TO CHANGE
I will SWITCH the dose or number of times a day I take the medications listed below when I get home from the hospital:    Lantus 100 units/mL subcutaneous solution  -- 58 unit(s) subcutaneous once a day (at bedtime)

## 2018-09-21 NOTE — PROGRESS NOTE ADULT - PROBLEM SELECTOR PLAN 1
mildly improving leukocytosis  c/w abx  isolation precautions  all medical consultant recs/management appreciated

## 2018-09-21 NOTE — DISCHARGE NOTE ADULT - CARE PLAN
Principal Discharge DX:	Aspiration pneumonia Principal Discharge DX:	Carbapenem-resistant Acinetobacter baumannii infection  Secondary Diagnosis:	Aspiration pneumonia of right lower lobe, unspecified aspiration pneumonia type  Secondary Diagnosis:	Pseudomonas aeruginosa infection  Secondary Diagnosis:	Respiratory acidosis  Secondary Diagnosis:	Uncontrolled diabetes mellitus  Secondary Diagnosis:	ATN (acute tubular necrosis)  Secondary Diagnosis:	Functional quadriplegia Principal Discharge DX:	Carbapenem-resistant Acinetobacter baumannii infection  Goal:	remain stable  Assessment and plan of treatment:	Completed all antibiotics  Call you Health care provider upon arrival home to make a one week follow up appointment.  If you develop fever, chills, malaise, or change in mental status call your Health Care Provider or go to the Emergency Department.  Nutrition is important, eat small frequent meals to help ensure you get adequate calories.  Do not stay in bed all day!  Increase your activity daily as tolerated.  Secondary Diagnosis:	Aspiration pneumonia of right lower lobe, unspecified aspiration pneumonia type  Assessment and plan of treatment:	Pneumonia is a lung infection that can cause a fever, cough, and trouble breathing.  Continue all antibiotics as ordered until complete.  Nutrition is important, eat small frequent meals.  Get lots of rest and drink fluids.  Call your health care provider upon arrival home from hospital and make a follow up appointment for one week.  If your cough worsens, you develop fever greater than 101', you have shaking chills, a fast heartbeat, trouble breathing and/or feel your are breathing much faster than usual, call your healthcare provider.  Make sure you wash your hands frequently.   aspiration precautions  Secondary Diagnosis:	Pseudomonas aeruginosa infection  Assessment and plan of treatment:	improved  completed abx treatment  Secondary Diagnosis:	Respiratory acidosis  Assessment and plan of treatment:	resolved  c/w trach collar  pulmonary toilet.  Bronchial suctioning  and Aspiration  precaution  Secondary Diagnosis:	Uncontrolled diabetes mellitus  Assessment and plan of treatment:	HgA1C this admission.  11.4  Make sure you get your HgA1c checked every three months.  If you take oral diabetes medications, check your blood glucose two times a day.  If you take insulin, check your blood glucose before meals and at bedtime.  It's important not to skip any meals.  Keep a log of your blood glucose results and always take it with you to your doctor appointments.  Keep a list of your current medications including injectables and over the counter medications and bring this medication list with you to all your doctor appointments.  If you have not seen your ophthalmologist this year call for appointment.  Check your feet daily for redness, sores, or openings. Do not self treat. If no improvement in two days call your primary care physician for an appointment.  Low blood sugar (hypoglycemia) is a blood sugar below 70mg/dl. Check your blood sugar if you feel signs/symptoms of hypoglycemia. If your blood sugar is below 70 take 15 grams of carbohydrates (ex 4 oz of apple juice, 3-4 glucose tablets, or 4-6 oz of regular soda) wait 15 minutes and repeat blood sugar to make sure it comes up above 70.  If your blood sugar is above 70 and you are due for a meal, have a meal.  If you are not due for a meal have a snack.  This snack helps keeps your blood sugar at a safe range.  Secondary Diagnosis:	ATN (acute tubular necrosis)  Assessment and plan of treatment:	improving  monitor kidney function  avoid nephrotoxic meds  Secondary Diagnosis:	Functional quadriplegia  Assessment and plan of treatment:	c/w supportive care

## 2018-09-21 NOTE — DISCHARGE NOTE ADULT - HOSPITAL COURSE
3 yo F from [facility], non-verbal, bed bound, S/P Trach/ PEG tube, PMH of HTN, asthma, convulsion disorder, bipolar, DVT legs, DM 2, sent from NH for coffee ground vomiting X3 this morning and cough.  Pt unable to provide historyPt also developed cough with clear sputum.  Sister states pt is combative at baseline, and her current behavior is baseline.  Pt was recently admitted to [facility] in 07/2018 for same reason and treated for aspiration PNA and UTI during last admission.no episode of vomiting in ED  Hb 12 at baseline , less likely UGI Bleeding  started protonix 40mg bid IV, seen by GI, no further bleeding noted. Started empiric treatment for aspiration pna, sputum culture grew with Pseudomonoas CRE and acetinobacter, started on Amikacin/Rifampin/Polymixin, wtih increase in leukocytosis, vancomycin added, now with leukocytosis trending down,afebrile. Hospitalization complicated by poor iv acess, s/p TLC R groin on 9/20/2018. 3 yo F from [facility], non-verbal, bed bound, S/P Trach/ PEG tube, PMH of HTN, asthma, convulsion disorder, bipolar, DVT legs, DM 2, sent from NH for coffee ground vomiting X3 this morning and cough.  Pt unable to provide historyPt also developed cough with clear sputum.  Sister states pt is combative at baseline, and her current behavior is baseline.  [facility] and treated for aspiration PNA and UTI during last admission.no episode of vomiting in ED  Hb 12 at baseline , less likely UGI Bleeding  started protonix 40mg bid IV, seen by GI, no further bleeding noted. Started empiric treatment for aspiration pna, sputum culture grew with Pseudomonoas CRE and acetinobacter, started on Amikacin/Rifampin/Polymixin, wtih increase in leukocytosis, vancomycin added, now with leukocytosis trending down,afebrile. Hospitalization complicated by poor iv acess, s/p TLC R groin on 9/20/2018. Sputum culture grew Psudomonas carbapenem resistant started on Amikacin and Rifampin, later complicated by LAYLA likely ATN, improving with aggressive IVF hydration. patient with uncontrolled DM2 needed insulin adjustment, endocrinology following.   NOT COMPLETE 3 yo F from [facility], non-verbal, bed bound, S/P Trach/ PEG tube, PMH of HTN, asthma, convulsion disorder, bipolar, DVT legs, DM 2, sent from NH for coffee ground vomiting X3 this morning and cough.  Pt unable to provide historyPt also developed cough with clear sputum.  Sister states pt is combative at baseline, and her current behavior is baseline.  Pt was recently admitted to [facility] in 07/2018 for same reason and treated for aspiration PNA and UTI during last admission.no episode of vomiting in ED  Hb 12 at baseline , less likely UGI Bleeding  started protonix 40mg bid IV, seen by GI, no further bleeding noted. Started empiric treatment for aspiration pna, sputum culture grew with Pseudomonoas CRE and acetinobacter, started on Amikacin/Rifampin/Polymixin, wtih increase in leukocytosis, vancomycin added, now with leukocytosis trending down,afebrile. Hospitalization complicated by poor iv acess, s/p TLC R groin on 9/20/2018. Sputum culture grew Psudomonas carbapenem resistant started on Amikacin and Rifampin, later complicated by LAYLA likely ATN, improving with aggressive IVF hydration. patient with uncontrolled DM2 needed insulin adjustment, endocrinology following.    Medically cleared for discharge to NH - continue to monitor kidney function and respiratory status

## 2018-09-21 NOTE — DISCHARGE NOTE ADULT - MEDICATION SUMMARY - MEDICATIONS TO TAKE
I will START or STAY ON the medications listed below when I get home from the hospital:    acetaminophen 160 mg/5 mL oral suspension  -- 20.31 milliliter(s) by mouth every 6 hours, As needed, For Temp greater than 38 C (100.4 F)  -- Indication: For fever    heparin  -- 5000 unit(s) subcutaneous 1 to 3 times a day  -- Indication: For Prophylaxis    levETIRAcetam 100 mg/mL oral solution  -- 10 milliliter(s) by gastrostomy tube 2 times a day  -- Indication: For Seizures    LORazepam 1 mg oral tablet  -- 1 tab(s) by mouth every 6 hours  -- Indication: For Schizophrenia    insulin lispro  -- 10 unit(s) subcutaneous 3 times a day (with meals)  -- Indication: For Diabetes mellitus    insulin glargine  -- 25 unit(s) subcutaneous once a day (at bedtime)  -- Indication: For Diabetes mellitus    nystatin 100,000 units/g topical powder (obsolete)  -- Indication: For tinea     atorvastatin 20 mg oral tablet  -- 1 tab(s) by mouth once a day  -- Indication: For Cholesterol    benztropine 2 mg oral tablet  -- 1 tab(s) by mouth 2 times a day  -- Indication: For eps    QUEtiapine 50 mg oral tablet  -- 1 tab(s) by mouth every 12 hours  -- Indication: For Schizophrenia    metoprolol tartrate 100 mg oral tablet  -- 1 tab(s) by mouth 2 times a day  -- Indication: For Htn    ipratropium-albuterol 0.5 mg-2.5 mg/3 mLinhalation solution  -- 3 milliliter(s) inhaled 4 times a day, As Needed  -- Indication: For Aspiration pneumonia    amLODIPine 2.5 mg oral tablet  -- 1 tab(s) by mouth once a day  -- Indication: For Hypertension    nystatin 100,000 units/g topical powder  -- 1 application on skin every 8 hours  -- Indication: For Skin rash    Senna 8.6 mg oral tablet  -- 1 tab(s) by mouth once a day (at bedtime)  -- Indication: For Constipation    lactobacillus acidophilus oral capsule  -- 1 tab(s) by gastrostomy tube every 8 hours  -- Indication: For Supplement    pantoprazole 40 mg oral granule, delayed release  -- 40  by gastrostomy tube once a day  -- Indication: For Stomach

## 2018-09-21 NOTE — PROGRESS NOTE ADULT - PROBLEM SELECTOR PLAN 2
cxr from yesterday showing worsening b/l pl effusion  IV lasix trial  repeat cxr in am  pulmonary following dr. Oscar appreciated

## 2018-09-21 NOTE — DISCHARGE NOTE ADULT - CARE PROVIDER_API CALL
Houston Kunz (C[place]ELIA), Internal Medicine  [address] [place] [address]  Phone: [phone]  Fax: [phone]

## 2018-09-21 NOTE — DISCHARGE NOTE ADULT - PLAN OF CARE
remain stable Completed all antibiotics  Call you Health care provider upon arrival home to make a one week follow up appointment.  If you develop fever, chills, malaise, or change in mental status call your Health Care Provider or go to the Emergency Department.  Nutrition is important, eat small frequent meals to help ensure you get adequate calories.  Do not stay in bed all day!  Increase your activity daily as tolerated. Pneumonia is a lung infection that can cause a fever, cough, and trouble breathing.  Continue all antibiotics as ordered until complete.  Nutrition is important, eat small frequent meals.  Get lots of rest and drink fluids.  Call your health care provider upon arrival home from hospital and make a follow up appointment for one week.  If your cough worsens, you develop fever greater than 101', you have shaking chills, a fast heartbeat, trouble breathing and/or feel your are breathing much faster than usual, call your healthcare provider.  Make sure you wash your hands frequently.   aspiration precautions improved  completed abx treatment resolved  c/w trach collar  pulmonary toilet.  Bronchial suctioning  and Aspiration  precaution HgA1C this admission.  11.4  Make sure you get your HgA1c checked every three months.  If you take oral diabetes medications, check your blood glucose two times a day.  If you take insulin, check your blood glucose before meals and at bedtime.  It's important not to skip any meals.  Keep a log of your blood glucose results and always take it with you to your doctor appointments.  Keep a list of your current medications including injectables and over the counter medications and bring this medication list with you to all your doctor appointments.  If you have not seen your ophthalmologist this year call for appointment.  Check your feet daily for redness, sores, or openings. Do not self treat. If no improvement in two days call your primary care physician for an appointment.  Low blood sugar (hypoglycemia) is a blood sugar below 70mg/dl. Check your blood sugar if you feel signs/symptoms of hypoglycemia. If your blood sugar is below 70 take 15 grams of carbohydrates (ex 4 oz of apple juice, 3-4 glucose tablets, or 4-6 oz of regular soda) wait 15 minutes and repeat blood sugar to make sure it comes up above 70.  If your blood sugar is above 70 and you are due for a meal, have a meal.  If you are not due for a meal have a snack.  This snack helps keeps your blood sugar at a safe range. improving  monitor kidney function  avoid nephrotoxic meds c/w supportive care

## 2018-09-21 NOTE — PROGRESS NOTE ADULT - SUBJECTIVE AND OBJECTIVE BOX
Patient seen and examined at bedside  No acute events noted overnight  Case discussed with medical team    HPI:  62 yo F from [facility], non-verbal, bed bound, S/P Trach/ PEG tube, PMH of HTN, asthma, convulsion disorder, bipolar, DVT legs, DM 2, sent from NH for coffee ground vomiting X3 this morning and cough.  Pt unable to provide history.  History given by sister (Sharon [phone])who states pt was vomiting coffee ground emesis this morning. Pt had no reported vomiting since in ED.  Pt also developed cough with clear sputum.  Sister states pt is combative at baseline, and her current behavior is baseline.  No reported fever, discomfort, diarrhea, or SOB.  Pt was recently admitted to [facility] in 07/2018 for same reason and treated for aspiration PNA and UTI during last admission.  Pt was supposed to be given IV vanco and zosyn for 7 day by PCP for PNA , which supposed to start from 09/13-09/19. Pt is DNR, MOSLT form in charts. (13 Sep 2018 15:49)      PAST MEDICAL & SURGICAL HISTORY:  No pertinent past medical history  Hypertension  Schizophrenia  Diabetic coma  Diabetes mellitus  No significant past surgical history  S/P percutaneous endoscopic gastrostomy (PEG) tube placement  H/O tracheostomy      angiotensin converting enzyme inhibitors (Unknown)       MEDICATIONS  (STANDING):  ALBUTerol/ipratropium for Nebulization 3 milliLiter(s) Nebulizer every 6 hours  amiKACIN  IVPB 560 milliGRAM(s) IV Intermittent every 12 hours  amLODIPine   Tablet 2.5 milliGRAM(s) Oral daily  atorvastatin 20 milliGRAM(s) Oral at bedtime  benztropine 2 milliGRAM(s) Oral two times a day  heparin  Injectable 5000 Unit(s) SubCutaneous every 8 hours  insulin glargine Injectable (LANTUS) 10 Unit(s) SubCutaneous at bedtime  insulin lispro (HumaLOG) corrective regimen sliding scale   SubCutaneous every 6 hours  insulin lispro Injectable (HumaLOG) 3 Unit(s) SubCutaneous three times a day with meals  lactobacillus acidophilus 1 Tablet(s) Oral every 8 hours  levETIRAcetam  Solution 1000 milliGRAM(s) Oral two times a day  metoprolol tartrate 100 milliGRAM(s) Oral two times a day  pantoprazole   Suspension 40 milliGRAM(s) Oral daily  polymyxin B IVPB [id number] Unit(s) IV Intermittent every 12 hours  QUEtiapine 50 milliGRAM(s) Oral every 12 hours  rifampin IVPB      rifampin IVPB 600 milliGRAM(s) IV Intermittent every 24 hours  senna 2 Tablet(s) Oral at bedtime  sodium chloride 0.9%. 1000 milliLiter(s) (75 mL/Hr) IV Continuous <Continuous>  vancomycin    Solution 125 milliGRAM(s) Oral every 6 hours    MEDICATIONS  (PRN):  haloperidol    Injectable 5 milliGRAM(s) IntraMuscular once PRN Agitation  LORazepam   Injectable 1 milliGRAM(s) IV Push every 8 hours PRN for breathrough agitation/anxiety/combative behavior      REVIEW OF SYSTEMS: limited 2/2 mental status  no new events reported from staff     T(C): 36.4 (09-21-18 @ 05:04), Max: 36.9 (09-20-18 @ 20:09)  HR: 103 (09-21-18 @ 05:04) (75 - 103)  BP: 129/62 (09-21-18 @ 05:04) (128/64 - 145/105)  RR: 18 (09-21-18 @ 05:04) (18 - 20)  SpO2: 98% (09-21-18 @ 05:04) (94% - 100%)    PHYSICAL EXAMINATION:   Constitutional: NAD  HEENT: AT  Neck:  Supple  Respiratory: trach collar intact. Adequate airflow b/l. Not using accessory muscles of respiration.  Cardiovascular:  S1 & S2 intact,  no R/G, 2+ radial pulses b/l  Gastrointestinal: Soft, ND, normoactive b.s., no organomegaly/RT/rigidity  Extremities: WWP  Neurological:  awake, stable neuro status. arousable     Labs and imaging reviewed    LABS:                        8.6    13.0  )-----------( 195      ( 21 Sep 2018 04:46 )             26.7     09-21    138  |  101  |  10  ----------------------------<  148<H>  3.2<L>   |  27  |  1.04    Ca    8.7      21 Sep 2018 04:46  Mg     1.5     09-20    TPro  6.2  /  Alb  2.2<L>  /  TBili  0.9  /  DBili  0.3<H>  /  AST  24  /  ALT  20  /  AlkPhos  86  09-20            CAPILLARY BLOOD GLUCOSE      POCT Blood Glucose.: 200 mg/dL (21 Sep 2018 06:41)  POCT Blood Glucose.: 128 mg/dL (20 Sep 2018 23:21)  POCT Blood Glucose.: 215 mg/dL (20 Sep 2018 17:01)  POCT Blood Glucose.: 188 mg/dL (20 Sep 2018 12:10)  POCT Blood Glucose.: 185 mg/dL (20 Sep 2018 08:06)        LIVER FUNCTIONS - ( 20 Sep 2018 07:12 )  Alb: 2.2 g/dL / Pro: 6.2 g/dL / ALK PHOS: 86 U/L / ALT: 20 U/L DA / AST: 24 U/L / GGT: x               RADIOLOGY & ADDITIONAL STUDIES:

## 2018-09-21 NOTE — PROGRESS NOTE ADULT - ASSESSMENT
A 62 yo Female  with  non-verbal, bed bound, S/P Trach/ PEG tube, [facility]  for evaluation of coffee ground vomiting  and cough. On arrival, she found to have tachycardia, Leukocytosis, and RLL consolidation. She has started on Zosyn and Vancomycin and the ID consult requested to assist with further evaluation and antibiotic management.     # Aspiration pneumonia - RLL consolidation - Pseudomonas and Acinetobacter   # MRSA PCR is negative    Would recommend:    1. Monitor WBC count, is trending down  2. Bronchial suctioning  and Aspiration  precaution   3. Continue Polymixin, Rifampin and amikacin to cover CRE until 9/26/18  4. Frequent repositioning      d/w  Nursing staff    will follow the patient with you

## 2018-09-21 NOTE — PROGRESS NOTE ADULT - PROBLEM SELECTOR PLAN 3
improving overall   c/w seroquel and benztropine  ativan po q6h, ativan ivp prn breakthrough combative/assaultive behavior  avoid haldol and/or QTc prolongation rx 2/2 hx QTc prolongation

## 2018-09-21 NOTE — PROGRESS NOTE ADULT - SUBJECTIVE AND OBJECTIVE BOX
Patient is seen and examined at the bed side, is afebrile. The Leukocytosis continues trending down.        REVIEW OF SYSTEMS: Unable to obtain due to mental status      ALLERGIES : NKDA      Vital Signs Last 24 Hrs  T(C): 36.8 (21 Sep 2018 15:18), Max: 36.9 (20 Sep 2018 20:09)  T(F): 98.2 (21 Sep 2018 15:18), Max: 98.4 (20 Sep 2018 20:09)  HR: 143 (21 Sep 2018 15:18) (75 - 143)  BP: 108/79 (21 Sep 2018 15:18) (108/79 - 129/62)  BP(mean): --  RR: 20 (21 Sep 2018 15:18) (18 - 20)  SpO2: 98% (21 Sep 2018 05:04) (98% - 100%)        PHYSICAL EXAM:  GENERAL: Not in distress  HEENT: Trach in placed  CHEST/LUNG: Air entry B/L  HEART: s1 and s2 present  ABDOMEN: Nontender, and Nondistended  EXTREMITIES:  No pedal  edema  CNS: Awake but not Alert        LABS:                         8.6    13.0  )-----------( 195      ( 21 Sep 2018 04:46 )             26.7                            9.0    16.7  )-----------( 202      ( 20 Sep 2018 13:38 )             28.6                           10.5   20.1  )-----------( 217      ( 19 Sep 2018 07:47 )             33.1                 138  |  101  |  10  ----------------------------<  148<H>  3.2<L>   |  27  |  1.04    Ca    8.7      21 Sep 2018 04:46  Mg     1.5         TPro  6.2  /  Alb  2.2<L>  /  TBili  0.9  /  DBili  0.3<H>  /  AST  24  /  ALT  20  /  AlkPhos  86      132<L>  |  98  |  10  ----------------------------<  188<H>  3.2<L>   |  22  |  1.07    Ca    8.8      20 Sep 2018 07:12  Phos  3.3       Mg     1.5         TPro  6.2  /  Alb  2.2<L>  /  TBili  0.9  /  DBili  0.3<H>  /  AST  24  /  ALT  20  /  AlkPhos  86           Urinalysis Basic - ( 14 Sep 2018 03:56 )    Color: Yellow / Appearance: Clear / S.020 / pH: x  Gluc: x / Ketone: Negative  / Bili: Negative / Urobili: Negative   Blood: x / Protein: 100 / Nitrite: Negative   Leuk Esterase: Negative / RBC: 0-2 /HPF / WBC 0-2 /HPF   Sq Epi: x / Non Sq Epi: Occasional /HPF / Bacteria: Trace /HPF      CAPILLARY BLOOD GLUCOSE      POCT Blood Glucose.: 144 mg/dL (14 Sep 2018 11:07)  POCT Blood Glucose.: 202 mg/dL (14 Sep 2018 08:09)  POCT Blood Glucose.: 277 mg/dL (14 Sep 2018 06:58)  POCT Blood Glucose.: 68 mg/dL (14 Sep 2018 06:03)  POCT Blood Glucose.: 316 mg/dL (14 Sep 2018 02:11)  POCT Blood Glucose.: 405 mg/dL (14 Sep 2018 00:28)  POCT Blood Glucose.: 386 mg/dL (13 Sep 2018 22:26)  POCT Blood Glucose.: 403 mg/dL (13 Sep 2018 19:09)        MEDICATIONS  (STANDING):  ALBUTerol/ipratropium for Nebulization 3 milliLiter(s) Nebulizer every 6 hours  amiKACIN  IVPB 560 milliGRAM(s) IV Intermittent every 12 hours  amLODIPine   Tablet 2.5 milliGRAM(s) Oral daily  atorvastatin 20 milliGRAM(s) Oral at bedtime  benztropine 2 milliGRAM(s) Oral two times a day  chlorhexidine 4% Liquid 1 Application(s) Topical every 12 hours  heparin  Injectable 5000 Unit(s) SubCutaneous every 8 hours  insulin glargine Injectable (LANTUS) 12 Unit(s) SubCutaneous at bedtime  insulin lispro (HumaLOG) corrective regimen sliding scale   SubCutaneous every 6 hours  insulin lispro Injectable (HumaLOG) 5 Unit(s) SubCutaneous three times a day with meals  lactobacillus acidophilus 1 Tablet(s) Oral every 8 hours  levETIRAcetam  Solution 1000 milliGRAM(s) Oral two times a day  metoprolol tartrate 100 milliGRAM(s) Oral two times a day  pantoprazole   Suspension 40 milliGRAM(s) Oral daily  polymyxin B IVPB [id number] Unit(s) IV Intermittent every 12 hours  QUEtiapine 50 milliGRAM(s) Oral every 12 hours  rifampin IVPB      rifampin IVPB 600 milliGRAM(s) IV Intermittent every 24 hours  senna 2 Tablet(s) Oral at bedtime  sodium chloride 0.9%. 1000 milliLiter(s) (75 mL/Hr) IV Continuous <Continuous>  vancomycin    Solution 125 milliGRAM(s) Oral every 6 hours    MEDICATIONS  (PRN):  haloperidol    Injectable 5 milliGRAM(s) IntraMuscular once PRN Agitation  LORazepam   Injectable 1 milliGRAM(s) IV Push every 8 hours PRN for breathrough agitation/anxiety/combative behavior            RADIOLOGY & ADDITIONAL TESTS:    18: Xray Chest 1 View- PORTABLE-Routine (18 @ 08:22) Tracheostomy cannula reidentified in position. No change heart mediastinum. No change in the right basilar opacity with loss of definition of the ipsilateral hemidiaphragm and costophrenic angle likely   representing a combination of pleural fluid and underlying  consolidation/atelectasis.      18: CT Chest No Cont (18 @ 03:41) Impression: Consolidation involving the right lower lobe is of uncertain etiology. It is unclear whether this represents compressive atelectasis secondary to right pleural effusion/elevation of the right hemidiaphragm   and/or represents infection.    18: Xray Chest 1 View-PORTABLE IMMEDIATE (18 @ 13:37) Bilateral pleural effusions and associated atelectasis.        MICROBIOLOGY DATA:    Culture - Sputum . (09.15.18 @ 09:28)    Gram Stain:   Rare polymorphonuclear leukocytes per low power field  Few Squamous epithelial cells per low power field  Moderate Gram Negative Rods per oil power field  Few Gram Positive Rods per oil power field  Few Yeast like cells per oil power field  Rare Gram Negative Coccobacilli per oil power field    -  Amikacin: S <=8    -  Amikacin: S <=8    -  Ampicillin/Sulbactam: R >16/8    -  Aztreonam: S 8    -  Cefepime: I 16    -  Cefepime: I 16    -  Ceftazidime: R >16    -  Ceftazidime: I 16    -  Ciprofloxacin: R >2    -  Ciprofloxacin: R >2    -  Gentamicin: R >8    -  Gentamicin: R >8    -  Imipenem: R >8    -  Imipenem: R    -  Levofloxacin: R >4    -  Levofloxacin: R >4    -  Meropenem: R >8    -  Meropenem: I 8    -  Piperacillin/Tazobactam: R >64    -  Piperacillin/Tazobactam: R    -  Tobramycin: R >8    -  Tobramycin: R >8    -  Trimethoprim/Sulfamethoxazole: R >2/38    Specimen Source: .Sputum Sputum    Culture Results:   Numerous Pseudomonas aeruginosa (Carbapenem Resistant)  Moderate Acinetobacter baumannii/haemolyticus (Carbapenem Resistant)  Complex  Normal Respiratory Moraima present    Organism Identification: Pseudomonas aeruginosa (Carbapenem Resistant)  Acinetobacter baumannii/haemolyticus (Carbapenem Resistant)    Organism: Acinetobacter baumannii/haemolyticus (Carbapenem Resistant)    Organism: Pseudomonas aeruginosa (Carbapenem Resistant)    Organism: Acinetobacter baumannii/haemolyticus (Carbapenem Resistant)    Method Type: ESTIVEN    Method Type: ESTIVEN    Method Type: KB        MRSA/MSSA PCR (18 @ 11:26)    MRSA PCR Result.: Andrew: MRSA/MSSA PCR assay is a qualitative in vitro diagnostic test for the  direct detection and differentiation of methicillin-resistant  Staphylococcus aureus (MRSA) from Staphylococcus aureus (SA). The assay  detects DNA from nasal swabs in patients atrisk for nasal colonization.  It is not intended to diagnose MRSA or SA infections nor guide or monitor  treatment for MRSA/SA infections. A negative result does not preclude  nasal colonization. The assay is FDA-approved and its performance has  been established by [employer], [place] and the [facility], [place], [place].    Staph Aureus PCR Result: NotDetec      Culture - Urine (18 @ 09:47)    Specimen Source: .Urine Catheterized    Culture Results:   10,000 - 49,000 CFU/mL Yeast

## 2018-09-21 NOTE — PROGRESS NOTE ADULT - PROBLEM SELECTOR PLAN 1
Remains with trach and peg  pulmonary toilet  Peg site care  asp precautions. Remains with trach and peg  pulmonary toilet  Peg site care  asp precautions.  Replace lytes

## 2018-09-21 NOTE — DISCHARGE NOTE ADULT - SECONDARY DIAGNOSIS.
Aspiration pneumonia of right lower lobe, unspecified aspiration pneumonia type Pseudomonas aeruginosa infection Respiratory acidosis Uncontrolled diabetes mellitus ATN (acute tubular necrosis) Functional quadriplegia

## 2018-09-21 NOTE — PROGRESS NOTE ADULT - SUBJECTIVE AND OBJECTIVE BOX
Interval Events:  pt in nad    Allergies    angiotensin converting enzyme inhibitors (Unknown)    Intolerances      Endocrine/Metabolic Medications:  atorvastatin 20 milliGRAM(s) Oral at bedtime  insulin glargine Injectable (LANTUS) 10 Unit(s) SubCutaneous at bedtime  insulin lispro (HumaLOG) corrective regimen sliding scale   SubCutaneous every 6 hours  insulin lispro Injectable (HumaLOG) 3 Unit(s) SubCutaneous three times a day with meals      Vital Signs Last 24 Hrs  T(C): 36.4 (21 Sep 2018 05:04), Max: 36.9 (20 Sep 2018 20:09)  T(F): 97.6 (21 Sep 2018 05:04), Max: 98.4 (20 Sep 2018 20:09)  HR: 103 (21 Sep 2018 05:04) (75 - 103)  BP: 129/62 (21 Sep 2018 05:04) (128/64 - 145/105)  BP(mean): --  RR: 18 (21 Sep 2018 05:04) (18 - 20)  SpO2: 98% (21 Sep 2018 05:04) (94% - 100%)      PHYSICAL EXAM  All physical exam findings normal, except those marked:  General:	Alert, active, cooperative, NAD, well hydrated  .		[] Abnormal:  Neck		Normal: supple, no cervical adenopathy, no palpable thyroid  .		[] Abnormal:  Cardiovascular	Normal: regular rate, normal S1, S2, no murmurs  .		[] Abnormal:  Respiratory	Normal: no chest wall deformity, normal respiratory pattern, CTA B/L  .		[] Abnormal:  Abdominal	Normal: soft, ND, NT, bowel sounds present, no masses, no organomegaly  .		[] Abnormal:  		Normal normal genitalia, testes descended, circumcised/uncircumcised  .		Bob stage:			Breast bob:  .		Menstrual history:  .		[] Abnormal:  Extremities	Normal: FROM x4  .		[] Abnormal:  Skin		Normal: intact and not indurated, no rash, no acanthosis nigricans  .		[] Abnormal:  Neurologic	Normal: grossly intact  .		[] Abnormal:    LABS                        8.6    13.0  )-----------( 195      ( 21 Sep 2018 04:46 )             26.7                               138    |  101    |  10                  Calcium: 8.7   / iCa: x      (09-21 @ 04:46)    ----------------------------<  148       Magnesium: x                                3.2     |  27     |  1.04             Phosphorous: x          CAPILLARY BLOOD GLUCOSE      POCT Blood Glucose.: 328 mg/dL (21 Sep 2018 10:11)  POCT Blood Glucose.: 200 mg/dL (21 Sep 2018 06:41)  POCT Blood Glucose.: 128 mg/dL (20 Sep 2018 23:21)  POCT Blood Glucose.: 215 mg/dL (20 Sep 2018 17:01)  POCT Blood Glucose.: 188 mg/dL (20 Sep 2018 12:10)        Assesment/plan    Dm- now hyperglycemic  on higher rates of gt feeds  change humalog to 5 tid  lantus to 12  fsg ac and hs

## 2018-09-21 NOTE — DISCHARGE NOTE ADULT - MEDICATION SUMMARY - MEDICATIONS TO STOP TAKING
I will STOP taking the medications listed below when I get home from the hospital:    insulin lispro 100 units/mL subcutaneous solution  --  subcutaneous 3 times a day (before meals); 1 Unit(s) if Glucose 151 - 200  2 Unit(s) if Glucose 201 - 250  3 Unit(s) if Glucose 251 - 300  4 Unit(s) if Glucose 301 - 350  5 Unit(s) if Glucose 351 - 400  6 Unit(s) if Glucose GREATER THAN 400    vancomycin 1 g intravenous injection  -- 1 gram(s) intravenous once a day for 7 days    Zosyn 3 g-0.375 g/50 mL intravenous solution  -- 3.375 gram(s) intravenous every 8 hours for 7 days

## 2018-09-21 NOTE — PROGRESS NOTE ADULT - SUBJECTIVE AND OBJECTIVE BOX
NP Note discussed with  Primary Attending dr Saravia    Patient is a 63y old  Female who presents with a chief complaint of vomiting (21 Sep 2018 11:21)3 yo F from [facility], non-verbal, bed bound, S/P Trach/ PEG tube, PMH of HTN, asthma, convulsion disorder, bipolar, DVT legs, DM 2, sent from NH for coffee ground vomiting X3 this morning and cough.  Pt unable to provide historyPt also developed cough with clear sputum.  Sister states pt is combative at baseline, and her current behavior is baseline.  Pt was recently admitted to [facility] in 07/2018 for same reason and treated for aspiration PNA and UTI during last admission.no episode of vomiting in ED  Hb 12 at baseline , less likely UGI Bleeding  started protonix 40mg bid IV, seen by GI, no further bleeding noted. Started empiric treatment for aspiration pna, sputum culture grew with Pseudomonoas CRE and acetinobacter, started on Amikacin/Rifampin/Polymixin, wtih increase in leukocytosis, vancomycin added, now with leukocytosis trending down,afebrile. Hospitalization complicated by poor iv acess, s/p TLC R groin on 9/20/2018.      INTERVAL HPI/OVERNIGHT EVENTS: no o/n events.     MEDICATIONS  (STANDING):  ALBUTerol/ipratropium for Nebulization 3 milliLiter(s) Nebulizer every 6 hours  amiKACIN  IVPB 560 milliGRAM(s) IV Intermittent every 12 hours  amLODIPine   Tablet 2.5 milliGRAM(s) Oral daily  atorvastatin 20 milliGRAM(s) Oral at bedtime  benztropine 2 milliGRAM(s) Oral two times a day  heparin  Injectable 5000 Unit(s) SubCutaneous every 8 hours  insulin glargine Injectable (LANTUS) 12 Unit(s) SubCutaneous at bedtime  insulin lispro (HumaLOG) corrective regimen sliding scale   SubCutaneous every 6 hours  insulin lispro Injectable (HumaLOG) 5 Unit(s) SubCutaneous three times a day with meals  lactobacillus acidophilus 1 Tablet(s) Oral every 8 hours  levETIRAcetam  Solution 1000 milliGRAM(s) Oral two times a day  metoprolol tartrate 100 milliGRAM(s) Oral two times a day  pantoprazole   Suspension 40 milliGRAM(s) Oral daily  polymyxin B IVPB 136229 Unit(s) IV Intermittent every 12 hours  QUEtiapine 50 milliGRAM(s) Oral every 12 hours  rifampin IVPB      rifampin IVPB 600 milliGRAM(s) IV Intermittent every 24 hours  senna 2 Tablet(s) Oral at bedtime  sodium chloride 0.9%. 1000 milliLiter(s) (75 mL/Hr) IV Continuous <Continuous>  vancomycin    Solution 125 milliGRAM(s) Oral every 6 hours    MEDICATIONS  (PRN):  haloperidol    Injectable 5 milliGRAM(s) IntraMuscular once PRN Agitation  LORazepam   Injectable 1 milliGRAM(s) IV Push every 8 hours PRN for breathrough agitation/anxiety/combative behavior      __________________________________________________  REVIEW OF SYSTEMS:    CONSTITUTIONAL: No fever,   unable to obtain ROS pt aphasic    Vital Signs Last 24 Hrs  T(C): 36.4 (21 Sep 2018 05:04), Max: 36.9 (20 Sep 2018 20:09)  T(F): 97.6 (21 Sep 2018 05:04), Max: 98.4 (20 Sep 2018 20:09)  HR: 103 (21 Sep 2018 05:04) (75 - 103)  BP: 129/62 (21 Sep 2018 05:04) (128/64 - 145/105)  BP(mean): --  RR: 18 (21 Sep 2018 05:04) (18 - 20)  SpO2: 98% (21 Sep 2018 05:04) (94% - 100%)    ________________________________________________  PHYSICAL EXAM:  GENERAL: NAD, contracted chronically ill appearing female on TC  HEENT: Normocephalic;  conjunctivae and sclerae clear; moist mucous membranes;   NECK : supple  CHEST/LUNG: clear anteriorly  HEART: S1 S2  regular; no murmurs, gallops or rubs  ABDOMEN: Soft, Nontender, Nondistended; Bowel sounds present +PEG  EXTREMITIES: contracted, no cyanosis; no edema; no calf tenderness  SKIN: warm and dry; redness under breasts  NERVOUS SYSTEM:  Awake and alert; aphasic, continues with EPS like movements of BUE and BLE, lip smacking    _________________________________________________  LABS:                        8.6    13.0  )-----------( 195      ( 21 Sep 2018 04:46 )             26.7     09-21    138  |  101  |  10  ----------------------------<  148<H>  3.2<L>   |  27  |  1.04    Ca    8.7      21 Sep 2018 04:46  Mg     1.5     09-20    TPro  6.2  /  Alb  2.2<L>  /  TBili  0.9  /  DBili  0.3<H>  /  AST  24  /  ALT  20  /  AlkPhos  86  09-20        CAPILLARY BLOOD GLUCOSE  Culture - Sputum . (09.20.18 @ 00:28)    Gram Stain:   Few polymorphonuclear leukocytes per low power field  Few Squamous epithelial cells per low power field  Moderate Gram Negative Rods seen per oil power field  Moderate Yeast like cells seen per oil power field    Specimen Source: .Sputum Sputum    Culture Results:   Moderate Pseudomonas aeruginosa  Moderate Serratia marcescens        POCT Blood Glucose.: 260 mg/dL (21 Sep 2018 12:11)  POCT Blood Glucose.: 328 mg/dL (21 Sep 2018 10:11)  POCT Blood Glucose.: 200 mg/dL (21 Sep 2018 06:41)  POCT Blood Glucose.: 128 mg/dL (20 Sep 2018 23:21)  POCT Blood Glucose.: 215 mg/dL (20 Sep 2018 17:01)        RADIOLOGY & ADDITIONAL TESTS:    Imaging  Reviewed:  YEs  Consultant(s) Notes Reviewed:   YES     < from: Xray Chest 1 View- PORTABLE-Urgent (09.20.18 @ 13:27) >  IMPRESSION:     Bilateral pleural effusions and pulmonary edema are worsening since the   prior exam. No evidence of pneumothorax.

## 2018-09-22 LAB
ANION GAP SERPL CALC-SCNC: 8 MMOL/L — SIGNIFICANT CHANGE UP (ref 5–17)
BUN SERPL-MCNC: 8 MG/DL — SIGNIFICANT CHANGE UP (ref 7–18)
CALCIUM SERPL-MCNC: 8.5 MG/DL — SIGNIFICANT CHANGE UP (ref 8.4–10.5)
CHLORIDE SERPL-SCNC: 98 MMOL/L — SIGNIFICANT CHANGE UP (ref 96–108)
CO2 SERPL-SCNC: 28 MMOL/L — SIGNIFICANT CHANGE UP (ref 22–31)
CREAT SERPL-MCNC: 1.27 MG/DL — SIGNIFICANT CHANGE UP (ref 0.5–1.3)
GLUCOSE SERPL-MCNC: 206 MG/DL — HIGH (ref 70–99)
HCT VFR BLD CALC: 30.9 % — LOW (ref 34.5–45)
HGB BLD-MCNC: 9.5 G/DL — LOW (ref 11.5–15.5)
MAGNESIUM SERPL-MCNC: 1.3 MG/DL — LOW (ref 1.6–2.6)
MCHC RBC-ENTMCNC: 24.6 PG — LOW (ref 27–34)
MCHC RBC-ENTMCNC: 30.6 GM/DL — LOW (ref 32–36)
MCV RBC AUTO: 80.6 FL — SIGNIFICANT CHANGE UP (ref 80–100)
PHOSPHATE SERPL-MCNC: 3.8 MG/DL — SIGNIFICANT CHANGE UP (ref 2.5–4.5)
PLATELET # BLD AUTO: 217 K/UL — SIGNIFICANT CHANGE UP (ref 150–400)
POTASSIUM SERPL-MCNC: 3.3 MMOL/L — LOW (ref 3.5–5.3)
POTASSIUM SERPL-SCNC: 3.3 MMOL/L — LOW (ref 3.5–5.3)
RBC # BLD: 3.84 M/UL — SIGNIFICANT CHANGE UP (ref 3.8–5.2)
RBC # FLD: 13 % — SIGNIFICANT CHANGE UP (ref 10.3–14.5)
SODIUM SERPL-SCNC: 134 MMOL/L — LOW (ref 135–145)
WBC # BLD: 9.4 K/UL — SIGNIFICANT CHANGE UP (ref 3.8–10.5)
WBC # FLD AUTO: 9.4 K/UL — SIGNIFICANT CHANGE UP (ref 3.8–10.5)

## 2018-09-22 PROCEDURE — 71045 X-RAY EXAM CHEST 1 VIEW: CPT | Mod: 26

## 2018-09-22 RX ORDER — POTASSIUM CHLORIDE 20 MEQ
40 PACKET (EA) ORAL ONCE
Qty: 0 | Refills: 0 | Status: COMPLETED | OUTPATIENT
Start: 2018-09-22 | End: 2018-09-22

## 2018-09-22 RX ORDER — MAGNESIUM SULFATE 500 MG/ML
1 VIAL (ML) INJECTION ONCE
Qty: 0 | Refills: 0 | Status: COMPLETED | OUTPATIENT
Start: 2018-09-22 | End: 2018-09-22

## 2018-09-22 RX ORDER — POTASSIUM CHLORIDE 20 MEQ
40 PACKET (EA) ORAL ONCE
Qty: 0 | Refills: 0 | Status: DISCONTINUED | OUTPATIENT
Start: 2018-09-22 | End: 2018-09-22

## 2018-09-22 RX ADMIN — Medication 5 UNIT(S): at 08:48

## 2018-09-22 RX ADMIN — Medication 5 UNIT(S): at 18:27

## 2018-09-22 RX ADMIN — LEVETIRACETAM 1000 MILLIGRAM(S): 250 TABLET, FILM COATED ORAL at 17:44

## 2018-09-22 RX ADMIN — Medication 100 MILLIGRAM(S): at 05:24

## 2018-09-22 RX ADMIN — Medication 3 MILLILITER(S): at 14:48

## 2018-09-22 RX ADMIN — HEPARIN SODIUM 5000 UNIT(S): 5000 INJECTION INTRAVENOUS; SUBCUTANEOUS at 21:08

## 2018-09-22 RX ADMIN — AMIKACIN SULFATE 102.24 MILLIGRAM(S): 250 INJECTION, SOLUTION INTRAMUSCULAR; INTRAVENOUS at 08:40

## 2018-09-22 RX ADMIN — Medication 40 MILLIEQUIVALENT(S): at 14:14

## 2018-09-22 RX ADMIN — INSULIN GLARGINE 12 UNIT(S): 100 INJECTION, SOLUTION SUBCUTANEOUS at 21:11

## 2018-09-22 RX ADMIN — Medication 40 MILLIGRAM(S): at 05:24

## 2018-09-22 RX ADMIN — PANTOPRAZOLE SODIUM 40 MILLIGRAM(S): 20 TABLET, DELAYED RELEASE ORAL at 11:48

## 2018-09-22 RX ADMIN — Medication 2 MILLIGRAM(S): at 05:24

## 2018-09-22 RX ADMIN — POLYMYXIN B SULFATE 500 UNIT(S): 500000 INJECTION, POWDER, LYOPHILIZED, FOR SOLUTION INTRAMUSCULAR; INTRATHECAL; INTRAVENOUS; OPHTHALMIC at 06:27

## 2018-09-22 RX ADMIN — INSULIN GLARGINE 12 UNIT(S): 100 INJECTION, SOLUTION SUBCUTANEOUS at 00:01

## 2018-09-22 RX ADMIN — Medication 5 UNIT(S): at 11:50

## 2018-09-22 RX ADMIN — SENNA PLUS 2 TABLET(S): 8.6 TABLET ORAL at 21:08

## 2018-09-22 RX ADMIN — HEPARIN SODIUM 5000 UNIT(S): 5000 INJECTION INTRAVENOUS; SUBCUTANEOUS at 14:22

## 2018-09-22 RX ADMIN — Medication 1: at 18:28

## 2018-09-22 RX ADMIN — SODIUM CHLORIDE 75 MILLILITER(S): 9 INJECTION INTRAMUSCULAR; INTRAVENOUS; SUBCUTANEOUS at 08:42

## 2018-09-22 RX ADMIN — Medication 3 MILLILITER(S): at 09:34

## 2018-09-22 RX ADMIN — QUETIAPINE FUMARATE 50 MILLIGRAM(S): 200 TABLET, FILM COATED ORAL at 17:45

## 2018-09-22 RX ADMIN — CHLORHEXIDINE GLUCONATE 1 APPLICATION(S): 213 SOLUTION TOPICAL at 17:44

## 2018-09-22 RX ADMIN — Medication 1 TABLET(S): at 14:17

## 2018-09-22 RX ADMIN — Medication 100 GRAM(S): at 11:49

## 2018-09-22 RX ADMIN — HEPARIN SODIUM 5000 UNIT(S): 5000 INJECTION INTRAVENOUS; SUBCUTANEOUS at 05:24

## 2018-09-22 RX ADMIN — Medication 100 GRAM(S): at 14:22

## 2018-09-22 RX ADMIN — Medication 100 MILLIGRAM(S): at 17:45

## 2018-09-22 RX ADMIN — Medication 3 MILLILITER(S): at 02:56

## 2018-09-22 RX ADMIN — Medication 2 MILLIGRAM(S): at 17:45

## 2018-09-22 RX ADMIN — Medication 1 MILLIGRAM(S): at 04:19

## 2018-09-22 RX ADMIN — ATORVASTATIN CALCIUM 20 MILLIGRAM(S): 80 TABLET, FILM COATED ORAL at 21:08

## 2018-09-22 RX ADMIN — CHLORHEXIDINE GLUCONATE 1 APPLICATION(S): 213 SOLUTION TOPICAL at 05:24

## 2018-09-22 RX ADMIN — AMLODIPINE BESYLATE 2.5 MILLIGRAM(S): 2.5 TABLET ORAL at 05:24

## 2018-09-22 RX ADMIN — QUETIAPINE FUMARATE 50 MILLIGRAM(S): 200 TABLET, FILM COATED ORAL at 05:24

## 2018-09-22 RX ADMIN — Medication 1 MILLIGRAM(S): at 20:03

## 2018-09-22 RX ADMIN — Medication 1 TABLET(S): at 05:24

## 2018-09-22 RX ADMIN — Medication 3 MILLILITER(S): at 20:09

## 2018-09-22 RX ADMIN — LEVETIRACETAM 1000 MILLIGRAM(S): 250 TABLET, FILM COATED ORAL at 05:24

## 2018-09-22 RX ADMIN — Medication 1 TABLET(S): at 21:08

## 2018-09-22 NOTE — PROGRESS NOTE ADULT - ASSESSMENT
A 62 yo Female  with  non-verbal, bed bound, S/P Trach/ PEG tube, sent in to the ER from [facility]  for evaluation of coffee ground vomiting  and cough. On arrival, she found to have tachycardia, Leukocytosis, and RLL consolidation. She has started on Zosyn and Vancomycin and the ID consult requested to assist with further evaluation and antibiotic management.     # Aspiration pneumonia - RLL consolidation - Pseudomonas and Acinetobacter   # MRSA PCR is negative    Would recommend:    1. aspiration precaution  2. Bronchial suctioning   3. Continue Polymixin, Rifampin and amikacin to cover CRE until 9/26/18  4. Frequent repositioning      d/w  Nursing staff    will follow the patient with you

## 2018-09-22 NOTE — PROGRESS NOTE ADULT - SUBJECTIVE AND OBJECTIVE BOX
Patient is seen and examined at the bed side, is afebrile. The WBC count trended down to normal.         REVIEW OF SYSTEMS: Unable to obtain due to mental status      ALLERGIES : NKDA      Vital Signs Last 24 Hrs  T(C): 36.2 (22 Sep 2018 14:33), Max: 37.2 (21 Sep 2018 21:28)  T(F): 97.2 (22 Sep 2018 14:33), Max: 98.9 (21 Sep 2018 21:28)  HR: 89 (22 Sep 2018 14:) (89 - 91)  BP: 154/122 (22 Sep 2018 14:33) (125/62 - 154/122)  BP(mean): --  RR: 18 (22 Sep 2018 14:33) (18 - 18)  SpO2: 80% (22 Sep 2018 14:) (80% - 98%)      PHYSICAL EXAM:  GENERAL: Not in distress  HEENT: Trach in placed  CHEST/LUNG: Air entry B/L  HEART: s1 and s2 present  ABDOMEN: Nontender, and Nondistended  EXTREMITIES:  No pedal  edema  CNS: Awake but not Alert        LABS:                                    9.5    9.4   )-----------( 217      ( 22 Sep 2018 07:28 )             30.9                8.6    13.0  )-----------( 195      ( 21 Sep 2018 04:46 )             26.                          10.5   20.1  )-----------( 217      ( 19 Sep 2018 07:47 )             33.1             09-    134<L>  |  98  |  8   ----------------------------<  206<H>  3.3<L>   |  28  |  1.27    Ca    8.5      22 Sep 2018 07:28  Phos  3.8     09-22  Mg     1.3     -22      09-21    138  |  101  |  10  ----------------------------<  148<H>  3.2<L>   |  27  |  1.04    Ca    8.7      21 Sep 2018 04:46  Mg     1.5     09-20    TPro  6.2  /  Alb  2.2<L>  /  TBili  0.9  /  DBili  0.3<H>  /  AST  24  /  ALT  20  /  AlkPhos  86  09-20         Urinalysis Basic - ( 14 Sep 2018 03:56 )    Color: Yellow / Appearance: Clear / S.020 / pH: x  Gluc: x / Ketone: Negative  / Bili: Negative / Urobili: Negative   Blood: x / Protein: 100 / Nitrite: Negative   Leuk Esterase: Negative / RBC: 0-2 /HPF / WBC 0-2 /HPF   Sq Epi: x / Non Sq Epi: Occasional /HPF / Bacteria: Trace /HPF      CAPILLARY BLOOD GLUCOSE      POCT Blood Glucose.: 144 mg/dL (14 Sep 2018 11:07)  POCT Blood Glucose.: 202 mg/dL (14 Sep 2018 08:09)  POCT Blood Glucose.: 277 mg/dL (14 Sep 2018 06:58)  POCT Blood Glucose.: 68 mg/dL (14 Sep 2018 06:03)  POCT Blood Glucose.: 316 mg/dL (14 Sep 2018 02:11)  POCT Blood Glucose.: 405 mg/dL (14 Sep 2018 00:28)  POCT Blood Glucose.: 386 mg/dL (13 Sep 2018 22:26)  POCT Blood Glucose.: 403 mg/dL (13 Sep 2018 19:09)      MEDICATIONS  (STANDING):  ALBUTerol/ipratropium for Nebulization 3 milliLiter(s) Nebulizer every 6 hours  amiKACIN  IVPB 560 milliGRAM(s) IV Intermittent every 12 hours  amLODIPine   Tablet 2.5 milliGRAM(s) Oral daily  atorvastatin 20 milliGRAM(s) Oral at bedtime  benztropine 2 milliGRAM(s) Oral two times a day  chlorhexidine 4% Liquid 1 Application(s) Topical every 12 hours  furosemide   Injectable 40 milliGRAM(s) IV Push daily  heparin  Injectable 5000 Unit(s) SubCutaneous every 8 hours  insulin glargine Injectable (LANTUS) 12 Unit(s) SubCutaneous at bedtime  insulin lispro (HumaLOG) corrective regimen sliding scale   SubCutaneous every 6 hours  insulin lispro Injectable (HumaLOG) 5 Unit(s) SubCutaneous three times a day with meals  lactobacillus acidophilus 1 Tablet(s) Oral every 8 hours  levETIRAcetam  Solution 1000 milliGRAM(s) Oral two times a day  metoprolol tartrate 100 milliGRAM(s) Oral two times a day  pantoprazole   Suspension 40 milliGRAM(s) Oral daily  polymyxin B IVPB [id number] Unit(s) IV Intermittent every 12 hours  QUEtiapine 50 milliGRAM(s) Oral every 12 hours  rifampin IVPB      rifampin IVPB 600 milliGRAM(s) IV Intermittent every 24 hours  senna 2 Tablet(s) Oral at bedtime    MEDICATIONS  (PRN):  haloperidol    Injectable 5 milliGRAM(s) IntraMuscular once PRN Agitation  LORazepam   Injectable 1 milliGRAM(s) IV Push every 8 hours PRN for breathrough agitation/anxiety/combative behavior          RADIOLOGY & ADDITIONAL TESTS:    18: Xray Chest 1 View- PORTABLE-Routine (18 @ 08:22) Tracheostomy cannula reidentified in position. No change heart mediastinum. No change in the right basilar opacity with loss of definition of the ipsilateral hemidiaphragm and costophrenic angle likely   representing a combination of pleural fluid and underlying  consolidation/atelectasis.      18: CT Chest No Cont (18 @ 03:41) Impression: Consolidation involving the right lower lobe is of uncertain etiology. It is unclear whether this represents compressive atelectasis secondary to right pleural effusion/elevation of the right hemidiaphragm  and/or represents infection.    18: Xray Chest 1 View-PORTABLE IMMEDIATE (18 @ 13:37) Bilateral pleural effusions and associated atelectasis.        MICROBIOLOGY DATA:    Culture - Blood (18 @ 17:33)    Specimen Source: .Blood Blood-Peripheral    Culture Results:   No growth to date.      Culture - Blood (18 @ 13:21)    Specimen Source: .Blood Blood-Peripheral    Culture Results:   No growth to date.        Culture - Sputum . (09.15.18 @ 09:28)    Gram Stain:   Rare polymorphonuclear leukocytes per low power field  Few Squamous epithelial cells per low power field  Moderate Gram Negative Rods per oil power field  Few Gram Positive Rods per oil power field  Few Yeast like cells per oil power field  Rare Gram Negative Coccobacilli per oil power field    -  Amikacin: S <=8    -  Amikacin: S <=8    -  Ampicillin/Sulbactam: R >16/8    -  Aztreonam: S 8    -  Cefepime: I 16    -  Cefepime: I 16    -  Ceftazidime: R >16    -  Ceftazidime: I 16    -  Ciprofloxacin: R >2    -  Ciprofloxacin: R >2    -  Gentamicin: R >8    -  Gentamicin: R >8    -  Imipenem: R >8    -  Imipenem: R    -  Levofloxacin: R >4    -  Levofloxacin: R >4    -  Meropenem: R >8    -  Meropenem: I 8    -  Piperacillin/Tazobactam: R >64    -  Piperacillin/Tazobactam: R    -  Tobramycin: R >8    -  Tobramycin: R >8    -  Trimethoprim/Sulfamethoxazole: R >2/38    Specimen Source: .Sputum Sputum    Culture Results:   Numerous Pseudomonas aeruginosa (Carbapenem Resistant)  Moderate Acinetobacter baumannii/haemolyticus (Carbapenem Resistant)  Complex  Normal Respiratory Moraima present    Organism Identification: Pseudomonas aeruginosa (Carbapenem Resistant)  Acinetobacter baumannii/haemolyticus (Carbapenem Resistant)    Organism: Acinetobacter baumannii/haemolyticus (Carbapenem Resistant)    Organism: Pseudomonas aeruginosa (Carbapenem Resistant)    Organism: Acinetobacter baumannii/haemolyticus (Carbapenem Resistant)    Method Type: ESTIVEN    Method Type: ESTIVEN    Method Type: KB        MRSA/MSSA PCR (18 @ 11:26)    MRSA PCR Result.: Y[place]c: MRSA/MSSA PCR assay is a qualitative in vitro diagnostic test for the  direct detection and differentiation of methicillin-resistant  Staphylococcus aureus (MRSA) from Staphylococcus aureus (SA). The assay  detects DNA from nasal swabs in patients atrisk for nasal colonization.  It is not intended to diagnose MRSA or SA infections nor guide or monitor  treatment for MRSA/SA infections. A negative result does not preclude  nasal colonization. The assay is FDA-approved and its performance has  been established by [employer], [place] and the [facility], [place], [place].    Staph Aureus PCR Result: NotDetec      Culture - Urine (18 @ 09:47)    Specimen Source: .Urine Catheterized    Culture Results:   10,000 - 49,000 CFU/mL Yeast

## 2018-09-22 NOTE — PROVIDER CONTACT NOTE (CRITICAL VALUE NOTIFICATION) - PERSON GIVING RESULT:
Sputum culture 9/19 moderate psuedomonas acidovornas that is carbopenum resistant, moderate serratia

## 2018-09-22 NOTE — PROGRESS NOTE ADULT - PROBLEM SELECTOR PLAN 1
s/p lasix  f/u cxr  electrolyte optimization  case management for d/c planning  all medical consultants and medical staff care appreciated

## 2018-09-22 NOTE — PROGRESS NOTE ADULT - SUBJECTIVE AND OBJECTIVE BOX
Patient seen and examined at bedside  No acute events noted overnight  Case discussed with medical team    HPI:  62 yo F from [facility], non-verbal, bed bound, S/P Trach/ PEG tube, PMH of HTN, asthma, convulsion disorder, bipolar, DVT legs, DM 2, sent from NH for coffee ground vomiting X3 this morning and cough.  Pt unable to provide history.  History given by sister (Sharon [phone])who states pt was vomiting coffee ground emesis this morning. Pt had no reported vomiting since in ED.  Pt also developed cough with clear sputum.  Sister states pt is combative at baseline, and her current behavior is baseline.  No reported fever, discomfort, diarrhea, or SOB.  Pt was recently admitted to [facility] in 07/2018 for same reason and treated for aspiration PNA and UTI during last admission.  Pt was supposed to be given IV vanco and zosyn for 7 day by PCP for PNA , which supposed to start from 09/13-09/19. Pt is DNR, MOSLT form in charts. (13 Sep 2018 15:49)      PAST MEDICAL & SURGICAL HISTORY:  No pertinent past medical history  Hypertension  Schizophrenia  Diabetic coma  Diabetes mellitus  No significant past surgical history  S/P percutaneous endoscopic gastrostomy (PEG) tube placement  H/O tracheostomy      angiotensin converting enzyme inhibitors (Unknown)       MEDICATIONS  (STANDING):  ALBUTerol/ipratropium for Nebulization 3 milliLiter(s) Nebulizer every 6 hours  amiKACIN  IVPB 560 milliGRAM(s) IV Intermittent every 12 hours  amLODIPine   Tablet 2.5 milliGRAM(s) Oral daily  atorvastatin 20 milliGRAM(s) Oral at bedtime  benztropine 2 milliGRAM(s) Oral two times a day  chlorhexidine 4% Liquid 1 Application(s) Topical every 12 hours  furosemide   Injectable 40 milliGRAM(s) IV Push daily  heparin  Injectable 5000 Unit(s) SubCutaneous every 8 hours  insulin glargine Injectable (LANTUS) 12 Unit(s) SubCutaneous at bedtime  insulin lispro (HumaLOG) corrective regimen sliding scale   SubCutaneous every 6 hours  insulin lispro Injectable (HumaLOG) 5 Unit(s) SubCutaneous three times a day with meals  lactobacillus acidophilus 1 Tablet(s) Oral every 8 hours  levETIRAcetam  Solution 1000 milliGRAM(s) Oral two times a day  magnesium sulfate  IVPB 1 Gram(s) IV Intermittent once  magnesium sulfate  IVPB 1 Gram(s) IV Intermittent once  metoprolol tartrate 100 milliGRAM(s) Oral two times a day  pantoprazole   Suspension 40 milliGRAM(s) Oral daily  polymyxin B IVPB [id number] Unit(s) IV Intermittent every 12 hours  potassium chloride   Powder 40 milliEquivalent(s) Enteral Tube once  QUEtiapine 50 milliGRAM(s) Oral every 12 hours  rifampin IVPB      rifampin IVPB 600 milliGRAM(s) IV Intermittent every 24 hours  senna 2 Tablet(s) Oral at bedtime  sodium chloride 0.9%. 1000 milliLiter(s) (75 mL/Hr) IV Continuous <Continuous>    MEDICATIONS  (PRN):  haloperidol    Injectable 5 milliGRAM(s) IntraMuscular once PRN Agitation  LORazepam   Injectable 1 milliGRAM(s) IV Push every 8 hours PRN for breathrough agitation/anxiety/combative behavior      REVIEW OF SYSTEMS:limited from pt 2/2 mental status    T(C): 36.7 (09-22-18 @ 05:24), Max: 37.2 (09-21-18 @ 21:28)  HR: 91 (09-22-18 @ 05:24) (90 - 103)  BP: 151/81 (09-22-18 @ 05:24) (108/79 - 151/81)  RR: 18 (09-22-18 @ 05:24) (18 - 20)  SpO2: 97% (09-22-18 @ 05:24) (97% - 98%)    PHYSICAL EXAMINATION:   Constitutional: NAD  HEENT: NC, AT  Neck:  Supple  Respiratory: basilar rales, no wheezing.  Adequate airflow b/l. Not using accessory muscles of respiration.  Cardiovascular:  S1 & S2 intact, no R/G, 2+ radial pulses b/l  Gastrointestinal: Soft, ND, normoactive b.s., no organomegaly/RT/rigidity  Extremities: WWP  Neurological:  stable, awake.      Labs and imaging reviewed    LABS:                        9.5    9.4   )-----------( 217      ( 22 Sep 2018 07:28 )             30.9     09-22    134<L>  |  98  |  8   ----------------------------<  206<H>  3.3<L>   |  28  |  1.27    Ca    8.5      22 Sep 2018 07:28  Phos  3.8     09-22  Mg     1.3     09-22              CAPILLARY BLOOD GLUCOSE      POCT Blood Glucose.: 318 mg/dL (22 Sep 2018 08:47)  POCT Blood Glucose.: 145 mg/dL (22 Sep 2018 06:00)  POCT Blood Glucose.: 137 mg/dL (21 Sep 2018 23:40)  POCT Blood Glucose.: 136 mg/dL (21 Sep 2018 17:01)  POCT Blood Glucose.: 260 mg/dL (21 Sep 2018 12:11)  POCT Blood Glucose.: 328 mg/dL (21 Sep 2018 10:11)              RADIOLOGY & ADDITIONAL STUDIES:

## 2018-09-22 NOTE — PROGRESS NOTE ADULT - SUBJECTIVE AND OBJECTIVE BOX
Patient is a 63y old  Female who presents with a chief complaint of vomiting (22 Sep 2018 09:42)    Awake, not alert, lying in bed in NAD. Remains on trach collar. No further vomiting. Had CVP line in right groin because of lack of IV access.    Covering for .    INTERVAL HPI/OVERNIGHT EVENTS:      VITAL SIGNS:  T(F): 98.1 (09-22-18 @ 05:24)  HR: 91 (09-22-18 @ 05:24)  BP: 151/81 (09-22-18 @ 05:24)  RR: 18 (09-22-18 @ 05:24)  SpO2: 97% (09-22-18 @ 05:24)  Wt(kg): --  I&O's Detail          REVIEW OF SYSTEMS:    CONSTITUTIONAL:  No fevers, chills, sweats    HEENT:  Eyes:  No diplopia or blurred vision. ENT:  No earache, sore throat or runny nose.    CARDIOVASCULAR:  No pressure, squeezing, tightness, or heaviness about the chest; no palpitations.    RESPIRATORY:  Per HPI    GASTROINTESTINAL:  No abdominal pain, nausea, vomiting or diarrhea.    GENITOURINARY:  No dysuria, frequency or urgency.    NEUROLOGIC:  No paresthesias, fasciculations, seizures or weakness.    PSYCHIATRIC:  No disorder of thought or mood.      PHYSICAL EXAM:    Constitutional: Well developed and nourished  Eyes:Perrla  ENMT: normal  Neck:supple  Respiratory: + Occasional ronchi bilaterally  Cardiovascular: S1 S2 regular  Gastrointestinal: Soft, Non tender  Extremities: No edema  Vascular:normal  Neurological:Awake, alert,Ox3  Musculoskeletal:Normal      MEDICATIONS  (STANDING):  ALBUTerol/ipratropium for Nebulization 3 milliLiter(s) Nebulizer every 6 hours  amiKACIN  IVPB 560 milliGRAM(s) IV Intermittent every 12 hours  amLODIPine   Tablet 2.5 milliGRAM(s) Oral daily  atorvastatin 20 milliGRAM(s) Oral at bedtime  benztropine 2 milliGRAM(s) Oral two times a day  chlorhexidine 4% Liquid 1 Application(s) Topical every 12 hours  furosemide   Injectable 40 milliGRAM(s) IV Push daily  heparin  Injectable 5000 Unit(s) SubCutaneous every 8 hours  insulin glargine Injectable (LANTUS) 12 Unit(s) SubCutaneous at bedtime  insulin lispro (HumaLOG) corrective regimen sliding scale   SubCutaneous every 6 hours  insulin lispro Injectable (HumaLOG) 5 Unit(s) SubCutaneous three times a day with meals  lactobacillus acidophilus 1 Tablet(s) Oral every 8 hours  levETIRAcetam  Solution 1000 milliGRAM(s) Oral two times a day  magnesium sulfate  IVPB 1 Gram(s) IV Intermittent once  metoprolol tartrate 100 milliGRAM(s) Oral two times a day  pantoprazole   Suspension 40 milliGRAM(s) Oral daily  polymyxin B IVPB [id number] Unit(s) IV Intermittent every 12 hours  potassium chloride   Powder 40 milliEquivalent(s) Enteral Tube once  QUEtiapine 50 milliGRAM(s) Oral every 12 hours  rifampin IVPB      rifampin IVPB 600 milliGRAM(s) IV Intermittent every 24 hours  senna 2 Tablet(s) Oral at bedtime  sodium chloride 0.9%. 1000 milliLiter(s) (75 mL/Hr) IV Continuous <Continuous>    MEDICATIONS  (PRN):  haloperidol    Injectable 5 milliGRAM(s) IntraMuscular once PRN Agitation  LORazepam   Injectable 1 milliGRAM(s) IV Push every 8 hours PRN for breathrough agitation/anxiety/combative behavior      Allergies    angiotensin converting enzyme inhibitors (Unknown)    Intolerances        LABS:                        9.5    9.4   )-----------( 217      ( 22 Sep 2018 07:28 )             30.9     09-22    134<L>  |  98  |  8   ----------------------------<  206<H>  3.3<L>   |  28  |  1.27    Ca    8.5      22 Sep 2018 07:28  Phos  3.8     09-22  Mg     1.3     09-22                CAPILLARY BLOOD GLUCOSE      POCT Blood Glucose.: 140 mg/dL (22 Sep 2018 11:29)  POCT Blood Glucose.: 318 mg/dL (22 Sep 2018 08:47)  POCT Blood Glucose.: 145 mg/dL (22 Sep 2018 06:00)  POCT Blood Glucose.: 137 mg/dL (21 Sep 2018 23:40)  POCT Blood Glucose.: 136 mg/dL (21 Sep 2018 17:01)        RADIOLOGY & ADDITIONAL TESTS:    CXR:  < from: Xray Chest 1 View- PORTABLE-Routine (09.22.18 @ 09:21) >  Tracheostomy tube is again seen. Left lung appears clear. There is again   moderate right pleural effusion. Osseous structures are unremarkable.    Impression: No change moderate right pleural effusion. Previously seen   left pleural fluid is no longer identified.    < end of copied text >    Ct scan chest:  < from: CT Chest No Cont (09.14.18 @ 03:41) >  No hilar and/or mediastinal adenopathy is noted.     Heart is normal in size. Calcification the coronary arteries is noted. No   pericardial effusion is noted.     No endobronchial lesions are noted. Tracheostomy tubeis noted in place.   Marked consolidation is noted involving most of the right lower lobe.   Small bilateral pleural effusions are noted, right more than left. There   is elevation of the right hemidiaphragm.    Below the diaphragm, visualized portions of the abdomen demonstrate   punctate calcification within the left kidney.     Visualized osseous structures are within normal limits.    Impression: Consolidation involving the right lower lobe is of uncertain   etiology. It is unclear whether this represents compressive atelectasis   secondary to right pleural effusion/elevation of the right hemidiaphragm   and/or represents infection.    < end of copied text >    ekg;    echo: Patient is a 63y old  Female who presents with a chief complaint of vomiting (22 Sep 2018 09:42)    Awake, not alert, lying in bed agitated. Remains on trach collar. No further vomiting. Had CVP line in right groin because of lack of IV access.    Covering for .    INTERVAL HPI/OVERNIGHT EVENTS:      VITAL SIGNS:  T(F): 98.1 (09-22-18 @ 05:24)  HR: 91 (09-22-18 @ 05:24)  BP: 151/81 (09-22-18 @ 05:24)  RR: 18 (09-22-18 @ 05:24)  SpO2: 97% (09-22-18 @ 05:24)  Wt(kg): --  I&O's Detail          REVIEW OF SYSTEMS:    CONSTITUTIONAL:  No fevers, chills, sweats    HEENT:  Eyes:  No diplopia or blurred vision. ENT:  No earache, sore throat or runny nose.    CARDIOVASCULAR:  No pressure, squeezing, tightness, or heaviness about the chest; no palpitations.    RESPIRATORY:  Per HPI    GASTROINTESTINAL:  No abdominal pain, nausea, vomiting or diarrhea.    GENITOURINARY:  No dysuria, frequency or urgency.    NEUROLOGIC:  No paresthesias, fasciculations, seizures or weakness.    PSYCHIATRIC:  No disorder of thought or mood.      PHYSICAL EXAM:    Constitutional: Well developed and nourished  Eyes:Perrla  ENMT: normal  Neck:supple  Respiratory: + Occasional ronchi bilaterally  Cardiovascular: S1 S2 regular  Gastrointestinal: Soft, Non tender  Extremities: No edema  Vascular:normal  Neurological:Awake but not alert  Musculoskeletal:Normal      MEDICATIONS  (STANDING):  ALBUTerol/ipratropium for Nebulization 3 milliLiter(s) Nebulizer every 6 hours  amiKACIN  IVPB 560 milliGRAM(s) IV Intermittent every 12 hours  amLODIPine   Tablet 2.5 milliGRAM(s) Oral daily  atorvastatin 20 milliGRAM(s) Oral at bedtime  benztropine 2 milliGRAM(s) Oral two times a day  chlorhexidine 4% Liquid 1 Application(s) Topical every 12 hours  furosemide   Injectable 40 milliGRAM(s) IV Push daily  heparin  Injectable 5000 Unit(s) SubCutaneous every 8 hours  insulin glargine Injectable (LANTUS) 12 Unit(s) SubCutaneous at bedtime  insulin lispro (HumaLOG) corrective regimen sliding scale   SubCutaneous every 6 hours  insulin lispro Injectable (HumaLOG) 5 Unit(s) SubCutaneous three times a day with meals  lactobacillus acidophilus 1 Tablet(s) Oral every 8 hours  levETIRAcetam  Solution 1000 milliGRAM(s) Oral two times a day  magnesium sulfate  IVPB 1 Gram(s) IV Intermittent once  metoprolol tartrate 100 milliGRAM(s) Oral two times a day  pantoprazole   Suspension 40 milliGRAM(s) Oral daily  polymyxin B IVPB [id number] Unit(s) IV Intermittent every 12 hours  potassium chloride   Powder 40 milliEquivalent(s) Enteral Tube once  QUEtiapine 50 milliGRAM(s) Oral every 12 hours  rifampin IVPB      rifampin IVPB 600 milliGRAM(s) IV Intermittent every 24 hours  senna 2 Tablet(s) Oral at bedtime  sodium chloride 0.9%. 1000 milliLiter(s) (75 mL/Hr) IV Continuous <Continuous>    MEDICATIONS  (PRN):  haloperidol    Injectable 5 milliGRAM(s) IntraMuscular once PRN Agitation  LORazepam   Injectable 1 milliGRAM(s) IV Push every 8 hours PRN for breathrough agitation/anxiety/combative behavior      Allergies    angiotensin converting enzyme inhibitors (Unknown)    Intolerances        LABS:                        9.5    9.4   )-----------( 217      ( 22 Sep 2018 07:28 )             30.9     09-22    134<L>  |  98  |  8   ----------------------------<  206<H>  3.3<L>   |  28  |  1.27    Ca    8.5      22 Sep 2018 07:28  Phos  3.8     09-22  Mg     1.3     09-22                CAPILLARY BLOOD GLUCOSE      POCT Blood Glucose.: 140 mg/dL (22 Sep 2018 11:29)  POCT Blood Glucose.: 318 mg/dL (22 Sep 2018 08:47)  POCT Blood Glucose.: 145 mg/dL (22 Sep 2018 06:00)  POCT Blood Glucose.: 137 mg/dL (21 Sep 2018 23:40)  POCT Blood Glucose.: 136 mg/dL (21 Sep 2018 17:01)        RADIOLOGY & ADDITIONAL TESTS:    CXR:  < from: Xray Chest 1 View- PORTABLE-Routine (09.22.18 @ 09:21) >  Tracheostomy tube is again seen. Left lung appears clear. There is again   moderate right pleural effusion. Osseous structures are unremarkable.    Impression: No change moderate right pleural effusion. Previously seen   left pleural fluid is no longer identified.    < end of copied text >    Ct scan chest:  < from: CT Chest No Cont (09.14.18 @ 03:41) >  No hilar and/or mediastinal adenopathy is noted.     Heart is normal in size. Calcification the coronary arteries is noted. No   pericardial effusion is noted.     No endobronchial lesions are noted. Tracheostomy tubeis noted in place.   Marked consolidation is noted involving most of the right lower lobe.   Small bilateral pleural effusions are noted, right more than left. There   is elevation of the right hemidiaphragm.    Below the diaphragm, visualized portions of the abdomen demonstrate   punctate calcification within the left kidney.     Visualized osseous structures are within normal limits.    Impression: Consolidation involving the right lower lobe is of uncertain   etiology. It is unclear whether this represents compressive atelectasis   secondary to right pleural effusion/elevation of the right hemidiaphragm   and/or represents infection.    < end of copied text >    ekg;    echo:

## 2018-09-23 LAB
ANION GAP SERPL CALC-SCNC: 11 MMOL/L — SIGNIFICANT CHANGE UP (ref 5–17)
BUN SERPL-MCNC: 8 MG/DL — SIGNIFICANT CHANGE UP (ref 7–18)
CALCIUM SERPL-MCNC: 8.4 MG/DL — SIGNIFICANT CHANGE UP (ref 8.4–10.5)
CHLORIDE SERPL-SCNC: 94 MMOL/L — LOW (ref 96–108)
CO2 SERPL-SCNC: 28 MMOL/L — SIGNIFICANT CHANGE UP (ref 22–31)
CREAT SERPL-MCNC: 1.27 MG/DL — SIGNIFICANT CHANGE UP (ref 0.5–1.3)
GLUCOSE SERPL-MCNC: 260 MG/DL — HIGH (ref 70–99)
HCT VFR BLD CALC: 27.9 % — LOW (ref 34.5–45)
HGB BLD-MCNC: 8.5 G/DL — LOW (ref 11.5–15.5)
MAGNESIUM SERPL-MCNC: 1.7 MG/DL — SIGNIFICANT CHANGE UP (ref 1.6–2.6)
MCHC RBC-ENTMCNC: 24.6 PG — LOW (ref 27–34)
MCHC RBC-ENTMCNC: 30.4 GM/DL — LOW (ref 32–36)
MCV RBC AUTO: 80.9 FL — SIGNIFICANT CHANGE UP (ref 80–100)
PHOSPHATE SERPL-MCNC: 3.8 MG/DL — SIGNIFICANT CHANGE UP (ref 2.5–4.5)
PLATELET # BLD AUTO: 219 K/UL — SIGNIFICANT CHANGE UP (ref 150–400)
POTASSIUM SERPL-MCNC: 3.4 MMOL/L — LOW (ref 3.5–5.3)
POTASSIUM SERPL-SCNC: 3.4 MMOL/L — LOW (ref 3.5–5.3)
RBC # BLD: 3.44 M/UL — LOW (ref 3.8–5.2)
RBC # FLD: 13.4 % — SIGNIFICANT CHANGE UP (ref 10.3–14.5)
SODIUM SERPL-SCNC: 133 MMOL/L — LOW (ref 135–145)
WBC # BLD: 7.6 K/UL — SIGNIFICANT CHANGE UP (ref 3.8–10.5)
WBC # FLD AUTO: 7.6 K/UL — SIGNIFICANT CHANGE UP (ref 3.8–10.5)

## 2018-09-23 RX ORDER — POTASSIUM CHLORIDE 20 MEQ
20 PACKET (EA) ORAL ONCE
Qty: 0 | Refills: 0 | Status: COMPLETED | OUTPATIENT
Start: 2018-09-23 | End: 2018-09-23

## 2018-09-23 RX ADMIN — Medication 1 MILLIGRAM(S): at 12:27

## 2018-09-23 RX ADMIN — Medication 2 MILLIGRAM(S): at 17:49

## 2018-09-23 RX ADMIN — Medication 1 TABLET(S): at 12:24

## 2018-09-23 RX ADMIN — HEPARIN SODIUM 5000 UNIT(S): 5000 INJECTION INTRAVENOUS; SUBCUTANEOUS at 12:24

## 2018-09-23 RX ADMIN — Medication 40 MILLIGRAM(S): at 05:35

## 2018-09-23 RX ADMIN — Medication 3 MILLILITER(S): at 15:20

## 2018-09-23 RX ADMIN — QUETIAPINE FUMARATE 50 MILLIGRAM(S): 200 TABLET, FILM COATED ORAL at 06:01

## 2018-09-23 RX ADMIN — HEPARIN SODIUM 5000 UNIT(S): 5000 INJECTION INTRAVENOUS; SUBCUTANEOUS at 05:34

## 2018-09-23 RX ADMIN — Medication 2: at 17:47

## 2018-09-23 RX ADMIN — Medication 3 MILLILITER(S): at 08:54

## 2018-09-23 RX ADMIN — AMLODIPINE BESYLATE 2.5 MILLIGRAM(S): 2.5 TABLET ORAL at 05:35

## 2018-09-23 RX ADMIN — CHLORHEXIDINE GLUCONATE 1 APPLICATION(S): 213 SOLUTION TOPICAL at 17:49

## 2018-09-23 RX ADMIN — Medication 20 MILLIEQUIVALENT(S): at 12:22

## 2018-09-23 RX ADMIN — Medication 2 MILLIGRAM(S): at 05:35

## 2018-09-23 RX ADMIN — Medication 5 UNIT(S): at 17:47

## 2018-09-23 RX ADMIN — Medication 100 MILLIGRAM(S): at 17:49

## 2018-09-23 RX ADMIN — INSULIN GLARGINE 12 UNIT(S): 100 INJECTION, SOLUTION SUBCUTANEOUS at 22:15

## 2018-09-23 RX ADMIN — CHLORHEXIDINE GLUCONATE 1 APPLICATION(S): 213 SOLUTION TOPICAL at 05:45

## 2018-09-23 RX ADMIN — AMIKACIN SULFATE 102.24 MILLIGRAM(S): 250 INJECTION, SOLUTION INTRAMUSCULAR; INTRAVENOUS at 17:48

## 2018-09-23 RX ADMIN — Medication 3 MILLILITER(S): at 20:34

## 2018-09-23 RX ADMIN — Medication 1 TABLET(S): at 05:35

## 2018-09-23 RX ADMIN — Medication 1 MILLIGRAM(S): at 05:34

## 2018-09-23 RX ADMIN — LEVETIRACETAM 1000 MILLIGRAM(S): 250 TABLET, FILM COATED ORAL at 05:33

## 2018-09-23 RX ADMIN — POLYMYXIN B SULFATE 500 UNIT(S): 500000 INJECTION, POWDER, LYOPHILIZED, FOR SOLUTION INTRAMUSCULAR; INTRATHECAL; INTRAVENOUS; OPHTHALMIC at 05:33

## 2018-09-23 RX ADMIN — AMIKACIN SULFATE 102.24 MILLIGRAM(S): 250 INJECTION, SOLUTION INTRAMUSCULAR; INTRAVENOUS at 05:33

## 2018-09-23 RX ADMIN — SENNA PLUS 2 TABLET(S): 8.6 TABLET ORAL at 22:16

## 2018-09-23 RX ADMIN — Medication 5 UNIT(S): at 08:24

## 2018-09-23 RX ADMIN — Medication 100 MILLIGRAM(S): at 05:35

## 2018-09-23 RX ADMIN — PANTOPRAZOLE SODIUM 40 MILLIGRAM(S): 20 TABLET, DELAYED RELEASE ORAL at 12:23

## 2018-09-23 RX ADMIN — ATORVASTATIN CALCIUM 20 MILLIGRAM(S): 80 TABLET, FILM COATED ORAL at 22:16

## 2018-09-23 RX ADMIN — Medication 3 MILLILITER(S): at 02:48

## 2018-09-23 RX ADMIN — LEVETIRACETAM 1000 MILLIGRAM(S): 250 TABLET, FILM COATED ORAL at 17:48

## 2018-09-23 RX ADMIN — POLYMYXIN B SULFATE 500 UNIT(S): 500000 INJECTION, POWDER, LYOPHILIZED, FOR SOLUTION INTRAMUSCULAR; INTRATHECAL; INTRAVENOUS; OPHTHALMIC at 17:48

## 2018-09-23 RX ADMIN — Medication 1 TABLET(S): at 22:16

## 2018-09-23 RX ADMIN — Medication 1 MILLIGRAM(S): at 22:15

## 2018-09-23 RX ADMIN — Medication 5 UNIT(S): at 12:22

## 2018-09-23 RX ADMIN — Medication 5: at 12:22

## 2018-09-23 RX ADMIN — HEPARIN SODIUM 5000 UNIT(S): 5000 INJECTION INTRAVENOUS; SUBCUTANEOUS at 22:16

## 2018-09-23 RX ADMIN — QUETIAPINE FUMARATE 50 MILLIGRAM(S): 200 TABLET, FILM COATED ORAL at 17:49

## 2018-09-23 NOTE — PROGRESS NOTE ADULT - SUBJECTIVE AND OBJECTIVE BOX
Patient is seen and examined at the bed side, is afebrile. She is doing well, no new events. The WBC count stay normal.         REVIEW OF SYSTEMS: Unable to obtain due to mental status      ALLERGIES : NKDA      Vital Signs Last 24 Hrs  T(C): 36.6 (23 Sep 2018 14:14), Max: 37 (23 Sep 2018 05:13)  T(F): 97.9 (23 Sep 2018 14:14), Max: 98.6 (23 Sep 2018 05:13)  HR: 82 (23 Sep 2018 14:14) (82 - 103)  BP: 158/64 (23 Sep 2018 14:14) (128/76 - 158/64)  BP(mean): --  RR: 18 (23 Sep 2018 14:14) (18 - 20)  SpO2: 97% (23 Sep 2018 14:14) (95% - 99%)        PHYSICAL EXAM:  GENERAL: Not in distress  HEENT: Trach in placed  CHEST/LUNG: Air entry B/L  HEART: s1 and s2 present  ABDOMEN: Nontender, and Nondistended  EXTREMITIES:  No pedal  edema  CNS: Awake but not Alert        LABS:                                     8.5    7.6   )-----------( 219      ( 23 Sep 2018 06:49 )             27.9                        9.5    9.4   )-----------( 217      ( 22 Sep 2018 07:28 )             30.9                8.6    13.0  )-----------( 195      ( 21 Sep 2018 04:46 )             26.                          10.5   20.1  )-----------( 217      ( 19 Sep 2018 07:47 )             33.1               133<L>  |  94<L>  |  8   ----------------------------<  260<H>  3.4<L>   |  28  |  1.27    Ca    8.4      23 Sep 2018 06:49  Phos  3.8     09-23  Mg     1.7           09-22    134<L>  |  98  |  8   ----------------------------<  206<H>  3.3<L>   |  28  |  1.27    Ca    8.5      22 Sep 2018 07:28  Phos  3.8     -  Mg     1.3           TPro  6.2  /  Alb  2.2<L>  /  TBili  0.9  /  DBili  0.3<H>  /  AST  24  /  ALT  20  /  AlkPhos  86  -         Urinalysis Basic - ( 14 Sep 2018 03:56 )    Color: Yellow / Appearance: Clear / S.020 / pH: x  Gluc: x / Ketone: Negative  / Bili: Negative / Urobili: Negative   Blood: x / Protein: 100 / Nitrite: Negative   Leuk Esterase: Negative / RBC: 0-2 /HPF / WBC 0-2 /HPF   Sq Epi: x / Non Sq Epi: Occasional /HPF / Bacteria: Trace /HPF      CAPILLARY BLOOD GLUCOSE      POCT Blood Glucose.: 144 mg/dL (14 Sep 2018 11:07)  POCT Blood Glucose.: 202 mg/dL (14 Sep 2018 08:09)  POCT Blood Glucose.: 277 mg/dL (14 Sep 2018 06:58)  POCT Blood Glucose.: 68 mg/dL (14 Sep 2018 06:03)  POCT Blood Glucose.: 316 mg/dL (14 Sep 2018 02:11)  POCT Blood Glucose.: 405 mg/dL (14 Sep 2018 00:28)  POCT Blood Glucose.: 386 mg/dL (13 Sep 2018 22:26)  POCT Blood Glucose.: 403 mg/dL (13 Sep 2018 19:09)        MEDICATIONS  (STANDING):  ALBUTerol/ipratropium for Nebulization 3 milliLiter(s) Nebulizer every 6 hours  amiKACIN  IVPB 560 milliGRAM(s) IV Intermittent every 12 hours  amLODIPine   Tablet 2.5 milliGRAM(s) Oral daily  atorvastatin 20 milliGRAM(s) Oral at bedtime  benztropine 2 milliGRAM(s) Oral two times a day  chlorhexidine 4% Liquid 1 Application(s) Topical every 12 hours  furosemide   Injectable 40 milliGRAM(s) IV Push daily  heparin  Injectable 5000 Unit(s) SubCutaneous every 8 hours  insulin glargine Injectable (LANTUS) 12 Unit(s) SubCutaneous at bedtime  insulin lispro (HumaLOG) corrective regimen sliding scale   SubCutaneous every 6 hours  insulin lispro Injectable (HumaLOG) 5 Unit(s) SubCutaneous three times a day with meals  lactobacillus acidophilus 1 Tablet(s) Oral every 8 hours  levETIRAcetam  Solution 1000 milliGRAM(s) Oral two times a day  metoprolol tartrate 100 milliGRAM(s) Oral two times a day  pantoprazole   Suspension 40 milliGRAM(s) Oral daily  polymyxin B IVPB 208009 Unit(s) IV Intermittent every 12 hours  QUEtiapine 50 milliGRAM(s) Oral every 12 hours  rifampin IVPB      rifampin IVPB 600 milliGRAM(s) IV Intermittent every 24 hours  senna 2 Tablet(s) Oral at bedtime    MEDICATIONS  (PRN):  haloperidol    Injectable 5 milliGRAM(s) IntraMuscular once PRN Agitation  LORazepam   Injectable 1 milliGRAM(s) IV Push every 8 hours PRN for breathrough agitation/anxiety/combative behavior            RADIOLOGY & ADDITIONAL TESTS:    18: Xray Chest 1 View- PORTABLE-Routine (18 @ 08:22) Tracheostomy cannula reidentified in position. No change heart mediastinum. No change in the right basilar opacity with loss of definition of the ipsilateral hemidiaphragm and costophrenic angle likely   representing a combination of pleural fluid and underlying  consolidation/atelectasis.      18: CT Chest No Cont (18 @ 03:41) Impression: Consolidation involving the right lower lobe is of uncertain etiology. It is unclear whether this represents compressive atelectasis secondary to right pleural effusion/elevation of the right hemidiaphragm  and/or represents infection.    18: Xray Chest 1 View-PORTABLE IMMEDIATE (18 @ 13:37) Bilateral pleural effusions and associated atelectasis.        MICROBIOLOGY DATA:    Culture - Blood (18 @ 17:33)    Specimen Source: .Blood Blood-Peripheral    Culture Results:   No growth to date.      Culture - Blood (18 @ 13:21)    Specimen Source: .Blood Blood-Peripheral    Culture Results:   No growth to date.        Culture - Sputum . (09.15.18 @ 09:28)    Gram Stain:   Rare polymorphonuclear leukocytes per low power field  Few Squamous epithelial cells per low power field  Moderate Gram Negative Rods per oil power field  Few Gram Positive Rods per oil power field  Few Yeast like cells per oil power field  Rare Gram Negative Coccobacilli per oil power field    -  Amikacin: S <=8    -  Amikacin: S <=8    -  Ampicillin/Sulbactam: R >16/8    -  Aztreonam: S 8    -  Cefepime: I 16    -  Cefepime: I 16    -  Ceftazidime: R >16    -  Ceftazidime: I 16    -  Ciprofloxacin: R >2    -  Ciprofloxacin: R >2    -  Gentamicin: R >8    -  Gentamicin: R >8    -  Imipenem: R >8    -  Imipenem: R    -  Levofloxacin: R >4    -  Levofloxacin: R >4    -  Meropenem: R >8    -  Meropenem: I 8    -  Piperacillin/Tazobactam: R >64    -  Piperacillin/Tazobactam: R    -  Tobramycin: R >8    -  Tobramycin: R >8    -  Trimethoprim/Sulfamethoxazole: R >2/38    Specimen Source: .Sputum Sputum    Culture Results:   Numerous Pseudomonas aeruginosa (Carbapenem Resistant)  Moderate Acinetobacter baumannii/haemolyticus (Carbapenem Resistant)  Complex  Normal Respiratory Moraima present    Organism Identification: Pseudomonas aeruginosa (Carbapenem Resistant)  Acinetobacter baumannii/haemolyticus (Carbapenem Resistant)    Organism: Acinetobacter baumannii/haemolyticus (Carbapenem Resistant)    Organism: Pseudomonas aeruginosa (Carbapenem Resistant)    Organism: Acinetobacter baumannii/haemolyticus (Carbapenem Resistant)    Method Type: ESTIVEN    Method Type: ESTIVEN    Method Type: KB        MRSA/MSSA PCR (18 @ 11:26)    MRSA PCR Result.: NotDetec: MRSA/MSSA PCR assay is a qualitative in vitro diagnostic test for the  direct detection and differentiation of methicillin-resistant  Staphylococcus aureus (MRSA) from Staphylococcus aureus (SA). The assay  detects DNA from nasal swabs in patients atrisk for nasal colonization.  It is not intended to diagnose MRSA or SA infections nor guide or monitor  treatment for MRSA/SA infections. A negative result does not preclude  nasal colonization. The assay is FDA-approved and its performance has  been established by [facility], [place] and the [facility], [place], [place].    Staph Aureus PCR Result: NotDetec      Culture - Urine (18 @ 09:47)    Specimen Source: .Urine Catheterized    Culture Results:   10,000 - 49,000 CFU/mL Yeast

## 2018-09-23 NOTE — PROGRESS NOTE ADULT - SUBJECTIVE AND OBJECTIVE BOX
Patient is a 63y old  Female who presents with a chief complaint of vomiting (23 Sep 2018 10:08)    Awake, not alert, lying in bed agitated. Remains on trach collar. No further vomiting. Had CVP line in right groin because of lack of IV access.    Covering for .    INTERVAL HPI/OVERNIGHT EVENTS:      VITAL SIGNS:  T(F): 98.6 (09-23-18 @ 05:13)  HR: 103 (09-23-18 @ 05:13)  BP: 128/76 (09-23-18 @ 05:13)  RR: 19 (09-23-18 @ 05:13)  SpO2: 95% (09-23-18 @ 05:13)  Wt(kg): --  I&O's Detail    22 Sep 2018 07:01  -  23 Sep 2018 07:00  --------------------------------------------------------  IN:    Enteral Tube Flush: 250 mL    Glucerna: 780 mL    IV PiggyBack: 400 mL    sodium chloride 0.9%: 300 mL  Total IN: 1730 mL    OUT:  Total OUT: 0 mL    Total NET: 1730 mL              REVIEW OF SYSTEMS:    CONSTITUTIONAL:  No fevers, chills, sweats    HEENT:  Eyes:  No diplopia or blurred vision. ENT:  No earache, sore throat or runny nose.    CARDIOVASCULAR:  No pressure, squeezing, tightness, or heaviness about the chest; no palpitations.    RESPIRATORY:  Per HPI    GASTROINTESTINAL:  No abdominal pain, nausea, vomiting or diarrhea.    GENITOURINARY:  No dysuria, frequency or urgency.    NEUROLOGIC:  No paresthesias, fasciculations, seizures or weakness.    PSYCHIATRIC:  No disorder of thought or mood.      PHYSICAL EXAM:    Constitutional: Well developed and nourished  Eyes:Perrla  ENMT: normal  Neck:supple  Respiratory: + Occasional ronchi bilaterally  Cardiovascular: S1 S2 regular  Gastrointestinal: Soft, Non tender  Extremities: No edema  Vascular:normal  Neurological:Awake, alert,Ox3  Musculoskeletal:Normal      MEDICATIONS  (STANDING):  ALBUTerol/ipratropium for Nebulization 3 milliLiter(s) Nebulizer every 6 hours  amiKACIN  IVPB 560 milliGRAM(s) IV Intermittent every 12 hours  amLODIPine   Tablet 2.5 milliGRAM(s) Oral daily  atorvastatin 20 milliGRAM(s) Oral at bedtime  benztropine 2 milliGRAM(s) Oral two times a day  chlorhexidine 4% Liquid 1 Application(s) Topical every 12 hours  furosemide   Injectable 40 milliGRAM(s) IV Push daily  heparin  Injectable 5000 Unit(s) SubCutaneous every 8 hours  insulin glargine Injectable (LANTUS) 12 Unit(s) SubCutaneous at bedtime  insulin lispro (HumaLOG) corrective regimen sliding scale   SubCutaneous every 6 hours  insulin lispro Injectable (HumaLOG) 5 Unit(s) SubCutaneous three times a day with meals  lactobacillus acidophilus 1 Tablet(s) Oral every 8 hours  levETIRAcetam  Solution 1000 milliGRAM(s) Oral two times a day  metoprolol tartrate 100 milliGRAM(s) Oral two times a day  pantoprazole   Suspension 40 milliGRAM(s) Oral daily  polymyxin B IVPB 362398 Unit(s) IV Intermittent every 12 hours  QUEtiapine 50 milliGRAM(s) Oral every 12 hours  rifampin IVPB      rifampin IVPB 600 milliGRAM(s) IV Intermittent every 24 hours  senna 2 Tablet(s) Oral at bedtime    MEDICATIONS  (PRN):  haloperidol    Injectable 5 milliGRAM(s) IntraMuscular once PRN Agitation  LORazepam   Injectable 1 milliGRAM(s) IV Push every 8 hours PRN for breathrough agitation/anxiety/combative behavior      Allergies    angiotensin converting enzyme inhibitors (Unknown)    Intolerances        LABS:                        8.5    7.6   )-----------( 219      ( 23 Sep 2018 06:49 )             27.9     09-23    133<L>  |  94<L>  |  8   ----------------------------<  260<H>  3.4<L>   |  28  |  1.27    Ca    8.4      23 Sep 2018 06:49  Phos  3.8     09-23  Mg     1.7     09-23                CAPILLARY BLOOD GLUCOSE      POCT Blood Glucose.: 379 mg/dL (23 Sep 2018 11:54)  POCT Blood Glucose.: 127 mg/dL (23 Sep 2018 05:40)  POCT Blood Glucose.: 93 mg/dL (23 Sep 2018 00:03)  POCT Blood Glucose.: 74 mg/dL (22 Sep 2018 21:11)  POCT Blood Glucose.: 200 mg/dL (22 Sep 2018 18:17)  POCT Blood Glucose.: 189 mg/dL (22 Sep 2018 16:56)        RADIOLOGY & ADDITIONAL TESTS:    CXR:  < from: Xray Chest 1 View- PORTABLE-Routine (09.22.18 @ 09:21) >  Tracheostomy tube is again seen. Left lung appears clear. There is again   moderate right pleural effusion. Osseous structures are unremarkable.    Impression: No change moderate right pleural effusion. Previously seen   left pleural fluid is no longer identified.    < end of copied text >    Ct scan chest:  < from: CT Chest No Cont (09.14.18 @ 03:41) >  Impression: Consolidation involving the right lower lobe is of uncertain   etiology. It is unclear whether this represents compressive atelectasis   secondary to right pleural effusion/elevation of the right hemidiaphragm   and/or represents infection.    < end of copied text >    ekg;    echo: Patient is a 63y old  Female who presents with a chief complaint of vomiting (23 Sep 2018 10:08)    Awake, not alert, lying in bed and not agitated. Remains on trach collar. No further vomiting. Had CVP line in right groin because of lack of IV access.    Covering for .    INTERVAL HPI/OVERNIGHT EVENTS:      VITAL SIGNS:  T(F): 98.6 (09-23-18 @ 05:13)  HR: 103 (09-23-18 @ 05:13)  BP: 128/76 (09-23-18 @ 05:13)  RR: 19 (09-23-18 @ 05:13)  SpO2: 95% (09-23-18 @ 05:13)  Wt(kg): --  I&O's Detail    22 Sep 2018 07:01  -  23 Sep 2018 07:00  --------------------------------------------------------  IN:    Enteral Tube Flush: 250 mL    Glucerna: 780 mL    IV PiggyBack: 400 mL    sodium chloride 0.9%: 300 mL  Total IN: 1730 mL    OUT:  Total OUT: 0 mL    Total NET: 1730 mL              REVIEW OF SYSTEMS:    CONSTITUTIONAL:  No fevers, chills, sweats    HEENT:  Eyes:  No diplopia or blurred vision. ENT:  No earache, sore throat or runny nose.    CARDIOVASCULAR:  No pressure, squeezing, tightness, or heaviness about the chest; no palpitations.    RESPIRATORY:  Per HPI    GASTROINTESTINAL:  No abdominal pain, nausea, vomiting or diarrhea.    GENITOURINARY:  No dysuria, frequency or urgency.    NEUROLOGIC:  No paresthesias, fasciculations, seizures or weakness.    PSYCHIATRIC:  No disorder of thought or mood.      PHYSICAL EXAM:    Constitutional: Well developed and nourished  Eyes:Perrla  ENMT: normal  Neck:supple  Respiratory: + Occasional ronchi bilaterally  Cardiovascular: S1 S2 regular  Gastrointestinal: Soft, Non tender  Extremities: No edema  Vascular:normal  Neurological:Awake, alert,Ox3  Musculoskeletal:Normal      MEDICATIONS  (STANDING):  ALBUTerol/ipratropium for Nebulization 3 milliLiter(s) Nebulizer every 6 hours  amiKACIN  IVPB 560 milliGRAM(s) IV Intermittent every 12 hours  amLODIPine   Tablet 2.5 milliGRAM(s) Oral daily  atorvastatin 20 milliGRAM(s) Oral at bedtime  benztropine 2 milliGRAM(s) Oral two times a day  chlorhexidine 4% Liquid 1 Application(s) Topical every 12 hours  furosemide   Injectable 40 milliGRAM(s) IV Push daily  heparin  Injectable 5000 Unit(s) SubCutaneous every 8 hours  insulin glargine Injectable (LANTUS) 12 Unit(s) SubCutaneous at bedtime  insulin lispro (HumaLOG) corrective regimen sliding scale   SubCutaneous every 6 hours  insulin lispro Injectable (HumaLOG) 5 Unit(s) SubCutaneous three times a day with meals  lactobacillus acidophilus 1 Tablet(s) Oral every 8 hours  levETIRAcetam  Solution 1000 milliGRAM(s) Oral two times a day  metoprolol tartrate 100 milliGRAM(s) Oral two times a day  pantoprazole   Suspension 40 milliGRAM(s) Oral daily  polymyxin B IVPB [id number] Unit(s) IV Intermittent every 12 hours  QUEtiapine 50 milliGRAM(s) Oral every 12 hours  rifampin IVPB      rifampin IVPB 600 milliGRAM(s) IV Intermittent every 24 hours  senna 2 Tablet(s) Oral at bedtime    MEDICATIONS  (PRN):  haloperidol    Injectable 5 milliGRAM(s) IntraMuscular once PRN Agitation  LORazepam   Injectable 1 milliGRAM(s) IV Push every 8 hours PRN for breathrough agitation/anxiety/combative behavior      Allergies    angiotensin converting enzyme inhibitors (Unknown)    Intolerances        LABS:                        8.5    7.6   )-----------( 219      ( 23 Sep 2018 06:49 )             27.9     09-23    133<L>  |  94<L>  |  8   ----------------------------<  260<H>  3.4<L>   |  28  |  1.27    Ca    8.4      23 Sep 2018 06:49  Phos  3.8     09-23  Mg     1.7     09-23                CAPILLARY BLOOD GLUCOSE      POCT Blood Glucose.: 379 mg/dL (23 Sep 2018 11:54)  POCT Blood Glucose.: 127 mg/dL (23 Sep 2018 05:40)  POCT Blood Glucose.: 93 mg/dL (23 Sep 2018 00:03)  POCT Blood Glucose.: 74 mg/dL (22 Sep 2018 21:11)  POCT Blood Glucose.: 200 mg/dL (22 Sep 2018 18:17)  POCT Blood Glucose.: 189 mg/dL (22 Sep 2018 16:56)        RADIOLOGY & ADDITIONAL TESTS:    CXR:  < from: Xray Chest 1 View- PORTABLE-Routine (09.22.18 @ 09:21) >  Tracheostomy tube is again seen. Left lung appears clear. There is again   moderate right pleural effusion. Osseous structures are unremarkable.    Impression: No change moderate right pleural effusion. Previously seen   left pleural fluid is no longer identified.    < end of copied text >    Ct scan chest:  < from: CT Chest No Cont (09.14.18 @ 03:41) >  Impression: Consolidation involving the right lower lobe is of uncertain   etiology. It is unclear whether this represents compressive atelectasis   secondary to right pleural effusion/elevation of the right hemidiaphragm   and/or represents infection.    < end of copied text >    ekg;    echo:

## 2018-09-23 NOTE — PROGRESS NOTE ADULT - PROBLEM SELECTOR PLAN 1
improving. C/w lasix today. Repeat CXR in AM. Adjust Rx as needed.   hypokalemia - C/w electrolyte optimization  case management for d/c planning  all medical consultants and medical staff care appreciated

## 2018-09-23 NOTE — PROGRESS NOTE ADULT - ASSESSMENT
A 62 yo Female  with  non-verbal, bed bound, S/P Trach/ PEG tube, sent in to the ER from [facility]  for evaluation of coffee ground vomiting  and cough. On arrival, she found to have tachycardia, Leukocytosis, and RLL consolidation. She has started on Zosyn and Vancomycin and the ID consult requested to assist with further evaluation and antibiotic management.     # Aspiration pneumonia - RLL consolidation - Pseudomonas and Acinetobacter   # MRSA PCR is negative    Would recommend:    1. Continue Polymixin, Rifampin and amikacin to cover CRE until 9/26/18  2. Aspiration  precaution  3. Bronchial suctioning   4. Frequent repositioning      d/w  Nursing staff    will follow the patient with you

## 2018-09-23 NOTE — PROGRESS NOTE ADULT - SUBJECTIVE AND OBJECTIVE BOX
Patient seen and examined at bedside  No acute events noted overnight  Case discussed with medical team    HPI:  62 yo F from [facility], non-verbal, bed bound, S/P Trach/ PEG tube, PMH of HTN, asthma, convulsion disorder, bipolar, DVT legs, DM 2, sent from NH for coffee ground vomiting X3 this morning and cough.  Pt unable to provide history.  History given by sister (Sharon [phone])who states pt was vomiting coffee ground emesis this morning. Pt had no reported vomiting since in ED.  Pt also developed cough with clear sputum.  Sister states pt is combative at baseline, and her current behavior is baseline.  No reported fever, discomfort, diarrhea, or SOB.  Pt was recently admitted to [facility] in 07/2018 for same reason and treated for aspiration PNA and UTI during last admission.  Pt was supposed to be given IV vanco and zosyn for 7 day by PCP for PNA , which supposed to start from 09/13-09/19. Pt is DNR, MOSLT form in charts. (13 Sep 2018 15:49)      PAST MEDICAL & SURGICAL HISTORY:  No pertinent past medical history  Hypertension  Schizophrenia  Diabetic coma  Diabetes mellitus  No significant past surgical history  S/P percutaneous endoscopic gastrostomy (PEG) tube placement  H/O tracheostomy      angiotensin converting enzyme inhibitors (Unknown)       MEDICATIONS  (STANDING):  ALBUTerol/ipratropium for Nebulization 3 milliLiter(s) Nebulizer every 6 hours  amiKACIN  IVPB 560 milliGRAM(s) IV Intermittent every 12 hours  amLODIPine   Tablet 2.5 milliGRAM(s) Oral daily  atorvastatin 20 milliGRAM(s) Oral at bedtime  benztropine 2 milliGRAM(s) Oral two times a day  chlorhexidine 4% Liquid 1 Application(s) Topical every 12 hours  furosemide   Injectable 40 milliGRAM(s) IV Push daily  heparin  Injectable 5000 Unit(s) SubCutaneous every 8 hours  insulin glargine Injectable (LANTUS) 12 Unit(s) SubCutaneous at bedtime  insulin lispro (HumaLOG) corrective regimen sliding scale   SubCutaneous every 6 hours  insulin lispro Injectable (HumaLOG) 5 Unit(s) SubCutaneous three times a day with meals  lactobacillus acidophilus 1 Tablet(s) Oral every 8 hours  levETIRAcetam  Solution 1000 milliGRAM(s) Oral two times a day  metoprolol tartrate 100 milliGRAM(s) Oral two times a day  pantoprazole   Suspension 40 milliGRAM(s) Oral daily  polymyxin B IVPB [id number] Unit(s) IV Intermittent every 12 hours  potassium chloride   Powder 20 milliEquivalent(s) Oral once  QUEtiapine 50 milliGRAM(s) Oral every 12 hours  rifampin IVPB      rifampin IVPB 600 milliGRAM(s) IV Intermittent every 24 hours  senna 2 Tablet(s) Oral at bedtime    MEDICATIONS  (PRN):  haloperidol    Injectable 5 milliGRAM(s) IntraMuscular once PRN Agitation  LORazepam   Injectable 1 milliGRAM(s) IV Push every 8 hours PRN for breathrough agitation/anxiety/combative behavior      REVIEW OF SYSTEMS: limited 2/2 mental status    T(C): 37 (09-23-18 @ 05:13), Max: 37 (09-23-18 @ 05:13)  HR: 103 (09-23-18 @ 05:13) (89 - 103)  BP: 128/76 (09-23-18 @ 05:13) (128/76 - 154/122)  RR: 19 (09-23-18 @ 05:13) (18 - 20)  SpO2: 95% (09-23-18 @ 05:13) (80% - 99%)    PHYSICAL EXAMINATION:   Constitutional: NAD  HEENT: AT  Neck:  trach collar intact. Supple  Respiratory:  Adequate airflow b/l. Not using accessory muscles of respiration.  Cardiovascular:  S1 & S2 intact, no R/G, 2+ radial pulses b/l  Gastrointestinal: Soft, ND, normoactive b.s.  Extremities: well perfused  Neurological:  Arousable, awake.     Labs and imaging reviewed    LABS:                        8.5    7.6   )-----------( 219      ( 23 Sep 2018 06:49 )             27.9     09-23    133<L>  |  94<L>  |  8   ----------------------------<  260<H>  3.4<L>   |  28  |  1.27    Ca    8.4      23 Sep 2018 06:49  Phos  3.8     09-23  Mg     1.7     09-23              CAPILLARY BLOOD GLUCOSE      POCT Blood Glucose.: 127 mg/dL (23 Sep 2018 05:40)  POCT Blood Glucose.: 93 mg/dL (23 Sep 2018 00:03)  POCT Blood Glucose.: 74 mg/dL (22 Sep 2018 21:11)  POCT Blood Glucose.: 200 mg/dL (22 Sep 2018 18:17)  POCT Blood Glucose.: 189 mg/dL (22 Sep 2018 16:56)  POCT Blood Glucose.: 140 mg/dL (22 Sep 2018 11:29)              RADIOLOGY & ADDITIONAL STUDIES:

## 2018-09-24 DIAGNOSIS — E87.1 HYPO-OSMOLALITY AND HYPONATREMIA: ICD-10-CM

## 2018-09-24 DIAGNOSIS — N17.9 ACUTE KIDNEY FAILURE, UNSPECIFIED: ICD-10-CM

## 2018-09-24 DIAGNOSIS — J69.0 PNEUMONITIS DUE TO INHALATION OF FOOD AND VOMIT: ICD-10-CM

## 2018-09-24 LAB
AMIKACIN TROUGH SERPL-MCNC: 17.6 UG/ML — SIGNIFICANT CHANGE UP
ANION GAP SERPL CALC-SCNC: 7 MMOL/L — SIGNIFICANT CHANGE UP (ref 5–17)
BUN SERPL-MCNC: 16 MG/DL — SIGNIFICANT CHANGE UP (ref 7–18)
CALCIUM SERPL-MCNC: 9 MG/DL — SIGNIFICANT CHANGE UP (ref 8.4–10.5)
CHLORIDE SERPL-SCNC: 93 MMOL/L — LOW (ref 96–108)
CO2 SERPL-SCNC: 31 MMOL/L — SIGNIFICANT CHANGE UP (ref 22–31)
CREAT SERPL-MCNC: 1.72 MG/DL — HIGH (ref 0.5–1.3)
GLUCOSE SERPL-MCNC: 163 MG/DL — HIGH (ref 70–99)
HCT VFR BLD CALC: 30.3 % — LOW (ref 34.5–45)
HGB BLD-MCNC: 9.1 G/DL — LOW (ref 11.5–15.5)
MAGNESIUM SERPL-MCNC: 1.6 MG/DL — SIGNIFICANT CHANGE UP (ref 1.6–2.6)
MCHC RBC-ENTMCNC: 24.3 PG — LOW (ref 27–34)
MCHC RBC-ENTMCNC: 30.2 GM/DL — LOW (ref 32–36)
MCV RBC AUTO: 80.6 FL — SIGNIFICANT CHANGE UP (ref 80–100)
PHOSPHATE SERPL-MCNC: 4.5 MG/DL — SIGNIFICANT CHANGE UP (ref 2.5–4.5)
PLATELET # BLD AUTO: 235 K/UL — SIGNIFICANT CHANGE UP (ref 150–400)
POTASSIUM SERPL-MCNC: 3.9 MMOL/L — SIGNIFICANT CHANGE UP (ref 3.5–5.3)
POTASSIUM SERPL-SCNC: 3.9 MMOL/L — SIGNIFICANT CHANGE UP (ref 3.5–5.3)
RBC # BLD: 3.76 M/UL — LOW (ref 3.8–5.2)
RBC # FLD: 13 % — SIGNIFICANT CHANGE UP (ref 10.3–14.5)
SODIUM SERPL-SCNC: 131 MMOL/L — LOW (ref 135–145)
WBC # BLD: 7.6 K/UL — SIGNIFICANT CHANGE UP (ref 3.8–10.5)
WBC # FLD AUTO: 7.6 K/UL — SIGNIFICANT CHANGE UP (ref 3.8–10.5)

## 2018-09-24 PROCEDURE — 71045 X-RAY EXAM CHEST 1 VIEW: CPT | Mod: 26

## 2018-09-24 RX ORDER — INSULIN LISPRO 100/ML
7 VIAL (ML) SUBCUTANEOUS
Qty: 0 | Refills: 0 | Status: DISCONTINUED | OUTPATIENT
Start: 2018-09-24 | End: 2018-09-26

## 2018-09-24 RX ORDER — SODIUM CHLORIDE 9 MG/ML
500 INJECTION INTRAMUSCULAR; INTRAVENOUS; SUBCUTANEOUS
Qty: 0 | Refills: 0 | Status: DISCONTINUED | OUTPATIENT
Start: 2018-09-24 | End: 2018-09-25

## 2018-09-24 RX ORDER — INSULIN LISPRO 100/ML
4 VIAL (ML) SUBCUTANEOUS ONCE
Qty: 0 | Refills: 0 | Status: COMPLETED | OUTPATIENT
Start: 2018-09-24 | End: 2018-09-24

## 2018-09-24 RX ORDER — INSULIN GLARGINE 100 [IU]/ML
14 INJECTION, SOLUTION SUBCUTANEOUS AT BEDTIME
Qty: 0 | Refills: 0 | Status: DISCONTINUED | OUTPATIENT
Start: 2018-09-24 | End: 2018-09-25

## 2018-09-24 RX ADMIN — Medication 3 MILLILITER(S): at 08:22

## 2018-09-24 RX ADMIN — Medication 2 MILLIGRAM(S): at 17:14

## 2018-09-24 RX ADMIN — Medication 100 MILLIGRAM(S): at 05:50

## 2018-09-24 RX ADMIN — CHLORHEXIDINE GLUCONATE 1 APPLICATION(S): 213 SOLUTION TOPICAL at 17:15

## 2018-09-24 RX ADMIN — Medication 3 MILLILITER(S): at 21:07

## 2018-09-24 RX ADMIN — Medication 5 UNIT(S): at 12:34

## 2018-09-24 RX ADMIN — HEPARIN SODIUM 5000 UNIT(S): 5000 INJECTION INTRAVENOUS; SUBCUTANEOUS at 05:50

## 2018-09-24 RX ADMIN — Medication 1: at 06:01

## 2018-09-24 RX ADMIN — QUETIAPINE FUMARATE 50 MILLIGRAM(S): 200 TABLET, FILM COATED ORAL at 06:29

## 2018-09-24 RX ADMIN — Medication 3 MILLILITER(S): at 14:16

## 2018-09-24 RX ADMIN — Medication 5 UNIT(S): at 08:22

## 2018-09-24 RX ADMIN — Medication 1 TABLET(S): at 14:11

## 2018-09-24 RX ADMIN — Medication 6: at 00:05

## 2018-09-24 RX ADMIN — Medication 40 MILLIGRAM(S): at 05:50

## 2018-09-24 RX ADMIN — AMLODIPINE BESYLATE 2.5 MILLIGRAM(S): 2.5 TABLET ORAL at 05:50

## 2018-09-24 RX ADMIN — PANTOPRAZOLE SODIUM 40 MILLIGRAM(S): 20 TABLET, DELAYED RELEASE ORAL at 12:33

## 2018-09-24 RX ADMIN — Medication 2 MILLIGRAM(S): at 05:50

## 2018-09-24 RX ADMIN — LEVETIRACETAM 1000 MILLIGRAM(S): 250 TABLET, FILM COATED ORAL at 05:51

## 2018-09-24 RX ADMIN — Medication 3: at 17:50

## 2018-09-24 RX ADMIN — HEPARIN SODIUM 5000 UNIT(S): 5000 INJECTION INTRAVENOUS; SUBCUTANEOUS at 14:11

## 2018-09-24 RX ADMIN — AMIKACIN SULFATE 102.24 MILLIGRAM(S): 250 INJECTION, SOLUTION INTRAMUSCULAR; INTRAVENOUS at 06:02

## 2018-09-24 RX ADMIN — SENNA PLUS 2 TABLET(S): 8.6 TABLET ORAL at 22:23

## 2018-09-24 RX ADMIN — POLYMYXIN B SULFATE 500 UNIT(S): 500000 INJECTION, POWDER, LYOPHILIZED, FOR SOLUTION INTRAMUSCULAR; INTRATHECAL; INTRAVENOUS; OPHTHALMIC at 05:49

## 2018-09-24 RX ADMIN — QUETIAPINE FUMARATE 50 MILLIGRAM(S): 200 TABLET, FILM COATED ORAL at 17:14

## 2018-09-24 RX ADMIN — Medication 5 UNIT(S): at 17:14

## 2018-09-24 RX ADMIN — AMIKACIN SULFATE 102.24 MILLIGRAM(S): 250 INJECTION, SOLUTION INTRAMUSCULAR; INTRAVENOUS at 17:47

## 2018-09-24 RX ADMIN — POLYMYXIN B SULFATE 500 UNIT(S): 500000 INJECTION, POWDER, LYOPHILIZED, FOR SOLUTION INTRAMUSCULAR; INTRATHECAL; INTRAVENOUS; OPHTHALMIC at 17:45

## 2018-09-24 RX ADMIN — Medication 1 TABLET(S): at 05:50

## 2018-09-24 RX ADMIN — LEVETIRACETAM 1000 MILLIGRAM(S): 250 TABLET, FILM COATED ORAL at 17:14

## 2018-09-24 RX ADMIN — Medication 4 UNIT(S): at 02:21

## 2018-09-24 RX ADMIN — CHLORHEXIDINE GLUCONATE 1 APPLICATION(S): 213 SOLUTION TOPICAL at 05:51

## 2018-09-24 RX ADMIN — HEPARIN SODIUM 5000 UNIT(S): 5000 INJECTION INTRAVENOUS; SUBCUTANEOUS at 22:23

## 2018-09-24 RX ADMIN — Medication 100 MILLIGRAM(S): at 17:14

## 2018-09-24 RX ADMIN — Medication 2: at 12:34

## 2018-09-24 RX ADMIN — ATORVASTATIN CALCIUM 20 MILLIGRAM(S): 80 TABLET, FILM COATED ORAL at 22:23

## 2018-09-24 RX ADMIN — Medication 1 TABLET(S): at 22:23

## 2018-09-24 NOTE — CONSULT NOTE ADULT - PROBLEM SELECTOR RECOMMENDATION 9
Trial of IVF NS @50ml/hr X 10hrs.  If worse tomorrow, will have to straight cath to check labs, but would not do so now.    D/w ID re: abx choice and duration.  Would try to stop polymyxin and amikacin if possible.

## 2018-09-24 NOTE — PROGRESS NOTE ADULT - SUBJECTIVE AND OBJECTIVE BOX
Patient seen and examined.       MEDICATIONS  (STANDING):  ALBUTerol/ipratropium for Nebulization 3 milliLiter(s) Nebulizer every 6 hours  amiKACIN  IVPB 560 milliGRAM(s) IV Intermittent every 12 hours  amLODIPine   Tablet 2.5 milliGRAM(s) Oral daily  atorvastatin 20 milliGRAM(s) Oral at bedtime  benztropine 2 milliGRAM(s) Oral two times a day  chlorhexidine 4% Liquid 1 Application(s) Topical every 12 hours  furosemide   Injectable 40 milliGRAM(s) IV Push daily  heparin  Injectable 5000 Unit(s) SubCutaneous every 8 hours  insulin glargine Injectable (LANTUS) 12 Unit(s) SubCutaneous at bedtime  insulin lispro (HumaLOG) corrective regimen sliding scale   SubCutaneous every 6 hours  insulin lispro Injectable (HumaLOG) 5 Unit(s) SubCutaneous three times a day with meals  lactobacillus acidophilus 1 Tablet(s) Oral every 8 hours  levETIRAcetam  Solution 1000 milliGRAM(s) Oral two times a day  metoprolol tartrate 100 milliGRAM(s) Oral two times a day  pantoprazole   Suspension 40 milliGRAM(s) Oral daily  polymyxin B IVPB [id number] Unit(s) IV Intermittent every 12 hours  QUEtiapine 50 milliGRAM(s) Oral every 12 hours  rifampin IVPB      rifampin IVPB 600 milliGRAM(s) IV Intermittent every 24 hours  senna 2 Tablet(s) Oral at bedtime      MEDICATIONS  (PRN):  haloperidol    Injectable 5 milliGRAM(s) IntraMuscular once PRN Agitation  LORazepam   Injectable 1 milliGRAM(s) IV Push every 8 hours PRN for breathrough agitation/anxiety/combative behavior   Medications up to date at time of exam.      PHYSICAL EXAMINATION:  Patient has no new complaints.  GENERAL: The patient is a well-developed, well-nourished, in no apparent distress.     Vital Signs Last 24 Hrs  T(C): 37.6 (24 Sep 2018 05:10), Max: 37.6 (24 Sep 2018 05:10)  T(F): 99.6 (24 Sep 2018 05:10), Max: 99.6 (24 Sep 2018 05:10)  HR: 102 (24 Sep 2018 05:10) (82 - 102)  BP: 118/68 (24 Sep 2018 05:10) (103/53 - 158/64)  BP(mean): --  RR: 18 (24 Sep 2018 05:10) (18 - 20)  SpO2: 100% (24 Sep 2018 05:10) (97% - 100%)   (if applicable)      HEENT: Head is normocephalic and atraumatic.     NECK: Supple, no palpable adenopathy. trach    LUNGS: Clear to auscultation, no wheezing, rales, or+ rhonchi.    HEART: Regular rate and rhythm without murmur.    ABDOMEN: Soft, nontender, and nondistended.  No hepatosplenomegaly is noted.    EXTREMITIES: Without any cyanosis, clubbing, rash, lesions or edema.    NEUROLOGIC: Awake, alert.    SKIN: Warm, dry, good turgor.      LABS:                        9.1    7.6   )-----------( 235      ( 24 Sep 2018 07:18 )             30.3     09-24    131<L>  |  93<L>  |  16  ----------------------------<  163<H>  3.9   |  31  |  1.72<H>    Ca    9.0      24 Sep 2018 07:18  Phos  4.5     09-24  Mg     1.6     09-24    MICROBIOLOGY: (if applicable)    RADIOLOGY & ADDITIONAL STUDIES:  EKG:   CXR:  ECHO:    IMPRESSION: 63y Female PAST MEDICAL & SURGICAL HISTORY:  No pertinent past medical history  Hypertension  Schizophrenia  Diabetic coma  Diabetes mellitus  No significant past surgical history  S/P percutaneous endoscopic gastrostomy (PEG) tube placement  H/O tracheostomy       p/w     RECOMMENDATIONS:      62 yo F from [facility], non-verbal, bed bound, S/P Trach/ PEG tube, PMH of HTN, asthma, convulsion disorder, bipolar, DVT legs, DM 2, sent from NH for coffee ground vomiting X3 this morning and cough.  Pt unable to provide history.  History given by sister (Sharon [facility] [place] [phone])who states pt was vomiting coffee ground emesis this morning. Pt had no reported vomiting since in ED.  Pt also developed cough with clear sputum.  Sister states pt is combative at baseline, and her current behavior is baseline.  No reported fever, discomfort, diarrhea, or SOB.  [facility] and treated for aspiration PNA and UTI during last admission.  Pt was supposed to be given IV vanco and zosyn for 7 day by PCP for PNA , which supposed to start from 09/13-09/19. Pt is DNR, MOSLT form in charts.     RECOMMENDATIONS:    Patient presents with coffee ground emesis from NH. Also with PNA. Tachycardia in the setting of infection, which has now improved.  Continue to monitor HR. BP controlled at present time. Continue with meds. Cardiology sign off, reconsult prn.

## 2018-09-24 NOTE — PROGRESS NOTE ADULT - PROBLEM SELECTOR PLAN 2
Will hydrate gently. Case d/w attg and renal. Does not need LYTES at this time, as pt would neeed straight cath. Will check BMP in AM. If still hyponatremic will get lytes as recommended by Dr. Torres

## 2018-09-24 NOTE — PROGRESS NOTE ADULT - SUBJECTIVE AND OBJECTIVE BOX
Patient is seen and examined at the bed side, is afebrile. She is doing well, no new events. The WBC count stay normal.         REVIEW OF SYSTEMS: Unable to obtain due to mental status      ALLERGIES : NKDA      Vital Signs Last 24 Hrs  T(C): 36.7 (24 Sep 2018 15:00), Max: 37.6 (24 Sep 2018 05:10)  T(F): 98 (24 Sep 2018 15:00), Max: 99.6 (24 Sep 2018 05:10)  HR: 98 (24 Sep 2018 15:00) (98 - 102)  BP: 134/64 (24 Sep 2018 15:00) (103/53 - 134/64)  BP(mean): --  RR: 18 (24 Sep 2018 15:00) (18 - 20)  SpO2: 100% (24 Sep 2018 15:) (100% - 100%)        PHYSICAL EXAM:  GENERAL: Not in distress  HEENT: Trach in placed  CHEST/LUNG: Air entry B/L  HEART: s1 and s2 present  ABDOMEN: Nontender, and Nondistended  EXTREMITIES:  No pedal  edema  CNS: Awake but not Alert        LABS:                         9.1    7.6   )-----------( 235      ( 24 Sep 2018 07:18 )             30.3                        8.6    13.0  )-----------( 195      ( 21 Sep 2018 04:46 )             26.                          10.5   20.1  )-----------( 217      ( 19 Sep 2018 07:47 )             33.1             09-24    131<L>  |  93<L>  |  16  ----------------------------<  163<H>  3.9   |  31  |  1.72<H>    Ca    9.0      24 Sep 2018 07:18  Phos  4.5     09-24  Mg     1.6     -24      09-23    133<L>  |  94<L>  |  8   ----------------------------<  260<H>  3.4<L>   |  28  |  1.27    Ca    8.4      23 Sep 2018 06:49  Phos  3.8     09-23  Mg     1.7     -23        TPro  6.2  /  Alb  2.2<L>  /  TBili  0.9  /  DBili  0.3<H>  /  AST  24  /  ALT  20  /  AlkPhos  86  09-         Urinalysis Basic - ( 14 Sep 2018 03:56 )    Color: Yellow / Appearance: Clear / S.020 / pH: x  Gluc: x / Ketone: Negative  / Bili: Negative / Urobili: Negative   Blood: x / Protein: 100 / Nitrite: Negative   Leuk Esterase: Negative / RBC: 0-2 /HPF / WBC 0-2 /HPF   Sq Epi: x / Non Sq Epi: Occasional /HPF / Bacteria: Trace /HPF      CAPILLARY BLOOD GLUCOSE      POCT Blood Glucose.: 144 mg/dL (14 Sep 2018 11:07)  POCT Blood Glucose.: 202 mg/dL (14 Sep 2018 08:09)  POCT Blood Glucose.: 277 mg/dL (14 Sep 2018 06:58)  POCT Blood Glucose.: 68 mg/dL (14 Sep 2018 06:03)  POCT Blood Glucose.: 316 mg/dL (14 Sep 2018 02:11)  POCT Blood Glucose.: 405 mg/dL (14 Sep 2018 00:28)  POCT Blood Glucose.: 386 mg/dL (13 Sep 2018 22:26)  POCT Blood Glucose.: 403 mg/dL (13 Sep 2018 19:09)      MEDICATIONS  (STANDING):  ALBUTerol/ipratropium for Nebulization 3 milliLiter(s) Nebulizer every 6 hours  amiKACIN  IVPB 560 milliGRAM(s) IV Intermittent every 12 hours  amLODIPine   Tablet 2.5 milliGRAM(s) Oral daily  atorvastatin 20 milliGRAM(s) Oral at bedtime  benztropine 2 milliGRAM(s) Oral two times a day  chlorhexidine 4% Liquid 1 Application(s) Topical every 12 hours  heparin  Injectable 5000 Unit(s) SubCutaneous every 8 hours  insulin glargine Injectable (LANTUS) 14 Unit(s) SubCutaneous at bedtime  insulin lispro (HumaLOG) corrective regimen sliding scale   SubCutaneous every 6 hours  insulin lispro Injectable (HumaLOG) 5 Unit(s) SubCutaneous three times a day with meals  lactobacillus acidophilus 1 Tablet(s) Oral every 8 hours  levETIRAcetam  Solution 1000 milliGRAM(s) Oral two times a day  metoprolol tartrate 100 milliGRAM(s) Oral two times a day  pantoprazole   Suspension 40 milliGRAM(s) Oral daily  polymyxin B IVPB 411563 Unit(s) IV Intermittent every 12 hours  QUEtiapine 50 milliGRAM(s) Oral every 12 hours  rifampin IVPB      rifampin IVPB 600 milliGRAM(s) IV Intermittent every 24 hours  senna 2 Tablet(s) Oral at bedtime  sodium chloride 0.9%. 500 milliLiter(s) (50 mL/Hr) IV Continuous <Continuous>    MEDICATIONS  (PRN):  haloperidol    Injectable 5 milliGRAM(s) IntraMuscular once PRN Agitation  LORazepam   Injectable 1 milliGRAM(s) IV Push every 8 hours PRN for breathrough agitation/anxiety/combative behavior            RADIOLOGY & ADDITIONAL TESTS:    18: Xray Chest 1 View- PORTABLE-Routine (18 @ 08:22) Tracheostomy cannula reidentified in position. No change heart mediastinum. No change in the right basilar opacity with loss of definition of the ipsilateral hemidiaphragm and costophrenic angle likely   representing a combination of pleural fluid and underlying  consolidation/atelectasis.      18: CT Chest No Cont (18 @ 03:41) Impression: Consolidation involving the right lower lobe is of uncertain etiology. It is unclear whether this represents compressive atelectasis secondary to right pleural effusion/elevation of the right hemidiaphragm  and/or represents infection.    18: Xray Chest 1 View-PORTABLE IMMEDIATE (18 @ 13:37) Bilateral pleural effusions and associated atelectasis.        MICROBIOLOGY DATA:    Culture - Blood (18 @ 17:33)    Specimen Source: .Blood Blood-Peripheral    Culture Results:   No growth to date.      Culture - Blood (18 @ 13:21)    Specimen Source: .Blood Blood-Peripheral    Culture Results:   No growth to date.        Culture - Sputum . (09.15.18 @ 09:28)    Gram Stain:   Rare polymorphonuclear leukocytes per low power field  Few Squamous epithelial cells per low power field  Moderate Gram Negative Rods per oil power field  Few Gram Positive Rods per oil power field  Few Yeast like cells per oil power field  Rare Gram Negative Coccobacilli per oil power field    -  Amikacin: S <=8    -  Amikacin: S <=8    -  Ampicillin/Sulbactam: R >16/8    -  Aztreonam: S 8    -  Cefepime: I 16    -  Cefepime: I 16    -  Ceftazidime: R >16    -  Ceftazidime: I 16    -  Ciprofloxacin: R >2    -  Ciprofloxacin: R >2    -  Gentamicin: R >8    -  Gentamicin: R >8    -  Imipenem: R >8    -  Imipenem: R    -  Levofloxacin: R >4    -  Levofloxacin: R >4    -  Meropenem: R >8    -  Meropenem: I 8    -  Piperacillin/Tazobactam: R >64    -  Piperacillin/Tazobactam: R    -  Tobramycin: R >8    -  Tobramycin: R >8    -  Trimethoprim/Sulfamethoxazole: R >2/38    Specimen Source: .Sputum Sputum    Culture Results:   Numerous Pseudomonas aeruginosa (Carbapenem Resistant)  Moderate Acinetobacter baumannii/haemolyticus (Carbapenem Resistant)  Complex  Normal Respiratory Moraima present    Organism Identification: Pseudomonas aeruginosa (Carbapenem Resistant)  Acinetobacter baumannii/haemolyticus (Carbapenem Resistant)    Organism: Acinetobacter baumannii/haemolyticus (Carbapenem Resistant)    Organism: Pseudomonas aeruginosa (Carbapenem Resistant)    Organism: Acinetobacter baumannii/haemolyticus (Carbapenem Resistant)    Method Type: ESTIVEN    Method Type: ESTIVEN    Method Type: ISMA        MRSA/MSSA PCR (18 @ 11:26)    MRSA PCR Result.: NotDetec: MRSA/MSSA PCR assay is a qualitative in vitro diagnostic test for the  direct detection and differentiation of methicillin-resistant  Staphylococcus aureus (MRSA) from Staphylococcus aureus (SA). The assay  detects DNA from nasal swabs in patients atrisk for nasal colonization.  It is not intended to diagnose MRSA or SA infections nor guide or monitor  treatment for MRSA/SA infections. A negative result does not preclude  nasal colonization. The assay is FDA-approved and its performance has  been established by [employer], [place] and the [facility], [place], [place].    Staph Aureus PCR Result: NotDetec      Culture - Urine (18 @ 09:47)    Specimen Source: .Urine Catheterized    Culture Results:   10,000 - 49,000 CFU/mL Yeast

## 2018-09-24 NOTE — PROGRESS NOTE ADULT - PROBLEM SELECTOR PLAN 1
mild  likely 2/2 recent lasix administration  f/u urine lytes  monitor renal function and u/o  avoid nephrotoxic rx

## 2018-09-24 NOTE — CONSULT NOTE ADULT - ASSESSMENT
63 year-old woman with LAYLA hemodynamically-mediated in the setting of lasix vs. ATN from polymyxin and amikacin vs. AIN from multiple abx.  Hyponatremia likely due to dehydration as well.  Elevate bicarbonate ether respiratory acidosis or metabolic alkalosis.

## 2018-09-24 NOTE — CONSULT NOTE ADULT - PROBLEM SELECTOR RECOMMENDATION 2
Trial of IVF NS @50ml/hr X 10hrs.  If worse tomorrow, will have to straight cath to check labs, but would not do so now.

## 2018-09-24 NOTE — PROGRESS NOTE ADULT - SUBJECTIVE AND OBJECTIVE BOX
NP Note discussed with  Primary Attending    Patient is a 63y old  Female who presents with a chief complaint of vomiting (24 Sep 2018 12:18)      INTERVAL HPI/OVERNIGHT EVENTS: no new complaints    MEDICATIONS  (STANDING):  ALBUTerol/ipratropium for Nebulization 3 milliLiter(s) Nebulizer every 6 hours  amiKACIN  IVPB 560 milliGRAM(s) IV Intermittent every 12 hours  amLODIPine   Tablet 2.5 milliGRAM(s) Oral daily  atorvastatin 20 milliGRAM(s) Oral at bedtime  benztropine 2 milliGRAM(s) Oral two times a day  chlorhexidine 4% Liquid 1 Application(s) Topical every 12 hours  heparin  Injectable 5000 Unit(s) SubCutaneous every 8 hours  insulin glargine Injectable (LANTUS) 12 Unit(s) SubCutaneous at bedtime  insulin lispro (HumaLOG) corrective regimen sliding scale   SubCutaneous every 6 hours  insulin lispro Injectable (HumaLOG) 5 Unit(s) SubCutaneous three times a day with meals  lactobacillus acidophilus 1 Tablet(s) Oral every 8 hours  levETIRAcetam  Solution 1000 milliGRAM(s) Oral two times a day  metoprolol tartrate 100 milliGRAM(s) Oral two times a day  pantoprazole   Suspension 40 milliGRAM(s) Oral daily  polymyxin B IVPB 870414 Unit(s) IV Intermittent every 12 hours  QUEtiapine 50 milliGRAM(s) Oral every 12 hours  rifampin IVPB      rifampin IVPB 600 milliGRAM(s) IV Intermittent every 24 hours  senna 2 Tablet(s) Oral at bedtime  sodium chloride 0.9%. 500 milliLiter(s) (50 mL/Hr) IV Continuous <Continuous>    MEDICATIONS  (PRN):  haloperidol    Injectable 5 milliGRAM(s) IntraMuscular once PRN Agitation  LORazepam   Injectable 1 milliGRAM(s) IV Push every 8 hours PRN for breathrough agitation/anxiety/combative behavior      __________________________________________________  REVIEW OF SYSTEMS:    CONSTITUTIONAL: No fever,   EYES: no acute visual disturbances  NECK: No pain or stiffness  RESPIRATORY: No cough; No shortness of breath  CARDIOVASCULAR: No chest pain, no palpitations  GASTROINTESTINAL: No pain. No nausea or vomiting; No diarrhea   NEUROLOGICAL: No headache or numbness, no tremors  MUSCULOSKELETAL: No joint pain, no muscle pain  GENITOURINARY: no dysuria, no frequency, no hesitancy  PSYCHIATRY: no depression , no anxiety  ALL OTHER  ROS negative        Vital Signs Last 24 Hrs  T(C): 37.6 (24 Sep 2018 05:10), Max: 37.6 (24 Sep 2018 05:10)  T(F): 99.6 (24 Sep 2018 05:10), Max: 99.6 (24 Sep 2018 05:10)  HR: 102 (24 Sep 2018 05:10) (82 - 102)  BP: 118/68 (24 Sep 2018 05:10) (103/53 - 158/64)  BP(mean): --  RR: 18 (24 Sep 2018 05:10) (18 - 20)  SpO2: 100% (24 Sep 2018 05:10) (97% - 100%)    ________________________________________________  PHYSICAL EXAM:  GENERAL: NAD  HEENT: Normocephalic;  conjunctivae and sclerae clear; moist mucous membranes;   NECK : supple  CHEST/LUNG: Clear to auscultation bilaterally with good air entry   HEART: S1 S2  regular; no murmurs, gallops or rubs  ABDOMEN: Soft, Nontender, Nondistended; Bowel sounds present  EXTREMITIES: no cyanosis; no edema; no calf tenderness  SKIN: warm and dry; no rash  NERVOUS SYSTEM:  Awake and alert; Oriented  to place, person and time ; no new deficits    _________________________________________________  LABS:                        9.1    7.6   )-----------( 235      ( 24 Sep 2018 07:18 )             30.3     09-24    131<L>  |  93<L>  |  16  ----------------------------<  163<H>  3.9   |  31  |  1.72<H>    Ca    9.0      24 Sep 2018 07:18  Phos  4.5     09-24  Mg     1.6     09-24          CAPILLARY BLOOD GLUCOSE      POCT Blood Glucose.: 243 mg/dL (24 Sep 2018 12:03)  POCT Blood Glucose.: 184 mg/dL (24 Sep 2018 05:52)  POCT Blood Glucose.: 322 mg/dL (24 Sep 2018 01:50)  POCT Blood Glucose.: 411 mg/dL (23 Sep 2018 23:54)  POCT Blood Glucose.: 366 mg/dL (23 Sep 2018 23:53)  POCT Blood Glucose.: 267 mg/dL (23 Sep 2018 22:14)  POCT Blood Glucose.: 243 mg/dL (23 Sep 2018 16:59)        RADIOLOGY & ADDITIONAL TESTS:    Imaging Personally Reviewed:  YES/NO    Consultant(s) Notes Reviewed:   YES/ No    Care Discussed with Consultants :     Plan of care was discussed with patient and /or primary care giver; all questions and concerns were addressed and care was aligned with patient's wishes. NP Note discussed with  Primary Attending    Patient is a 63y old  Female who presents with a chief complaint of vomiting (24 Sep 2018 12:18)      INTERVAL HPI/OVERNIGHT EVENTS: no new complaints    MEDICATIONS  (STANDING):  ALBUTerol/ipratropium for Nebulization 3 milliLiter(s) Nebulizer every 6 hours  amiKACIN  IVPB 560 milliGRAM(s) IV Intermittent every 12 hours  amLODIPine   Tablet 2.5 milliGRAM(s) Oral daily  atorvastatin 20 milliGRAM(s) Oral at bedtime  benztropine 2 milliGRAM(s) Oral two times a day  chlorhexidine 4% Liquid 1 Application(s) Topical every 12 hours  heparin  Injectable 5000 Unit(s) SubCutaneous every 8 hours  insulin glargine Injectable (LANTUS) 12 Unit(s) SubCutaneous at bedtime  insulin lispro (HumaLOG) corrective regimen sliding scale   SubCutaneous every 6 hours  insulin lispro Injectable (HumaLOG) 5 Unit(s) SubCutaneous three times a day with meals  lactobacillus acidophilus 1 Tablet(s) Oral every 8 hours  levETIRAcetam  Solution 1000 milliGRAM(s) Oral two times a day  metoprolol tartrate 100 milliGRAM(s) Oral two times a day  pantoprazole   Suspension 40 milliGRAM(s) Oral daily  polymyxin B IVPB [id number] Unit(s) IV Intermittent every 12 hours  QUEtiapine 50 milliGRAM(s) Oral every 12 hours  rifampin IVPB      rifampin IVPB 600 milliGRAM(s) IV Intermittent every 24 hours  senna 2 Tablet(s) Oral at bedtime  sodium chloride 0.9%. 500 milliLiter(s) (50 mL/Hr) IV Continuous <Continuous>    MEDICATIONS  (PRN):  haloperidol    Injectable 5 milliGRAM(s) IntraMuscular once PRN Agitation  LORazepam   Injectable 1 milliGRAM(s) IV Push every 8 hours PRN for breathrough agitation/anxiety/combative behavior      __________________________________________________  REVIEW OF SYSTEMS:    cannot perform ROS. Pt non verbal with dementia      Vital Signs Last 24 Hrs  T(C): 37.6 (24 Sep 2018 05:10), Max: 37.6 (24 Sep 2018 05:10)  T(F): 99.6 (24 Sep 2018 05:10), Max: 99.6 (24 Sep 2018 05:10)  HR: 102 (24 Sep 2018 05:10) (82 - 102)  BP: 118/68 (24 Sep 2018 05:10) (103/53 - 158/64)  BP(mean): --  RR: 18 (24 Sep 2018 05:10) (18 - 20)  SpO2: 100% (24 Sep 2018 05:10) (97% - 100%)    ________________________________________________  PHYSICAL EXAM:  GENERAL: NAD  HEENT: Normocephalic;  conjunctivae and sclerae clear; moist mucous membranes;   NECK : suppleno wheezing or rhonchi  HEART: S1 S2  regular;   ABDOMEN: Soft, Nontender, Nondistended; Bowel sounds present. Peg insitu  EXTREMITIES: no cyanosis; no edema; no calf tenderness. EDMONDS and is in constant motion  SKIN: warm and dry; no rash      _________________________________________________  LABS:                        9.1    7.6   )-----------( 235      ( 24 Sep 2018 07:18 )             30.3     09-24    131<L>  |  93<L>  |  16  ----------------------------<  163<H>  3.9   |  31  |  1.72<H>    Ca    9.0      24 Sep 2018 07:18  Phos  4.5     09-24  Mg     1.6     09-24          CAPILLARY BLOOD GLUCOSE      POCT Blood Glucose.: 243 mg/dL (24 Sep 2018 12:03)  POCT Blood Glucose.: 184 mg/dL (24 Sep 2018 05:52)  POCT Blood Glucose.: 322 mg/dL (24 Sep 2018 01:50)  POCT Blood Glucose.: 411 mg/dL (23 Sep 2018 23:54)  POCT Blood Glucose.: 366 mg/dL (23 Sep 2018 23:53)  POCT Blood Glucose.: 267 mg/dL (23 Sep 2018 22:14)  POCT Blood Glucose.: 243 mg/dL (23 Sep 2018 16:59)        RADIOLOGY & ADDITIONAL TESTS:    Imaging Personally Reviewed:  YES/NO    Consultant(s) Notes Reviewed:   YES/ No    Care Discussed with Consultants :     Plan of care was discussed with patient and /or primary care giver; all questions and concerns were addressed and care was aligned with patient's wishes.

## 2018-09-24 NOTE — PROGRESS NOTE ADULT - SUBJECTIVE AND OBJECTIVE BOX
Patient is a 63y old  Female who presents with a chief complaint of vomiting (24 Sep 2018 12:27)  Patient is resting comfortable in bed in NAD. Not agitated    INTERVAL HPI/OVERNIGHT EVENTS:      VITAL SIGNS:  T(F): 99.6 (09-24-18 @ 05:10)  HR: 102 (09-24-18 @ 05:10)  BP: 118/68 (09-24-18 @ 05:10)  RR: 18 (09-24-18 @ 05:10)  SpO2: 100% (09-24-18 @ 05:10)  Wt(kg): --  I&O's Detail          REVIEW OF SYSTEMS:    CONSTITUTIONAL:  No fevers, chills, sweats    HEENT:  Eyes:  No diplopia or blurred vision. ENT:  No earache, sore throat or runny nose.    CARDIOVASCULAR:  No pressure, squeezing, tightness, or heaviness about the chest; no palpitations.    RESPIRATORY:  Per HPI    GASTROINTESTINAL:  No abdominal pain, nausea, vomiting or diarrhea.    GENITOURINARY:  No dysuria, frequency or urgency.    NEUROLOGIC:  No paresthesias, fasciculations, seizures or weakness.    PSYCHIATRIC:  No disorder of thought or mood.      PHYSICAL EXAM:    Constitutional: Well developed and nourished  Eyes:Perrla  ENMT: normal  Neck:supple  Respiratory: good air entry  Cardiovascular: S1 S2 regular  Gastrointestinal: Soft, Non tender  Extremities: No edema  Vascular:normal  Neurological:non focal  Musculoskeletal:Normal      MEDICATIONS  (STANDING):  ALBUTerol/ipratropium for Nebulization 3 milliLiter(s) Nebulizer every 6 hours  amiKACIN  IVPB 560 milliGRAM(s) IV Intermittent every 12 hours  amLODIPine   Tablet 2.5 milliGRAM(s) Oral daily  atorvastatin 20 milliGRAM(s) Oral at bedtime  benztropine 2 milliGRAM(s) Oral two times a day  chlorhexidine 4% Liquid 1 Application(s) Topical every 12 hours  heparin  Injectable 5000 Unit(s) SubCutaneous every 8 hours  insulin glargine Injectable (LANTUS) 14 Unit(s) SubCutaneous at bedtime  insulin lispro (HumaLOG) corrective regimen sliding scale   SubCutaneous every 6 hours  insulin lispro Injectable (HumaLOG) 5 Unit(s) SubCutaneous three times a day with meals  lactobacillus acidophilus 1 Tablet(s) Oral every 8 hours  levETIRAcetam  Solution 1000 milliGRAM(s) Oral two times a day  metoprolol tartrate 100 milliGRAM(s) Oral two times a day  pantoprazole   Suspension 40 milliGRAM(s) Oral daily  polymyxin B IVPB 461600 Unit(s) IV Intermittent every 12 hours  QUEtiapine 50 milliGRAM(s) Oral every 12 hours  rifampin IVPB      rifampin IVPB 600 milliGRAM(s) IV Intermittent every 24 hours  senna 2 Tablet(s) Oral at bedtime  sodium chloride 0.9%. 500 milliLiter(s) (50 mL/Hr) IV Continuous <Continuous>    MEDICATIONS  (PRN):  haloperidol    Injectable 5 milliGRAM(s) IntraMuscular once PRN Agitation  LORazepam   Injectable 1 milliGRAM(s) IV Push every 8 hours PRN for breathrough agitation/anxiety/combative behavior      Allergies    angiotensin converting enzyme inhibitors (Unknown)    Intolerances        LABS:                        9.1    7.6   )-----------( 235      ( 24 Sep 2018 07:18 )             30.3     09-24    131<L>  |  93<L>  |  16  ----------------------------<  163<H>  3.9   |  31  |  1.72<H>    Ca    9.0      24 Sep 2018 07:18  Phos  4.5     09-24  Mg     1.6     09-24                CAPILLARY BLOOD GLUCOSE      POCT Blood Glucose.: 243 mg/dL (24 Sep 2018 12:03)  POCT Blood Glucose.: 184 mg/dL (24 Sep 2018 05:52)  POCT Blood Glucose.: 322 mg/dL (24 Sep 2018 01:50)  POCT Blood Glucose.: 411 mg/dL (23 Sep 2018 23:54)  POCT Blood Glucose.: 366 mg/dL (23 Sep 2018 23:53)  POCT Blood Glucose.: 267 mg/dL (23 Sep 2018 22:14)  POCT Blood Glucose.: 243 mg/dL (23 Sep 2018 16:59)        RADIOLOGY & ADDITIONAL TESTS:    CXR:    Ct scan chest:    ekg;    echo:

## 2018-09-24 NOTE — PROGRESS NOTE ADULT - ASSESSMENT
A 64 yo Female  with  non-verbal, bed bound, S/P Trach/ PEG tube, sent in to the ER from [facility]  for evaluation of coffee ground vomiting  and cough. On arrival, she found to have tachycardia, Leukocytosis, and RLL consolidation. She has started on Zosyn and Vancomycin and the ID consult requested to assist with further evaluation and antibiotic management.     # Aspiration pneumonia - RLL consolidation - Pseudomonas and Acinetobacter   # MRSA PCR is negative    Would recommend:    1. Bronchial suctioning   2. Aspiration  precaution  3. Continue Polymixin, Rifampin and amikacin to cover CRE until 9/26/18  4. Frequent repositioning      d/w  Nursing staff    will follow the patient with you

## 2018-09-24 NOTE — PROGRESS NOTE ADULT - SUBJECTIVE AND OBJECTIVE BOX
Interval Events:  pt in nadd  on gt feeds at 65cc/hr    Allergies    angiotensin converting enzyme inhibitors (Unknown)    Intolerances      Endocrine/Metabolic Medications:  atorvastatin 20 milliGRAM(s) Oral at bedtime  insulin glargine Injectable (LANTUS) 14 Unit(s) SubCutaneous at bedtime  insulin lispro (HumaLOG) corrective regimen sliding scale   SubCutaneous every 6 hours  insulin lispro Injectable (HumaLOG) 7 Unit(s) SubCutaneous three times a day with meals      Vital Signs Last 24 Hrs  T(C): 36.7 (24 Sep 2018 21:05), Max: 37.6 (24 Sep 2018 05:10)  T(F): 98 (24 Sep 2018 21:05), Max: 99.6 (24 Sep 2018 05:10)  HR: 80 (24 Sep 2018 21:05) (80 - 102)  BP: 116/93 (24 Sep 2018 21:05) (116/93 - 134/64)  BP(mean): --  RR: 18 (24 Sep 2018 21:05) (18 - 18)  SpO2: 100% (24 Sep 2018 21:05) (100% - 100%)      PHYSICAL EXAM  All physical exam findings normal, except those marked:  General:	Alert, active, cooperative, NAD, well hydrated  .		[] Abnormal:  Neck		Normal: supple, no cervical adenopathy, no palpable thyroid  .		[] Abnormal:  Cardiovascular	Normal: regular rate, normal S1, S2, no murmurs  .		[] Abnormal:  Respiratory	Normal: no chest wall deformity, normal respiratory pattern, CTA B/L  .		[] Abnormal:  Abdominal	Normal: soft, ND, NT, bowel sounds present, no masses, no organomegaly  .		[] Abnormal:  		Normal normal genitalia, testes descended, circumcised/uncircumcised  .		Bob stage:			Breast bob:  .		Menstrual history:  .		[] Abnormal:  Extremities	Normal: FROM x4  .		[] Abnormal:  Skin		Normal: intact and not indurated, no rash, no acanthosis nigricans  .		[] Abnormal:  Neurologic	Normal: grossly intact  .		[] Abnormal:    LABS                        9.1    7.6   )-----------( 235      ( 24 Sep 2018 07:18 )             30.3                               131    |  93     |  16                  Calcium: 9.0   / iCa: x      (09-24 @ 07:18)    ----------------------------<  163       Magnesium: 1.6                              3.9     |  31     |  1.72             Phosphorous: 4.5        CAPILLARY BLOOD GLUCOSE      POCT Blood Glucose.: 273 mg/dL (24 Sep 2018 17:17)  POCT Blood Glucose.: 243 mg/dL (24 Sep 2018 12:03)  POCT Blood Glucose.: 184 mg/dL (24 Sep 2018 05:52)  POCT Blood Glucose.: 322 mg/dL (24 Sep 2018 01:50)  POCT Blood Glucose.: 411 mg/dL (23 Sep 2018 23:54)  POCT Blood Glucose.: 366 mg/dL (23 Sep 2018 23:53)        Assesment/plan    Dm- remains un controlled   at goal gt feeds 65cc/hr  cont lantus 14 units  increase humalog to 7 tid   fsg q6

## 2018-09-24 NOTE — CONSULT NOTE ADULT - ATTENDING COMMENTS
[facility] NEPHROLOGY  Donnell Torres M.D.  Miguel Clayton D.O.  Lizzette Musa M.D.  Yuliya Taylor, MSN, ANP-C    Telephone: [phone]  Facsimile: [phone]    [address] [place] [address]

## 2018-09-24 NOTE — PROGRESS NOTE ADULT - SUBJECTIVE AND OBJECTIVE BOX
Patient seen and examined at bedside  No acute events noted overnight  Case discussed with medical team    HPI:  64 yo F from [facility], non-verbal, bed bound, S/P Trach/ PEG tube, PMH of HTN, asthma, convulsion disorder, bipolar, DVT legs, DM 2, sent from NH for coffee ground vomiting X3 this morning and cough.  Pt unable to provide history.  History given by sister (Sharon [phone])who states pt was vomiting coffee ground emesis this morning. Pt had no reported vomiting since in ED.  Pt also developed cough with clear sputum.  Sister states pt is combative at baseline, and her current behavior is baseline.  No reported fever, discomfort, diarrhea, or SOB.  Pt was recently admitted to [facility] in 07/2018 for same reason and treated for aspiration PNA and UTI during last admission.  Pt was supposed to be given IV vanco and zosyn for 7 day by PCP for PNA , which supposed to start from 09/13-09/19. Pt is DNR, MOSLT form in charts. (13 Sep 2018 15:49)      PAST MEDICAL & SURGICAL HISTORY:  No pertinent past medical history  Hypertension  Schizophrenia  Diabetic coma  Diabetes mellitus  No significant past surgical history  S/P percutaneous endoscopic gastrostomy (PEG) tube placement  H/O tracheostomy      angiotensin converting enzyme inhibitors (Unknown)       MEDICATIONS  (STANDING):  ALBUTerol/ipratropium for Nebulization 3 milliLiter(s) Nebulizer every 6 hours  amiKACIN  IVPB 560 milliGRAM(s) IV Intermittent every 12 hours  amLODIPine   Tablet 2.5 milliGRAM(s) Oral daily  atorvastatin 20 milliGRAM(s) Oral at bedtime  benztropine 2 milliGRAM(s) Oral two times a day  chlorhexidine 4% Liquid 1 Application(s) Topical every 12 hours  furosemide   Injectable 40 milliGRAM(s) IV Push daily  heparin  Injectable 5000 Unit(s) SubCutaneous every 8 hours  insulin glargine Injectable (LANTUS) 12 Unit(s) SubCutaneous at bedtime  insulin lispro (HumaLOG) corrective regimen sliding scale   SubCutaneous every 6 hours  insulin lispro Injectable (HumaLOG) 5 Unit(s) SubCutaneous three times a day with meals  lactobacillus acidophilus 1 Tablet(s) Oral every 8 hours  levETIRAcetam  Solution 1000 milliGRAM(s) Oral two times a day  metoprolol tartrate 100 milliGRAM(s) Oral two times a day  pantoprazole   Suspension 40 milliGRAM(s) Oral daily  polymyxin B IVPB 214993 Unit(s) IV Intermittent every 12 hours  QUEtiapine 50 milliGRAM(s) Oral every 12 hours  rifampin IVPB      rifampin IVPB 600 milliGRAM(s) IV Intermittent every 24 hours  senna 2 Tablet(s) Oral at bedtime    MEDICATIONS  (PRN):  haloperidol    Injectable 5 milliGRAM(s) IntraMuscular once PRN Agitation  LORazepam   Injectable 1 milliGRAM(s) IV Push every 8 hours PRN for breathrough agitation/anxiety/combative behavior      REVIEW OF SYSTEMS: limited from pt 2/2 mental status    T(C): 37.6 (09-24-18 @ 05:10), Max: 37.6 (09-24-18 @ 05:10)  HR: 102 (09-24-18 @ 05:10) (82 - 102)  BP: 118/68 (09-24-18 @ 05:10) (103/53 - 158/64)  RR: 18 (09-24-18 @ 05:10) (18 - 20)  SpO2: 100% (09-24-18 @ 05:10) (97% - 100%)    PHYSICAL EXAMINATION:   Constitutional: NAD  HEENT: AT  Neck:  Supple  Respiratory:  trach collar intact. Adequate airflow b/l. Not using accessory muscles of respiration.  Cardiovascular:  S1 & S2 intact, no R/G, 2+ radial pulses b/l  Gastrointestinal: Soft, ND, normoactive b.s.,   Extremities: WWP  Neurological:  stable, awake.      Labs and imaging reviewed    LABS:                        9.1    7.6   )-----------( 235      ( 24 Sep 2018 07:18 )             30.3     09-24    131<L>  |  93<L>  |  16  ----------------------------<  163<H>  3.9   |  31  |  1.72<H>    Ca    9.0      24 Sep 2018 07:18  Phos  4.5     09-24  Mg     1.6     09-24              CAPILLARY BLOOD GLUCOSE      POCT Blood Glucose.: 243 mg/dL (24 Sep 2018 12:03)  POCT Blood Glucose.: 184 mg/dL (24 Sep 2018 05:52)  POCT Blood Glucose.: 322 mg/dL (24 Sep 2018 01:50)  POCT Blood Glucose.: 411 mg/dL (23 Sep 2018 23:54)  POCT Blood Glucose.: 366 mg/dL (23 Sep 2018 23:53)  POCT Blood Glucose.: 267 mg/dL (23 Sep 2018 22:14)  POCT Blood Glucose.: 243 mg/dL (23 Sep 2018 16:59)              RADIOLOGY & ADDITIONAL STUDIES:

## 2018-09-24 NOTE — CONSULT NOTE ADULT - SUBJECTIVE AND OBJECTIVE BOX
Pt interviewed and examined. Full note to follow [facility] NEPHROLOGY- CONSULTATION NOTE    Patient is a 63y old  Female who presents with a chief complaint of vomiting (24 Sep 2018 12:18) with coffee ground emesis and cough.  She was found to have PNA.  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  She was started on polymyxin on 9/16 and amikacin on 9/17.  Baseline creatinine is  0.8, eitan to ~1 on 9/20, 1.3 on 9/22 and 1.7 today.  Pt is also on rifampin.  She was previously on vanco, zosyn, and unasyn this admission as well.  Pt was given lasix IV on 9/21 and 9/22.  She has a PEG and has been tolerating PEG feeds. No ACEi/ARBs/NSAIDs/IV Contrast.    Pt is unable to give history.    PAST MEDICAL & SURGICAL HISTORY:  No pertinent past medical history  Hypertension  Schizophrenia  Diabetic coma  Diabetes mellitus  No significant past surgical history  S/P percutaneous endoscopic gastrostomy (PEG) tube placement  H/O tracheostomy    angiotensin converting enzyme inhibitors (Unknown)    Home Medications Reviewed  Hospital Medications:   MEDICATIONS  (STANDING):  ALBUTerol/ipratropium for Nebulization 3 milliLiter(s) Nebulizer every 6 hours  amiKACIN  IVPB 560 milliGRAM(s) IV Intermittent every 12 hours  amLODIPine   Tablet 2.5 milliGRAM(s) Oral daily  atorvastatin 20 milliGRAM(s) Oral at bedtime  benztropine 2 milliGRAM(s) Oral two times a day  chlorhexidine 4% Liquid 1 Application(s) Topical every 12 hours  heparin  Injectable 5000 Unit(s) SubCutaneous every 8 hours  insulin glargine Injectable (LANTUS) 14 Unit(s) SubCutaneous at bedtime  insulin lispro (HumaLOG) corrective regimen sliding scale   SubCutaneous every 6 hours  insulin lispro Injectable (HumaLOG) 7 Unit(s) SubCutaneous three times a day with meals  lactobacillus acidophilus 1 Tablet(s) Oral every 8 hours  levETIRAcetam  Solution 1000 milliGRAM(s) Oral two times a day  metoprolol tartrate 100 milliGRAM(s) Oral two times a day  pantoprazole   Suspension 40 milliGRAM(s) Oral daily  polymyxin B IVPB 114665 Unit(s) IV Intermittent every 12 hours  QUEtiapine 50 milliGRAM(s) Oral every 12 hours  rifampin IVPB      rifampin IVPB 600 milliGRAM(s) IV Intermittent every 24 hours  senna 2 Tablet(s) Oral at bedtime  sodium chloride 0.9%. 500 milliLiter(s) (50 mL/Hr) IV Continuous <Continuous>    SOCIAL HISTORY:  Denies ETOh,Smoking,   FAMILY HISTORY:  No pertinent family history in first degree relatives    REVIEW OF SYSTEMS: unable to obtain    VITALS:  T(F): 98 (09-24-18 @ 21:05), Max: 99.6 (09-24-18 @ 05:10)  HR: 80 (09-24-18 @ 21:05)  BP: 116/93 (09-24-18 @ 21:05)  RR: 18 (09-24-18 @ 21:05)  SpO2: 100% (09-24-18 @ 21:05)  Wt(kg): --      PHYSICAL EXAM:  Constitutional: NAD, calm  HEENT: anicteric sclera, oropharynx clear, MMM  Neck: No JVD  Respiratory: CTAB, no wheezes, rales or rhonchi, trach  Cardiovascular: S1, S2, RRR  Gastrointestinal: BS+, soft, NT/ND  Extremities: No cyanosis or clubbing. No peripheral edema  Neurological: A/O x 3, no focal deficits  Psychiatric: Normal mood, normal affect  : No CVA tenderness. No coe.   Skin: No rashes  Vascular Access:    LABS:  09-24    131<L>  |  93<L>  |  16  ----------------------------<  163<H>  3.9   |  31  |  1.72<H>    Ca    9.0      24 Sep 2018 07:18  Phos  4.5     09-24  Mg     1.6     09-24      Creatinine Trend: 1.72 <--, 1.27 <--, 1.27 <--, 1.04 <--, 1.02 <--, 1.07 <--, 0.76 <--                        9.1    7.6   )-----------( 235      ( 24 Sep 2018 07:18 )             30.3     Urine Studies:      RADIOLOGY & ADDITIONAL STUDIES:    < from: Xray Chest 1 View- PORTABLE-Routine (09.24.18 @ 08:49) >    EXAM:  XR CHEST PORTABLE ROUTINE 1V                            PROCEDURE DATE:  09/24/2018          INTERPRETATION:  Chest one view    HISTORY: Pleural effusion    COMPARISON STUDY: 9/22/2018    Frontal expiratory view of the chest shows the heart to be similar in   size. Tracheostomy tube is again noted. The lungs show clearing of the   right lung with some residual right base infiltrate or atelectasis and   there is no evidence of pneumothorax nor pleural effusion.    IMPRESSION:  Interval clearing of right lung.    Thank you for the courtesy of this referral.                CHAVEZ DAVENPORT M.D., ATTENDING RADIOLOGIST  This document has been electronically signed. Sep 24 2018 11:03AM                < end of copied text >

## 2018-09-25 DIAGNOSIS — E87.2 ACIDOSIS: ICD-10-CM

## 2018-09-25 DIAGNOSIS — N17.9 ACUTE KIDNEY FAILURE, UNSPECIFIED: ICD-10-CM

## 2018-09-25 DIAGNOSIS — J69.0 PNEUMONITIS DUE TO INHALATION OF FOOD AND VOMIT: ICD-10-CM

## 2018-09-25 DIAGNOSIS — Z02.9 ENCOUNTER FOR ADMINISTRATIVE EXAMINATIONS, UNSPECIFIED: ICD-10-CM

## 2018-09-25 LAB
ANION GAP SERPL CALC-SCNC: 9 MMOL/L — SIGNIFICANT CHANGE UP (ref 5–17)
BUN SERPL-MCNC: 17 MG/DL — SIGNIFICANT CHANGE UP (ref 7–18)
CALCIUM SERPL-MCNC: 9.4 MG/DL — SIGNIFICANT CHANGE UP (ref 8.4–10.5)
CHLORIDE SERPL-SCNC: 94 MMOL/L — LOW (ref 96–108)
CO2 SERPL-SCNC: 30 MMOL/L — SIGNIFICANT CHANGE UP (ref 22–31)
CREAT SERPL-MCNC: 2.1 MG/DL — HIGH (ref 0.5–1.3)
CULTURE RESULTS: SIGNIFICANT CHANGE UP
CULTURE RESULTS: SIGNIFICANT CHANGE UP
GLUCOSE SERPL-MCNC: 258 MG/DL — HIGH (ref 70–99)
HCT VFR BLD CALC: 31.1 % — LOW (ref 34.5–45)
HGB BLD-MCNC: 9.9 G/DL — LOW (ref 11.5–15.5)
MCHC RBC-ENTMCNC: 25 PG — LOW (ref 27–34)
MCHC RBC-ENTMCNC: 31.8 GM/DL — LOW (ref 32–36)
MCV RBC AUTO: 78.6 FL — LOW (ref 80–100)
OSMOLALITY SERPL: 285 MOS/KG — SIGNIFICANT CHANGE UP (ref 275–300)
PLATELET # BLD AUTO: 250 K/UL — SIGNIFICANT CHANGE UP (ref 150–400)
POTASSIUM SERPL-MCNC: 3.8 MMOL/L — SIGNIFICANT CHANGE UP (ref 3.5–5.3)
POTASSIUM SERPL-SCNC: 3.8 MMOL/L — SIGNIFICANT CHANGE UP (ref 3.5–5.3)
RBC # BLD: 3.96 M/UL — SIGNIFICANT CHANGE UP (ref 3.8–5.2)
RBC # FLD: 12.8 % — SIGNIFICANT CHANGE UP (ref 10.3–14.5)
SODIUM SERPL-SCNC: 133 MMOL/L — LOW (ref 135–145)
SPECIMEN SOURCE: SIGNIFICANT CHANGE UP
SPECIMEN SOURCE: SIGNIFICANT CHANGE UP
WBC # BLD: 7.8 K/UL — SIGNIFICANT CHANGE UP (ref 3.8–10.5)
WBC # FLD AUTO: 7.8 K/UL — SIGNIFICANT CHANGE UP (ref 3.8–10.5)

## 2018-09-25 RX ORDER — INSULIN GLARGINE 100 [IU]/ML
18 INJECTION, SOLUTION SUBCUTANEOUS AT BEDTIME
Qty: 0 | Refills: 0 | Status: DISCONTINUED | OUTPATIENT
Start: 2018-09-25 | End: 2018-09-26

## 2018-09-25 RX ORDER — SODIUM CHLORIDE 9 MG/ML
1000 INJECTION INTRAMUSCULAR; INTRAVENOUS; SUBCUTANEOUS
Qty: 0 | Refills: 0 | Status: DISCONTINUED | OUTPATIENT
Start: 2018-09-25 | End: 2018-09-27

## 2018-09-25 RX ADMIN — QUETIAPINE FUMARATE 50 MILLIGRAM(S): 200 TABLET, FILM COATED ORAL at 05:19

## 2018-09-25 RX ADMIN — AMIKACIN SULFATE 102.24 MILLIGRAM(S): 250 INJECTION, SOLUTION INTRAMUSCULAR; INTRAVENOUS at 05:19

## 2018-09-25 RX ADMIN — Medication 3 MILLILITER(S): at 14:05

## 2018-09-25 RX ADMIN — Medication 2 MILLIGRAM(S): at 05:19

## 2018-09-25 RX ADMIN — POLYMYXIN B SULFATE 500 UNIT(S): 500000 INJECTION, POWDER, LYOPHILIZED, FOR SOLUTION INTRAMUSCULAR; INTRATHECAL; INTRAVENOUS; OPHTHALMIC at 05:19

## 2018-09-25 RX ADMIN — HEPARIN SODIUM 5000 UNIT(S): 5000 INJECTION INTRAVENOUS; SUBCUTANEOUS at 22:21

## 2018-09-25 RX ADMIN — POLYMYXIN B SULFATE 500 UNIT(S): 500000 INJECTION, POWDER, LYOPHILIZED, FOR SOLUTION INTRAMUSCULAR; INTRATHECAL; INTRAVENOUS; OPHTHALMIC at 18:09

## 2018-09-25 RX ADMIN — LEVETIRACETAM 1000 MILLIGRAM(S): 250 TABLET, FILM COATED ORAL at 17:40

## 2018-09-25 RX ADMIN — Medication 2: at 05:29

## 2018-09-25 RX ADMIN — INSULIN GLARGINE 18 UNIT(S): 100 INJECTION, SOLUTION SUBCUTANEOUS at 22:42

## 2018-09-25 RX ADMIN — Medication 1 MILLIGRAM(S): at 18:09

## 2018-09-25 RX ADMIN — Medication 3 MILLILITER(S): at 20:26

## 2018-09-25 RX ADMIN — Medication 7 UNIT(S): at 11:53

## 2018-09-25 RX ADMIN — PANTOPRAZOLE SODIUM 40 MILLIGRAM(S): 20 TABLET, DELAYED RELEASE ORAL at 13:43

## 2018-09-25 RX ADMIN — Medication 1 TABLET(S): at 22:21

## 2018-09-25 RX ADMIN — Medication 7 UNIT(S): at 08:30

## 2018-09-25 RX ADMIN — Medication 7 UNIT(S): at 17:39

## 2018-09-25 RX ADMIN — CHLORHEXIDINE GLUCONATE 1 APPLICATION(S): 213 SOLUTION TOPICAL at 17:45

## 2018-09-25 RX ADMIN — LEVETIRACETAM 1000 MILLIGRAM(S): 250 TABLET, FILM COATED ORAL at 05:20

## 2018-09-25 RX ADMIN — Medication 1 TABLET(S): at 13:44

## 2018-09-25 RX ADMIN — Medication 100 MILLIGRAM(S): at 17:40

## 2018-09-25 RX ADMIN — Medication 2 MILLIGRAM(S): at 17:40

## 2018-09-25 RX ADMIN — ATORVASTATIN CALCIUM 20 MILLIGRAM(S): 80 TABLET, FILM COATED ORAL at 22:21

## 2018-09-25 RX ADMIN — Medication 3: at 00:09

## 2018-09-25 RX ADMIN — SODIUM CHLORIDE 50 MILLILITER(S): 9 INJECTION INTRAMUSCULAR; INTRAVENOUS; SUBCUTANEOUS at 18:09

## 2018-09-25 RX ADMIN — QUETIAPINE FUMARATE 50 MILLIGRAM(S): 200 TABLET, FILM COATED ORAL at 17:40

## 2018-09-25 RX ADMIN — Medication 1: at 17:38

## 2018-09-25 RX ADMIN — Medication 100 MILLIGRAM(S): at 05:19

## 2018-09-25 RX ADMIN — Medication 6: at 11:53

## 2018-09-25 RX ADMIN — AMIKACIN SULFATE 102.24 MILLIGRAM(S): 250 INJECTION, SOLUTION INTRAMUSCULAR; INTRAVENOUS at 18:09

## 2018-09-25 RX ADMIN — Medication 3 MILLILITER(S): at 09:12

## 2018-09-25 RX ADMIN — HEPARIN SODIUM 5000 UNIT(S): 5000 INJECTION INTRAVENOUS; SUBCUTANEOUS at 05:19

## 2018-09-25 RX ADMIN — INSULIN GLARGINE 14 UNIT(S): 100 INJECTION, SOLUTION SUBCUTANEOUS at 00:09

## 2018-09-25 RX ADMIN — Medication 1 TABLET(S): at 05:19

## 2018-09-25 RX ADMIN — CHLORHEXIDINE GLUCONATE 1 APPLICATION(S): 213 SOLUTION TOPICAL at 06:39

## 2018-09-25 RX ADMIN — AMLODIPINE BESYLATE 2.5 MILLIGRAM(S): 2.5 TABLET ORAL at 05:19

## 2018-09-25 RX ADMIN — Medication 3 MILLILITER(S): at 02:53

## 2018-09-25 NOTE — PROGRESS NOTE ADULT - PROBLEM SELECTOR PLAN 1
SCr with a significant upward trend despite IVF  likely due to ATN.  Check renal u/s.  Check urine lytes.  Retrial of IVF NS @50ml/hr X 10hrs.  I SCr with a significant upward trend despite IVF  likely due to ATN.  Check renal u/s.  Check urine lytes. Increase IVF to 100mg iV X 10 hrs.  Would seriously consider discontinuing polymyxin and amikacin if possible. SCr with a significant upward trend despite IVF  likely due to ATN.  Check renal u/s.  Check urine lytes. Increase IVF to 50mg IV X 20 hrs.  Would seriously consider discontinuing polymyxin and amikacin if possible.

## 2018-09-25 NOTE — PROGRESS NOTE ADULT - SUBJECTIVE AND OBJECTIVE BOX
Patient seen and examined at bedside  No acute events noted overnight  Case discussed with medical team    HPI:  62 yo F from [facility], non-verbal, bed bound, S/P Trach/ PEG tube, PMH of HTN, asthma, convulsion disorder, bipolar, DVT legs, DM 2, sent from NH for coffee ground vomiting X3 this morning and cough.  Pt unable to provide history.  History given by sister (Sharon [phone])who states pt was vomiting coffee ground emesis this morning. Pt had no reported vomiting since in ED.  Pt also developed cough with clear sputum.  Sister states pt is combative at baseline, and her current behavior is baseline.  No reported fever, discomfort, diarrhea, or SOB.  Pt was recently admitted to [facility] in 07/2018 for same reason and treated for aspiration PNA and UTI during last admission.  Pt was supposed to be given IV vanco and zosyn for 7 day by PCP for PNA , which supposed to start from 09/13-09/19. Pt is DNR, MOSLT form in charts. (13 Sep 2018 15:49)      PAST MEDICAL & SURGICAL HISTORY:  No pertinent past medical history  Hypertension  Schizophrenia  Diabetic coma  Diabetes mellitus  No significant past surgical history  S/P percutaneous endoscopic gastrostomy (PEG) tube placement  H/O tracheostomy      angiotensin converting enzyme inhibitors (Unknown)       MEDICATIONS  (STANDING):  ALBUTerol/ipratropium for Nebulization 3 milliLiter(s) Nebulizer every 6 hours  amiKACIN  IVPB 560 milliGRAM(s) IV Intermittent every 12 hours  amLODIPine   Tablet 2.5 milliGRAM(s) Oral daily  atorvastatin 20 milliGRAM(s) Oral at bedtime  benztropine 2 milliGRAM(s) Oral two times a day  chlorhexidine 4% Liquid 1 Application(s) Topical every 12 hours  heparin  Injectable 5000 Unit(s) SubCutaneous every 8 hours  insulin glargine Injectable (LANTUS) 14 Unit(s) SubCutaneous at bedtime  insulin lispro (HumaLOG) corrective regimen sliding scale   SubCutaneous every 6 hours  insulin lispro Injectable (HumaLOG) 7 Unit(s) SubCutaneous three times a day with meals  lactobacillus acidophilus 1 Tablet(s) Oral every 8 hours  levETIRAcetam  Solution 1000 milliGRAM(s) Oral two times a day  metoprolol tartrate 100 milliGRAM(s) Oral two times a day  pantoprazole   Suspension 40 milliGRAM(s) Oral daily  polymyxin B IVPB [id number] Unit(s) IV Intermittent every 12 hours  QUEtiapine 50 milliGRAM(s) Oral every 12 hours  rifampin IVPB      rifampin IVPB 600 milliGRAM(s) IV Intermittent every 24 hours  senna 2 Tablet(s) Oral at bedtime  sodium chloride 0.9%. 500 milliLiter(s) (50 mL/Hr) IV Continuous <Continuous>  sodium chloride 0.9%. 1000 milliLiter(s) (50 mL/Hr) IV Continuous <Continuous>    MEDICATIONS  (PRN):  haloperidol    Injectable 5 milliGRAM(s) IntraMuscular once PRN Agitation  LORazepam   Injectable 1 milliGRAM(s) IV Push every 8 hours PRN for breathrough agitation/anxiety/combative behavior      REVIEW OF SYSTEMS:limited 2/2 mental status    T(C): 36.9 (09-25-18 @ 05:07), Max: 36.9 (09-25-18 @ 05:07)  HR: 103 (09-25-18 @ 05:07) (80 - 103)  BP: 117/63 (09-25-18 @ 05:07) (116/93 - 134/64)  RR: 18 (09-25-18 @ 05:07) (18 - 18)  SpO2: 100% (09-25-18 @ 05:07) (100% - 100%)    PHYSICAL EXAMINATION:   Constitutional:  NAD  HEENT: AT  Neck:  Supple  Respiratory:  Adequate airflow b/l. Not using accessory muscles of respiration.  Cardiovascular:  S1 & S2 intact, no R/G, 2+ radial pulses b/l  Gastrointestinal: Soft, ND, normoactive b.s., no organomegaly/RT/rigidity  Extremities: WWP  Neurological:  stable, awake.     Labs and imaging reviewed    LABS:                        9.9    7.8   )-----------( 250      ( 25 Sep 2018 06:15 )             31.1     09-25    133<L>  |  94<L>  |  17  ----------------------------<  258<H>  3.8   |  30  |  2.10<H>    Ca    9.4      25 Sep 2018 06:15  Phos  4.5     09-24  Mg     1.6     09-24              CAPILLARY BLOOD GLUCOSE      POCT Blood Glucose.: 236 mg/dL (25 Sep 2018 05:24)  POCT Blood Glucose.: 290 mg/dL (25 Sep 2018 00:01)  POCT Blood Glucose.: 273 mg/dL (24 Sep 2018 17:17)  POCT Blood Glucose.: 243 mg/dL (24 Sep 2018 12:03)              RADIOLOGY & ADDITIONAL STUDIES:

## 2018-09-25 NOTE — PROGRESS NOTE ADULT - PROBLEM SELECTOR PLAN 3
Hgb A1C 11.4  Endocrine alvarenga following --lantus increased to 18 units. cont humalog 7 ac tid  continue to monitor blood glucose with sliding scale coverage

## 2018-09-25 NOTE — PROGRESS NOTE ADULT - PROBLEM SELECTOR PLAN 6
Improving. 131 ---> 133 this AM; continue to follow  Renal following -continue IVF NS @50ml/hr X 10hrs

## 2018-09-25 NOTE — PROGRESS NOTE ADULT - SUBJECTIVE AND OBJECTIVE BOX
Interval Events:  pt in nad  on 65 cc/hr feeds    Allergies    angiotensin converting enzyme inhibitors (Unknown)    Intolerances      Endocrine/Metabolic Medications:  atorvastatin 20 milliGRAM(s) Oral at bedtime  insulin glargine Injectable (LANTUS) 14 Unit(s) SubCutaneous at bedtime  insulin lispro (HumaLOG) corrective regimen sliding scale   SubCutaneous every 6 hours  insulin lispro Injectable (HumaLOG) 7 Unit(s) SubCutaneous three times a day with meals      Vital Signs Last 24 Hrs  T(C): 36.9 (25 Sep 2018 05:07), Max: 36.9 (25 Sep 2018 05:07)  T(F): 98.5 (25 Sep 2018 05:07), Max: 98.5 (25 Sep 2018 05:07)  HR: 103 (25 Sep 2018 05:07) (80 - 103)  BP: 117/63 (25 Sep 2018 05:07) (116/93 - 134/64)  BP(mean): --  RR: 18 (25 Sep 2018 05:07) (18 - 18)  SpO2: 100% (25 Sep 2018 05:07) (100% - 100%)      PHYSICAL EXAM  All physical exam findings normal, except those marked:  General:	Alert, active, cooperative, NAD, well hydrated  .		[] Abnormal:  Neck		Normal: supple, no cervical adenopathy, no palpable thyroid  .		[] Abnormal:  Cardiovascular	Normal: regular rate, normal S1, S2, no murmurs  .		[] Abnormal:  Respiratory	Normal: no chest wall deformity, normal respiratory pattern, CTA B/L  .		[] Abnormal:  Abdominal	Normal: soft, ND, NT, bowel sounds present, no masses, no organomegaly  .		[] Abnormal:  		Normal normal genitalia, testes descended, circumcised/uncircumcised  .		Bob stage:			Breast bob:  .		Menstrual history:  .		[] Abnormal:  Extremities	Normal: FROM x4  .		[] Abnormal:  Skin		Normal: intact and not indurated, no rash, no acanthosis nigricans  .		[] Abnormal:  Neurologic	Normal: grossly intact  .		[] Abnormal:    LABS                        9.9    7.8   )-----------( 250      ( 25 Sep 2018 06:15 )             31.1                               133    |  94     |  17                  Calcium: 9.4   / iCa: x      (09-25 @ 06:15)    ----------------------------<  258       Magnesium: x                                3.8     |  30     |  2.10             Phosphorous: x          CAPILLARY BLOOD GLUCOSE      POCT Blood Glucose.: 236 mg/dL (25 Sep 2018 05:24)  POCT Blood Glucose.: 290 mg/dL (25 Sep 2018 00:01)  POCT Blood Glucose.: 273 mg/dL (24 Sep 2018 17:17)  POCT Blood Glucose.: 243 mg/dL (24 Sep 2018 12:03)        Assesment/plan   dm- remains hyperglycemic  change lantus to 18 units  cont humalog 7 ac tid  fsg q6

## 2018-09-25 NOTE — PROGRESS NOTE ADULT - SUBJECTIVE AND OBJECTIVE BOX
Patient is a 63y old  Female who presents with a chief complaint of vomiting (25 Sep 2018 10:15)  Awake, not alert, non verbal, lying in bed in NAD. Occasionally agitated    INTERVAL HPI/OVERNIGHT EVENTS:      VITAL SIGNS:  T(F): 98.8 (09-25-18 @ 14:29)  HR: 115 (09-25-18 @ 14:29)  BP: 142/75 (09-25-18 @ 14:29)  RR: 18 (09-25-18 @ 14:29)  SpO2: 99% (09-25-18 @ 14:29)  Wt(kg): --  I&O's Detail          REVIEW OF SYSTEMS:    CONSTITUTIONAL:  No fevers, chills, sweats    HEENT:  Eyes:  No diplopia or blurred vision. ENT:  No earache, sore throat or runny nose.    CARDIOVASCULAR:  No pressure, squeezing, tightness, or heaviness about the chest; no palpitations.    RESPIRATORY:  Per HPI    GASTROINTESTINAL:  No abdominal pain, nausea, vomiting or diarrhea.    GENITOURINARY:  No dysuria, frequency or urgency.    NEUROLOGIC:  No paresthesias, fasciculations, seizures or weakness.    PSYCHIATRIC:  No disorder of thought or mood.      PHYSICAL EXAM:    Constitutional: Well developed and nourished  Eyes:Perrla  ENMT: normal  Neck:supple  Respiratory: good air entry  Cardiovascular: S1 S2 regular  Gastrointestinal: Soft, Non tender  Extremities: No edema  Vascular:normal  Neurological:Awake,   Musculoskeletal:Normal      MEDICATIONS  (STANDING):  ALBUTerol/ipratropium for Nebulization 3 milliLiter(s) Nebulizer every 6 hours  amiKACIN  IVPB 560 milliGRAM(s) IV Intermittent every 12 hours  amLODIPine   Tablet 2.5 milliGRAM(s) Oral daily  atorvastatin 20 milliGRAM(s) Oral at bedtime  benztropine 2 milliGRAM(s) Oral two times a day  chlorhexidine 4% Liquid 1 Application(s) Topical every 12 hours  heparin  Injectable 5000 Unit(s) SubCutaneous every 8 hours  insulin glargine Injectable (LANTUS) 18 Unit(s) SubCutaneous at bedtime  insulin lispro (HumaLOG) corrective regimen sliding scale   SubCutaneous every 6 hours  insulin lispro Injectable (HumaLOG) 7 Unit(s) SubCutaneous three times a day with meals  lactobacillus acidophilus 1 Tablet(s) Oral every 8 hours  levETIRAcetam  Solution 1000 milliGRAM(s) Oral two times a day  metoprolol tartrate 100 milliGRAM(s) Oral two times a day  pantoprazole   Suspension 40 milliGRAM(s) Oral daily  polymyxin B IVPB 609758 Unit(s) IV Intermittent every 12 hours  QUEtiapine 50 milliGRAM(s) Oral every 12 hours  rifampin IVPB      rifampin IVPB 600 milliGRAM(s) IV Intermittent every 24 hours  senna 2 Tablet(s) Oral at bedtime  sodium chloride 0.9%. 500 milliLiter(s) (50 mL/Hr) IV Continuous <Continuous>  sodium chloride 0.9%. 1000 milliLiter(s) (50 mL/Hr) IV Continuous <Continuous>    MEDICATIONS  (PRN):  haloperidol    Injectable 5 milliGRAM(s) IntraMuscular once PRN Agitation  LORazepam   Injectable 1 milliGRAM(s) IV Push every 8 hours PRN for breathrough agitation/anxiety/combative behavior      Allergies    angiotensin converting enzyme inhibitors (Unknown)    Intolerances        LABS:                        9.9    7.8   )-----------( 250      ( 25 Sep 2018 06:15 )             31.1     09-25    133<L>  |  94<L>  |  17  ----------------------------<  258<H>  3.8   |  30  |  2.10<H>    Ca    9.4      25 Sep 2018 06:15  Phos  4.5     09-24  Mg     1.6     09-24                CAPILLARY BLOOD GLUCOSE      POCT Blood Glucose.: 496 mg/dL (25 Sep 2018 11:46)  POCT Blood Glucose.: 236 mg/dL (25 Sep 2018 05:24)  POCT Blood Glucose.: 290 mg/dL (25 Sep 2018 00:01)  POCT Blood Glucose.: 273 mg/dL (24 Sep 2018 17:17)        RADIOLOGY & ADDITIONAL TESTS:    CXR:  < from: Xray Chest 1 View- PORTABLE-Routine (09.24.18 @ 08:49) >  IMPRESSION:  Interval clearing of right lung.    Thank you for the courtesy of this referral.    < end of copied text >    Ct scan chest:    ekg;    echo:

## 2018-09-25 NOTE — PROGRESS NOTE ADULT - PROBLEM SELECTOR PLAN 1
persistent  c/w ivf  f/u urine lytes  f/u renal u/s  monitor renal function and u/o  avoid nephrotoxic rx

## 2018-09-25 NOTE — PROGRESS NOTE ADULT - SUBJECTIVE AND OBJECTIVE BOX
Patient is seen and examined at the bed side, is afebrile. She is doing well, no new events. The WBC count stay normal.         REVIEW OF SYSTEMS: Unable to obtain due to mental status      ALLERGIES : NKDA      Vital Signs Last 24 Hrs  T(C): 37.1 (25 Sep 2018 14:29), Max: 37.1 (25 Sep 2018 14:29)  T(F): 98.8 (25 Sep 2018 14:29), Max: 98.8 (25 Sep 2018 14:29)  HR: 115 (25 Sep 2018 14:) (80 - 115)  BP: 142/75 (25 Sep 2018 14:29) (116/93 - 142/75)  BP(mean): --  RR: 18 (25 Sep 2018 14:29) (18 - 18)  SpO2: 99% (25 Sep 2018 14:29) (99% - 100%)      PHYSICAL EXAM:  GENERAL: Not in distress  HEENT: Trach in placed  CHEST/LUNG: Air entry B/L  HEART: s1 and s2 present  ABDOMEN: Nontender, and Nondistended  EXTREMITIES:  No pedal  edema  CNS: Awake but not Alert        LABS:                                    9.9    7.8   )-----------( 250      ( 25 Sep 2018 06:15 )             31.1                          10.5   20.1  )-----------( 217      ( 19 Sep 2018 07:47 )             33.1                 09-25    133<L>  |  94<L>  |  17  ----------------------------<  258<H>  3.8   |  30  |  2.10<H>    Ca    9.4      25 Sep 2018 06:15  Phos  4.5     09-24  Mg     1.6     09-24      09-24    131<L>  |  93<L>  |  16  ----------------------------<  163<H>  3.9   |  31  |  1.72<H>    Ca    9.0      24 Sep 2018 07:18  Phos  4.5     09-24  Mg     1.6     09-24      TPro  6.2  /  Alb  2.2<L>  /  TBili  0.9  /  DBili  0.3<H>  /  AST  24  /  ALT  20  /  AlkPhos  86  09-20         Urinalysis Basic - ( 14 Sep 2018 03:56 )    Color: Yellow / Appearance: Clear / S.020 / pH: x  Gluc: x / Ketone: Negative  / Bili: Negative / Urobili: Negative   Blood: x / Protein: 100 / Nitrite: Negative   Leuk Esterase: Negative / RBC: 0-2 /HPF / WBC 0-2 /HPF   Sq Epi: x / Non Sq Epi: Occasional /HPF / Bacteria: Trace /HPF      CAPILLARY BLOOD GLUCOSE      POCT Blood Glucose.: 144 mg/dL (14 Sep 2018 11:07)  POCT Blood Glucose.: 202 mg/dL (14 Sep 2018 08:09)  POCT Blood Glucose.: 277 mg/dL (14 Sep 2018 06:58)  POCT Blood Glucose.: 68 mg/dL (14 Sep 2018 06:03)  POCT Blood Glucose.: 316 mg/dL (14 Sep 2018 02:11)  POCT Blood Glucose.: 405 mg/dL (14 Sep 2018 00:28)  POCT Blood Glucose.: 386 mg/dL (13 Sep 2018 22:26)  POCT Blood Glucose.: 403 mg/dL (13 Sep 2018 19:09)      MEDICATIONS  (STANDING):  ALBUTerol/ipratropium for Nebulization 3 milliLiter(s) Nebulizer every 6 hours  amiKACIN  IVPB 560 milliGRAM(s) IV Intermittent every 12 hours  amLODIPine   Tablet 2.5 milliGRAM(s) Oral daily  atorvastatin 20 milliGRAM(s) Oral at bedtime  benztropine 2 milliGRAM(s) Oral two times a day  chlorhexidine 4% Liquid 1 Application(s) Topical every 12 hours  heparin  Injectable 5000 Unit(s) SubCutaneous every 8 hours  insulin glargine Injectable (LANTUS) 18 Unit(s) SubCutaneous at bedtime  insulin lispro (HumaLOG) corrective regimen sliding scale   SubCutaneous every 6 hours  insulin lispro Injectable (HumaLOG) 7 Unit(s) SubCutaneous three times a day with meals  lactobacillus acidophilus 1 Tablet(s) Oral every 8 hours  levETIRAcetam  Solution 1000 milliGRAM(s) Oral two times a day  metoprolol tartrate 100 milliGRAM(s) Oral two times a day  pantoprazole   Suspension 40 milliGRAM(s) Oral daily  polymyxin B IVPB [id number] Unit(s) IV Intermittent every 12 hours  QUEtiapine 50 milliGRAM(s) Oral every 12 hours  rifampin IVPB      rifampin IVPB 600 milliGRAM(s) IV Intermittent every 24 hours  senna 2 Tablet(s) Oral at bedtime  sodium chloride 0.9%. 500 milliLiter(s) (50 mL/Hr) IV Continuous <Continuous>  sodium chloride 0.9%. 1000 milliLiter(s) (50 mL/Hr) IV Continuous <Continuous>    MEDICATIONS  (PRN):  haloperidol    Injectable 5 milliGRAM(s) IntraMuscular once PRN Agitation  LORazepam   Injectable 1 milliGRAM(s) IV Push every 8 hours PRN for breathrough agitation/anxiety/combative behavior            RADIOLOGY & ADDITIONAL TESTS:    18: Xray Chest 1 View- PORTABLE-Routine (18 @ 08:22) Tracheostomy cannula reidentified in position. No change heart mediastinum. No change in the right basilar opacity with loss of definition of the ipsilateral hemidiaphragm and costophrenic angle likely   representing a combination of pleural fluid and underlying  consolidation/atelectasis.      18: CT Chest No Cont (18 @ 03:41) Impression: Consolidation involving the right lower lobe is of uncertain etiology. It is unclear whether this represents compressive atelectasis secondary to right pleural effusion/elevation of the right hemidiaphragm  and/or represents infection.    18: Xray Chest 1 View-PORTABLE IMMEDIATE (18 @ 13:37) Bilateral pleural effusions and associated atelectasis.        MICROBIOLOGY DATA:    Culture - Blood (18 @ 17:33)    Specimen Source: .Blood Blood-Peripheral    Culture Results:   No growth to date.      Culture - Blood (18 @ 13:21)    Specimen Source: .Blood Blood-Peripheral    Culture Results:   No growth to date.        Culture - Sputum . (09.15.18 @ 09:28)    Gram Stain:   Rare polymorphonuclear leukocytes per low power field  Few Squamous epithelial cells per low power field  Moderate Gram Negative Rods per oil power field  Few Gram Positive Rods per oil power field  Few Yeast like cells per oil power field  Rare Gram Negative Coccobacilli per oil power field    -  Amikacin: S <=8    -  Amikacin: S <=8    -  Ampicillin/Sulbactam: R >16/8    -  Aztreonam: S 8    -  Cefepime: I 16    -  Cefepime: I 16    -  Ceftazidime: R >16    -  Ceftazidime: I 16    -  Ciprofloxacin: R >2    -  Ciprofloxacin: R >2    -  Gentamicin: R >8    -  Gentamicin: R >8    -  Imipenem: R >8    -  Imipenem: R    -  Levofloxacin: R >4    -  Levofloxacin: R >4    -  Meropenem: R >8    -  Meropenem: I 8    -  Piperacillin/Tazobactam: R >64    -  Piperacillin/Tazobactam: R    -  Tobramycin: R >8    -  Tobramycin: R >8    -  Trimethoprim/Sulfamethoxazole: R >2/38    Specimen Source: .Sputum Sputum    Culture Results:   Numerous Pseudomonas aeruginosa (Carbapenem Resistant)  Moderate Acinetobacter baumannii/haemolyticus (Carbapenem Resistant)  Complex  Normal Respiratory Moraima present    Organism Identification: Pseudomonas aeruginosa (Carbapenem Resistant)  Acinetobacter baumannii/haemolyticus (Carbapenem Resistant)    Organism: Acinetobacter baumannii/haemolyticus (Carbapenem Resistant)    Organism: Pseudomonas aeruginosa (Carbapenem Resistant)    Organism: Acinetobacter baumannii/haemolyticus (Carbapenem Resistant)    Method Type: ESTIVEN    Method Type: ESTIVEN    Method Type:         MRSA/MSSA PCR (18 @ 11:26)    MRSA PCR Result.: NotDetec: MRSA/MSSA PCR assay is a qualitative in vitro diagnostic test for the  direct detection and differentiation of methicillin-resistant  Staphylococcus aureus (MRSA) from Staphylococcus aureus (SA). The assay  detects DNA from nasal swabs in patients atrisk for nasal colonization.  It is not intended to diagnose MRSA or SA infections nor guide or monitor  treatment for MRSA/SA infections. A negative result does not preclude  nasal colonization. The assay is FDA-approved and its performance has  been established by [employer] [place] and the [facility], [place], [place].    Staph Aureus PCR Result: NotDetec      Culture - Urine (18 @ 09:47)    Specimen Source: .Urine Catheterized    Culture Results:   10,000 - 49,000 CFU/mL Yeast Patient is seen and examined at the bed side, is afebrile. She is tolerating abx well, no diarrhea. The creatinine is trending up.      REVIEW OF SYSTEMS: Unable to obtain due to mental status      ALLERGIES : NKDA      Vital Signs Last 24 Hrs  T(C): 37.1 (25 Sep 2018 14:29), Max: 37.1 (25 Sep 2018 14:29)  T(F): 98.8 (25 Sep 2018 14:29), Max: 98.8 (25 Sep 2018 14:29)  HR: 115 (25 Sep 2018 14:29) (80 - 115)  BP: 142/75 (25 Sep 2018 14:29) (116/93 - 142/75)  BP(mean): --  RR: 18 (25 Sep 2018 14:29) (18 - 18)  SpO2: 99% (25 Sep 2018 14:29) (99% - 100%)      PHYSICAL EXAM:  GENERAL: Not in distress  HEENT: Trach in placed  CHEST/LUNG: Air entry B/L  HEART: s1 and s2 present  ABDOMEN: Nontender, and Nondistended  EXTREMITIES:  No pedal  edema  CNS: Awake but not Alert        LABS:                                    9.9    7.8   )-----------( 250      ( 25 Sep 2018 06:15 )             31.1                          10.5   20.1  )-----------( 217      ( 19 Sep 2018 07:47 )             33.1           Amikacin Level, Trough (18 @ 17:06)    Amikacin Level, Trough: 17.6: Performed by: [facility], [address] [place] [address] 065[address] ug/mL            133<L>  |  94<L>  |  17  ----------------------------<  258<H>  3.8   |  30  |  2.10<H>    Ca    9.4      25 Sep 2018 06:15  Phos  4.5     -24  Mg     1.6     -24    131<L>  |  93<L>  |  16  ----------------------------<  163<H>  3.9   |  31  |  1.72<H>    Ca    9.0      24 Sep 2018 07:18  Phos  4.5     -  Mg     1.6     -24      TPro  6.2  /  Alb  2.2<L>  /  TBili  0.9  /  DBili  0.3<H>  /  AST  24  /  ALT  20  /  AlkPhos  86  09-         Urinalysis Basic - ( 14 Sep 2018 03:56 )    Color: Yellow / Appearance: Clear / S.020 / pH: x  Gluc: x / Ketone: Negative  / Bili: Negative / Urobili: Negative   Blood: x / Protein: 100 / Nitrite: Negative   Leuk Esterase: Negative / RBC: 0-2 /HPF / WBC 0-2 /HPF   Sq Epi: x / Non Sq Epi: Occasional /HPF / Bacteria: Trace /HPF      CAPILLARY BLOOD GLUCOSE      POCT Blood Glucose.: 144 mg/dL (14 Sep 2018 11:07)  POCT Blood Glucose.: 202 mg/dL (14 Sep 2018 08:09)  POCT Blood Glucose.: 277 mg/dL (14 Sep 2018 06:58)  POCT Blood Glucose.: 68 mg/dL (14 Sep 2018 06:03)  POCT Blood Glucose.: 316 mg/dL (14 Sep 2018 02:11)  POCT Blood Glucose.: 405 mg/dL (14 Sep 2018 00:28)  POCT Blood Glucose.: 386 mg/dL (13 Sep 2018 22:26)  POCT Blood Glucose.: 403 mg/dL (13 Sep 2018 19:09)      MEDICATIONS  (STANDING):  ALBUTerol/ipratropium for Nebulization 3 milliLiter(s) Nebulizer every 6 hours  amiKACIN  IVPB 560 milliGRAM(s) IV Intermittent every 12 hours  amLODIPine   Tablet 2.5 milliGRAM(s) Oral daily  atorvastatin 20 milliGRAM(s) Oral at bedtime  benztropine 2 milliGRAM(s) Oral two times a day  chlorhexidine 4% Liquid 1 Application(s) Topical every 12 hours  heparin  Injectable 5000 Unit(s) SubCutaneous every 8 hours  insulin glargine Injectable (LANTUS) 18 Unit(s) SubCutaneous at bedtime  insulin lispro (HumaLOG) corrective regimen sliding scale   SubCutaneous every 6 hours  insulin lispro Injectable (HumaLOG) 7 Unit(s) SubCutaneous three times a day with meals  lactobacillus acidophilus 1 Tablet(s) Oral every 8 hours  levETIRAcetam  Solution 1000 milliGRAM(s) Oral two times a day  metoprolol tartrate 100 milliGRAM(s) Oral two times a day  pantoprazole   Suspension 40 milliGRAM(s) Oral daily  polymyxin B IVPB [id number] Unit(s) IV Intermittent every 12 hours  QUEtiapine 50 milliGRAM(s) Oral every 12 hours  rifampin IVPB      rifampin IVPB 600 milliGRAM(s) IV Intermittent every 24 hours  senna 2 Tablet(s) Oral at bedtime  sodium chloride 0.9%. 500 milliLiter(s) (50 mL/Hr) IV Continuous <Continuous>  sodium chloride 0.9%. 1000 milliLiter(s) (50 mL/Hr) IV Continuous <Continuous>    MEDICATIONS  (PRN):  haloperidol    Injectable 5 milliGRAM(s) IntraMuscular once PRN Agitation  LORazepam   Injectable 1 milliGRAM(s) IV Push every 8 hours PRN for breathrough agitation/anxiety/combative behavior        RADIOLOGY & ADDITIONAL TESTS:    18: Xray Chest 1 View- PORTABLE-Routine (18 @ 08:22) Tracheostomy cannula reidentified in position. No change heart mediastinum. No change in the right basilar opacity with loss of definition of the ipsilateral hemidiaphragm and costophrenic angle likely   representing a combination of pleural fluid and underlying  consolidation/atelectasis.      18: CT Chest No Cont (18 @ 03:41) Impression: Consolidation involving the right lower lobe is of uncertain etiology. It is unclear whether this represents compressive atelectasis secondary to right pleural effusion/elevation of the right hemidiaphragm  and/or represents infection.    18: Xray Chest 1 View-PORTABLE IMMEDIATE (18 @ 13:37) Bilateral pleural effusions and associated atelectasis.        MICROBIOLOGY DATA:    Culture - Blood (18 @ 17:33)    Specimen Source: .Blood Blood-Peripheral    Culture Results:   No growth to date.      Culture - Blood (18 @ 13:21)    Specimen Source: .Blood Blood-Peripheral    Culture Results:   No growth to date.        Culture - Sputum . (09.15.18 @ 09:28)    Gram Stain:   Rare polymorphonuclear leukocytes per low power field  Few Squamous epithelial cells per low power field  Moderate Gram Negative Rods per oil power field  Few Gram Positive Rods per oil power field  Few Yeast like cells per oil power field  Rare Gram Negative Coccobacilli per oil power field    -  Amikacin: S <=8    -  Amikacin: S <=8    -  Ampicillin/Sulbactam: R >16/8    -  Aztreonam: S 8    -  Cefepime: I 16    -  Cefepime: I 16    -  Ceftazidime: R >16    -  Ceftazidime: I 16    -  Ciprofloxacin: R >2    -  Ciprofloxacin: R >2    -  Gentamicin: R >8    -  Gentamicin: R >8    -  Imipenem: R >8    -  Imipenem: R    -  Levofloxacin: R >4    -  Levofloxacin: R >4    -  Meropenem: R >8    -  Meropenem: I 8    -  Piperacillin/Tazobactam: R >64    -  Piperacillin/Tazobactam: R    -  Tobramycin: R >8    -  Tobramycin: R >8    -  Trimethoprim/Sulfamethoxazole: R >2/38    Specimen Source: .Sputum Sputum    Culture Results:   Numerous Pseudomonas aeruginosa (Carbapenem Resistant)  Moderate Acinetobacter baumannii/haemolyticus (Carbapenem Resistant)  Complex  Normal Respiratory Moraima present    Organism Identification: Pseudomonas aeruginosa (Carbapenem Resistant)  Acinetobacter baumannii/haemolyticus (Carbapenem Resistant)    Organism: Acinetobacter baumannii/haemolyticus (Carbapenem Resistant)    Organism: Pseudomonas aeruginosa (Carbapenem Resistant)    Organism: Acinetobacter baumannii/haemolyticus (Carbapenem Resistant)    Method Type: ESTIVEN    Method Type: ESTIVEN    Method Type: KB        MRSA/MSSA PCR (18 @ 11:26)    MRSA PCR Result.: NotDetec: MRSA/MSSA PCR assay is a qualitative in vitro diagnostic test for the  direct detection and differentiation of methicillin-resistant  Staphylococcus aureus (MRSA) from Staphylococcus aureus (SA). The assay  detects DNA from nasal swabs in patients atrisk for nasal colonization.  It is not intended to diagnose MRSA or SA infections nor guide or monitor  treatment for MRSA/SA infections. A negative result does not preclude  nasal colonization. The assay is FDA-approved and its performance has  been established by [employer] [place] and the [facility], [place], [place].    Staph Aureus PCR Result: NotDetec      Culture - Urine (18 @ 09:47)    Specimen Source: .Urine Catheterized    Culture Results:   10,000 - 49,000 CFU/mL Yeast

## 2018-09-25 NOTE — PROGRESS NOTE ADULT - ASSESSMENT
63 year-old woman with LAYLA hemodynamically-mediated in the setting of lasix vs. ATN from polymyxin and amikacin vs. AIN from multiple abx.  Hyponatremia likely due to dehydration as well.  Elevate bicarbonate ether respiratory acidosis or metabolic alkalosis. 63 year-old woman with LAYLA hemodynamically-mediated in the setting of lasix vs. ATN from polymyxin and amikacin vs. AIN from multiple abx.  Hyponatremia likely due to dehydration as well.  Elevate bicarbonate ether respiratory acidosis or metabolic alkalosis.    Would seriously consider discontinuing polymyxin and amikacin if possible.

## 2018-09-25 NOTE — PROGRESS NOTE ADULT - SUBJECTIVE AND OBJECTIVE BOX
Patient is a 63y old  Female who presents with a chief complaint of vomiting (25 Sep 2018 16:11)    angiotensin converting enzyme inhibitors (Unknown)      INTERVAL HPI /OVERNIGHT EVENTS: no acute overnight events. On encounter patient appears comfortable on TC. Tmax 99.6 yesterday AM.     T(C): 37.1 (09-25-18 @ 14:29), Max: 37.1 (09-25-18 @ 14:29)  HR: 115 (09-25-18 @ 14:29) (80 - 115)  BP: 142/75 (09-25-18 @ 14:29) (116/93 - 142/75)  RR: 18 (09-25-18 @ 14:29) (18 - 18)  SpO2: 99% (09-25-18 @ 14:29) (99% - 100%)  I&O's Summary    Vital Signs Last 24 Hrs  T(C): 37.1 (25 Sep 2018 14:29), Max: 37.1 (25 Sep 2018 14:29)  T(F): 98.8 (25 Sep 2018 14:29), Max: 98.8 (25 Sep 2018 14:29)  HR: 115 (25 Sep 2018 14:29) (80 - 115)  BP: 142/75 (25 Sep 2018 14:29) (116/93 - 142/75)  BP(mean): --  RR: 18 (25 Sep 2018 14:29) (18 - 18)  SpO2: 99% (25 Sep 2018 14:29) (99% - 100%)  PAST MEDICAL & SURGICAL HISTORY:  No pertinent past medical history  Hypertension  Schizophrenia  Diabetic coma  Diabetes mellitus  No significant past surgical history  S/P percutaneous endoscopic gastrostomy (PEG) tube placement  H/O tracheostomy          LABS:                        9.9    7.8   )-----------( 250      ( 25 Sep 2018 06:15 )             31.1     09-25    133<L>  |  94<L>  |  17  ----------------------------<  258<H>  3.8   |  30  |  2.10<H>    Ca    9.4      25 Sep 2018 06:15  Phos  4.5     09-24  Mg     1.6     09-24        CAPILLARY BLOOD GLUCOSE      POCT Blood Glucose.: 173 mg/dL (25 Sep 2018 16:55)  POCT Blood Glucose.: 496 mg/dL (25 Sep 2018 11:46)  POCT Blood Glucose.: 236 mg/dL (25 Sep 2018 05:24)  POCT Blood Glucose.: 290 mg/dL (25 Sep 2018 00:01)            MEDICATIONS  (STANDING):  ALBUTerol/ipratropium for Nebulization 3 milliLiter(s) Nebulizer every 6 hours  amiKACIN  IVPB 560 milliGRAM(s) IV Intermittent every 12 hours  amLODIPine   Tablet 2.5 milliGRAM(s) Oral daily  atorvastatin 20 milliGRAM(s) Oral at bedtime  benztropine 2 milliGRAM(s) Oral two times a day  chlorhexidine 4% Liquid 1 Application(s) Topical every 12 hours  heparin  Injectable 5000 Unit(s) SubCutaneous every 8 hours  insulin glargine Injectable (LANTUS) 18 Unit(s) SubCutaneous at bedtime  insulin lispro (HumaLOG) corrective regimen sliding scale   SubCutaneous every 6 hours  insulin lispro Injectable (HumaLOG) 7 Unit(s) SubCutaneous three times a day with meals  lactobacillus acidophilus 1 Tablet(s) Oral every 8 hours  levETIRAcetam  Solution 1000 milliGRAM(s) Oral two times a day  metoprolol tartrate 100 milliGRAM(s) Oral two times a day  pantoprazole   Suspension 40 milliGRAM(s) Oral daily  polymyxin B IVPB [id number] Unit(s) IV Intermittent every 12 hours  QUEtiapine 50 milliGRAM(s) Oral every 12 hours  rifampin IVPB      rifampin IVPB 600 milliGRAM(s) IV Intermittent every 24 hours  senna 2 Tablet(s) Oral at bedtime  sodium chloride 0.9%. 500 milliLiter(s) (50 mL/Hr) IV Continuous <Continuous>  sodium chloride 0.9%. 1000 milliLiter(s) (50 mL/Hr) IV Continuous <Continuous>    MEDICATIONS  (PRN):  haloperidol    Injectable 5 milliGRAM(s) IntraMuscular once PRN Agitation  LORazepam   Injectable 1 milliGRAM(s) IV Push every 8 hours PRN for breathrough agitation/anxiety/combative behavior      REVIEW OF SYSTEMS: limited due to patient mentation  CONSTITUTIONAL: + low grade fever.  EYES: No eye pain, or discharge  ENMT: No sinus or throat pain  NECK: No pain or stiffness  RESPIRATORY: No shortness of breath  CARDIOVASCULAR: No chest pain.  GASTROINTESTINAL: vomiting No abdominal pain. No diarrhea.  NEUROLOGICAL: No headaches.  SKIN: No itching, or burning   MUSCULOSKELETAL: No joint pain. No muscle, back, or extremity pain      RADIOLOGY & ADDITIONAL TESTS:  < from: Xray Chest 1 View- PORTABLE-Routine (09.24.18 @ 08:49) >  IMPRESSION:  Interval clearing of right lung.  Thank you for the courtesy of this referral.    < from: Xray Chest 1 View- PORTABLE-Routine (09.22.18 @ 09:21) >  Impression: No change moderate right pleural effusion. Previously seen   left pleural fluid is no longer identified.    < from: Xray Chest 1 View- PORTABLE-Urgent (09.20.18 @ 13:27) >  IMPRESSION:   Bilateral pleural effusions and pulmonary edema are worsening since the   prior exam. No evidence of pneumothorax.    < from: Xray Chest 1 View- PORTABLE-Routine (09.20.18 @ 09:11) >  IMPRESSION: No significant interval change with persistent right basilar   opacity obscuring the right hemidiaphragm and right costophrenic angle   sulcus, as described.    < from: Xray Chest 1 View- PORTABLE-Routine (09.16.18 @ 08:22) >  Tracheostomy cannula reidentified in position. No change heart   mediastinum. No changein the right basilar opacity with loss of   definition of the ipsilateral hemidiaphragm and costophrenic angle likely   representing a combination of pleural fluid and underlying   consolidation/atelectasis. Subsegmental and discoid atelectasis left base   similar to prior. No pneumothorax or other new abnormality.    < from: CT Chest No Cont (09.14.18 @ 03:41) >  Impression: Consolidation involving the right lower lobe is of uncertain   etiology. It is unclear whether this represents compressive atelectasis   secondary to right pleural effusion/elevation of the right hemidiaphragm   and/or represents infection.    < from: Xray Chest 1 View-PORTABLE IMMEDIATE (09.13.18 @ 13:37) >  IMPRESSION:   Bilateral pleural effusions and associated atelectasis.    < from: 12 Lead ECG (07.24.18 @ 20:06) >  Diagnosis Line *** Poor data quality, interpretation may be adversely affected  Probably  Normal sinus rhythm  Possible Left atrial enlargement  Borderline ECG  Confirmed by CESAR CASTILLO ([id number]) on 06-Aug-2018 09:38:05      Consultant(s) Notes Reviewed:  [x] YES  [ ] NO    PHYSICAL EXAM:  GENERAL: NAD female  HEAD:  Atraumatic, Normocephalic  EYES: EOMI, PERRLA, conjunctiva and sclera clear  ENMT: No tonsillar erythema, exudates, or enlargement;  NECK: +trach. Supple, No JVD  NERVOUS SYSTEM:  EDMONDS.   CHEST/LUNG: Good air movement bilaterally  HEART: S1, S2.  ABDOMEN: Soft, Nontender, Nondistended; Bowel sounds present  EXTREMITIES:  2+ Peripheral Pulses.    Care Collaborated Discussed with Consultants/Other Providers [x] YES  [ ] NO

## 2018-09-25 NOTE — PROGRESS NOTE ADULT - ASSESSMENT
64 yo F from [facility], non-verbal, bed bound, S/P Trach/ PEG tube, PMH of HTN, asthma, convulsion disorder, bipolar, DVT legs, DM 2, sent from NH for coffee ground vomiting X3 the morning of admission and cough.  Pt unable to provide history.  History given by sister (Sharon [phone])who states pt was vomiting coffee ground emesis this morning. Pt had no reported vomiting since in ED.  Pt also developed cough with clear sputum.  Sister states pt is combative at baseline, and her current behavior is baseline.  No reported fever, discomfort, diarrhea, or SOB.  Pt was recently admitted to [facility] in 07/2018 for same reason and treated for aspiration PNA and UTI during last admission.  Pt was supposed to be given IV vanco and zosyn for 7 day by PCP for PNA , which supposed to start from 09/13-09/19. Pt is DNR, MOSLT form in charts.     Patient admitted for further management.

## 2018-09-25 NOTE — PROGRESS NOTE ADULT - PROBLEM SELECTOR PLAN 2
Cr 1.72 ---> 2.10 this AM; continue to follow  Renal following -- SCr with a significant upward trend despite IVF  likely due to ATN. Retrial of IVF NS @50ml/hr X 10hrs.    Follow up renal u/s and urine lytes.

## 2018-09-25 NOTE — PROGRESS NOTE ADULT - ASSESSMENT
A 64 yo Female  with  non-verbal, bed bound, S/P Trach/ PEG tube, sent in to the ER from [facility]  for evaluation of coffee ground vomiting  and cough. On arrival, she found to have tachycardia, Leukocytosis, and RLL consolidation. She has started on Zosyn and Vancomycin and the ID consult requested to assist with further evaluation and antibiotic management.     # Aspiration pneumonia - RLL consolidation - Pseudomonas and Acinetobacter   # MRSA PCR is negative    Would recommend:    1. Bronchial suctioning   2. Aspiration  precaution  3. Continue Polymixin, Rifampin and amikacin to cover CRE until 9/26/18  4. Frequent repositioning      d/w  Nursing staff    will follow the patient with you A 62 yo Female  with  non-verbal, bed bound, S/P Trach/ PEG tube, [facility]  [facility] evaluation of coffee ground vomiting  and cough. On arrival, she found to have tachycardia, Leukocytosis, and RLL consolidation. She has started on Zosyn and Vancomycin and the ID consult requested to assist with further evaluation and antibiotic management.     # Aspiration pneumonia - RLL consolidation - Pseudomonas and Acinetobacter   # MRSA PCR is negative    Would recommend:    1. Monitor Amikacin level and adjust doses accordingly   2. Bronchial suctioning  and Aspiration  precaution  3. Continue Polymixin, Rifampin and amikacin to cover CRE x 1 more day,  until 9/26/18  4. Frequent repositioning      d/w  Nursing staff    will follow the patient with you

## 2018-09-25 NOTE — PROGRESS NOTE ADULT - SUBJECTIVE AND OBJECTIVE BOX
[facility] NEPHROLOGY- PROGRESS NOTE, Follow up     Patient is a 63y Female who presents with a chief complaint of vomiting (24 Sep 2018 12:18) with coffee ground emesis and cough.  She was found to have PNA.  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  She was started on polymyxin on 9/16 and amikacin on 9/17.  Baseline creatinine is  0.8, eitan to ~1 on 9/20, 1.3 on 9/22 and 1.7 today.  Pt is also on rifampin.  She was previously on vanco, zosyn, and unasyn this admission as well.  Pt was given lasix IV on 9/21 and 9/22.  She has a PEG and has been tolerating PEG feeds. No ACEi/ARBs/NSAIDs/IV Contrast.    Pt is unable to give history.  REVIEW OF SYSTEMS: unable to obtain    PAST MEDICAL & SURGICAL HISTORY:  No pertinent past medical history  Hypertension  Schizophrenia  Diabetic coma  Diabetes mellitus  No significant past surgical history  S/P percutaneous endoscopic gastrostomy (PEG) tube placement  H/O tracheostomy      Allergy:  angiotensin converting enzyme inhibitors (Unknown)    Hospital Medications:   MEDICATIONS  (STANDING):  ALBUTerol/ipratropium for Nebulization 3 milliLiter(s) Nebulizer every 6 hours  amiKACIN  IVPB 560 milliGRAM(s) IV Intermittent every 12 hours  amLODIPine   Tablet 2.5 milliGRAM(s) Oral daily  atorvastatin 20 milliGRAM(s) Oral at bedtime  benztropine 2 milliGRAM(s) Oral two times a day  chlorhexidine 4% Liquid 1 Application(s) Topical every 12 hours  heparin  Injectable 5000 Unit(s) SubCutaneous every 8 hours  insulin glargine Injectable (LANTUS) 18 Unit(s) SubCutaneous at bedtime  insulin lispro (HumaLOG) corrective regimen sliding scale   SubCutaneous every 6 hours  insulin lispro Injectable (HumaLOG) 7 Unit(s) SubCutaneous three times a day with meals  lactobacillus acidophilus 1 Tablet(s) Oral every 8 hours  levETIRAcetam  Solution 1000 milliGRAM(s) Oral two times a day  metoprolol tartrate 100 milliGRAM(s) Oral two times a day  pantoprazole   Suspension 40 milliGRAM(s) Oral daily  polymyxin B IVPB 328909 Unit(s) IV Intermittent every 12 hours  QUEtiapine 50 milliGRAM(s) Oral every 12 hours  rifampin IVPB      rifampin IVPB 600 milliGRAM(s) IV Intermittent every 24 hours  senna 2 Tablet(s) Oral at bedtime  sodium chloride 0.9%. 500 milliLiter(s) (50 mL/Hr) IV Continuous <Continuous>  sodium chloride 0.9%. 1000 milliLiter(s) (50 mL/Hr) IV Continuous <Continuous>      VITALS:  T(F): 98.5 (09-25-18 @ 05:07), Max: 98.5 (09-25-18 @ 05:07)  HR: 103 (09-25-18 @ 09:50)  BP: 117/63 (09-25-18 @ 05:07)  RR: 18 (09-25-18 @ 05:07)  SpO2: 100% (09-25-18 @ 09:50)  Wt(kg): --      PHYSICAL EXAM:  Constitutional: NAD, calm  HEENT: anicteric sclera, oropharynx clear, MMM  Neck: No JVD  Respiratory: CTAB, no wheezes, rales or rhonchi, trach  Cardiovascular: S1, S2, RRR  Gastrointestinal: BS+, soft, NT/ND  Extremities: No cyanosis or clubbing. No peripheral edema  Neurological: A/O x 3, no focal deficits  Psychiatric: Normal mood, normal affect  : No CVA tenderness. No coe.   Skin: warm and dry     LABS:  09-25    133<L>  |  94<L>  |  17  ----------------------------<  258<H>  3.8   |  30  |  2.10<H>    Ca    9.4      25 Sep 2018 06:15  Mg     1.6     09-24      Creatinine Trend: 2.10 <--, 1.72 <--, 1.27 <--, 1.27 <--, 1.04 <--, 1.02 <--, 1.07 <--, 0.76 <--                        9.9    7.8   )-----------( 250      ( 25 Sep 2018 06:15 )             31.1     Urine Studies:      RADIOLOGY & ADDITIONAL STUDIES:

## 2018-09-26 LAB
ANION GAP SERPL CALC-SCNC: 10 MMOL/L — SIGNIFICANT CHANGE UP (ref 5–17)
BUN SERPL-MCNC: 25 MG/DL — HIGH (ref 7–18)
CALCIUM SERPL-MCNC: 9.4 MG/DL — SIGNIFICANT CHANGE UP (ref 8.4–10.5)
CHLORIDE SERPL-SCNC: 94 MMOL/L — LOW (ref 96–108)
CO2 SERPL-SCNC: 27 MMOL/L — SIGNIFICANT CHANGE UP (ref 22–31)
CREAT SERPL-MCNC: 2.76 MG/DL — HIGH (ref 0.5–1.3)
GLUCOSE SERPL-MCNC: 83 MG/DL — SIGNIFICANT CHANGE UP (ref 70–99)
POTASSIUM SERPL-MCNC: 3.9 MMOL/L — SIGNIFICANT CHANGE UP (ref 3.5–5.3)
POTASSIUM SERPL-SCNC: 3.9 MMOL/L — SIGNIFICANT CHANGE UP (ref 3.5–5.3)
SODIUM SERPL-SCNC: 131 MMOL/L — LOW (ref 135–145)

## 2018-09-26 PROCEDURE — 76775 US EXAM ABDO BACK WALL LIM: CPT | Mod: 26

## 2018-09-26 RX ORDER — INSULIN LISPRO 100/ML
10 VIAL (ML) SUBCUTANEOUS
Qty: 0 | Refills: 0 | Status: DISCONTINUED | OUTPATIENT
Start: 2018-09-26 | End: 2018-10-04

## 2018-09-26 RX ORDER — INSULIN GLARGINE 100 [IU]/ML
25 INJECTION, SOLUTION SUBCUTANEOUS AT BEDTIME
Qty: 0 | Refills: 0 | Status: DISCONTINUED | OUTPATIENT
Start: 2018-09-26 | End: 2018-10-04

## 2018-09-26 RX ORDER — INSULIN LISPRO 100/ML
10 VIAL (ML) SUBCUTANEOUS
Qty: 0 | Refills: 0 | Status: DISCONTINUED | OUTPATIENT
Start: 2018-09-26 | End: 2018-09-26

## 2018-09-26 RX ADMIN — LEVETIRACETAM 1000 MILLIGRAM(S): 250 TABLET, FILM COATED ORAL at 17:50

## 2018-09-26 RX ADMIN — Medication 2 MILLIGRAM(S): at 17:50

## 2018-09-26 RX ADMIN — Medication 3 MILLILITER(S): at 14:22

## 2018-09-26 RX ADMIN — Medication 1 TABLET(S): at 14:14

## 2018-09-26 RX ADMIN — Medication 4: at 06:26

## 2018-09-26 RX ADMIN — Medication 7 UNIT(S): at 08:23

## 2018-09-26 RX ADMIN — Medication 4: at 00:21

## 2018-09-26 RX ADMIN — Medication 3 MILLILITER(S): at 20:46

## 2018-09-26 RX ADMIN — Medication 1 TABLET(S): at 05:21

## 2018-09-26 RX ADMIN — AMIKACIN SULFATE 102.24 MILLIGRAM(S): 250 INJECTION, SOLUTION INTRAMUSCULAR; INTRAVENOUS at 05:21

## 2018-09-26 RX ADMIN — INSULIN GLARGINE 25 UNIT(S): 100 INJECTION, SOLUTION SUBCUTANEOUS at 22:13

## 2018-09-26 RX ADMIN — LEVETIRACETAM 1000 MILLIGRAM(S): 250 TABLET, FILM COATED ORAL at 05:21

## 2018-09-26 RX ADMIN — Medication 100 MILLIGRAM(S): at 17:50

## 2018-09-26 RX ADMIN — Medication 3: at 17:51

## 2018-09-26 RX ADMIN — SENNA PLUS 2 TABLET(S): 8.6 TABLET ORAL at 22:14

## 2018-09-26 RX ADMIN — CHLORHEXIDINE GLUCONATE 1 APPLICATION(S): 213 SOLUTION TOPICAL at 05:22

## 2018-09-26 RX ADMIN — QUETIAPINE FUMARATE 50 MILLIGRAM(S): 200 TABLET, FILM COATED ORAL at 05:21

## 2018-09-26 RX ADMIN — HEPARIN SODIUM 5000 UNIT(S): 5000 INJECTION INTRAVENOUS; SUBCUTANEOUS at 05:21

## 2018-09-26 RX ADMIN — Medication 10 UNIT(S): at 11:41

## 2018-09-26 RX ADMIN — Medication 1 TABLET(S): at 22:14

## 2018-09-26 RX ADMIN — POLYMYXIN B SULFATE 500 UNIT(S): 500000 INJECTION, POWDER, LYOPHILIZED, FOR SOLUTION INTRAMUSCULAR; INTRATHECAL; INTRAVENOUS; OPHTHALMIC at 05:21

## 2018-09-26 RX ADMIN — HEPARIN SODIUM 5000 UNIT(S): 5000 INJECTION INTRAVENOUS; SUBCUTANEOUS at 14:14

## 2018-09-26 RX ADMIN — Medication 3 MILLILITER(S): at 02:23

## 2018-09-26 RX ADMIN — Medication 1 MILLIGRAM(S): at 11:32

## 2018-09-26 RX ADMIN — ATORVASTATIN CALCIUM 20 MILLIGRAM(S): 80 TABLET, FILM COATED ORAL at 22:13

## 2018-09-26 RX ADMIN — Medication 10 UNIT(S): at 17:51

## 2018-09-26 RX ADMIN — QUETIAPINE FUMARATE 50 MILLIGRAM(S): 200 TABLET, FILM COATED ORAL at 17:50

## 2018-09-26 RX ADMIN — Medication 2 MILLIGRAM(S): at 05:21

## 2018-09-26 RX ADMIN — HEPARIN SODIUM 5000 UNIT(S): 5000 INJECTION INTRAVENOUS; SUBCUTANEOUS at 22:13

## 2018-09-26 RX ADMIN — CHLORHEXIDINE GLUCONATE 1 APPLICATION(S): 213 SOLUTION TOPICAL at 17:50

## 2018-09-26 RX ADMIN — PANTOPRAZOLE SODIUM 40 MILLIGRAM(S): 20 TABLET, DELAYED RELEASE ORAL at 11:32

## 2018-09-26 RX ADMIN — AMLODIPINE BESYLATE 2.5 MILLIGRAM(S): 2.5 TABLET ORAL at 05:22

## 2018-09-26 RX ADMIN — Medication 100 MILLIGRAM(S): at 05:21

## 2018-09-26 RX ADMIN — Medication 3 MILLILITER(S): at 09:22

## 2018-09-26 RX ADMIN — Medication 1: at 11:40

## 2018-09-26 NOTE — PROGRESS NOTE ADULT - SUBJECTIVE AND OBJECTIVE BOX
Interval Events:  pt in nad    Allergies    angiotensin converting enzyme inhibitors (Unknown)    Intolerances      Endocrine/Metabolic Medications:  atorvastatin 20 milliGRAM(s) Oral at bedtime  insulin glargine Injectable (LANTUS) 18 Unit(s) SubCutaneous at bedtime  insulin lispro (HumaLOG) corrective regimen sliding scale   SubCutaneous every 6 hours  insulin lispro Injectable (HumaLOG) 7 Unit(s) SubCutaneous three times a day with meals      Vital Signs Last 24 Hrs  T(C): 36.6 (26 Sep 2018 05:05), Max: 37.1 (25 Sep 2018 14:29)  T(F): 97.9 (26 Sep 2018 05:05), Max: 98.8 (25 Sep 2018 14:29)  HR: 77 (26 Sep 2018 05:05) (77 - 115)  BP: 116/65 (26 Sep 2018 05:05) (116/65 - 142/75)  BP(mean): --  RR: 18 (26 Sep 2018 05:05) (18 - 18)  SpO2: 100% (26 Sep 2018 05:05) (98% - 100%)      PHYSICAL EXAM  All physical exam findings normal, except those marked:  General:	Alert, active, cooperative, NAD, well hydrated  .		[] Abnormal:  Neck		Normal: supple, no cervical adenopathy, no palpable thyroid  .		[] Abnormal:  Cardiovascular	Normal: regular rate, normal S1, S2, no murmurs  .		[] Abnormal:  Respiratory	Normal: no chest wall deformity, normal respiratory pattern, CTA B/L  .		[] Abnormal:  Abdominal	Normal: soft, ND, NT, bowel sounds present, no masses, no organomegaly  .		[] Abnormal:  		Normal normal genitalia, testes descended, circumcised/uncircumcised  .		Bob stage:			Breast bob:  .		Menstrual history:  .		[] Abnormal:  Extremities	Normal: FROM x4  .		[] Abnormal:  Skin		Normal: intact and not indurated, no rash, no acanthosis nigricans  .		[] Abnormal:  Neurologic	Normal: grossly intact  .		[] Abnormal:    LABS        CAPILLARY BLOOD GLUCOSE      POCT Blood Glucose.: 323 mg/dL (26 Sep 2018 06:00)  POCT Blood Glucose.: 346 mg/dL (25 Sep 2018 23:24)  POCT Blood Glucose.: 355 mg/dL (25 Sep 2018 22:40)  POCT Blood Glucose.: 173 mg/dL (25 Sep 2018 16:55)  POCT Blood Glucose.: 496 mg/dL (25 Sep 2018 11:46)        Assesment/plan    Dm- remains hyperglycemic  on multiple iv abx with dextrose   on 65cc/hr gt feeds  change lantus to 25 and humalog 10 tid  fsg q6  aim fsg 150<200

## 2018-09-26 NOTE — PROGRESS NOTE ADULT - PROBLEM SELECTOR PLAN 3
D/w ID re: abx choice and duration.  Would try to stop polymyxin and amikacin if possible. mgmt per primary team.

## 2018-09-26 NOTE — PROGRESS NOTE ADULT - PROBLEM SELECTOR PLAN 1
BMP pending  c/w ivf  f/u urine lytes  f/u renal u/s  monitor renal function and u/o  avoid nephrotoxic rx

## 2018-09-26 NOTE — PROGRESS NOTE ADULT - ASSESSMENT
A 62 yo Female  with  non-verbal, bed bound, S/P Trach/ PEG tube, sent in to the ER from [facility]  for evaluation of coffee ground vomiting  and cough. On arrival, she found to have tachycardia, Leukocytosis, and RLL consolidation. She has started on Zosyn and Vancomycin and the ID consult requested to assist with further evaluation and antibiotic management.     # Aspiration pneumonia - RLL consolidation - Pseudomonas and Acinetobacter   # MRSA PCR is negative    Would recommend:    1. Discontinue All antibiotics since creatinine is trending up  2. Monitor kidney function and c/w IVF  3. Bronchial suctioning  and Aspiration  precaution  4. Frequent repositioning      d/w  Dr. Torres  and Nursing staff    will follow the patient with you

## 2018-09-26 NOTE — PROGRESS NOTE ADULT - SUBJECTIVE AND OBJECTIVE BOX
Patient is seen and examined at the bed side, is afebrile.  The creatinine continues trending up.      REVIEW OF SYSTEMS: Unable to obtain due to mental status      ALLERGIES : NKDA      Vital Signs Last 24 Hrs  T(C): 36.6 (26 Sep 2018 05:05), Max: 36.6 (26 Sep 2018 05:05)  T(F): 97.9 (26 Sep 2018 05:05), Max: 97.9 (26 Sep 2018 05:05)  HR: 88 (26 Sep 2018 09:10) (77 - 115)  BP: 116/65 (26 Sep 2018 05:05) (116/65 - 131/92)  BP(mean): --  RR: 18 (26 Sep 2018 05:05) (18 - 18)  SpO2: 96% (26 Sep 2018 09:10) (96% - 100%)      PHYSICAL EXAM:  GENERAL: Not in distress  HEENT: Trach in placed  CHEST/LUNG: Air entry B/L  HEART: s1 and s2 present  ABDOMEN: Nontender, and Nondistended  EXTREMITIES:  No pedal  edema  CNS: Awake but not Alert        LABS:                          9.9    7.8   )-----------( 250      ( 25 Sep 2018 06:15 )             31.1                                  10.5   20.1  )-----------( 217      ( 19 Sep 2018 07:47 )             33.1           Amikacin Level, Trough (18 @ 17:06)    Amikacin Level, Trough: 17.6: Performed by: [facility], [id number] [place] [address] 67159 ug/mL          131<L>  |  94<L>  |  25<H>  ----------------------------<  83  3.9   |  27  |  2.76<H>    Ca    9.4      26 Sep 2018 13:54      -    133<L>  |  94<L>  |  17  ----------------------------<  258<H>  3.8   |  30  |  2.10<H>    Ca    9.4      25 Sep 2018 06:15  Phos  4.5       Mg     1.6         131<L>  |  93<L>  |  16  ----------------------------<  163<H>  3.9   |  31  |  1.72<H>    Ca    9.0      24 Sep 2018 07:18  Phos  4.5       Mg     1.6           TPro  6.2  /  Alb  2.2<L>  /  TBili  0.9  /  DBili  0.3<H>  /  AST  24  /  ALT  20  /  AlkPhos  86           Urinalysis Basic - ( 14 Sep 2018 03:56 )    Color: Yellow / Appearance: Clear / S.020 / pH: x  Gluc: x / Ketone: Negative  / Bili: Negative / Urobili: Negative   Blood: x / Protein: 100 / Nitrite: Negative   Leuk Esterase: Negative / RBC: 0-2 /HPF / WBC 0-2 /HPF   Sq Epi: x / Non Sq Epi: Occasional /HPF / Bacteria: Trace /HPF      CAPILLARY BLOOD GLUCOSE      POCT Blood Glucose.: 144 mg/dL (14 Sep 2018 11:07)  POCT Blood Glucose.: 202 mg/dL (14 Sep 2018 08:09)  POCT Blood Glucose.: 277 mg/dL (14 Sep 2018 06:58)  POCT Blood Glucose.: 68 mg/dL (14 Sep 2018 06:03)  POCT Blood Glucose.: 316 mg/dL (14 Sep 2018 02:11)  POCT Blood Glucose.: 405 mg/dL (14 Sep 2018 00:28)  POCT Blood Glucose.: 386 mg/dL (13 Sep 2018 22:26)  POCT Blood Glucose.: 403 mg/dL (13 Sep 2018 19:09)      MEDICATIONS  (STANDING):  ALBUTerol/ipratropium for Nebulization 3 milliLiter(s) Nebulizer every 6 hours  amLODIPine   Tablet 2.5 milliGRAM(s) Oral daily  atorvastatin 20 milliGRAM(s) Oral at bedtime  benztropine 2 milliGRAM(s) Oral two times a day  chlorhexidine 4% Liquid 1 Application(s) Topical every 12 hours  heparin  Injectable 5000 Unit(s) SubCutaneous every 8 hours  insulin glargine Injectable (LANTUS) 25 Unit(s) SubCutaneous at bedtime  insulin lispro (HumaLOG) corrective regimen sliding scale   SubCutaneous every 6 hours  insulin lispro Injectable (HumaLOG) 10 Unit(s) SubCutaneous <User Schedule>  lactobacillus acidophilus 1 Tablet(s) Oral every 8 hours  levETIRAcetam  Solution 1000 milliGRAM(s) Oral two times a day  metoprolol tartrate 100 milliGRAM(s) Oral two times a day  pantoprazole   Suspension 40 milliGRAM(s) Oral daily  QUEtiapine 50 milliGRAM(s) Oral every 12 hours  senna 2 Tablet(s) Oral at bedtime  sodium chloride 0.9%. 1000 milliLiter(s) (50 mL/Hr) IV Continuous <Continuous>    MEDICATIONS  (PRN):  haloperidol    Injectable 5 milliGRAM(s) IntraMuscular once PRN Agitation  LORazepam   Injectable 1 milliGRAM(s) IV Push every 8 hours PRN for breathrough agitation/anxiety/combative behavior          RADIOLOGY & ADDITIONAL TESTS:    18: Xray Chest 1 View- PORTABLE-Routine (18 @ 08:22) Tracheostomy cannula reidentified in position. No change heart mediastinum. No change in the right basilar opacity with loss of definition of the ipsilateral hemidiaphragm and costophrenic angle likely   representing a combination of pleural fluid and underlying  consolidation/atelectasis.      18: CT Chest No Cont (18 @ 03:41) Impression: Consolidation involving the right lower lobe is of uncertain etiology. It is unclear whether this represents compressive atelectasis secondary to right pleural effusion/elevation of the right hemidiaphragm  and/or represents infection.    18: Xray Chest 1 View-PORTABLE IMMEDIATE (18 @ 13:37) Bilateral pleural effusions and associated atelectasis.        MICROBIOLOGY DATA:    Culture - Blood (18 @ 17:33)    Specimen Source: .Blood Blood-Peripheral    Culture Results:   No growth to date.      Culture - Blood (18 @ 13:21)    Specimen Source: .Blood Blood-Peripheral    Culture Results:   No growth to date.        Culture - Sputum . (09.15.18 @ 09:28)    Gram Stain:   Rare polymorphonuclear leukocytes per low power field  Few Squamous epithelial cells per low power field  Moderate Gram Negative Rods per oil power field  Few Gram Positive Rods per oil power field  Few Yeast like cells per oil power field  Rare Gram Negative Coccobacilli per oil power field    -  Amikacin: S <=8    -  Amikacin: S <=8    -  Ampicillin/Sulbactam: R >16/8    -  Aztreonam: S 8    -  Cefepime: I 16    -  Cefepime: I 16    -  Ceftazidime: R >16    -  Ceftazidime: I 16    -  Ciprofloxacin: R >2    -  Ciprofloxacin: R >2    -  Gentamicin: R >8    -  Gentamicin: R >8    -  Imipenem: R >8    -  Imipenem: R    -  Levofloxacin: R >4    -  Levofloxacin: R >4    -  Meropenem: R >8    -  Meropenem: I 8    -  Piperacillin/Tazobactam: R >64    -  Piperacillin/Tazobactam: R    -  Tobramycin: R >8    -  Tobramycin: R >8    -  Trimethoprim/Sulfamethoxazole: R >2/38    Specimen Source: .Sputum Sputum    Culture Results:   Numerous Pseudomonas aeruginosa (Carbapenem Resistant)  Moderate Acinetobacter baumannii/haemolyticus (Carbapenem Resistant)  Complex  Normal Respiratory Moraima present    Organism Identification: Pseudomonas aeruginosa (Carbapenem Resistant)  Acinetobacter baumannii/haemolyticus (Carbapenem Resistant)    Organism: Acinetobacter baumannii/haemolyticus (Carbapenem Resistant)    Organism: Pseudomonas aeruginosa (Carbapenem Resistant)    Organism: Acinetobacter baumannii/haemolyticus (Carbapenem Resistant)    Method Type: ESTIVEN    Method Type: ESTIVEN    Method Type: KB        MRSA/MSSA PCR (18 @ 11:26)    MRSA PCR Result.: Andrew: MRSA/MSSA PCR assay is a qualitative in vitro diagnostic test for the  direct detection and differentiation of methicillin-resistant  Staphylococcus aureus (MRSA) from Staphylococcus aureus (SA). The assay  detects DNA from nasal swabs in patients atrisk for nasal colonization.  It is not intended to diagnose MRSA or SA infections nor guide or monitor  treatment for MRSA/SA infections. A negative result does not preclude  nasal colonization. The assay is FDA-approved and its performance has  been established by [employer], [place] and the [facility], [place], [place].    Staph Aureus PCR Result: NotDetec      Culture - Urine (18 @ 09:47)    Specimen Source: .Urine Catheterized    Culture Results:   10,000 - 49,000 CFU/mL Yeast

## 2018-09-26 NOTE — PROGRESS NOTE ADULT - SUBJECTIVE AND OBJECTIVE BOX
Patient seen and examined at bedside  No acute events noted overnight  Case discussed with medical team    HPI:  62 yo F from [facility], non-verbal, bed bound, S/P Trach/ PEG tube, PMH of HTN, asthma, convulsion disorder, bipolar, DVT legs, DM 2, sent from NH for coffee ground vomiting X3 this morning and cough.  Pt unable to provide history.  History given by sister (Sharon -[phone])who states pt was vomiting coffee ground emesis this morning. Pt had no reported vomiting since in ED.  Pt also developed cough with clear sputum.  Sister states pt is combative at baseline, and her current behavior is baseline.  No reported fever, discomfort, diarrhea, or SOB.  Pt was recently admitted to [facility] in 07/2018 for same reason and treated for aspiration PNA and UTI during last admission.  Pt was supposed to be given IV vanco and zosyn for 7 day by PCP for PNA , which supposed to start from 09/13-09/19. Pt is DNR, MOSLT form in charts. (13 Sep 2018 15:49)      PAST MEDICAL & SURGICAL HISTORY:  No pertinent past medical history  Hypertension  Schizophrenia  Diabetic coma  Diabetes mellitus  No significant past surgical history  S/P percutaneous endoscopic gastrostomy (PEG) tube placement  H/O tracheostomy      angiotensin converting enzyme inhibitors (Unknown)       MEDICATIONS  (STANDING):  ALBUTerol/ipratropium for Nebulization 3 milliLiter(s) Nebulizer every 6 hours  amiKACIN  IVPB 560 milliGRAM(s) IV Intermittent every 12 hours  amLODIPine   Tablet 2.5 milliGRAM(s) Oral daily  atorvastatin 20 milliGRAM(s) Oral at bedtime  benztropine 2 milliGRAM(s) Oral two times a day  chlorhexidine 4% Liquid 1 Application(s) Topical every 12 hours  heparin  Injectable 5000 Unit(s) SubCutaneous every 8 hours  insulin glargine Injectable (LANTUS) 25 Unit(s) SubCutaneous at bedtime  insulin lispro (HumaLOG) corrective regimen sliding scale   SubCutaneous every 6 hours  insulin lispro Injectable (HumaLOG) 10 Unit(s) SubCutaneous <User Schedule>  lactobacillus acidophilus 1 Tablet(s) Oral every 8 hours  levETIRAcetam  Solution 1000 milliGRAM(s) Oral two times a day  metoprolol tartrate 100 milliGRAM(s) Oral two times a day  pantoprazole   Suspension 40 milliGRAM(s) Oral daily  polymyxin B IVPB 457162 Unit(s) IV Intermittent every 12 hours  QUEtiapine 50 milliGRAM(s) Oral every 12 hours  rifampin IVPB      rifampin IVPB 600 milliGRAM(s) IV Intermittent every 24 hours  senna 2 Tablet(s) Oral at bedtime  sodium chloride 0.9%. 1000 milliLiter(s) (50 mL/Hr) IV Continuous <Continuous>    MEDICATIONS  (PRN):  haloperidol    Injectable 5 milliGRAM(s) IntraMuscular once PRN Agitation  LORazepam   Injectable 1 milliGRAM(s) IV Push every 8 hours PRN for breathrough agitation/anxiety/combative behavior      REVIEW OF SYSTEMS:limited 2/2 mental status    T(C): 36.6 (09-26-18 @ 05:05), Max: 37.1 (09-25-18 @ 14:29)  HR: 88 (09-26-18 @ 09:10) (77 - 115)  BP: 116/65 (09-26-18 @ 05:05) (116/65 - 142/75)  RR: 18 (09-26-18 @ 05:05) (18 - 18)  SpO2: 96% (09-26-18 @ 09:10) (96% - 100%)    PHYSICAL EXAMINATION:   Constitutional: NAD  HEENT: AT  Neck:  Supple  Respiratory: Adequate airflow b/l. Not using accessory muscles of respiration.  Cardiovascular:  S1 & S2 intact, no R/G, 2+ radial pulses b/l  Gastrointestinal: Soft, ND, normoactive b.s., no organomegaly/RT/rigidity  Extremities: WWP  Neurological:  awake. stable.    Labs and imaging reviewed    LABS:                        9.9    7.8   )-----------( 250      ( 25 Sep 2018 06:15 )             31.1     09-25    133<L>  |  94<L>  |  17  ----------------------------<  258<H>  3.8   |  30  |  2.10<H>    Ca    9.4      25 Sep 2018 06:15              CAPILLARY BLOOD GLUCOSE      POCT Blood Glucose.: 193 mg/dL (26 Sep 2018 11:36)  POCT Blood Glucose.: 323 mg/dL (26 Sep 2018 06:00)  POCT Blood Glucose.: 346 mg/dL (25 Sep 2018 23:24)  POCT Blood Glucose.: 355 mg/dL (25 Sep 2018 22:40)  POCT Blood Glucose.: 173 mg/dL (25 Sep 2018 16:55)              RADIOLOGY & ADDITIONAL STUDIES:

## 2018-09-26 NOTE — PROGRESS NOTE ADULT - ASSESSMENT
63 year-old woman with LAYLA hemodynamically-mediated in the setting of lasix vs. ATN from polymyxin and amikacin vs. AIN from multiple abx.  Hyponatremia likely due to dehydration as well.  Elevate bicarbonate ether respiratory acidosis or metabolic alkalosis.    Would seriously consider discontinuing polymyxin and amikacin if possible. 63 year-old woman with LAYLA hemodynamically-mediated in the setting of lasix vs. ATN from polymyxin and amikacin vs. AIN from multiple abx.  Hyponatremia likely due to dehydration as well.  Elevate bicarbonate ether respiratory acidosis or metabolic alkalosis.    Would seriously consider discontinuing polymyxin and amikacin if possible.  Discussed with Dr. Kwon, ID.   Will d/c Abx.  Also, will give IVF NS @100ml/hr for now.

## 2018-09-26 NOTE — PROGRESS NOTE ADULT - SUBJECTIVE AND OBJECTIVE BOX
[facility] NEPHROLOGY- PROGRESS NOTE, Follow up     Patient is a 63y Female who presents with a chief complaint of vomiting (24 Sep 2018 12:18) with coffee ground emesis and cough.  She was found to have PNA.  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  She was started on polymyxin on 9/16 and amikacin on 9/17.  Baseline creatinine is  0.8, eitan to ~1 on 9/20, 1.3 on 9/22 and 1.7 today.  Pt is also on rifampin.  She was previously on vanco, zosyn, and unasyn this admission as well.  Pt was given lasix IV on 9/21 and 9/22.  She has a PEG and has been tolerating PEG feeds. No ACEi/ARBs/NSAIDs/IV Contrast.    Pt is unable to give history.  REVIEW OF SYSTEMS: unable to obtain    PAST MEDICAL & SURGICAL HISTORY:  No pertinent past medical history  Hypertension  Schizophrenia  Diabetic coma  Diabetes mellitus  No significant past surgical history  S/P percutaneous endoscopic gastrostomy (PEG) tube placement  H/O tracheostomy      Allergy:  angiotensin converting enzyme inhibitors (Unknown)    Hospital Medications:   MEDICATIONS  (STANDING):  ALBUTerol/ipratropium for Nebulization 3 milliLiter(s) Nebulizer every 6 hours  amLODIPine   Tablet 2.5 milliGRAM(s) Oral daily  atorvastatin 20 milliGRAM(s) Oral at bedtime  benztropine 2 milliGRAM(s) Oral two times a day  chlorhexidine 4% Liquid 1 Application(s) Topical every 12 hours  heparin  Injectable 5000 Unit(s) SubCutaneous every 8 hours  insulin glargine Injectable (LANTUS) 25 Unit(s) SubCutaneous at bedtime  insulin lispro (HumaLOG) corrective regimen sliding scale   SubCutaneous every 6 hours  insulin lispro Injectable (HumaLOG) 10 Unit(s) SubCutaneous <User Schedule>  lactobacillus acidophilus 1 Tablet(s) Oral every 8 hours  levETIRAcetam  Solution 1000 milliGRAM(s) Oral two times a day  metoprolol tartrate 100 milliGRAM(s) Oral two times a day  pantoprazole   Suspension 40 milliGRAM(s) Oral daily  QUEtiapine 50 milliGRAM(s) Oral every 12 hours  senna 2 Tablet(s) Oral at bedtime  sodium chloride 0.9%. 1000 milliLiter(s) (50 mL/Hr) IV Continuous <Continuous>      VITALS:  T(F): 97.9 (09-26-18 @ 05:05), Max: 97.9 (09-26-18 @ 05:05)  HR: 88 (09-26-18 @ 09:10)  BP: 116/65 (09-26-18 @ 05:05)  RR: 18 (09-26-18 @ 05:05)  SpO2: 96% (09-26-18 @ 09:10)  Wt(kg): --      PHYSICAL EXAM:  Constitutional: NAD, calm  HEENT: anicteric sclera, oropharynx clear, MMM  Neck: No JVD  Respiratory: CTAB, no wheezes, rales or rhonchi, trach  Cardiovascular: S1, S2, RRR  Gastrointestinal: BS+, soft, NT/ND  Extremities: No cyanosis or clubbing. No peripheral edema  : No CVA tenderness. No coe.   Skin: warm and dry       LABS:  09-26    131<L>  |  94<L>  |  25<H>  ----------------------------<  83  3.9   |  27  |  2.76<H>    Ca    9.4      26 Sep 2018 13:54      Creatinine Trend: 2.76 <--, 2.10 <--, 1.72 <--, 1.27 <--, 1.27 <--, 1.04 <--, 1.02 <--, 1.07 <--                        9.9    7.8   )-----------( 250      ( 25 Sep 2018 06:15 )             31.1         Urine Studies:      RADIOLOGY & ADDITIONAL STUDIES:  < from: US Renal (09.26.18 @ 12:27) >  EXAM:  US KIDNEY(S)                            PROCEDURE DATE:  09/26/2018          INTERPRETATION:  History: Acute renal failure.    Bilateral renal ultrasound.  Limited by patient's contracted state and inability to offer the   requisite cooperation.  Right kidney 10.4 left 10.1 cm long dimension. No hydronephrosis   bilaterally. No gross solid or cystic mass. There is 0.4 cm   nonobstructive calculus left kidney upper pole.    Impression: No hydronephrosis. Nonobstructive left renal calculus.                    PEBBLES SAAVEDRA M.D., ATTENDING RADIOLOGIST  This document has been electronically signed. Sep 26 2018  1:49PM        < end of copied text >

## 2018-09-26 NOTE — PROGRESS NOTE ADULT - PROBLEM SELECTOR PLAN 1
SCr with a significant upward trend despite IVF  likely due to ATN.    Renal u/s reviewed as noted above.  Check urine lytes- ordered 9/25/18 pending collection still . Continue IVF  50mg IV X 20 hrs.  Would seriously consider discontinuing polymyxin and amikacin if possible. LAYLA 2/2 ATN 2/2 polymyxin and amikacin.  Would seriously consider discontinuing polymyxin and amikacin if possible.  Discussed with Dr. Kwon, ID.   Will d/c Abx.

## 2018-09-26 NOTE — PROGRESS NOTE ADULT - SUBJECTIVE AND OBJECTIVE BOX
Patient is a 63y old  Female who presents with a chief complaint of vomiting (26 Sep 2018 17:20)  Awake, and not alert, non verbal and agitated. Clinically unchanged.    INTERVAL HPI/OVERNIGHT EVENTS:      VITAL SIGNS:  T(F): 97.9 (09-26-18 @ 05:05)  HR: 91 (09-26-18 @ 18:18)  BP: 122/66 (09-26-18 @ 18:18)  RR: 18 (09-26-18 @ 05:05)  SpO2: 96% (09-26-18 @ 09:10)  Wt(kg): --  I&O's Detail          REVIEW OF SYSTEMS:    CONSTITUTIONAL:  No fevers, chills, sweats    HEENT:  Eyes:  No diplopia or blurred vision. ENT:  No earache, sore throat or runny nose.    CARDIOVASCULAR:  No pressure, squeezing, tightness, or heaviness about the chest; no palpitations.    RESPIRATORY:  Per HPI    GASTROINTESTINAL:  No abdominal pain, nausea, vomiting or diarrhea.    GENITOURINARY:  No dysuria, frequency or urgency.    NEUROLOGIC:  No paresthesias, fasciculations, seizures or weakness.    PSYCHIATRIC:  No disorder of thought or mood.      PHYSICAL EXAM:    Constitutional: Well developed and nourished  Eyes:Perrla  ENMT: normal  Neck:supple  Respiratory: good air entry  Cardiovascular: S1 S2 regular  Gastrointestinal: Soft, Non tender  Extremities: No edema  Vascular:normal  Neurological:Awake, alert,Ox3  Musculoskeletal:Normal      MEDICATIONS  (STANDING):  ALBUTerol/ipratropium for Nebulization 3 milliLiter(s) Nebulizer every 6 hours  amLODIPine   Tablet 2.5 milliGRAM(s) Oral daily  atorvastatin 20 milliGRAM(s) Oral at bedtime  benztropine 2 milliGRAM(s) Oral two times a day  chlorhexidine 4% Liquid 1 Application(s) Topical every 12 hours  heparin  Injectable 5000 Unit(s) SubCutaneous every 8 hours  insulin glargine Injectable (LANTUS) 25 Unit(s) SubCutaneous at bedtime  insulin lispro (HumaLOG) corrective regimen sliding scale   SubCutaneous every 6 hours  insulin lispro Injectable (HumaLOG) 10 Unit(s) SubCutaneous <User Schedule>  lactobacillus acidophilus 1 Tablet(s) Oral every 8 hours  levETIRAcetam  Solution 1000 milliGRAM(s) Oral two times a day  metoprolol tartrate 100 milliGRAM(s) Oral two times a day  pantoprazole   Suspension 40 milliGRAM(s) Oral daily  QUEtiapine 50 milliGRAM(s) Oral every 12 hours  senna 2 Tablet(s) Oral at bedtime  sodium chloride 0.9%. 1000 milliLiter(s) (50 mL/Hr) IV Continuous <Continuous>    MEDICATIONS  (PRN):  haloperidol    Injectable 5 milliGRAM(s) IntraMuscular once PRN Agitation  LORazepam   Injectable 1 milliGRAM(s) IV Push every 8 hours PRN for breathrough agitation/anxiety/combative behavior      Allergies    angiotensin converting enzyme inhibitors (Unknown)    Intolerances        LABS:                        9.9    7.8   )-----------( 250      ( 25 Sep 2018 06:15 )             31.1     09-26    131<L>  |  94<L>  |  25<H>  ----------------------------<  83  3.9   |  27  |  2.76<H>    Ca    9.4      26 Sep 2018 13:54                CAPILLARY BLOOD GLUCOSE      POCT Blood Glucose.: 257 mg/dL (26 Sep 2018 17:39)  POCT Blood Glucose.: 193 mg/dL (26 Sep 2018 11:36)  POCT Blood Glucose.: 323 mg/dL (26 Sep 2018 06:00)  POCT Blood Glucose.: 346 mg/dL (25 Sep 2018 23:24)  POCT Blood Glucose.: 355 mg/dL (25 Sep 2018 22:40)        RADIOLOGY & ADDITIONAL TESTS:    CXR:    Ct scan chest:    ekg;    echo:

## 2018-09-26 NOTE — CHART NOTE - NSCHARTNOTEFT_GEN_A_CORE
Assessment:   Patient reports [ ] nausea  [ ] vomiting [ ] diarrhea [ ] constipation  [ ]chewing problems [ ] swallowing issues  [ ] other:   Pt visited. Pt with tracheostomy. TF Glucerna 1.2 infusing @  65 ml/hr x 16 hr providing 1040 ml,1248 calories.  protein 62 gms, free water 837 ml/day.  TF tolerated. Blood glucose noted  accu checks 193, na 131. Endo f/u noted . lantus adjusted. Pt with multiple ABT with  dextrose     PO intake:   Source for PO intake [ ] Patient/family [ ] chart [ ] staff (x) PEG TF    Current Weight:   % Weight Change ? bed scale     Pertinent Medications: MEDICATIONS  (STANDING):  ALBUTerol/ipratropium for Nebulization 3 milliLiter(s) Nebulizer every 6 hours  amiKACIN  IVPB 560 milliGRAM(s) IV Intermittent every 12 hours  amLODIPine   Tablet 2.5 milliGRAM(s) Oral daily  atorvastatin 20 milliGRAM(s) Oral at bedtime  benztropine 2 milliGRAM(s) Oral two times a day  chlorhexidine 4% Liquid 1 Application(s) Topical every 12 hours  heparin  Injectable 5000 Unit(s) SubCutaneous every 8 hours  insulin glargine Injectable (LANTUS) 25 Unit(s) SubCutaneous at bedtime  insulin lispro (HumaLOG) corrective regimen sliding scale   SubCutaneous every 6 hours  insulin lispro Injectable (HumaLOG) 10 Unit(s) SubCutaneous <User Schedule>  lactobacillus acidophilus 1 Tablet(s) Oral every 8 hours  levETIRAcetam  Solution 1000 milliGRAM(s) Oral two times a day  metoprolol tartrate 100 milliGRAM(s) Oral two times a day  pantoprazole   Suspension 40 milliGRAM(s) Oral daily  polymyxin B IVPB 256145 Unit(s) IV Intermittent every 12 hours  QUEtiapine 50 milliGRAM(s) Oral every 12 hours  rifampin IVPB      rifampin IVPB 600 milliGRAM(s) IV Intermittent every 24 hours  senna 2 Tablet(s) Oral at bedtime  sodium chloride 0.9%. 1000 milliLiter(s) (50 mL/Hr) IV Continuous <Continuous>    MEDICATIONS  (PRN):  haloperidol    Injectable 5 milliGRAM(s) IntraMuscular once PRN Agitation  LORazepam   Injectable 1 milliGRAM(s) IV Push every 8 hours PRN for breathrough agitation/anxiety/combative behavior    Pertinent Labs:  09-26 Na131 mmol/L<L> Glu 83 mg/dL K+ 3.9 mmol/L Cr  2.76 mg/dL<H> BUN 25 mg/dL<H> 09-24 Phos 4.5 mg/dL 09-20 Alb 2.2 g/dL<L> 09-14 [id number] 11.4 %<H> 09-14 Chol 145 mg/dL LDL 69 mg/dL HDL 43 mg/dL<L> Trig 167 mg/dL<H>      Skin:     Estimated Needs:   [x ] no change since previous assessment  [ ] recalculated:       Previous Nutrition Diagnosis:   [ ] Inadequate Energy Intake [ x]Inadequate Oral Intake [ ] Excessive Energy Intake   [ ] Underweight [ ] Increased Nutrient Needs [ ] Overweight/Obesity   [ ] Altered GI Function [ ] Unintended Weight Loss [ ] Food & Nutrition Related Knowledge Deficit [ ] Malnutrition     Nutrition Diagnosis is x[ ] ongoing  [ ] resolved [ ] not applicable     New Nutrition Diagnosis: [ ] not applicable  [ ] Inadequate Energy Intake [ ]Inadequate Oral Intake [ ] Excessive Energy Intake   [ ] Underweight [ ] Increased Nutrient Needs [ ] Overweight/Obesity   [ ] Altered GI Function [ ] Unintended Weight Loss [ ] Food & Nutrition Related Knowledge Deficit [ ] Malnutrition     Related to:     As evidenced by:     Interventions:   Recommend  [ ] Change Diet To:  [ ] Nutrition Supplement  [ x] Nutrition Support- continue with current TF RX  [ ] Other:     Monitoring and Evaluation:   [ ] PO intake [ x] Tolerance to diet prescription [ ] weights [ ] follow up per protocol  [ ] other:

## 2018-09-27 DIAGNOSIS — N17.0 ACUTE KIDNEY FAILURE WITH TUBULAR NECROSIS: ICD-10-CM

## 2018-09-27 LAB
ANION GAP SERPL CALC-SCNC: 9 MMOL/L — SIGNIFICANT CHANGE UP (ref 5–17)
BUN SERPL-MCNC: 25 MG/DL — HIGH (ref 7–18)
CALCIUM SERPL-MCNC: 9.3 MG/DL — SIGNIFICANT CHANGE UP (ref 8.4–10.5)
CHLORIDE SERPL-SCNC: 97 MMOL/L — SIGNIFICANT CHANGE UP (ref 96–108)
CO2 SERPL-SCNC: 24 MMOL/L — SIGNIFICANT CHANGE UP (ref 22–31)
CREAT SERPL-MCNC: 2.8 MG/DL — HIGH (ref 0.5–1.3)
GLUCOSE SERPL-MCNC: 269 MG/DL — HIGH (ref 70–99)
HCT VFR BLD CALC: 29.6 % — LOW (ref 34.5–45)
HGB BLD-MCNC: 9.3 G/DL — LOW (ref 11.5–15.5)
MCHC RBC-ENTMCNC: 24.8 PG — LOW (ref 27–34)
MCHC RBC-ENTMCNC: 31.4 GM/DL — LOW (ref 32–36)
MCV RBC AUTO: 78.9 FL — LOW (ref 80–100)
PLATELET # BLD AUTO: 238 K/UL — SIGNIFICANT CHANGE UP (ref 150–400)
POTASSIUM SERPL-MCNC: 3.9 MMOL/L — SIGNIFICANT CHANGE UP (ref 3.5–5.3)
POTASSIUM SERPL-SCNC: 3.9 MMOL/L — SIGNIFICANT CHANGE UP (ref 3.5–5.3)
RBC # BLD: 3.75 M/UL — LOW (ref 3.8–5.2)
RBC # FLD: 12.8 % — SIGNIFICANT CHANGE UP (ref 10.3–14.5)
SODIUM SERPL-SCNC: 130 MMOL/L — LOW (ref 135–145)
WBC # BLD: 8.5 K/UL — SIGNIFICANT CHANGE UP (ref 3.8–10.5)
WBC # FLD AUTO: 8.5 K/UL — SIGNIFICANT CHANGE UP (ref 3.8–10.5)

## 2018-09-27 PROCEDURE — 71045 X-RAY EXAM CHEST 1 VIEW: CPT | Mod: 26

## 2018-09-27 RX ORDER — SODIUM CHLORIDE 9 MG/ML
1000 INJECTION INTRAMUSCULAR; INTRAVENOUS; SUBCUTANEOUS
Qty: 0 | Refills: 0 | Status: DISCONTINUED | OUTPATIENT
Start: 2018-09-27 | End: 2018-09-28

## 2018-09-27 RX ADMIN — HEPARIN SODIUM 5000 UNIT(S): 5000 INJECTION INTRAVENOUS; SUBCUTANEOUS at 21:55

## 2018-09-27 RX ADMIN — SODIUM CHLORIDE 100 MILLILITER(S): 9 INJECTION INTRAMUSCULAR; INTRAVENOUS; SUBCUTANEOUS at 19:20

## 2018-09-27 RX ADMIN — INSULIN GLARGINE 25 UNIT(S): 100 INJECTION, SOLUTION SUBCUTANEOUS at 22:05

## 2018-09-27 RX ADMIN — LEVETIRACETAM 1000 MILLIGRAM(S): 250 TABLET, FILM COATED ORAL at 17:38

## 2018-09-27 RX ADMIN — Medication 3 MILLILITER(S): at 08:28

## 2018-09-27 RX ADMIN — SENNA PLUS 2 TABLET(S): 8.6 TABLET ORAL at 21:54

## 2018-09-27 RX ADMIN — Medication 1 MILLIGRAM(S): at 02:08

## 2018-09-27 RX ADMIN — CHLORHEXIDINE GLUCONATE 1 APPLICATION(S): 213 SOLUTION TOPICAL at 17:38

## 2018-09-27 RX ADMIN — Medication 100 MILLIGRAM(S): at 17:38

## 2018-09-27 RX ADMIN — Medication 1 TABLET(S): at 13:15

## 2018-09-27 RX ADMIN — Medication 3 MILLILITER(S): at 02:15

## 2018-09-27 RX ADMIN — Medication 3 MILLILITER(S): at 20:11

## 2018-09-27 RX ADMIN — PANTOPRAZOLE SODIUM 40 MILLIGRAM(S): 20 TABLET, DELAYED RELEASE ORAL at 12:09

## 2018-09-27 RX ADMIN — Medication 100 MILLIGRAM(S): at 05:55

## 2018-09-27 RX ADMIN — QUETIAPINE FUMARATE 50 MILLIGRAM(S): 200 TABLET, FILM COATED ORAL at 17:39

## 2018-09-27 RX ADMIN — QUETIAPINE FUMARATE 50 MILLIGRAM(S): 200 TABLET, FILM COATED ORAL at 05:55

## 2018-09-27 RX ADMIN — Medication 1: at 00:01

## 2018-09-27 RX ADMIN — AMLODIPINE BESYLATE 2.5 MILLIGRAM(S): 2.5 TABLET ORAL at 05:55

## 2018-09-27 RX ADMIN — Medication 6: at 12:08

## 2018-09-27 RX ADMIN — CHLORHEXIDINE GLUCONATE 1 APPLICATION(S): 213 SOLUTION TOPICAL at 05:56

## 2018-09-27 RX ADMIN — Medication 1 TABLET(S): at 21:54

## 2018-09-27 RX ADMIN — Medication 10 UNIT(S): at 12:09

## 2018-09-27 RX ADMIN — Medication 2 MILLIGRAM(S): at 05:55

## 2018-09-27 RX ADMIN — ATORVASTATIN CALCIUM 20 MILLIGRAM(S): 80 TABLET, FILM COATED ORAL at 21:54

## 2018-09-27 RX ADMIN — Medication 2 MILLIGRAM(S): at 17:37

## 2018-09-27 RX ADMIN — Medication 3 MILLILITER(S): at 14:09

## 2018-09-27 RX ADMIN — Medication 10 UNIT(S): at 05:54

## 2018-09-27 RX ADMIN — HEPARIN SODIUM 5000 UNIT(S): 5000 INJECTION INTRAVENOUS; SUBCUTANEOUS at 13:15

## 2018-09-27 RX ADMIN — Medication 3: at 17:37

## 2018-09-27 RX ADMIN — HEPARIN SODIUM 5000 UNIT(S): 5000 INJECTION INTRAVENOUS; SUBCUTANEOUS at 05:55

## 2018-09-27 RX ADMIN — Medication 10 UNIT(S): at 17:37

## 2018-09-27 RX ADMIN — Medication 1: at 05:54

## 2018-09-27 RX ADMIN — Medication 1 TABLET(S): at 05:55

## 2018-09-27 RX ADMIN — LEVETIRACETAM 1000 MILLIGRAM(S): 250 TABLET, FILM COATED ORAL at 05:55

## 2018-09-27 NOTE — PROGRESS NOTE ADULT - ASSESSMENT
Patient is a 63y old  Female who presents with a chief complaint of vomiting (21 Sep 2018 11:21)3 yo F from [facility], non-verbal, bed bound, S/P Trach/ PEG tube, PMH of HTN, asthma, convulsion disorder, bipolar, DVT legs, DM 2, sent from NH for coffee ground vomiting X3 this morning and cough.  Pt unable to provide historyPt also developed cough with clear sputum.  Sister states pt is combative at baseline, and her current behavior is baseline.  Pt was recently admitted to [facility] in 07/2018 for same reason and treated for aspiration PNA and UTI during last admission.no episode of vomiting in ED  Hb 12 at baseline , less likely UGI Bleeding  started protonix 40mg bid IV, seen by GI, no further bleeding noted. Started empiric treatment for aspiration pna, sputum culture grew with Pseudomonoas CRE and acetinobacter, started on Amikacin/Rifampin/Polymixin, wtih increase in leukocytosis, vancomycin added, now with leukocytosis trending down,afebrile. Hospitalization complicated by poor iv acess, s/p TLC R groin on 9/20/2018. TLC d/c on 9/27/18.

## 2018-09-27 NOTE — PROGRESS NOTE ADULT - PROBLEM SELECTOR PLAN 1
likely 2/2 abx which have been d/c  monitor renal function and u/o   all medical consultants recs/management appreciated

## 2018-09-27 NOTE — PROGRESS NOTE ADULT - ASSESSMENT
63 year-old woman with LAYLA hemodynamically-mediated in the setting of lasix vs. ATN from polymyxin and amikacin vs. AIN from multiple abx.  Hyponatremia likely due to dehydration as well.  Elevate bicarbonate ether respiratory acidosis or metabolic alkalosis.    Renal function improved.  Continue IVF NS @100ml/hr for now. 63 year-old woman with LAYLA due to ATN from polymyxin and amikacin  Hyponatremia likely due to dehydration as well.  Elevate bicarbonate ether respiratory acidosis or metabolic alkalosis.    Renal function improving.  Continue IVF NS @100ml/hr for now.

## 2018-09-27 NOTE — PROGRESS NOTE ADULT - PROBLEM SELECTOR PLAN 1
LAYLA 2/2 ATN 2/2 polymyxin and amikacin.  Would seriously consider discontinuing polymyxin and amikacin if possible.  Discussed with Dr. Kwon, ID.   Will d/c Abx. LAYLA 2/2 ATN 2/2 polymyxin and amikacin.  Renal function improving.  Continue IVF NS @100ml/hr for now.

## 2018-09-27 NOTE — PROGRESS NOTE ADULT - SUBJECTIVE AND OBJECTIVE BOX
[facility] NEPHROLOGY- PROGRESS NOTE, Follow up     Patient is a 63y Female who presents with a chief complaint of vomiting (24 Sep 2018 12:18) with coffee ground emesis and cough.  She was found to have PNA.  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  She was started on polymyxin on 9/16 and amikacin on 9/17.  Baseline creatinine is  0.8, eitan to ~1 on 9/20, 1.3 on 9/22 and 1.7 today.  Pt is also on rifampin.  She was previously on vanco, zosyn, and unasyn this admission as well.  Pt was given lasix IV on 9/21 and 9/22.  She has a PEG and has been tolerating PEG feeds. No ACEi/ARBs/NSAIDs/IV Contrast.    Pt is unable to give history.  REVIEW OF SYSTEMS: unable to obtain    VITALS:  T(F): 97.8 (09-27-18 @ 05:25), Max: 98 (09-26-18 @ 20:45)  HR: 105 (09-27-18 @ 09:00)  BP: 124/61 (09-27-18 @ 05:25)  RR: 18 (09-27-18 @ 05:25)  SpO2: 99% (09-27-18 @ 09:00)  Wt(kg): --      PHYSICAL EXAM:  Constitutional: NAD, calm  HEENT: anicteric sclera, oropharynx clear, MMM  Neck: No JVD  Respiratory: CTAB, no wheezes, rales or rhonchi, trach  Cardiovascular: S1, S2, RRR  Gastrointestinal: BS+, soft, NT/ND  Extremities: No cyanosis or clubbing. No peripheral edema  : No CVA tenderness. No coe.   Skin: warm and dry       LABS:  09-27    130<L>  |  97  |  25<H>  ----------------------------<  269<H>  3.9   |  24  |  2.80<H>    Ca    9.3      27 Sep 2018 08:21      Creatinine Trend: 2.80 <--, 2.76 <--, 2.10 <--, 1.72 <--, 1.27 <--, 1.27 <--, 1.04 <--                        9.3    8.5   )-----------( 238      ( 27 Sep 2018 08:21 )             29.6     Urine Studies:              < end of copied text > [facility] NEPHROLOGY- PROGRESS NOTE, Follow up     Patient is a 63y Female who presents with a chief complaint of vomiting (24 Sep 2018 12:18) with coffee ground emesis and cough.  She was found to have PNA.  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  She was started on polymyxin on 9/16 and amikacin on 9/17.  Baseline creatinine is  0.8, eitan to ~1 on 9/20, 1.3 on 9/22 and 1.7 today.  Pt is also on rifampin.  She was previously on vanco, zosyn, and unasyn this admission as well.  Pt was given lasix IV on 9/21 and 9/22.  She has a PEG and has been tolerating PEG feeds. No ACEi/ARBs/NSAIDs/IV Contrast.    Pt is unable to give history.  REVIEW OF SYSTEMS: unable to obtain    VITALS:  T(F): 97.8 (09-27-18 @ 05:25), Max: 98 (09-26-18 @ 20:45)  HR: 105 (09-27-18 @ 09:00)  BP: 124/61 (09-27-18 @ 05:25)  RR: 18 (09-27-18 @ 05:25)  SpO2: 99% (09-27-18 @ 09:00)  Wt(kg): --      PHYSICAL EXAM:  Constitutional: NAD, calm  HEENT: anicteric sclera, oropharynx clear, MMM  Neck: No JVD  Respiratory: CTAB, no wheezes, rales or rhonchi, trach  Cardiovascular: S1, S2, RRR  Gastrointestinal: BS+, soft, NT/ND  Extremities: No cyanosis or clubbing. No peripheral edema  : No CVA tenderness. No coe.   Skin: warm and dry       LABS:  09-27    130<L>  |  97  |  25<H>  ----------------------------<  269<H>  3.9   |  24  |  2.80<H>    Ca    9.3      27 Sep 2018 08:21      Creatinine Trend: 2.80 <--, 2.76 <--, 2.10 <--, 1.72 <--, 1.27 <--, 1.27 <--, 1.04 <--                        9.3    8.5   )-----------( 238      ( 27 Sep 2018 08:21 )             29.6

## 2018-09-27 NOTE — PROGRESS NOTE ADULT - SUBJECTIVE AND OBJECTIVE BOX
Interval Events:  pt in nad    Allergies    angiotensin converting enzyme inhibitors (Unknown)    Intolerances      Endocrine/Metabolic Medications:  atorvastatin 20 milliGRAM(s) Oral at bedtime  insulin glargine Injectable (LANTUS) 25 Unit(s) SubCutaneous at bedtime  insulin lispro (HumaLOG) corrective regimen sliding scale   SubCutaneous every 6 hours  insulin lispro Injectable (HumaLOG) 10 Unit(s) SubCutaneous <User Schedule>      Vital Signs Last 24 Hrs  T(C): 36.6 (27 Sep 2018 14:28), Max: 36.7 (26 Sep 2018 20:45)  T(F): 97.8 (27 Sep 2018 14:28), Max: 98 (26 Sep 2018 20:45)  HR: 79 (27 Sep 2018 14:28) (79 - 105)  BP: 131/69 (27 Sep 2018 14:28) (115/71 - 131/69)  BP(mean): --  RR: 16 (27 Sep 2018 14:28) (16 - 18)  SpO2: 100% (27 Sep 2018 14:28) (98% - 100%)      PHYSICAL EXAM  All physical exam findings normal, except those marked:  General:	Alert, active, cooperative, NAD, well hydrated  .		[] Abnormal:  Neck		Normal: supple, no cervical adenopathy, no palpable thyroid  .		[] Abnormal:  Cardiovascular	Normal: regular rate, normal S1, S2, no murmurs  .		[] Abnormal:  Respiratory	Normal: no chest wall deformity, normal respiratory pattern, CTA B/L  .		[] Abnormal:  Abdominal	Normal: soft, ND, NT, bowel sounds present, no masses, no organomegaly  .		[] Abnormal:  		Normal normal genitalia, testes descended, circumcised/uncircumcised  .		Bob stage:			Breast bob:  .		Menstrual history:  .		[] Abnormal:  Extremities	Normal: FROM x4  .		[] Abnormal:  Skin		Normal: intact and not indurated, no rash, no acanthosis nigricans  .		[] Abnormal:  Neurologic	Normal: grossly intact  .		[] Abnormal:    LABS                        9.3    8.5   )-----------( 238      ( 27 Sep 2018 08:21 )             29.6                               130    |  97     |  25                  Calcium: 9.3   / iCa: x      (09-27 @ 08:21)    ----------------------------<  269       Magnesium: x                                3.9     |  24     |  2.80             Phosphorous: x          CAPILLARY BLOOD GLUCOSE      POCT Blood Glucose.: 413 mg/dL (27 Sep 2018 12:03)  POCT Blood Glucose.: 174 mg/dL (27 Sep 2018 05:45)  POCT Blood Glucose.: 194 mg/dL (26 Sep 2018 23:56)  POCT Blood Glucose.: 257 mg/dL (26 Sep 2018 17:39)        Assesment/plan    Dm- better but cont to have swings  cont lantus 25 and humalog 10 tid  fsg q6

## 2018-09-27 NOTE — PROGRESS NOTE ADULT - SUBJECTIVE AND OBJECTIVE BOX
Patient seen and examined at bedside  No new acute events noted overnight  Case discussed with medical team    HPI:  64 yo F from [facility], non-verbal, bed bound, S/P Trach/ PEG tube, PMH of HTN, asthma, convulsion disorder, bipolar, DVT legs, DM 2, sent from NH for coffee ground vomiting X3 this morning and cough.  Pt unable to provide history.  History given by sister (Sharon [phone])who states pt was vomiting coffee ground emesis this morning. Pt had no reported vomiting since in ED.  Pt also developed cough with clear sputum.  Sister states pt is combative at baseline, and her current behavior is baseline.  No reported fever, discomfort, diarrhea, or SOB.  [facility] and treated for aspiration PNA and UTI during last admission.  Pt was supposed to be given IV vanco and zosyn for 7 day by PCP for PNA , which supposed to start from 09/13-09/19. Pt is DNR, MOSLT form in charts. (13 Sep 2018 15:49)      PAST MEDICAL & SURGICAL HISTORY:  No pertinent past medical history  Hypertension  Schizophrenia  Diabetic coma  Diabetes mellitus  No significant past surgical history  S/P percutaneous endoscopic gastrostomy (PEG) tube placement  H/O tracheostomy      angiotensin converting enzyme inhibitors (Unknown)     MEDICATIONS  (STANDING):  ALBUTerol/ipratropium for Nebulization 3 milliLiter(s) Nebulizer every 6 hours  amLODIPine   Tablet 2.5 milliGRAM(s) Oral daily  atorvastatin 20 milliGRAM(s) Oral at bedtime  benztropine 2 milliGRAM(s) Oral two times a day  chlorhexidine 4% Liquid 1 Application(s) Topical every 12 hours  heparin  Injectable 5000 Unit(s) SubCutaneous every 8 hours  insulin glargine Injectable (LANTUS) 25 Unit(s) SubCutaneous at bedtime  insulin lispro (HumaLOG) corrective regimen sliding scale   SubCutaneous every 6 hours  insulin lispro Injectable (HumaLOG) 10 Unit(s) SubCutaneous <User Schedule>  lactobacillus acidophilus 1 Tablet(s) Oral every 8 hours  levETIRAcetam  Solution 1000 milliGRAM(s) Oral two times a day  metoprolol tartrate 100 milliGRAM(s) Oral two times a day  pantoprazole   Suspension 40 milliGRAM(s) Oral daily  QUEtiapine 50 milliGRAM(s) Oral every 12 hours  senna 2 Tablet(s) Oral at bedtime  sodium chloride 0.9%. 1000 milliLiter(s) (50 mL/Hr) IV Continuous <Continuous>    MEDICATIONS  (PRN):  haloperidol    Injectable 5 milliGRAM(s) IntraMuscular once PRN Agitation  LORazepam   Injectable 1 milliGRAM(s) IV Push every 8 hours PRN for breathrough agitation/anxiety/combative behavior    Vital Signs Last 24 Hrs  T(C): 36.6 (27 Sep 2018 05:25), Max: 36.7 (26 Sep 2018 20:45)  T(F): 97.8 (27 Sep 2018 05:25), Max: 98 (26 Sep 2018 20:45)  HR: 91 (27 Sep 2018 05:25) (89 - 91)  BP: 124/61 (27 Sep 2018 05:25) (115/71 - 124/61)  BP(mean): --  RR: 18 (27 Sep 2018 05:25) (18 - 18)  SpO2: 98% (27 Sep 2018 05:25) (98% - 98%)    REVIEW OF SYSTEMS:limited 2/2 mental status      PHYSICAL EXAMINATION:   Constitutional: NAD  HEENT: AT  Neck:  Supple  Respiratory: bibasilar rales. Adequate airflow b/l. Not using accessory muscles of respiration.  Cardiovascular:  S1 & S2 intact, no R/G, 2+ radial pulses b/l  Gastrointestinal: Soft, ND, normoactive b.s., no organomegaly/RT/rigidity  Extremities: WWP  Neurological:  awake. stable.    Labs and imaging reviewed          RADIOLOGY & ADDITIONAL STUDIES:

## 2018-09-27 NOTE — PROGRESS NOTE ADULT - SUBJECTIVE AND OBJECTIVE BOX
Patient is seen and examined at the bed side, is afebrile.  The creatinine continues trending up.      REVIEW OF SYSTEMS: Unable to obtain due to mental status      ALLERGIES : NKDA      Vital Signs Last 24 Hrs  T(C): 36.6 (27 Sep 2018 14:28), Max: 36.7 (26 Sep 2018 20:45)  T(F): 97.8 (27 Sep 2018 14:28), Max: 98 (26 Sep 2018 20:45)  HR: 79 (27 Sep 2018 14:28) (79 - 105)  BP: 131/69 (27 Sep 2018 14:28) (115/71 - 131/69)  BP(mean): --  RR: 16 (27 Sep 2018 14:28) (16 - 18)  SpO2: 100% (27 Sep 2018 14:28) (98% - 100%)      PHYSICAL EXAM:  GENERAL: Not in distress  HEENT: Trach in placed  CHEST/LUNG: Air entry B/L  HEART: s1 and s2 present  ABDOMEN: Nontender, and Nondistended  EXTREMITIES:  No pedal  edema  CNS: Awake but not Alert        LABS:                         9.3    8.5   )-----------( 238      ( 27 Sep 2018 08:21 )             29.6                            9.9    7.8   )-----------( 250      ( 25 Sep 2018 06:15 )             31.1                                  10.5   20.1  )-----------( 217      ( 19 Sep 2018 07:47 )             33.1           Amikacin Level, Trough (18 @ 17:06)    Amikacin Level, Trough: 17.6: Performed by: [facility], [facility] [place] [address] 65657 ug/mL          130<L>  |  97  |  25<H>  ----------------------------<  269<H>  3.9   |  24  |  2.80<H>    Ca    9.3      27 Sep 2018 08:21      09    131<L>  |  94<L>  |  25<H>  ----------------------------<  83  3.9   |  27  |  2.76<H>    Ca    9.4      26 Sep 2018 13:54          133<L>  |  94<L>  |  17  ----------------------------<  258<H>  3.8   |  30  |  2.10<H>    Ca    9.4      25 Sep 2018 06:15  Phos  4.5     09-24  Mg     1.6         131<L>  |  93<L>  |  16  ----------------------------<  163<H>  3.9   |  31  |  1.72<H>    Ca    9.0      24 Sep 2018 07:18  Phos  4.5     09-24  Mg     1.6           TPro  6.2  /  Alb  2.2<L>  /  TBili  0.9  /  DBili  0.3<H>  /  AST  24  /  ALT  20  /  AlkPhos  86           Urinalysis Basic - ( 14 Sep 2018 03:56 )    Color: Yellow / Appearance: Clear / S.020 / pH: x  Gluc: x / Ketone: Negative  / Bili: Negative / Urobili: Negative   Blood: x / Protein: 100 / Nitrite: Negative   Leuk Esterase: Negative / RBC: 0-2 /HPF / WBC 0-2 /HPF   Sq Epi: x / Non Sq Epi: Occasional /HPF / Bacteria: Trace /HPF      CAPILLARY BLOOD GLUCOSE      POCT Blood Glucose.: 144 mg/dL (14 Sep 2018 11:07)  POCT Blood Glucose.: 202 mg/dL (14 Sep 2018 08:09)  POCT Blood Glucose.: 277 mg/dL (14 Sep 2018 06:58)  POCT Blood Glucose.: 68 mg/dL (14 Sep 2018 06:03)  POCT Blood Glucose.: 316 mg/dL (14 Sep 2018 02:11)  POCT Blood Glucose.: 405 mg/dL (14 Sep 2018 00:28)  POCT Blood Glucose.: 386 mg/dL (13 Sep 2018 22:26)  POCT Blood Glucose.: 403 mg/dL (13 Sep 2018 19:09)      MEDICATIONS  (STANDING):  ALBUTerol/ipratropium for Nebulization 3 milliLiter(s) Nebulizer every 6 hours  amLODIPine   Tablet 2.5 milliGRAM(s) Oral daily  atorvastatin 20 milliGRAM(s) Oral at bedtime  benztropine 2 milliGRAM(s) Oral two times a day  chlorhexidine 4% Liquid 1 Application(s) Topical every 12 hours  heparin  Injectable 5000 Unit(s) SubCutaneous every 8 hours  insulin glargine Injectable (LANTUS) 25 Unit(s) SubCutaneous at bedtime  insulin lispro (HumaLOG) corrective regimen sliding scale   SubCutaneous every 6 hours  insulin lispro Injectable (HumaLOG) 10 Unit(s) SubCutaneous <User Schedule>  lactobacillus acidophilus 1 Tablet(s) Oral every 8 hours  levETIRAcetam  Solution 1000 milliGRAM(s) Oral two times a day  metoprolol tartrate 100 milliGRAM(s) Oral two times a day  pantoprazole   Suspension 40 milliGRAM(s) Oral daily  QUEtiapine 50 milliGRAM(s) Oral every 12 hours  senna 2 Tablet(s) Oral at bedtime  sodium chloride 0.9%. 1000 milliLiter(s) (50 mL/Hr) IV Continuous <Continuous>    MEDICATIONS  (PRN):  haloperidol    Injectable 5 milliGRAM(s) IntraMuscular once PRN Agitation  LORazepam   Injectable 1 milliGRAM(s) IV Push every 8 hours PRN for breathrough agitation/anxiety/combative behavior          RADIOLOGY & ADDITIONAL TESTS:    18: Xray Chest 1 View- PORTABLE-Routine (18 @ 08:22) Tracheostomy cannula reidentified in position. No change heart mediastinum. No change in the right basilar opacity with loss of definition of the ipsilateral hemidiaphragm and costophrenic angle likely   representing a combination of pleural fluid and underlying  consolidation/atelectasis.      18: CT Chest No Cont (18 @ 03:41) Impression: Consolidation involving the right lower lobe is of uncertain etiology. It is unclear whether this represents compressive atelectasis secondary to right pleural effusion/elevation of the right hemidiaphragm  and/or represents infection.    18: Xray Chest 1 View-PORTABLE IMMEDIATE (18 @ 13:37) Bilateral pleural effusions and associated atelectasis.        MICROBIOLOGY DATA:    Culture - Blood (18 @ 17:33)    Specimen Source: .Blood Blood-Peripheral    Culture Results:   No growth to date.      Culture - Blood (18 @ 13:21)    Specimen Source: .Blood Blood-Peripheral    Culture Results:   No growth to date.        Culture - Sputum . (09.15.18 @ 09:28)    Gram Stain:   Rare polymorphonuclear leukocytes per low power field  Few Squamous epithelial cells per low power field  Moderate Gram Negative Rods per oil power field  Few Gram Positive Rods per oil power field  Few Yeast like cells per oil power field  Rare Gram Negative Coccobacilli per oil power field    -  Amikacin: S <=8    -  Amikacin: S <=8    -  Ampicillin/Sulbactam: R >16/8    -  Aztreonam: S 8    -  Cefepime: I 16    -  Cefepime: I 16    -  Ceftazidime: R >16    -  Ceftazidime: I 16    -  Ciprofloxacin: R >2    -  Ciprofloxacin: R >2    -  Gentamicin: R >8    -  Gentamicin: R >8    -  Imipenem: R >8    -  Imipenem: R    -  Levofloxacin: R >4    -  Levofloxacin: R >4    -  Meropenem: R >8    -  Meropenem: I 8    -  Piperacillin/Tazobactam: R >64    -  Piperacillin/Tazobactam: R    -  Tobramycin: R >8    -  Tobramycin: R >8    -  Trimethoprim/Sulfamethoxazole: R >2/38    Specimen Source: .Sputum Sputum    Culture Results:   Numerous Pseudomonas aeruginosa (Carbapenem Resistant)  Moderate Acinetobacter baumannii/haemolyticus (Carbapenem Resistant)  Complex  Normal Respiratory Moraima present    Organism Identification: Pseudomonas aeruginosa (Carbapenem Resistant)  Acinetobacter baumannii/haemolyticus (Carbapenem Resistant)    Organism: Acinetobacter baumannii/haemolyticus (Carbapenem Resistant)    Organism: Pseudomonas aeruginosa (Carbapenem Resistant)    Organism: Acinetobacter baumannii/haemolyticus (Carbapenem Resistant)    Method Type: ESTIVEN    Method Type: ESTIVEN    Method Type: KB        MRSA/MSSA PCR (18 @ 11:26)    MRSA PCR Result.: NotDetec: MRSA/MSSA PCR assay is a qualitative in vitro diagnostic test for the  direct detection and differentiation of methicillin-resistant  Staphylococcus aureus (MRSA) from Staphylococcus aureus (SA). The assay  detects DNA from nasal swabs in patients atrisk for nasal colonization.  It is not intended to diagnose MRSA or SA infections nor guide or monitor  treatment for MRSA/SA infections. A negative result does not preclude  nasal colonization. The assay is FDA-approved and its performance has  been established by [employer], [place] and the [place], [place].    Staph Aureus PCR Result: [facility]      Culture - Urine (18 @ 09:47)    Specimen Source: .Urine Catheterized    Culture Results:   10,000 - 49,000 CFU/mL Yeast

## 2018-09-27 NOTE — PROGRESS NOTE ADULT - SUBJECTIVE AND OBJECTIVE BOX
Time of Visit:  Patient seen and examined.     MEDICATIONS  (STANDING):  ALBUTerol/ipratropium for Nebulization 3 milliLiter(s) Nebulizer every 6 hours  amLODIPine   Tablet 2.5 milliGRAM(s) Oral daily  atorvastatin 20 milliGRAM(s) Oral at bedtime  benztropine 2 milliGRAM(s) Oral two times a day  chlorhexidine 4% Liquid 1 Application(s) Topical every 12 hours  heparin  Injectable 5000 Unit(s) SubCutaneous every 8 hours  insulin glargine Injectable (LANTUS) 25 Unit(s) SubCutaneous at bedtime  insulin lispro (HumaLOG) corrective regimen sliding scale   SubCutaneous every 6 hours  insulin lispro Injectable (HumaLOG) 10 Unit(s) SubCutaneous <User Schedule>  lactobacillus acidophilus 1 Tablet(s) Oral every 8 hours  levETIRAcetam  Solution 1000 milliGRAM(s) Oral two times a day  metoprolol tartrate 100 milliGRAM(s) Oral two times a day  pantoprazole   Suspension 40 milliGRAM(s) Oral daily  QUEtiapine 50 milliGRAM(s) Oral every 12 hours  senna 2 Tablet(s) Oral at bedtime  sodium chloride 0.9%. 1000 milliLiter(s) (100 mL/Hr) IV Continuous <Continuous>      MEDICATIONS  (PRN):  haloperidol    Injectable 5 milliGRAM(s) IntraMuscular once PRN Agitation  LORazepam   Injectable 1 milliGRAM(s) IV Push every 8 hours PRN for breathrough agitation/anxiety/combative behavior       Medications up to date at time of exam.    ROS; No staff report of fever, chills, cough, congestion and no Hypoxia on exam.   PHYSICAL EXAMINATION:    Vital Signs Last 24 Hrs  T(C): 36.6 (27 Sep 2018 14:28), Max: 36.7 (26 Sep 2018 20:45)  T(F): 97.8 (27 Sep 2018 14:28), Max: 98 (26 Sep 2018 20:45)  HR: 79 (27 Sep 2018 14:28) (79 - 105)  BP: 131/69 (27 Sep 2018 14:28) (115/71 - 131/69)  BP(mean): --  RR: 16 (27 Sep 2018 14:28) (16 - 18)  SpO2: 100% (27 Sep 2018 14:28) (98% - 100%)   (if applicable)    General; Asleep but responsive to tactile stimuli. Non verbal as baseline mental status. No acute distress.     HEENT: Head is normocephalic and atraumatic. No nasal tenderness. Moist mucosa.     NECK: Supple, no palpable adenopathy.    LUNGS: Breath sounds with B/L rales. No use of accessory muscle.     HEART: S1 S2 Regular rate and no click/ rub.    ABDOMEN: Soft, nontender, and nondistended.  No hepatosplenomegaly is noted. Active bowel sounds. + peg.    EXTREMITIES: Without any cyanosis, clubbing,  lesions or edema.    NEUROLOGIC:  Non verbal . Cognitive impaired.     SKIN: Warm and moist. Non diaphoretic.   LABS:                        9.3    8.5   )-----------( 238      ( 27 Sep 2018 08:21 )             29.6     09-27    130<L>  |  97  |  25<H>  ----------------------------<  269<H>  3.9   |  24  |  2.80<H>    Ca    9.3      27 Sep 2018 08:21    RADIOLOGY & ADDITIONAL STUDIES:  EKG:   CXR: < from: Xray Chest 1 View- PORTABLE-Urgent (09.27.18 @ 10:51) >  PROCEDURE DATE:  09/27/2018          INTERPRETATION:  Chest portable.    Clinical History: Abnormal breath sounds.    Comparison: 9/24/2018.    Single AP view submitted.  Again noted is tracheostomy tube in place.    The evaluation of the cardiomediastinal silhouette is limited on portable   technique.  There is arteriosclerotic calcification of the aorta.      There is elevation of the right hemidiaphragm.  Subsegmental atelectasis and/or fibrosis is noted right lower lung zone.  No new lobar lung consolidation, pleural effusion or pneumothorax is   noted.    Impression:    Findings as discussed above.      PAST MEDICAL & SURGICAL HISTORY:  No pertinent past medical history  Hypertension  Schizophrenia  Diabetic coma  Diabetes mellitus  No significant past surgical history  S/P percutaneous endoscopic gastrostomy (PEG) tube placement  H/O tracheostomy     Impression; 64 Y/O Female from [facility] with multiple chronic conditions. Has acute on chronic respiratory failure and on oxygen supplementation FIO2 35% via tracheostomy.    Suggestion:   Continue Oxygen supplementation FIO2 35% via Tracheostomy .  Continue DuoNeb Q 6 hours.  DVT/GI prophylactic .  Aspiration precautions especially during Peg feeding.   Oral, trach care, suction . Continue Oral chlorhexidine care. Time of Visit:  Patient seen and examined.     MEDICATIONS  (STANDING):  ALBUTerol/ipratropium for Nebulization 3 milliLiter(s) Nebulizer every 6 hours  amLODIPine   Tablet 2.5 milliGRAM(s) Oral daily  atorvastatin 20 milliGRAM(s) Oral at bedtime  benztropine 2 milliGRAM(s) Oral two times a day  chlorhexidine 4% Liquid 1 Application(s) Topical every 12 hours  heparin  Injectable 5000 Unit(s) SubCutaneous every 8 hours  insulin glargine Injectable (LANTUS) 25 Unit(s) SubCutaneous at bedtime  insulin lispro (HumaLOG) corrective regimen sliding scale   SubCutaneous every 6 hours  insulin lispro Injectable (HumaLOG) 10 Unit(s) SubCutaneous <User Schedule>  lactobacillus acidophilus 1 Tablet(s) Oral every 8 hours  levETIRAcetam  Solution 1000 milliGRAM(s) Oral two times a day  metoprolol tartrate 100 milliGRAM(s) Oral two times a day  pantoprazole   Suspension 40 milliGRAM(s) Oral daily  QUEtiapine 50 milliGRAM(s) Oral every 12 hours  senna 2 Tablet(s) Oral at bedtime  sodium chloride 0.9%. 1000 milliLiter(s) (100 mL/Hr) IV Continuous <Continuous>      MEDICATIONS  (PRN):  haloperidol    Injectable 5 milliGRAM(s) IntraMuscular once PRN Agitation  LORazepam   Injectable 1 milliGRAM(s) IV Push every 8 hours PRN for breathrough agitation/anxiety/combative behavior       Medications up to date at time of exam.    ROS; No staff report of fever, chills, cough, congestion and no Hypoxia on exam.   PHYSICAL EXAMINATION:    Vital Signs Last 24 Hrs  T(C): 36.6 (27 Sep 2018 14:28), Max: 36.7 (26 Sep 2018 20:45)  T(F): 97.8 (27 Sep 2018 14:28), Max: 98 (26 Sep 2018 20:45)  HR: 79 (27 Sep 2018 14:28) (79 - 105)  BP: 131/69 (27 Sep 2018 14:28) (115/71 - 131/69)  BP(mean): --  RR: 16 (27 Sep 2018 14:28) (16 - 18)  SpO2: 100% (27 Sep 2018 14:28) (98% - 100%)   (if applicable)    General; Asleep but responsive to tactile stimuli. Non verbal as baseline mental status. No acute distress.     HEENT: Head is normocephalic and atraumatic. No nasal tenderness. Moist mucosa.     NECK: Supple, no palpable adenopathy.    LUNGS: Breath sounds with B/L rales. No use of accessory muscle.     HEART: S1 S2 Regular rate and no click/ rub.    ABDOMEN: Soft, nontender, and nondistended.  No hepatosplenomegaly is noted. Active bowel sounds. + peg.    EXTREMITIES: Without any cyanosis, clubbing,  lesions or edema.    NEUROLOGIC:  Non verbal . Cognitive impaired.     SKIN: Warm and moist. Non diaphoretic.   LABS:                        9.3    8.5   )-----------( 238      ( 27 Sep 2018 08:21 )             29.6     09-27    130<L>  |  97  |  25<H>  ----------------------------<  269<H>  3.9   |  24  |  2.80<H>    Ca    9.3      27 Sep 2018 08:21    RADIOLOGY & ADDITIONAL STUDIES:  EKG:   CXR: < from: Xray Chest 1 View- PORTABLE-Urgent (09.27.18 @ 10:51) >  PROCEDURE DATE:  09/27/2018          INTERPRETATION:  Chest portable.    Clinical History: Abnormal breath sounds.    Comparison: 9/24/2018.    Single AP view submitted.  Again noted is tracheostomy tube in place.    The evaluation of the cardiomediastinal silhouette is limited on portable   technique.  There is arteriosclerotic calcification of the aorta.      There is elevation of the right hemidiaphragm.  Subsegmental atelectasis and/or fibrosis is noted right lower lung zone.  No new lobar lung consolidation, pleural effusion or pneumothorax is   noted.    Impression:    Findings as discussed above.      PAST MEDICAL & SURGICAL HISTORY:  No pertinent past medical history  Hypertension  Schizophrenia  Diabetic coma  Diabetes mellitus  No significant past surgical history  S/P percutaneous endoscopic gastrostomy (PEG) tube placement  H/O tracheostomy     Impression; 62 Y/O Female from [facility] with multiple chronic conditions. Has acute on chronic respiratory failure and on oxygen supplementation FIO2 35% via tracheostomy.    Suggestion:   Continue Oxygen supplementation FIO2 35% via Tracheostomy .  Continue DuoNeb Q 6 hours.  DVT/GI prophylactic .  Aspiration precautions especially during Peg feeding.   Oral, trach care, suction . Continue Oral chlorhexidine care.         Agree with above assessment and plan as transcribed.

## 2018-09-27 NOTE — PROGRESS NOTE ADULT - ASSESSMENT
A 64 yo Female  with  non-verbal, bed bound, S/P Trach/ PEG tube[facility] coffee ground vomiting  and cough. On arrival, she found to have tachycardia, Leukocytosis, and RLL consolidation. She has started on Zosyn and Vancomycin and the ID consult requested to assist with further evaluation and antibiotic management.     # Aspiration pneumonia - RLL consolidation - Pseudomonas and Acinetobacter   # MRSA PCR is negative    Would recommend:    1. Monitor OFF antibioitcs  2. Monitor kidney function and c/w IVF  3. Bronchial suctioning  and Aspiration  precaution  4. Frequent repositioning      d/w  Nursing staff    will follow the patient with you

## 2018-09-28 LAB
ALBUMIN SERPL ELPH-MCNC: 2 G/DL — LOW (ref 3.5–5)
ALP SERPL-CCNC: 85 U/L — SIGNIFICANT CHANGE UP (ref 40–120)
ALT FLD-CCNC: 22 U/L DA — SIGNIFICANT CHANGE UP (ref 10–60)
ANION GAP SERPL CALC-SCNC: 9 MMOL/L — SIGNIFICANT CHANGE UP (ref 5–17)
AST SERPL-CCNC: 23 U/L — SIGNIFICANT CHANGE UP (ref 10–40)
BILIRUB SERPL-MCNC: 0.2 MG/DL — SIGNIFICANT CHANGE UP (ref 0.2–1.2)
BUN SERPL-MCNC: 23 MG/DL — HIGH (ref 7–18)
CALCIUM SERPL-MCNC: 9.3 MG/DL — SIGNIFICANT CHANGE UP (ref 8.4–10.5)
CHLORIDE SERPL-SCNC: 102 MMOL/L — SIGNIFICANT CHANGE UP (ref 96–108)
CO2 SERPL-SCNC: 23 MMOL/L — SIGNIFICANT CHANGE UP (ref 22–31)
CREAT SERPL-MCNC: 2.29 MG/DL — HIGH (ref 0.5–1.3)
GLUCOSE SERPL-MCNC: 182 MG/DL — HIGH (ref 70–99)
HCT VFR BLD CALC: 28.6 % — LOW (ref 34.5–45)
HGB BLD-MCNC: 8.8 G/DL — LOW (ref 11.5–15.5)
MCHC RBC-ENTMCNC: 24.4 PG — LOW (ref 27–34)
MCHC RBC-ENTMCNC: 30.8 GM/DL — LOW (ref 32–36)
MCV RBC AUTO: 79.2 FL — LOW (ref 80–100)
PLATELET # BLD AUTO: 250 K/UL — SIGNIFICANT CHANGE UP (ref 150–400)
POTASSIUM SERPL-MCNC: 4.4 MMOL/L — SIGNIFICANT CHANGE UP (ref 3.5–5.3)
POTASSIUM SERPL-SCNC: 4.4 MMOL/L — SIGNIFICANT CHANGE UP (ref 3.5–5.3)
PROT SERPL-MCNC: 6.9 G/DL — SIGNIFICANT CHANGE UP (ref 6–8.3)
RBC # BLD: 3.61 M/UL — LOW (ref 3.8–5.2)
RBC # FLD: 12.7 % — SIGNIFICANT CHANGE UP (ref 10.3–14.5)
SODIUM SERPL-SCNC: 134 MMOL/L — LOW (ref 135–145)
WBC # BLD: 10 K/UL — SIGNIFICANT CHANGE UP (ref 3.8–10.5)
WBC # FLD AUTO: 10 K/UL — SIGNIFICANT CHANGE UP (ref 3.8–10.5)

## 2018-09-28 RX ORDER — NYSTATIN CREAM 100000 [USP'U]/G
1 CREAM TOPICAL EVERY 8 HOURS
Qty: 0 | Refills: 0 | Status: DISCONTINUED | OUTPATIENT
Start: 2018-09-28 | End: 2018-10-04

## 2018-09-28 RX ORDER — SODIUM CHLORIDE 9 MG/ML
1000 INJECTION INTRAMUSCULAR; INTRAVENOUS; SUBCUTANEOUS
Qty: 0 | Refills: 0 | Status: DISCONTINUED | OUTPATIENT
Start: 2018-09-28 | End: 2018-10-04

## 2018-09-28 RX ADMIN — HEPARIN SODIUM 5000 UNIT(S): 5000 INJECTION INTRAVENOUS; SUBCUTANEOUS at 14:04

## 2018-09-28 RX ADMIN — CHLORHEXIDINE GLUCONATE 1 APPLICATION(S): 213 SOLUTION TOPICAL at 05:16

## 2018-09-28 RX ADMIN — QUETIAPINE FUMARATE 50 MILLIGRAM(S): 200 TABLET, FILM COATED ORAL at 05:15

## 2018-09-28 RX ADMIN — AMLODIPINE BESYLATE 2.5 MILLIGRAM(S): 2.5 TABLET ORAL at 05:15

## 2018-09-28 RX ADMIN — NYSTATIN CREAM 1 APPLICATION(S): 100000 CREAM TOPICAL at 18:10

## 2018-09-28 RX ADMIN — Medication 10 UNIT(S): at 12:42

## 2018-09-28 RX ADMIN — Medication 1 TABLET(S): at 05:15

## 2018-09-28 RX ADMIN — INSULIN GLARGINE 25 UNIT(S): 100 INJECTION, SOLUTION SUBCUTANEOUS at 21:57

## 2018-09-28 RX ADMIN — Medication 10 UNIT(S): at 06:01

## 2018-09-28 RX ADMIN — Medication 3: at 12:42

## 2018-09-28 RX ADMIN — HEPARIN SODIUM 5000 UNIT(S): 5000 INJECTION INTRAVENOUS; SUBCUTANEOUS at 21:52

## 2018-09-28 RX ADMIN — HEPARIN SODIUM 5000 UNIT(S): 5000 INJECTION INTRAVENOUS; SUBCUTANEOUS at 05:15

## 2018-09-28 RX ADMIN — Medication 2 MILLIGRAM(S): at 18:10

## 2018-09-28 RX ADMIN — ATORVASTATIN CALCIUM 20 MILLIGRAM(S): 80 TABLET, FILM COATED ORAL at 21:48

## 2018-09-28 RX ADMIN — Medication 1 TABLET(S): at 14:05

## 2018-09-28 RX ADMIN — LEVETIRACETAM 1000 MILLIGRAM(S): 250 TABLET, FILM COATED ORAL at 18:13

## 2018-09-28 RX ADMIN — Medication 3 MILLILITER(S): at 03:11

## 2018-09-28 RX ADMIN — Medication 3 MILLILITER(S): at 14:26

## 2018-09-28 RX ADMIN — Medication 10 UNIT(S): at 18:35

## 2018-09-28 RX ADMIN — Medication 1: at 18:36

## 2018-09-28 RX ADMIN — Medication 100 MILLIGRAM(S): at 05:15

## 2018-09-28 RX ADMIN — SODIUM CHLORIDE 100 MILLILITER(S): 9 INJECTION INTRAMUSCULAR; INTRAVENOUS; SUBCUTANEOUS at 21:48

## 2018-09-28 RX ADMIN — Medication 3 MILLILITER(S): at 20:53

## 2018-09-28 RX ADMIN — QUETIAPINE FUMARATE 50 MILLIGRAM(S): 200 TABLET, FILM COATED ORAL at 18:15

## 2018-09-28 RX ADMIN — SENNA PLUS 2 TABLET(S): 8.6 TABLET ORAL at 21:48

## 2018-09-28 RX ADMIN — LEVETIRACETAM 1000 MILLIGRAM(S): 250 TABLET, FILM COATED ORAL at 05:16

## 2018-09-28 RX ADMIN — CHLORHEXIDINE GLUCONATE 1 APPLICATION(S): 213 SOLUTION TOPICAL at 18:13

## 2018-09-28 RX ADMIN — Medication 3 MILLILITER(S): at 08:00

## 2018-09-28 RX ADMIN — Medication 2: at 06:01

## 2018-09-28 RX ADMIN — NYSTATIN CREAM 1 APPLICATION(S): 100000 CREAM TOPICAL at 21:49

## 2018-09-28 RX ADMIN — Medication 1 TABLET(S): at 21:48

## 2018-09-28 RX ADMIN — Medication 100 MILLIGRAM(S): at 18:15

## 2018-09-28 RX ADMIN — Medication 2 MILLIGRAM(S): at 05:15

## 2018-09-28 RX ADMIN — PANTOPRAZOLE SODIUM 40 MILLIGRAM(S): 20 TABLET, DELAYED RELEASE ORAL at 12:23

## 2018-09-28 RX ADMIN — Medication 1 MILLIGRAM(S): at 12:26

## 2018-09-28 NOTE — PROGRESS NOTE ADULT - PROBLEM SELECTOR PLAN 1
LAYLA 2/2 ATN 2/2 polymyxin and amikacin.  Renal function improving.  Continue IVF NS @100ml/hr for now. LAYLA 2/2 ATN 2/2 polymyxin and amikacin.  Renal function improving  Continue IVF NS @100ml/hr for now.

## 2018-09-28 NOTE — PROGRESS NOTE ADULT - SUBJECTIVE AND OBJECTIVE BOX
Patient seen and examined.     MEDICATIONS  (STANDING):  ALBUTerol/ipratropium for Nebulization 3 milliLiter(s) Nebulizer every 6 hours  amLODIPine   Tablet 2.5 milliGRAM(s) Oral daily  atorvastatin 20 milliGRAM(s) Oral at bedtime  benztropine 2 milliGRAM(s) Oral two times a day  chlorhexidine 4% Liquid 1 Application(s) Topical every 12 hours  heparin  Injectable 5000 Unit(s) SubCutaneous every 8 hours  insulin glargine Injectable (LANTUS) 25 Unit(s) SubCutaneous at bedtime  insulin lispro (HumaLOG) corrective regimen sliding scale   SubCutaneous every 6 hours  insulin lispro Injectable (HumaLOG) 10 Unit(s) SubCutaneous <User Schedule>  lactobacillus acidophilus 1 Tablet(s) Oral every 8 hours  levETIRAcetam  Solution 1000 milliGRAM(s) Oral two times a day  LORazepam     Tablet 1 milliGRAM(s) Oral every 6 hours  metoprolol tartrate 100 milliGRAM(s) Oral two times a day  pantoprazole   Suspension 40 milliGRAM(s) Oral daily  QUEtiapine 50 milliGRAM(s) Oral every 12 hours  senna 2 Tablet(s) Oral at bedtime  sodium chloride 0.9%. 1000 milliLiter(s) (100 mL/Hr) IV Continuous <Continuous>      MEDICATIONS  (PRN):  haloperidol    Injectable 5 milliGRAM(s) IntraMuscular once PRN Agitation  LORazepam   Injectable 1 milliGRAM(s) IV Push every 8 hours PRN for breathrough agitation/anxiety/combative behavior     Medications up to date at time of exam.    PHYSICAL EXAMINATION:  Patient is non verbal.  GENERAL: The patient is a well-developed, well-nourished, in no apparent distress.     Vital Signs Last 24 Hrs  T(C): 36.7 (28 Sep 2018 05:26), Max: 36.7 (28 Sep 2018 05:26)  T(F): 98 (28 Sep 2018 05:26), Max: 98 (28 Sep 2018 05:26)  HR: 87 (28 Sep 2018 08:50) (79 - 101)  BP: 161/92 (28 Sep 2018 05:26) (118/46 - 161/92)  BP(mean): --  RR: 18 (28 Sep 2018 05:26) (16 - 18)  SpO2: 95% (28 Sep 2018 08:50) (95% - 100%)   (if applicable)    Chest Tube (if applicable)    HEENT: Head is normocephalic and atraumatic.     NECK: Supple, no palpable adenopathy. +trach    LUNGS: Clear to auscultation, no wheezing, rales, + rhonchi.    HEART: Regular rate and rhythm without murmur.    ABDOMEN: Soft, nontender, and nondistended.  PEG    EXTREMITIES: Without any cyanosis, clubbing, rash, lesions or edema +contracted    NEUROLOGIC: Awake    SKIN: Warm, dry, good turgor.    LABS:                        8.8    10.0  )-----------( 250      ( 28 Sep 2018 11:12 )             28.6     09-28    134<L>  |  102  |  23<H>  ----------------------------<  182<H>  4.4   |  23  |  2.29<H>    Ca    9.3      28 Sep 2018 11:12    TPro  6.9  /  Alb  2.0<L>  /  TBili  0.2  /  DBili  x   /  AST  23  /  ALT  22  /  AlkPhos  85  09-28    MICROBIOLOGY: (if applicable)    RADIOLOGY & ADDITIONAL STUDIES:  EKG:   CXR:  ECHO:    IMPRESSION: 63y Female PAST MEDICAL & SURGICAL HISTORY:  No pertinent past medical history  Hypertension  Schizophrenia  Diabetic coma  Diabetes mellitus  No significant past surgical history  S/P percutaneous endoscopic gastrostomy (PEG) tube placement  H/O tracheostomy     Impression; 64 Y/O Female from [facility] with multiple chronic conditions. Has acute on chronic respiratory failure and on oxygen supplementation FIO2 35% via tracheostomy.    CXR 9/27, elevate R hemidiaphragm (not new, compared to old CXR 7/2018)    Suggestion:   Trach w/ FiO2 35%  Continue DuoNeb Q 6 hours.  DVT/GI prophylactic .  Aspiration precautions especially during Peg feeding.   Trach care. Patient seen and examined.     MEDICATIONS  (STANDING):  ALBUTerol/ipratropium for Nebulization 3 milliLiter(s) Nebulizer every 6 hours  amLODIPine   Tablet 2.5 milliGRAM(s) Oral daily  atorvastatin 20 milliGRAM(s) Oral at bedtime  benztropine 2 milliGRAM(s) Oral two times a day  chlorhexidine 4% Liquid 1 Application(s) Topical every 12 hours  heparin  Injectable 5000 Unit(s) SubCutaneous every 8 hours  insulin glargine Injectable (LANTUS) 25 Unit(s) SubCutaneous at bedtime  insulin lispro (HumaLOG) corrective regimen sliding scale   SubCutaneous every 6 hours  insulin lispro Injectable (HumaLOG) 10 Unit(s) SubCutaneous <User Schedule>  lactobacillus acidophilus 1 Tablet(s) Oral every 8 hours  levETIRAcetam  Solution 1000 milliGRAM(s) Oral two times a day  LORazepam     Tablet 1 milliGRAM(s) Oral every 6 hours  metoprolol tartrate 100 milliGRAM(s) Oral two times a day  pantoprazole   Suspension 40 milliGRAM(s) Oral daily  QUEtiapine 50 milliGRAM(s) Oral every 12 hours  senna 2 Tablet(s) Oral at bedtime  sodium chloride 0.9%. 1000 milliLiter(s) (100 mL/Hr) IV Continuous <Continuous>      MEDICATIONS  (PRN):  haloperidol    Injectable 5 milliGRAM(s) IntraMuscular once PRN Agitation  LORazepam   Injectable 1 milliGRAM(s) IV Push every 8 hours PRN for breathrough agitation/anxiety/combative behavior     Medications up to date at time of exam.    PHYSICAL EXAMINATION:  Patient is non verbal.  GENERAL: The patient is a well-developed, well-nourished, in no apparent distress.     Vital Signs Last 24 Hrs  T(C): 36.7 (28 Sep 2018 05:26), Max: 36.7 (28 Sep 2018 05:26)  T(F): 98 (28 Sep 2018 05:26), Max: 98 (28 Sep 2018 05:26)  HR: 87 (28 Sep 2018 08:50) (79 - 101)  BP: 161/92 (28 Sep 2018 05:26) (118/46 - 161/92)  BP(mean): --  RR: 18 (28 Sep 2018 05:26) (16 - 18)  SpO2: 95% (28 Sep 2018 08:50) (95% - 100%)   (if applicable)    Chest Tube (if applicable)    HEENT: Head is normocephalic and atraumatic.     NECK: Supple, no palpable adenopathy. +trach    LUNGS: Clear to auscultation, no wheezing, rales, + rhonchi.    HEART: Regular rate and rhythm without murmur.    ABDOMEN: Soft, nontender, and nondistended.  PEG    EXTREMITIES: Without any cyanosis, clubbing, rash, lesions or edema +contracted    NEUROLOGIC: Awake    SKIN: Warm, dry, good turgor.    LABS:                        8.8    10.0  )-----------( 250      ( 28 Sep 2018 11:12 )             28.6     09-28    134<L>  |  102  |  23<H>  ----------------------------<  182<H>  4.4   |  23  |  2.29<H>    Ca    9.3      28 Sep 2018 11:12    TPro  6.9  /  Alb  2.0<L>  /  TBili  0.2  /  DBili  x   /  AST  23  /  ALT  22  /  AlkPhos  85  09-28    MICROBIOLOGY: (if applicable)    RADIOLOGY & ADDITIONAL STUDIES:  EKG:   CXR:  ECHO:    IMPRESSION: 63y Female PAST MEDICAL & SURGICAL HISTORY:  No pertinent past medical history  Hypertension  Schizophrenia  Diabetic coma  Diabetes mellitus  No significant past surgical history  S/P percutaneous endoscopic gastrostomy (PEG) tube placement  H/O tracheostomy     Impression; 62 Y/O Female from [facility] with multiple chronic conditions. Has acute on chronic respiratory failure and on oxygen supplementation FIO2 35% via tracheostomy.    CXR 9/27, elevate R hemidiaphragm (not new, compared to old CXR 7/2018)    Suggestion:   Trach w/ FiO2 35%  Continue DuoNeb Q 6 hours.  DVT/GI prophylactic .  Aspiration precautions especially during Peg feeding.   Trach care.     Agree with above assessment and plan as transcribed.

## 2018-09-28 NOTE — PROGRESS NOTE ADULT - SUBJECTIVE AND OBJECTIVE BOX
Interval Events:      Allergies    angiotensin converting enzyme inhibitors (Unknown)    Intolerances      Endocrine/Metabolic Medications:  atorvastatin 20 milliGRAM(s) Oral at bedtime  insulin glargine Injectable (LANTUS) 25 Unit(s) SubCutaneous at bedtime  insulin lispro (HumaLOG) corrective regimen sliding scale   SubCutaneous every 6 hours  insulin lispro Injectable (HumaLOG) 10 Unit(s) SubCutaneous <User Schedule>      Vital Signs Last 24 Hrs  T(C): 36.7 (28 Sep 2018 05:26), Max: 36.7 (28 Sep 2018 05:26)  T(F): 98 (28 Sep 2018 05:26), Max: 98 (28 Sep 2018 05:26)  HR: 87 (28 Sep 2018 08:50) (79 - 101)  BP: 161/92 (28 Sep 2018 05:26) (118/46 - 161/92)  BP(mean): --  RR: 18 (28 Sep 2018 05:26) (16 - 18)  SpO2: 95% (28 Sep 2018 08:50) (95% - 100%)      PHYSICAL EXAM  All physical exam findings normal, except those marked:  General:	Alert, active, cooperative, NAD, well hydrated  .		[] Abnormal:  Neck		Normal: supple, no cervical adenopathy, no palpable thyroid  .		[] Abnormal:  Cardiovascular	Normal: regular rate, normal S1, S2, no murmurs  .		[] Abnormal:  Respiratory	Normal: no chest wall deformity, normal respiratory pattern, CTA B/L  .		[] Abnormal:  Abdominal	Normal: soft, ND, NT, bowel sounds present, no masses, no organomegaly  .		[] Abnormal:  		Normal normal genitalia, testes descended, circumcised/uncircumcised  .		Bob stage:			Breast bob:  .		Menstrual history:  .		[] Abnormal:  Extremities	Normal: FROM x4  .		[] Abnormal:  Skin		Normal: intact and not indurated, no rash, no acanthosis nigricans  .		[] Abnormal:  Neurologic	Normal: grossly intact  .		[] Abnormal:    LABS        CAPILLARY BLOOD GLUCOSE      POCT Blood Glucose.: 248 mg/dL (28 Sep 2018 05:50)  POCT Blood Glucose.: 131 mg/dL (28 Sep 2018 00:51)  POCT Blood Glucose.: 126 mg/dL (27 Sep 2018 22:03)  POCT Blood Glucose.: 259 mg/dL (27 Sep 2018 17:25)  POCT Blood Glucose.: 413 mg/dL (27 Sep 2018 12:03)        Assesment/plan Interval Events:  pt in nad    Allergies    angiotensin converting enzyme inhibitors (Unknown)    Intolerances      Endocrine/Metabolic Medications:  atorvastatin 20 milliGRAM(s) Oral at bedtime  insulin glargine Injectable (LANTUS) 25 Unit(s) SubCutaneous at bedtime  insulin lispro (HumaLOG) corrective regimen sliding scale   SubCutaneous every 6 hours  insulin lispro Injectable (HumaLOG) 10 Unit(s) SubCutaneous <User Schedule>      Vital Signs Last 24 Hrs  T(C): 36.7 (28 Sep 2018 05:26), Max: 36.7 (28 Sep 2018 05:26)  T(F): 98 (28 Sep 2018 05:26), Max: 98 (28 Sep 2018 05:26)  HR: 87 (28 Sep 2018 08:50) (79 - 101)  BP: 161/92 (28 Sep 2018 05:26) (118/46 - 161/92)  BP(mean): --  RR: 18 (28 Sep 2018 05:26) (16 - 18)  SpO2: 95% (28 Sep 2018 08:50) (95% - 100%)      PHYSICAL EXAM  All physical exam findings normal, except those marked:  General:	Alert, active, cooperative, NAD, well hydrated  .		[] Abnormal:  Neck		Normal: supple, no cervical adenopathy, no palpable thyroid  .		[] Abnormal:  Cardiovascular	Normal: regular rate, normal S1, S2, no murmurs  .		[] Abnormal:  Respiratory	Normal: no chest wall deformity, normal respiratory pattern, CTA B/L  .		[] Abnormal:  Abdominal	Normal: soft, ND, NT, bowel sounds present, no masses, no organomegaly  .		[] Abnormal:  		Normal normal genitalia, testes descended, circumcised/uncircumcised  .		Bob stage:			Breast bob:  .		Menstrual history:  .		[] Abnormal:  Extremities	Normal: FROM x4  .		[] Abnormal:  Skin		Normal: intact and not indurated, no rash, no acanthosis nigricans  .		[] Abnormal:  Neurologic	Normal: grossly intact  .		[] Abnormal:    LABS        CAPILLARY BLOOD GLUCOSE      POCT Blood Glucose.: 248 mg/dL (28 Sep 2018 05:50)  POCT Blood Glucose.: 131 mg/dL (28 Sep 2018 00:51)  POCT Blood Glucose.: 126 mg/dL (27 Sep 2018 22:03)  POCT Blood Glucose.: 259 mg/dL (27 Sep 2018 17:25)  POCT Blood Glucose.: 413 mg/dL (27 Sep 2018 12:03)        Assesment/plan    Dm- better controlled  cont lantus 25  humalog 10 tid  fsg q6 h

## 2018-09-28 NOTE — PROGRESS NOTE ADULT - PROBLEM SELECTOR PLAN 1
ATN secondary to polymixin and amikacin.  Continue IVF at 100cc/hr as per renal.  Daily labs Creatinine improving  ATN secondary to polymixin and amikacin.  Continue IVF at 100cc/hr as per renal.  Daily labs

## 2018-09-28 NOTE — PROGRESS NOTE ADULT - SUBJECTIVE AND OBJECTIVE BOX
[facility] NEPHROLOGY- PROGRESS NOTE, Follow up     Patient is a 63y Female who presents with a chief complaint of vomiting (24 Sep 2018 12:18) with coffee ground emesis and cough.  She was found to have PNA.  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  She was started on polymyxin on 9/16 and amikacin on 9/17.  Baseline creatinine is  0.8, eitan to ~1 on 9/20, 1.3 on 9/22 and 1.7 today.  Pt is also on rifampin.  She was previously on vanco, zosyn, and unasyn this admission as well.  Pt was given lasix IV on 9/21 and 9/22.  She has a PEG and has been tolerating PEG feeds. No ACEi/ARBs/NSAIDs/IV Contrast.    Pt is unable to give history.  REVIEW OF SYSTEMS: unable to obtain  Allergy:  angiotensin converting enzyme inhibitors (Unknown)    Hospital Medications:   MEDICATIONS  (STANDING):  ALBUTerol/ipratropium for Nebulization 3 milliLiter(s) Nebulizer every 6 hours  amLODIPine   Tablet 2.5 milliGRAM(s) Oral daily  atorvastatin 20 milliGRAM(s) Oral at bedtime  benztropine 2 milliGRAM(s) Oral two times a day  chlorhexidine 4% Liquid 1 Application(s) Topical every 12 hours  heparin  Injectable 5000 Unit(s) SubCutaneous every 8 hours  insulin glargine Injectable (LANTUS) 25 Unit(s) SubCutaneous at bedtime  insulin lispro (HumaLOG) corrective regimen sliding scale   SubCutaneous every 6 hours  insulin lispro Injectable (HumaLOG) 10 Unit(s) SubCutaneous <User Schedule>  lactobacillus acidophilus 1 Tablet(s) Oral every 8 hours  levETIRAcetam  Solution 1000 milliGRAM(s) Oral two times a day  LORazepam     Tablet 1 milliGRAM(s) Oral every 6 hours  metoprolol tartrate 100 milliGRAM(s) Oral two times a day  nystatin Powder 1 Application(s) Topical every 8 hours  pantoprazole   Suspension 40 milliGRAM(s) Oral daily  QUEtiapine 50 milliGRAM(s) Oral every 12 hours  senna 2 Tablet(s) Oral at bedtime  sodium chloride 0.9%. 1000 milliLiter(s) (100 mL/Hr) IV Continuous <Continuous>      VITALS:  T(F): 97.5 (09-28-18 @ 14:25), Max: 98 (09-28-18 @ 05:26)  HR: 85 (09-28-18 @ 14:25)  BP: 174/70 (09-28-18 @ 14:25)  RR: 18 (09-28-18 @ 14:25)  SpO2: 97% (09-28-18 @ 14:25)  Wt(kg): --      PHYSICAL EXAM:  Constitutional: NAD, calm  HEENT: anicteric sclera, oropharynx clear, MMM  Neck: No JVD  Respiratory: CTAB, no wheezes, rales or rhonchi, trach  Cardiovascular: S1, S2, RRR  Gastrointestinal: BS+, soft, NT/ND  Extremities: No cyanosis or clubbing. No peripheral edema  : No CVA tenderness. No coe.   Skin: warm and dry     LABS:  09-28    134<L>  |  102  |  23<H>  ----------------------------<  182<H>  4.4   |  23  |  2.29<H>    Ca    9.3      28 Sep 2018 11:12    TPro  6.9  /  Alb  2.0<L>  /  TBili  0.2  /  DBili  x   /  AST  23  /  ALT  22  /  AlkPhos  85  09-28    Creatinine Trend: 2.29 <--, 2.80 <--, 2.76 <--, 2.10 <--, 1.72 <--, 1.27 <--, 1.27 <--                        8.8    10.0  )-----------( 250      ( 28 Sep 2018 11:12 )             28.6     Urine Studies:      RADIOLOGY & ADDITIONAL STUDIES:

## 2018-09-28 NOTE — PROGRESS NOTE ADULT - ASSESSMENT
Patient is a 63y old  Female who presents with a chief complaint of vomiting (21 Sep 2018 11:21)3 yo F from [facility], non-verbal, bed bound, S/P Trach/ PEG tube, PMH of HTN, asthma, convulsion disorder, bipolar, DVT legs, DM 2, sent from NH for coffee ground vomiting X3 this morning and cough.  Pt unable to provide historyPt also developed cough with clear sputum.  Sister states pt is combative at baseline, and her current behavior is baseline.  Pt was recently admitted to [facility] in 07/2018 for same reason and treated for aspiration PNA and UTI during last admission.no episode of vomiting in ED  Hb 12 at baseline , less likely UGI Bleeding  started protonix 40mg bid IV, seen by GI, no further bleeding noted. Started empiric treatment for aspiration pna, sputum culture grew with Pseudomonoas CRE and acetinobacter, started on Amikacin/Rifampin/Polymixin, wtih increase in leukocytosis, vancomycin added, now with leukocytosis trending down,afebrile. Hospitalization complicated by poor iv acess, s/p TLC R groin on 9/20/2018. TLC d/c on 9/27/18.

## 2018-09-28 NOTE — PROGRESS NOTE ADULT - SUBJECTIVE AND OBJECTIVE BOX
NP Note discussed with  Primary Attending    Patient is a 63y old  Female who presents with a chief complaint of vomiting (28 Sep 2018 08:02)      INTERVAL HPI/OVERNIGHT EVENTS: no new complaints    MEDICATIONS  (STANDING):  ALBUTerol/ipratropium for Nebulization 3 milliLiter(s) Nebulizer every 6 hours  amLODIPine   Tablet 2.5 milliGRAM(s) Oral daily  atorvastatin 20 milliGRAM(s) Oral at bedtime  benztropine 2 milliGRAM(s) Oral two times a day  chlorhexidine 4% Liquid 1 Application(s) Topical every 12 hours  heparin  Injectable 5000 Unit(s) SubCutaneous every 8 hours  insulin glargine Injectable (LANTUS) 25 Unit(s) SubCutaneous at bedtime  insulin lispro (HumaLOG) corrective regimen sliding scale   SubCutaneous every 6 hours  insulin lispro Injectable (HumaLOG) 10 Unit(s) SubCutaneous <User Schedule>  lactobacillus acidophilus 1 Tablet(s) Oral every 8 hours  levETIRAcetam  Solution 1000 milliGRAM(s) Oral two times a day  LORazepam     Tablet 1 milliGRAM(s) Oral every 6 hours  metoprolol tartrate 100 milliGRAM(s) Oral two times a day  pantoprazole   Suspension 40 milliGRAM(s) Oral daily  QUEtiapine 50 milliGRAM(s) Oral every 12 hours  senna 2 Tablet(s) Oral at bedtime  sodium chloride 0.9%. 1000 milliLiter(s) (100 mL/Hr) IV Continuous <Continuous>    MEDICATIONS  (PRN):  haloperidol    Injectable 5 milliGRAM(s) IntraMuscular once PRN Agitation  LORazepam   Injectable 1 milliGRAM(s) IV Push every 8 hours PRN for breathrough agitation/anxiety/combative behavior      __________________________________________________  REVIEW OF SYSTEMS: Unable to obtain due to aphasic    Vital Signs Last 24 Hrs  T(C): 36.7 (28 Sep 2018 05:26), Max: 36.7 (28 Sep 2018 05:26)  T(F): 98 (28 Sep 2018 05:26), Max: 98 (28 Sep 2018 05:26)  HR: 80 (28 Sep 2018 05:26) (79 - 101)  BP: 161/92 (28 Sep 2018 05:26) (118/46 - 161/92)  BP(mean): --  RR: 18 (28 Sep 2018 05:26) (16 - 18)  SpO2: 98% (28 Sep 2018 05:26) (98% - 100%)    ________________________________________________  PHYSICAL EXAM:  GENERAL: NAD  HEENT: Normocephalic;  conjunctivae and sclerae clear; moist mucous membranes;   NECK : supple  CHEST/LUNG: Diminished breath sounds with faint bibasilar crackles and a few scattered rhonchi  HEART: S1 S2  regular; no murmurs, gallops or rubs  ABDOMEN: Soft, Nontender, Nondistended; Bowel sounds present; Peg intact  EXTREMITIES: no cyanosis; no edema; no calf tenderness  SKIN: warm and dry; no rash  NERVOUS SYSTEM:  Awake and alert; Restless and combative at times    _________________________________________________  LABS:                        9.3    8.5   )-----------( 238      ( 27 Sep 2018 08:21 )             29.6     09-27    130<L>  |  97  |  25<H>  ----------------------------<  269<H>  3.9   |  24  |  2.80<H>    Ca    9.3      27 Sep 2018 08:21          CAPILLARY BLOOD GLUCOSE      POCT Blood Glucose.: 248 mg/dL (28 Sep 2018 05:50)  POCT Blood Glucose.: 131 mg/dL (28 Sep 2018 00:51)  POCT Blood Glucose.: 126 mg/dL (27 Sep 2018 22:03)  POCT Blood Glucose.: 259 mg/dL (27 Sep 2018 17:25)  POCT Blood Glucose.: 413 mg/dL (27 Sep 2018 12:03)        RADIOLOGY & ADDITIONAL TESTS:    Imaging Personally Reviewed:  YES  < from: Xray Chest 1 View- PORTABLE-Urgent (09.27.18 @ 10:51) >  There is elevation of the right hemidiaphragm.  Subsegmental atelectasis and/or fibrosis is noted right lower lung zone.  No new lobar lung consolidation, pleural effusion or pneumothorax is   noted.    < end of copied text >      Consultant(s) Notes Reviewed:   YES Care Discussed with Consultants :     Plan of care was discussed with patient and /or primary care giver; all questions and concerns were addressed and care was aligned with patient's wishes. NP Note discussed with  Primary Attending    Patient is a 63y old  Female who presents with a chief complaint of vomiting (28 Sep 2018 08:02)      INTERVAL HPI/OVERNIGHT EVENTS: no new complaints    MEDICATIONS  (STANDING):  ALBUTerol/ipratropium for Nebulization 3 milliLiter(s) Nebulizer every 6 hours  amLODIPine   Tablet 2.5 milliGRAM(s) Oral daily  atorvastatin 20 milliGRAM(s) Oral at bedtime  benztropine 2 milliGRAM(s) Oral two times a day  chlorhexidine 4% Liquid 1 Application(s) Topical every 12 hours  heparin  Injectable 5000 Unit(s) SubCutaneous every 8 hours  insulin glargine Injectable (LANTUS) 25 Unit(s) SubCutaneous at bedtime  insulin lispro (HumaLOG) corrective regimen sliding scale   SubCutaneous every 6 hours  insulin lispro Injectable (HumaLOG) 10 Unit(s) SubCutaneous <User Schedule>  lactobacillus acidophilus 1 Tablet(s) Oral every 8 hours  levETIRAcetam  Solution 1000 milliGRAM(s) Oral two times a day  LORazepam     Tablet 1 milliGRAM(s) Oral every 6 hours  metoprolol tartrate 100 milliGRAM(s) Oral two times a day  pantoprazole   Suspension 40 milliGRAM(s) Oral daily  QUEtiapine 50 milliGRAM(s) Oral every 12 hours  senna 2 Tablet(s) Oral at bedtime  sodium chloride 0.9%. 1000 milliLiter(s) (100 mL/Hr) IV Continuous <Continuous>    MEDICATIONS  (PRN):  haloperidol    Injectable 5 milliGRAM(s) IntraMuscular once PRN Agitation  LORazepam   Injectable 1 milliGRAM(s) IV Push every 8 hours PRN for breathrough agitation/anxiety/combative behavior      __________________________________________________  REVIEW OF SYSTEMS: Unable to obtain due to aphasic    Vital Signs Last 24 Hrs  T(C): 36.7 (28 Sep 2018 05:26), Max: 36.7 (28 Sep 2018 05:26)  T(F): 98 (28 Sep 2018 05:26), Max: 98 (28 Sep 2018 05:26)  HR: 80 (28 Sep 2018 05:26) (79 - 101)  BP: 161/92 (28 Sep 2018 05:26) (118/46 - 161/92)  BP(mean): --  RR: 18 (28 Sep 2018 05:26) (16 - 18)  SpO2: 98% (28 Sep 2018 05:26) (98% - 100%)    ________________________________________________  PHYSICAL EXAM:  GENERAL: NAD  HEENT: Normocephalic;  conjunctivae and sclerae clear; moist mucous membranes;   NECK : supple  CHEST/LUNG: Diminished breath sounds with faint bibasilar crackles and a few scattered rhonchi  HEART: S1 S2  regular; no murmurs, gallops or rubs  ABDOMEN: Soft, Nontender, Nondistended; Bowel sounds present; Peg intact  EXTREMITIES: no cyanosis; no edema; no calf tenderness  SKIN: warm and dry; no rash  NERVOUS SYSTEM:  Awake and alert; Restless and combative at times    _________________________________________________  LABS:                                8.8    10.0  )-----------( 250      ( 28 Sep 2018 11:12 )             28.6     09-28    134<L>  |  102  |  23<H>  ----------------------------<  182<H>  4.4   |  23  |  2.29<H>    Ca    9.3      28 Sep 2018 11:12    TPro  6.9  /  Alb  2.0<L>  /  TBili  0.2  /  DBili  x   /  AST  23  /  ALT  22  /  AlkPhos  85  09-28        CAPILLARY BLOOD GLUCOSE      POCT Blood Glucose.: 248 mg/dL (28 Sep 2018 05:50)  POCT Blood Glucose.: 131 mg/dL (28 Sep 2018 00:51)  POCT Blood Glucose.: 126 mg/dL (27 Sep 2018 22:03)  POCT Blood Glucose.: 259 mg/dL (27 Sep 2018 17:25)  POCT Blood Glucose.: 413 mg/dL (27 Sep 2018 12:03)        RADIOLOGY & ADDITIONAL TESTS:    Imaging Personally Reviewed:  YES  < from: Xray Chest 1 View- PORTABLE-Urgent (09.27.18 @ 10:51) >  There is elevation of the right hemidiaphragm.  Subsegmental atelectasis and/or fibrosis is noted right lower lung zone.  No new lobar lung consolidation, pleural effusion or pneumothorax is   noted.    < end of copied text >      Consultant(s) Notes Reviewed:   YES Care Discussed with Consultants :     Plan of care was discussed with patient and /or primary care giver; all questions and concerns were addressed and care was aligned with patient's wishes.

## 2018-09-28 NOTE — PROGRESS NOTE ADULT - SUBJECTIVE AND OBJECTIVE BOX
Patient seen and examined at bedside  restless overnight  Case discussed with medical team    HPI:  62 yo F from [facility], non-verbal, bed bound, S/P Trach/ PEG tube, PMH of HTN, asthma, convulsion disorder, bipolar, DVT legs, DM 2, sent from NH for coffee ground vomiting X3 this morning and cough.  Pt unable to provide history.  History given by sister (Sharon [phone])who states pt was vomiting coffee ground emesis this morning. Pt had no reported vomiting since in ED.  Pt also developed cough with clear sputum.  Sister states pt is combative at baseline, and her current behavior is baseline.  No reported fever, discomfort, diarrhea, or SOB.  Pt was recently admitted to [facility] in 07/2018 for same reason and treated for aspiration PNA and UTI during last admission.  Pt was supposed to be given IV vanco and zosyn for 7 day by PCP for PNA , which supposed to start from 09/13-09/19. Pt is DNR, MOSLT form in charts. (13 Sep 2018 15:49)      PAST MEDICAL & SURGICAL HISTORY:  No pertinent past medical history  Hypertension  Schizophrenia  Diabetic coma  Diabetes mellitus  No significant past surgical history  S/P percutaneous endoscopic gastrostomy (PEG) tube placement  H/O tracheostomy      angiotensin converting enzyme inhibitors (Unknown)       MEDICATIONS  (STANDING):  ALBUTerol/ipratropium for Nebulization 3 milliLiter(s) Nebulizer every 6 hours  amLODIPine   Tablet 2.5 milliGRAM(s) Oral daily  atorvastatin 20 milliGRAM(s) Oral at bedtime  benztropine 2 milliGRAM(s) Oral two times a day  chlorhexidine 4% Liquid 1 Application(s) Topical every 12 hours  heparin  Injectable 5000 Unit(s) SubCutaneous every 8 hours  insulin glargine Injectable (LANTUS) 25 Unit(s) SubCutaneous at bedtime  insulin lispro (HumaLOG) corrective regimen sliding scale   SubCutaneous every 6 hours  insulin lispro Injectable (HumaLOG) 10 Unit(s) SubCutaneous <User Schedule>  lactobacillus acidophilus 1 Tablet(s) Oral every 8 hours  levETIRAcetam  Solution 1000 milliGRAM(s) Oral two times a day  LORazepam     Tablet 1 milliGRAM(s) Oral every 6 hours  metoprolol tartrate 100 milliGRAM(s) Oral two times a day  pantoprazole   Suspension 40 milliGRAM(s) Oral daily  QUEtiapine 50 milliGRAM(s) Oral every 12 hours  senna 2 Tablet(s) Oral at bedtime  sodium chloride 0.9%. 1000 milliLiter(s) (100 mL/Hr) IV Continuous <Continuous>    MEDICATIONS  (PRN):  haloperidol    Injectable 5 milliGRAM(s) IntraMuscular once PRN Agitation  LORazepam   Injectable 1 milliGRAM(s) IV Push every 8 hours PRN for breathrough agitation/anxiety/combative behavior      REVIEW OF SYSTEMS: limited 2/2 mental status    T(C): 36.7 (09-28-18 @ 05:26), Max: 36.7 (09-28-18 @ 05:26)  HR: 80 (09-28-18 @ 05:26) (79 - 105)  BP: 161/92 (09-28-18 @ 05:26) (118/46 - 161/92)  RR: 18 (09-28-18 @ 05:26) (16 - 18)  SpO2: 98% (09-28-18 @ 05:26) (98% - 100%)    PHYSICAL EXAMINATION:   Constitutional: restless lady in bed  HEENT: AT  Neck:  trach collar intact. Supple  Respiratory:  Adequate airflow b/l. Not using accessory muscles of respiration.  Cardiovascular:  S1 & S2 intact, no R/G, 2+ radial pulses b/l  Gastrointestinal: Soft, ND, normoactive b.s., no organomegaly/RT/rigidity  Extremities: WWP  Neurological:  stable. Awake     Labs and imaging reviewed    LABS:                        9.3    8.5   )-----------( 238      ( 27 Sep 2018 08:21 )             29.6     09-27    130<L>  |  97  |  25<H>  ----------------------------<  269<H>  3.9   |  24  |  2.80<H>    Ca    9.3      27 Sep 2018 08:21              CAPILLARY BLOOD GLUCOSE      POCT Blood Glucose.: 248 mg/dL (28 Sep 2018 05:50)  POCT Blood Glucose.: 131 mg/dL (28 Sep 2018 00:51)  POCT Blood Glucose.: 126 mg/dL (27 Sep 2018 22:03)  POCT Blood Glucose.: 259 mg/dL (27 Sep 2018 17:25)  POCT Blood Glucose.: 413 mg/dL (27 Sep 2018 12:03)              RADIOLOGY & ADDITIONAL STUDIES:

## 2018-09-28 NOTE — PROGRESS NOTE ADULT - ASSESSMENT
A 62 yo Female  with  non-verbal, bed bound, S/P Trach/ PEG tube, [facility] evaluation of coffee ground vomiting  and cough. On arrival, she found to have tachycardia, Leukocytosis, and RLL consolidation. She has started on Zosyn and Vancomycin and the ID consult requested to assist with further evaluation and antibiotic management.     # Aspiration pneumonia - RLL consolidation - Pseudomonas and Acinetobacter   # MRSA PCR is negative    Would recommend:    1. Monitor kidney function and c/w IVF  2. Monitor oFF antibiotic   3. Bronchial suctioning  and Aspiration  precaution  4. Frequent repositioning      d/w  Nursing staff    will follow the patient with you

## 2018-09-28 NOTE — PROGRESS NOTE ADULT - ASSESSMENT
63 year-old woman with LAYLA due to ATN from polymyxin and amikacin  Hyponatremia likely due to dehydration as well.  Elevate bicarbonate ether respiratory acidosis or metabolic alkalosis.    Renal function improving.  Continue IVF NS @100ml/hr for now.

## 2018-09-28 NOTE — PROGRESS NOTE ADULT - SUBJECTIVE AND OBJECTIVE BOX
Patient is seen and examined at the bed side, is afebrile.  The creatinine has started  trending down.      REVIEW OF SYSTEMS: Unable to obtain due to mental status      ALLERGIES : NKDA      Vital Signs Last 24 Hrs  T(C): 36.4 (28 Sep 2018 14:25), Max: 36.7 (28 Sep 2018 05:26)  T(F): 97.5 (28 Sep 2018 14:25), Max: 98 (28 Sep 2018 05:26)  HR: 85 (28 Sep 2018 14:) (80 - 101)  BP: 174/70 (28 Sep 2018 14:25) (118/46 - 174/70)  BP(mean): --  RR: 18 (28 Sep 2018 14:) (18 - 18)  SpO2: 97% (28 Sep 2018 14:) (95% - 100%)      PHYSICAL EXAM:  GENERAL: Not in distress  HEENT: Trach in placed  CHEST/LUNG: Air entry B/L  HEART: s1 and s2 present  ABDOMEN: Nontender, and Nondistended  EXTREMITIES:  No pedal  edema  CNS: Awake but not Alert        LABS:                         8.8    10.0  )-----------( 250      ( 28 Sep 2018 11:12 )             28.6                         9.9    7.8   )-----------( 250      ( 25 Sep 2018 06:15 )             31.1                                  10.5   20.1  )-----------( 217      ( 19 Sep 2018 07:47 )             33.1           Amikacin Level, Trough (18 @ 17:06)    Amikacin Level, Trough: 17.6: Performed by: [facility], [facility] [place] [address]16 ug/mL          134<L>  |  102  |  23<H>  ----------------------------<  182<H>  4.4   |  23  |  2.29<H>    Ca    9.3      28 Sep 2018 11:12    TPro  6.9  /  Alb  2.0<L>  /  TBili  0.2  /  DBili  x   /  AST  23  /  ALT  22  /  AlkPhos  85      130<L>  |  97  |  25<H>  ----------------------------<  269<H>  3.9   |  24  |  2.80<H>    Ca    9.3      27 Sep 2018 08:21      09    131<L>  |  94<L>  |  25<H>  ----------------------------<  83  3.9   |  27  |  2.76<H>    Ca    9.4      26 Sep 2018 13:54          133<L>  |  94<L>  |  17  ----------------------------<  258<H>  3.8   |  30  |  2.10<H>    Ca    9.4      25 Sep 2018 06:15  Phos  4.5     09-24  Mg     1.6     -      09-24    131<L>  |  93<L>  |  16  ----------------------------<  163<H>  3.9   |  31  |  1.72<H>    Ca    9.0      24 Sep 2018 07:18  Phos  4.5     09-24  Mg     1.6     24      TPro  6.2  /  Alb  2.2<L>  /  TBili  0.9  /  DBili  0.3<H>  /  AST  24  /  ALT  20  /  AlkPhos  86  09-20         Urinalysis Basic - ( 14 Sep 2018 03:56 )    Color: Yellow / Appearance: Clear / S.020 / pH: x  Gluc: x / Ketone: Negative  / Bili: Negative / Urobili: Negative   Blood: x / Protein: 100 / Nitrite: Negative   Leuk Esterase: Negative / RBC: 0-2 /HPF / WBC 0-2 /HPF   Sq Epi: x / Non Sq Epi: Occasional /HPF / Bacteria: Trace /HPF      CAPILLARY BLOOD GLUCOSE      POCT Blood Glucose.: 144 mg/dL (14 Sep 2018 11:07)  POCT Blood Glucose.: 202 mg/dL (14 Sep 2018 08:09)  POCT Blood Glucose.: 277 mg/dL (14 Sep 2018 06:58)  POCT Blood Glucose.: 68 mg/dL (14 Sep 2018 06:03)  POCT Blood Glucose.: 316 mg/dL (14 Sep 2018 02:11)  POCT Blood Glucose.: 405 mg/dL (14 Sep 2018 00:28)  POCT Blood Glucose.: 386 mg/dL (13 Sep 2018 22:26)  POCT Blood Glucose.: 403 mg/dL (13 Sep 2018 19:09)      MEDICATIONS  (STANDING):  ALBUTerol/ipratropium for Nebulization 3 milliLiter(s) Nebulizer every 6 hours  amLODIPine   Tablet 2.5 milliGRAM(s) Oral daily  atorvastatin 20 milliGRAM(s) Oral at bedtime  benztropine 2 milliGRAM(s) Oral two times a day  chlorhexidine 4% Liquid 1 Application(s) Topical every 12 hours  heparin  Injectable 5000 Unit(s) SubCutaneous every 8 hours  insulin glargine Injectable (LANTUS) 25 Unit(s) SubCutaneous at bedtime  insulin lispro (HumaLOG) corrective regimen sliding scale   SubCutaneous every 6 hours  insulin lispro Injectable (HumaLOG) 10 Unit(s) SubCutaneous <User Schedule>  lactobacillus acidophilus 1 Tablet(s) Oral every 8 hours  levETIRAcetam  Solution 1000 milliGRAM(s) Oral two times a day  LORazepam     Tablet 1 milliGRAM(s) Oral every 6 hours  metoprolol tartrate 100 milliGRAM(s) Oral two times a day  nystatin Powder 1 Application(s) Topical every 8 hours  pantoprazole   Suspension 40 milliGRAM(s) Oral daily  QUEtiapine 50 milliGRAM(s) Oral every 12 hours  senna 2 Tablet(s) Oral at bedtime  sodium chloride 0.9%. 1000 milliLiter(s) (100 mL/Hr) IV Continuous <Continuous>    MEDICATIONS  (PRN):  haloperidol    Injectable 5 milliGRAM(s) IntraMuscular once PRN Agitation  LORazepam   Injectable 1 milliGRAM(s) IV Push every 8 hours PRN for breathrough agitation/anxiety/combative behavior          RADIOLOGY & ADDITIONAL TESTS:    18: Xray Chest 1 View- PORTABLE-Routine (18 @ 08:22) Tracheostomy cannula reidentified in position. No change heart mediastinum. No change in the right basilar opacity with loss of definition of the ipsilateral hemidiaphragm and costophrenic angle likely   representing a combination of pleural fluid and underlying  consolidation/atelectasis.      9/14/18: CT Chest No Cont (18 @ 03:41) Impression: Consolidation involving the right lower lobe is of uncertain etiology. It is unclear whether this represents compressive atelectasis secondary to right pleural effusion/elevation of the right hemidiaphragm  and/or represents infection.    18: Xray Chest 1 View-PORTABLE IMMEDIATE (18 @ 13:37) Bilateral pleural effusions and associated atelectasis.        MICROBIOLOGY DATA:    Culture - Blood (18 @ 17:33)    Specimen Source: .Blood Blood-Peripheral    Culture Results:   No growth to date.      Culture - Blood (18 @ 13:21)    Specimen Source: .Blood Blood-Peripheral    Culture Results:   No growth to date.        Culture - Sputum . (09.15.18 @ 09:28)    Gram Stain:   Rare polymorphonuclear leukocytes per low power field  Few Squamous epithelial cells per low power field  Moderate Gram Negative Rods per oil power field  Few Gram Positive Rods per oil power field  Few Yeast like cells per oil power field  Rare Gram Negative Coccobacilli per oil power field    -  Amikacin: S <=8    -  Amikacin: S <=8    -  Ampicillin/Sulbactam: R >16/8    -  Aztreonam: S 8    -  Cefepime: I 16    -  Cefepime: I 16    -  Ceftazidime: R >16    -  Ceftazidime: I 16    -  Ciprofloxacin: R >2    -  Ciprofloxacin: R >2    -  Gentamicin: R >8    -  Gentamicin: R >8    -  Imipenem: R >8    -  Imipenem: R    -  Levofloxacin: R >4    -  Levofloxacin: R >4    -  Meropenem: R >8    -  Meropenem: I 8    -  Piperacillin/Tazobactam: R >64    -  Piperacillin/Tazobactam: R    -  Tobramycin: R >8    -  Tobramycin: R >8    -  Trimethoprim/Sulfamethoxazole: R >2/38    Specimen Source: .Sputum Sputum    Culture Results:   Numerous Pseudomonas aeruginosa (Carbapenem Resistant)  Moderate Acinetobacter baumannii/haemolyticus (Carbapenem Resistant)  Complex  Normal Respiratory Moraima present    Organism Identification: Pseudomonas aeruginosa (Carbapenem Resistant)  Acinetobacter baumannii/haemolyticus (Carbapenem Resistant)    Organism: Acinetobacter baumannii/haemolyticus (Carbapenem Resistant)    Organism: Pseudomonas aeruginosa (Carbapenem Resistant)    Organism: Acinetobacter baumannii/haemolyticus (Carbapenem Resistant)    Method Type: ESTIVEN    Method Type: ESTIVEN    Method Type: KB        MRSA/MSSA PCR (18 @ 11:26)    MRSA PCR Result.: Andrew: MRSA/MSSA PCR assay is a qualitative in vitro diagnostic test for the  direct detection and differentiation of methicillin-resistant  Staphylococcus aureus (MRSA) from Staphylococcus aureus (SA). The assay  detects DNA from nasal swabs in patients atrisk for nasal colonization.  It is not intended to diagnose MRSA or SA infections nor guide or monitor  treatment for MRSA/SA infections. A negative result does not preclude  nasal colonization. The assay is FDA-approved and its performance has  been established by [employer] [place], [place] and the [facility], [place], [place].    Staph Aureus PCR Result: Andrew      Culture - Urine (18 @ 09:47)    Specimen Source: .Urine Catheterized    Culture Results:   10,000 - 49,000 CFU/mL Yeast

## 2018-09-28 NOTE — PROGRESS NOTE ADULT - PROBLEM SELECTOR PLAN 1
likely 2/2 abx which have been previiously d/c  F/u AM labs   IVF trial   monitor renal function and u/o   all medical consultants recs/management appreciated

## 2018-09-29 LAB
ALBUMIN SERPL ELPH-MCNC: 2 G/DL — LOW (ref 3.5–5)
ALP SERPL-CCNC: 70 U/L — SIGNIFICANT CHANGE UP (ref 40–120)
ALT FLD-CCNC: 20 U/L DA — SIGNIFICANT CHANGE UP (ref 10–60)
ANION GAP SERPL CALC-SCNC: 8 MMOL/L — SIGNIFICANT CHANGE UP (ref 5–17)
AST SERPL-CCNC: 21 U/L — SIGNIFICANT CHANGE UP (ref 10–40)
BILIRUB SERPL-MCNC: 0.2 MG/DL — SIGNIFICANT CHANGE UP (ref 0.2–1.2)
BUN SERPL-MCNC: 19 MG/DL — HIGH (ref 7–18)
CALCIUM SERPL-MCNC: 9.2 MG/DL — SIGNIFICANT CHANGE UP (ref 8.4–10.5)
CHLORIDE SERPL-SCNC: 105 MMOL/L — SIGNIFICANT CHANGE UP (ref 96–108)
CO2 SERPL-SCNC: 24 MMOL/L — SIGNIFICANT CHANGE UP (ref 22–31)
CREAT SERPL-MCNC: 1.94 MG/DL — HIGH (ref 0.5–1.3)
GLUCOSE SERPL-MCNC: 150 MG/DL — HIGH (ref 70–99)
HCT VFR BLD CALC: 28.3 % — LOW (ref 34.5–45)
HGB BLD-MCNC: 8.8 G/DL — LOW (ref 11.5–15.5)
MCHC RBC-ENTMCNC: 24.7 PG — LOW (ref 27–34)
MCHC RBC-ENTMCNC: 31.2 GM/DL — LOW (ref 32–36)
MCV RBC AUTO: 79.2 FL — LOW (ref 80–100)
PLATELET # BLD AUTO: 253 K/UL — SIGNIFICANT CHANGE UP (ref 150–400)
POTASSIUM SERPL-MCNC: 3.9 MMOL/L — SIGNIFICANT CHANGE UP (ref 3.5–5.3)
POTASSIUM SERPL-SCNC: 3.9 MMOL/L — SIGNIFICANT CHANGE UP (ref 3.5–5.3)
PROT SERPL-MCNC: 6.8 G/DL — SIGNIFICANT CHANGE UP (ref 6–8.3)
RBC # BLD: 3.57 M/UL — LOW (ref 3.8–5.2)
RBC # FLD: 13 % — SIGNIFICANT CHANGE UP (ref 10.3–14.5)
SODIUM SERPL-SCNC: 137 MMOL/L — SIGNIFICANT CHANGE UP (ref 135–145)
WBC # BLD: 7.7 K/UL — SIGNIFICANT CHANGE UP (ref 3.8–10.5)
WBC # FLD AUTO: 7.7 K/UL — SIGNIFICANT CHANGE UP (ref 3.8–10.5)

## 2018-09-29 RX ORDER — SODIUM CHLORIDE 9 MG/ML
1000 INJECTION INTRAMUSCULAR; INTRAVENOUS; SUBCUTANEOUS
Qty: 0 | Refills: 0 | Status: DISCONTINUED | OUTPATIENT
Start: 2018-09-29 | End: 2018-10-04

## 2018-09-29 RX ADMIN — Medication 1 TABLET(S): at 05:54

## 2018-09-29 RX ADMIN — Medication 2 MILLIGRAM(S): at 18:11

## 2018-09-29 RX ADMIN — Medication 1 TABLET(S): at 14:23

## 2018-09-29 RX ADMIN — CHLORHEXIDINE GLUCONATE 1 APPLICATION(S): 213 SOLUTION TOPICAL at 05:57

## 2018-09-29 RX ADMIN — NYSTATIN CREAM 1 APPLICATION(S): 100000 CREAM TOPICAL at 05:57

## 2018-09-29 RX ADMIN — Medication 1 TABLET(S): at 21:39

## 2018-09-29 RX ADMIN — Medication 3 MILLILITER(S): at 02:01

## 2018-09-29 RX ADMIN — PANTOPRAZOLE SODIUM 40 MILLIGRAM(S): 20 TABLET, DELAYED RELEASE ORAL at 12:49

## 2018-09-29 RX ADMIN — HEPARIN SODIUM 5000 UNIT(S): 5000 INJECTION INTRAVENOUS; SUBCUTANEOUS at 14:22

## 2018-09-29 RX ADMIN — LEVETIRACETAM 1000 MILLIGRAM(S): 250 TABLET, FILM COATED ORAL at 18:11

## 2018-09-29 RX ADMIN — SENNA PLUS 2 TABLET(S): 8.6 TABLET ORAL at 21:39

## 2018-09-29 RX ADMIN — Medication 1: at 06:16

## 2018-09-29 RX ADMIN — ATORVASTATIN CALCIUM 20 MILLIGRAM(S): 80 TABLET, FILM COATED ORAL at 21:39

## 2018-09-29 RX ADMIN — QUETIAPINE FUMARATE 50 MILLIGRAM(S): 200 TABLET, FILM COATED ORAL at 05:56

## 2018-09-29 RX ADMIN — AMLODIPINE BESYLATE 2.5 MILLIGRAM(S): 2.5 TABLET ORAL at 05:55

## 2018-09-29 RX ADMIN — NYSTATIN CREAM 1 APPLICATION(S): 100000 CREAM TOPICAL at 21:39

## 2018-09-29 RX ADMIN — Medication 10 UNIT(S): at 12:43

## 2018-09-29 RX ADMIN — Medication 3 MILLILITER(S): at 16:06

## 2018-09-29 RX ADMIN — Medication 1 MILLIGRAM(S): at 05:54

## 2018-09-29 RX ADMIN — Medication 1: at 12:42

## 2018-09-29 RX ADMIN — LEVETIRACETAM 1000 MILLIGRAM(S): 250 TABLET, FILM COATED ORAL at 05:57

## 2018-09-29 RX ADMIN — HEPARIN SODIUM 5000 UNIT(S): 5000 INJECTION INTRAVENOUS; SUBCUTANEOUS at 21:39

## 2018-09-29 RX ADMIN — Medication 10 UNIT(S): at 06:17

## 2018-09-29 RX ADMIN — NYSTATIN CREAM 1 APPLICATION(S): 100000 CREAM TOPICAL at 14:23

## 2018-09-29 RX ADMIN — QUETIAPINE FUMARATE 50 MILLIGRAM(S): 200 TABLET, FILM COATED ORAL at 18:11

## 2018-09-29 RX ADMIN — Medication 100 MILLIGRAM(S): at 18:11

## 2018-09-29 RX ADMIN — HEPARIN SODIUM 5000 UNIT(S): 5000 INJECTION INTRAVENOUS; SUBCUTANEOUS at 05:55

## 2018-09-29 RX ADMIN — CHLORHEXIDINE GLUCONATE 1 APPLICATION(S): 213 SOLUTION TOPICAL at 18:11

## 2018-09-29 RX ADMIN — Medication 2 MILLIGRAM(S): at 05:54

## 2018-09-29 RX ADMIN — Medication 3 MILLILITER(S): at 08:44

## 2018-09-29 RX ADMIN — Medication 3 MILLILITER(S): at 20:10

## 2018-09-29 RX ADMIN — Medication 100 MILLIGRAM(S): at 05:55

## 2018-09-29 RX ADMIN — Medication 1 MILLIGRAM(S): at 18:11

## 2018-09-29 RX ADMIN — Medication 10 UNIT(S): at 18:17

## 2018-09-29 RX ADMIN — Medication 1 MILLIGRAM(S): at 12:49

## 2018-09-29 RX ADMIN — Medication 1 MILLIGRAM(S): at 00:28

## 2018-09-29 RX ADMIN — INSULIN GLARGINE 25 UNIT(S): 100 INJECTION, SOLUTION SUBCUTANEOUS at 23:46

## 2018-09-29 NOTE — PROGRESS NOTE ADULT - SUBJECTIVE AND OBJECTIVE BOX
[facility] NEPHROLOGY- PROGRESS NOTE, Follow up     Patient is a 63y Female who presents with a chief complaint of vomiting (24 Sep 2018 12:18) with coffee ground emesis and cough.  She was found to have PNA.  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  She was started on polymyxin on 9/16 and amikacin on 9/17.  Baseline creatinine is  0.8, eitan to ~1 on 9/20, 1.3 on 9/22 and 1.7 today.  Pt is also on rifampin.  She was previously on vanco, zosyn, and unasyn this admission as well.  Pt was given lasix IV on 9/21 and 9/22.  She has a PEG and has been tolerating PEG feeds. No ACEi/ARBs/NSAIDs/IV Contrast.    Pt is unable to give history.  REVIEW OF SYSTEMS: unable to obtain  Allergy:  angiotensin converting enzyme inhibitors (Unknown)    Hospital Medications:   MEDICATIONS  (STANDING):  ALBUTerol/ipratropium for Nebulization 3 milliLiter(s) Nebulizer every 6 hours  amLODIPine   Tablet 2.5 milliGRAM(s) Oral daily  atorvastatin 20 milliGRAM(s) Oral at bedtime  benztropine 2 milliGRAM(s) Oral two times a day  chlorhexidine 4% Liquid 1 Application(s) Topical every 12 hours  heparin  Injectable 5000 Unit(s) SubCutaneous every 8 hours  insulin glargine Injectable (LANTUS) 25 Unit(s) SubCutaneous at bedtime  insulin lispro (HumaLOG) corrective regimen sliding scale   SubCutaneous every 6 hours  insulin lispro Injectable (HumaLOG) 10 Unit(s) SubCutaneous <User Schedule>  lactobacillus acidophilus 1 Tablet(s) Oral every 8 hours  levETIRAcetam  Solution 1000 milliGRAM(s) Oral two times a day  LORazepam     Tablet 1 milliGRAM(s) Oral every 6 hours  metoprolol tartrate 100 milliGRAM(s) Oral two times a day  nystatin Powder 1 Application(s) Topical every 8 hours  pantoprazole   Suspension 40 milliGRAM(s) Oral daily  QUEtiapine 50 milliGRAM(s) Oral every 12 hours  senna 2 Tablet(s) Oral at bedtime  sodium chloride 0.9%. 1000 milliLiter(s) (100 mL/Hr) IV Continuous <Continuous>    VITALS:  T(F): 97.4 (09-29-18 @ 13:00), Max: 98 (09-28-18 @ 21:05)  HR: 103 (09-29-18 @ 13:00)  BP: 94/73 (09-29-18 @ 13:00)  RR: 18 (09-29-18 @ 13:00)  SpO2: 97% (09-29-18 @ 13:00)  Wt(kg): --      PHYSICAL EXAM:    Constitutional: NAD, calm  HEENT: anicteric sclera, oropharynx clear, MMM  Neck: No JVD  Respiratory: CTAB, no wheezes, rales or rhonchi, trach  Cardiovascular: S1, S2, RRR  Gastrointestinal: BS+, soft, NT/ND  Extremities: No cyanosis or clubbing. No peripheral edema  : No CVA tenderness. No coe.   Skin: warm and dry     LABS:  09-29    137  |  105  |  19<H>  ----------------------------<  150<H>  3.9   |  24  |  1.94<H>    Ca    9.2      29 Sep 2018 06:16    TPro  6.8  /  Alb  2.0<L>  /  TBili  0.2  /  DBili  x   /  AST  21  /  ALT  20  /  AlkPhos  70  09-29    Creatinine Trend: 1.94 <--, 2.29 <--, 2.80 <--, 2.76 <--, 2.10 <--, 1.72 <--, 1.27 <--                        8.8    7.7   )-----------( 253      ( 29 Sep 2018 06:16 )             28.3     Urine Studies:      RADIOLOGY & ADDITIONAL STUDIES: [facility] NEPHROLOGY- PROGRESS NOTE, Follow up     Patient is a 63y Female who presents with a chief complaint of vomiting (24 Sep 2018 12:18) with coffee ground emesis and cough.  She was found to have PNA.  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  She was started on polymyxin on 9/16 and amikacin on 9/17.  Baseline creatinine is  0.8, eitan to ~1 on 9/20, 1.3 on 9/22 and 1.7 today.  Pt is also on rifampin.  She was previously on vanco, zosyn, and unasyn this admission as well.  Pt was given lasix IV on 9/21 and 9/22.  She has a PEG and has been tolerating PEG feeds. No ACEi/ARBs/NSAIDs/IV Contrast.    Pt is unable to give history.      REVIEW OF SYSTEMS: unable to obtain  Allergy:  angiotensin converting enzyme inhibitors (Unknown)    Hospital Medications:   MEDICATIONS  (STANDING):  ALBUTerol/ipratropium for Nebulization 3 milliLiter(s) Nebulizer every 6 hours  amLODIPine   Tablet 2.5 milliGRAM(s) Oral daily  atorvastatin 20 milliGRAM(s) Oral at bedtime  benztropine 2 milliGRAM(s) Oral two times a day  chlorhexidine 4% Liquid 1 Application(s) Topical every 12 hours  heparin  Injectable 5000 Unit(s) SubCutaneous every 8 hours  insulin glargine Injectable (LANTUS) 25 Unit(s) SubCutaneous at bedtime  insulin lispro (HumaLOG) corrective regimen sliding scale   SubCutaneous every 6 hours  insulin lispro Injectable (HumaLOG) 10 Unit(s) SubCutaneous <User Schedule>  lactobacillus acidophilus 1 Tablet(s) Oral every 8 hours  levETIRAcetam  Solution 1000 milliGRAM(s) Oral two times a day  LORazepam     Tablet 1 milliGRAM(s) Oral every 6 hours  metoprolol tartrate 100 milliGRAM(s) Oral two times a day  nystatin Powder 1 Application(s) Topical every 8 hours  pantoprazole   Suspension 40 milliGRAM(s) Oral daily  QUEtiapine 50 milliGRAM(s) Oral every 12 hours  senna 2 Tablet(s) Oral at bedtime  sodium chloride 0.9%. 1000 milliLiter(s) (100 mL/Hr) IV Continuous <Continuous>    VITALS:  T(F): 97.4 (09-29-18 @ 13:00), Max: 98 (09-28-18 @ 21:05)  HR: 103 (09-29-18 @ 13:00)  BP: 94/73 (09-29-18 @ 13:00)  RR: 18 (09-29-18 @ 13:00)  SpO2: 97% (09-29-18 @ 13:00)  Wt(kg): --      PHYSICAL EXAM:    Constitutional: NAD, calm  HEENT: anicteric sclera, oropharynx clear, MMM  Neck: No JVD  Respiratory: CTAB, no wheezes, rales or rhonchi, trach  Cardiovascular: S1, S2, RRR  Gastrointestinal: BS+, soft, NT/ND  Extremities: No cyanosis or clubbing. No peripheral edema  : No CVA tenderness. No coe.   Skin: warm and dry     LABS:  09-29    137  |  105  |  19<H>  ----------------------------<  150<H>  3.9   |  24  |  1.94<H>    Ca    9.2      29 Sep 2018 06:16    TPro  6.8  /  Alb  2.0<L>  /  TBili  0.2  /  DBili  x   /  AST  21  /  ALT  20  /  AlkPhos  70  09-29    Creatinine Trend: 1.94 <--, 2.29 <--, 2.80 <--, 2.76 <--, 2.10 <--, 1.72 <--, 1.27 <--                        8.8    7.7   )-----------( 253      ( 29 Sep 2018 06:16 )             28.3     Urine Studies:      RADIOLOGY & ADDITIONAL STUDIES:

## 2018-09-29 NOTE — PROGRESS NOTE ADULT - SUBJECTIVE AND OBJECTIVE BOX
Patient seen and examined at bedside  Case discussed with medical team    HPI:  64 yo F from [facility], non-verbal, bed bound, S/P Trach/ PEG tube, PMH of HTN, asthma, convulsion disorder, bipolar, DVT legs, DM 2, sent from NH for coffee ground vomiting X3 this morning and cough.  Pt unable to provide history.  History given by sister (Sharon [phone])who states pt was vomiting coffee ground emesis this morning. Pt had no reported vomiting since in ED.  Pt also developed cough with clear sputum.  Sister states pt is combative at baseline, and her current behavior is baseline.  No reported fever, discomfort, diarrhea, or SOB.  Pt was recently admitted to [facility] in 07/2018 for same reason and treated for aspiration PNA and UTI during last admission.  Pt was supposed to be given IV vanco and zosyn for 7 day by PCP for PNA , which supposed to start from 09/13-09/19. Pt is DNR, MOSLT form in charts. (13 Sep 2018 15:49)      PAST MEDICAL & SURGICAL HISTORY:  No pertinent past medical history  Hypertension  Schizophrenia  Diabetic coma  Diabetes mellitus  No significant past surgical history  S/P percutaneous endoscopic gastrostomy (PEG) tube placement  H/O tracheostomy      angiotensin converting enzyme inhibitors (Unknown)       MEDICATIONS  (STANDING):  ALBUTerol/ipratropium for Nebulization 3 milliLiter(s) Nebulizer every 6 hours  amLODIPine   Tablet 2.5 milliGRAM(s) Oral daily  atorvastatin 20 milliGRAM(s) Oral at bedtime  benztropine 2 milliGRAM(s) Oral two times a day  chlorhexidine 4% Liquid 1 Application(s) Topical every 12 hours  heparin  Injectable 5000 Unit(s) SubCutaneous every 8 hours  insulin glargine Injectable (LANTUS) 25 Unit(s) SubCutaneous at bedtime  insulin lispro (HumaLOG) corrective regimen sliding scale   SubCutaneous every 6 hours  insulin lispro Injectable (HumaLOG) 10 Unit(s) SubCutaneous <User Schedule>  lactobacillus acidophilus 1 Tablet(s) Oral every 8 hours  levETIRAcetam  Solution 1000 milliGRAM(s) Oral two times a day  LORazepam     Tablet 1 milliGRAM(s) Oral every 6 hours  metoprolol tartrate 100 milliGRAM(s) Oral two times a day  pantoprazole   Suspension 40 milliGRAM(s) Oral daily  QUEtiapine 50 milliGRAM(s) Oral every 12 hours  senna 2 Tablet(s) Oral at bedtime  sodium chloride 0.9%. 1000 milliLiter(s) (100 mL/Hr) IV Continuous <Continuous>    MEDICATIONS  (PRN):  haloperidol    Injectable 5 milliGRAM(s) IntraMuscular once PRN Agitation  LORazepam   Injectable 1 milliGRAM(s) IV Push every 8 hours PRN for breathrough agitation/anxiety/combative behavior      REVIEW OF SYSTEMS: limited 2/2 mental status    Vital Signs Last 24 Hrs  T(C): 36.7 (28 Sep 2018 21:05), Max: 36.7 (28 Sep 2018 21:05)  T(F): 98 (28 Sep 2018 21:05), Max: 98 (28 Sep 2018 21:05)  HR: 113 (28 Sep 2018 21:05) (85 - 113)  BP: 113/66 (28 Sep 2018 21:05) (113/66 - 174/70)  BP(mean): --  RR: 18 (28 Sep 2018 21:05) (18 - 18)  SpO2: 100% (28 Sep 2018 21:05) (95% - 100%)    PHYSICAL EXAMINATION:   Constitutional: restless lady in bed  HEENT: AT  Neck:  trach collar intact. Supple  Respiratory:  Adequate airflow b/l. Not using accessory muscles of respiration.  Cardiovascular:  S1 & S2 intact, no R/G, 2+ radial pulses b/l  Gastrointestinal: Soft, ND, normoactive b.s., no organomegaly/RT/rigidity  Extremities: WWP  Neurological:  stable. Awake   labs and imaging reviewed

## 2018-09-29 NOTE — PROGRESS NOTE ADULT - PROBLEM SELECTOR PLAN 1
LAYLA 2/2 ATN 2/2 polymyxin and amikacin.  Renal function improving  Continue IVF NS @100ml/hr for now.

## 2018-09-29 NOTE — PROGRESS NOTE ADULT - PROBLEM SELECTOR PLAN 1
improving yet persistent abnormal renal function   c/w IVF   monitor renal function and u/o   all medical consultants recs/management appreciated

## 2018-09-30 LAB
ANION GAP SERPL CALC-SCNC: 13 MMOL/L — SIGNIFICANT CHANGE UP (ref 5–17)
BUN SERPL-MCNC: 17 MG/DL — SIGNIFICANT CHANGE UP (ref 7–18)
CALCIUM SERPL-MCNC: 9.4 MG/DL — SIGNIFICANT CHANGE UP (ref 8.4–10.5)
CHLORIDE SERPL-SCNC: 108 MMOL/L — SIGNIFICANT CHANGE UP (ref 96–108)
CO2 SERPL-SCNC: 18 MMOL/L — LOW (ref 22–31)
CREAT SERPL-MCNC: 1.8 MG/DL — HIGH (ref 0.5–1.3)
GLUCOSE SERPL-MCNC: 175 MG/DL — HIGH (ref 70–99)
HCT VFR BLD CALC: 28.9 % — LOW (ref 34.5–45)
HGB BLD-MCNC: 9 G/DL — LOW (ref 11.5–15.5)
MCHC RBC-ENTMCNC: 24.6 PG — LOW (ref 27–34)
MCHC RBC-ENTMCNC: 31.2 GM/DL — LOW (ref 32–36)
MCV RBC AUTO: 79.2 FL — LOW (ref 80–100)
PLATELET # BLD AUTO: 281 K/UL — SIGNIFICANT CHANGE UP (ref 150–400)
POTASSIUM SERPL-MCNC: 3.6 MMOL/L — SIGNIFICANT CHANGE UP (ref 3.5–5.3)
POTASSIUM SERPL-SCNC: 3.6 MMOL/L — SIGNIFICANT CHANGE UP (ref 3.5–5.3)
RBC # BLD: 3.65 M/UL — LOW (ref 3.8–5.2)
RBC # FLD: 13.1 % — SIGNIFICANT CHANGE UP (ref 10.3–14.5)
SODIUM SERPL-SCNC: 139 MMOL/L — SIGNIFICANT CHANGE UP (ref 135–145)
WBC # BLD: 6.9 K/UL — SIGNIFICANT CHANGE UP (ref 3.8–10.5)
WBC # FLD AUTO: 6.9 K/UL — SIGNIFICANT CHANGE UP (ref 3.8–10.5)

## 2018-09-30 RX ADMIN — Medication 1 MILLIGRAM(S): at 17:07

## 2018-09-30 RX ADMIN — PANTOPRAZOLE SODIUM 40 MILLIGRAM(S): 20 TABLET, DELAYED RELEASE ORAL at 13:13

## 2018-09-30 RX ADMIN — Medication 3 MILLILITER(S): at 02:08

## 2018-09-30 RX ADMIN — HEPARIN SODIUM 5000 UNIT(S): 5000 INJECTION INTRAVENOUS; SUBCUTANEOUS at 06:08

## 2018-09-30 RX ADMIN — INSULIN GLARGINE 25 UNIT(S): 100 INJECTION, SOLUTION SUBCUTANEOUS at 22:08

## 2018-09-30 RX ADMIN — HEPARIN SODIUM 5000 UNIT(S): 5000 INJECTION INTRAVENOUS; SUBCUTANEOUS at 22:07

## 2018-09-30 RX ADMIN — NYSTATIN CREAM 1 APPLICATION(S): 100000 CREAM TOPICAL at 06:08

## 2018-09-30 RX ADMIN — Medication 1 TABLET(S): at 06:07

## 2018-09-30 RX ADMIN — Medication 10 UNIT(S): at 13:21

## 2018-09-30 RX ADMIN — Medication 3 MILLILITER(S): at 14:08

## 2018-09-30 RX ADMIN — Medication 1: at 06:07

## 2018-09-30 RX ADMIN — Medication 1: at 13:20

## 2018-09-30 RX ADMIN — Medication 1 MILLIGRAM(S): at 23:14

## 2018-09-30 RX ADMIN — QUETIAPINE FUMARATE 50 MILLIGRAM(S): 200 TABLET, FILM COATED ORAL at 06:07

## 2018-09-30 RX ADMIN — Medication 2: at 23:43

## 2018-09-30 RX ADMIN — Medication 1 MILLIGRAM(S): at 06:07

## 2018-09-30 RX ADMIN — LEVETIRACETAM 1000 MILLIGRAM(S): 250 TABLET, FILM COATED ORAL at 06:08

## 2018-09-30 RX ADMIN — LEVETIRACETAM 1000 MILLIGRAM(S): 250 TABLET, FILM COATED ORAL at 17:03

## 2018-09-30 RX ADMIN — Medication 1 MILLIGRAM(S): at 00:17

## 2018-09-30 RX ADMIN — Medication 3 MILLILITER(S): at 08:54

## 2018-09-30 RX ADMIN — NYSTATIN CREAM 1 APPLICATION(S): 100000 CREAM TOPICAL at 22:08

## 2018-09-30 RX ADMIN — Medication 2 MILLIGRAM(S): at 06:07

## 2018-09-30 RX ADMIN — ATORVASTATIN CALCIUM 20 MILLIGRAM(S): 80 TABLET, FILM COATED ORAL at 22:07

## 2018-09-30 RX ADMIN — CHLORHEXIDINE GLUCONATE 1 APPLICATION(S): 213 SOLUTION TOPICAL at 06:07

## 2018-09-30 RX ADMIN — AMLODIPINE BESYLATE 2.5 MILLIGRAM(S): 2.5 TABLET ORAL at 06:07

## 2018-09-30 RX ADMIN — Medication 2 MILLIGRAM(S): at 17:04

## 2018-09-30 RX ADMIN — Medication 100 MILLIGRAM(S): at 17:04

## 2018-09-30 RX ADMIN — Medication 3 MILLILITER(S): at 20:18

## 2018-09-30 RX ADMIN — Medication 10 UNIT(S): at 06:08

## 2018-09-30 RX ADMIN — NYSTATIN CREAM 1 APPLICATION(S): 100000 CREAM TOPICAL at 13:13

## 2018-09-30 RX ADMIN — Medication 1 TABLET(S): at 13:13

## 2018-09-30 RX ADMIN — SENNA PLUS 2 TABLET(S): 8.6 TABLET ORAL at 22:07

## 2018-09-30 RX ADMIN — Medication 1 TABLET(S): at 22:08

## 2018-09-30 RX ADMIN — Medication 100 MILLIGRAM(S): at 06:07

## 2018-09-30 RX ADMIN — QUETIAPINE FUMARATE 50 MILLIGRAM(S): 200 TABLET, FILM COATED ORAL at 17:03

## 2018-09-30 RX ADMIN — Medication 1 MILLIGRAM(S): at 13:13

## 2018-09-30 RX ADMIN — HEPARIN SODIUM 5000 UNIT(S): 5000 INJECTION INTRAVENOUS; SUBCUTANEOUS at 13:14

## 2018-09-30 NOTE — PROGRESS NOTE ADULT - ASSESSMENT
A 62 yo Female  with  non-verbal, bed bound, S/P Trach/ PEG tube, [facility] evaluation of coffee ground vomiting  and cough. On arrival, she found to have tachycardia, Leukocytosis, and RLL consolidation. She has started on Zosyn and Vancomycin and the ID consult requested to assist with further evaluation and antibiotic management.     # Aspiration pneumonia - RLL consolidation - Pseudomonas and Acinetobacter   # MRSA PCR is negative    Would recommend:    1. Monitor kidney function, is improving  2. Continue to Monitor OFF antibiotic   3. Bronchial suctioning  and Aspiration  precaution  4. Frequent repositioning      d/w  Nursing staff    will follow the patient with you

## 2018-09-30 NOTE — PROGRESS NOTE ADULT - SUBJECTIVE AND OBJECTIVE BOX
Patient seen and examined at bedside  Case discussed with medical team    HPI:  62 yo F from [facility], non-verbal, bed bound, S/P Trach/ PEG tube, PMH of HTN, asthma, convulsion disorder, bipolar, DVT legs, DM 2, sent from NH for coffee ground vomiting X3 this morning and cough.  Pt unable to provide history.  History given by sister (Sharon [phone])who states pt was vomiting coffee ground emesis this morning. Pt had no reported vomiting since in ED.  Pt also developed cough with clear sputum.  Sister states pt is combative at baseline, and her current behavior is baseline.  No reported fever, discomfort, diarrhea, or SOB.  Pt was recently admitted to [facility] in 07/2018 for same reason and treated for aspiration PNA and UTI during last admission.  Pt was supposed to be given IV vanco and zosyn for 7 day by PCP for PNA , which supposed to start from 09/13-09/19. Pt is DNR, MOSLT form in charts. (13 Sep 2018 15:49)      PAST MEDICAL & SURGICAL HISTORY:  No pertinent past medical history  Hypertension  Schizophrenia  Diabetic coma  Diabetes mellitus  No significant past surgical history  S/P percutaneous endoscopic gastrostomy (PEG) tube placement  H/O tracheostomy      angiotensin converting enzyme inhibitors (Unknown)       MEDICATIONS  (STANDING):  ALBUTerol/ipratropium for Nebulization 3 milliLiter(s) Nebulizer every 6 hours  amLODIPine   Tablet 2.5 milliGRAM(s) Oral daily  atorvastatin 20 milliGRAM(s) Oral at bedtime  benztropine 2 milliGRAM(s) Oral two times a day  chlorhexidine 4% Liquid 1 Application(s) Topical every 12 hours  heparin  Injectable 5000 Unit(s) SubCutaneous every 8 hours  insulin glargine Injectable (LANTUS) 25 Unit(s) SubCutaneous at bedtime  insulin lispro (HumaLOG) corrective regimen sliding scale   SubCutaneous every 6 hours  insulin lispro Injectable (HumaLOG) 10 Unit(s) SubCutaneous <User Schedule>  lactobacillus acidophilus 1 Tablet(s) Oral every 8 hours  levETIRAcetam  Solution 1000 milliGRAM(s) Oral two times a day  LORazepam     Tablet 1 milliGRAM(s) Oral every 6 hours  metoprolol tartrate 100 milliGRAM(s) Oral two times a day  pantoprazole   Suspension 40 milliGRAM(s) Oral daily  QUEtiapine 50 milliGRAM(s) Oral every 12 hours  senna 2 Tablet(s) Oral at bedtime  sodium chloride 0.9%. 1000 milliLiter(s) (100 mL/Hr) IV Continuous <Continuous>    MEDICATIONS  (PRN):  haloperidol    Injectable 5 milliGRAM(s) IntraMuscular once PRN Agitation  LORazepam   Injectable 1 milliGRAM(s) IV Push every 8 hours PRN for breathrough agitation/anxiety/combative behavior      REVIEW OF SYSTEMS: limited 2/2 mental status  Vital Signs Last 24 Hrs  T(C): 36.2 (30 Sep 2018 05:23), Max: 36.7 (29 Sep 2018 20:32)  T(F): 97.2 (30 Sep 2018 05:23), Max: 98.1 (29 Sep 2018 20:32)  HR: 70 (30 Sep 2018 08:48) (70 - 103)  BP: 119/55 (30 Sep 2018 05:23) (94/73 - 120/61)  BP(mean): --  RR: 18 (30 Sep 2018 05:23) (17 - 18)  SpO2: 99% (30 Sep 2018 08:48) (97% - 100%)    PHYSICAL EXAMINATION:   Constitutional: restless lady in bed  HEENT: AT  Neck:  trach collar intact. Supple  Respiratory:  Adequate airflow b/l. Not using accessory muscles of respiration.  Cardiovascular:  S1 & S2 intact, no R/G, 2+ radial pulses b/l  Gastrointestinal: Soft, ND, normoactive b.s., no organomegaly/RT/rigidity  Extremities: WWP  Neurological:  stable. Awake   labs and imaging reviewed    Basic Metabolic Panel in AM (09.30.18 @ 07:02)    Sodium, Serum: 139 mmol/L    Potassium, Serum: 3.6 mmol/L    Chloride, Serum: 108 mmol/L    Carbon Dioxide, Serum: 18 mmol/L    Anion Gap, Serum: 13 mmol/L    Blood Urea Nitrogen, Serum: 17 mg/dL    Creatinine, Serum: 1.80 mg/dL    Glucose, Serum: 175 mg/dL    Calcium, Total Serum: 9.4 mg/dL    eGFR if Non : 29: Interpretative comment  The units for eGFR are ml/min/1.73m2 (normalized body surface area). The  eGFR is calculated from a serum creatinine using the CKD-EPI equation.  Other variables required for calculation are race, age and sex. Among  patients with chronic kidney disease (CKD), the eGFR is useful in  determining the stage of disease according to KDOQI CKD classification.  All eGFR results are reported numerically with the following  interpretation.          GFR                    With                 Without     (ml/min/1.73 m2)    Kidney Damage       Kidney Damage        >= 90                    Stage 1                     Normal        60-89                    Stage 2                     Decreased GFR        30-59     Stage 3                     Stage 3        15-29                    Stage 4                     Stage 4        < 15                      Stage 5                     Stage 5  Each stage of CKD assumes that the associated GFR level has been in  effect for at least 3 months. Determination of stages one and two (with  eGFR > 59 ml/min/m2) requires estimation of kidney damage for at least 3  months as defined by structural or functional abnormalities.  Limitations: All estimates of GFR will be less accurate for patients at  extremes of muscle mass (including but not limited to frail elderly,  critically ill, or cancer patients), those with unusual diets, and those  with conditions associated with reduced secretion or extrarenal  elimination of creatinine. The eGFR equation is not recommended for use  in patients with unstable creatinine levels. mL/min/1.73M2    eGFR if African American: 34 mL/min/1.73M2

## 2018-09-30 NOTE — PROGRESS NOTE ADULT - SUBJECTIVE AND OBJECTIVE BOX
Time of Visit:  Patient seen and examined.     MEDICATIONS  (STANDING):  ALBUTerol/ipratropium for Nebulization 3 milliLiter(s) Nebulizer every 6 hours  amLODIPine   Tablet 2.5 milliGRAM(s) Oral daily  atorvastatin 20 milliGRAM(s) Oral at bedtime  benztropine 2 milliGRAM(s) Oral two times a day  chlorhexidine 4% Liquid 1 Application(s) Topical every 12 hours  heparin  Injectable 5000 Unit(s) SubCutaneous every 8 hours  insulin glargine Injectable (LANTUS) 25 Unit(s) SubCutaneous at bedtime  insulin lispro (HumaLOG) corrective regimen sliding scale   SubCutaneous every 6 hours  insulin lispro Injectable (HumaLOG) 10 Unit(s) SubCutaneous <User Schedule>  lactobacillus acidophilus 1 Tablet(s) Oral every 8 hours  levETIRAcetam  Solution 1000 milliGRAM(s) Oral two times a day  LORazepam     Tablet 1 milliGRAM(s) Oral every 6 hours  metoprolol tartrate 100 milliGRAM(s) Oral two times a day  nystatin Powder 1 Application(s) Topical every 8 hours  pantoprazole   Suspension 40 milliGRAM(s) Oral daily  QUEtiapine 50 milliGRAM(s) Oral every 12 hours  senna 2 Tablet(s) Oral at bedtime  sodium chloride 0.9%. 1000 milliLiter(s) (100 mL/Hr) IV Continuous <Continuous>  sodium chloride 0.9%. 1000 milliLiter(s) (100 mL/Hr) IV Continuous <Continuous>      MEDICATIONS  (PRN):  haloperidol    Injectable 5 milliGRAM(s) IntraMuscular once PRN Agitation  LORazepam   Injectable 1 milliGRAM(s) IV Push every 8 hours PRN for breathrough agitation/anxiety/combative behavior       Medications up to date at time of exam.      PHYSICAL EXAMINATION:  Patient has no new complaints.  GENERAL: The patient is a well-developed, well-nourished, in no apparent distress.     Vital Signs Last 24 Hrs  T(C): 36.8 (30 Sep 2018 14:34), Max: 36.8 (30 Sep 2018 14:34)  T(F): 98.2 (30 Sep 2018 14:34), Max: 98.2 (30 Sep 2018 14:34)  HR: 79 (30 Sep 2018 17:13) (69 - 83)  BP: 135/80 (30 Sep 2018 17:13) (104/53 - 135/80)  BP(mean): --  RR: 18 (30 Sep 2018 17:13) (16 - 18)  SpO2: 98% (30 Sep 2018 17:13) (98% - 100%)   (if applicable)    Chest Tube (if applicable)    HEENT: Head is normocephalic and atraumatic. Extraocular muscles are intact. Mucous membranes are moist.     NECK: Supple, no palpable adenopathy. + Trach, clean site    LUNGS: Clear to auscultation, no wheezing, rales, or rhonchi.    HEART: Regular rate and rhythm without murmur.    ABDOMEN: Soft, nontender, and nondistended.  No hepatosplenomegaly is noted. + PEG    : No painful voiding, no pelvic pain    EXTREMITIES: Without any cyanosis, clubbing, rash, lesions or edema.    NEUROLOGIC: eyes open    SKIN: Warm, dry, good turgor.      LABS:                        9.0    6.9   )-----------( 281      ( 30 Sep 2018 07:02 )             28.9     09-30    139  |  108  |  17  ----------------------------<  175<H>  3.6   |  18<L>  |  1.80<H>    Ca    9.4      30 Sep 2018 07:02    TPro  6.8  /  Alb  2.0<L>  /  TBili  0.2  /  DBili  x   /  AST  21  /  ALT  20  /  AlkPhos  70  09-29                        MICROBIOLOGY: (if applicable)    RADIOLOGY & ADDITIONAL STUDIES:  EKG:   CXR:  ECHO:    IMPRESSION: 63y Female PAST MEDICAL & SURGICAL HISTORY:  No pertinent past medical history  Hypertension  Schizophrenia  Diabetic coma  Diabetes mellitus  No significant past surgical history  S/P percutaneous endoscopic gastrostomy (PEG) tube placement  H/O tracheostomy   p/w       Impression; 62 Y/O Female from [facility] with multiple chronic conditions. Has acute on chronic respiratory failure and on oxygen supplementation FIO2 35% via tracheostomy. LAYLA improving    CXR 9/27, elevate R hemidiaphragm (not new, compared to old CXR 7/2018)    Suggestion:   Trach w/ FiO2 35%  Continue DuoNeb Q 6 hours.  DVT/GI prophylactic .  Aspiration precautions especially during Peg feeding.   Trach care.

## 2018-09-30 NOTE — PROGRESS NOTE ADULT - SUBJECTIVE AND OBJECTIVE BOX
Patient is seen and examined at the bed side, is afebrile.  The creatinine is  trending down towards to her baseline.      REVIEW OF SYSTEMS: Unable to obtain due to mental status      ALLERGIES : NKDA      Vital Signs Last 24 Hrs  T(C): 36.8 (30 Sep 2018 14:34), Max: 36.8 (30 Sep 2018 14:34)  T(F): 98.2 (30 Sep 2018 14:34), Max: 98.2 (30 Sep 2018 14:34)  HR: 79 (30 Sep 2018 17:13) (69 - 80)  BP: 135/80 (30 Sep 2018 17:13) (104/53 - 135/80)  BP(mean): --  RR: 18 (30 Sep 2018 17:13) (16 - 18)  SpO2: 98% (30 Sep 2018 17:13) (98% - 100%)      PHYSICAL EXAM:  GENERAL: Not in distress  HEENT: Trach in placed  CHEST/LUNG: Air entry B/L  HEART: s1 and s2 present  ABDOMEN: Nontender, and Nondistended  EXTREMITIES:  No pedal  edema  CNS: Awake but not Alert        LABS:                         9.0    6.9   )-----------( 281      ( 30 Sep 2018 07:02 )             28.9                          9.9    7.8   )-----------( 250      ( 25 Sep 2018 06:15 )             31.1                                  10.5   20.1  )-----------( 217      ( 19 Sep 2018 07:47 )             33.1           Amikacin Level, Trough (09.24.18 @ 17:06)    Amikacin Level, Trough: 17.6: Performed by: [facility], [id number] [place] [address] 55966 ug/mL        139  |  108  |  17  ----------------------------<  175<H>  3.6   |  18<L>  |  1.80<H>    Ca    9.4      30 Sep 2018 07:02    TPro  6.8  /  Alb  2.0<L>  /  TBili  0.2  /  DBili  x   /  AST  21  /  ALT  20  /  AlkPhos  70      134<L>  |  102  |  23<H>  ----------------------------<  182<H>  4.4   |  23  |  2.29<H>    Ca    9.3      28 Sep 2018 11:12    TPro  6.9  /  Alb  2.0<L>  /  TBili  0.2  /  DBili  x   /  AST  23  /  ALT  22  /  AlkPhos  85      130<L>  |  97  |  25<H>  ----------------------------<  269<H>  3.9   |  24  |  2.80<H>    Ca    9.3      27 Sep 2018 08:21      TPro  6.2  /  Alb  2.2<L>  /  TBili  0.9  /  DBili  0.3<H>  /  AST  24  /  ALT  20  /  AlkPhos  86  09         Urinalysis Basic - ( 14 Sep 2018 03:56 )    Color: Yellow / Appearance: Clear / S.020 / pH: x  Gluc: x / Ketone: Negative  / Bili: Negative / Urobili: Negative   Blood: x / Protein: 100 / Nitrite: Negative   Leuk Esterase: Negative / RBC: 0-2 /HPF / WBC 0-2 /HPF   Sq Epi: x / Non Sq Epi: Occasional /HPF / Bacteria: Trace /HPF      CAPILLARY BLOOD GLUCOSE      POCT Blood Glucose.: 144 mg/dL (14 Sep 2018 11:07)  POCT Blood Glucose.: 202 mg/dL (14 Sep 2018 08:09)  POCT Blood Glucose.: 277 mg/dL (14 Sep 2018 06:58)  POCT Blood Glucose.: 68 mg/dL (14 Sep 2018 06:03)  POCT Blood Glucose.: 316 mg/dL (14 Sep 2018 02:11)  POCT Blood Glucose.: 405 mg/dL (14 Sep 2018 00:28)  POCT Blood Glucose.: 386 mg/dL (13 Sep 2018 22:26)  POCT Blood Glucose.: 403 mg/dL (13 Sep 2018 19:09)      MEDICATIONS  (STANDING):  ALBUTerol/ipratropium for Nebulization 3 milliLiter(s) Nebulizer every 6 hours  amLODIPine   Tablet 2.5 milliGRAM(s) Oral daily  atorvastatin 20 milliGRAM(s) Oral at bedtime  benztropine 2 milliGRAM(s) Oral two times a day  chlorhexidine 4% Liquid 1 Application(s) Topical every 12 hours  heparin  Injectable 5000 Unit(s) SubCutaneous every 8 hours  insulin glargine Injectable (LANTUS) 25 Unit(s) SubCutaneous at bedtime  insulin lispro (HumaLOG) corrective regimen sliding scale   SubCutaneous every 6 hours  insulin lispro Injectable (HumaLOG) 10 Unit(s) SubCutaneous <User Schedule>  lactobacillus acidophilus 1 Tablet(s) Oral every 8 hours  levETIRAcetam  Solution 1000 milliGRAM(s) Oral two times a day  LORazepam     Tablet 1 milliGRAM(s) Oral every 6 hours  metoprolol tartrate 100 milliGRAM(s) Oral two times a day  nystatin Powder 1 Application(s) Topical every 8 hours  pantoprazole   Suspension 40 milliGRAM(s) Oral daily  QUEtiapine 50 milliGRAM(s) Oral every 12 hours  senna 2 Tablet(s) Oral at bedtime  sodium chloride 0.9%. 1000 milliLiter(s) (100 mL/Hr) IV Continuous <Continuous>    MEDICATIONS  (PRN):  haloperidol    Injectable 5 milliGRAM(s) IntraMuscular once PRN Agitation  LORazepam   Injectable 1 milliGRAM(s) IV Push every 8 hours PRN for breathrough agitation/anxiety/combative behavior          RADIOLOGY & ADDITIONAL TESTS:    18: Xray Chest 1 View- PORTABLE-Routine (18 @ 08:22) Tracheostomy cannula reidentified in position. No change heart mediastinum. No change in the right basilar opacity with loss of definition of the ipsilateral hemidiaphragm and costophrenic angle likely   representing a combination of pleural fluid and underlying  consolidation/atelectasis.      18: CT Chest No Cont (18 @ 03:41) Impression: Consolidation involving the right lower lobe is of uncertain etiology. It is unclear whether this represents compressive atelectasis secondary to right pleural effusion/elevation of the right hemidiaphragm  and/or represents infection.    18: Xray Chest 1 View-PORTABLE IMMEDIATE (18 @ 13:37) Bilateral pleural effusions and associated atelectasis.        MICROBIOLOGY DATA:    Culture - Blood (18 @ 17:33)    Specimen Source: .Blood Blood-Peripheral    Culture Results:   No growth to date.      Culture - Blood (18 @ 13:21)    Specimen Source: .Blood Blood-Peripheral    Culture Results:   No growth to date.        Culture - Sputum . (09.15.18 @ 09:28)    Gram Stain:   Rare polymorphonuclear leukocytes per low power field  Few Squamous epithelial cells per low power field  Moderate Gram Negative Rods per oil power field  Few Gram Positive Rods per oil power field  Few Yeast like cells per oil power field  Rare Gram Negative Coccobacilli per oil power field    -  Amikacin: S <=8    -  Amikacin: S <=8    -  Ampicillin/Sulbactam: R >16/8    -  Aztreonam: S 8    -  Cefepime: I 16    -  Cefepime: I 16    -  Ceftazidime: R >16    -  Ceftazidime: I 16    -  Ciprofloxacin: R >2    -  Ciprofloxacin: R >2    -  Gentamicin: R >8    -  Gentamicin: R >8    -  Imipenem: R >8    -  Imipenem: R    -  Levofloxacin: R >4    -  Levofloxacin: R >4    -  Meropenem: R >8    -  Meropenem: I 8    -  Piperacillin/Tazobactam: R >64    -  Piperacillin/Tazobactam: R    -  Tobramycin: R >8    -  Tobramycin: R >8    -  Trimethoprim/Sulfamethoxazole: R >2/38    Specimen Source: .Sputum Sputum    Culture Results:   Numerous Pseudomonas aeruginosa (Carbapenem Resistant)  Moderate Acinetobacter baumannii/haemolyticus (Carbapenem Resistant)  Complex  Normal Respiratory Moraima present    Organism Identification: Pseudomonas aeruginosa (Carbapenem Resistant)  Acinetobacter baumannii/haemolyticus (Carbapenem Resistant)    Organism: Acinetobacter baumannii/haemolyticus (Carbapenem Resistant)    Organism: Pseudomonas aeruginosa (Carbapenem Resistant)    Organism: Acinetobacter baumannii/haemolyticus (Carbapenem Resistant)    Method Type: ESTIVEN    Method Type: ESTIVEN    Method Type: KB        MRSA/MSSA PCR (18 @ 11:26)    MRSA PCR Result.: NotDetec: MRSA/MSSA PCR assay is a qualitative in vitro diagnostic test for the  direct detection and differentiation of methicillin-resistant  Staphylococcus aureus (MRSA) from Staphylococcus aureus (SA). The assay  detects DNA from nasal swabs in patients atrisk for nasal colonization.  It is not intended to diagnose MRSA or SA infections nor guide or monitor  treatment for MRSA/SA infections. A negative result does not preclude  nasal colonization. The assay is FDA-approved and its performance has  been established by [employer], [place] and the [place], [place].    Staph Aureus PCR Result: [facility]      Culture - Urine (18 @ 09:47)    Specimen Source: .Urine Catheterized    Culture Results:   10,000 - 49,000 CFU/mL Yeast

## 2018-09-30 NOTE — PROGRESS NOTE ADULT - SUBJECTIVE AND OBJECTIVE BOX
Interval Events:      Allergies    angiotensin converting enzyme inhibitors (Unknown)    Intolerances      Endocrine/Metabolic Medications:  atorvastatin 20 milliGRAM(s) Oral at bedtime  insulin glargine Injectable (LANTUS) 25 Unit(s) SubCutaneous at bedtime  insulin lispro (HumaLOG) corrective regimen sliding scale   SubCutaneous every 6 hours  insulin lispro Injectable (HumaLOG) 10 Unit(s) SubCutaneous <User Schedule>      Vital Signs Last 24 Hrs  T(C): 36.2 (30 Sep 2018 05:23), Max: 36.7 (29 Sep 2018 20:32)  T(F): 97.2 (30 Sep 2018 05:23), Max: 98.1 (29 Sep 2018 20:32)  HR: 70 (30 Sep 2018 08:48) (70 - 103)  BP: 119/55 (30 Sep 2018 05:23) (94/73 - 120/61)  BP(mean): --  RR: 18 (30 Sep 2018 05:23) (17 - 18)  SpO2: 99% (30 Sep 2018 08:48) (97% - 100%)      PHYSICAL EXAM  All physical exam findings normal, except those marked:  General:	Alert, active, cooperative, NAD, well hydrated  .		[] Abnormal:  Neck		Normal: supple, no cervical adenopathy, no palpable thyroid  .		[] Abnormal:  Cardiovascular	Normal: regular rate, normal S1, S2, no murmurs  .		[] Abnormal:  Respiratory	Normal: no chest wall deformity, normal respiratory pattern, CTA B/L  .		[] Abnormal:  Abdominal	Normal: soft, ND, NT, bowel sounds present, no masses, no organomegaly  .		[] Abnormal:  		Normal normal genitalia, testes descended, circumcised/uncircumcised  .		Bob stage:			Breast bob:  .		Menstrual history:  .		[] Abnormal:  Extremities	Normal: FROM x4  .		[] Abnormal:  Skin		Normal: intact and not indurated, no rash, no acanthosis nigricans  .		[] Abnormal:  Neurologic	Normal: grossly intact  .		[] Abnormal:    LABS                        9.0    6.9   )-----------( 281      ( 30 Sep 2018 07:02 )             28.9                               139    |  108    |  17                  Calcium: 9.4   / iCa: x      (09-30 @ 07:02)    ----------------------------<  175       Magnesium: x                                3.6     |  18     |  1.80             Phosphorous: x          CAPILLARY BLOOD GLUCOSE      POCT Blood Glucose.: 175 mg/dL (30 Sep 2018 05:37)  POCT Blood Glucose.: 131 mg/dL (29 Sep 2018 23:39)  POCT Blood Glucose.: 133 mg/dL (29 Sep 2018 18:06)  POCT Blood Glucose.: 174 mg/dL (29 Sep 2018 11:54)        Assesment/plan Interval Events:  pt in nad  on 65 cc gt feeds    Allergies    angiotensin converting enzyme inhibitors (Unknown)    Intolerances      Endocrine/Metabolic Medications:  atorvastatin 20 milliGRAM(s) Oral at bedtime  insulin glargine Injectable (LANTUS) 25 Unit(s) SubCutaneous at bedtime  insulin lispro (HumaLOG) corrective regimen sliding scale   SubCutaneous every 6 hours  insulin lispro Injectable (HumaLOG) 10 Unit(s) SubCutaneous <User Schedule>      Vital Signs Last 24 Hrs  T(C): 36.2 (30 Sep 2018 05:23), Max: 36.7 (29 Sep 2018 20:32)  T(F): 97.2 (30 Sep 2018 05:23), Max: 98.1 (29 Sep 2018 20:32)  HR: 70 (30 Sep 2018 08:48) (70 - 103)  BP: 119/55 (30 Sep 2018 05:23) (94/73 - 120/61)  BP(mean): --  RR: 18 (30 Sep 2018 05:23) (17 - 18)  SpO2: 99% (30 Sep 2018 08:48) (97% - 100%)      PHYSICAL EXAM  All physical exam findings normal, except those marked:  General:	Alert, active, cooperative, NAD, well hydrated  .		[] Abnormal:  Neck		Normal: supple, no cervical adenopathy, no palpable thyroid  .		[] Abnormal:  Cardiovascular	Normal: regular rate, normal S1, S2, no murmurs  .		[] Abnormal:  Respiratory	Normal: no chest wall deformity, normal respiratory pattern, CTA B/L  .		[] Abnormal:  Abdominal	Normal: soft, ND, NT, bowel sounds present, no masses, no organomegaly  .		[] Abnormal:  		Normal normal genitalia, testes descended, circumcised/uncircumcised  .		Bob stage:			Breast bob:  .		Menstrual history:  .		[] Abnormal:  Extremities	Normal: FROM x4  .		[] Abnormal:  Skin		Normal: intact and not indurated, no rash, no acanthosis nigricans  .		[] Abnormal:  Neurologic	Normal: grossly intact  .		[] Abnormal:    LABS                        9.0    6.9   )-----------( 281      ( 30 Sep 2018 07:02 )             28.9                               139    |  108    |  17                  Calcium: 9.4   / iCa: x      (09-30 @ 07:02)    ----------------------------<  175       Magnesium: x                                3.6     |  18     |  1.80             Phosphorous: x          CAPILLARY BLOOD GLUCOSE      POCT Blood Glucose.: 175 mg/dL (30 Sep 2018 05:37)  POCT Blood Glucose.: 131 mg/dL (29 Sep 2018 23:39)  POCT Blood Glucose.: 133 mg/dL (29 Sep 2018 18:06)  POCT Blood Glucose.: 174 mg/dL (29 Sep 2018 11:54)        Assesment/plan   Dm- good control  cont lantus 25 and humalog 10 tid  fsg q6

## 2018-10-01 LAB
ANION GAP SERPL CALC-SCNC: 8 MMOL/L — SIGNIFICANT CHANGE UP (ref 5–17)
BUN SERPL-MCNC: 16 MG/DL — SIGNIFICANT CHANGE UP (ref 7–18)
CALCIUM SERPL-MCNC: 9.5 MG/DL — SIGNIFICANT CHANGE UP (ref 8.4–10.5)
CHLORIDE SERPL-SCNC: 107 MMOL/L — SIGNIFICANT CHANGE UP (ref 96–108)
CO2 SERPL-SCNC: 24 MMOL/L — SIGNIFICANT CHANGE UP (ref 22–31)
CREAT SERPL-MCNC: 1.5 MG/DL — HIGH (ref 0.5–1.3)
GLUCOSE SERPL-MCNC: 49 MG/DL — LOW (ref 70–99)
HCT VFR BLD CALC: 29.4 % — LOW (ref 34.5–45)
HGB BLD-MCNC: 9 G/DL — LOW (ref 11.5–15.5)
MCHC RBC-ENTMCNC: 24.5 PG — LOW (ref 27–34)
MCHC RBC-ENTMCNC: 30.5 GM/DL — LOW (ref 32–36)
MCV RBC AUTO: 80.4 FL — SIGNIFICANT CHANGE UP (ref 80–100)
PLATELET # BLD AUTO: 358 K/UL — SIGNIFICANT CHANGE UP (ref 150–400)
POTASSIUM SERPL-MCNC: 3.7 MMOL/L — SIGNIFICANT CHANGE UP (ref 3.5–5.3)
POTASSIUM SERPL-SCNC: 3.7 MMOL/L — SIGNIFICANT CHANGE UP (ref 3.5–5.3)
RBC # BLD: 3.66 M/UL — LOW (ref 3.8–5.2)
RBC # FLD: 13 % — SIGNIFICANT CHANGE UP (ref 10.3–14.5)
SODIUM SERPL-SCNC: 139 MMOL/L — SIGNIFICANT CHANGE UP (ref 135–145)
WBC # BLD: 11.5 K/UL — HIGH (ref 3.8–10.5)
WBC # FLD AUTO: 11.5 K/UL — HIGH (ref 3.8–10.5)

## 2018-10-01 RX ORDER — SODIUM CHLORIDE 9 MG/ML
1000 INJECTION INTRAMUSCULAR; INTRAVENOUS; SUBCUTANEOUS
Qty: 0 | Refills: 0 | Status: DISCONTINUED | OUTPATIENT
Start: 2018-10-01 | End: 2018-10-02

## 2018-10-01 RX ADMIN — PANTOPRAZOLE SODIUM 40 MILLIGRAM(S): 20 TABLET, DELAYED RELEASE ORAL at 12:03

## 2018-10-01 RX ADMIN — Medication 2 MILLIGRAM(S): at 18:12

## 2018-10-01 RX ADMIN — Medication 100 MILLIGRAM(S): at 05:17

## 2018-10-01 RX ADMIN — NYSTATIN CREAM 1 APPLICATION(S): 100000 CREAM TOPICAL at 05:18

## 2018-10-01 RX ADMIN — Medication 1 MILLIGRAM(S): at 05:16

## 2018-10-01 RX ADMIN — LEVETIRACETAM 1000 MILLIGRAM(S): 250 TABLET, FILM COATED ORAL at 05:16

## 2018-10-01 RX ADMIN — Medication 3 MILLILITER(S): at 02:13

## 2018-10-01 RX ADMIN — QUETIAPINE FUMARATE 50 MILLIGRAM(S): 200 TABLET, FILM COATED ORAL at 05:16

## 2018-10-01 RX ADMIN — AMLODIPINE BESYLATE 2.5 MILLIGRAM(S): 2.5 TABLET ORAL at 05:16

## 2018-10-01 RX ADMIN — LEVETIRACETAM 1000 MILLIGRAM(S): 250 TABLET, FILM COATED ORAL at 18:13

## 2018-10-01 RX ADMIN — Medication 100 MILLIGRAM(S): at 18:14

## 2018-10-01 RX ADMIN — Medication 3: at 18:10

## 2018-10-01 RX ADMIN — NYSTATIN CREAM 1 APPLICATION(S): 100000 CREAM TOPICAL at 22:08

## 2018-10-01 RX ADMIN — CHLORHEXIDINE GLUCONATE 1 APPLICATION(S): 213 SOLUTION TOPICAL at 18:10

## 2018-10-01 RX ADMIN — HEPARIN SODIUM 5000 UNIT(S): 5000 INJECTION INTRAVENOUS; SUBCUTANEOUS at 05:16

## 2018-10-01 RX ADMIN — HEPARIN SODIUM 5000 UNIT(S): 5000 INJECTION INTRAVENOUS; SUBCUTANEOUS at 14:23

## 2018-10-01 RX ADMIN — Medication 1 TABLET(S): at 14:23

## 2018-10-01 RX ADMIN — Medication 1 MILLIGRAM(S): at 18:09

## 2018-10-01 RX ADMIN — Medication 3 MILLILITER(S): at 20:05

## 2018-10-01 RX ADMIN — Medication 1 MILLIGRAM(S): at 12:00

## 2018-10-01 RX ADMIN — Medication 1 TABLET(S): at 05:16

## 2018-10-01 RX ADMIN — ATORVASTATIN CALCIUM 20 MILLIGRAM(S): 80 TABLET, FILM COATED ORAL at 22:07

## 2018-10-01 RX ADMIN — Medication 10 UNIT(S): at 05:32

## 2018-10-01 RX ADMIN — Medication 1: at 05:33

## 2018-10-01 RX ADMIN — NYSTATIN CREAM 1 APPLICATION(S): 100000 CREAM TOPICAL at 14:25

## 2018-10-01 RX ADMIN — HEPARIN SODIUM 5000 UNIT(S): 5000 INJECTION INTRAVENOUS; SUBCUTANEOUS at 22:07

## 2018-10-01 RX ADMIN — Medication 3 MILLILITER(S): at 08:07

## 2018-10-01 RX ADMIN — Medication 3 MILLILITER(S): at 14:33

## 2018-10-01 RX ADMIN — Medication 2 MILLIGRAM(S): at 05:16

## 2018-10-01 RX ADMIN — INSULIN GLARGINE 25 UNIT(S): 100 INJECTION, SOLUTION SUBCUTANEOUS at 22:13

## 2018-10-01 RX ADMIN — QUETIAPINE FUMARATE 50 MILLIGRAM(S): 200 TABLET, FILM COATED ORAL at 18:09

## 2018-10-01 RX ADMIN — SENNA PLUS 2 TABLET(S): 8.6 TABLET ORAL at 22:07

## 2018-10-01 RX ADMIN — Medication 1 TABLET(S): at 22:07

## 2018-10-01 RX ADMIN — Medication 10 UNIT(S): at 18:11

## 2018-10-01 RX ADMIN — Medication 10 UNIT(S): at 12:02

## 2018-10-01 NOTE — PROGRESS NOTE ADULT - PROBLEM SELECTOR PLAN 1
improving yet persistent abnormal renal function   c/w IVF   monitor renal function and u/o   all medical consultants recs/management appreciated  d/c planning pending improved renal function

## 2018-10-01 NOTE — PROGRESS NOTE ADULT - SUBJECTIVE AND OBJECTIVE BOX
Patient seen and examined at bedside  No acute events noted overnight  Case discussed with medical team    HPI:  62 yo F from [facility], non-verbal, bed bound, S/P Trach/ PEG tube, PMH of HTN, asthma, convulsion disorder, bipolar, DVT legs, DM 2, sent from NH for coffee ground vomiting X3 this morning and cough.  Pt unable to provide history.  History given by sister (Sharon [phone])who states pt was vomiting coffee ground emesis this morning. Pt had no reported vomiting since in ED.  Pt also developed cough with clear sputum.  Sister states pt is combative at baseline, and her current behavior is baseline.  No reported fever, discomfort, diarrhea, or SOB.  Pt was recently admitted to [facility] in 07/2018 for same reason and treated for aspiration PNA and UTI during last admission.  Pt was supposed to be given IV vanco and zosyn for 7 day by PCP for PNA , which supposed to start from 09/13-09/19. Pt is DNR, MOSLT form in charts. (13 Sep 2018 15:49)      PAST MEDICAL & SURGICAL HISTORY:  No pertinent past medical history  Hypertension  Schizophrenia  Diabetic coma  Diabetes mellitus  No significant past surgical history  S/P percutaneous endoscopic gastrostomy (PEG) tube placement  H/O tracheostomy      angiotensin converting enzyme inhibitors (Unknown)       MEDICATIONS  (STANDING):  ALBUTerol/ipratropium for Nebulization 3 milliLiter(s) Nebulizer every 6 hours  amLODIPine   Tablet 2.5 milliGRAM(s) Oral daily  atorvastatin 20 milliGRAM(s) Oral at bedtime  benztropine 2 milliGRAM(s) Oral two times a day  chlorhexidine 4% Liquid 1 Application(s) Topical every 12 hours  heparin  Injectable 5000 Unit(s) SubCutaneous every 8 hours  insulin glargine Injectable (LANTUS) 25 Unit(s) SubCutaneous at bedtime  insulin lispro (HumaLOG) corrective regimen sliding scale   SubCutaneous every 6 hours  insulin lispro Injectable (HumaLOG) 10 Unit(s) SubCutaneous <User Schedule>  lactobacillus acidophilus 1 Tablet(s) Oral every 8 hours  levETIRAcetam  Solution 1000 milliGRAM(s) Oral two times a day  LORazepam     Tablet 1 milliGRAM(s) Oral every 6 hours  metoprolol tartrate 100 milliGRAM(s) Oral two times a day  nystatin Powder 1 Application(s) Topical every 8 hours  pantoprazole   Suspension 40 milliGRAM(s) Oral daily  QUEtiapine 50 milliGRAM(s) Oral every 12 hours  senna 2 Tablet(s) Oral at bedtime  sodium chloride 0.9%. 1000 milliLiter(s) (100 mL/Hr) IV Continuous <Continuous>  sodium chloride 0.9%. 1000 milliLiter(s) (100 mL/Hr) IV Continuous <Continuous>  sodium chloride 0.9%. 1000 milliLiter(s) (100 mL/Hr) IV Continuous <Continuous>    MEDICATIONS  (PRN):  haloperidol    Injectable 5 milliGRAM(s) IntraMuscular once PRN Agitation  LORazepam   Injectable 1 milliGRAM(s) IV Push every 8 hours PRN for breathrough agitation/anxiety/combative behavior      REVIEW OF SYSTEMS: limited      T(C): 36.4 (10-01-18 @ 05:15), Max: 36.8 (09-30-18 @ 14:34)  HR: 77 (10-01-18 @ 05:15) (69 - 79)  BP: 135/80 (09-30-18 @ 17:13) (120/45 - 135/80)  RR: 20 (10-01-18 @ 05:15) (16 - 20)  SpO2: 100% (10-01-18 @ 05:15) (98% - 100%)    PHYSICAL EXAMINATION:   Constitutional: NAD  HEENT: AT  Neck:  Supple  Respiratory: trach collar intact. Adequate airflow b/l. Not using accessory muscles of respiration.  Cardiovascular:  S1 & S2 intact, no R/G, 2+ radial pulses b/l  Gastrointestinal: Soft, ND, normoactive b.s., no organomegaly/RT/rigidity  Extremities: WWP  Neurological:  stable. awake.     Labs and imaging reviewed    LABS:                        9.0    11.5  )-----------( 358      ( 01 Oct 2018 07:56 )             29.4     10-01    139  |  107  |  16  ----------------------------<  49<L>  3.7   |  24  |  1.50<H>    Ca    9.5      01 Oct 2018 07:56              CAPILLARY BLOOD GLUCOSE      POCT Blood Glucose.: 146 mg/dL (01 Oct 2018 11:35)  POCT Blood Glucose.: 169 mg/dL (01 Oct 2018 05:31)  POCT Blood Glucose.: 214 mg/dL (30 Sep 2018 23:33)  POCT Blood Glucose.: 216 mg/dL (30 Sep 2018 22:06)  POCT Blood Glucose.: 126 mg/dL (30 Sep 2018 17:01)  POCT Blood Glucose.: 172 mg/dL (30 Sep 2018 13:18)              RADIOLOGY & ADDITIONAL STUDIES:

## 2018-10-01 NOTE — PROGRESS NOTE ADULT - PROBLEM SELECTOR PLAN 3
LevETIRAcetam  Solution 1000 milliGRAM(s) Oral two times a day  LORazepam     Tablet 1 milliGRAM(s) Oral every 6 hours  QUEtiapine 50 milliGRAM(s) Oral every 12 hours

## 2018-10-01 NOTE — PROGRESS NOTE ADULT - SUBJECTIVE AND OBJECTIVE BOX
Patient seen and examined.     MEDICATIONS  (STANDING):  ALBUTerol/ipratropium for Nebulization 3 milliLiter(s) Nebulizer every 6 hours  amLODIPine   Tablet 2.5 milliGRAM(s) Oral daily  atorvastatin 20 milliGRAM(s) Oral at bedtime  benztropine 2 milliGRAM(s) Oral two times a day  chlorhexidine 4% Liquid 1 Application(s) Topical every 12 hours  heparin  Injectable 5000 Unit(s) SubCutaneous every 8 hours  insulin glargine Injectable (LANTUS) 25 Unit(s) SubCutaneous at bedtime  insulin lispro (HumaLOG) corrective regimen sliding scale   SubCutaneous every 6 hours  insulin lispro Injectable (HumaLOG) 10 Unit(s) SubCutaneous <User Schedule>  lactobacillus acidophilus 1 Tablet(s) Oral every 8 hours  levETIRAcetam  Solution 1000 milliGRAM(s) Oral two times a day  LORazepam     Tablet 1 milliGRAM(s) Oral every 6 hours  metoprolol tartrate 100 milliGRAM(s) Oral two times a day  nystatin Powder 1 Application(s) Topical every 8 hours  pantoprazole   Suspension 40 milliGRAM(s) Oral daily  QUEtiapine 50 milliGRAM(s) Oral every 12 hours  senna 2 Tablet(s) Oral at bedtime  sodium chloride 0.9%. 1000 milliLiter(s) (100 mL/Hr) IV Continuous <Continuous>  sodium chloride 0.9%. 1000 milliLiter(s) (100 mL/Hr) IV Continuous <Continuous>  sodium chloride 0.9%. 1000 milliLiter(s) (100 mL/Hr) IV Continuous <Continuous>      MEDICATIONS  (PRN):  haloperidol    Injectable 5 milliGRAM(s) IntraMuscular once PRN Agitation  LORazepam   Injectable 1 milliGRAM(s) IV Push every 8 hours PRN for breathrough agitation/anxiety/combative behavior     Medications up to date at time of exam.    PHYSICAL EXAMINATION:  Patient has no new complaints.  GENERAL: The patient is a well-developed, well-nourished, in no apparent distress.     Vital Signs Last 24 Hrs  T(C): 36.4 (01 Oct 2018 05:15), Max: 36.4 (01 Oct 2018 05:15)  T(F): 97.5 (01 Oct 2018 05:15), Max: 97.5 (01 Oct 2018 05:15)  HR: 77 (01 Oct 2018 05:15) (77 - 79)  BP: 135/80 (30 Sep 2018 17:13) (135/80 - 135/80)  BP(mean): --  RR: 20 (01 Oct 2018 05:15) (18 - 20)  SpO2: 100% (01 Oct 2018 05:15) (98% - 100%)   (if applicable)    Chest Tube (if applicable)    HEENT: Head is normocephalic and atraumatic.     NECK: Supple, no palpable adenopathy. +trach    LUNGS: Clear to auscultation, no wheezing, rales, or rhonchi.    HEART: Regular rate and rhythm without murmur.    ABDOMEN: Soft, nontender, and nondistended.  +PEG    EXTREMITIES: Without any cyanosis, clubbing, rash, lesions or edema.    NEUROLOGIC: Awake, alert.    SKIN: Warm, dry, good turgor.    LABS:                        9.0    11.5  )-----------( 358      ( 01 Oct 2018 07:56 )             29.4     10-01    139  |  107  |  16  ----------------------------<  49<L>  3.7   |  24  |  1.50<H>    Ca    9.5      01 Oct 2018 07:56      MICROBIOLOGY: (if applicable)    RADIOLOGY & ADDITIONAL STUDIES:  EKG:   CXR:  ECHO:    IMPRESSION: 63y Female PAST MEDICAL & SURGICAL HISTORY:  No pertinent past medical history  Hypertension  Schizophrenia  Diabetic coma  Diabetes mellitus  No significant past surgical history  S/P percutaneous endoscopic gastrostomy (PEG) tube placement  H/O tracheostomy     Impression; 62 Y/O Female from [facility] with multiple chronic conditions. Has acute on chronic respiratory failure and on oxygen supplementation FIO2 35% via tracheostomy.     LAYLA improving    CXR 9/27, elevate R hemidiaphragm (not new, compared to old CXR 7/2018)    Suggestion:   Trach w/ FiO2 35%  Continue DuoNeb Q 6 hours.  DVT/GI prophylactic .  Aspiration precautions especially during Peg feeding.   Trach care. Patient seen and examined.     MEDICATIONS  (STANDING):  ALBUTerol/ipratropium for Nebulization 3 milliLiter(s) Nebulizer every 6 hours  amLODIPine   Tablet 2.5 milliGRAM(s) Oral daily  atorvastatin 20 milliGRAM(s) Oral at bedtime  benztropine 2 milliGRAM(s) Oral two times a day  chlorhexidine 4% Liquid 1 Application(s) Topical every 12 hours  heparin  Injectable 5000 Unit(s) SubCutaneous every 8 hours  insulin glargine Injectable (LANTUS) 25 Unit(s) SubCutaneous at bedtime  insulin lispro (HumaLOG) corrective regimen sliding scale   SubCutaneous every 6 hours  insulin lispro Injectable (HumaLOG) 10 Unit(s) SubCutaneous <User Schedule>  lactobacillus acidophilus 1 Tablet(s) Oral every 8 hours  levETIRAcetam  Solution 1000 milliGRAM(s) Oral two times a day  LORazepam     Tablet 1 milliGRAM(s) Oral every 6 hours  metoprolol tartrate 100 milliGRAM(s) Oral two times a day  nystatin Powder 1 Application(s) Topical every 8 hours  pantoprazole   Suspension 40 milliGRAM(s) Oral daily  QUEtiapine 50 milliGRAM(s) Oral every 12 hours  senna 2 Tablet(s) Oral at bedtime  sodium chloride 0.9%. 1000 milliLiter(s) (100 mL/Hr) IV Continuous <Continuous>  sodium chloride 0.9%. 1000 milliLiter(s) (100 mL/Hr) IV Continuous <Continuous>  sodium chloride 0.9%. 1000 milliLiter(s) (100 mL/Hr) IV Continuous <Continuous>      MEDICATIONS  (PRN):  haloperidol    Injectable 5 milliGRAM(s) IntraMuscular once PRN Agitation  LORazepam   Injectable 1 milliGRAM(s) IV Push every 8 hours PRN for breathrough agitation/anxiety/combative behavior     Medications up to date at time of exam.    PHYSICAL EXAMINATION:  Patient has no new complaints.  GENERAL: The patient is a well-developed, well-nourished, in no apparent distress.     Vital Signs Last 24 Hrs  T(C): 36.4 (01 Oct 2018 05:15), Max: 36.4 (01 Oct 2018 05:15)  T(F): 97.5 (01 Oct 2018 05:15), Max: 97.5 (01 Oct 2018 05:15)  HR: 77 (01 Oct 2018 05:15) (77 - 79)  BP: 135/80 (30 Sep 2018 17:13) (135/80 - 135/80)  BP(mean): --  RR: 20 (01 Oct 2018 05:15) (18 - 20)  SpO2: 100% (01 Oct 2018 05:15) (98% - 100%)   (if applicable)    Chest Tube (if applicable)    HEENT: Head is normocephalic and atraumatic.     NECK: Supple, no palpable adenopathy. +trach    LUNGS: Clear to auscultation, no wheezing, rales, or rhonchi.    HEART: Regular rate and rhythm without murmur.    ABDOMEN: Soft, nontender, and nondistended.  +PEG    EXTREMITIES: Without any cyanosis, clubbing, rash, lesions or edema.    NEUROLOGIC: Awake, alert.    SKIN: Warm, dry, good turgor.    LABS:                        9.0    11.5  )-----------( 358      ( 01 Oct 2018 07:56 )             29.4     10-01    139  |  107  |  16  ----------------------------<  49<L>  3.7   |  24  |  1.50<H>    Ca    9.5      01 Oct 2018 07:56      MICROBIOLOGY: (if applicable)    RADIOLOGY & ADDITIONAL STUDIES:  EKG:   CXR:  ECHO:    IMPRESSION: 63y Female PAST MEDICAL & SURGICAL HISTORY:  No pertinent past medical history  Hypertension  Schizophrenia  Diabetic coma  Diabetes mellitus  No significant past surgical history  S/P percutaneous endoscopic gastrostomy (PEG) tube placement  H/O tracheostomy     Impression; 62 Y/O Female from [facility] with multiple chronic conditions. Has acute on chronic respiratory failure and on oxygen supplementation FIO2 35% via tracheostomy.     LAYLA improving  CO2 improved  CXR 9/27, elevate R hemidiaphragm (not new, compared to old CXR 7/2018)    Suggestion:   Trach w/ FiO2 35%  Continue DuoNeb Q 6 hours.  DVT/GI prophylactic .  Aspiration precautions especially during Peg feeding.   Trach care. Patient seen and examined.     MEDICATIONS  (STANDING):  ALBUTerol/ipratropium for Nebulization 3 milliLiter(s) Nebulizer every 6 hours  amLODIPine   Tablet 2.5 milliGRAM(s) Oral daily  atorvastatin 20 milliGRAM(s) Oral at bedtime  benztropine 2 milliGRAM(s) Oral two times a day  chlorhexidine 4% Liquid 1 Application(s) Topical every 12 hours  heparin  Injectable 5000 Unit(s) SubCutaneous every 8 hours  insulin glargine Injectable (LANTUS) 25 Unit(s) SubCutaneous at bedtime  insulin lispro (HumaLOG) corrective regimen sliding scale   SubCutaneous every 6 hours  insulin lispro Injectable (HumaLOG) 10 Unit(s) SubCutaneous <User Schedule>  lactobacillus acidophilus 1 Tablet(s) Oral every 8 hours  levETIRAcetam  Solution 1000 milliGRAM(s) Oral two times a day  LORazepam     Tablet 1 milliGRAM(s) Oral every 6 hours  metoprolol tartrate 100 milliGRAM(s) Oral two times a day  nystatin Powder 1 Application(s) Topical every 8 hours  pantoprazole   Suspension 40 milliGRAM(s) Oral daily  QUEtiapine 50 milliGRAM(s) Oral every 12 hours  senna 2 Tablet(s) Oral at bedtime  sodium chloride 0.9%. 1000 milliLiter(s) (100 mL/Hr) IV Continuous <Continuous>  sodium chloride 0.9%. 1000 milliLiter(s) (100 mL/Hr) IV Continuous <Continuous>  sodium chloride 0.9%. 1000 milliLiter(s) (100 mL/Hr) IV Continuous <Continuous>      MEDICATIONS  (PRN):  haloperidol    Injectable 5 milliGRAM(s) IntraMuscular once PRN Agitation  LORazepam   Injectable 1 milliGRAM(s) IV Push every 8 hours PRN for breathrough agitation/anxiety/combative behavior     Medications up to date at time of exam.    PHYSICAL EXAMINATION:  Patient has no new complaints.  GENERAL: The patient is a well-developed, well-nourished, in no apparent distress.     Vital Signs Last 24 Hrs  T(C): 36.4 (01 Oct 2018 05:15), Max: 36.4 (01 Oct 2018 05:15)  T(F): 97.5 (01 Oct 2018 05:15), Max: 97.5 (01 Oct 2018 05:15)  HR: 77 (01 Oct 2018 05:15) (77 - 79)  BP: 135/80 (30 Sep 2018 17:13) (135/80 - 135/80)  BP(mean): --  RR: 20 (01 Oct 2018 05:15) (18 - 20)  SpO2: 100% (01 Oct 2018 05:15) (98% - 100%)   (if applicable)    Chest Tube (if applicable)    HEENT: Head is normocephalic and atraumatic.     NECK: Supple, no palpable adenopathy. +trach    LUNGS: Clear to auscultation, no wheezing, rales, or rhonchi.    HEART: Regular rate and rhythm without murmur.    ABDOMEN: Soft, nontender, and nondistended.  +PEG    EXTREMITIES: Without any cyanosis, clubbing, rash, lesions or edema.    NEUROLOGIC: Awake, alert.    SKIN: Warm, dry, good turgor.    LABS:                        9.0    11.5  )-----------( 358      ( 01 Oct 2018 07:56 )             29.4     10-01    139  |  107  |  16  ----------------------------<  49<L>  3.7   |  24  |  1.50<H>    Ca    9.5      01 Oct 2018 07:56      MICROBIOLOGY: (if applicable)    RADIOLOGY & ADDITIONAL STUDIES:  EKG:   CXR:  ECHO:    IMPRESSION: 63y Female PAST MEDICAL & SURGICAL HISTORY:  No pertinent past medical history  Hypertension  Schizophrenia  Diabetic coma  Diabetes mellitus  No significant past surgical history  S/P percutaneous endoscopic gastrostomy (PEG) tube placement  H/O tracheostomy     Impression; 62 Y/O Female from [facility] with multiple chronic conditions. Has acute on chronic respiratory failure and on oxygen supplementation FIO2 35% via tracheostomy.     LAYLA improving  CO2 improved  CXR 9/27, elevate R hemidiaphragm (not new, compared to old CXR 7/2018)    Suggestion:   Trach w/ FiO2 35%  Continue DuoNeb Q 6 hours.  DVT/GI prophylactic .  Aspiration precautions especially during Peg feeding.   Trach care.     Agree with above assessment and plan as transcribed.

## 2018-10-01 NOTE — PROGRESS NOTE ADULT - SUBJECTIVE AND OBJECTIVE BOX
Patient is seen and examined at the bed side, is afebrile.  The creatinine is  trending down towards to her baseline.      REVIEW OF SYSTEMS: Unable to obtain due to mental status      ALLERGIES : NKDA      Vital Signs Last 24 Hrs  T(C): 37 (01 Oct 2018 14:53), Max: 37 (01 Oct 2018 14:53)  T(F): 98.6 (01 Oct 2018 14:53), Max: 98.6 (01 Oct 2018 14:53)  HR: 58 (01 Oct 2018 14:53) (58 - 77)  BP: 118/53 (01 Oct 2018 14:53) (118/53 - 118/53)  BP(mean): --  RR: 22 (01 Oct 2018 14:53) (20 - 22)  SpO2: 90% (01 Oct 2018 14:53) (90% - 100%)      PHYSICAL EXAM:  GENERAL: Not in distress  HEENT: Trach in placed  CHEST/LUNG: Air entry B/L  HEART: s1 and s2 present  ABDOMEN: Nontender, and Nondistended  EXTREMITIES:  No pedal  edema  CNS: Awake but not Alert        LABS:                         9.0    11.5  )-----------( 358      ( 01 Oct 2018 07:56 )             29.4                           9.0    6.9   )-----------( 281      ( 30 Sep 2018 07:02 )             28.9                                 10.5   20.1  )-----------( 217      ( 19 Sep 2018 07:47 )             33.1           Amikacin Level, Trough (18 @ 17:06)    Amikacin Level, Trough: 17.6: Performed by: [facility], [address] [place] [address] 31681 ug/mL      10-01    139  |  107  |  16  ----------------------------<  49<L>  3.7   |  24  |  1.50<H>    Ca    9.5      01 Oct 2018 07:56          139  |  108  |  17  ----------------------------<  175<H>  3.6   |  18<L>  |  1.80<H>    Ca    9.4      30 Sep 2018 07:02    TPro  6.8  /  Alb  2.0<L>  /  TBili  0.2  /  DBili  x   /  AST  21  /  ALT  20  /  AlkPhos  70      130<L>  |  97  |  25<H>  ----------------------------<  269<H>  3.9   |  24  |  2.80<H>    Ca    9.3      27 Sep 2018 08:21      TPro  6.2  /  Alb  2.2<L>  /  TBili  0.9  /  DBili  0.3<H>  /  AST  24  /  ALT  20  /  AlkPhos  86           Urinalysis Basic - ( 14 Sep 2018 03:56 )    Color: Yellow / Appearance: Clear / S.020 / pH: x  Gluc: x / Ketone: Negative  / Bili: Negative / Urobili: Negative   Blood: x / Protein: 100 / Nitrite: Negative   Leuk Esterase: Negative / RBC: 0-2 /HPF / WBC 0-2 /HPF   Sq Epi: x / Non Sq Epi: Occasional /HPF / Bacteria: Trace /HPF      CAPILLARY BLOOD GLUCOSE      POCT Blood Glucose.: 144 mg/dL (14 Sep 2018 11:07)  POCT Blood Glucose.: 202 mg/dL (14 Sep 2018 08:09)  POCT Blood Glucose.: 277 mg/dL (14 Sep 2018 06:58)  POCT Blood Glucose.: 68 mg/dL (14 Sep 2018 06:03)  POCT Blood Glucose.: 316 mg/dL (14 Sep 2018 02:11)  POCT Blood Glucose.: 405 mg/dL (14 Sep 2018 00:28)  POCT Blood Glucose.: 386 mg/dL (13 Sep 2018 22:26)  POCT Blood Glucose.: 403 mg/dL (13 Sep 2018 19:09)        MEDICATIONS  (STANDING):  ALBUTerol/ipratropium for Nebulization 3 milliLiter(s) Nebulizer every 6 hours  amLODIPine   Tablet 2.5 milliGRAM(s) Oral daily  atorvastatin 20 milliGRAM(s) Oral at bedtime  benztropine 2 milliGRAM(s) Oral two times a day  chlorhexidine 4% Liquid 1 Application(s) Topical every 12 hours  heparin  Injectable 5000 Unit(s) SubCutaneous every 8 hours  insulin glargine Injectable (LANTUS) 25 Unit(s) SubCutaneous at bedtime  insulin lispro (HumaLOG) corrective regimen sliding scale   SubCutaneous every 6 hours  insulin lispro Injectable (HumaLOG) 10 Unit(s) SubCutaneous <User Schedule>  lactobacillus acidophilus 1 Tablet(s) Oral every 8 hours  levETIRAcetam  Solution 1000 milliGRAM(s) Oral two times a day  LORazepam     Tablet 1 milliGRAM(s) Oral every 6 hours  metoprolol tartrate 100 milliGRAM(s) Oral two times a day  nystatin Powder 1 Application(s) Topical every 8 hours  pantoprazole   Suspension 40 milliGRAM(s) Oral daily  QUEtiapine 50 milliGRAM(s) Oral every 12 hours  senna 2 Tablet(s) Oral at bedtime  sodium chloride 0.9%. 1000 milliLiter(s) (100 mL/Hr) IV Continuous <Continuous>  sodium chloride 0.9%. 1000 milliLiter(s) (100 mL/Hr) IV Continuous <Continuous>  sodium chloride 0.9%. 1000 milliLiter(s) (100 mL/Hr) IV Continuous <Continuous>    MEDICATIONS  (PRN):  haloperidol    Injectable 5 milliGRAM(s) IntraMuscular once PRN Agitation  LORazepam   Injectable 1 milliGRAM(s) IV Push every 8 hours PRN for breathrough agitation/anxiety/combative behavior            RADIOLOGY & ADDITIONAL TESTS:    18: Xray Chest 1 View- PORTABLE-Routine (18 @ 08:22) Tracheostomy cannula reidentified in position. No change heart mediastinum. No change in the right basilar opacity with loss of definition of the ipsilateral hemidiaphragm and costophrenic angle likely   representing a combination of pleural fluid and underlying  consolidation/atelectasis.      18: CT Chest No Cont (18 @ 03:41) Impression: Consolidation involving the right lower lobe is of uncertain etiology. It is unclear whether this represents compressive atelectasis secondary to right pleural effusion/elevation of the right hemidiaphragm  and/or represents infection.    18: Xray Chest 1 View-PORTABLE IMMEDIATE (18 @ 13:37) Bilateral pleural effusions and associated atelectasis.        MICROBIOLOGY DATA:    Culture - Blood (18 @ 17:33)    Specimen Source: .Blood Blood-Peripheral    Culture Results:   No growth to date.      Culture - Blood (18 @ 13:21)    Specimen Source: .Blood Blood-Peripheral    Culture Results:   No growth to date.        Culture - Sputum . (09.15.18 @ 09:28)    Gram Stain:   Rare polymorphonuclear leukocytes per low power field  Few Squamous epithelial cells per low power field  Moderate Gram Negative Rods per oil power field  Few Gram Positive Rods per oil power field  Few Yeast like cells per oil power field  Rare Gram Negative Coccobacilli per oil power field    -  Amikacin: S <=8    -  Amikacin: S <=8    -  Ampicillin/Sulbactam: R >16/8    -  Aztreonam: S 8    -  Cefepime: I 16    -  Cefepime: I 16    -  Ceftazidime: R >16    -  Ceftazidime: I 16    -  Ciprofloxacin: R >2    -  Ciprofloxacin: R >2    -  Gentamicin: R >8    -  Gentamicin: R >8    -  Imipenem: R >8    -  Imipenem: R    -  Levofloxacin: R >4    -  Levofloxacin: R >4    -  Meropenem: R >8    -  Meropenem: I 8    -  Piperacillin/Tazobactam: R >64    -  Piperacillin/Tazobactam: R    -  Tobramycin: R >8    -  Tobramycin: R >8    -  Trimethoprim/Sulfamethoxazole: R >2/38    Specimen Source: .Sputum Sputum    Culture Results:   Numerous Pseudomonas aeruginosa (Carbapenem Resistant)  Moderate Acinetobacter baumannii/haemolyticus (Carbapenem Resistant)  Complex  Normal Respiratory Moraima present    Organism Identification: Pseudomonas aeruginosa (Carbapenem Resistant)  Acinetobacter baumannii/haemolyticus (Carbapenem Resistant)    Organism: Acinetobacter baumannii/haemolyticus (Carbapenem Resistant)    Organism: Pseudomonas aeruginosa (Carbapenem Resistant)    Organism: Acinetobacter baumannii/haemolyticus (Carbapenem Resistant)    Method Type: ESTIVEN    Method Type: ESTIVEN    Method Type: KB        MRSA/MSSA PCR (18 @ 11:26)    MRSA PCR Result.: NotDetec: MRSA/MSSA PCR assay is a qualitative in vitro diagnostic test for the  direct detection and differentiation of methicillin-resistant  Staphylococcus aureus (MRSA) from Staphylococcus aureus (SA). The assay  detects DNA from nasal swabs in patients atrisk for nasal colonization.  It is not intended to diagnose MRSA or SA infections nor guide or monitor  treatment for MRSA/SA infections. A negative result does not preclude  nasal colonization. The assay is FDA-approved and its performance has  been established by [employer], [place] and the [place], [place].    Staph Aureus PCR Result: [facility]      Culture - Urine (18 @ 09:47)    Specimen Source: .Urine Catheterized    Culture Results:   10,000 - 49,000 CFU/mL Yeast Patient is seen and examined at the bed side, is afebrile.  The creatinine contnues  trending down.      REVIEW OF SYSTEMS: Unable to obtain due to mental status      ALLERGIES : NKDA      Vital Signs Last 24 Hrs  T(C): 37 (01 Oct 2018 14:53), Max: 37 (01 Oct 2018 14:53)  T(F): 98.6 (01 Oct 2018 14:53), Max: 98.6 (01 Oct 2018 14:53)  HR: 58 (01 Oct 2018 14:53) (58 - 77)  BP: 118/53 (01 Oct 2018 14:53) (118/53 - 118/53)  BP(mean): --  RR: 22 (01 Oct 2018 14:53) (20 - 22)  SpO2: 90% (01 Oct 2018 14:53) (90% - 100%)      PHYSICAL EXAM:  GENERAL: Not in distress  HEENT: Trach in placed  CHEST/LUNG: Air entry B/L  HEART: s1 and s2 present  ABDOMEN: Nontender, and Nondistended  EXTREMITIES:  No pedal  edema  CNS: Awake but not Alert        LABS:                         9.0    11.5  )-----------( 358      ( 01 Oct 2018 07:56 )             29.4                           9.0    6.9   )-----------( 281      ( 30 Sep 2018 07:02 )             28.9                                 10.5   20.1  )-----------( 217      ( 19 Sep 2018 07:47 )             33.1           Amikacin Level, Trough (.18 @ 17:06)    Amikacin Level, Trough: 17.6: Performed by: [facility], [address] [place] [address] 30652 ug/mL      10-01    139  |  107  |  16  ----------------------------<  49<L>  3.7   |  24  |  1.50<H>    Ca    9.5      01 Oct 2018 07:56      09    139  |  108  |  17  ----------------------------<  175<H>  3.6   |  18<L>  |  1.80<H>    Ca    9.4      30 Sep 2018 07:02    TPro  6.8  /  Alb  2.0<L>  /  TBili  0.2  /  DBili  x   /  AST  21  /  ALT  20  /  AlkPhos  70      130<L>  |  97  |  25<H>  ----------------------------<  269<H>  3.9   |  24  |  2.80<H>    Ca    9.3      27 Sep 2018 08:21      TPro  6.2  /  Alb  2.2<L>  /  TBili  0.9  /  DBili  0.3<H>  /  AST  24  /  ALT  20  /  AlkPhos  86           Urinalysis Basic - ( 14 Sep 2018 03:56 )    Color: Yellow / Appearance: Clear / S.020 / pH: x  Gluc: x / Ketone: Negative  / Bili: Negative / Urobili: Negative   Blood: x / Protein: 100 / Nitrite: Negative   Leuk Esterase: Negative / RBC: 0-2 /HPF / WBC 0-2 /HPF   Sq Epi: x / Non Sq Epi: Occasional /HPF / Bacteria: Trace /HPF      CAPILLARY BLOOD GLUCOSE      POCT Blood Glucose.: 144 mg/dL (14 Sep 2018 11:07)  POCT Blood Glucose.: 202 mg/dL (14 Sep 2018 08:09)  POCT Blood Glucose.: 277 mg/dL (14 Sep 2018 06:58)  POCT Blood Glucose.: 68 mg/dL (14 Sep 2018 06:03)  POCT Blood Glucose.: 316 mg/dL (14 Sep 2018 02:11)  POCT Blood Glucose.: 405 mg/dL (14 Sep 2018 00:28)  POCT Blood Glucose.: 386 mg/dL (13 Sep 2018 22:26)  POCT Blood Glucose.: 403 mg/dL (13 Sep 2018 19:09)        MEDICATIONS  (STANDING):  ALBUTerol/ipratropium for Nebulization 3 milliLiter(s) Nebulizer every 6 hours  amLODIPine   Tablet 2.5 milliGRAM(s) Oral daily  atorvastatin 20 milliGRAM(s) Oral at bedtime  benztropine 2 milliGRAM(s) Oral two times a day  chlorhexidine 4% Liquid 1 Application(s) Topical every 12 hours  heparin  Injectable 5000 Unit(s) SubCutaneous every 8 hours  insulin glargine Injectable (LANTUS) 25 Unit(s) SubCutaneous at bedtime  insulin lispro (HumaLOG) corrective regimen sliding scale   SubCutaneous every 6 hours  insulin lispro Injectable (HumaLOG) 10 Unit(s) SubCutaneous <User Schedule>  lactobacillus acidophilus 1 Tablet(s) Oral every 8 hours  levETIRAcetam  Solution 1000 milliGRAM(s) Oral two times a day  LORazepam     Tablet 1 milliGRAM(s) Oral every 6 hours  metoprolol tartrate 100 milliGRAM(s) Oral two times a day  nystatin Powder 1 Application(s) Topical every 8 hours  pantoprazole   Suspension 40 milliGRAM(s) Oral daily  QUEtiapine 50 milliGRAM(s) Oral every 12 hours  senna 2 Tablet(s) Oral at bedtime  sodium chloride 0.9%. 1000 milliLiter(s) (100 mL/Hr) IV Continuous <Continuous>  sodium chloride 0.9%. 1000 milliLiter(s) (100 mL/Hr) IV Continuous <Continuous>  sodium chloride 0.9%. 1000 milliLiter(s) (100 mL/Hr) IV Continuous <Continuous>    MEDICATIONS  (PRN):  haloperidol    Injectable 5 milliGRAM(s) IntraMuscular once PRN Agitation  LORazepam   Injectable 1 milliGRAM(s) IV Push every 8 hours PRN for breathrough agitation/anxiety/combative behavior            RADIOLOGY & ADDITIONAL TESTS:    18: Xray Chest 1 View- PORTABLE-Routine (18 @ 08:22) Tracheostomy cannula reidentified in position. No change heart mediastinum. No change in the right basilar opacity with loss of definition of the ipsilateral hemidiaphragm and costophrenic angle likely   representing a combination of pleural fluid and underlying  consolidation/atelectasis.      18: CT Chest No Cont (18 @ 03:41) Impression: Consolidation involving the right lower lobe is of uncertain etiology. It is unclear whether this represents compressive atelectasis secondary to right pleural effusion/elevation of the right hemidiaphragm  and/or represents infection.    18: Xray Chest 1 View-PORTABLE IMMEDIATE (18 @ 13:37) Bilateral pleural effusions and associated atelectasis.        MICROBIOLOGY DATA:    Culture - Blood (18 @ 17:33)    Specimen Source: .Blood Blood-Peripheral    Culture Results:   No growth to date.      Culture - Blood (18 @ 13:21)    Specimen Source: .Blood Blood-Peripheral    Culture Results:   No growth to date.        Culture - Sputum . (09.15.18 @ 09:28)    Gram Stain:   Rare polymorphonuclear leukocytes per low power field  Few Squamous epithelial cells per low power field  Moderate Gram Negative Rods per oil power field  Few Gram Positive Rods per oil power field  Few Yeast like cells per oil power field  Rare Gram Negative Coccobacilli per oil power field    -  Amikacin: S <=8    -  Amikacin: S <=8    -  Ampicillin/Sulbactam: R >16/8    -  Aztreonam: S 8    -  Cefepime: I 16    -  Cefepime: I 16    -  Ceftazidime: R >16    -  Ceftazidime: I 16    -  Ciprofloxacin: R >2    -  Ciprofloxacin: R >2    -  Gentamicin: R >8    -  Gentamicin: R >8    -  Imipenem: R >8    -  Imipenem: R    -  Levofloxacin: R >4    -  Levofloxacin: R >4    -  Meropenem: R >8    -  Meropenem: I 8    -  Piperacillin/Tazobactam: R >64    -  Piperacillin/Tazobactam: R    -  Tobramycin: R >8    -  Tobramycin: R >8    -  Trimethoprim/Sulfamethoxazole: R >2/38    Specimen Source: .Sputum Sputum    Culture Results:   Numerous Pseudomonas aeruginosa (Carbapenem Resistant)  Moderate Acinetobacter baumannii/haemolyticus (Carbapenem Resistant)  Complex  Normal Respiratory Moraima present    Organism Identification: Pseudomonas aeruginosa (Carbapenem Resistant)  Acinetobacter baumannii/haemolyticus (Carbapenem Resistant)    Organism: Acinetobacter baumannii/haemolyticus (Carbapenem Resistant)    Organism: Pseudomonas aeruginosa (Carbapenem Resistant)    Organism: Acinetobacter baumannii/haemolyticus (Carbapenem Resistant)    Method Type: ESTIVEN    Method Type: ESTIVEN    Method Type: KB        MRSA/MSSA PCR (18 @ 11:26)    MRSA PCR Result.: NotDetec: MRSA/MSSA PCR assay is a qualitative in vitro diagnostic test for the  direct detection and differentiation of methicillin-resistant  Staphylococcus aureus (MRSA) from Staphylococcus aureus (SA). The assay  detects DNA from nasal swabs in patients atrisk for nasal colonization.  It is not intended to diagnose MRSA or SA infections nor guide or monitor  treatment for MRSA/SA infections. A negative result does not preclude  nasal colonization. The assay is FDA-approved and its performance has  been established by [employer], [place] and the [place], [place].    Staph Aureus PCR Result: [facility]      Culture - Urine (18 @ 09:47)    Specimen Source: .Urine Catheterized    Culture Results:   10,000 - 49,000 CFU/mL Yeast

## 2018-10-01 NOTE — PROGRESS NOTE ADULT - ASSESSMENT
63 year-old woman with LAYLA due to ATN from polymyxin and amikacin  Hyponatremia likely due to dehydration as well.  Elevate bicarbonate ether respiratory acidosis or metabolic alkalosis.    Renal function improving.  Give IVF NS @100ml/hr for one more day.  If creatinine improves further tomorrow, would d/c IVF.

## 2018-10-01 NOTE — PROGRESS NOTE ADULT - SUBJECTIVE AND OBJECTIVE BOX
Interval Events:      Allergies    angiotensin converting enzyme inhibitors (Unknown)    Intolerances      Endocrine/Metabolic Medications:  atorvastatin 20 milliGRAM(s) Oral at bedtime  insulin glargine Injectable (LANTUS) 25 Unit(s) SubCutaneous at bedtime  insulin lispro (HumaLOG) corrective regimen sliding scale   SubCutaneous every 6 hours  insulin lispro Injectable (HumaLOG) 10 Unit(s) SubCutaneous <User Schedule>      Vital Signs Last 24 Hrs  T(C): 36.4 (01 Oct 2018 05:15), Max: 36.8 (30 Sep 2018 14:34)  T(F): 97.5 (01 Oct 2018 05:15), Max: 98.2 (30 Sep 2018 14:34)  HR: 77 (01 Oct 2018 05:15) (69 - 79)  BP: 135/80 (30 Sep 2018 17:13) (120/45 - 135/80)  BP(mean): --  RR: 20 (01 Oct 2018 05:15) (16 - 20)  SpO2: 100% (01 Oct 2018 05:15) (98% - 100%)      PHYSICAL EXAM  All physical exam findings normal, except those marked:  General:	Alert, active, cooperative, NAD, well hydrated  .		[] Abnormal:  Neck		Normal: supple, no cervical adenopathy, no palpable thyroid  .		[] Abnormal:  Cardiovascular	Normal: regular rate, normal S1, S2, no murmurs  .		[] Abnormal:  Respiratory	Normal: no chest wall deformity, normal respiratory pattern, CTA B/L  .		[] Abnormal:  Abdominal	Normal: soft, ND, NT, bowel sounds present, no masses, no organomegaly  .		[] Abnormal:  		Normal normal genitalia, testes descended, circumcised/uncircumcised  .		Bob stage:			Breast bob:  .		Menstrual history:  .		[] Abnormal:  Extremities	Normal: FROM x4  .		[] Abnormal:  Skin		Normal: intact and not indurated, no rash, no acanthosis nigricans  .		[] Abnormal:  Neurologic	Normal: grossly intact  .		[] Abnormal:    LABS                        9.0    11.5  )-----------( 358      ( 01 Oct 2018 07:56 )             29.4                               139    |  107    |  16                  Calcium: 9.5   / iCa: x      (10-01 @ 07:56)    ----------------------------<  49        Magnesium: x                                3.7     |  24     |  1.50             Phosphorous: x          CAPILLARY BLOOD GLUCOSE      POCT Blood Glucose.: 169 mg/dL (01 Oct 2018 05:31)  POCT Blood Glucose.: 214 mg/dL (30 Sep 2018 23:33)  POCT Blood Glucose.: 216 mg/dL (30 Sep 2018 22:06)  POCT Blood Glucose.: 126 mg/dL (30 Sep 2018 17:01)  POCT Blood Glucose.: 172 mg/dL (30 Sep 2018 13:18)        Assesment/plan Interval Events:  pt in nad    Allergies    angiotensin converting enzyme inhibitors (Unknown)    Intolerances      Endocrine/Metabolic Medications:  atorvastatin 20 milliGRAM(s) Oral at bedtime  insulin glargine Injectable (LANTUS) 25 Unit(s) SubCutaneous at bedtime  insulin lispro (HumaLOG) corrective regimen sliding scale   SubCutaneous every 6 hours  insulin lispro Injectable (HumaLOG) 10 Unit(s) SubCutaneous <User Schedule>      Vital Signs Last 24 Hrs  T(C): 36.4 (01 Oct 2018 05:15), Max: 36.8 (30 Sep 2018 14:34)  T(F): 97.5 (01 Oct 2018 05:15), Max: 98.2 (30 Sep 2018 14:34)  HR: 77 (01 Oct 2018 05:15) (69 - 79)  BP: 135/80 (30 Sep 2018 17:13) (120/45 - 135/80)  BP(mean): --  RR: 20 (01 Oct 2018 05:15) (16 - 20)  SpO2: 100% (01 Oct 2018 05:15) (98% - 100%)      PHYSICAL EXAM  All physical exam findings normal, except those marked:  General:	Alert, active, cooperative, NAD, well hydrated  .		[] Abnormal:  Neck		Normal: supple, no cervical adenopathy, no palpable thyroid  .		[] Abnormal:  Cardiovascular	Normal: regular rate, normal S1, S2, no murmurs  .		[] Abnormal:  Respiratory	Normal: no chest wall deformity, normal respiratory pattern, CTA B/L  .		[] Abnormal:  Abdominal	Normal: soft, ND, NT, bowel sounds present, no masses, no organomegaly  .		[] Abnormal:  		Normal normal genitalia, testes descended, circumcised/uncircumcised  .		Bob stage:			Breast bob:  .		Menstrual history:  .		[] Abnormal:  Extremities	Normal: FROM x4  .		[] Abnormal:  Skin		Normal: intact and not indurated, no rash, no acanthosis nigricans  .		[] Abnormal:  Neurologic	Normal: grossly intact  .		[] Abnormal:    LABS                        9.0    11.5  )-----------( 358      ( 01 Oct 2018 07:56 )             29.4                               139    |  107    |  16                  Calcium: 9.5   / iCa: x      (10-01 @ 07:56)    ----------------------------<  49        Magnesium: x                                3.7     |  24     |  1.50             Phosphorous: x          CAPILLARY BLOOD GLUCOSE      POCT Blood Glucose.: 169 mg/dL (01 Oct 2018 05:31)  POCT Blood Glucose.: 214 mg/dL (30 Sep 2018 23:33)  POCT Blood Glucose.: 216 mg/dL (30 Sep 2018 22:06)  POCT Blood Glucose.: 126 mg/dL (30 Sep 2018 17:01)  POCT Blood Glucose.: 172 mg/dL (30 Sep 2018 13:18)        Assesment/plan    Dm- good control  cont lantus 25 and humalog 10 tid  fsg q6     Nephrology and ID f/u noted

## 2018-10-01 NOTE — PROGRESS NOTE ADULT - ASSESSMENT
A 64 yo Female  with  non-verbal, bed bound, S/P Trach/ PEG tube, [facility] coffee ground vomiting  and cough. On arrival, she found to have tachycardia, Leukocytosis, and RLL consolidation. She has started on Zosyn and Vancomycin and the ID consult requested to assist with further evaluation and antibiotic management.     # Aspiration pneumonia - RLL consolidation - Pseudomonas and Acinetobacter   # MRSA PCR is negative    Would recommend:    1. Monitor kidney function, is improving  2. Continue to Monitor OFF antibiotic   3. Bronchial suctioning  and Aspiration  precaution  4. Frequent repositioning      d/w  Nursing staff    will follow the patient with you

## 2018-10-01 NOTE — PROGRESS NOTE ADULT - ASSESSMENT
NP Note discussed with  Primary Attending    Patient is a 63y old  Female who presents with a chief complaint of vomiting (01 Oct 2018 12:36)      INTERVAL HPI/OVERNIGHT EVENTS: No new complaints.  Iatrogenic Acute kidney injury 2/2 ATN 2/2 polymyxin and amikacin   resolving with IVF hydration.  Dr. Torres, nephrologist requests venous ABG however CO2 has normalized.  No distress noted.  Care collaborated with Dr. Lau.      MEDICATIONS  (STANDING):  ALBUTerol/ipratropium for Nebulization 3 milliLiter(s) Nebulizer every 6 hours  amLODIPine   Tablet 2.5 milliGRAM(s) Oral daily  atorvastatin 20 milliGRAM(s) Oral at bedtime  benztropine 2 milliGRAM(s) Oral two times a day  chlorhexidine 4% Liquid 1 Application(s) Topical every 12 hours  heparin  Injectable 5000 Unit(s) SubCutaneous every 8 hours  insulin glargine Injectable (LANTUS) 25 Unit(s) SubCutaneous at bedtime  insulin lispro (HumaLOG) corrective regimen sliding scale   SubCutaneous every 6 hours  insulin lispro Injectable (HumaLOG) 10 Unit(s) SubCutaneous <User Schedule>  lactobacillus acidophilus 1 Tablet(s) Oral every 8 hours  levETIRAcetam  Solution 1000 milliGRAM(s) Oral two times a day  LORazepam     Tablet 1 milliGRAM(s) Oral every 6 hours  metoprolol tartrate 100 milliGRAM(s) Oral two times a day  nystatin Powder 1 Application(s) Topical every 8 hours  pantoprazole   Suspension 40 milliGRAM(s) Oral daily  QUEtiapine 50 milliGRAM(s) Oral every 12 hours  senna 2 Tablet(s) Oral at bedtime  sodium chloride 0.9%. 1000 milliLiter(s) (100 mL/Hr) IV Continuous <Continuous>  sodium chloride 0.9%. 1000 milliLiter(s) (100 mL/Hr) IV Continuous <Continuous>  sodium chloride 0.9%. 1000 milliLiter(s) (100 mL/Hr) IV Continuous <Continuous>    MEDICATIONS  (PRN):  haloperidol    Injectable 5 milliGRAM(s) IntraMuscular once PRN Agitation  LORazepam   Injectable 1 milliGRAM(s) IV Push every 8 hours PRN for breathrough agitation/anxiety/combative behavior      __________________________________________________  REVIEW OF SYSTEMS:    CONSTITUTIONAL: No fever,   EYES: no acute visual disturbances  NECK: No pain or stiffness  RESPIRATORY: No cough; No shortness of breath  CARDIOVASCULAR: No chest pain, no palpitations  GASTROINTESTINAL: No pain. No nausea or vomiting; No diarrhea   NEUROLOGICAL: No headache or numbness, no tremors  MUSCULOSKELETAL: No joint pain, no muscle pain  GENITOURINARY: no dysuria, no frequency, no hesitancy  PSYCHIATRY: no depression , no anxiety  ALL OTHER  ROS negative      Vital Signs Last 24 Hrs  T(C): 36.4 (01 Oct 2018 05:15), Max: 36.8 (30 Sep 2018 14:34)  T(F): 97.5 (01 Oct 2018 05:15), Max: 98.2 (30 Sep 2018 14:34)  HR: 77 (01 Oct 2018 05:15) (69 - 79)  BP: 135/80 (30 Sep 2018 17:13) (120/45 - 135/80)  BP(mean): --  RR: 20 (01 Oct 2018 05:15) (16 - 20)  SpO2: 100% (01 Oct 2018 05:15) (98% - 100%)    ________________________________________________  PHYSICAL EXAM:  GENERAL: NAD  HEENT: Normocephalic;  conjunctivae and sclerae clear; moist mucous membranes;   NECK : supple  CHEST/LUNG: Clear to auscultation bilaterally with good air entry   HEART: S1 S2  regular; no murmurs, gallops or rubs  ABDOMEN: Soft, Nontender, Nondistended; Bowel sounds present  EXTREMITIES: no cyanosis; no edema; no calf tenderness  SKIN: warm and dry; no rash  NERVOUS SYSTEM:  Awake and alert; Oriented  to place, person and time ; no new deficits    _________________________________________________  LABS:                        9.0    11.5  )-----------( 358      ( 01 Oct 2018 07:56 )             29.4     10-01    139  |  107  |  16  ----------------------------<  49<L>  3.7   |  24  |  1.50<H>    Ca    9.5      01 Oct 2018 07:56    CAPILLARY BLOOD GLUCOSE  POCT Blood Glucose.: 146 mg/dL (01 Oct 2018 11:35)  POCT Blood Glucose.: 169 mg/dL (01 Oct 2018 05:31)  POCT Blood Glucose.: 214 mg/dL (30 Sep 2018 23:33)  POCT Blood Glucose.: 216 mg/dL (30 Sep 2018 22:06)  POCT Blood Glucose.: 126 mg/dL (30 Sep 2018 17:01)    RADIOLOGY & ADDITIONAL TESTS:    Consultant(s) Notes Reviewed:   YES    Plan of care was discussed with patient and /or primary care giver; all questions and concerns were addressed and care was aligned with patient's wishes.

## 2018-10-01 NOTE — PROGRESS NOTE ADULT - PROBLEM SELECTOR PLAN 1
LAYLA 2/2 ATN 2/2 polymyxin and amikacin.  Renal function improving.  Continue IVF NS @100ml/hr for now.

## 2018-10-02 LAB
ANION GAP SERPL CALC-SCNC: 6 MMOL/L — SIGNIFICANT CHANGE UP (ref 5–17)
ANION GAP SERPL CALC-SCNC: 6 MMOL/L — SIGNIFICANT CHANGE UP (ref 5–17)
BUN SERPL-MCNC: 17 MG/DL — SIGNIFICANT CHANGE UP (ref 7–18)
BUN SERPL-MCNC: 19 MG/DL — HIGH (ref 7–18)
CALCIUM SERPL-MCNC: 9.3 MG/DL — SIGNIFICANT CHANGE UP (ref 8.4–10.5)
CALCIUM SERPL-MCNC: 9.4 MG/DL — SIGNIFICANT CHANGE UP (ref 8.4–10.5)
CHLORIDE SERPL-SCNC: 101 MMOL/L — SIGNIFICANT CHANGE UP (ref 96–108)
CHLORIDE SERPL-SCNC: 103 MMOL/L — SIGNIFICANT CHANGE UP (ref 96–108)
CO2 SERPL-SCNC: 27 MMOL/L — SIGNIFICANT CHANGE UP (ref 22–31)
CO2 SERPL-SCNC: 29 MMOL/L — SIGNIFICANT CHANGE UP (ref 22–31)
CREAT SERPL-MCNC: 1.42 MG/DL — HIGH (ref 0.5–1.3)
CREAT SERPL-MCNC: 1.42 MG/DL — HIGH (ref 0.5–1.3)
GLUCOSE SERPL-MCNC: 191 MG/DL — HIGH (ref 70–99)
GLUCOSE SERPL-MCNC: 226 MG/DL — HIGH (ref 70–99)
HCT VFR BLD CALC: 27.8 % — LOW (ref 34.5–45)
HGB BLD-MCNC: 8.7 G/DL — LOW (ref 11.5–15.5)
MCHC RBC-ENTMCNC: 24.5 PG — LOW (ref 27–34)
MCHC RBC-ENTMCNC: 31.2 GM/DL — LOW (ref 32–36)
MCV RBC AUTO: 78.5 FL — LOW (ref 80–100)
PLATELET # BLD AUTO: 296 K/UL — SIGNIFICANT CHANGE UP (ref 150–400)
POTASSIUM SERPL-MCNC: 3.8 MMOL/L — SIGNIFICANT CHANGE UP (ref 3.5–5.3)
POTASSIUM SERPL-MCNC: 4.2 MMOL/L — SIGNIFICANT CHANGE UP (ref 3.5–5.3)
POTASSIUM SERPL-SCNC: 3.8 MMOL/L — SIGNIFICANT CHANGE UP (ref 3.5–5.3)
POTASSIUM SERPL-SCNC: 4.2 MMOL/L — SIGNIFICANT CHANGE UP (ref 3.5–5.3)
RBC # BLD: 3.54 M/UL — LOW (ref 3.8–5.2)
RBC # FLD: 13.2 % — SIGNIFICANT CHANGE UP (ref 10.3–14.5)
SODIUM SERPL-SCNC: 136 MMOL/L — SIGNIFICANT CHANGE UP (ref 135–145)
SODIUM SERPL-SCNC: 136 MMOL/L — SIGNIFICANT CHANGE UP (ref 135–145)
WBC # BLD: 6.4 K/UL — SIGNIFICANT CHANGE UP (ref 3.8–10.5)
WBC # FLD AUTO: 6.4 K/UL — SIGNIFICANT CHANGE UP (ref 3.8–10.5)

## 2018-10-02 RX ORDER — VANCOMYCIN HCL 1 G
1 VIAL (EA) INTRAVENOUS
Qty: 0 | Refills: 0 | COMMUNITY

## 2018-10-02 RX ORDER — QUETIAPINE FUMARATE 200 MG/1
1 TABLET, FILM COATED ORAL
Qty: 0 | Refills: 0 | DISCHARGE
Start: 2018-10-02

## 2018-10-02 RX ORDER — LACTOBACILLUS ACIDOPHILUS 100MM CELL
1 CAPSULE ORAL
Qty: 0 | Refills: 0 | DISCHARGE
Start: 2018-10-02

## 2018-10-02 RX ORDER — INSULIN GLARGINE 100 [IU]/ML
40 INJECTION, SOLUTION SUBCUTANEOUS
Qty: 0 | Refills: 0 | DISCHARGE
Start: 2018-10-02

## 2018-10-02 RX ORDER — INSULIN LISPRO 100/ML
10 VIAL (ML) SUBCUTANEOUS
Qty: 0 | Refills: 0 | DISCHARGE
Start: 2018-10-02

## 2018-10-02 RX ORDER — INSULIN GLARGINE 100 [IU]/ML
25 INJECTION, SOLUTION SUBCUTANEOUS
Qty: 0 | Refills: 0 | DISCHARGE
Start: 2018-10-02

## 2018-10-02 RX ORDER — INSULIN GLARGINE 100 [IU]/ML
58 INJECTION, SOLUTION SUBCUTANEOUS
Qty: 0 | Refills: 0 | COMMUNITY

## 2018-10-02 RX ORDER — NYSTATIN CREAM 100000 [USP'U]/G
1 CREAM TOPICAL
Qty: 0 | Refills: 0 | DISCHARGE
Start: 2018-10-02

## 2018-10-02 RX ORDER — PIPERACILLIN AND TAZOBACTAM 4; .5 G/20ML; G/20ML
3.38 INJECTION, POWDER, LYOPHILIZED, FOR SOLUTION INTRAVENOUS
Qty: 0 | Refills: 0 | COMMUNITY

## 2018-10-02 RX ORDER — INSULIN LISPRO 100/ML
10 VIAL (ML) SUBCUTANEOUS
Qty: 0 | Refills: 0 | COMMUNITY
Start: 2018-10-02

## 2018-10-02 RX ORDER — SODIUM CHLORIDE 9 MG/ML
1000 INJECTION INTRAMUSCULAR; INTRAVENOUS; SUBCUTANEOUS
Qty: 0 | Refills: 0 | Status: DISCONTINUED | OUTPATIENT
Start: 2018-10-02 | End: 2018-10-03

## 2018-10-02 RX ADMIN — PANTOPRAZOLE SODIUM 40 MILLIGRAM(S): 20 TABLET, DELAYED RELEASE ORAL at 11:51

## 2018-10-02 RX ADMIN — QUETIAPINE FUMARATE 50 MILLIGRAM(S): 200 TABLET, FILM COATED ORAL at 18:31

## 2018-10-02 RX ADMIN — HEPARIN SODIUM 5000 UNIT(S): 5000 INJECTION INTRAVENOUS; SUBCUTANEOUS at 06:04

## 2018-10-02 RX ADMIN — Medication 1 TABLET(S): at 22:35

## 2018-10-02 RX ADMIN — NYSTATIN CREAM 1 APPLICATION(S): 100000 CREAM TOPICAL at 13:50

## 2018-10-02 RX ADMIN — HEPARIN SODIUM 5000 UNIT(S): 5000 INJECTION INTRAVENOUS; SUBCUTANEOUS at 13:49

## 2018-10-02 RX ADMIN — Medication 3 MILLILITER(S): at 02:28

## 2018-10-02 RX ADMIN — ATORVASTATIN CALCIUM 20 MILLIGRAM(S): 80 TABLET, FILM COATED ORAL at 22:35

## 2018-10-02 RX ADMIN — Medication 10 UNIT(S): at 11:50

## 2018-10-02 RX ADMIN — Medication 1 MILLIGRAM(S): at 18:31

## 2018-10-02 RX ADMIN — NYSTATIN CREAM 1 APPLICATION(S): 100000 CREAM TOPICAL at 06:04

## 2018-10-02 RX ADMIN — Medication 10 UNIT(S): at 18:29

## 2018-10-02 RX ADMIN — Medication 1: at 07:00

## 2018-10-02 RX ADMIN — AMLODIPINE BESYLATE 2.5 MILLIGRAM(S): 2.5 TABLET ORAL at 06:04

## 2018-10-02 RX ADMIN — NYSTATIN CREAM 1 APPLICATION(S): 100000 CREAM TOPICAL at 22:38

## 2018-10-02 RX ADMIN — Medication 1 TABLET(S): at 13:49

## 2018-10-02 RX ADMIN — Medication 1 MILLIGRAM(S): at 02:14

## 2018-10-02 RX ADMIN — INSULIN GLARGINE 25 UNIT(S): 100 INJECTION, SOLUTION SUBCUTANEOUS at 22:37

## 2018-10-02 RX ADMIN — HEPARIN SODIUM 5000 UNIT(S): 5000 INJECTION INTRAVENOUS; SUBCUTANEOUS at 22:36

## 2018-10-02 RX ADMIN — Medication 2 MILLIGRAM(S): at 18:49

## 2018-10-02 RX ADMIN — Medication 1 TABLET(S): at 06:04

## 2018-10-02 RX ADMIN — Medication 3 MILLILITER(S): at 08:23

## 2018-10-02 RX ADMIN — Medication 1 MILLIGRAM(S): at 11:51

## 2018-10-02 RX ADMIN — Medication 3 MILLILITER(S): at 20:58

## 2018-10-02 RX ADMIN — Medication 10 UNIT(S): at 06:59

## 2018-10-02 RX ADMIN — Medication 2 MILLIGRAM(S): at 06:04

## 2018-10-02 RX ADMIN — LEVETIRACETAM 1000 MILLIGRAM(S): 250 TABLET, FILM COATED ORAL at 07:00

## 2018-10-02 RX ADMIN — Medication 100 MILLIGRAM(S): at 18:29

## 2018-10-02 RX ADMIN — Medication 1 MILLIGRAM(S): at 06:04

## 2018-10-02 RX ADMIN — LEVETIRACETAM 1000 MILLIGRAM(S): 250 TABLET, FILM COATED ORAL at 18:29

## 2018-10-02 RX ADMIN — Medication 3 MILLILITER(S): at 14:30

## 2018-10-02 RX ADMIN — SENNA PLUS 2 TABLET(S): 8.6 TABLET ORAL at 22:35

## 2018-10-02 RX ADMIN — QUETIAPINE FUMARATE 50 MILLIGRAM(S): 200 TABLET, FILM COATED ORAL at 06:04

## 2018-10-02 RX ADMIN — Medication 100 MILLIGRAM(S): at 06:04

## 2018-10-02 RX ADMIN — Medication 3: at 18:29

## 2018-10-02 NOTE — PROGRESS NOTE ADULT - SUBJECTIVE AND OBJECTIVE BOX
Time of Visit:  Patient seen and examined.     MEDICATIONS  (STANDING):  ALBUTerol/ipratropium for Nebulization 3 milliLiter(s) Nebulizer every 6 hours  amLODIPine   Tablet 2.5 milliGRAM(s) Oral daily  atorvastatin 20 milliGRAM(s) Oral at bedtime  benztropine 2 milliGRAM(s) Oral two times a day  chlorhexidine 4% Liquid 1 Application(s) Topical every 12 hours  heparin  Injectable 5000 Unit(s) SubCutaneous every 8 hours  insulin glargine Injectable (LANTUS) 25 Unit(s) SubCutaneous at bedtime  insulin lispro (HumaLOG) corrective regimen sliding scale   SubCutaneous every 6 hours  insulin lispro Injectable (HumaLOG) 10 Unit(s) SubCutaneous <User Schedule>  lactobacillus acidophilus 1 Tablet(s) Oral every 8 hours  levETIRAcetam  Solution 1000 milliGRAM(s) Oral two times a day  LORazepam     Tablet 1 milliGRAM(s) Oral every 6 hours  metoprolol tartrate 100 milliGRAM(s) Oral two times a day  nystatin Powder 1 Application(s) Topical every 8 hours  pantoprazole   Suspension 40 milliGRAM(s) Oral daily  QUEtiapine 50 milliGRAM(s) Oral every 12 hours  senna 2 Tablet(s) Oral at bedtime  sodium chloride 0.9%. 1000 milliLiter(s) (100 mL/Hr) IV Continuous <Continuous>  sodium chloride 0.9%. 1000 milliLiter(s) (100 mL/Hr) IV Continuous <Continuous>  sodium chloride 0.9%. 1000 milliLiter(s) (100 mL/Hr) IV Continuous <Continuous>      MEDICATIONS  (PRN):  haloperidol    Injectable 5 milliGRAM(s) IntraMuscular once PRN Agitation  LORazepam   Injectable 1 milliGRAM(s) IV Push every 8 hours PRN for breathrough agitation/anxiety/combative behavior       Medications up to date at time of exam.    ROS: No fever, chills, cough, congestion and no hypoxia on exam.  PHYSICAL EXAMINATION:  Vital Signs Last 24 Hrs  T(C): 36.4 (02 Oct 2018 13:45), Max: 37 (01 Oct 2018 14:53)  T(F): 97.6 (02 Oct 2018 13:45), Max: 98.6 (01 Oct 2018 14:53)  HR: 81 (02 Oct 2018 13:45) (58 - 92)  BP: 127/59 (02 Oct 2018 13:45) (110/48 - 132/91)  BP(mean): --  RR: 17 (02 Oct 2018 13:45) (17 - 22)  SpO2: 100% (02 Oct 2018 13:45) (90% - 100%)   (if applicable)    General; Asleep but responsive to tactile stimuli. Non verbal as baseline mental status. No acute distress.     HEENT: Normocephalic and atraumatic. No nasal tenderness. Moist mucosa.     NECK: has Tracheostomy, non infected.     LUNGS: Clear to auscultation. No use of accessory muscle.     HEART: S1 S2 Regular rate and no click/ rub.    ABDOMEN: Soft, nontender, and nondistended.  No hepatosplenomegaly is noted. Active bowel sounds. + Peg.    EXTREMITIES: Without any cyanosis, clubbing,  lesions or edema.    NEUROLOGIC:  Non verbal . Cognitive impaired.     SKIN: Warm and moist. Non diaphoretic.       LABS:                        8.7    6.4   )-----------( 296      ( 02 Oct 2018 08:35 )             27.8     10-02    136  |  103  |  17  ----------------------------<  226<H>  3.8   |  27  |  1.42<H>    Ca    9.3      02 Oct 2018 08:35    RADIOLOGY & ADDITIONAL STUDIES:  EKG:   CXR: < from: Xray Chest 1 View- PORTABLE-Urgent (09.27.18 @ 10:51) >    PROCEDURE DATE:  09/27/2018          INTERPRETATION:  Chest portable.    Clinical History: Abnormal breath sounds.    Comparison: 9/24/2018.    Single AP view submitted.  Again noted is tracheostomy tube in place.    The evaluation of the cardiomediastinal silhouette is limited on portable   technique.  There is arteriosclerotic calcification of the aorta.      There is elevation of the right hemidiaphragm.  Subsegmental atelectasis and/or fibrosis is noted right lower lung zone.  No new lobar lung consolidation, pleural effusion or pneumothorax is   noted.    Impression:    Findings as discussed above.         PAST MEDICAL & SURGICAL HISTORY:  No pertinent past medical history  Hypertension  Schizophrenia  Diabetic coma  Diabetes mellitus  No significant past surgical history  S/P percutaneous endoscopic gastrostomy (PEG) tube placement  H/O tracheostomy     Impression;  64 Y/O Female from [facility] with multiple chronic conditions. Has acute on chronic respiratory failure and on oxygen supplementation FIO2 35% via tracheostomy.    Suggestion:   Continue Oxygen supplementation FIO2 35% via Tracheostomy .  Continue DuoNeb Q 6 hours.  DVT/GI prophylactic .  Aspiration precautions especially during Peg feeding. HOB elevation.   Oral, trach care, suction . Continue Oral chlorhexidine care.    Contact precautions. Time of Visit:  Patient seen and examined.     MEDICATIONS  (STANDING):  ALBUTerol/ipratropium for Nebulization 3 milliLiter(s) Nebulizer every 6 hours  amLODIPine   Tablet 2.5 milliGRAM(s) Oral daily  atorvastatin 20 milliGRAM(s) Oral at bedtime  benztropine 2 milliGRAM(s) Oral two times a day  chlorhexidine 4% Liquid 1 Application(s) Topical every 12 hours  heparin  Injectable 5000 Unit(s) SubCutaneous every 8 hours  insulin glargine Injectable (LANTUS) 25 Unit(s) SubCutaneous at bedtime  insulin lispro (HumaLOG) corrective regimen sliding scale   SubCutaneous every 6 hours  insulin lispro Injectable (HumaLOG) 10 Unit(s) SubCutaneous <User Schedule>  lactobacillus acidophilus 1 Tablet(s) Oral every 8 hours  levETIRAcetam  Solution 1000 milliGRAM(s) Oral two times a day  LORazepam     Tablet 1 milliGRAM(s) Oral every 6 hours  metoprolol tartrate 100 milliGRAM(s) Oral two times a day  nystatin Powder 1 Application(s) Topical every 8 hours  pantoprazole   Suspension 40 milliGRAM(s) Oral daily  QUEtiapine 50 milliGRAM(s) Oral every 12 hours  senna 2 Tablet(s) Oral at bedtime  sodium chloride 0.9%. 1000 milliLiter(s) (100 mL/Hr) IV Continuous <Continuous>  sodium chloride 0.9%. 1000 milliLiter(s) (100 mL/Hr) IV Continuous <Continuous>  sodium chloride 0.9%. 1000 milliLiter(s) (100 mL/Hr) IV Continuous <Continuous>      MEDICATIONS  (PRN):  haloperidol    Injectable 5 milliGRAM(s) IntraMuscular once PRN Agitation  LORazepam   Injectable 1 milliGRAM(s) IV Push every 8 hours PRN for breathrough agitation/anxiety/combative behavior       Medications up to date at time of exam.    ROS: No fever, chills, cough, congestion and no hypoxia on exam.  PHYSICAL EXAMINATION:  Vital Signs Last 24 Hrs  T(C): 36.4 (02 Oct 2018 13:45), Max: 37 (01 Oct 2018 14:53)  T(F): 97.6 (02 Oct 2018 13:45), Max: 98.6 (01 Oct 2018 14:53)  HR: 81 (02 Oct 2018 13:45) (58 - 92)  BP: 127/59 (02 Oct 2018 13:45) (110/48 - 132/91)  BP(mean): --  RR: 17 (02 Oct 2018 13:45) (17 - 22)  SpO2: 100% (02 Oct 2018 13:45) (90% - 100%)   (if applicable)    General; Asleep but responsive to tactile stimuli. Non verbal as baseline mental status. No acute distress.     HEENT: Normocephalic and atraumatic. No nasal tenderness. Moist mucosa.     NECK: has Tracheostomy, non infected.     LUNGS: Clear to auscultation. No use of accessory muscle.     HEART: S1 S2 Regular rate and no click/ rub.    ABDOMEN: Soft, nontender, and nondistended.  No hepatosplenomegaly is noted. Active bowel sounds. + Peg.    EXTREMITIES: Without any cyanosis, clubbing,  lesions or edema.    NEUROLOGIC:  Non verbal . Cognitive impaired.     SKIN: Warm and moist. Non diaphoretic.       LABS:                        8.7    6.4   )-----------( 296      ( 02 Oct 2018 08:35 )             27.8     10-02    136  |  103  |  17  ----------------------------<  226<H>  3.8   |  27  |  1.42<H>    Ca    9.3      02 Oct 2018 08:35    RADIOLOGY & ADDITIONAL STUDIES:  EKG:   CXR: < from: Xray Chest 1 View- PORTABLE-Urgent (09.27.18 @ 10:51) >    PROCEDURE DATE:  09/27/2018          INTERPRETATION:  Chest portable.    Clinical History: Abnormal breath sounds.    Comparison: 9/24/2018.    Single AP view submitted.  Again noted is tracheostomy tube in place.    The evaluation of the cardiomediastinal silhouette is limited on portable   technique.  There is arteriosclerotic calcification of the aorta.      There is elevation of the right hemidiaphragm.  Subsegmental atelectasis and/or fibrosis is noted right lower lung zone.  No new lobar lung consolidation, pleural effusion or pneumothorax is   noted.    Impression:    Findings as discussed above.         PAST MEDICAL & SURGICAL HISTORY:  No pertinent past medical history  Hypertension  Schizophrenia  Diabetic coma  Diabetes mellitus  No significant past surgical history  S/P percutaneous endoscopic gastrostomy (PEG) tube placement  H/O tracheostomy     Impression;  64 Y/O Female from [facility] with multiple chronic conditions. Has acute on chronic respiratory failure and on oxygen supplementation FIO2 35% via tracheostomy.    Suggestion:   Continue Oxygen supplementation FIO2 35% via Tracheostomy .  Continue DuoNeb Q 6 hours.  DVT/GI prophylactic .  Aspiration precautions especially during Peg feeding. HOB elevation.   Oral, trach care, suction . Continue Oral chlorhexidine care.    Contact precautions.     Agree with above assessment and plan as transcribed.

## 2018-10-02 NOTE — PROGRESS NOTE ADULT - ASSESSMENT
A 62 yo Female  with  non-verbal, bed bound, S/P Trach/ PEG tube, [facility] evaluation of coffee ground vomiting  and cough. On arrival, she found to have tachycardia, Leukocytosis, and RLL consolidation. She has started on Zosyn and Vancomycin and the ID consult requested to assist with further evaluation and antibiotic management.     # Aspiration pneumonia - RLL consolidation - Pseudomonas and Acinetobacter   # MRSA PCR is negative    Would recommend:    1. Monitor kidney function, is improving  2. Continue to Monitor OFF antibiotic   3. Bronchial suctioning  and Aspiration  precaution  4. Frequent repositioning      d/w  Nursing staff    will follow the patient with you A 64 yo Female  with  non-verbal, bed bound, S/P Trach/ PEG tube, [facility] of coffee ground vomiting  and cough. On arrival, she found to have tachycardia, Leukocytosis, and RLL consolidation. She has started on Zosyn and Vancomycin and the ID consult requested to assist with further evaluation and antibiotic management.     # Aspiration pneumonia - RLL consolidation - Pseudomonas and Acinetobacter   # MRSA PCR is negative    Would recommend:    1.  Bronchial suctioning  and Aspiration  precaution  2. Monitor kidney function, is improving  3.Continue to Monitor OFF antibiotic   4. Frequent repositioning      d/w  Nursing staff    will follow the patient with you

## 2018-10-02 NOTE — PROGRESS NOTE ADULT - SUBJECTIVE AND OBJECTIVE BOX
[facility] NEPHROLOGY- PROGRESS NOTE, Follow up     Patient is a 63y Female who presents with a chief complaint of vomiting (24 Sep 2018 12:18) with coffee ground emesis and cough.  She was found to have PNA.  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  She was started on polymyxin on 9/16 and amikacin on 9/17.  Baseline creatinine is  0.8, eitan to ~1 on 9/20, 1.3 on 9/22 and 1.7 today.  Pt is also on rifampin.  She was previously on vanco, zosyn, and unasyn this admission as well.  Pt was given lasix IV on 9/21 and 9/22.  She has a PEG and has been tolerating PEG feeds. No ACEi/ARBs/NSAIDs/IV Contrast.    Pt is unable to give history.      REVIEW OF SYSTEMS: unable to obtain  Allergy:  angiotensin converting enzyme inhibitors (Unknown)      VITALS:  T(F): 98.3 (10-02-18 @ 05:45), Max: 98.6 (10-01-18 @ 14:53)  HR: 92 (10-02-18 @ 05:45)  BP: 115/87 (10-02-18 @ 05:45)  RR: 17 (10-02-18 @ 05:45)  SpO2: 92% (10-02-18 @ 05:45)  Wt(kg): --      PHYSICAL EXAM:  Constitutional: NAD, calm  HEENT: anicteric sclera, oropharynx clear, MMM  Neck: No JVD  Respiratory: CTAB, no wheezes, rales or rhonchi, trach  Cardiovascular: S1, S2, RRR  Gastrointestinal: BS+, soft, NT/ND  Extremities: No cyanosis or clubbing. No peripheral edema  : No CVA tenderness. No coe.   Skin: warm and dry       LABS: Pending today  10-01    139  |  107  |  16  ----------------------------<  49<L>  3.7   |  24  |  1.50<H>    Ca    9.5      01 Oct 2018 07:56      Creatinine Trend: 1.50 <--, 1.80 <--, 1.94 <--, 2.29 <--, 2.80 <--, 2.76 <--                        9.0    11.5  )-----------( 358      ( 01 Oct 2018 07:56 )             29.4     Urine Studies:

## 2018-10-02 NOTE — PROGRESS NOTE ADULT - SUBJECTIVE AND OBJECTIVE BOX
Patient seen and examined at bedside  No acute events noted overnight  Case discussed with medical team    HPI:  62 yo F from [facility], non-verbal, bed bound, S/P Trach/ PEG tube, PMH of HTN, asthma, convulsion disorder, bipolar, DVT legs, DM 2, sent from NH for coffee ground vomiting X3 this morning and cough.  Pt unable to provide history.  History given by sister (Sharon [phone])who states pt was vomiting coffee ground emesis this morning. Pt had no reported vomiting since in ED.  Pt also developed cough with clear sputum.  Sister states pt is combative at baseline, and her current behavior is baseline.  No reported fever, discomfort, diarrhea, or SOB.  Pt was recently admitted to [facility] in 07/2018 for same reason and treated for aspiration PNA and UTI during last admission.  Pt was supposed to be given IV vanco and zosyn for 7 day by PCP for PNA , which supposed to start from 09/13-09/19. Pt is DNR, MOSLT form in charts. (13 Sep 2018 15:49)      PAST MEDICAL & SURGICAL HISTORY:  No pertinent past medical history  Hypertension  Schizophrenia  Diabetic coma  Diabetes mellitus  No significant past surgical history  S/P percutaneous endoscopic gastrostomy (PEG) tube placement  H/O tracheostomy      angiotensin converting enzyme inhibitors (Unknown)       MEDICATIONS  (STANDING):  ALBUTerol/ipratropium for Nebulization 3 milliLiter(s) Nebulizer every 6 hours  amLODIPine   Tablet 2.5 milliGRAM(s) Oral daily  atorvastatin 20 milliGRAM(s) Oral at bedtime  benztropine 2 milliGRAM(s) Oral two times a day  chlorhexidine 4% Liquid 1 Application(s) Topical every 12 hours  heparin  Injectable 5000 Unit(s) SubCutaneous every 8 hours  insulin glargine Injectable (LANTUS) 25 Unit(s) SubCutaneous at bedtime  insulin lispro (HumaLOG) corrective regimen sliding scale   SubCutaneous every 6 hours  insulin lispro Injectable (HumaLOG) 10 Unit(s) SubCutaneous <User Schedule>  lactobacillus acidophilus 1 Tablet(s) Oral every 8 hours  levETIRAcetam  Solution 1000 milliGRAM(s) Oral two times a day  LORazepam     Tablet 1 milliGRAM(s) Oral every 6 hours  metoprolol tartrate 100 milliGRAM(s) Oral two times a day  nystatin Powder 1 Application(s) Topical every 8 hours  pantoprazole   Suspension 40 milliGRAM(s) Oral daily  QUEtiapine 50 milliGRAM(s) Oral every 12 hours  senna 2 Tablet(s) Oral at bedtime  sodium chloride 0.9%. 1000 milliLiter(s) (100 mL/Hr) IV Continuous <Continuous>  sodium chloride 0.9%. 1000 milliLiter(s) (100 mL/Hr) IV Continuous <Continuous>  sodium chloride 0.9%. 1000 milliLiter(s) (100 mL/Hr) IV Continuous <Continuous>    MEDICATIONS  (PRN):  haloperidol    Injectable 5 milliGRAM(s) IntraMuscular once PRN Agitation  LORazepam   Injectable 1 milliGRAM(s) IV Push every 8 hours PRN for breathrough agitation/anxiety/combative behavior      REVIEW OF SYSTEMS: limited    Vital Signs Last 24 Hrs  T(C): 36.8 (02 Oct 2018 05:45), Max: 37 (01 Oct 2018 14:53)  T(F): 98.3 (02 Oct 2018 05:45), Max: 98.6 (01 Oct 2018 14:53)  HR: 92 (02 Oct 2018 05:45) (58 - 92)  BP: 115/87 (02 Oct 2018 05:45) (110/48 - 132/91)  BP(mean): --  RR: 17 (02 Oct 2018 05:45) (17 - 22)  SpO2: 92% (02 Oct 2018 05:45) (90% - 97%)  PHYSICAL EXAMINATION:   Constitutional: NAD  HEENT: AT  Neck:  Supple  Respiratory: trach collar intact. Adequate airflow b/l. Not using accessory muscles of respiration.  Cardiovascular:  S1 & S2 intact, no R/G, 2+ radial pulses b/l  Gastrointestinal: Soft, ND, normoactive b.s., no organomegaly/RT/rigidity  Extremities: WWP  Neurological:  stable. awake.     Labs and imaging reviewed      RADIOLOGY & ADDITIONAL STUDIES:

## 2018-10-02 NOTE — PROGRESS NOTE ADULT - SUBJECTIVE AND OBJECTIVE BOX
Patient is seen and examined at the bed side, is afebrile.  The creatinine continues   trending down.      REVIEW OF SYSTEMS: Unable to obtain due to mental status      ALLERGIES : NKDA      Vital Signs Last 24 Hrs  T(C): 36.4 (02 Oct 2018 13:45), Max: 36.8 (02 Oct 2018 05:45)  T(F): 97.6 (02 Oct 2018 13:45), Max: 98.3 (02 Oct 2018 05:45)  HR: 81 (02 Oct 2018 13:45) (78 - 92)  BP: 127/59 (02 Oct 2018 13:45) (110/48 - 132/91)  BP(mean): --  RR: 17 (02 Oct 2018 13:45) (17 - 20)  SpO2: 100% (02 Oct 2018 13:45) (92% - 100%)      PHYSICAL EXAM:  GENERAL: Not in distress  HEENT: Trach in placed  CHEST/LUNG: Air entry B/L  HEART: s1 and s2 present  ABDOMEN: Nontender, and Nondistended  EXTREMITIES:  No pedal  edema  CNS: Awake but not Alert        LABS:                         8.7    6.4   )-----------( 296      ( 02 Oct 2018 08:35 )             27.8                           9.0    11.5  )-----------( 358      ( 01 Oct 2018 07:56 )             29.4                           9.0    6.9   )-----------( 281      ( 30 Sep 2018 07:02 )             28.9                                 10.5   20.1  )-----------( 217      ( 19 Sep 2018 07:47 )             33.1           Amikacin Level, Trough (.24.18 @ 17:06)    Amikacin Level, Trough: 17.6: Performed by: [facility], [phone] [place] [address] 21843 ug/mL    10-02    136  |  101  |  19<H>  ----------------------------<  191<H>  4.2   |  29  |  1.42<H>    Ca    9.4      02 Oct 2018 14:33      10    139  |  107  |  16  ----------------------------<  49<L>  3.7   |  24  |  1.50<H>    Ca    9.5      01 Oct 2018 07:56      TPro  6.8  /  Alb  2.0<L>  /  TBili  0.2  /  DBili  x   /  AST  21  /  ALT  20  /  AlkPhos  70    09    130<L>  |  97  |  25<H>  ----------------------------<  269<H>  3.9   |  24  |  2.80<H>    Ca    9.3      27 Sep 2018 08:21      TPro  6.2  /  Alb  2.2<L>  /  TBili  0.9  /  DBili  0.3<H>  /  AST  24  /  ALT  20  /  AlkPhos  86           Urinalysis Basic - ( 14 Sep 2018 03:56 )    Color: Yellow / Appearance: Clear / S.020 / pH: x  Gluc: x / Ketone: Negative  / Bili: Negative / Urobili: Negative   Blood: x / Protein: 100 / Nitrite: Negative   Leuk Esterase: Negative / RBC: 0-2 /HPF / WBC 0-2 /HPF   Sq Epi: x / Non Sq Epi: Occasional /HPF / Bacteria: Trace /HPF      CAPILLARY BLOOD GLUCOSE      POCT Blood Glucose.: 144 mg/dL (14 Sep 2018 11:07)  POCT Blood Glucose.: 202 mg/dL (14 Sep 2018 08:09)  POCT Blood Glucose.: 277 mg/dL (14 Sep 2018 06:58)  POCT Blood Glucose.: 68 mg/dL (14 Sep 2018 06:03)  POCT Blood Glucose.: 316 mg/dL (14 Sep 2018 02:11)  POCT Blood Glucose.: 405 mg/dL (14 Sep 2018 00:28)  POCT Blood Glucose.: 386 mg/dL (13 Sep 2018 22:26)  POCT Blood Glucose.: 403 mg/dL (13 Sep 2018 19:09)      MEDICATIONS  (STANDING):  ALBUTerol/ipratropium for Nebulization 3 milliLiter(s) Nebulizer every 6 hours  amLODIPine   Tablet 2.5 milliGRAM(s) Oral daily  atorvastatin 20 milliGRAM(s) Oral at bedtime  benztropine 2 milliGRAM(s) Oral two times a day  heparin  Injectable 5000 Unit(s) SubCutaneous every 8 hours  insulin glargine Injectable (LANTUS) 25 Unit(s) SubCutaneous at bedtime  insulin lispro (HumaLOG) corrective regimen sliding scale   SubCutaneous every 6 hours  insulin lispro Injectable (HumaLOG) 10 Unit(s) SubCutaneous <User Schedule>  lactobacillus acidophilus 1 Tablet(s) Oral every 8 hours  levETIRAcetam  Solution 1000 milliGRAM(s) Oral two times a day  LORazepam     Tablet 1 milliGRAM(s) Oral every 6 hours  metoprolol tartrate 100 milliGRAM(s) Oral two times a day  nystatin Powder 1 Application(s) Topical every 8 hours  pantoprazole   Suspension 40 milliGRAM(s) Oral daily  QUEtiapine 50 milliGRAM(s) Oral every 12 hours  senna 2 Tablet(s) Oral at bedtime  sodium chloride 0.9%. 1000 milliLiter(s) (100 mL/Hr) IV Continuous <Continuous>  sodium chloride 0.9%. 1000 milliLiter(s) (100 mL/Hr) IV Continuous <Continuous>  sodium chloride 0.9%. 1000 milliLiter(s) (100 mL/Hr) IV Continuous <Continuous>    MEDICATIONS  (PRN):  haloperidol    Injectable 5 milliGRAM(s) IntraMuscular once PRN Agitation  LORazepam   Injectable 1 milliGRAM(s) IV Push every 8 hours PRN for breathrough agitation/anxiety/combative behavior          RADIOLOGY & ADDITIONAL TESTS:    18: Xray Chest 1 View- PORTABLE-Routine (18 @ 08:22) Tracheostomy cannula reidentified in position. No change heart mediastinum. No change in the right basilar opacity with loss of definition of the ipsilateral hemidiaphragm and costophrenic angle likely   representing a combination of pleural fluid and underlying  consolidation/atelectasis.      18: CT Chest No Cont (18 @ 03:41) Impression: Consolidation involving the right lower lobe is of uncertain etiology. It is unclear whether this represents compressive atelectasis secondary to right pleural effusion/elevation of the right hemidiaphragm  and/or represents infection.    18: Xray Chest 1 View-PORTABLE IMMEDIATE (18 @ 13:37) Bilateral pleural effusions and associated atelectasis.        MICROBIOLOGY DATA:    Culture - Blood (18 @ 17:33)    Specimen Source: .Blood Blood-Peripheral    Culture Results:   No growth to date.      Culture - Blood (18 @ 13:21)    Specimen Source: .Blood Blood-Peripheral    Culture Results:   No growth to date.        Culture - Sputum . (09.15.18 @ 09:28)    Gram Stain:   Rare polymorphonuclear leukocytes per low power field  Few Squamous epithelial cells per low power field  Moderate Gram Negative Rods per oil power field  Few Gram Positive Rods per oil power field  Few Yeast like cells per oil power field  Rare Gram Negative Coccobacilli per oil power field    -  Amikacin: S <=8    -  Amikacin: S <=8    -  Ampicillin/Sulbactam: R >16/8    -  Aztreonam: S 8    -  Cefepime: I 16    -  Cefepime: I 16    -  Ceftazidime: R >16    -  Ceftazidime: I 16    -  Ciprofloxacin: R >2    -  Ciprofloxacin: R >2    -  Gentamicin: R >8    -  Gentamicin: R >8    -  Imipenem: R >8    -  Imipenem: R    -  Levofloxacin: R >4    -  Levofloxacin: R >4    -  Meropenem: R >8    -  Meropenem: I 8    -  Piperacillin/Tazobactam: R >64    -  Piperacillin/Tazobactam: R    -  Tobramycin: R >8    -  Tobramycin: R >8    -  Trimethoprim/Sulfamethoxazole: R >2/38    Specimen Source: .Sputum Sputum    Culture Results:   Numerous Pseudomonas aeruginosa (Carbapenem Resistant)  Moderate Acinetobacter baumannii/haemolyticus (Carbapenem Resistant)  Complex  Normal Respiratory Moraima present    Organism Identification: Pseudomonas aeruginosa (Carbapenem Resistant)  Acinetobacter baumannii/haemolyticus (Carbapenem Resistant)    Organism: Acinetobacter baumannii/haemolyticus (Carbapenem Resistant)    Organism: Pseudomonas aeruginosa (Carbapenem Resistant)    Organism: Acinetobacter baumannii/haemolyticus (Carbapenem Resistant)    Method Type: ESTIVEN    Method Type: ESTIVEN    Method Type: KB        MRSA/MSSA PCR (18 @ 11:26)    MRSA PCR Result.: NotDetec: MRSA/MSSA PCR assay is a qualitative in vitro diagnostic test for the  direct detection and differentiation of methicillin-resistant  Staphylococcus aureus (MRSA) from Staphylococcus aureus (SA). The assay  detects DNA from nasal swabs in patients atrisk for nasal colonization.  It is not intended to diagnose MRSA or SA infections nor guide or monitor  treatment for MRSA/SA infections. A negative result does not preclude  nasal colonization. The assay is FDA-approved and its performance has  been established by [facility] [place], [place] and the [facility], [place], [place].    Staph Aureus PCR Result: [facility]      Culture - Urine (18 @ 09:47)    Specimen Source: .Urine Catheterized    Culture Results:   10,000 - 49,000 CFU/mL Yeast Patient is seen and examined at the bed side, is afebrile.  The creatinine continues  trending down, no new events.      REVIEW OF SYSTEMS: Unable to obtain due to mental status      ALLERGIES : NKDA      Vital Signs Last 24 Hrs  T(C): 36.4 (02 Oct 2018 13:45), Max: 36.8 (02 Oct 2018 05:45)  T(F): 97.6 (02 Oct 2018 13:45), Max: 98.3 (02 Oct 2018 05:45)  HR: 81 (02 Oct 2018 13:45) (78 - 92)  BP: 127/59 (02 Oct 2018 13:45) (110/48 - 132/91)  BP(mean): --  RR: 17 (02 Oct 2018 13:45) (17 - 20)  SpO2: 100% (02 Oct 2018 13:45) (92% - 100%)      PHYSICAL EXAM:  GENERAL: Not in distress  HEENT: Trach in placed  CHEST/LUNG: Air entry B/L  HEART: s1 and s2 present  ABDOMEN: Nontender, and Nondistended  EXTREMITIES:  No pedal  edema  CNS: Awake but not Alert        LABS:                         8.7    6.4   )-----------( 296      ( 02 Oct 2018 08:35 )             27.8                           9.0    11.5  )-----------( 358      ( 01 Oct 2018 07:56 )             29.4                           10.5   20.1  )-----------( 217      ( 19 Sep 2018 07:47 )             33.1           Amikacin Level, Trough (09.24.18 @ 17:06)    Amikacin Level, Trough: 17.6: Performed by: [facility], [phone] [place] [address] [id number] ug/mL    10-02    136  |  101  |  19<H>  ----------------------------<  191<H>  4.2   |  29  |  1.42<H>    Ca    9.4      02 Oct 2018 14:33      10    139  |  107  |  16  ----------------------------<  49<L>  3.7   |  24  |  1.50<H>    Ca    9.5      01 Oct 2018 07:56      TPro  6.8  /  Alb  2.0<L>  /  TBili  0.2  /  DBili  x   /  AST  21  /  ALT  20  /  AlkPhos  70      130<L>  |  97  |  25<H>  ----------------------------<  269<H>  3.9   |  24  |  2.80<H>    Ca    9.3      27 Sep 2018 08:21      TPro  6.2  /  Alb  2.2<L>  /  TBili  0.9  /  DBili  0.3<H>  /  AST  24  /  ALT  20  /  AlkPhos  86           Urinalysis Basic - ( 14 Sep 2018 03:56 )    Color: Yellow / Appearance: Clear / S.020 / pH: x  Gluc: x / Ketone: Negative  / Bili: Negative / Urobili: Negative   Blood: x / Protein: 100 / Nitrite: Negative   Leuk Esterase: Negative / RBC: 0-2 /HPF / WBC 0-2 /HPF   Sq Epi: x / Non Sq Epi: Occasional /HPF / Bacteria: Trace /HPF      CAPILLARY BLOOD GLUCOSE      POCT Blood Glucose.: 144 mg/dL (14 Sep 2018 11:07)  POCT Blood Glucose.: 202 mg/dL (14 Sep 2018 08:09)  POCT Blood Glucose.: 277 mg/dL (14 Sep 2018 06:58)  POCT Blood Glucose.: 68 mg/dL (14 Sep 2018 06:03)  POCT Blood Glucose.: 316 mg/dL (14 Sep 2018 02:11)  POCT Blood Glucose.: 405 mg/dL (14 Sep 2018 00:28)  POCT Blood Glucose.: 386 mg/dL (13 Sep 2018 22:26)  POCT Blood Glucose.: 403 mg/dL (13 Sep 2018 19:09)      MEDICATIONS  (STANDING):  ALBUTerol/ipratropium for Nebulization 3 milliLiter(s) Nebulizer every 6 hours  amLODIPine   Tablet 2.5 milliGRAM(s) Oral daily  atorvastatin 20 milliGRAM(s) Oral at bedtime  benztropine 2 milliGRAM(s) Oral two times a day  heparin  Injectable 5000 Unit(s) SubCutaneous every 8 hours  insulin glargine Injectable (LANTUS) 25 Unit(s) SubCutaneous at bedtime  insulin lispro (HumaLOG) corrective regimen sliding scale   SubCutaneous every 6 hours  insulin lispro Injectable (HumaLOG) 10 Unit(s) SubCutaneous <User Schedule>  lactobacillus acidophilus 1 Tablet(s) Oral every 8 hours  levETIRAcetam  Solution 1000 milliGRAM(s) Oral two times a day  LORazepam     Tablet 1 milliGRAM(s) Oral every 6 hours  metoprolol tartrate 100 milliGRAM(s) Oral two times a day  nystatin Powder 1 Application(s) Topical every 8 hours  pantoprazole   Suspension 40 milliGRAM(s) Oral daily  QUEtiapine 50 milliGRAM(s) Oral every 12 hours  senna 2 Tablet(s) Oral at bedtime  sodium chloride 0.9%. 1000 milliLiter(s) (100 mL/Hr) IV Continuous <Continuous>  sodium chloride 0.9%. 1000 milliLiter(s) (100 mL/Hr) IV Continuous <Continuous>  sodium chloride 0.9%. 1000 milliLiter(s) (100 mL/Hr) IV Continuous <Continuous>    MEDICATIONS  (PRN):  haloperidol    Injectable 5 milliGRAM(s) IntraMuscular once PRN Agitation  LORazepam   Injectable 1 milliGRAM(s) IV Push every 8 hours PRN for breathrough agitation/anxiety/combative behavior          RADIOLOGY & ADDITIONAL TESTS:    18: Xray Chest 1 View- PORTABLE-Routine (18 @ 08:22) Tracheostomy cannula reidentified in position. No change heart mediastinum. No change in the right basilar opacity with loss of definition of the ipsilateral hemidiaphragm and costophrenic angle likely   representing a combination of pleural fluid and underlying  consolidation/atelectasis.      18: CT Chest No Cont (18 @ 03:41) Impression: Consolidation involving the right lower lobe is of uncertain etiology. It is unclear whether this represents compressive atelectasis secondary to right pleural effusion/elevation of the right hemidiaphragm  and/or represents infection.    18: Xray Chest 1 View-PORTABLE IMMEDIATE (18 @ 13:37) Bilateral pleural effusions and associated atelectasis.        MICROBIOLOGY DATA:    Culture - Blood (18 @ 17:33)    Specimen Source: .Blood Blood-Peripheral    Culture Results:   No growth to date.      Culture - Blood (18 @ 13:21)    Specimen Source: .Blood Blood-Peripheral    Culture Results:   No growth to date.        Culture - Sputum . (09.15.18 @ 09:28)    Gram Stain:   Rare polymorphonuclear leukocytes per low power field  Few Squamous epithelial cells per low power field  Moderate Gram Negative Rods per oil power field  Few Gram Positive Rods per oil power field  Few Yeast like cells per oil power field  Rare Gram Negative Coccobacilli per oil power field    -  Amikacin: S <=8    -  Amikacin: S <=8    -  Ampicillin/Sulbactam: R >16/8    -  Aztreonam: S 8    -  Cefepime: I 16    -  Cefepime: I 16    -  Ceftazidime: R >16    -  Ceftazidime: I 16    -  Ciprofloxacin: R >2    -  Ciprofloxacin: R >2    -  Gentamicin: R >8    -  Gentamicin: R >8    -  Imipenem: R >8    -  Imipenem: R    -  Levofloxacin: R >4    -  Levofloxacin: R >4    -  Meropenem: R >8    -  Meropenem: I 8    -  Piperacillin/Tazobactam: R >64    -  Piperacillin/Tazobactam: R    -  Tobramycin: R >8    -  Tobramycin: R >8    -  Trimethoprim/Sulfamethoxazole: R >2/38    Specimen Source: .Sputum Sputum    Culture Results:   Numerous Pseudomonas aeruginosa (Carbapenem Resistant)  Moderate Acinetobacter baumannii/haemolyticus (Carbapenem Resistant)  Complex  Normal Respiratory Moraima present    Organism Identification: Pseudomonas aeruginosa (Carbapenem Resistant)  Acinetobacter baumannii/haemolyticus (Carbapenem Resistant)    Organism: Acinetobacter baumannii/haemolyticus (Carbapenem Resistant)    Organism: Pseudomonas aeruginosa (Carbapenem Resistant)    Organism: Acinetobacter baumannii/haemolyticus (Carbapenem Resistant)    Method Type: ESTIVEN    Method Type: ESTIVEN    Method Type: KB        MRSA/MSSA PCR (18 @ 11:26)    MRSA PCR Result.: NotDetec: MRSA/MSSA PCR assay is a qualitative in vitro diagnostic test for the  direct detection and differentiation of methicillin-resistant  Staphylococcus aureus (MRSA) from Staphylococcus aureus (SA). The assay  detects DNA from nasal swabs in patients atrisk for nasal colonization.  It is not intended to diagnose MRSA or SA infections nor guide or monitor  treatment for MRSA/SA infections. A negative result does not preclude  nasal colonization. The assay is FDA-approved and its performance has  been established by [facility] [place], [place] and the [facility] [employer]  [place], [place].    Staph Aureus PCR Result: NotDetec      Culture - Urine (18 @ 09:47)    Specimen Source: .Urine Catheterized    Culture Results:   10,000 - 49,000 CFU/mL Yeast

## 2018-10-02 NOTE — PROGRESS NOTE ADULT - PROBLEM SELECTOR PLAN 1
LAYLA 2/2 ATN 2/2 polymyxin and amikacin.  Renal function improving.  Continue IVF NS @100ml/hr for now.  If creatinine improves further today, would d/c IVF.

## 2018-10-02 NOTE — PROGRESS NOTE ADULT - SUBJECTIVE AND OBJECTIVE BOX
Interval Events:      Allergies    angiotensin converting enzyme inhibitors (Unknown)    Intolerances      Endocrine/Metabolic Medications:  atorvastatin 20 milliGRAM(s) Oral at bedtime  insulin glargine Injectable (LANTUS) 25 Unit(s) SubCutaneous at bedtime  insulin lispro (HumaLOG) corrective regimen sliding scale   SubCutaneous every 6 hours  insulin lispro Injectable (HumaLOG) 10 Unit(s) SubCutaneous <User Schedule>      Vital Signs Last 24 Hrs  T(C): 36.8 (02 Oct 2018 05:45), Max: 37 (01 Oct 2018 14:53)  T(F): 98.3 (02 Oct 2018 05:45), Max: 98.6 (01 Oct 2018 14:53)  HR: 92 (02 Oct 2018 05:45) (58 - 92)  BP: 115/87 (02 Oct 2018 05:45) (110/48 - 132/91)  BP(mean): --  RR: 17 (02 Oct 2018 05:45) (17 - 22)  SpO2: 92% (02 Oct 2018 05:45) (90% - 97%)      PHYSICAL EXAM  All physical exam findings normal, except those marked:  General:	Alert, active, cooperative, NAD, well hydrated  .		[] Abnormal:  Neck		Normal: supple, no cervical adenopathy, no palpable thyroid  .		[] Abnormal:  Cardiovascular	Normal: regular rate, normal S1, S2, no murmurs  .		[] Abnormal:  Respiratory	Normal: no chest wall deformity, normal respiratory pattern, CTA B/L  .		[] Abnormal:  Abdominal	Normal: soft, ND, NT, bowel sounds present, no masses, no organomegaly  .		[] Abnormal:  		Normal normal genitalia, testes descended, circumcised/uncircumcised  .		Bob stage:			Breast bob:  .		Menstrual history:  .		[] Abnormal:  Extremities	Normal: FROM x4  .		[] Abnormal:  Skin		Normal: intact and not indurated, no rash, no acanthosis nigricans  .		[] Abnormal:  Neurologic	Normal: grossly intact  .		[] Abnormal:    LABS                        8.7    6.4   )-----------( 296      ( 02 Oct 2018 08:35 )             27.8                               136    |  103    |  17                  Calcium: 9.3   / iCa: x      (10-02 @ 08:35)    ----------------------------<  226       Magnesium: x                                3.8     |  27     |  1.42             Phosphorous: x          CAPILLARY BLOOD GLUCOSE      POCT Blood Glucose.: 193 mg/dL (02 Oct 2018 06:38)  POCT Blood Glucose.: 105 mg/dL (02 Oct 2018 00:54)  POCT Blood Glucose.: 129 mg/dL (01 Oct 2018 22:04)  POCT Blood Glucose.: 275 mg/dL (01 Oct 2018 17:36)  POCT Blood Glucose.: 146 mg/dL (01 Oct 2018 11:35)        Assesment/plan Interval Events:  pt in nad    Allergies    angiotensin converting enzyme inhibitors (Unknown)    Intolerances      Endocrine/Metabolic Medications:  atorvastatin 20 milliGRAM(s) Oral at bedtime  insulin glargine Injectable (LANTUS) 25 Unit(s) SubCutaneous at bedtime  insulin lispro (HumaLOG) corrective regimen sliding scale   SubCutaneous every 6 hours  insulin lispro Injectable (HumaLOG) 10 Unit(s) SubCutaneous <User Schedule>      Vital Signs Last 24 Hrs  T(C): 36.8 (02 Oct 2018 05:45), Max: 37 (01 Oct 2018 14:53)  T(F): 98.3 (02 Oct 2018 05:45), Max: 98.6 (01 Oct 2018 14:53)  HR: 92 (02 Oct 2018 05:45) (58 - 92)  BP: 115/87 (02 Oct 2018 05:45) (110/48 - 132/91)  BP(mean): --  RR: 17 (02 Oct 2018 05:45) (17 - 22)  SpO2: 92% (02 Oct 2018 05:45) (90% - 97%)      PHYSICAL EXAM  All physical exam findings normal, except those marked:  General:	Alert, active, cooperative, NAD, well hydrated  .		[] Abnormal:  Neck		Normal: supple, no cervical adenopathy, no palpable thyroid  .		[] Abnormal:  Cardiovascular	Normal: regular rate, normal S1, S2, no murmurs  .		[] Abnormal:  Respiratory	Normal: no chest wall deformity, normal respiratory pattern, CTA B/L  .		[] Abnormal:  Abdominal	Normal: soft, ND, NT, bowel sounds present, no masses, no organomegaly  .		[] Abnormal:  		Normal normal genitalia, testes descended, circumcised/uncircumcised  .		Bob stage:			Breast bob:  .		Menstrual history:  .		[] Abnormal:  Extremities	Normal: FROM x4  .		[] Abnormal:  Skin		Normal: intact and not indurated, no rash, no acanthosis nigricans  .		[] Abnormal:  Neurologic	Normal: grossly intact  .		[] Abnormal:    LABS                        8.7    6.4   )-----------( 296      ( 02 Oct 2018 08:35 )             27.8                               136    |  103    |  17                  Calcium: 9.3   / iCa: x      (10-02 @ 08:35)    ----------------------------<  226       Magnesium: x                                3.8     |  27     |  1.42             Phosphorous: x          CAPILLARY BLOOD GLUCOSE      POCT Blood Glucose.: 193 mg/dL (02 Oct 2018 06:38)  POCT Blood Glucose.: 105 mg/dL (02 Oct 2018 00:54)  POCT Blood Glucose.: 129 mg/dL (01 Oct 2018 22:04)  POCT Blood Glucose.: 275 mg/dL (01 Oct 2018 17:36)  POCT Blood Glucose.: 146 mg/dL (01 Oct 2018 11:35)        Assesment/plan    Dm- good control  cont lantus 25 and humalog 10 tid  fsg q6

## 2018-10-02 NOTE — PROGRESS NOTE ADULT - SUBJECTIVE AND OBJECTIVE BOX
NP Note discussed with  Primary Attending    Patient is a 63y old  Female who presents with a chief complaint of vomiting (02 Oct 2018 10:16) Admitted with Pseudomonas aspiratoi      INTERVAL HPI/OVERNIGHT EVENTS: no new complaints    MEDICATIONS  (STANDING):  ALBUTerol/ipratropium for Nebulization 3 milliLiter(s) Nebulizer every 6 hours  amLODIPine   Tablet 2.5 milliGRAM(s) Oral daily  atorvastatin 20 milliGRAM(s) Oral at bedtime  benztropine 2 milliGRAM(s) Oral two times a day  chlorhexidine 4% Liquid 1 Application(s) Topical every 12 hours  heparin  Injectable 5000 Unit(s) SubCutaneous every 8 hours  insulin glargine Injectable (LANTUS) 25 Unit(s) SubCutaneous at bedtime  insulin lispro (HumaLOG) corrective regimen sliding scale   SubCutaneous every 6 hours  insulin lispro Injectable (HumaLOG) 10 Unit(s) SubCutaneous <User Schedule>  lactobacillus acidophilus 1 Tablet(s) Oral every 8 hours  levETIRAcetam  Solution 1000 milliGRAM(s) Oral two times a day  LORazepam     Tablet 1 milliGRAM(s) Oral every 6 hours  metoprolol tartrate 100 milliGRAM(s) Oral two times a day  nystatin Powder 1 Application(s) Topical every 8 hours  pantoprazole   Suspension 40 milliGRAM(s) Oral daily  QUEtiapine 50 milliGRAM(s) Oral every 12 hours  senna 2 Tablet(s) Oral at bedtime  sodium chloride 0.9%. 1000 milliLiter(s) (100 mL/Hr) IV Continuous <Continuous>  sodium chloride 0.9%. 1000 milliLiter(s) (100 mL/Hr) IV Continuous <Continuous>  sodium chloride 0.9%. 1000 milliLiter(s) (100 mL/Hr) IV Continuous <Continuous>    MEDICATIONS  (PRN):  haloperidol    Injectable 5 milliGRAM(s) IntraMuscular once PRN Agitation  LORazepam   Injectable 1 milliGRAM(s) IV Push every 8 hours PRN for breathrough agitation/anxiety/combative behavior      __________________________________________________  REVIEW OF SYSTEMS:    CONSTITUTIONAL: No fever,   EYES: no acute visual disturbances  NECK: No pain or stiffness  RESPIRATORY: No cough; No shortness of breath  CARDIOVASCULAR: No chest pain, no palpitations  GASTROINTESTINAL: No pain. No nausea or vomiting; No diarrhea   NEUROLOGICAL: No headache or numbness, no tremors  MUSCULOSKELETAL: No joint pain, no muscle pain  GENITOURINARY: no dysuria, no frequency, no hesitancy  PSYCHIATRY: no depression , no anxiety  ALL OTHER  ROS negative        Vital Signs Last 24 Hrs  T(C): 36.8 (02 Oct 2018 05:45), Max: 37 (01 Oct 2018 14:53)  T(F): 98.3 (02 Oct 2018 05:45), Max: 98.6 (01 Oct 2018 14:53)  HR: 92 (02 Oct 2018 05:45) (58 - 92)  BP: 115/87 (02 Oct 2018 05:45) (110/48 - 132/91)  BP(mean): --  RR: 17 (02 Oct 2018 05:45) (17 - 22)  SpO2: 92% (02 Oct 2018 05:45) (90% - 97%)    ________________________________________________  PHYSICAL EXAM:  GENERAL: NAD  HEENT: Normocephalic;  conjunctivae and sclerae clear; moist mucous membranes;   NECK : supple  CHEST/LUNG: Clear to auscultation bilaterally with good air entry   HEART: S1 S2  regular; no murmurs, gallops or rubs  ABDOMEN: Soft, Nontender, Nondistended; Bowel sounds present  EXTREMITIES: no cyanosis; no edema; no calf tenderness  SKIN: warm and dry; no rash  NERVOUS SYSTEM:  Awake and alert; Oriented  to place, person and time ; no new deficits    _________________________________________________  LABS:                        8.7    6.4   )-----------( 296      ( 02 Oct 2018 08:35 )             27.8     10-02    136  |  103  |  17  ----------------------------<  226<H>  3.8   |  27  |  1.42<H>    Ca    9.3      02 Oct 2018 08:35          CAPILLARY BLOOD GLUCOSE      POCT Blood Glucose.: 121 mg/dL (02 Oct 2018 11:18)  POCT Blood Glucose.: 193 mg/dL (02 Oct 2018 06:38)  POCT Blood Glucose.: 105 mg/dL (02 Oct 2018 00:54)  POCT Blood Glucose.: 129 mg/dL (01 Oct 2018 22:04)  POCT Blood Glucose.: 275 mg/dL (01 Oct 2018 17:36)        RADIOLOGY & ADDITIONAL TESTS:    Imaging  Reviewed:  YES/NO    Consultant(s) Notes Reviewed:   YES/ No      Plan of care was discussed with patient and /or primary care giver; all questions and concerns were addressed NP Note discussed with  Primary Attending    Patient is a 63y old  Female who presents with a chief complaint of vomiting (21 Sep 2018 11:21)3 yo F from [facility], non-verbal, bed bound, S/P Trach/ PEG tube, PMH of HTN, asthma, convulsion disorder, bipolar, DVT legs, DM 2, sent from NH for coffee ground vomiting and cough. Pt unable to provide historyPt also developed cough with clear sputum.  Sister states pt is combative at baseline, and her current behavior is baseline.  Pt was recently admitted to [facility] in 07/2018 for same reason and treated for aspiration PNA and UTI during last admission.no episode of vomiting in ED  Hb 12 at baseline , less likely UGI Bleeding  started protonix 40mg bid IV, seen by GI, no further bleeding noted. Started empiric treatment for aspiration pna, sputum culture grew with Pseudomonoas CRE and acetinobacter, started on Amikacin/Rifampin/Polymixin, Hospitalization complicated by poor iv acess, s/p TLC R groin on 9/20/2018, now discontinued. Further hospitalization complicated by LAYLA likely 2/2 ATN due to Amikacin, which was then stopped, patient receiving aggressive IVF hydration, renal function improving.     Unable to obtain history from patient.     INTERVAL HPI/OVERNIGHT EVENTS: no o/n events per rn staff.    MEDICATIONS  (STANDING):  ALBUTerol/ipratropium for Nebulization 3 milliLiter(s) Nebulizer every 6 hours  amLODIPine   Tablet 2.5 milliGRAM(s) Oral daily  atorvastatin 20 milliGRAM(s) Oral at bedtime  benztropine 2 milliGRAM(s) Oral two times a day  chlorhexidine 4% Liquid 1 Application(s) Topical every 12 hours  heparin  Injectable 5000 Unit(s) SubCutaneous every 8 hours  insulin glargine Injectable (LANTUS) 25 Unit(s) SubCutaneous at bedtime  insulin lispro (HumaLOG) corrective regimen sliding scale   SubCutaneous every 6 hours  insulin lispro Injectable (HumaLOG) 10 Unit(s) SubCutaneous <User Schedule>  lactobacillus acidophilus 1 Tablet(s) Oral every 8 hours  levETIRAcetam  Solution 1000 milliGRAM(s) Oral two times a day  LORazepam     Tablet 1 milliGRAM(s) Oral every 6 hours  metoprolol tartrate 100 milliGRAM(s) Oral two times a day  nystatin Powder 1 Application(s) Topical every 8 hours  pantoprazole   Suspension 40 milliGRAM(s) Oral daily  QUEtiapine 50 milliGRAM(s) Oral every 12 hours  senna 2 Tablet(s) Oral at bedtime  sodium chloride 0.9%. 1000 milliLiter(s) (100 mL/Hr) IV Continuous <Continuous>  sodium chloride 0.9%. 1000 milliLiter(s) (100 mL/Hr) IV Continuous <Continuous>  sodium chloride 0.9%. 1000 milliLiter(s) (100 mL/Hr) IV Continuous <Continuous>    MEDICATIONS  (PRN):  haloperidol    Injectable 5 milliGRAM(s) IntraMuscular once PRN Agitation  LORazepam   Injectable 1 milliGRAM(s) IV Push every 8 hours PRN for breathrough agitation/anxiety/combative behavior      __________________________________________________  REVIEW OF SYSTEMS:    CONSTITUTIONAL: No fever,       Vital Signs Last 24 Hrs  T(C): 36.8 (02 Oct 2018 05:45), Max: 37 (01 Oct 2018 14:53)  T(F): 98.3 (02 Oct 2018 05:45), Max: 98.6 (01 Oct 2018 14:53)  HR: 92 (02 Oct 2018 05:45) (58 - 92)  BP: 115/87 (02 Oct 2018 05:45) (110/48 - 132/91)  BP(mean): --  RR: 17 (02 Oct 2018 05:45) (17 - 22)  SpO2: 92% (02 Oct 2018 05:45) (90% - 97%)    ________________________________________________  PHYSICAL EXAM:  GENERAL: NAD, bedbound female in nad  HEENT: Normocephalic;  conjunctivae and sclerae clear; moist mucous membranes;   NECK : supple  CHEST/LUNG: Clear to auscultation bilaterally with good air entry   HEART: S1 S2  regular; no murmurs, gallops or rubs  ABDOMEN: Soft, Nontender, Nondistended; Bowel sounds present +PEG  EXTREMITIES: no cyanosis; no edema; no calf tenderness, +contracted  SKIN: warm and dry; fungal rash groin  NERVOUS SYSTEM:  Awake and alert; at baseline  _________________________________________________  LABS:                        8.7    6.4   )-----------( 296      ( 02 Oct 2018 08:35 )             27.8     10-02    136  |  103  |  17  ----------------------------<  226<H>  3.8   |  27  |  1.42<H>    Ca    9.3      02 Oct 2018 08:35          CAPILLARY BLOOD GLUCOSE      POCT Blood Glucose.: 121 mg/dL (02 Oct 2018 11:18)  POCT Blood Glucose.: 193 mg/dL (02 Oct 2018 06:38)  POCT Blood Glucose.: 105 mg/dL (02 Oct 2018 00:54)  POCT Blood Glucose.: 129 mg/dL (01 Oct 2018 22:04)  POCT Blood Glucose.: 275 mg/dL (01 Oct 2018 17:36)        RADIOLOGY & ADDITIONAL TESTS:    Imaging  Reviewed:  YES    Consultant(s) Notes Reviewed:   YES

## 2018-10-02 NOTE — PROGRESS NOTE ADULT - ASSESSMENT
63 year-old woman with LAYLA due to ATN from polymyxin and amikacin  Hyponatremia likely due to dehydration as well.  Elevate bicarbonate ether respiratory acidosis or metabolic alkalosis.    Renal function improving.  If creatinine improves further today, would d/c IVF.

## 2018-10-03 LAB
ANION GAP SERPL CALC-SCNC: 5 MMOL/L — SIGNIFICANT CHANGE UP (ref 5–17)
BUN SERPL-MCNC: 21 MG/DL — HIGH (ref 7–18)
CALCIUM SERPL-MCNC: 10.1 MG/DL — SIGNIFICANT CHANGE UP (ref 8.4–10.5)
CHLORIDE SERPL-SCNC: 100 MMOL/L — SIGNIFICANT CHANGE UP (ref 96–108)
CO2 SERPL-SCNC: 31 MMOL/L — SIGNIFICANT CHANGE UP (ref 22–31)
CREAT SERPL-MCNC: 1.43 MG/DL — HIGH (ref 0.5–1.3)
GLUCOSE SERPL-MCNC: 176 MG/DL — HIGH (ref 70–99)
HCT VFR BLD CALC: 32.1 % — LOW (ref 34.5–45)
HGB BLD-MCNC: 9.9 G/DL — LOW (ref 11.5–15.5)
MCHC RBC-ENTMCNC: 24.4 PG — LOW (ref 27–34)
MCHC RBC-ENTMCNC: 30.8 GM/DL — LOW (ref 32–36)
MCV RBC AUTO: 79.1 FL — LOW (ref 80–100)
PLATELET # BLD AUTO: 354 K/UL — SIGNIFICANT CHANGE UP (ref 150–400)
POTASSIUM SERPL-MCNC: 4.6 MMOL/L — SIGNIFICANT CHANGE UP (ref 3.5–5.3)
POTASSIUM SERPL-SCNC: 4.6 MMOL/L — SIGNIFICANT CHANGE UP (ref 3.5–5.3)
RBC # BLD: 4.06 M/UL — SIGNIFICANT CHANGE UP (ref 3.8–5.2)
RBC # FLD: 13.6 % — SIGNIFICANT CHANGE UP (ref 10.3–14.5)
SODIUM SERPL-SCNC: 136 MMOL/L — SIGNIFICANT CHANGE UP (ref 135–145)
WBC # BLD: 6.8 K/UL — SIGNIFICANT CHANGE UP (ref 3.8–10.5)
WBC # FLD AUTO: 6.8 K/UL — SIGNIFICANT CHANGE UP (ref 3.8–10.5)

## 2018-10-03 RX ADMIN — HEPARIN SODIUM 5000 UNIT(S): 5000 INJECTION INTRAVENOUS; SUBCUTANEOUS at 13:17

## 2018-10-03 RX ADMIN — Medication 10 UNIT(S): at 17:41

## 2018-10-03 RX ADMIN — INSULIN GLARGINE 25 UNIT(S): 100 INJECTION, SOLUTION SUBCUTANEOUS at 23:26

## 2018-10-03 RX ADMIN — QUETIAPINE FUMARATE 50 MILLIGRAM(S): 200 TABLET, FILM COATED ORAL at 18:42

## 2018-10-03 RX ADMIN — HEPARIN SODIUM 5000 UNIT(S): 5000 INJECTION INTRAVENOUS; SUBCUTANEOUS at 06:59

## 2018-10-03 RX ADMIN — Medication 2: at 00:32

## 2018-10-03 RX ADMIN — NYSTATIN CREAM 1 APPLICATION(S): 100000 CREAM TOPICAL at 13:24

## 2018-10-03 RX ADMIN — QUETIAPINE FUMARATE 50 MILLIGRAM(S): 200 TABLET, FILM COATED ORAL at 06:57

## 2018-10-03 RX ADMIN — Medication 2: at 17:41

## 2018-10-03 RX ADMIN — Medication 1: at 07:00

## 2018-10-03 RX ADMIN — NYSTATIN CREAM 1 APPLICATION(S): 100000 CREAM TOPICAL at 23:28

## 2018-10-03 RX ADMIN — Medication 1 MILLIGRAM(S): at 06:57

## 2018-10-03 RX ADMIN — ATORVASTATIN CALCIUM 20 MILLIGRAM(S): 80 TABLET, FILM COATED ORAL at 23:25

## 2018-10-03 RX ADMIN — Medication 3 MILLILITER(S): at 20:07

## 2018-10-03 RX ADMIN — HEPARIN SODIUM 5000 UNIT(S): 5000 INJECTION INTRAVENOUS; SUBCUTANEOUS at 23:28

## 2018-10-03 RX ADMIN — Medication 10 UNIT(S): at 07:00

## 2018-10-03 RX ADMIN — Medication 1 MILLIGRAM(S): at 12:16

## 2018-10-03 RX ADMIN — Medication 1 TABLET(S): at 23:25

## 2018-10-03 RX ADMIN — SENNA PLUS 2 TABLET(S): 8.6 TABLET ORAL at 23:24

## 2018-10-03 RX ADMIN — Medication 10 UNIT(S): at 12:10

## 2018-10-03 RX ADMIN — LEVETIRACETAM 1000 MILLIGRAM(S): 250 TABLET, FILM COATED ORAL at 06:58

## 2018-10-03 RX ADMIN — PANTOPRAZOLE SODIUM 40 MILLIGRAM(S): 20 TABLET, DELAYED RELEASE ORAL at 12:11

## 2018-10-03 RX ADMIN — Medication 1 TABLET(S): at 13:23

## 2018-10-03 RX ADMIN — Medication 100 MILLIGRAM(S): at 06:58

## 2018-10-03 RX ADMIN — Medication 1 MILLIGRAM(S): at 23:27

## 2018-10-03 RX ADMIN — Medication 3 MILLILITER(S): at 03:19

## 2018-10-03 RX ADMIN — Medication 3 MILLILITER(S): at 08:09

## 2018-10-03 RX ADMIN — Medication 2 MILLIGRAM(S): at 18:42

## 2018-10-03 RX ADMIN — LEVETIRACETAM 1000 MILLIGRAM(S): 250 TABLET, FILM COATED ORAL at 18:42

## 2018-10-03 RX ADMIN — NYSTATIN CREAM 1 APPLICATION(S): 100000 CREAM TOPICAL at 07:01

## 2018-10-03 RX ADMIN — AMLODIPINE BESYLATE 2.5 MILLIGRAM(S): 2.5 TABLET ORAL at 06:58

## 2018-10-03 RX ADMIN — Medication 1 MILLIGRAM(S): at 18:42

## 2018-10-03 RX ADMIN — Medication 1: at 12:10

## 2018-10-03 RX ADMIN — Medication 1 MILLIGRAM(S): at 00:47

## 2018-10-03 RX ADMIN — Medication 3 MILLILITER(S): at 14:08

## 2018-10-03 RX ADMIN — Medication 2: at 23:27

## 2018-10-03 RX ADMIN — Medication 1 TABLET(S): at 06:57

## 2018-10-03 RX ADMIN — Medication 2 MILLIGRAM(S): at 06:57

## 2018-10-03 NOTE — PROGRESS NOTE ADULT - SUBJECTIVE AND OBJECTIVE BOX
Patient is seen and examined at the bed side, is afebrile.  The creatinine continues  trending down, no new events.      REVIEW OF SYSTEMS: Unable to obtain due to mental status      ALLERGIES : NKDA      Vital Signs Last 24 Hrs  T(C): 36.7 (03 Oct 2018 14:49), Max: 36.7 (03 Oct 2018 14:49)  T(F): 98 (03 Oct 2018 14:49), Max: 98 (03 Oct 2018 14:49)  HR: 85 (03 Oct 2018 14:49) (70 - 85)  BP: 108/- (03 Oct 2018 14:49) (86/43 - 117/55)  BP(mean): --  RR: 18 (03 Oct 2018 14:49) (16 - 18)  SpO2: 100% (03 Oct 2018 14:49) (100% - 100%)      PHYSICAL EXAM:  GENERAL: Not in distress  HEENT: Trach in placed  CHEST/LUNG: Air entry B/L  HEART: s1 and s2 present  ABDOMEN: Nontender, and Nondistended  EXTREMITIES:  No pedal  edema  CNS: Awake but not Alert        LABS:                         9.9    6.8   )-----------( 354      ( 03 Oct 2018 06:41 )             32.1                           8.7    6.4   )-----------( 296      ( 02 Oct 2018 08:35 )             27.8                           9.0    11.5  )-----------( 358      ( 01 Oct 2018 07:56 )             29.4                           10.5   20.1  )-----------( 217      ( 19 Sep 2018 07:47 )             33.1           Amikacin Level, Trough (.18 @ 17:06)    Amikacin Level, Trough: 17.6: Performed by: [facility], [id number] [place] [address] 06[address] ug/mL      10-03    136  |  100  |  21<H>  ----------------------------<  176<H>  4.6   |  31  |  1.43<H>    Ca    10.1      03 Oct 2018 06:41      10    136  |  101  |  19<H>  ----------------------------<  191<H>  4.2   |  29  |  1.42<H>    Ca    9.4      02 Oct 2018 14:33      10-01    139  |  107  |  16  ----------------------------<  49<L>  3.7   |  24  |  1.50<H>    Ca    9.5      01 Oct 2018 07:56      TPro  6.8  /  Alb  2.0<L>  /  TBili  0.2  /  DBili  x   /  AST  21  /  ALT  20  /  AlkPhos  70    09    130<L>  |  97  |  25<H>  ----------------------------<  269<H>  3.9   |  24  |  2.80<H>    Ca    9.3      27 Sep 2018 08:21      TPro  6.2  /  Alb  2.2<L>  /  TBili  0.9  /  DBili  0.3<H>  /  AST  24  /  ALT  20  /  AlkPhos  86           Urinalysis Basic - ( 14 Sep 2018 03:56 )    Color: Yellow / Appearance: Clear / S.020 / pH: x  Gluc: x / Ketone: Negative  / Bili: Negative / Urobili: Negative   Blood: x / Protein: 100 / Nitrite: Negative   Leuk Esterase: Negative / RBC: 0-2 /HPF / WBC 0-2 /HPF   Sq Epi: x / Non Sq Epi: Occasional /HPF / Bacteria: Trace /HPF      CAPILLARY BLOOD GLUCOSE      POCT Blood Glucose.: 144 mg/dL (14 Sep 2018 11:07)  POCT Blood Glucose.: 202 mg/dL (14 Sep 2018 08:09)  POCT Blood Glucose.: 277 mg/dL (14 Sep 2018 06:58)  POCT Blood Glucose.: 68 mg/dL (14 Sep 2018 06:03)  POCT Blood Glucose.: 316 mg/dL (14 Sep 2018 02:11)  POCT Blood Glucose.: 405 mg/dL (14 Sep 2018 00:28)  POCT Blood Glucose.: 386 mg/dL (13 Sep 2018 22:26)  POCT Blood Glucose.: 403 mg/dL (13 Sep 2018 19:09)      MEDICATIONS  (STANDING):  ALBUTerol/ipratropium for Nebulization 3 milliLiter(s) Nebulizer every 6 hours  amLODIPine   Tablet 2.5 milliGRAM(s) Oral daily  atorvastatin 20 milliGRAM(s) Oral at bedtime  benztropine 2 milliGRAM(s) Oral two times a day  heparin  Injectable 5000 Unit(s) SubCutaneous every 8 hours  insulin glargine Injectable (LANTUS) 25 Unit(s) SubCutaneous at bedtime  insulin lispro (HumaLOG) corrective regimen sliding scale   SubCutaneous every 6 hours  insulin lispro Injectable (HumaLOG) 10 Unit(s) SubCutaneous <User Schedule>  lactobacillus acidophilus 1 Tablet(s) Oral every 8 hours  levETIRAcetam  Solution 1000 milliGRAM(s) Oral two times a day  LORazepam     Tablet 1 milliGRAM(s) Oral every 6 hours  metoprolol tartrate 100 milliGRAM(s) Oral two times a day  nystatin Powder 1 Application(s) Topical every 8 hours  pantoprazole   Suspension 40 milliGRAM(s) Oral daily  QUEtiapine 50 milliGRAM(s) Oral every 12 hours  senna 2 Tablet(s) Oral at bedtime  sodium chloride 0.9%. 1000 milliLiter(s) (100 mL/Hr) IV Continuous <Continuous>  sodium chloride 0.9%. 1000 milliLiter(s) (100 mL/Hr) IV Continuous <Continuous>  sodium chloride 0.9%. 1000 milliLiter(s) (100 mL/Hr) IV Continuous <Continuous>    MEDICATIONS  (PRN):  haloperidol    Injectable 5 milliGRAM(s) IntraMuscular once PRN Agitation  LORazepam   Injectable 1 milliGRAM(s) IV Push every 8 hours PRN for breathrough agitation/anxiety/combative behavior          RADIOLOGY & ADDITIONAL TESTS:    18: Xray Chest 1 View- PORTABLE-Routine (18 @ 08:22) Tracheostomy cannula reidentified in position. No change heart mediastinum. No change in the right basilar opacity with loss of definition of the ipsilateral hemidiaphragm and costophrenic angle likely   representing a combination of pleural fluid and underlying  consolidation/atelectasis.      18: CT Chest No Cont (18 @ 03:41) Impression: Consolidation involving the right lower lobe is of uncertain etiology. It is unclear whether this represents compressive atelectasis secondary to right pleural effusion/elevation of the right hemidiaphragm  and/or represents infection.    18: Xray Chest 1 View-PORTABLE IMMEDIATE (18 @ 13:37) Bilateral pleural effusions and associated atelectasis.        MICROBIOLOGY DATA:    Culture - Blood (18 @ 17:33)    Specimen Source: .Blood Blood-Peripheral    Culture Results:   No growth to date.      Culture - Blood (18 @ 13:21)    Specimen Source: .Blood Blood-Peripheral    Culture Results:   No growth to date.        Culture - Sputum . (09.15.18 @ 09:28)    Gram Stain:   Rare polymorphonuclear leukocytes per low power field  Few Squamous epithelial cells per low power field  Moderate Gram Negative Rods per oil power field  Few Gram Positive Rods per oil power field  Few Yeast like cells per oil power field  Rare Gram Negative Coccobacilli per oil power field    -  Amikacin: S <=8    -  Amikacin: S <=8    -  Ampicillin/Sulbactam: R >16/8    -  Aztreonam: S 8    -  Cefepime: I 16    -  Cefepime: I 16    -  Ceftazidime: R >16    -  Ceftazidime: I 16    -  Ciprofloxacin: R >2    -  Ciprofloxacin: R >2    -  Gentamicin: R >8    -  Gentamicin: R >8    -  Imipenem: R >8    -  Imipenem: R    -  Levofloxacin: R >4    -  Levofloxacin: R >4    -  Meropenem: R >8    -  Meropenem: I 8    -  Piperacillin/Tazobactam: R >64    -  Piperacillin/Tazobactam: R    -  Tobramycin: R >8    -  Tobramycin: R >8    -  Trimethoprim/Sulfamethoxazole: R >2/38    Specimen Source: .Sputum Sputum    Culture Results:   Numerous Pseudomonas aeruginosa (Carbapenem Resistant)  Moderate Acinetobacter baumannii/haemolyticus (Carbapenem Resistant)  Complex  Normal Respiratory Moraima present    Organism Identification: Pseudomonas aeruginosa (Carbapenem Resistant)  Acinetobacter baumannii/haemolyticus (Carbapenem Resistant)    Organism: Acinetobacter baumannii/haemolyticus (Carbapenem Resistant)    Organism: Pseudomonas aeruginosa (Carbapenem Resistant)    Organism: Acinetobacter baumannii/haemolyticus (Carbapenem Resistant)    Method Type: ESTIVEN    Method Type: ESTIVEN    Method Type: KB        MRSA/MSSA PCR (18 @ 11:26)    MRSA PCR Result.: NotDetec: MRSA/MSSA PCR assay is a qualitative in vitro diagnostic test for the  direct detection and differentiation of methicillin-resistant  Staphylococcus aureus (MRSA) from Staphylococcus aureus (SA). The assay  detects DNA from nasal swabs in patients atrisk for nasal colonization.  It is not intended to diagnose MRSA or SA infections nor guide or monitor  treatment for MRSA/SA infections. A negative result does not preclude  nasal colonization. The assay is FDA-approved and its performance has  been established by [employer] [place] and the [facility], [place], [place].    Staph Aureus PCR Result: [facility]      Culture - Urine (18 @ 09:47)    Specimen Source: .Urine Catheterized    Culture Results:   10,000 - 49,000 CFU/mL Yeast Patient is seen and examined at the bed side, is afebrile.  The creatinine is stable.      REVIEW OF SYSTEMS: Unable to obtain due to mental status      ALLERGIES : NKDA      Vital Signs Last 24 Hrs  T(C): 36.7 (03 Oct 2018 14:49), Max: 36.7 (03 Oct 2018 14:49)  T(F): 98 (03 Oct 2018 14:49), Max: 98 (03 Oct 2018 14:49)  HR: 85 (03 Oct 2018 14:49) (70 - 85)  BP: 108/- (03 Oct 2018 14:49) (86/43 - 117/55)  BP(mean): --  RR: 18 (03 Oct 2018 14:49) (16 - 18)  SpO2: 100% (03 Oct 2018 14:49) (100% - 100%)      PHYSICAL EXAM:  GENERAL: Not in distress  HEENT: Trach in placed  CHEST/LUNG: Air entry B/L  HEART: s1 and s2 present  ABDOMEN: Nontender, and Nondistended  EXTREMITIES:  No pedal  edema  CNS: Awake but not Alert        LABS:                         9.9    6.8   )-----------( 354      ( 03 Oct 2018 06:41 )             32.1                           8.7    6.4   )-----------( 296      ( 02 Oct 2018 08:35 )             27.8                           9.0    11.5  )-----------( 358      ( 01 Oct 2018 07:56 )             29.4                           10.5   20.1  )-----------( 217      ( 19 Sep 2018 07:47 )             33.1           Amikacin Level, Trough (18 @ 17:06)    Amikacin Level, Trough: 17.6: Performed by: [facility], [id number] [place] [address] 75705 ug/mL      10-03    136  |  100  |  21<H>  ----------------------------<  176<H>  4.6   |  31  |  1.43<H>    Ca    10.1      03 Oct 2018 06:41      10    136  |  101  |  19<H>  ----------------------------<  191<H>  4.2   |  29  |  1.42<H>    Ca    9.4      02 Oct 2018 14:33      10-01    139  |  107  |  16  ----------------------------<  49<L>  3.7   |  24  |  1.50<H>    Ca    9.5      01 Oct 2018 07:56      TPro  6.8  /  Alb  2.0<L>  /  TBili  0.2  /  DBili  x   /  AST  21  /  ALT  20  /  AlkPhos  70    09    130<L>  |  97  |  25<H>  ----------------------------<  269<H>  3.9   |  24  |  2.80<H>    Ca    9.3      27 Sep 2018 08:21      TPro  6.2  /  Alb  2.2<L>  /  TBili  0.9  /  DBili  0.3<H>  /  AST  24  /  ALT  20  /  AlkPhos  86           Urinalysis Basic - ( 14 Sep 2018 03:56 )    Color: Yellow / Appearance: Clear / S.020 / pH: x  Gluc: x / Ketone: Negative  / Bili: Negative / Urobili: Negative   Blood: x / Protein: 100 / Nitrite: Negative   Leuk Esterase: Negative / RBC: 0-2 /HPF / WBC 0-2 /HPF   Sq Epi: x / Non Sq Epi: Occasional /HPF / Bacteria: Trace /HPF      CAPILLARY BLOOD GLUCOSE      POCT Blood Glucose.: 144 mg/dL (14 Sep 2018 11:07)  POCT Blood Glucose.: 202 mg/dL (14 Sep 2018 08:09)  POCT Blood Glucose.: 277 mg/dL (14 Sep 2018 06:58)  POCT Blood Glucose.: 68 mg/dL (14 Sep 2018 06:03)  POCT Blood Glucose.: 316 mg/dL (14 Sep 2018 02:11)  POCT Blood Glucose.: 405 mg/dL (14 Sep 2018 00:28)  POCT Blood Glucose.: 386 mg/dL (13 Sep 2018 22:26)  POCT Blood Glucose.: 403 mg/dL (13 Sep 2018 19:09)      MEDICATIONS  (STANDING):  ALBUTerol/ipratropium for Nebulization 3 milliLiter(s) Nebulizer every 6 hours  amLODIPine   Tablet 2.5 milliGRAM(s) Oral daily  atorvastatin 20 milliGRAM(s) Oral at bedtime  benztropine 2 milliGRAM(s) Oral two times a day  heparin  Injectable 5000 Unit(s) SubCutaneous every 8 hours  insulin glargine Injectable (LANTUS) 25 Unit(s) SubCutaneous at bedtime  insulin lispro (HumaLOG) corrective regimen sliding scale   SubCutaneous every 6 hours  insulin lispro Injectable (HumaLOG) 10 Unit(s) SubCutaneous <User Schedule>  lactobacillus acidophilus 1 Tablet(s) Oral every 8 hours  levETIRAcetam  Solution 1000 milliGRAM(s) Oral two times a day  LORazepam     Tablet 1 milliGRAM(s) Oral every 6 hours  metoprolol tartrate 100 milliGRAM(s) Oral two times a day  nystatin Powder 1 Application(s) Topical every 8 hours  pantoprazole   Suspension 40 milliGRAM(s) Oral daily  QUEtiapine 50 milliGRAM(s) Oral every 12 hours  senna 2 Tablet(s) Oral at bedtime  sodium chloride 0.9%. 1000 milliLiter(s) (100 mL/Hr) IV Continuous <Continuous>  sodium chloride 0.9%. 1000 milliLiter(s) (100 mL/Hr) IV Continuous <Continuous>  sodium chloride 0.9%. 1000 milliLiter(s) (100 mL/Hr) IV Continuous <Continuous>    MEDICATIONS  (PRN):  haloperidol    Injectable 5 milliGRAM(s) IntraMuscular once PRN Agitation  LORazepam   Injectable 1 milliGRAM(s) IV Push every 8 hours PRN for breathrough agitation/anxiety/combative behavior          RADIOLOGY & ADDITIONAL TESTS:    18: Xray Chest 1 View- PORTABLE-Routine (18 @ 08:22) Tracheostomy cannula reidentified in position. No change heart mediastinum. No change in the right basilar opacity with loss of definition of the ipsilateral hemidiaphragm and costophrenic angle likely   representing a combination of pleural fluid and underlying  consolidation/atelectasis.      18: CT Chest No Cont (18 @ 03:41) Impression: Consolidation involving the right lower lobe is of uncertain etiology. It is unclear whether this represents compressive atelectasis secondary to right pleural effusion/elevation of the right hemidiaphragm  and/or represents infection.    18: Xray Chest 1 View-PORTABLE IMMEDIATE (18 @ 13:37) Bilateral pleural effusions and associated atelectasis.        MICROBIOLOGY DATA:    Culture - Blood (18 @ 17:33)    Specimen Source: .Blood Blood-Peripheral    Culture Results:   No growth to date.      Culture - Blood (18 @ 13:21)    Specimen Source: .Blood Blood-Peripheral    Culture Results:   No growth to date.        Culture - Sputum . (09.15.18 @ 09:28)    Gram Stain:   Rare polymorphonuclear leukocytes per low power field  Few Squamous epithelial cells per low power field  Moderate Gram Negative Rods per oil power field  Few Gram Positive Rods per oil power field  Few Yeast like cells per oil power field  Rare Gram Negative Coccobacilli per oil power field    -  Amikacin: S <=8    -  Amikacin: S <=8    -  Ampicillin/Sulbactam: R >16/8    -  Aztreonam: S 8    -  Cefepime: I 16    -  Cefepime: I 16    -  Ceftazidime: R >16    -  Ceftazidime: I 16    -  Ciprofloxacin: R >2    -  Ciprofloxacin: R >2    -  Gentamicin: R >8    -  Gentamicin: R >8    -  Imipenem: R >8    -  Imipenem: R    -  Levofloxacin: R >4    -  Levofloxacin: R >4    -  Meropenem: R >8    -  Meropenem: I 8    -  Piperacillin/Tazobactam: R >64    -  Piperacillin/Tazobactam: R    -  Tobramycin: R >8    -  Tobramycin: R >8    -  Trimethoprim/Sulfamethoxazole: R >2/38    Specimen Source: .Sputum Sputum    Culture Results:   Numerous Pseudomonas aeruginosa (Carbapenem Resistant)  Moderate Acinetobacter baumannii/haemolyticus (Carbapenem Resistant)  Complex  Normal Respiratory Moraima present    Organism Identification: Pseudomonas aeruginosa (Carbapenem Resistant)  Acinetobacter baumannii/haemolyticus (Carbapenem Resistant)    Organism: Acinetobacter baumannii/haemolyticus (Carbapenem Resistant)    Organism: Pseudomonas aeruginosa (Carbapenem Resistant)    Organism: Acinetobacter baumannii/haemolyticus (Carbapenem Resistant)    Method Type: ESTIVEN    Method Type: ESTIVEN    Method Type: KB        MRSA/MSSA PCR (18 @ 11:26)    MRSA PCR Result.: NotDetec: MRSA/MSSA PCR assay is a qualitative in vitro diagnostic test for the  direct detection and differentiation of methicillin-resistant  Staphylococcus aureus (MRSA) from Staphylococcus aureus (SA). The assay  detects DNA from nasal swabs in patients atrisk for nasal colonization.  It is not intended to diagnose MRSA or SA infections nor guide or monitor  treatment for MRSA/SA infections. A negative result does not preclude  nasal colonization. The assay is FDA-approved and its performance has  been established by [employer], [place] and the [facility], [place], [place].    Staph Aureus PCR Result: [facility]      Culture - Urine (18 @ 09:47)    Specimen Source: .Urine Catheterized    Culture Results:   10,000 - 49,000 CFU/mL Yeast

## 2018-10-03 NOTE — PROGRESS NOTE ADULT - ASSESSMENT
63 year-old woman with LAYLA due to ATN from polymyxin and amikacin  Hyponatremia likely due to dehydration as well.  Elevate bicarbonate ether respiratory acidosis or metabolic alkalosis.    Renal function improving.  Monitor off IVF.  Cont TFs and free H2O via PEG

## 2018-10-03 NOTE — PROGRESS NOTE ADULT - SUBJECTIVE AND OBJECTIVE BOX
Patient seen and examined.     MEDICATIONS  (STANDING):  ALBUTerol/ipratropium for Nebulization 3 milliLiter(s) Nebulizer every 6 hours  amLODIPine   Tablet 2.5 milliGRAM(s) Oral daily  atorvastatin 20 milliGRAM(s) Oral at bedtime  benztropine 2 milliGRAM(s) Oral two times a day  heparin  Injectable 5000 Unit(s) SubCutaneous every 8 hours  insulin glargine Injectable (LANTUS) 25 Unit(s) SubCutaneous at bedtime  insulin lispro (HumaLOG) corrective regimen sliding scale   SubCutaneous every 6 hours  insulin lispro Injectable (HumaLOG) 10 Unit(s) SubCutaneous <User Schedule>  lactobacillus acidophilus 1 Tablet(s) Oral every 8 hours  levETIRAcetam  Solution 1000 milliGRAM(s) Oral two times a day  LORazepam     Tablet 1 milliGRAM(s) Oral every 6 hours  metoprolol tartrate 100 milliGRAM(s) Oral two times a day  nystatin Powder 1 Application(s) Topical every 8 hours  pantoprazole   Suspension 40 milliGRAM(s) Oral daily  QUEtiapine 50 milliGRAM(s) Oral every 12 hours  senna 2 Tablet(s) Oral at bedtime  sodium chloride 0.9%. 1000 milliLiter(s) (100 mL/Hr) IV Continuous <Continuous>  sodium chloride 0.9%. 1000 milliLiter(s) (100 mL/Hr) IV Continuous <Continuous>  sodium chloride 0.9%. 1000 milliLiter(s) (100 mL/Hr) IV Continuous <Continuous>      MEDICATIONS  (PRN):  haloperidol    Injectable 5 milliGRAM(s) IntraMuscular once PRN Agitation  LORazepam   Injectable 1 milliGRAM(s) IV Push every 8 hours PRN for breathrough agitation/anxiety/combative behavior     Medications up to date at time of exam.    PHYSICAL EXAMINATION:  Patient has no new complaints.  GENERAL: The patient is a well-developed, well-nourished, in no apparent distress.     Vital Signs Last 24 Hrs  T(C): 36.2 (03 Oct 2018 05:06), Max: 36.5 (02 Oct 2018 20:50)  T(F): 97.1 (03 Oct 2018 05:06), Max: 97.7 (02 Oct 2018 20:50)  HR: 80 (03 Oct 2018 05:06) (70 - 81)  BP: 117/55 (03 Oct 2018 05:06) (86/43 - 127/59)  BP(mean): --  RR: 16 (03 Oct 2018 05:06) (16 - 18)  SpO2: 100% (03 Oct 2018 05:06) (100% - 100%)   (if applicable)    Chest Tube (if applicable)    HEENT: Head is normocephalic and atraumatic.     NECK: Supple, no palpable adenopathy. trach    LUNGS: Clear to auscultation, no wheezing, rales, or rhonchi.    HEART: Regular rate and rhythm without murmur.    ABDOMEN: Soft, nontender, and nondistended.  PEG    EXTREMITIES: Without any cyanosis, clubbing, rash, lesions or edema.    NEUROLOGIC: Awake, alert.    SKIN: Warm, dry, good turgor.    LABS:                        9.9    6.8   )-----------( 354      ( 03 Oct 2018 06:41 )             32.1     10-03    136  |  100  |  21<H>  ----------------------------<  176<H>  4.6   |  31  |  1.43<H>    Ca    10.1      03 Oct 2018 06:41                          MICROBIOLOGY: (if applicable)    RADIOLOGY & ADDITIONAL STUDIES:  EKG:   CXR:  ECHO:    IMPRESSION: 63y Female PAST MEDICAL & SURGICAL HISTORY:  No pertinent past medical history  Hypertension  Schizophrenia  Diabetic coma  Diabetes mellitus  No significant past surgical history  S/P percutaneous endoscopic gastrostomy (PEG) tube placement  H/O tracheostomy         Impression; 62 Y/O Female from [facility] with multiple chronic conditions. Has acute on chronic respiratory failure and on oxygen supplementation FIO2 35% via tracheostomy.     LAYLA improving  CO2 improved  CXR 9/27, elevate R hemidiaphragm (not new, compared to old CXR 7/2018)    Suggestion:   Trach w/ FiO2 35%  Continue DuoNeb Q 6 hours.  DVT/GI prophylactic .  Aspiration precautions especially during Peg feeding.   Trach care. Patient seen and examined.     MEDICATIONS  (STANDING):  ALBUTerol/ipratropium for Nebulization 3 milliLiter(s) Nebulizer every 6 hours  amLODIPine   Tablet 2.5 milliGRAM(s) Oral daily  atorvastatin 20 milliGRAM(s) Oral at bedtime  benztropine 2 milliGRAM(s) Oral two times a day  heparin  Injectable 5000 Unit(s) SubCutaneous every 8 hours  insulin glargine Injectable (LANTUS) 25 Unit(s) SubCutaneous at bedtime  insulin lispro (HumaLOG) corrective regimen sliding scale   SubCutaneous every 6 hours  insulin lispro Injectable (HumaLOG) 10 Unit(s) SubCutaneous <User Schedule>  lactobacillus acidophilus 1 Tablet(s) Oral every 8 hours  levETIRAcetam  Solution 1000 milliGRAM(s) Oral two times a day  LORazepam     Tablet 1 milliGRAM(s) Oral every 6 hours  metoprolol tartrate 100 milliGRAM(s) Oral two times a day  nystatin Powder 1 Application(s) Topical every 8 hours  pantoprazole   Suspension 40 milliGRAM(s) Oral daily  QUEtiapine 50 milliGRAM(s) Oral every 12 hours  senna 2 Tablet(s) Oral at bedtime  sodium chloride 0.9%. 1000 milliLiter(s) (100 mL/Hr) IV Continuous <Continuous>  sodium chloride 0.9%. 1000 milliLiter(s) (100 mL/Hr) IV Continuous <Continuous>  sodium chloride 0.9%. 1000 milliLiter(s) (100 mL/Hr) IV Continuous <Continuous>      MEDICATIONS  (PRN):  haloperidol    Injectable 5 milliGRAM(s) IntraMuscular once PRN Agitation  LORazepam   Injectable 1 milliGRAM(s) IV Push every 8 hours PRN for breathrough agitation/anxiety/combative behavior     Medications up to date at time of exam.    PHYSICAL EXAMINATION:  Patient has no new complaints.  GENERAL: The patient is a well-developed, well-nourished, in no apparent distress.     Vital Signs Last 24 Hrs  T(C): 36.2 (03 Oct 2018 05:06), Max: 36.5 (02 Oct 2018 20:50)  T(F): 97.1 (03 Oct 2018 05:06), Max: 97.7 (02 Oct 2018 20:50)  HR: 80 (03 Oct 2018 05:06) (70 - 81)  BP: 117/55 (03 Oct 2018 05:06) (86/43 - 127/59)  BP(mean): --  RR: 16 (03 Oct 2018 05:06) (16 - 18)  SpO2: 100% (03 Oct 2018 05:06) (100% - 100%)   (if applicable)    Chest Tube (if applicable)    HEENT: Head is normocephalic and atraumatic.     NECK: Supple, no palpable adenopathy. trach    LUNGS: Clear to auscultation, no wheezing, rales, or rhonchi.    HEART: Regular rate and rhythm without murmur.    ABDOMEN: Soft, nontender, and nondistended.  PEG    EXTREMITIES: Without any cyanosis, clubbing, rash, lesions or edema.    NEUROLOGIC: Awake, alert.    SKIN: Warm, dry, good turgor.    LABS:                        9.9    6.8   )-----------( 354      ( 03 Oct 2018 06:41 )             32.1     10-03    136  |  100  |  21<H>  ----------------------------<  176<H>  4.6   |  31  |  1.43<H>    Ca    10.1      03 Oct 2018 06:41            MICROBIOLOGY: (if applicable)    RADIOLOGY & ADDITIONAL STUDIES:  EKG:   CXR:  ECHO:    IMPRESSION: 63y Female PAST MEDICAL & SURGICAL HISTORY:  No pertinent past medical history  Hypertension  Schizophrenia  Diabetic coma  Diabetes mellitus  No significant past surgical history  S/P percutaneous endoscopic gastrostomy (PEG) tube placement  H/O tracheostomy         Impression; 62 Y/O Female from [facility] with multiple chronic conditions. Has acute on chronic respiratory failure and on oxygen supplementation FIO2 35% via tracheostomy.    LAYLA improving  CO2 improved  CXR 9/27, elevate R hemidiaphragm (not new, compared to old CXR 7/2018)    Suggestion:   Trach w/ FiO2 35%  Continue DuoNeb Q 6 hours.  DVT/GI prophylactic .  Aspiration precautions especially during Peg feeding.   Trach care. Patient seen and examined.     MEDICATIONS  (STANDING):  ALBUTerol/ipratropium for Nebulization 3 milliLiter(s) Nebulizer every 6 hours  amLODIPine   Tablet 2.5 milliGRAM(s) Oral daily  atorvastatin 20 milliGRAM(s) Oral at bedtime  benztropine 2 milliGRAM(s) Oral two times a day  heparin  Injectable 5000 Unit(s) SubCutaneous every 8 hours  insulin glargine Injectable (LANTUS) 25 Unit(s) SubCutaneous at bedtime  insulin lispro (HumaLOG) corrective regimen sliding scale   SubCutaneous every 6 hours  insulin lispro Injectable (HumaLOG) 10 Unit(s) SubCutaneous <User Schedule>  lactobacillus acidophilus 1 Tablet(s) Oral every 8 hours  levETIRAcetam  Solution 1000 milliGRAM(s) Oral two times a day  LORazepam     Tablet 1 milliGRAM(s) Oral every 6 hours  metoprolol tartrate 100 milliGRAM(s) Oral two times a day  nystatin Powder 1 Application(s) Topical every 8 hours  pantoprazole   Suspension 40 milliGRAM(s) Oral daily  QUEtiapine 50 milliGRAM(s) Oral every 12 hours  senna 2 Tablet(s) Oral at bedtime  sodium chloride 0.9%. 1000 milliLiter(s) (100 mL/Hr) IV Continuous <Continuous>  sodium chloride 0.9%. 1000 milliLiter(s) (100 mL/Hr) IV Continuous <Continuous>  sodium chloride 0.9%. 1000 milliLiter(s) (100 mL/Hr) IV Continuous <Continuous>      MEDICATIONS  (PRN):  haloperidol    Injectable 5 milliGRAM(s) IntraMuscular once PRN Agitation  LORazepam   Injectable 1 milliGRAM(s) IV Push every 8 hours PRN for breathrough agitation/anxiety/combative behavior     Medications up to date at time of exam.    PHYSICAL EXAMINATION:  Patient has no new complaints.  GENERAL: The patient is a well-developed, well-nourished, in no apparent distress.     Vital Signs Last 24 Hrs  T(C): 36.2 (03 Oct 2018 05:06), Max: 36.5 (02 Oct 2018 20:50)  T(F): 97.1 (03 Oct 2018 05:06), Max: 97.7 (02 Oct 2018 20:50)  HR: 80 (03 Oct 2018 05:06) (70 - 81)  BP: 117/55 (03 Oct 2018 05:06) (86/43 - 127/59)  BP(mean): --  RR: 16 (03 Oct 2018 05:06) (16 - 18)  SpO2: 100% (03 Oct 2018 05:06) (100% - 100%)   (if applicable)    Chest Tube (if applicable)    HEENT: Head is normocephalic and atraumatic.     NECK: Supple, no palpable adenopathy. trach    LUNGS: Clear to auscultation, no wheezing, rales, or rhonchi.    HEART: Regular rate and rhythm without murmur.    ABDOMEN: Soft, nontender, and nondistended.  PEG    EXTREMITIES: Without any cyanosis, clubbing, rash, lesions or edema.    NEUROLOGIC: Awake, alert.    SKIN: Warm, dry, good turgor.    LABS:                        9.9    6.8   )-----------( 354      ( 03 Oct 2018 06:41 )             32.1     10-03    136  |  100  |  21<H>  ----------------------------<  176<H>  4.6   |  31  |  1.43<H>    Ca    10.1      03 Oct 2018 06:41            MICROBIOLOGY: (if applicable)    RADIOLOGY & ADDITIONAL STUDIES:  EKG:   CXR:  ECHO:    IMPRESSION: 63y Female PAST MEDICAL & SURGICAL HISTORY:  No pertinent past medical history  Hypertension  Schizophrenia  Diabetic coma  Diabetes mellitus  No significant past surgical history  S/P percutaneous endoscopic gastrostomy (PEG) tube placement  H/O tracheostomy         Impression; 64 Y/O Female from [facility] with multiple chronic conditions. Has acute on chronic respiratory failure and on oxygen supplementation FIO2 35% via tracheostomy.    LAYLA improving  CO2 improved  CXR 9/27, elevate R hemidiaphragm (not new, compared to old CXR 7/2018)    Suggestion:   Trach w/ FiO2 35%  Continue DuoNeb Q 6 hours.  DVT/GI prophylactic .  Aspiration precautions especially during Peg feeding.   Trach care.     Agree with above assessment and plan as transcribed.

## 2018-10-03 NOTE — PROGRESS NOTE ADULT - ASSESSMENT
A 64 yo Female  with  non-verbal, bed bound, S/P Trach/ PEG tube, [facility] evaluation of coffee ground vomiting  and cough. On arrival, she found to have tachycardia, Leukocytosis, and RLL consolidation. She has started on Zosyn and Vancomycin and the ID consult requested to assist with further evaluation and antibiotic management.     # Aspiration pneumonia - RLL consolidation - Pseudomonas and Acinetobacter   # MRSA PCR is negative    Would recommend:    1.  Bronchial suctioning  and Aspiration  precaution  2. Monitor kidney function, is improving  3.Continue to Monitor OFF antibiotic   4. Frequent repositioning      d/w  Nursing staff    will follow the patient with you A 62 yo Female  with  non-verbal, bed bound, S/P Trach/ PEG tube, [facility]  for evaluation of coffee ground vomiting  and cough. On arrival, she found to have tachycardia, Leukocytosis, and RLL consolidation. She has started on Zosyn and Vancomycin and the ID consult requested to assist with further evaluation and antibiotic management.     # Aspiration pneumonia - RLL consolidation - Pseudomonas and Acinetobacter   # MRSA PCR is negative    Would recommend:    1. Bronchial suctioning  and Aspiration  precaution  2. Monitor kidney function  3. Monitor OFF antibiotic   4. Frequent repositioning      d/w  Nursing staff    will follow the patient with you

## 2018-10-03 NOTE — CHART NOTE - NSCHARTNOTEFT_GEN_A_CORE
Assessment:   Patient reports [ ] nausea  [ ] vomiting [ ] diarrhea [ ] constipation  [ ]chewing problems [ ] swallowing issues  [ ] other:  Pt visited. TF Glucerna 1.2 infusing @ 65 ml/ hr x 16 hr providing   1040 ml,1248 calories, protein 62 gms, free water 837 ml/day. TF tolerated.  Pt with Tracheostomy. A1c 11.4. .     PO intake:   Source for PO intake [ ] Patient/family [ ] chart [ ] staff (x ) PEg    Current Weight:   % Weight Change Bed   scale 120 LB    Pertinent Medications: MEDICATIONS  (STANDING):  ALBUTerol/ipratropium for Nebulization 3 milliLiter(s) Nebulizer every 6 hours  amLODIPine   Tablet 2.5 milliGRAM(s) Oral daily  atorvastatin 20 milliGRAM(s) Oral at bedtime  benztropine 2 milliGRAM(s) Oral two times a day  heparin  Injectable 5000 Unit(s) SubCutaneous every 8 hours  insulin glargine Injectable (LANTUS) 25 Unit(s) SubCutaneous at bedtime  insulin lispro (HumaLOG) corrective regimen sliding scale   SubCutaneous every 6 hours  insulin lispro Injectable (HumaLOG) 10 Unit(s) SubCutaneous <User Schedule>  lactobacillus acidophilus 1 Tablet(s) Oral every 8 hours  levETIRAcetam  Solution 1000 milliGRAM(s) Oral two times a day  LORazepam     Tablet 1 milliGRAM(s) Oral every 6 hours  metoprolol tartrate 100 milliGRAM(s) Oral two times a day  nystatin Powder 1 Application(s) Topical every 8 hours  pantoprazole   Suspension 40 milliGRAM(s) Oral daily  QUEtiapine 50 milliGRAM(s) Oral every 12 hours  senna 2 Tablet(s) Oral at bedtime  sodium chloride 0.9%. 1000 milliLiter(s) (100 mL/Hr) IV Continuous <Continuous>  sodium chloride 0.9%. 1000 milliLiter(s) (100 mL/Hr) IV Continuous <Continuous>  sodium chloride 0.9%. 1000 milliLiter(s) (100 mL/Hr) IV Continuous <Continuous>    MEDICATIONS  (PRN):  haloperidol    Injectable 5 milliGRAM(s) IntraMuscular once PRN Agitation  LORazepam   Injectable 1 milliGRAM(s) IV Push every 8 hours PRN for breathrough agitation/anxiety/combative behavior    Pertinent Labs:  10-03 Na136 mmol/L Glu 176 mg/dL<H> K+ 4.6 mmol/L Cr  1.43 mg/dL<H> BUN 21 mg/dL<H> 09-29 Alb 2.0 g/dL<L> 09-14 [id number] 11.4 %<H> 09-14 Chol 145 mg/dL LDL 69 mg/dL HDL 43 mg/dL<L> Trig 167 mg/dL<H>      Skin: No PU     Estimated Needs:   [ x] no change since previous assessment  [ ] recalculated:       Previous Nutrition Diagnosis:   [ ] Inadequate Energy Intake [ ]Inadequate Oral Intake [ ] Excessive Energy Intake   [ ] Underweight [ ] Increased Nutrient Needs [ ] Overweight/Obesity   [ ] Altered GI Function [ ] Unintended Weight Loss [ ] Food & Nutrition Related Knowledge Deficit [ ] Malnutrition     Nutrition Diagnosis is [x ] ongoing  [ ] resolved [ ] not applicable     New Nutrition Diagnosis: [ ] not applicable  [ ] Inadequate Energy Intake [ ]Inadequate Oral Intake [ ] Excessive Energy Intake   [ ] Underweight [ ] Increased Nutrient Needs [ ] Overweight/Obesity   [ ] Altered GI Function [ ] Unintended Weight Loss [ ] Food & Nutrition Related Knowledge Deficit [ ] Malnutrition     Related to:     As evidenced by:     Interventions:   Recommend  [ ] Change Diet To:  [ ] Nutrition Supplement  [x ] Nutrition Support continue with Glucerna 1.2 @ 65 ml/hr x 16 hr.   [ ] Other:     Monitoring and Evaluation:   [ ] PO intake x[ ] Tolerance to diet prescription [ ] weights [ ] follow up per protocol  [ ] other:

## 2018-10-03 NOTE — PROGRESS NOTE ADULT - SUBJECTIVE AND OBJECTIVE BOX
Patient seen and examined at bedside  difficulty obtaining iv access, otherwise no new acute events noted overnight  Case discussed with medical team    HPI:  62 [facility] F from [facility], non-verbal, bed bound, S/P Trach/ PEG tube, PMH of HTN, asthma, convulsion disorder, bipolar, DVT legs, DM 2, sent from NH for coffee ground vomiting X3 this morning and cough.  Pt unable to provide history.  History given by sister (Sharon [phone])who states pt was vomiting coffee ground emesis this morning. Pt had no reported vomiting since in ED.  Pt also developed cough with clear sputum.  Sister states pt is combative at baseline, and her current behavior is baseline.  No reported fever, discomfort, diarrhea, or SOB.  Pt was recently admitted to [facility] in 07/2018 for same reason and treated for aspiration PNA and UTI during last admission.  Pt was supposed to be given IV vanco and zosyn for 7 day by PCP for PNA , which supposed to start from 09/13-09/19. Pt is DNR, MOSLT form in charts. (13 Sep 2018 15:49)      PAST MEDICAL & SURGICAL HISTORY:  No pertinent past medical history  Hypertension  Schizophrenia  Diabetic coma  Diabetes mellitus  No significant past surgical history  S/P percutaneous endoscopic gastrostomy (PEG) tube placement  H/O tracheostomy      angiotensin converting enzyme inhibitors (Unknown)       MEDICATIONS  (STANDING):  ALBUTerol/ipratropium for Nebulization 3 milliLiter(s) Nebulizer every 6 hours  amLODIPine   Tablet 2.5 milliGRAM(s) Oral daily  atorvastatin 20 milliGRAM(s) Oral at bedtime  benztropine 2 milliGRAM(s) Oral two times a day  chlorhexidine 4% Liquid 1 Application(s) Topical every 12 hours  heparin  Injectable 5000 Unit(s) SubCutaneous every 8 hours  insulin glargine Injectable (LANTUS) 25 Unit(s) SubCutaneous at bedtime  insulin lispro (HumaLOG) corrective regimen sliding scale   SubCutaneous every 6 hours  insulin lispro Injectable (HumaLOG) 10 Unit(s) SubCutaneous <User Schedule>  lactobacillus acidophilus 1 Tablet(s) Oral every 8 hours  levETIRAcetam  Solution 1000 milliGRAM(s) Oral two times a day  LORazepam     Tablet 1 milliGRAM(s) Oral every 6 hours  metoprolol tartrate 100 milliGRAM(s) Oral two times a day  nystatin Powder 1 Application(s) Topical every 8 hours  pantoprazole   Suspension 40 milliGRAM(s) Oral daily  QUEtiapine 50 milliGRAM(s) Oral every 12 hours  senna 2 Tablet(s) Oral at bedtime  sodium chloride 0.9%. 1000 milliLiter(s) (100 mL/Hr) IV Continuous <Continuous>  sodium chloride 0.9%. 1000 milliLiter(s) (100 mL/Hr) IV Continuous <Continuous>  sodium chloride 0.9%. 1000 milliLiter(s) (100 mL/Hr) IV Continuous <Continuous>    MEDICATIONS  (PRN):  haloperidol    Injectable 5 milliGRAM(s) IntraMuscular once PRN Agitation  LORazepam   Injectable 1 milliGRAM(s) IV Push every 8 hours PRN for breathrough agitation/anxiety/combative behavior      REVIEW OF SYSTEMS: limited  Vital Signs Last 24 Hrs  T(C): 36.2 (03 Oct 2018 05:06), Max: 36.5 (02 Oct 2018 20:50)  T(F): 97.1 (03 Oct 2018 05:06), Max: 97.7 (02 Oct 2018 20:50)  HR: 80 (03 Oct 2018 05:06) (70 - 81)  BP: 117/55 (03 Oct 2018 05:06) (86/43 - 127/59)  BP(mean): --  RR: 16 (03 Oct 2018 05:06) (16 - 18)  SpO2: 100% (03 Oct 2018 05:06) (100% - 100%)    PHYSICAL EXAMINATION:   Constitutional: NAD  HEENT: AT  Neck:  Supple  Respiratory: trach collar intact. Adequate airflow b/l. Not using accessory muscles of respiration.  Cardiovascular:  S1 & S2 intact, no R/G, 2+ radial pulses b/l  Gastrointestinal: Soft, ND, normoactive b.s., no organomegaly/RT/rigidity  Extremities: WWP  Neurological:  stable. awake.     Labs and imaging reviewed      RADIOLOGY & ADDITIONAL STUDIES:

## 2018-10-03 NOTE — PROGRESS NOTE ADULT - PROBLEM SELECTOR PLAN 1
much improved, possibly new baseline renal function vs insufficient ivf  nevertheless, renal function improved and stable,  d/c

## 2018-10-03 NOTE — PROGRESS NOTE ADULT - SUBJECTIVE AND OBJECTIVE BOX
[facility] NEPHROLOGY- PROGRESS NOTE, Follow up     Patient is a 63y Female who presents with a chief complaint of vomiting (24 Sep 2018 12:18) with coffee ground emesis and cough.  She was found to have PNA.  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  She was started on polymyxin on 9/16 and amikacin on 9/17.  Baseline creatinine is  0.8, eitan to ~1 on 9/20, 1.3 on 9/22 and 1.7 today.  Pt is also on rifampin.  She was previously on vanco, zosyn, and unasyn this admission as well.  Pt was given lasix IV on 9/21 and 9/22.  She has a PEG and has been tolerating PEG feeds. No ACEi/ARBs/NSAIDs/IV Contrast.    Pt is unable to give history.      REVIEW OF SYSTEMS: unable to obtain  Allergy:  angiotensin converting enzyme inhibitors (Unknown)      VITALS:  T(F): 98 (10-03-18 @ 14:49), Max: 98 (10-03-18 @ 14:49)  HR: 101 (10-03-18 @ 18:53)  BP: 92/57 (10-03-18 @ 18:53)  RR: 18 (10-03-18 @ 14:49)  SpO2: 100% (10-03-18 @ 14:49)  Wt(kg): --      PHYSICAL EXAM:  Constitutional: NAD, calm  HEENT: anicteric sclera, oropharynx clear, MMM  Neck: No JVD  Respiratory: CTAB, no wheezes, rales or rhonchi, trach  Cardiovascular: S1, S2, RRR  Gastrointestinal: BS+, soft, NT/ND  Extremities: No cyanosis or clubbing. No peripheral edema  : No CVA tenderness. No coe.   Skin: warm and dry         LABS:  10-03    136  |  100  |  21<H>  ----------------------------<  176<H>  4.6   |  31  |  1.43<H>    Ca    10.1      03 Oct 2018 06:41      Creatinine Trend: 1.43 <--, 1.42 <--, 1.42 <--, 1.50 <--, 1.80 <--, 1.94 <--, 2.29 <--, 2.80 <--                        9.9    6.8   )-----------( 354      ( 03 Oct 2018 06:41 )             32.1

## 2018-10-04 VITALS
SYSTOLIC BLOOD PRESSURE: 105 MMHG | OXYGEN SATURATION: 90 % | RESPIRATION RATE: 17 BRPM | HEART RATE: 83 BPM | DIASTOLIC BLOOD PRESSURE: 41 MMHG | TEMPERATURE: 98 F

## 2018-10-04 PROCEDURE — 82962 GLUCOSE BLOOD TEST: CPT

## 2018-10-04 PROCEDURE — 85610 PROTHROMBIN TIME: CPT

## 2018-10-04 PROCEDURE — 85027 COMPLETE CBC AUTOMATED: CPT

## 2018-10-04 PROCEDURE — 87184 SC STD DISK METHOD PER PLATE: CPT

## 2018-10-04 PROCEDURE — 86900 BLOOD TYPING SEROLOGIC ABO: CPT

## 2018-10-04 PROCEDURE — 87186 SC STD MICRODIL/AGAR DIL: CPT

## 2018-10-04 PROCEDURE — 84484 ASSAY OF TROPONIN QUANT: CPT

## 2018-10-04 PROCEDURE — 87640 STAPH A DNA AMP PROBE: CPT

## 2018-10-04 PROCEDURE — 71250 CT THORAX DX C-: CPT

## 2018-10-04 PROCEDURE — 86850 RBC ANTIBODY SCREEN: CPT

## 2018-10-04 PROCEDURE — 76775 US EXAM ABDO BACK WALL LIM: CPT

## 2018-10-04 PROCEDURE — 83930 ASSAY OF BLOOD OSMOLALITY: CPT

## 2018-10-04 PROCEDURE — 85730 THROMBOPLASTIN TIME PARTIAL: CPT

## 2018-10-04 PROCEDURE — 84100 ASSAY OF PHOSPHORUS: CPT

## 2018-10-04 PROCEDURE — 93005 ELECTROCARDIOGRAM TRACING: CPT

## 2018-10-04 PROCEDURE — 80053 COMPREHEN METABOLIC PANEL: CPT

## 2018-10-04 PROCEDURE — 87086 URINE CULTURE/COLONY COUNT: CPT

## 2018-10-04 PROCEDURE — 81001 URINALYSIS AUTO W/SCOPE: CPT

## 2018-10-04 PROCEDURE — 87070 CULTURE OTHR SPECIMN AEROBIC: CPT

## 2018-10-04 PROCEDURE — 87040 BLOOD CULTURE FOR BACTERIA: CPT

## 2018-10-04 PROCEDURE — 82803 BLOOD GASES ANY COMBINATION: CPT

## 2018-10-04 PROCEDURE — 83735 ASSAY OF MAGNESIUM: CPT

## 2018-10-04 PROCEDURE — 80076 HEPATIC FUNCTION PANEL: CPT

## 2018-10-04 PROCEDURE — 80150 ASSAY OF AMIKACIN: CPT

## 2018-10-04 PROCEDURE — 84145 PROCALCITONIN (PCT): CPT

## 2018-10-04 PROCEDURE — 84443 ASSAY THYROID STIM HORMONE: CPT

## 2018-10-04 PROCEDURE — 80048 BASIC METABOLIC PNL TOTAL CA: CPT

## 2018-10-04 PROCEDURE — 94640 AIRWAY INHALATION TREATMENT: CPT

## 2018-10-04 PROCEDURE — 71045 X-RAY EXAM CHEST 1 VIEW: CPT

## 2018-10-04 PROCEDURE — 82607 VITAMIN B-12: CPT

## 2018-10-04 PROCEDURE — 99285 EMERGENCY DEPT VISIT HI MDM: CPT | Mod: 25

## 2018-10-04 PROCEDURE — 94799 UNLISTED PULMONARY SVC/PX: CPT

## 2018-10-04 PROCEDURE — 80061 LIPID PANEL: CPT

## 2018-10-04 PROCEDURE — 83036 HEMOGLOBIN GLYCOSYLATED A1C: CPT

## 2018-10-04 PROCEDURE — 86901 BLOOD TYPING SEROLOGIC RH(D): CPT

## 2018-10-04 PROCEDURE — 80202 ASSAY OF VANCOMYCIN: CPT

## 2018-10-04 RX ADMIN — Medication 10 UNIT(S): at 12:01

## 2018-10-04 RX ADMIN — Medication 2 MILLIGRAM(S): at 05:28

## 2018-10-04 RX ADMIN — HEPARIN SODIUM 5000 UNIT(S): 5000 INJECTION INTRAVENOUS; SUBCUTANEOUS at 13:12

## 2018-10-04 RX ADMIN — Medication 3 MILLILITER(S): at 09:39

## 2018-10-04 RX ADMIN — Medication 1 TABLET(S): at 05:28

## 2018-10-04 RX ADMIN — AMLODIPINE BESYLATE 2.5 MILLIGRAM(S): 2.5 TABLET ORAL at 05:27

## 2018-10-04 RX ADMIN — HEPARIN SODIUM 5000 UNIT(S): 5000 INJECTION INTRAVENOUS; SUBCUTANEOUS at 05:28

## 2018-10-04 RX ADMIN — Medication 3 MILLILITER(S): at 02:06

## 2018-10-04 RX ADMIN — Medication 1 MILLIGRAM(S): at 05:27

## 2018-10-04 RX ADMIN — Medication 1: at 05:30

## 2018-10-04 RX ADMIN — Medication 100 MILLIGRAM(S): at 05:29

## 2018-10-04 RX ADMIN — PANTOPRAZOLE SODIUM 40 MILLIGRAM(S): 20 TABLET, DELAYED RELEASE ORAL at 12:02

## 2018-10-04 RX ADMIN — QUETIAPINE FUMARATE 50 MILLIGRAM(S): 200 TABLET, FILM COATED ORAL at 05:28

## 2018-10-04 RX ADMIN — NYSTATIN CREAM 1 APPLICATION(S): 100000 CREAM TOPICAL at 13:14

## 2018-10-04 RX ADMIN — Medication 1: at 12:01

## 2018-10-04 RX ADMIN — Medication 1 TABLET(S): at 13:12

## 2018-10-04 RX ADMIN — NYSTATIN CREAM 1 APPLICATION(S): 100000 CREAM TOPICAL at 05:29

## 2018-10-04 RX ADMIN — Medication 10 UNIT(S): at 05:30

## 2018-10-04 RX ADMIN — LEVETIRACETAM 1000 MILLIGRAM(S): 250 TABLET, FILM COATED ORAL at 05:29

## 2018-10-04 RX ADMIN — Medication 1 MILLIGRAM(S): at 12:04

## 2018-10-04 NOTE — PROGRESS NOTE ADULT - PROBLEM SELECTOR PLAN 10
dvt/gi ppx
dvt/gi ppx
IMPROVE SCORE 4  continue heparin subQ for DVT prophylaxis    continue Protonix
dvt/gi ppx

## 2018-10-04 NOTE — PROGRESS NOTE ADULT - REASON FOR ADMISSION
vomiting

## 2018-10-04 NOTE — PROGRESS NOTE ADULT - ATTENDING COMMENTS
Patient seen and examined. Agree with assessment and plan as transcribed above.
[facility] NEPHROLOGY  Donnell Torres M.D.  Miguel Clayton D.O.  Lizzette Musa M.D.  Yuliya Taylor, MSN, ANP-C    Telephone: [phone]  Facsimile: [phone]    [address] [place] [address]
[facility] NEPHROLOGY  Donnell Torres M.D.  Miguel Clayton D.O.  Lizzette Musa M.D.  Yuliya Taylor, MSN, ANP-C    Telephone: [phone]  Facsimile: [phone]    [address] [place] [address]
[facility] NEPHROLOGY  Donnell Torres M.D.  Miguel Clayton D.O.  Lizzette Musa M.D.  Yuliya Taylor, MSN, ANP-C    Telephone: [phone]  Facsimile: [phone]    [address] [place] [address]
[facility] NEPHROLOGY  Donnell Torres M.D.  Miguel Clayton D.O.  Lizzette Musa M.D.  Yuliya Taylor, MSN, ANP-C    Telephone: [phone]  Facsimile: [phone]    [address] [place] [address]
[facility] NEPHROLOGY  Donnell Torres M.D.  Miguel Clayton D.O.  Lizzette Musa M.D.  Yuliya Taylor, MSN, ANP-C    Telephone: [phone]  Facsimile: [phone]    [address] [place] [address]
[facility] NEPHROLOGY  Donnell Torres M.D.  Miguel Clayton D.O.  Lizzette Musa M.D.  Yuliya Taylor, MSN, ANP-C    Telephone: [phone]  Facsimile: [phone]    [address] [place] [address]
[facility] NEPHROLOGY  Donnell Torers M.D.  Miguel Clayton D.O.  Lizzette Musa M.D.  Yuliya Taylor, MSN, ANP-C    Telephone: [phone]  Facsimile: [phone]    [address] [place] [address]
[facility] NEPHROLOGY  Donnell Torres M.D.  Miguel Clayton D.O.  Lizzette Musa M.D.  Yuliya Taylor, MSN, ANP-C    Telephone: [phone]  Facsimile: [phone]    [address] [place] [address]
[facility] NEPHROLOGY  Donnell Torres M.D.  Miguel Clayton D.O.  Lizzette Musa M.D.  Yuliya Taylor, MSN, ANP-C    Telephone: [phone]  Facsimile: [phone]    [address] [place] [address]
[facility] NEPHROLOGY  Donnell Torres M.D.  Miguel Clayton D.O.  Lizzette Musa M.D.  Yuliya Taylor, MSN, ANP-C    Telephone: [phone]  Facsimile: [phone]    [address] [place] [address]
hypokalemia: replace, f/u labs
hypomagnesemia: replaced, f/u labs
hypokalemia - replace

## 2018-10-04 NOTE — PROGRESS NOTE ADULT - PROBLEM SELECTOR PROBLEM 2
Carbapenem-resistant Acinetobacter baumannii infection
Diabetes mellitus
ATN (acute tubular necrosis)
Chronic respiratory failure, unspecified whether with hypoxia or hypercapnia
Chronic respiratory failure, unspecified whether with hypoxia or hypercapnia
Coffee ground emesis
Diabetes mellitus
Hyponatremia
LAYLA (acute kidney injury)
Pleural effusion
Coffee ground emesis
Coffee ground emesis
Combative behavior
Hyponatremia
LAYLA (acute kidney injury)
Uncontrolled diabetes mellitus
Hyponatremia
Pseudomonas aeruginosa infection
Hyponatremia
Diabetes mellitus
Hyponatremia
Diabetes mellitus

## 2018-10-04 NOTE — PROGRESS NOTE ADULT - PROBLEM SELECTOR PLAN 4
lasix trial  follow up CXR  if effusion increased may need tap by IR under conscious sedation.
Continue trach collar.  Continue bronchodilators.  Suction as needed.
Insulin glargine Injectable (LANTUS) 25 Unit(s) SubCutaneous at bedtime  Insulin lispro (HumaLOG) corrective regimen sliding scale   SubCutaneous every 6 hours  Insulin lispro Injectable (HumaLOG) 10 Unit(s) SubCutaneous <User Schedule>
Leukocytosis trending down.  c/w Amikacin D#5  c/w Rifampin D#5  c/w Polymixin B D#6  c/w Vancomycin Peg D#2  ID dr. Kwon following and appreicated
Resolved
cont meds  Psych follow up.
lasix trial  follow up CXR  if effusion increased may need tap by IR under concious sedation.
lasix trial  follow up CXR  if effusion increased may need tap by IR under conscious sedation.
lasix trial  follow up CXR  if effusion increased may need tap by IR under conscious sedation.
same as above
same as above
Continue trach collar.  Bronchodilators.  Suction as needed,
Improved.  Xray Chest 1 View- PORTABLE-Routine (09.22.18 @ 09:21)  Impression: No change moderate right pleural effusion. Previously seen left pleural fluid is no longer identified.  Xray Chest 1 View- PORTABLE-Routine (09.24.18 @ 08:49) > IMPRESSION:  Interval clearing of right lung.  Pulmonary kathy following
Pt had one episode of frothy pink sputum overnight.  However,  no further episodes and followup hemoglobin stable.
Resolved
Resolved
Respiratory acidosis vs. metabolic alkalosis.  Check VBG in am- (P).
c/w antihypertensive rx  monitor vitals, adjust rx prn
c/w antihypertensive rx  monitor vitals, adjust rx prn
clinically improved  c/w protonix  monitor h/h and clinical status closely
controlled, continue sliding scale and lantus
improved
improving, c/w abx, adjust per id
improving, c/w abx, adjust per id
no additional acute events noted inpt  c/w protonix  monitor h/h and clinical status closely
Continue Seroquel 2x daily
resolved
improving, c/w abx, adjust per id
lasix trial  follow up CXR  if effusion increased may need tap by IR under concious sedation
lasix trial  follow up CXR  if effusion increased may need tap by IR under conscious sedation.
improved

## 2018-10-04 NOTE — PROGRESS NOTE ADULT - SUBJECTIVE AND OBJECTIVE BOX
Time of Visit:  Patient seen and examined.     MEDICATIONS  (STANDING):  ALBUTerol/ipratropium for Nebulization 3 milliLiter(s) Nebulizer every 6 hours  amLODIPine   Tablet 2.5 milliGRAM(s) Oral daily  atorvastatin 20 milliGRAM(s) Oral at bedtime  benztropine 2 milliGRAM(s) Oral two times a day  heparin  Injectable 5000 Unit(s) SubCutaneous every 8 hours  insulin glargine Injectable (LANTUS) 25 Unit(s) SubCutaneous at bedtime  insulin lispro (HumaLOG) corrective regimen sliding scale   SubCutaneous every 6 hours  insulin lispro Injectable (HumaLOG) 10 Unit(s) SubCutaneous <User Schedule>  lactobacillus acidophilus 1 Tablet(s) Oral every 8 hours  levETIRAcetam  Solution 1000 milliGRAM(s) Oral two times a day  LORazepam     Tablet 1 milliGRAM(s) Oral every 6 hours  metoprolol tartrate 100 milliGRAM(s) Oral two times a day  nystatin Powder 1 Application(s) Topical every 8 hours  pantoprazole   Suspension 40 milliGRAM(s) Oral daily  QUEtiapine 50 milliGRAM(s) Oral every 12 hours  senna 2 Tablet(s) Oral at bedtime      MEDICATIONS  (PRN):  haloperidol    Injectable 5 milliGRAM(s) IntraMuscular once PRN Agitation  LORazepam   Injectable 1 milliGRAM(s) IV Push every 8 hours PRN for breathrough agitation/anxiety/combative behavior       Medications up to date at time of exam.    ROS: Non verbal. No fever, chills, cough, congestion on exam.  PHYSICAL EXAMINATION:    Vital Signs Last 24 Hrs  T(C): 36.4 (04 Oct 2018 05:21), Max: 36.7 (03 Oct 2018 14:49)  T(F): 97.6 (04 Oct 2018 05:21), Max: 98 (03 Oct 2018 14:49)  HR: 92 (04 Oct 2018 05:21) (85 - 101)  BP: 132/65 (04 Oct 2018 05:21) (92/57 - 132/65)  BP(mean): --  RR: 16 (04 Oct 2018 05:21) (16 - 18)  SpO2: 98% (04 Oct 2018 05:21) (98% - 100%)   (if applicable)  General; Non verbal as baseline mental status. No acute distress.     HEENT: Normocephalic and atraumatic. No nasal tenderness. Moist mucosa.     NECK: Has Tracheostomy, non infected.     LUNGS: Clear to auscultation B/L. No wheeze/ rales. No use of accessory muscle.     HEART: S1 S2 Regular rate and no click/ rub.    ABDOMEN: Soft, nontender, and nondistended.  No hepatosplenomegaly is noted. Active bowel sounds. + Peg.    EXTREMITIES: Without any cyanosis, clubbing,  lesions or edema.    NEUROLOGIC:  Non verbal . Cognitive impaired.     SKIN: Warm and moist. Non diaphoretic.       LABS:                        9.9    6.8   )-----------( 354      ( 03 Oct 2018 06:41 )             32.1     10-03    136  |  100  |  21<H>  ----------------------------<  176<H>  4.6   |  31  |  1.43<H>    Ca    10.1      03 Oct 2018 06:41    RADIOLOGY & ADDITIONAL STUDIES:  EKG:   CXR: < from: Xray Chest 1 View- PORTABLE-Urgent (09.27.18 @ 10:51) >  PROCEDURE DATE:  09/27/2018          INTERPRETATION:  Chest portable.    Clinical History: Abnormal breath sounds.    Comparison: 9/24/2018.    Single AP view submitted.  Again noted is tracheostomy tube in place.    The evaluation of the cardiomediastinal silhouette is limited on portable   technique.  There is arteriosclerotic calcification of the aorta.      There is elevation of the right hemidiaphragm.  Subsegmental atelectasis and/or fibrosis is noted right lower lung zone.  No new lobar lung consolidation, pleural effusion or pneumothorax is   noted.    Impression:     PAST MEDICAL & SURGICAL HISTORY:  No pertinent past medical history  Hypertension  Schizophrenia  Diabetic coma  Diabetes mellitus  No significant past surgical history  S/P percutaneous endoscopic gastrostomy (PEG) tube placement  H/O tracheostomy    Impression;  62 Y/O Female from [facility] with multiple chronic conditions. Has acute on chronic respiratory failure and on oxygen supplementation FIO2 35% via tracheostomy.    Suggestion:   Continue Oxygen supplementation FIO2 35% via Tracheostomy .  Continue DuoNeb Q 6 hours.  DVT/GI prophylactic .  Aspiration precautions especially during Peg feeding. HOB elevation.   Oral, trach care, suction . Continue Oral chlorhexidine care.    Contact precautions. Time of Visit:  Patient seen and examined.     MEDICATIONS  (STANDING):  ALBUTerol/ipratropium for Nebulization 3 milliLiter(s) Nebulizer every 6 hours  amLODIPine   Tablet 2.5 milliGRAM(s) Oral daily  atorvastatin 20 milliGRAM(s) Oral at bedtime  benztropine 2 milliGRAM(s) Oral two times a day  heparin  Injectable 5000 Unit(s) SubCutaneous every 8 hours  insulin glargine Injectable (LANTUS) 25 Unit(s) SubCutaneous at bedtime  insulin lispro (HumaLOG) corrective regimen sliding scale   SubCutaneous every 6 hours  insulin lispro Injectable (HumaLOG) 10 Unit(s) SubCutaneous <User Schedule>  lactobacillus acidophilus 1 Tablet(s) Oral every 8 hours  levETIRAcetam  Solution 1000 milliGRAM(s) Oral two times a day  LORazepam     Tablet 1 milliGRAM(s) Oral every 6 hours  metoprolol tartrate 100 milliGRAM(s) Oral two times a day  nystatin Powder 1 Application(s) Topical every 8 hours  pantoprazole   Suspension 40 milliGRAM(s) Oral daily  QUEtiapine 50 milliGRAM(s) Oral every 12 hours  senna 2 Tablet(s) Oral at bedtime      MEDICATIONS  (PRN):  haloperidol    Injectable 5 milliGRAM(s) IntraMuscular once PRN Agitation  LORazepam   Injectable 1 milliGRAM(s) IV Push every 8 hours PRN for breathrough agitation/anxiety/combative behavior       Medications up to date at time of exam.    ROS: Non verbal. No fever, chills, cough, congestion on exam.  PHYSICAL EXAMINATION:    Vital Signs Last 24 Hrs  T(C): 36.4 (04 Oct 2018 05:21), Max: 36.7 (03 Oct 2018 14:49)  T(F): 97.6 (04 Oct 2018 05:21), Max: 98 (03 Oct 2018 14:49)  HR: 92 (04 Oct 2018 05:21) (85 - 101)  BP: 132/65 (04 Oct 2018 05:21) (92/57 - 132/65)  BP(mean): --  RR: 16 (04 Oct 2018 05:21) (16 - 18)  SpO2: 98% (04 Oct 2018 05:21) (98% - 100%)   (if applicable)  General; Non verbal as baseline mental status. No acute distress.     HEENT: Normocephalic and atraumatic. No nasal tenderness. Moist mucosa.     NECK: Has Tracheostomy, non infected.     LUNGS: Clear to auscultation B/L. No wheeze/ rales. No use of accessory muscle.     HEART: S1 S2 Regular rate and no click/ rub.    ABDOMEN: Soft, nontender, and nondistended.  No hepatosplenomegaly is noted. Active bowel sounds. + Peg.    EXTREMITIES: Without any cyanosis, clubbing,  lesions or edema.    NEUROLOGIC:  Non verbal . Cognitive impaired.     SKIN: Warm and moist. Non diaphoretic.       LABS:                        9.9    6.8   )-----------( 354      ( 03 Oct 2018 06:41 )             32.1     10-03    136  |  100  |  21<H>  ----------------------------<  176<H>  4.6   |  31  |  1.43<H>    Ca    10.1      03 Oct 2018 06:41    RADIOLOGY & ADDITIONAL STUDIES:  EKG:   CXR: < from: Xray Chest 1 View- PORTABLE-Urgent (09.27.18 @ 10:51) >  PROCEDURE DATE:  09/27/2018          INTERPRETATION:  Chest portable.    Clinical History: Abnormal breath sounds.    Comparison: 9/24/2018.    Single AP view submitted.  Again noted is tracheostomy tube in place.    The evaluation of the cardiomediastinal silhouette is limited on portable   technique.  There is arteriosclerotic calcification of the aorta.      There is elevation of the right hemidiaphragm.  Subsegmental atelectasis and/or fibrosis is noted right lower lung zone.  No new lobar lung consolidation, pleural effusion or pneumothorax is   noted.    Impression:     PAST MEDICAL & SURGICAL HISTORY:  No pertinent past medical history  Hypertension  Schizophrenia  Diabetic coma  Diabetes mellitus  No significant past surgical history  S/P percutaneous endoscopic gastrostomy (PEG) tube placement  H/O tracheostomy    Impression;  64 Y/O Female from [facility] with multiple chronic conditions. Has acute on chronic respiratory failure and on oxygen supplementation FIO2 35% via tracheostomy.    Suggestion:   Continue Oxygen supplementation FIO2 35% via Tracheostomy .  Continue DuoNeb Q 6 hours.  DVT/GI prophylactic .  Aspiration precautions especially during Peg feeding. HOB elevation.   Oral, trach care, suction . Continue Oral chlorhexidine care.    Contact precautions.       Agree with above assessment and plan as transcribed.

## 2018-10-04 NOTE — PROGRESS NOTE ADULT - PROBLEM SELECTOR PLAN 8
continue medications. Less combative.
dvt/gi ppx
dvt/gi ppx
Continue medication.  Intermittently combative.
ativan
improved, well controlled with rx
s/p Trach; continue trach collar and trach care  maintain aspiration precautions
improved, well controlled with rx
ativan

## 2018-10-04 NOTE — PROGRESS NOTE ADULT - PROBLEM SELECTOR PROBLEM 6
Pseudomonas aeruginosa infection
Pseudomonas aeruginosa infection
Diabetes mellitus
Pseudomonas aeruginosa infection
Carbapenem-resistant Acinetobacter baumannii infection
Combative behavior
Combative behavior
Pseudomonas aeruginosa infection
Schizophrenia, unspecified type
Carbapenem-resistant Acinetobacter baumannii infection
Chronic respiratory failure, unspecified whether with hypoxia or hypercapnia
Hypertension
Hyponatremia
Need for prophylactic measure
Need for prophylactic measure
Uncontrolled diabetes mellitus
Chronic respiratory failure, unspecified whether with hypoxia or hypercapnia
Chronic respiratory failure, unspecified whether with hypoxia or hypercapnia

## 2018-10-04 NOTE — PROGRESS NOTE ADULT - PROBLEM SELECTOR PLAN 7
Continue insulin.  Monitor glucose.
c/w dm rx, monitor acks
c/w dm rx, monitor acks
Continue insulin.  Monitor glucose levels.
c/w seroquel  c/w Lorazepam PRN
continue Seroquel, cogentin, lorazepam and haldol
scd's b/l  protonix
hep sq
c/w seroquel  c/w Lorazepam PRN
c/w seroquel  c/w Lorazepam PRN

## 2018-10-04 NOTE — PROGRESS NOTE ADULT - PROVIDER SPECIALTY LIST ADULT
Cardiology
Endocrinology
Infectious Disease
Internal Medicine
Nephrology
Pulmonology
Endocrinology
Endocrinology
Pulmonology
Internal Medicine
Pulmonology
Internal Medicine
Internal Medicine
Nephrology
Internal Medicine

## 2018-10-04 NOTE — PROGRESS NOTE ADULT - PROBLEM SELECTOR PROBLEM 3
Chronic respiratory failure, unspecified whether with hypoxia or hypercapnia
Schizophrenia, unspecified type
Aspiration pneumonia of right lower lobe, unspecified aspiration pneumonia type
Chronic respiratory failure, unspecified whether with hypoxia or hypercapnia
Diabetes mellitus
Nausea and vomiting, intractability of vomiting not specified, unspecified vomiting type
Pleural effusion
Pleural effusion
Pseudomonas aeruginosa infection
Pseudomonas aeruginosa infection
Schizophrenia
Schizophrenia, unspecified type
Aspiration pneumonia
Aspiration pneumonia of right lower lobe, unspecified aspiration pneumonia type
Chronic respiratory failure, unspecified whether with hypoxia or hypercapnia
Combative behavior
Diabetes mellitus
Pleural effusion
Aspiration pneumonia due to vomit, unspecified laterality, unspecified part of lung
Carbapenem-resistant Acinetobacter baumannii infection
Pleural effusion
Schizophrenia, unspecified type
Pleural effusion
Schizophrenia

## 2018-10-04 NOTE — PROGRESS NOTE ADULT - PROBLEM SELECTOR PROBLEM 9
Need for prophylactic measure
Diabetes mellitus
Discharge planning issues
Diabetes mellitus
Diabetes mellitus

## 2018-10-04 NOTE — PROGRESS NOTE ADULT - PROBLEM SELECTOR PROBLEM 5
Nausea and vomiting, intractability of vomiting not specified, unspecified vomiting type
Combative behavior
Nausea and vomiting, intractability of vomiting not specified, unspecified vomiting type
Carbapenem-resistant Acinetobacter baumannii infection
Hypertension
Nausea and vomiting, intractability of vomiting not specified, unspecified vomiting type
Pleural effusion
Pseudomonas aeruginosa infection
Schizophrenia, unspecified type
Schizophrenia, unspecified type
Carbapenem-resistant Acinetobacter baumannii infection
Chronic respiratory failure, unspecified whether with hypoxia or hypercapnia
Diabetes mellitus
Diabetes mellitus
Hypertension
Pseudomonas aeruginosa infection
Schizophrenia, unspecified type
Diabetes mellitus
Hyponatremia
Carbapenem-resistant Acinetobacter baumannii infection
Nausea and vomiting, intractability of vomiting not specified, unspecified vomiting type
Carbapenem-resistant Acinetobacter baumannii infection

## 2018-10-04 NOTE — PROGRESS NOTE ADULT - PROBLEM SELECTOR PLAN 9
c/w dm rx, monitor acks
Patient is from [facility]
c/w dm rx, monitor acks

## 2018-10-04 NOTE — PROGRESS NOTE ADULT - PROBLEM SELECTOR PLAN 5
resolved  cont meds.
resolved  cont meds.
AmLODIPine   Tablet 2.5 milliGRAM(s) Oral daily  Metoprolol tartrate 100 milliGRAM(s) Oral two times a day
Antibiotics completed as per ID.
c/w seroquel  c/w Lorazepam PRN
c/w seroquel  c/w Lorazepam PRN
lasix trial  follow up CXR  if effusion increased may need tap by IR under conscious sedation.
resolved  cont meds.
same as above
Continue cogentin
Has completed antibiotics  Continue to follow.
c/w dm rx  monitor acks  adjust rx prn
c/w dm rx  monitor acks  adjust rx prn
c/w respiratory support
c/w respiratory support  dr. Quiroz, pulmonologist, following
c/w respiratory support  dr. Quiroz, pulmonologist, following
continue metoprolol and amlodipine  continue to monitor BP per floor protocol  cardiology Titleman following
improved
same as above
same as above
BS 200s to 400's will increase lantus
resolved
resolved  cont meds
same as above
improved

## 2018-10-04 NOTE — PROGRESS NOTE ADULT - PROBLEM SELECTOR PROBLEM 1
Acute kidney injury
Acute kidney injury
Aspiration pneumonia
Chronic respiratory failure, unspecified whether with hypoxia or hypercapnia
LAYLA (acute kidney injury)
Nausea and vomiting, intractability of vomiting not specified, unspecified vomiting type
Pleural effusion
Pleural effusion
Respiratory acidosis
ATN (acute tubular necrosis)
Acute kidney injury
Aspiration pneumonia
Carbapenem-resistant Acinetobacter baumannii infection
LAYLA (acute kidney injury)
LAYLA (acute kidney injury)
Pseudomonas aeruginosa infection
Uncontrolled diabetes mellitus
ATN (acute tubular necrosis)
LAYLA (acute kidney injury)
Chronic respiratory failure, unspecified whether with hypoxia or hypercapnia
ATN (acute tubular necrosis)
Chronic respiratory failure, unspecified whether with hypoxia or hypercapnia

## 2018-10-04 NOTE — PROGRESS NOTE ADULT - PROBLEM SELECTOR PROBLEM 4
Pleural effusion
Pleural effusion
Schizophrenia, unspecified type
Carbapenem-resistant Acinetobacter baumannii infection
Carbapenem-resistant Acinetobacter baumannii infection
Chronic respiratory failure, unspecified whether with hypoxia or hypercapnia
Pleural effusion
Pseudomonas aeruginosa infection
Pseudomonas aeruginosa infection
Respiratory acidosis
Schizophrenia, unspecified type
Uncontrolled diabetes mellitus
Chronic respiratory failure, unspecified whether with hypoxia or hypercapnia
Coffee ground emesis
Diabetes mellitus
Hypertension
Hypertension
Pleural effusion
Pseudomonas aeruginosa infection
Respiratory acidosis
Combative behavior
Respiratory acidosis
Pseudomonas aeruginosa infection
Pleural effusion
Pseudomonas aeruginosa infection

## 2018-10-04 NOTE — PROGRESS NOTE ADULT - SUBJECTIVE AND OBJECTIVE BOX
[facility] NEPHROLOGY- PROGRESS NOTE, Follow up     Patient is a 63y Female who presents with a chief complaint of vomiting (24 Sep 2018 12:18) with coffee ground emesis and cough.  She was found to have PNA.  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  She was started on polymyxin on 9/16 and amikacin on 9/17.  Baseline creatinine is  0.8, eitan to ~1 on 9/20, 1.3 on 9/22 and 1.7 today.  Pt is also on rifampin.  She was previously on vanco, zosyn, and unasyn this admission as well.  Pt was given lasix IV on 9/21 and 9/22.  She has a PEG and has been tolerating PEG feeds. No ACEi/ARBs/NSAIDs/IV Contrast.    Pt is unable to give history.      REVIEW OF SYSTEMS: unable to obtain  Allergy:  angiotensin converting enzyme inhibitors (Unknown)    VITALS:  T(F): 97.6 (10-04-18 @ 05:21), Max: 98 (10-03-18 @ 14:49)  HR: 92 (10-04-18 @ 05:21)  BP: 132/65 (10-04-18 @ 05:21)  RR: 16 (10-04-18 @ 05:21)  SpO2: 98% (10-04-18 @ 05:21)  Wt(kg): --      PHYSICAL EXAM:  Constitutional: NAD, calm  HEENT: anicteric sclera, oropharynx clear, MMM  Neck: No JVD  Respiratory: CTAB, no wheezes, rales or rhonchi, trach  Cardiovascular: S1, S2, RRR  Gastrointestinal: BS+, soft, NT/ND  Extremities: No cyanosis or clubbing. No peripheral edema  : No CVA tenderness. No coe.   Skin: warm and dry         LABS: Pending today  10-03    136  |  100  |  21<H>  ----------------------------<  176<H>  4.6   |  31  |  1.43<H>    Ca    10.1      03 Oct 2018 06:41      Creatinine Trend: 1.43 <--, 1.42 <--, 1.42 <--, 1.50 <--, 1.80 <--, 1.94 <--, 2.29 <--                        9.9    6.8   )-----------( 354      ( 03 Oct 2018 06:41 )             32.1     Urine Studies:

## 2018-10-04 NOTE — PROGRESS NOTE ADULT - PROBLEM SELECTOR PROBLEM 8
Diabetes mellitus
Need for prophylactic measure
Need for prophylactic measure
Schizophrenia
Chronic respiratory failure, unspecified whether with hypoxia or hypercapnia
Combative behavior
Schizophrenia, unspecified type
Combative behavior
Combative behavior

## 2018-10-04 NOTE — PROGRESS NOTE ADULT - PROBLEM SELECTOR PROBLEM 7
Combative behavior
Diabetes mellitus
Need for prophylactic measure
Schizophrenia
Schizophrenia, unspecified type
Need for prophylactic measure
Schizophrenia, unspecified type
Schizophrenia, unspecified type

## 2018-10-04 NOTE — PROGRESS NOTE ADULT - SUBJECTIVE AND OBJECTIVE BOX
Patient seen and examined at bedside  no new events noted  Case discussed with medical team    HPI:  64 yo F from [facility], non-verbal, bed bound, S/P Trach/ PEG tube, PMH of HTN, asthma, convulsion disorder, bipolar, DVT legs, DM 2, sent from NH for coffee ground vomiting X3 this morning and cough.  Pt unable to provide history.  History given by sister (Sharon [phone])who states pt was vomiting coffee ground emesis this morning. Pt had no reported vomiting since in ED.  Pt also developed cough with clear sputum.  Sister states pt is combative at baseline, and her current behavior is baseline.  No reported fever, discomfort, diarrhea, or SOB.  Pt was recently admitted to [facility] in 07/2018 for same reason and treated for aspiration PNA and UTI during last admission.  Pt was supposed to be given IV vanco and zosyn for 7 day by PCP for PNA , which supposed to start from 09/13-09/19. Pt is DNR, MOSLT form in charts. (13 Sep 2018 15:49)      PAST MEDICAL & SURGICAL HISTORY:  No pertinent past medical history  Hypertension  Schizophrenia  Diabetic coma  Diabetes mellitus  No significant past surgical history  S/P percutaneous endoscopic gastrostomy (PEG) tube placement  H/O tracheostomy      angiotensin converting enzyme inhibitors (Unknown)       MEDICATIONS  (STANDING):  ALBUTerol/ipratropium for Nebulization 3 milliLiter(s) Nebulizer every 6 hours  amLODIPine   Tablet 2.5 milliGRAM(s) Oral daily  atorvastatin 20 milliGRAM(s) Oral at bedtime  benztropine 2 milliGRAM(s) Oral two times a day  chlorhexidine 4% Liquid 1 Application(s) Topical every 12 hours  heparin  Injectable 5000 Unit(s) SubCutaneous every 8 hours  insulin glargine Injectable (LANTUS) 25 Unit(s) SubCutaneous at bedtime  insulin lispro (HumaLOG) corrective regimen sliding scale   SubCutaneous every 6 hours  insulin lispro Injectable (HumaLOG) 10 Unit(s) SubCutaneous <User Schedule>  lactobacillus acidophilus 1 Tablet(s) Oral every 8 hours  levETIRAcetam  Solution 1000 milliGRAM(s) Oral two times a day  LORazepam     Tablet 1 milliGRAM(s) Oral every 6 hours  metoprolol tartrate 100 milliGRAM(s) Oral two times a day  nystatin Powder 1 Application(s) Topical every 8 hours  pantoprazole   Suspension 40 milliGRAM(s) Oral daily  QUEtiapine 50 milliGRAM(s) Oral every 12 hours  senna 2 Tablet(s) Oral at bedtime  sodium chloride 0.9%. 1000 milliLiter(s) (100 mL/Hr) IV Continuous <Continuous>  sodium chloride 0.9%. 1000 milliLiter(s) (100 mL/Hr) IV Continuous <Continuous>  sodium chloride 0.9%. 1000 milliLiter(s) (100 mL/Hr) IV Continuous <Continuous>    MEDICATIONS  (PRN):  haloperidol    Injectable 5 milliGRAM(s) IntraMuscular once PRN Agitation  LORazepam   Injectable 1 milliGRAM(s) IV Push every 8 hours PRN for breathrough agitation/anxiety/combative behavior      REVIEW OF SYSTEMS: limited  Vital Signs Last 24 Hrs  T(C): 36.4 (04 Oct 2018 05:21), Max: 36.7 (03 Oct 2018 14:49)  T(F): 97.6 (04 Oct 2018 05:21), Max: 98 (03 Oct 2018 14:49)  HR: 92 (04 Oct 2018 05:21) (85 - 101)  BP: 132/65 (04 Oct 2018 05:21) (92/57 - 132/65)  BP(mean): --  RR: 16 (04 Oct 2018 05:21) (16 - 18)  SpO2: 98% (04 Oct 2018 05:21) (98% - 100%)    PHYSICAL EXAMINATION:   Constitutional: NAD  HEENT: AT  Neck:  Supple  Respiratory: trach collar intact. Adequate airflow b/l. Not using accessory muscles of respiration.  Cardiovascular:  S1 & S2 intact, no R/G, 2+ radial pulses b/l  Gastrointestinal: Soft, ND, normoactive b.s., no organomegaly/RT/rigidity  Extremities: WWP  Neurological:  stable. awake.     Labs and imaging reviewed      RADIOLOGY & ADDITIONAL STUDIES:

## 2018-11-28 ENCOUNTER — EMERGENCY (EMERGENCY)
Facility: HOSPITAL | Age: 63
LOS: 1 days | Discharge: HOME | End: 2018-11-28
Attending: EMERGENCY MEDICINE
Payer: MEDICAID

## 2018-11-28 VITALS
SYSTOLIC BLOOD PRESSURE: 126 MMHG | HEIGHT: 65 IN | DIASTOLIC BLOOD PRESSURE: 84 MMHG | OXYGEN SATURATION: 96 % | HEART RATE: 84 BPM | WEIGHT: 110.01 LBS | RESPIRATION RATE: 18 BRPM | TEMPERATURE: 98 F

## 2018-11-28 VITALS
DIASTOLIC BLOOD PRESSURE: 88 MMHG | OXYGEN SATURATION: 95 % | SYSTOLIC BLOOD PRESSURE: 136 MMHG | HEART RATE: 74 BPM | RESPIRATION RATE: 18 BRPM | TEMPERATURE: 97 F

## 2018-11-28 DIAGNOSIS — Z98.89 OTHER SPECIFIED POSTPROCEDURAL STATES: Chronic | ICD-10-CM

## 2018-11-28 DIAGNOSIS — Z93.1 GASTROSTOMY STATUS: Chronic | ICD-10-CM

## 2018-11-28 PROCEDURE — 74018 RADEX ABDOMEN 1 VIEW: CPT

## 2018-11-28 PROCEDURE — 99284 EMERGENCY DEPT VISIT MOD MDM: CPT | Mod: 25

## 2018-11-28 PROCEDURE — 74018 RADEX ABDOMEN 1 VIEW: CPT | Mod: 26

## 2018-11-28 PROCEDURE — 99283 EMERGENCY DEPT VISIT LOW MDM: CPT | Mod: 25

## 2018-11-28 PROCEDURE — L8699: CPT

## 2018-11-28 PROCEDURE — 43760 CHANGE GASTROSTOMY TUBE PERCUTANEOUS W/O GUIDE: CPT

## 2018-11-28 PROCEDURE — 43760: CPT

## 2018-11-28 NOTE — ED PROVIDER NOTE - OBJECTIVE STATEMENT
62 y/o w/ PMHx of chronic respiratory failure, Parkinson's and Alzheimer's, dementia, bipolar disorder, HTN, DM, presents to ED after pulling out g-tube. Pt has been sent from NH for replacement. Contracted staff at [facility] - nurse will call back with size of g-tube in place.

## 2018-11-28 NOTE — ED ADULT TRIAGE NOTE - CHIEF COMPLAINT QUOTE
CHRISTINA from [facility] for peg tube replacement,peg tube dislodge as per ems

## 2018-11-28 NOTE — ED ADULT NURSE NOTE - OBJECTIVE STATEMENT
BIBA from nursing home for dislodge peg tube. no bleeding at site. PT is nonverbal, unable to speak or ambulate with hourly rond in place to ID needs of care

## 2018-11-28 NOTE — ED PROVIDER NOTE - MEDICAL DECISION MAKING DETAILS
64 y/o F here w/ dislodged g-tube. Will replace tube, confirm placement w/ x-ray and discharge back to NH.

## 2018-11-28 NOTE — ED ADULT NURSE NOTE - NS ED NURSE DC INFO COMPLEXITY
D/C instructions not given/Complex: Multiple Rx/Tx. Pt has difficulty understanding. Requires additional help

## 2018-12-11 NOTE — PROGRESS NOTE BEHAVIORAL HEALTH - NSBHCHARTREVIEWVS_PSY_A_CORE FT
Peripheral Nerve Block Procedure Note    Staff:     Anesthesiologist:  Jesus Peguero MD    Resident/CRNA:  Moris Sims MD    Block performed by resident/CRNA in the presence of a teaching physician    Location: Pre-op  Procedure Start/Stop TImes:      12/11/2018 7:40 AM     12/11/2018 7:50 AM    patient identified, IV checked, site marked, risks and benefits discussed, informed consent, monitors and equipment checked, pre-op evaluation, at physician/surgeon's request and post-op pain management      Correct Patient: Yes      Correct Position: Yes      Correct Site: Yes      Correct Procedure: Yes      Correct Laterality:  Yes    Site Marked:  Yes  Procedure details:     Procedure:  TAP    ASA:  3    Laterality:  Bilateral    Position:  Supine    Sterile Prep: chloraprep, mask and sterile gloves      Needle:  Short bevel    Needle gauge:  21    Needle length (mm):  110    Ultrasound: Yes      Ultrasound used to identify targeted nerve, plexus, or vascular structure and placed a needle adjacent to it      Permanent Image entered into patiient's record      Abnormal pain on injection: No      Blood Aspirated: No      Paresthesias:  No    Bleeding at site: No      Bolus via:  Needle    Infusion Method:  Single Shot    Blood aspirated via catheter: No      Complications:  None  Assessment/Narrative:     Injection made incrementally with aspirations every (mL):  5         Vital Signs Last 24 Hrs  T(C): 36.8 (30 Jul 2018 05:12), Max: 37.1 (29 Jul 2018 20:49)  T(F): 98.2 (30 Jul 2018 05:12), Max: 98.7 (29 Jul 2018 20:49)  HR: 80 (30 Jul 2018 05:12) (59 - 89)  BP: 147/98 (30 Jul 2018 05:12) (107/52 - 147/98)  BP(mean): --  RR: 19 (30 Jul 2018 05:12) (16 - 19)  SpO2: 99% (30 Jul 2018 05:12) (99% - 100%)

## 2018-12-26 NOTE — PROGRESS NOTE ADULT - SUBJECTIVE AND OBJECTIVE BOX
Documentation of the ultasound findings, images, and interpretations with be available in the patient's Viewpoint report located in the Chart Review Imaging tab in [employer].     Patient is seen and examined at the bed side, is afebrile.  She has having loose stool.       REVIEW OF SYSTEMS: Unable to obtained since nonverbal      ALLERGIES: ACIs    Vital Signs Last 24 Hrs  T(C): 36.8 (23 Jul 2018 13:28), Max: 36.8 (23 Jul 2018 13:28)  T(F): 98.3 (23 Jul 2018 13:28), Max: 98.3 (23 Jul 2018 13:28)  HR: 68 (23 Jul 2018 17:07) (57 - 75)  BP: 116/59 (23 Jul 2018 17:07) (116/59 - 130/68)  BP(mean): 72 (23 Jul 2018 17:07) (72 - 72)  RR: 18 (23 Jul 2018 13:28) (16 - 18)  SpO2: 95% (23 Jul 2018 13:28) (95% - 99%)      PHYSICAL EXAM:  GENERAL: Not in acute distress, having involuntary movements of face  HEENT: Trach in placed  CVS: S1 and s2 present  RESP: Air entry B/L  GI: Abdomen nondistended, NT and PEG in placed  EXT: No pedal edema, hands contracted   CNS: Awake but not alert        LABS: NO NEW labs                          11.6   6.0   )-----------( 146      ( 22 Jul 2018 07:06 )             37.2                           12.6   6.1   )-----------( 153      ( 19 Jul 2018 07:30 )             40.2                         11.8   15.4  )-----------( 170      ( 16 Jul 2018 07:12 )             38.1                           12.2   23.0  )-----------( 180      ( 15 Jul 2018 12:45 )             38.7         07-22    144  |  108  |  4<L>  ----------------------------<  173<H>  4.0   |  27  |  0.74    Ca    9.1      22 Jul 2018 07:06  Phos  3.0     07-22  Mg     1.7     07-22 07-18    141  |  107  |  6<L>  ----------------------------<  202<H>  3.6   |  27  |  0.78    Ca    8.2<L>      18 Jul 2018 13:06      TPro  7.4  /  Alb  3.4<L>  /  TBili  0.9  /  DBili  x   /  AST  26  /  ALT  44  /  AlkPhos  108  07-14        CAPILLARY BLOOD GLUCOSE      POCT Blood Glucose.: 211 mg/dL (15 Jul 2018 17:22)  POCT Blood Glucose.: 123 mg/dL (15 Jul 2018 11:44)  POCT Blood Glucose.: 353 mg/dL (15 Jul 2018 07:56)  POCT Blood Glucose.: 254 mg/dL (15 Jul 2018 00:42)  POCT Blood Glucose.: 230 mg/dL (14 Jul 2018 22:22)    MEDICATIONS  (STANDING):  ALBUTerol/ipratropium for Nebulization 3 milliLiter(s) Nebulizer every 6 hours  amiKACIN  IVPB 600 milliGRAM(s) IV Intermittent every 12 hours  amLODIPine   Tablet 2.5 milliGRAM(s) Oral daily  atorvastatin 20 milliGRAM(s) Oral at bedtime  cefepime   IVPB 2000 milliGRAM(s) IV Intermittent every 8 hours  cefepime   IVPB      dextrose 5% + sodium chloride 0.45%. 1000 milliLiter(s) (50 mL/Hr) IV Continuous <Continuous>  dextrose 5% + sodium chloride 0.45%. 1000 milliLiter(s) (75 mL/Hr) IV Continuous <Continuous>  dextrose 5%. 1000 milliLiter(s) (50 mL/Hr) IV Continuous <Continuous>  dextrose 50% Injectable 12.5 Gram(s) IV Push once  dextrose 50% Injectable 25 Gram(s) IV Push once  dextrose 50% Injectable 25 Gram(s) IV Push once  heparin  Injectable 5000 Unit(s) SubCutaneous every 8 hours  insulin glargine Injectable (LANTUS) 15 Unit(s) SubCutaneous at bedtime  insulin lispro (HumaLOG) corrective regimen sliding scale   SubCutaneous every 6 hours  levETIRAcetam  Solution 1000 milliGRAM(s) Oral two times a day  LORazepam     Tablet 1 milliGRAM(s) Oral every 6 hours  LORazepam   Injectable 1 milliGRAM(s) IV Push once  metoprolol tartrate 100 milliGRAM(s) Oral two times a day  pantoprazole   Suspension 40 milliGRAM(s) Enteral Tube daily  QUEtiapine 50 milliGRAM(s) Oral every 12 hours  sodium chloride 0.9% lock flush 20 milliLiter(s) IV Push once    MEDICATIONS  (PRN):  acetaminophen    Suspension 650 milliGRAM(s) Oral every 6 hours PRN For Temp greater than 38 C (100.4 F)  dextrose 40% Gel 15 Gram(s) Oral once PRN Blood Glucose LESS THAN 70 milliGRAM(s)/deciLiter  glucagon  Injectable 1 milliGRAM(s) IntraMuscular once PRN Glucose <70 milliGRAM(s)/deciLiter  LORazepam   Injectable 1 milliGRAM(s) IV Push every 6 hours PRN combative /agitation  sodium chloride 0.9% lock flush 10 milliLiter(s) IV Push every 1 hour PRN After each medication administration  sodium chloride 0.9% lock flush 10 milliLiter(s) IV Push every 12 hours PRN Lumen of catheter NOT used          RADIOLOGY & ADDITIONAL TESTS:    7/15/18: CT Chest No Cont (07.15.18 @ 11:14) 1. Mid to distal esophageal wall thickening is suggested. Correlate with endoscopic evaluation.  2. A PEG tube is identified, the distal aspect of which is identified in the region of the pylorus/duodenal bulb; correlate clinically as to appropriate positioning.  3. There is opacity identified within the right lower lobe lung parenchyma, likely representative of a combination of both atelectasis and consolidation. Small right pleural effusion.    7/14/18: Xray Chest 1 View AP/PA (07.14.18 @ 15:34)No consolidation. Platelike atelectasis in the right lower lobe.        MICROBIOLOGY DATA:    Culture - Sputum . (07.16.18 @ 10:36)    -  Gentamicin: R >8    -  Imipenem: R >8    -  Levofloxacin: R >4    -  Meropenem: R >8    -  Piperacillin/Tazobactam: R >64    -  Tobramycin: R >8    -  Amikacin: S 16    Gram Stain:   Moderate polymorphonuclear leukocytes seen per low power field  Moderate Squamous epithelial cells seen per low power field  Moderate Gram Negative Rods seen per oil power field  Few Gram Positive Cocci seen per oil power field    -  Aztreonam: R >16    -  Cefepime: I 16    -  Ceftazidime: R >16    -  Ciprofloxacin: R >2    Specimen Source: .Sputum Sputum trap    Culture Results:   Three or more mixed gram negative rods including  Moderate Pseudomonas aeruginosa (Carbapenem Resistant)    Organism Identification: Pseudomonas aeruginosa (Carbapenem Resistant)    Organism: Pseudomonas aeruginosa (Carbapenem Resistant)    Method Type: ESTIVEN        MRSA/MSSA PCR (07.16.18 @ 10:15)    MRSA PCR Result.: Melanie: MRSA/MSSA PCR assay is a qualitative in vitro diagnostic test for the  direct detection and differentiation of methicillin-resistant  Staphylococcus aureus (MRSA) from Staphylococcus aureus (SA). The assay  detects DNA from nasal swabs in patients atrisk for nasal colonization.  It is not intended to diagnose MRSA or SA infections nor guide or monitor  treatment for MRSA/SA infections. A negative result does not preclude  nasal colonization. The assay is FDA-approved and its performance has  been established by [employer] [place], [place] and the [facility], [place], [place].    Staph Aureus PCR Result: Melanie    Urine Microscopic-Add On (NC) (07.15.18 @ 23:04)    Bacteria: Many /HPF    Epithelial Cells: Many /HPF    Red Blood Cell - Urine: 5-10 /HPF    White Blood Cell - Urine: 11-25 /HPF Patient is seen and examined at the bed side, is afebrile.  The Loose stool has improved.       REVIEW OF SYSTEMS: Unable to obtained since nonverbal      ALLERGIES: ACIs      Vital Signs Last 24 Hrs  T(C): 36.8 (23 Jul 2018 13:28), Max: 36.8 (23 Jul 2018 13:28)  T(F): 98.3 (23 Jul 2018 13:28), Max: 98.3 (23 Jul 2018 13:28)  HR: 68 (23 Jul 2018 17:07) (57 - 75)  BP: 116/59 (23 Jul 2018 17:07) (116/59 - 130/68)  BP(mean): 72 (23 Jul 2018 17:07) (72 - 72)  RR: 18 (23 Jul 2018 13:28) (16 - 18)  SpO2: 95% (23 Jul 2018 13:28) (95% - 99%)      PHYSICAL EXAM:  GENERAL: Not in acute distress, having involuntary movements of face  HEENT: Trach in placed  CVS: S1 and s2 present  RESP: Air entry B/L  GI: Abdomen nondistended, NT and PEG in placed  EXT: No pedal edema, hands contracted   CNS: Awake but not alert        LABS: NO NEW labs                          11.6   6.0   )-----------( 146      ( 22 Jul 2018 07:06 )             37.2                   11.8   15.4  )-----------( 170      ( 16 Jul 2018 07:12 )             38.1                           12.2   23.0  )-----------( 180      ( 15 Jul 2018 12:45 )             38.7         07-22    144  |  108  |  4<L>  ----------------------------<  173<H>  4.0   |  27  |  0.74    Ca    9.1      22 Jul 2018 07:06  Phos  3.0     07-22  Mg     1.7     07-22 07-18    141  |  107  |  6<L>  ----------------------------<  202<H>  3.6   |  27  |  0.78    Ca    8.2<L>      18 Jul 2018 13:06      TPro  7.4  /  Alb  3.4<L>  /  TBili  0.9  /  DBili  x   /  AST  26  /  ALT  44  /  AlkPhos  108  07-14        CAPILLARY BLOOD GLUCOSE      POCT Blood Glucose.: 211 mg/dL (15 Jul 2018 17:22)  POCT Blood Glucose.: 123 mg/dL (15 Jul 2018 11:44)  POCT Blood Glucose.: 353 mg/dL (15 Jul 2018 07:56)  POCT Blood Glucose.: 254 mg/dL (15 Jul 2018 00:42)  POCT Blood Glucose.: 230 mg/dL (14 Jul 2018 22:22)    MEDICATIONS  (STANDING):  ALBUTerol/ipratropium for Nebulization 3 milliLiter(s) Nebulizer every 6 hours  amiKACIN  IVPB 600 milliGRAM(s) IV Intermittent every 12 hours  amLODIPine   Tablet 2.5 milliGRAM(s) Oral daily  atorvastatin 20 milliGRAM(s) Oral at bedtime  cefepime   IVPB 2000 milliGRAM(s) IV Intermittent every 8 hours  cefepime   IVPB      dextrose 5% + sodium chloride 0.45%. 1000 milliLiter(s) (50 mL/Hr) IV Continuous <Continuous>  heparin  Injectable 5000 Unit(s) SubCutaneous every 8 hours  insulin glargine Injectable (LANTUS) 15 Unit(s) SubCutaneous at bedtime  insulin lispro (HumaLOG) corrective regimen sliding scale   SubCutaneous every 6 hours  levETIRAcetam  Solution 1000 milliGRAM(s) Oral two times a day  LORazepam     Tablet 1 milliGRAM(s) Oral every 6 hours  LORazepam   Injectable 1 milliGRAM(s) IV Push once  metoprolol tartrate 100 milliGRAM(s) Oral two times a day  pantoprazole   Suspension 40 milliGRAM(s) Enteral Tube daily  QUEtiapine 50 milliGRAM(s) Oral every 12 hours  sodium chloride 0.9% lock flush 20 milliLiter(s) IV Push once    MEDICATIONS  (PRN):  acetaminophen    Suspension 650 milliGRAM(s) Oral every 6 hours PRN For Temp greater than 38 C (100.4 F)  dextrose 40% Gel 15 Gram(s) Oral once PRN Blood Glucose LESS THAN 70 milliGRAM(s)/deciLiter  glucagon  Injectable 1 milliGRAM(s) IntraMuscular once PRN Glucose <70 milliGRAM(s)/deciLiter  LORazepam   Injectable 1 milliGRAM(s) IV Push every 6 hours PRN combative /agitation  sodium chloride 0.9% lock flush 10 milliLiter(s) IV Push every 1 hour PRN After each medication administration  sodium chloride 0.9% lock flush 10 milliLiter(s) IV Push every 12 hours PRN Lumen of catheter NOT used          RADIOLOGY & ADDITIONAL TESTS:    7/15/18: CT Chest No Cont (07.15.18 @ 11:14) 1. Mid to distal esophageal wall thickening is suggested. Correlate with endoscopic evaluation.  2. A PEG tube is identified, the distal aspect of which is identified in the region of the pylorus/duodenal bulb; correlate clinically as to appropriate positioning.  3. There is opacity identified within the right lower lobe lung parenchyma, likely representative of a combination of both atelectasis and consolidation. Small right pleural effusion.    7/14/18: Xray Chest 1 View AP/PA (07.14.18 @ 15:34)No consolidation. Platelike atelectasis in the right lower lobe.        MICROBIOLOGY DATA:    Culture - Sputum . (07.16.18 @ 10:36)    -  Gentamicin: R >8    -  Imipenem: R >8    -  Levofloxacin: R >4    -  Meropenem: R >8    -  Piperacillin/Tazobactam: R >64    -  Tobramycin: R >8    -  Amikacin: S 16    Gram Stain:   Moderate polymorphonuclear leukocytes seen per low power field  Moderate Squamous epithelial cells seen per low power field  Moderate Gram Negative Rods seen per oil power field  Few Gram Positive Cocci seen per oil power field    -  Aztreonam: R >16    -  Cefepime: I 16    -  Ceftazidime: R >16    -  Ciprofloxacin: R >2    Specimen Source: .Sputum Sputum trap    Culture Results:   Three or more mixed gram negative rods including  Moderate Pseudomonas aeruginosa (Carbapenem Resistant)    Organism Identification: Pseudomonas aeruginosa (Carbapenem Resistant)    Organism: Pseudomonas aeruginosa (Carbapenem Resistant)    Method Type: ESTIVEN        MRSA/MSSA PCR (07.16.18 @ 10:15)    MRSA PCR Result.: NotDetec: MRSA/MSSA PCR assay is a qualitative in vitro diagnostic test for the  direct detection and differentiation of methicillin-resistant  Staphylococcus aureus (MRSA) from Staphylococcus aureus (SA). The assay  detects DNA from nasal swabs in patients atrisk for nasal colonization.  It is not intended to diagnose MRSA or SA infections nor guide or monitor  treatment for MRSA/SA infections. A negative result does not preclude  nasal colonization. The assay is FDA-approved and its performance has  been established by [employer] [place], [place] and the [facility], [place], [place].    Staph Aureus PCR Result: [facility]    Urine Microscopic-Add On (NC) (07.15.18 @ 23:04)    Bacteria: Many /HPF    Epithelial Cells: Many /HPF    Red Blood Cell - Urine: 5-10 /HPF    White Blood Cell - Urine: 11-25 /HPF

## 2019-03-09 ENCOUNTER — EMERGENCY (EMERGENCY)
Facility: HOSPITAL | Age: 64
LOS: 1 days | Discharge: HOME | End: 2019-03-09
Attending: EMERGENCY MEDICINE
Payer: MEDICAID

## 2019-03-09 VITALS
HEART RATE: 96 BPM | RESPIRATION RATE: 18 BRPM | SYSTOLIC BLOOD PRESSURE: 106 MMHG | DIASTOLIC BLOOD PRESSURE: 60 MMHG | TEMPERATURE: 98 F | HEIGHT: 66 IN | WEIGHT: 130.07 LBS | OXYGEN SATURATION: 98 %

## 2019-03-09 DIAGNOSIS — Z93.1 GASTROSTOMY STATUS: Chronic | ICD-10-CM

## 2019-03-09 DIAGNOSIS — Z98.89 OTHER SPECIFIED POSTPROCEDURAL STATES: Chronic | ICD-10-CM

## 2019-03-09 PROCEDURE — 99284 EMERGENCY DEPT VISIT MOD MDM: CPT | Mod: 25

## 2019-03-09 PROCEDURE — 43762 RPLC GTUBE NO REVJ TRC: CPT

## 2019-03-09 PROCEDURE — 74019 RADEX ABDOMEN 2 VIEWS: CPT

## 2019-03-09 NOTE — ED PROCEDURE NOTE - CPROC ED TIME OUT STATEMENT1
“Patient's name, , procedure and correct site were confirmed during the [facility] Timeout.”
23-Jul-2018 17:50

## 2019-03-09 NOTE — ED ADULT NURSE NOTE - OBJECTIVE STATEMENT
BIBA from NH for peg replacement. PT is A&Ox0, nonverbal and  unable to make needs known. Hourly rounding in place to id needs of care.

## 2019-03-09 NOTE — ED PROVIDER NOTE - CLINICAL SUMMARY MEDICAL DECISION MAKING FREE TEXT BOX
65 y/o F pt here for G tube replacement. Will attempt replacement, confirmation, and discharge back to nursing home.

## 2019-03-09 NOTE — ED PROVIDER NOTE - PMH
Alzheimer disease    Diabetes mellitus    Diabetic coma    Hypertension    Parkinson disease    Respiratory failure    Schizophrenia

## 2019-03-09 NOTE — ED PROVIDER NOTE - OBJECTIVE STATEMENT
65 y/o F pt with PMHx of Alzheimer dz, DM, HTN, Parkinson's dz, h/o trach and peg presents to ED for replacement of malfunctioning G tube. Pt received medications and two feeds. As per paperwork, pt is DNR. No reported fever, chills, or any other complaints. 63 y/o F pt with PMHx of Alzheimer dz, DM, HTN, Parkinson's dz, h/o trach and peg presents to ED for replacement of malfunctioning G tube. Pt receives medications and tube feeds. As per paperwork, pt is DNR. No reported fever, chills, or any other complaints.

## 2019-03-10 VITALS
TEMPERATURE: 98 F | DIASTOLIC BLOOD PRESSURE: 68 MMHG | HEART RATE: 85 BPM | SYSTOLIC BLOOD PRESSURE: 153 MMHG | RESPIRATION RATE: 18 BRPM | OXYGEN SATURATION: 100 %

## 2019-03-10 PROCEDURE — 74019 RADEX ABDOMEN 2 VIEWS: CPT | Mod: 26

## 2019-05-04 ENCOUNTER — EMERGENCY (EMERGENCY)
Facility: HOSPITAL | Age: 64
LOS: 1 days | Discharge: HOME | End: 2019-05-04
Attending: EMERGENCY MEDICINE
Payer: MEDICAID

## 2019-05-04 VITALS
OXYGEN SATURATION: 99 % | HEART RATE: 74 BPM | RESPIRATION RATE: 20 BRPM | DIASTOLIC BLOOD PRESSURE: 60 MMHG | TEMPERATURE: 97 F | SYSTOLIC BLOOD PRESSURE: 102 MMHG

## 2019-05-04 DIAGNOSIS — Z93.1 GASTROSTOMY STATUS: Chronic | ICD-10-CM

## 2019-05-04 DIAGNOSIS — Z98.89 OTHER SPECIFIED POSTPROCEDURAL STATES: Chronic | ICD-10-CM

## 2019-05-04 PROCEDURE — 99284 EMERGENCY DEPT VISIT MOD MDM: CPT | Mod: 25

## 2019-05-04 PROCEDURE — 43762 RPLC GTUBE NO REVJ TRC: CPT

## 2019-05-04 PROCEDURE — 99285 EMERGENCY DEPT VISIT HI MDM: CPT | Mod: 25

## 2019-05-04 PROCEDURE — 49465 FLUORO EXAM OF G/COLON TUBE: CPT

## 2019-05-04 PROCEDURE — 96372 THER/PROPH/DIAG INJ SC/IM: CPT

## 2019-05-04 RX ORDER — HALOPERIDOL DECANOATE 100 MG/ML
5 INJECTION INTRAMUSCULAR ONCE
Qty: 0 | Refills: 0 | Status: COMPLETED | OUTPATIENT
Start: 2019-05-04 | End: 2019-05-04

## 2019-05-04 RX ADMIN — HALOPERIDOL DECANOATE 5 MILLIGRAM(S): 100 INJECTION INTRAMUSCULAR at 22:13

## 2019-05-04 NOTE — ED PROVIDER NOTE - OBJECTIVE STATEMENT
h/o Multiple medical problems including respiratory failure on trach 35% 02, PEG, Dementia, Parkinsons here from NH for replacement of PEG tube. Pt tube clogged, unclear when last replaced. No other acute issue noted. History limited due to mental status.

## 2019-05-04 NOTE — ED ADULT TRIAGE NOTE - CHIEF COMPLAINT QUOTE
biba  sent from  [facility] ([place], [place]) home for  Peg tube reinsertion. pt PEG Tube is  blocked .

## 2019-05-05 VITALS
TEMPERATURE: 97 F | DIASTOLIC BLOOD PRESSURE: 56 MMHG | OXYGEN SATURATION: 100 % | RESPIRATION RATE: 18 BRPM | SYSTOLIC BLOOD PRESSURE: 143 MMHG | HEART RATE: 86 BPM

## 2019-05-05 PROBLEM — G30.9 ALZHEIMER'S DISEASE, UNSPECIFIED: Chronic | Status: ACTIVE | Noted: 2019-03-09

## 2019-05-05 PROBLEM — G20 PARKINSON'S DISEASE: Chronic | Status: ACTIVE | Noted: 2019-03-09

## 2019-05-05 PROBLEM — J96.90 RESPIRATORY FAILURE, UNSPECIFIED, UNSPECIFIED WHETHER WITH HYPOXIA OR HYPERCAPNIA: Chronic | Status: ACTIVE | Noted: 2019-03-09

## 2019-05-05 NOTE — ED ADULT NURSE NOTE - OBJECTIVE STATEMENT
Pt presents with blocked PEG tube. No active drainage, appearance is pink-red. Pt is awake but nonverbal, trach in place. Unable to determine orientation. Resting tremors. Pt has strong muscle tone and actively tries to kick when healthcare provider provides any care.

## 2019-05-05 NOTE — ED ADULT NURSE NOTE - CHIEF COMPLAINT QUOTE
biba  sent from  [facility] home for  Peg tube reinsertion. pt PEG Tube is  blocked .

## 2019-05-05 NOTE — ED ADULT NURSE NOTE - NSIMPLEMENTINTERV_GEN_ALL_ED
Implemented All Universal Safety Interventions:  [place] to call system. Call bell, personal items and telephone within reach. Instruct patient to call for assistance. Room bathroom lighting operational. Non-slip footwear when patient is off stretcher. Physically safe environment: no spills, clutter or unnecessary equipment. Stretcher in lowest position, wheels locked, appropriate side rails in place.

## 2019-08-27 NOTE — ED PROVIDER NOTE - CARE PLAN
Nsaids Pregnancy And Lactation Text: These medications are considered safe up to 30 weeks gestation. It is excreted in breast milk. Imiquimod Counseling:  I discussed with the patient the risks of imiquimod including but not limited to erythema, scaling, itching, weeping, crusting, and pain.  Patient understands that the inflammatory response to imiquimod is variable from person to person and was educated regarded proper titration schedule.  If flu-like symptoms develop, patient knows to discontinue the medication and contact us. Use Enhanced Medication Counseling?: No Topical Clindamycin Pregnancy And Lactation Text: This medication is Pregnancy Category B and is considered safe during pregnancy. It is unknown if it is excreted in breast milk. Azathioprine Counseling:  I discussed with the patient the risks of azathioprine including but not limited to myelosuppression, immunosuppression, hepatotoxicity, lymphoma, and infections.  The patient understands that monitoring is required including baseline LFTs, Creatinine, possible TPMP genotyping and weekly CBCs for the first month and then every 2 weeks thereafter.  The patient verbalized understanding of the proper use and possible adverse effects of azathioprine.  All of the patient's questions and concerns were addressed. Albendazole Pregnancy And Lactation Text: This medication is Pregnancy Category C and it isn't known if it is safe during pregnancy. It is also excreted in breast milk. High Dose Vitamin A Counseling: Side effects reviewed, pt to contact office should one occur. Clofazimine Pregnancy And Lactation Text: This medication is Pregnancy Category C and isn't considered safe during pregnancy. It is excreted in breast milk. Azithromycin Pregnancy And Lactation Text: This medication is considered safe during pregnancy and is also secreted in breast milk. Fluconazole Counseling:  Patient counseled regarding adverse effects of fluconazole including but not limited to headache, diarrhea, nausea, upset stomach, liver function test abnormalities, taste disturbance, and stomach pain.  There is a rare possibility of liver failure that can occur when taking fluconazole.  The patient understands that monitoring of LFTs and kidney function test may be required, especially at baseline. The patient verbalized understanding of the proper use and possible adverse effects of fluconazole.  All of the patient's questions and concerns were addressed. Rituxan Counseling:  I discussed with the patient the risks of Rituxan infusions. Side effects can include infusion reactions, severe drug rashes including mucocutaneous reactions, reactivation of latent hepatitis and other infections and rarely progressive multifocal leukoencephalopathy.  All of the patient's questions and concerns were addressed. Imiquimod Pregnancy And Lactation Text: This medication is Pregnancy Category C. It is unknown if this medication is excreted in breast milk. Odomzo Counseling- I discussed with the patient the risks of Odomzo including but not limited to nausea, vomiting, diarrhea, constipation, weight loss, changes in the sense of taste, decreased appetite, muscle spasms, and hair loss.  The patient verbalized understanding of the proper use and possible adverse effects of Odomzo.  All of the patient's questions and concerns were addressed. Azathioprine Pregnancy And Lactation Text: This medication is Pregnancy Category D and isn't considered safe during pregnancy. It is unknown if this medication is excreted in breast milk. Colchicine Counseling:  Patient counseled regarding adverse effects including but not limited to stomach upset (nausea, vomiting, stomach pain, or diarrhea).  Patient instructed to limit alcohol consumption while taking this medication.  Colchicine may reduce blood counts especially with prolonged use.  The patient understands that monitoring of kidney function and blood counts may be required, especially at baseline. The patient verbalized understanding of the proper use and possible adverse effects of colchicine.  All of the patient's questions and concerns were addressed. Albendazole Counseling:  I discussed with the patient the risks of albendazole including but not limited to cytopenia, kidney damage, nausea/vomiting and severe allergy.  The patient understands that this medication is being used in an off-label manner. High Dose Vitamin A Pregnancy And Lactation Text: High dose vitamin A therapy is contraindicated during pregnancy and breast feeding. Rituxan Pregnancy And Lactation Text: This medication is Pregnancy Category C and it isn't know if it is safe during pregnancy. It is unknown if this medication is excreted in breast milk but similar antibodies are known to be excreted. Ilumya Pregnancy And Lactation Text: The risk during pregnancy and breastfeeding is uncertain with this medication. Bactrim Counseling:  I discussed with the patient the risks of sulfa antibiotics including but not limited to GI upset, allergic reaction, drug rash, diarrhea, dizziness, photosensitivity, and yeast infections.  Rarely, more serious reactions can occur including but not limited to aplastic anemia, agranulocytosis, methemoglobinemia, blood dyscrasias, liver or kidney failure, lung infiltrates or desquamative/blistering drug rashes. Odomzo Pregnancy And Lactation Text: This medication is Pregnancy Category X and is absolutely contraindicated during pregnancy. It is unknown if it is excreted in breast milk. Minoxidil Counseling: Minoxidil is a topical medication which can increase blood flow where it is applied. It is uncertain how this medication increases hair growth. Side effects are uncommon and include stinging and allergic reactions. Cellcept Counseling:  I discussed with the patient the risks of mycophenolate mofetil including but not limited to infection/immunosuppression, GI upset, hypokalemia, hypercholesterolemia, bone marrow suppression, lymphoproliferative disorders, malignancy, GI ulceration/bleed/perforation, colitis, interstitial lung disease, kidney failure, progressive multifocal leukoencephalopathy, and birth defects.  The patient understands that monitoring is required including a baseline creatinine and regular CBC testing. In addition, patient must alert us immediately if symptoms of infection or other concerning signs are noted. Bactrim Pregnancy And Lactation Text: This medication is Pregnancy Category D and is known to cause fetal risk.  It is also excreted in breast milk. Tetracycline Pregnancy And Lactation Text: This medication is Pregnancy Category D and not consider safe during pregnancy. It is also excreted in breast milk. Principal Discharge DX:	Aspiration pneumonia Siliq Counseling:  I discussed with the patient the risks of Siliq including but not limited to new or worsening depression, suicidal thoughts and behavior, immunosuppression, malignancy, posterior leukoencephalopathy syndrome, and serious infections.  The patient understands that monitoring is required including a PPD at baseline and must alert us or the primary physician if symptoms of infection or other concerning signs are noted. There is also a special program designed to monitor depression which is required with Siliq. Ilumya Counseling: I discussed with the patient the risks of tildrakizumab including but not limited to immunosuppression, malignancy, posterior leukoencephalopathy syndrome, and serious infections.  The patient understands that monitoring is required including a PPD at baseline and must alert us or the primary physician if symptoms of infection or other concerning signs are noted. Otezla Counseling: The side effects of Otezla were discussed with the patient, including but not limited to worsening or new depression, weight loss, diarrhea, nausea, upper respiratory tract infection, and headache. Patient instructed to call the office should any adverse effect occur.  The patient verbalized understanding of the proper use and possible adverse effects of Otezla.  All the patient's questions and concerns were addressed. Minoxidil Pregnancy And Lactation Text: This medication has not been assigned a Pregnancy Risk Category but animal studies failed to show danger with the topical medication. It is unknown if the medication is excreted in breast milk. Hydroxyzine Pregnancy And Lactation Text: This medication is not safe during pregnancy and should not be taken. It is also excreted in breast milk and breast feeding isn't recommended. Cephalexin Counseling: I counseled the patient regarding use of cephalexin as an antibiotic for prophylactic and/or therapeutic purposes. Cephalexin (commonly prescribed under brand name Keflex) is a cephalosporin antibiotic which is active against numerous classes of bacteria, including most skin bacteria. Side effects may include nausea, diarrhea, gastrointestinal upset, rash, hives, yeast infections, and in rare cases, hepatitis, kidney disease, seizures, fever, confusion, neurologic symptoms, and others. Patients with severe allergies to penicillin medications are cautioned that there is about a 10% incidence of cross-reactivity with cephalosporins. When possible, patients with penicillin allergies should use alternatives to cephalosporins for antibiotic therapy. Tetracycline Counseling: Patient counseled regarding possible photosensitivity and increased risk for sunburn.  Patient instructed to avoid sunlight, if possible.  When exposed to sunlight, patients should wear protective clothing, sunglasses, and sunscreen.  The patient was instructed to call the office immediately if the following severe adverse effects occur:  hearing changes, easy bruising/bleeding, severe headache, or vision changes.  The patient verbalized understanding of the proper use and possible adverse effects of tetracycline.  All of the patient's questions and concerns were addressed. Patient understands to avoid pregnancy while on therapy due to potential birth defects. Dapsone Counseling: I discussed with the patient the risks of dapsone including but not limited to hemolytic anemia, agranulocytosis, rashes, methemoglobinemia, kidney failure, peripheral neuropathy, headaches, GI upset, and liver toxicity.  Patients who start dapsone require monitoring including baseline LFTs and weekly CBCs for the first month, then every month thereafter.  The patient verbalized understanding of the proper use and possible adverse effects of dapsone.  All of the patient's questions and concerns were addressed. Otezla Pregnancy And Lactation Text: This medication is Pregnancy Category C and it isn't known if it is safe during pregnancy. It is unknown if it is excreted in breast milk. Mirvaso Counseling: Mirvaso is a topical medication which can decrease superficial blood flow where applied. Side effects are uncommon and include stinging, redness and allergic reactions. Humira Pregnancy And Lactation Text: This medication is Pregnancy Category B and is considered safe during pregnancy. It is unknown if this medication is excreted in breast milk. Cyclophosphamide Counseling:  I discussed with the patient the risks of cyclophosphamide including but not limited to hair loss, hormonal abnormalities, decreased fertility, abdominal pain, diarrhea, nausea and vomiting, bone marrow suppression and infection. The patient understands that monitoring is required while taking this medication. Hydroxyzine Counseling: Patient advised that the medication is sedating and not to drive a car after taking this medication.  Patient informed of potential adverse effects including but not limited to dry mouth, urinary retention, and blurry vision.  The patient verbalized understanding of the proper use and possible adverse effects of hydroxyzine.  All of the patient's questions and concerns were addressed. Rifampin Pregnancy And Lactation Text: This medication is Pregnancy Category C and it isn't know if it is safe during pregnancy. It is also excreted in breast milk and should not be used if you are breast feeding. Simponi Counseling:  I discussed with the patient the risks of golimumab including but not limited to myelosuppression, immunosuppression, autoimmune hepatitis, demyelinating diseases, lymphoma, and serious infections.  The patient understands that monitoring is required including a PPD at baseline and must alert us or the primary physician if symptoms of infection or other concerning signs are noted. Cephalexin Pregnancy And Lactation Text: This medication is Pregnancy Category B and considered safe during pregnancy.  It is also excreted in breast milk but can be used safely for shorter doses. Dapsone Pregnancy And Lactation Text: This medication is Pregnancy Category C and is not considered safe during pregnancy or breast feeding. Benzoyl Peroxide Counseling: Patient counseled that medicine may cause skin irritation and bleach clothing.  In the event of skin irritation, the patient was advised to reduce the amount of the drug applied or use it less frequently.   The patient verbalized understanding of the proper use and possible adverse effects of benzoyl peroxide.  All of the patient's questions and concerns were addressed. Oxybutynin Counseling:  I discussed with the patient the risks of oxybutynin including but not limited to skin rash, drowsiness, dry mouth, difficulty urinating, and blurred vision. Mirvaso Pregnancy And Lactation Text: This medication has not been assigned a Pregnancy Risk Category. It is unknown if the medication is excreted in breast milk. Humira Counseling:  I discussed with the patient the risks of adalimumab including but not limited to myelosuppression, immunosuppression, autoimmune hepatitis, demyelinating diseases, lymphoma, and serious infections.  The patient understands that monitoring is required including a PPD at baseline and must alert us or the primary physician if symptoms of infection or other concerning signs are noted. Cyclophosphamide Pregnancy And Lactation Text: This medication is Pregnancy Category D and it isn't considered safe during pregnancy. This medication is excreted in breast milk. Doxepin Pregnancy And Lactation Text: This medication is Pregnancy Category C and it isn't known if it is safe during pregnancy. It is also excreted in breast milk and breast feeding isn't recommended. Clindamycin Counseling: I counseled the patient regarding use of clindamycin as an antibiotic for prophylactic and/or therapeutic purposes. Clindamycin is active against numerous classes of bacteria, including skin bacteria. Side effects may include nausea, diarrhea, gastrointestinal upset, rash, hives, yeast infections, and in rare cases, colitis. Rifampin Counseling: I discussed with the patient the risks of rifampin including but not limited to liver damage, kidney damage, red-orange body fluids, nausea/vomiting and severe allergy. Benzoyl Peroxide Pregnancy And Lactation Text: This medication is Pregnancy Category C. It is unknown if benzoyl peroxide is excreted in breast milk. Erivedge Counseling- I discussed with the patient the risks of Erivedge including but not limited to nausea, vomiting, diarrhea, constipation, weight loss, changes in the sense of taste, decreased appetite, muscle spasms, and hair loss.  The patient verbalized understanding of the proper use and possible adverse effects of Erivedge.  All of the patient's questions and concerns were addressed. Picato Counseling:  I discussed with the patient the risks of Picato including but not limited to erythema, scaling, itching, weeping, crusting, and pain. Topical Sulfur Applications Counseling: Topical Sulfur Counseling: Patient counseled that this medication may cause skin irritation or allergic reactions.  In the event of skin irritation, the patient was advised to reduce the amount of the drug applied or use it less frequently.   The patient verbalized understanding of the proper use and possible adverse effects of topical sulfur application.  All of the patient's questions and concerns were addressed. Cyclosporine Counseling:  I discussed with the patient the risks of cyclosporine including but not limited to hypertension, gingival hyperplasia,myelosuppression, immunosuppression, liver damage, kidney damage, neurotoxicity, lymphoma, and serious infections. The patient understands that monitoring is required including baseline blood pressure, CBC, CMP, lipid panel and uric acid, and then 1-2 times monthly CMP and blood pressure. Doxepin Counseling:  Patient advised that the medication is sedating and not to drive a car after taking this medication. Patient informed of potential adverse effects including but not limited to dry mouth, urinary retention, and blurry vision.  The patient verbalized understanding of the proper use and possible adverse effects of doxepin.  All of the patient's questions and concerns were addressed. Clindamycin Pregnancy And Lactation Text: This medication can be used in pregnancy if certain situations. Clindamycin is also present in breast milk. Quinolones Pregnancy And Lactation Text: This medication is Pregnancy Category C and it isn't know if it is safe during pregnancy. It is also excreted in breast milk. Stelara Counseling:  I discussed with the patient the risks of ustekinumab including but not limited to immunosuppression, malignancy, posterior leukoencephalopathy syndrome, and serious infections.  The patient understands that monitoring is required including a PPD at baseline and must alert us or the primary physician if symptoms of infection or other concerning signs are noted. Carac Counseling:  I discussed with the patient the risks of Carac including but not limited to erythema, scaling, itching, weeping, crusting, and pain. Propranolol Counseling:  I discussed with the patient the risks of propranolol including but not limited to low heart rate, low blood pressure, low blood sugar, restlessness and increased cold sensitivity. They should call the office if they experience any of these side effects. Enbrel Counseling:  I discussed with the patient the risks of etanercept including but not limited to myelosuppression, immunosuppression, autoimmune hepatitis, demyelinating diseases, lymphoma, and infections.  The patient understands that monitoring is required including a PPD at baseline and must alert us or the primary physician if symptoms of infection or other concerning signs are noted. Topical Sulfur Applications Pregnancy And Lactation Text: This medication is Pregnancy Category C and has an unknown safety profile during pregnancy. It is unknown if this topical medication is excreted in breast milk. Cyclosporine Pregnancy And Lactation Text: This medication is Pregnancy Category C and it isn't know if it is safe during pregnancy. This medication is excreted in breast milk. Cimetidine Pregnancy And Lactation Text: This medication is Pregnancy Category B and is considered safe during pregnancy. It is also excreted in breast milk and breast feeding isn't recommended. Quinolones Counseling:  I discussed with the patient the risks of fluoroquinolones including but not limited to GI upset, allergic reaction, drug rash, diarrhea, dizziness, photosensitivity, yeast infections, liver function test abnormalities, tendonitis/tendon rupture. Finasteride Counseling:  I discussed with the patient the risks of use of finasteride including but not limited to decreased libido, decreased ejaculate volume, gynecomastia, and depression. Women should not handle medication.  All of the patient's questions and concerns were addressed. Propranolol Pregnancy And Lactation Text: This medication is Pregnancy Category C and it isn't known if it is safe during pregnancy. It is excreted in breast milk. Carac Pregnancy And Lactation Text: This medication is Pregnancy Category X and contraindicated in pregnancy and in women who may become pregnant. It is unknown if this medication is excreted in breast milk. Protopic Counseling: Patient may experience a mild burning sensation during topical application. Protopic is not approved in children less than 2 years of age. There have been case reports of hematologic and skin malignancies in patients using topical calcineurin inhibitors although causality is questionable. Dupixent Pregnancy And Lactation Text: This medication likely crosses the placenta but the risk for the fetus is uncertain. This medication is excreted in breast milk. Wartpeel Counseling:  I discussed with the patient the risks of Wartpeel including but not limited to erythema, scaling, itching, weeping, crusting, and pain. Methotrexate Counseling:  Patient counseled regarding adverse effects of methotrexate including but not limited to nausea, vomiting, abnormalities in liver function tests. Patients may develop mouth sores, rash, diarrhea, and abnormalities in blood counts. The patient understands that monitoring is required including LFT's and blood counts.  There is a rare possibility of scarring of the liver and lung problems that can occur when taking methotrexate. Persistent nausea, loss of appetite, pale stools, dark urine, cough, and shortness of breath should be reported immediately. Patient advised to discontinue methotrexate treatment at least three months before attempting to become pregnant.  I discussed the need for folate supplements while taking methotrexate.  These supplements can decrease side effects during methotrexate treatment. The patient verbalized understanding of the proper use and possible adverse effects of methotrexate.  All of the patient's questions and concerns were addressed. Cimetidine Counseling:  I discussed with the patient the risks of Cimetidine including but not limited to gynecomastia, headache, diarrhea, nausea, drowsiness, arrhythmias, pancreatitis, skin rashes, psychosis, bone marrow suppression and kidney toxicity. Taltz Counseling: I discussed with the patient the risks of ixekizumab including but not limited to immunosuppression, serious infections, worsening of inflammatory bowel disease and drug reactions.  The patient understands that monitoring is required including a PPD at baseline and must alert us or the primary physician if symptoms of infection or other concerning signs are noted. 5-Fu Counseling: 5-Fluorouracil Counseling:  I discussed with the patient the risks of 5-fluorouracil including but not limited to erythema, scaling, itching, weeping, crusting, and pain. Finasteride Pregnancy And Lactation Text: This medication is absolutely contraindicated during pregnancy. It is unknown if it is excreted in breast milk. Protopic Pregnancy And Lactation Text: This medication is Pregnancy Category C. It is unknown if this medication is excreted in breast milk when applied topically. Birth Control Pills Counseling: Birth Control Pill Counseling: I discussed with the patient the potential side effects of OCPs including but not limited to increased risk of stroke, heart attack, thrombophlebitis, deep venous thrombosis, hepatic adenomas, breast changes, GI upset, headaches, and depression.  The patient verbalized understanding of the proper use and possible adverse effects of OCPs. All of the patient's questions and concerns were addressed. Dupixent Counseling: I discussed with the patient the risks of dupilumab including but not limited to eye infection and irritation, cold sores, injection site reactions, worsening of asthma, allergic reactions and increased risk of parasitic infection.  Live vaccines should be avoided while taking dupilumab. Dupilumab will also interact with certain medications such as warfarin and cyclosporine. The patient understands that monitoring is required and they must alert us or the primary physician if symptoms of infection or other concerning signs are noted. Methotrexate Pregnancy And Lactation Text: This medication is Pregnancy Category X and is known to cause fetal harm. This medication is excreted in breast milk. Gabapentin Counseling: I discussed with the patient the risks of gabapentin including but not limited to dizziness, somnolence, fatigue and ataxia. Birth Control Pills Pregnancy And Lactation Text: This medication should be avoided if pregnant and for the first 30 days post-partum. Minocycline Counseling: Patient advised regarding possible photosensitivity and discoloration of the teeth, skin, lips, tongue and gums.  Patient instructed to avoid sunlight, if possible.  When exposed to sunlight, patients should wear protective clothing, sunglasses, and sunscreen.  The patient was instructed to call the office immediately if the following severe adverse effects occur:  hearing changes, easy bruising/bleeding, severe headache, or vision changes.  The patient verbalized understanding of the proper use and possible adverse effects of minocycline.  All of the patient's questions and concerns were addressed. Rhofade Counseling: Rhofade is a topical medication which can decrease superficial blood flow where applied. Side effects are uncommon and include stinging, redness and allergic reactions. Zyclara Counseling:  I discussed with the patient the risks of imiquimod including but not limited to erythema, scaling, itching, weeping, crusting, and pain.  Patient understands that the inflammatory response to imiquimod is variable from person to person and was educated regarded proper titration schedule.  If flu-like symptoms develop, patient knows to discontinue the medication and contact us. Prednisone Counseling:  I discussed with the patient the risks of prolonged use of prednisone including but not limited to weight gain, insomnia, osteoporosis, mood changes, diabetes, susceptibility to infection, glaucoma and high blood pressure.  In cases where prednisone use is prolonged, patients should be monitored with blood pressure checks, serum glucose levels and an eye exam.  Additionally, the patient may need to be placed on GI prophylaxis, PCP prophylaxis, and calcium and vitamin D supplementation and/or a bisphosphonate.  The patient verbalized understanding of the proper use and the possible adverse effects of prednisone.  All of the patient's questions and concerns were addressed. Drysol Counseling:  I discussed with the patient the risks of drysol/aluminum chloride including but not limited to skin rash, itching, irritation, burning. Metronidazole Pregnancy And Lactation Text: This medication is Pregnancy Category B and considered safe during pregnancy.  It is also excreted in breast milk. Cosentyx Counseling:  I discussed with the patient the risks of Cosentyx including but not limited to worsening of Crohn's disease, immunosuppression, allergic reactions and infections.  The patient understands that monitoring is required including a PPD at baseline and must alert us or the primary physician if symptoms of infection or other concerning signs are noted. Spironolactone Counseling: Patient advised regarding risks of diarrhea, abdominal pain, hyperkalemia, birth defects (for female patients), liver toxicity and renal toxicity. The patient may need blood work to monitor liver and kidney function and potassium levels while on therapy. The patient verbalized understanding of the proper use and possible adverse effects of spironolactone.  All of the patient's questions and concerns were addressed. Terbinafine Counseling: Patient counseling regarding adverse effects of terbinafine including but not limited to headache, diarrhea, rash, upset stomach, liver function test abnormalities, itching, taste/smell disturbance, nausea, abdominal pain, and flatulence.  There is a rare possibility of liver failure that can occur when taking terbinafine.  The patient understands that a baseline LFT and kidney function test may be required. The patient verbalized understanding of the proper use and possible adverse effects of terbinafine.  All of the patient's questions and concerns were addressed. Glycopyrrolate Counseling:  I discussed with the patient the risks of glycopyrrolate including but not limited to skin rash, drowsiness, dry mouth, difficulty urinating, and blurred vision. Drysol Pregnancy And Lactation Text: This medication is considered safe during pregnancy and breast feeding. Metronidazole Counseling:  I discussed with the patient the risks of metronidazole including but not limited to seizures, nausea/vomiting, a metallic taste in the mouth, nausea/vomiting and severe allergy. Tremfya Counseling: I discussed with the patient the risks of guselkumab including but not limited to immunosuppression, serious infections, worsening of inflammatory bowel disease and drug reactions.  The patient understands that monitoring is required including a PPD at baseline and must alert us or the primary physician if symptoms of infection or other concerning signs are noted. Cimzia Pregnancy And Lactation Text: This medication crosses the placenta but can be considered safe in certain situations. Cimzia may be excreted in breast milk. Ketoconazole Pregnancy And Lactation Text: This medication is Pregnancy Category C and it isn't know if it is safe during pregnancy. It is also excreted in breast milk and breast feeding isn't recommended. Solaraze Counseling:  I discussed with the patient the risks of Solaraze including but not limited to erythema, scaling, itching, weeping, crusting, and pain. Spironolactone Pregnancy And Lactation Text: This medication can cause feminization of the male fetus and should be avoided during pregnancy. The active metabolite is also found in breast milk. Detail Level: Simple Glycopyrrolate Pregnancy And Lactation Text: This medication is Pregnancy Category B and is considered safe during pregnancy. It is unknown if it is excreted breast milk. Elidel Counseling: Patient may experience a mild burning sensation during topical application. Elidel is not approved in children less than 2 years of age. There have been case reports of hematologic and skin malignancies in patients using topical calcineurin inhibitors although causality is questionable. Erythromycin Pregnancy And Lactation Text: This medication is Pregnancy Category B and is considered safe during pregnancy. It is also excreted in breast milk. Cimzia Counseling:  I discussed with the patient the risks of Cimzia including but not limited to immunosuppression, allergic reactions and infections.  The patient understands that monitoring is required including a PPD at baseline and must alert us or the primary physician if symptoms of infection or other concerning signs are noted. SSKI Counseling:  I discussed with the patient the risks of SSKI including but not limited to thyroid abnormalities, metallic taste, GI upset, fever, headache, acne, arthralgias, paraesthesias, lymphadenopathy, easy bleeding, arrhythmias, and allergic reaction. Ketoconazole Counseling:   Patient counseled regarding improving absorption with orange juice.  Adverse effects include but are not limited to breast enlargement, headache, diarrhea, nausea, upset stomach, liver function test abnormalities, taste disturbance, and stomach pain.  There is a rare possibility of liver failure that can occur when taking ketoconazole. The patient understands that monitoring of LFTs may be required, especially at baseline. The patient verbalized understanding of the proper use and possible adverse effects of ketoconazole.  All of the patient's questions and concerns were addressed. Solaraze Pregnancy And Lactation Text: This medication is Pregnancy Category B and is considered safe. There is some data to suggest avoiding during the third trimester. It is unknown if this medication is excreted in breast milk. Acitretin Counseling:  I discussed with the patient the risks of acitretin including but not limited to hair loss, dry lips/skin/eyes, liver damage, hyperlipidemia, depression/suicidal ideation, photosensitivity.  Serious rare side effects can include but are not limited to pancreatitis, pseudotumor cerebri, bony changes, clot formation/stroke/heart attack.  Patient understands that alcohol is contraindicated since it can result in liver toxicity and significantly prolong the elimination of the drug by many years. Opioid Counseling: I discussed with the patient the potential side effects of opioids including but not limited to addiction, altered mental status, and depression. I stressed avoiding alcohol, benzodiazepines, muscle relaxants and sleep aids unless specifically okayed by a physician. The patient verbalized understanding of the proper use and possible adverse effects of opioids. All of the patient's questions and concerns were addressed. They were instructed to flush the remaining pills down the toilet if they did not need them for pain. Hydroxychloroquine Counseling:  I discussed with the patient that a baseline ophthalmologic exam is needed at the start of therapy and every year thereafter while on therapy. A CBC may also be warranted for monitoring.  The side effects of this medication were discussed with the patient, including but not limited to agranulocytosis, aplastic anemia, seizures, rashes, retinopathy, and liver toxicity. Patient instructed to call the office should any adverse effect occur.  The patient verbalized understanding of the proper use and possible adverse effects of Plaquenil.  All the patient's questions and concerns were addressed. Erythromycin Counseling:  I discussed with the patient the risks of erythromycin including but not limited to GI upset, allergic reaction, drug rash, diarrhea, increase in liver enzymes, and yeast infections. Xeljanz Counseling: I discussed with the patient the risks of Xeljanz therapy including increased risk of infection, liver issues, headache, diarrhea, or cold symptoms. Live vaccines should be avoided. They were instructed to call if they have any problems. Sski Pregnancy And Lactation Text: This medication is Pregnancy Category D and isn't considered safe during pregnancy. It is excreted in breast milk. Topical Retinoid counseling:  Patient advised to apply a pea-sized amount only at bedtime and wait 30 minutes after washing their face before applying.  If too drying, patient may add a non-comedogenic moisturizer. The patient verbalized understanding of the proper use and possible adverse effects of retinoids.  All of the patient's questions and concerns were addressed. Opioid Pregnancy And Lactation Text: These medications can lead to premature delivery and should be avoided during pregnancy. These medications are also present in breast milk in small amounts. Acitretin Pregnancy And Lactation Text: This medication is Pregnancy Category X and should not be given to women who are pregnant or may become pregnant in the future. This medication is excreted in breast milk. Hydroxychloroquine Pregnancy And Lactation Text: This medication has been shown to cause fetal harm but it isn't assigned a Pregnancy Risk Category. There are small amounts excreted in breast milk. Eucrisa Counseling: Patient may experience a mild burning sensation during topical application. Eucrisa is not approved in children less than 2 years of age. Doxycycline Pregnancy And Lactation Text: This medication is Pregnancy Category D and not consider safe during pregnancy. It is also excreted in breast milk but is considered safe for shorter treatment courses. Xelcalliz Pregnancy And Lactation Text: This medication is Pregnancy Category D and is not considered safe during pregnancy.  The risk during breast feeding is also uncertain. Thalidomide Counseling: I discussed with the patient the risks of thalidomide including but not limited to birth defects, anxiety, weakness, chest pain, dizziness, cough and severe allergy. Bexarotene Counseling:  I discussed with the patient the risks of bexarotene including but not limited to hair loss, dry lips/skin/eyes, liver abnormalities, hyperlipidemia, pancreatitis, depression/suicidal ideation, photosensitivity, drug rash/allergic reactions, hypothyroidism, anemia, leukopenia, infection, cataracts, and teratogenicity.  Patient understands that they will need regular blood tests to check lipid profile, liver function tests, white blood cell count, thyroid function tests and pregnancy test if applicable. Niacinamide Counseling: I recommended taking niacin or niacinamide, also know as vitamin B3, twice daily. Recent evidence suggests that taking vitamin B3 (500 mg twice daily) can reduce the risk of actinic keratoses and non-melanoma skin cancers. Side effects of vitamin B3 include flushing and headache. Itraconazole Counseling:  I discussed with the patient the risks of itraconazole including but not limited to liver damage, nausea/vomiting, neuropathy, and severe allergy.  The patient understands that this medication is best absorbed when taken with acidic beverages such as non-diet cola or ginger ale.  The patient understands that monitoring is required including baseline LFTs and repeat LFTs at intervals.  The patient understands that they are to contact us or the primary physician if concerning signs are noted. Doxycycline Counseling:  Patient counseled regarding possible photosensitivity and increased risk for sunburn.  Patient instructed to avoid sunlight, if possible.  When exposed to sunlight, patients should wear protective clothing, sunglasses, and sunscreen.  The patient was instructed to call the office immediately if the following severe adverse effects occur:  hearing changes, easy bruising/bleeding, severe headache, or vision changes.  The patient verbalized understanding of the proper use and possible adverse effects of doxycycline.  All of the patient's questions and concerns were addressed. Tazorac Counseling:  Patient advised that medication is irritating and drying.  Patient may need to apply sparingly and wash off after an hour before eventually leaving it on overnight.  The patient verbalized understanding of the proper use and possible adverse effects of tazorac.  All of the patient's questions and concerns were addressed. Xolair Counseling:  Patient informed of potential adverse effects including but not limited to fever, muscle aches, rash and allergic reactions.  The patient verbalized understanding of the proper use and possible adverse effects of Xolair.  All of the patient's questions and concerns were addressed. Arava Counseling:  Patient counseled regarding adverse effects of Arava including but not limited to nausea, vomiting, abnormalities in liver function tests. Patients may develop mouth sores, rash, diarrhea, and abnormalities in blood counts. The patient understands that monitoring is required including LFTs and blood counts.  There is a rare possibility of scarring of the liver and lung problems that can occur when taking methotrexate. Persistent nausea, loss of appetite, pale stools, dark urine, cough, and shortness of breath should be reported immediately. Patient advised to discontinue Arava treatment and consult with a physician prior to attempting conception. The patient will have to undergo a treatment to eliminate Arava from the body prior to conception. Bexarotene Pregnancy And Lactation Text: This medication is Pregnancy Category X and should not be given to women who are pregnant or may become pregnant. This medication should not be used if you are breast feeding. Griseofulvin Pregnancy And Lactation Text: This medication is Pregnancy Category X and is known to cause serious birth defects. It is unknown if this medication is excreted in breast milk but breast feeding should be avoided. Hydroquinone Counseling:  Patient advised that medication may result in skin irritation, lightening (hypopigmentation), dryness, and burning.  In the event of skin irritation, the patient was advised to reduce the amount of the drug applied or use it less frequently.  Rarely, spots that are treated with hydroquinone can become darker (pseudoochronosis).  Should this occur, patient instructed to stop medication and call the office. The patient verbalized understanding of the proper use and possible adverse effects of hydroquinone.  All of the patient's questions and concerns were addressed. Niacinamide Pregnancy And Lactation Text: These medications are considered safe during pregnancy. Tazorac Pregnancy And Lactation Text: This medication is not safe during pregnancy. It is unknown if this medication is excreted in breast milk. Valtrex Counseling: I discussed with the patient the risks of valacyclovir including but not limited to kidney damage, nausea, vomiting and severe allergy.  The patient understands that if the infection seems to be worsening or is not improving, they are to call. Xolair Pregnancy And Lactation Text: This medication is Pregnancy Category B and is considered safe during pregnancy. This medication is excreted in breast milk. Isotretinoin Counseling: Patient should get monthly blood tests, not donate blood, not drive at night if vision affected, not share medication, and not undergo elective surgery for 6 months after tx completed. Side effects reviewed, pt to contact office should one occur. Griseofulvin Counseling:  I discussed with the patient the risks of griseofulvin including but not limited to photosensitivity, cytopenia, liver damage, nausea/vomiting and severe allergy.  The patient understands that this medication is best absorbed when taken with a fatty meal (e.g., ice cream or french fries). Infliximab Counseling:  I discussed with the patient the risks of infliximab including but not limited to myelosuppression, immunosuppression, autoimmune hepatitis, demyelinating diseases, lymphoma, and serious infections.  The patient understands that monitoring is required including a PPD at baseline and must alert us or the primary physician if symptoms of infection or other concerning signs are noted. Ivermectin Counseling:  Patient instructed to take medication on an empty stomach with a full glass of water.  Patient informed of potential adverse effects including but not limited to nausea, diarrhea, dizziness, itching, and swelling of the extremities or lymph nodes.  The patient verbalized understanding of the proper use and possible adverse effects of ivermectin.  All of the patient's questions and concerns were addressed. Nsaids Counseling: NSAID Counseling: I discussed with the patient that NSAIDs should be taken with food. Prolonged use of NSAIDs can result in the development of stomach ulcers.  Patient advised to stop taking NSAIDs if abdominal pain occurs.  The patient verbalized understanding of the proper use and possible adverse effects of NSAIDs.  All of the patient's questions and concerns were addressed. Valtrex Pregnancy And Lactation Text: this medication is Pregnancy Category B and is considered safe during pregnancy. This medication is not directly found in breast milk but it's metabolite acyclovir is present. Topical Clindamycin Counseling: Patient counseled that this medication may cause skin irritation or allergic reactions.  In the event of skin irritation, the patient was advised to reduce the amount of the drug applied or use it less frequently.   The patient verbalized understanding of the proper use and possible adverse effects of clindamycin.  All of the patient's questions and concerns were addressed. Azithromycin Counseling:  I discussed with the patient the risks of azithromycin including but not limited to GI upset, allergic reaction, drug rash, diarrhea, and yeast infections. Clofazimine Counseling:  I discussed with the patient the risks of clofazimine including but not limited to skin and eye pigmentation, liver damage, nausea/vomiting, gastrointestinal bleeding and allergy. Isotretinoin Pregnancy And Lactation Text: This medication is Pregnancy Category X and is considered extremely dangerous during pregnancy. It is unknown if it is excreted in breast milk.

## 2019-09-20 ENCOUNTER — EMERGENCY (EMERGENCY)
Facility: HOSPITAL | Age: 64
LOS: 1 days | Discharge: HOME | End: 2019-09-20
Attending: EMERGENCY MEDICINE
Payer: MEDICAID

## 2019-09-20 DIAGNOSIS — Z98.89 OTHER SPECIFIED POSTPROCEDURAL STATES: Chronic | ICD-10-CM

## 2019-09-20 DIAGNOSIS — Z93.1 GASTROSTOMY STATUS: Chronic | ICD-10-CM

## 2019-09-20 PROCEDURE — 43762 RPLC GTUBE NO REVJ TRC: CPT

## 2019-09-20 PROCEDURE — 99282 EMERGENCY DEPT VISIT SF MDM: CPT

## 2019-09-21 ENCOUNTER — EMERGENCY (EMERGENCY)
Facility: HOSPITAL | Age: 64
LOS: 1 days | Discharge: HOME | End: 2019-09-21
Attending: EMERGENCY MEDICINE
Payer: MEDICAID

## 2019-09-21 VITALS
OXYGEN SATURATION: 100 % | SYSTOLIC BLOOD PRESSURE: 140 MMHG | RESPIRATION RATE: 22 BRPM | DIASTOLIC BLOOD PRESSURE: 90 MMHG | HEART RATE: 68 BPM

## 2019-09-21 VITALS
RESPIRATION RATE: 20 BRPM | TEMPERATURE: 98 F | HEART RATE: 54 BPM | SYSTOLIC BLOOD PRESSURE: 152 MMHG | WEIGHT: 130.07 LBS | HEIGHT: 62 IN | DIASTOLIC BLOOD PRESSURE: 72 MMHG | OXYGEN SATURATION: 100 %

## 2019-09-21 VITALS
OXYGEN SATURATION: 100 % | TEMPERATURE: 98 F | HEART RATE: 69 BPM | DIASTOLIC BLOOD PRESSURE: 101 MMHG | SYSTOLIC BLOOD PRESSURE: 142 MMHG | RESPIRATION RATE: 18 BRPM

## 2019-09-21 VITALS — WEIGHT: 119.93 LBS

## 2019-09-21 DIAGNOSIS — Z93.1 GASTROSTOMY STATUS: Chronic | ICD-10-CM

## 2019-09-21 DIAGNOSIS — Z98.89 OTHER SPECIFIED POSTPROCEDURAL STATES: Chronic | ICD-10-CM

## 2019-09-21 PROCEDURE — 82962 GLUCOSE BLOOD TEST: CPT

## 2019-09-21 PROCEDURE — 43762 RPLC GTUBE NO REVJ TRC: CPT

## 2019-09-21 PROCEDURE — L8699: CPT

## 2019-09-21 PROCEDURE — 99284 EMERGENCY DEPT VISIT MOD MDM: CPT | Mod: 25

## 2019-09-21 PROCEDURE — 99285 EMERGENCY DEPT VISIT HI MDM: CPT | Mod: 25

## 2019-09-21 PROCEDURE — 49465 FLUORO EXAM OF G/COLON TUBE: CPT

## 2019-09-21 PROCEDURE — 99282 EMERGENCY DEPT VISIT SF MDM: CPT

## 2019-09-21 NOTE — ED ADULT NURSE NOTE - NSIMPLEMENTINTERV_GEN_ALL_ED
Implemented All Fall Risk Interventions:  [place] to call system. Call bell, personal items and telephone within reach. Instruct patient to call for assistance. Room bathroom lighting operational. Non-slip footwear when patient is off stretcher. Physically safe environment: no spills, clutter or unnecessary equipment. Stretcher in lowest position, wheels locked, appropriate side rails in place. Provide visual cue, wrist band, yellow gown, etc. Monitor gait and stability. Monitor for mental status changes and reorient to person, place, and time. Review medications for side effects contributing to fall risk. Reinforce activity limits and safety measures with patient and family.

## 2019-09-21 NOTE — ED PROVIDER NOTE - PATIENT PORTAL LINK FT
You can access the FollowMyHealth Patient Portal offered by [facility] by registering at the following website: [email or web address]. By joining [employer]’s FollowMyHealth portal, you will also be able to view your health information using other applications (apps) compatible with our system.

## 2019-09-21 NOTE — ED PROVIDER NOTE - PATIENT PORTAL LINK FT
You can access the FollowMyHealth Patient Portal offered by [facility] by registering at the following website: [email or web address]. By joining [email or web address]’s FollowMyHealth portal, you will also be able to view your health information using other applications (apps) compatible with our system.
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
## 2019-09-21 NOTE — ED ADULT NURSE NOTE - OBJECTIVE STATEMENT
Pt received as a notification for unresponsiveness and hypoxia, awake, alert looking around, with oxygen to trach in place no respiratory or hypoxia noted oxygen saturation 100%. Pt has peg tube out to be replaced. Placed on cardiac monitor safety precaution maintained. Contraction noted to both hands.

## 2019-09-21 NOTE — ED PROVIDER NOTE - OBJECTIVE STATEMENT
HPI LIMITED - PT IS NONVERBAL  65 y/o F presents to the ED BIBA due to hypoxemia. EMS notes that pt recently had a G-tube dislodgement. Pt returned to nursing home to get it replaced and when the nurse went to check up on her the G-tube was dislodged again.

## 2019-09-21 NOTE — ED PROVIDER NOTE - CLINICAL SUMMARY MEDICAL DECISION MAKING FREE TEXT BOX
64 year old female with peg tube replacement. vitals WNL. PE as above.  peg tube replaced. confirmed on XR. will dc.

## 2019-09-21 NOTE — ED PROVIDER NOTE - OBJECTIVE STATEMENT
64 year old female with dementia, peg, trach, DM, HTN, schizo coming in for peg tube clogged to be replaced.

## 2019-09-22 ENCOUNTER — INPATIENT (INPATIENT)
Facility: HOSPITAL | Age: 64
LOS: 7 days | Discharge: NURSING, REHAB OR LONG-TERM CARE | DRG: 326 | End: 2019-09-30
Attending: INTERNAL MEDICINE | Admitting: INTERNAL MEDICINE
Payer: MEDICAID

## 2019-09-22 VITALS
WEIGHT: 139.99 LBS | HEIGHT: 67 IN | HEART RATE: 62 BPM | OXYGEN SATURATION: 96 % | TEMPERATURE: 98 F | RESPIRATION RATE: 16 BRPM | SYSTOLIC BLOOD PRESSURE: 120 MMHG | DIASTOLIC BLOOD PRESSURE: 71 MMHG

## 2019-09-22 DIAGNOSIS — Z98.89 OTHER SPECIFIED POSTPROCEDURAL STATES: Chronic | ICD-10-CM

## 2019-09-22 DIAGNOSIS — Z93.1 GASTROSTOMY STATUS: Chronic | ICD-10-CM

## 2019-09-22 DIAGNOSIS — K94.23 GASTROSTOMY MALFUNCTION: ICD-10-CM

## 2019-09-22 DIAGNOSIS — F20.9 SCHIZOPHRENIA, UNSPECIFIED: ICD-10-CM

## 2019-09-22 DIAGNOSIS — E11.9 TYPE 2 DIABETES MELLITUS WITHOUT COMPLICATIONS: ICD-10-CM

## 2019-09-22 DIAGNOSIS — I10 ESSENTIAL (PRIMARY) HYPERTENSION: ICD-10-CM

## 2019-09-22 DIAGNOSIS — Z29.9 ENCOUNTER FOR PROPHYLACTIC MEASURES, UNSPECIFIED: ICD-10-CM

## 2019-09-22 DIAGNOSIS — Z66 DO NOT RESUSCITATE: ICD-10-CM

## 2019-09-22 DIAGNOSIS — Z43.0 ENCOUNTER FOR ATTENTION TO TRACHEOSTOMY: ICD-10-CM

## 2019-09-22 DIAGNOSIS — G30.9 ALZHEIMER'S DISEASE, UNSPECIFIED: ICD-10-CM

## 2019-09-22 LAB
ANION GAP SERPL CALC-SCNC: 10 MMOL/L — SIGNIFICANT CHANGE UP (ref 5–17)
APTT BLD: 36 SEC — SIGNIFICANT CHANGE UP (ref 27.5–36.3)
BASOPHILS # BLD AUTO: 0.04 K/UL — SIGNIFICANT CHANGE UP (ref 0–0.2)
BASOPHILS NFR BLD AUTO: 0.4 % — SIGNIFICANT CHANGE UP (ref 0–2)
BUN SERPL-MCNC: 31 MG/DL — HIGH (ref 7–18)
CALCIUM SERPL-MCNC: 10.2 MG/DL — SIGNIFICANT CHANGE UP (ref 8.4–10.5)
CHLORIDE SERPL-SCNC: 109 MMOL/L — HIGH (ref 96–108)
CO2 SERPL-SCNC: 28 MMOL/L — SIGNIFICANT CHANGE UP (ref 22–31)
CREAT SERPL-MCNC: 1.23 MG/DL — SIGNIFICANT CHANGE UP (ref 0.5–1.3)
EOSINOPHIL # BLD AUTO: 0.2 K/UL — SIGNIFICANT CHANGE UP (ref 0–0.5)
EOSINOPHIL NFR BLD AUTO: 1.8 % — SIGNIFICANT CHANGE UP (ref 0–6)
GLUCOSE BLDC GLUCOMTR-MCNC: 119 MG/DL — HIGH (ref 70–99)
GLUCOSE BLDC GLUCOMTR-MCNC: 264 MG/DL — HIGH (ref 70–99)
GLUCOSE SERPL-MCNC: 46 MG/DL — LOW (ref 70–99)
HCT VFR BLD CALC: 42.1 % — SIGNIFICANT CHANGE UP (ref 34.5–45)
HCV AB S/CO SERPL IA: 0.15 S/CO — SIGNIFICANT CHANGE UP (ref 0–0.99)
HCV AB SERPL-IMP: SIGNIFICANT CHANGE UP
HGB BLD-MCNC: 12.5 G/DL — SIGNIFICANT CHANGE UP (ref 11.5–15.5)
IMM GRANULOCYTES NFR BLD AUTO: 0.2 % — SIGNIFICANT CHANGE UP (ref 0–1.5)
INR BLD: 0.97 RATIO — SIGNIFICANT CHANGE UP (ref 0.88–1.16)
LYMPHOCYTES # BLD AUTO: 4.97 K/UL — HIGH (ref 1–3.3)
LYMPHOCYTES # BLD AUTO: 43.9 % — SIGNIFICANT CHANGE UP (ref 13–44)
MCHC RBC-ENTMCNC: 25.4 PG — LOW (ref 27–34)
MCHC RBC-ENTMCNC: 29.7 GM/DL — LOW (ref 32–36)
MCV RBC AUTO: 85.4 FL — SIGNIFICANT CHANGE UP (ref 80–100)
MONOCYTES # BLD AUTO: 0.76 K/UL — SIGNIFICANT CHANGE UP (ref 0–0.9)
MONOCYTES NFR BLD AUTO: 6.7 % — SIGNIFICANT CHANGE UP (ref 2–14)
NEUTROPHILS # BLD AUTO: 5.34 K/UL — SIGNIFICANT CHANGE UP (ref 1.8–7.4)
NEUTROPHILS NFR BLD AUTO: 47 % — SIGNIFICANT CHANGE UP (ref 43–77)
NRBC # BLD: 0 /100 WBCS — SIGNIFICANT CHANGE UP (ref 0–0)
PLATELET # BLD AUTO: 220 K/UL — SIGNIFICANT CHANGE UP (ref 150–400)
POTASSIUM SERPL-MCNC: 4.8 MMOL/L — SIGNIFICANT CHANGE UP (ref 3.5–5.3)
POTASSIUM SERPL-SCNC: 4.8 MMOL/L — SIGNIFICANT CHANGE UP (ref 3.5–5.3)
PROTHROM AB SERPL-ACNC: 10.8 SEC — SIGNIFICANT CHANGE UP (ref 10–12.9)
RBC # BLD: 4.93 M/UL — SIGNIFICANT CHANGE UP (ref 3.8–5.2)
RBC # FLD: 14.4 % — SIGNIFICANT CHANGE UP (ref 10.3–14.5)
SODIUM SERPL-SCNC: 147 MMOL/L — HIGH (ref 135–145)
WBC # BLD: 11.33 K/UL — HIGH (ref 3.8–10.5)
WBC # FLD AUTO: 11.33 K/UL — HIGH (ref 3.8–10.5)

## 2019-09-22 PROCEDURE — 99285 EMERGENCY DEPT VISIT HI MDM: CPT

## 2019-09-22 RX ORDER — INSULIN LISPRO 100/ML
VIAL (ML) SUBCUTANEOUS
Refills: 0 | Status: DISCONTINUED | OUTPATIENT
Start: 2019-09-22 | End: 2019-09-25

## 2019-09-22 RX ORDER — NYSTATIN 500MM UNIT
0 POWDER (EA) MISCELLANEOUS
Qty: 0 | Refills: 0 | DISCHARGE

## 2019-09-22 RX ORDER — INSULIN LISPRO 100/ML
VIAL (ML) SUBCUTANEOUS AT BEDTIME
Refills: 0 | Status: DISCONTINUED | OUTPATIENT
Start: 2019-09-22 | End: 2019-09-25

## 2019-09-22 RX ORDER — LEVETIRACETAM 250 MG/1
500 TABLET, FILM COATED ORAL EVERY 12 HOURS
Refills: 0 | Status: DISCONTINUED | OUTPATIENT
Start: 2019-09-22 | End: 2019-09-29

## 2019-09-22 RX ORDER — SODIUM CHLORIDE 9 MG/ML
1000 INJECTION, SOLUTION INTRAVENOUS
Refills: 0 | Status: DISCONTINUED | OUTPATIENT
Start: 2019-09-22 | End: 2019-09-28

## 2019-09-22 RX ORDER — SODIUM CHLORIDE 9 MG/ML
1000 INJECTION, SOLUTION INTRAVENOUS
Refills: 0 | Status: DISCONTINUED | OUTPATIENT
Start: 2019-09-22 | End: 2019-09-22

## 2019-09-22 RX ORDER — HEPARIN SODIUM 5000 [USP'U]/ML
5000 INJECTION INTRAVENOUS; SUBCUTANEOUS EVERY 8 HOURS
Refills: 0 | Status: DISCONTINUED | OUTPATIENT
Start: 2019-09-22 | End: 2019-09-25

## 2019-09-22 RX ORDER — METOPROLOL TARTRATE 50 MG
2.5 TABLET ORAL EVERY 6 HOURS
Refills: 0 | Status: DISCONTINUED | OUTPATIENT
Start: 2019-09-22 | End: 2019-09-29

## 2019-09-22 RX ORDER — PANTOPRAZOLE SODIUM 20 MG/1
40 TABLET, DELAYED RELEASE ORAL DAILY
Refills: 0 | Status: DISCONTINUED | OUTPATIENT
Start: 2019-09-22 | End: 2019-09-29

## 2019-09-22 RX ADMIN — Medication 2.5 MILLIGRAM(S): at 17:43

## 2019-09-22 RX ADMIN — Medication 1: at 21:05

## 2019-09-22 RX ADMIN — Medication 0.5 MILLIGRAM(S): at 21:04

## 2019-09-22 RX ADMIN — LEVETIRACETAM 420 MILLIGRAM(S): 250 TABLET, FILM COATED ORAL at 17:43

## 2019-09-22 RX ADMIN — HEPARIN SODIUM 5000 UNIT(S): 5000 INJECTION INTRAVENOUS; SUBCUTANEOUS at 21:07

## 2019-09-22 RX ADMIN — SODIUM CHLORIDE 75 MILLILITER(S): 9 INJECTION, SOLUTION INTRAVENOUS at 17:43

## 2019-09-22 NOTE — CONSULT NOTE ADULT - ASSESSMENT
64F nonverbal with a displaced PEG tube.  -the present PEG fistula already closed  -plan for PEG tube placement by EGD on 9/25/19 at 7.30AM  -would need to obtain a consent for the EGD from pt's HCP

## 2019-09-22 NOTE — ED PROVIDER NOTE - OBJECTIVE STATEMENT
63 y/o F with PMHx of Alzheimer's, DM, HTN, Parkinson's presents to ED complaining of PEG tube dislodgment some time this morning. Pt states she pulled the tube this morning accidentally. Pt asks for transfer papers. Pt was sent here solely for the tube replacement. No other complaints. Pt allergic to angiotensin converting enzyme inhibitors.

## 2019-09-22 NOTE — ED PROVIDER NOTE - PROGRESS NOTE DETAILS
pt with 2nd visit in 2 days for pulling PEG tube.  Case discussed with Dr. Lau, will admit for GI to place more secure tube.

## 2019-09-22 NOTE — CONSULT NOTE ADULT - SUBJECTIVE AND OBJECTIVE BOX
Time of visit:    CHIEF COMPLAINT: Patient is a 64y old  Female who presents with a chief complaint of PEG Tube Dislodgement (22 Sep 2019 12:03)      HPI:   Patient seen and examined.     PAST MEDICAL & SURGICAL HISTORY:  Alzheimer disease  Parkinson disease  Respiratory failure  Hypertension  Schizophrenia  Diabetic coma  Diabetes mellitus  S/P percutaneous endoscopic gastrostomy (PEG) tube placement  H/O tracheostomy      Allergies    angiotensin converting enzyme inhibitors (Unknown)    Intolerances        MEDICATIONS  (STANDING):      MEDICATIONS  (PRN):   Medications up to date at time of exam.    Medications up to date at time of exam.    FAMILY HISTORY:  No pertinent family history in first degree relatives      SOCIAL HISTORY  Smoking History: [   ] smoking/smoke exposure, [   ] former smoker  Living Condition: [   ] apartment, [   ] private house  Work History:   Travel History: denies recent travel  Illicit Substance Use: denies  Alcohol Use: denies    REVIEW OF SYSTEMS:    CONSTITUTIONAL:  denies fevers, chills, sweats, weight loss    HEENT:  denies diplopia or blurred vision, sore throat or runny nose.    CARDIOVASCULAR:  denies pressure, squeezing, tightness, or heaviness about the chest; no palpitations.    RESPIRATORY:  denies SOB, cough, DIXON, wheezing.    GASTROINTESTINAL:  denies abdominal pain, nausea, vomiting or diarrhea.    GENITOURINARY: denies dysuria, frequency or urgency.    NEUROLOGIC:  denies numbness, tingling, seizures or weakness.    PSYCHIATRIC:  denies disorder of thought or mood.    MSK: denies swelling, redness      PHYSICAL EXAMINATION:    GENERAL: The patient is a well-developed, well-nourished, in no apparent distress.     Vital Signs Last 24 Hrs  T(C): 36.8 (22 Sep 2019 10:29), Max: 36.8 (22 Sep 2019 10:29)  T(F): 98.2 (22 Sep 2019 10:29), Max: 98.2 (22 Sep 2019 10:29)  HR: 62 (22 Sep 2019 10:29) (62 - 68)  BP: 120/71 (22 Sep 2019 10:29) (120/71 - 149/93)  BP(mean): --  RR: 16 (22 Sep 2019 10:29) (16 - 27)  SpO2: 96% (22 Sep 2019 10:29) (96% - 100%)   (if applicable)    Chest Tube (if applicable)    HEENT: Head is normocephalic and atraumatic. Extraocular muscles are intact. Mucous membranes are moist.     NECK: Supple, no palpable adenopathy.    LUNGS: Clear to auscultation, no wheezing, rales, or rhonchi.    HEART: Regular rate and rhythm without murmur.    ABDOMEN: Soft, nontender, and nondistended.  No hepatosplenomegaly is noted.    RENAL: No difficulty voiding, no pelvic pain    EXTREMITIES: Without any cyanosis, clubbing, rash, lesions or edema.    NEUROLOGIC: Awake, alert, oriented, grossly intact    SKIN: Warm, dry, good turgor.      LABS:                        12.5   11.33 )-----------( 220      ( 22 Sep 2019 13:07 )             42.1     09-22    147<H>  |  109<H>  |  31<H>  ----------------------------<  46<L>  4.8   |  28  |  1.23    Ca    10.2      22 Sep 2019 13:07      PT/INR - ( 22 Sep 2019 13:07 )   PT: 10.8 sec;   INR: 0.97 ratio         PTT - ( 22 Sep 2019 13:07 )  PTT:36.0 sec                    MICROBIOLOGY: (if applicable)    RADIOLOGY & ADDITIONAL STUDIES:  EKG:   CXR:  ECHO:    IMPRESSION: 64y Female PAST MEDICAL & SURGICAL HISTORY:  Alzheimer disease  Parkinson disease  Respiratory failure  Hypertension  Schizophrenia  Diabetic coma  Diabetes mellitus  S/P percutaneous endoscopic gastrostomy (PEG) tube placement  H/O tracheostomy   p/w                   RECOMMENDATIONS: Time of visit:    CHIEF COMPLAINT: Patient is a 64y old  Female who presents with a chief complaint of PEG Tube Dislodgement (22 Sep 2019 12:03)      HPI:  This is a  64 years old Female from [facility], DNR, with PMHx of Alzheimer's dementia , CKD 3, DM, HTN, non-verbal Parkinson's disease, s/p Tracheostomy presents to ED complaining of PEG tube dislodgment some time this morning. Pt states she pulled the tube this morning accidentally. Pt asks for transfer papers. Pt was sent here solely for the tube replacement. No other complaints. Pt allergic to angiotensin converting enzyme inhibitors. Source of history is nursing home chart. Patient is on 35 percent oxygen at nursing home.  Presenting Symptoms: dislodged PEG tube with closed opening.  GI dr michelle consult appreciated. Plan for PEG tube placement on Wednesday 9/25/2019.        PAST MEDICAL & SURGICAL HISTORY:  Alzheimer disease  Parkinson disease  Respiratory failure  Hypertension  Schizophrenia  Diabetic coma  Diabetes mellitus  S/P percutaneous endoscopic gastrostomy (PEG) tube placement  H/O tracheostomy      Allergies    angiotensin converting enzyme inhibitors (Unknown)    Intolerances        MEDICATIONS  (STANDING):      MEDICATIONS  (PRN):   Medications up to date at time of exam.    Medications up to date at time of exam.    FAMILY HISTORY:  No pertinent family history in first degree relatives      SOCIAL HISTORY unknown  Smoking History: [   ] smoking/smoke exposure, [   ] former smoker  Living Condition: [   ] apartment, [   ] private house  Work History:   Travel History: denies recent travel  Illicit Substance Use: denies  Alcohol Use: denies    REVIEW OF SYSTEMS: + toxic metabolic encephalopathy    CONSTITUTIONAL:  denies fevers, chills, sweats, weight loss    HEENT:  denies diplopia or blurred vision, sore throat or runny nose.    CARDIOVASCULAR:  denies pressure, squeezing, tightness, or heaviness about the chest; no palpitations.    RESPIRATORY:  denies SOB, cough, DIXON, wheezing.    GASTROINTESTINAL:  denies abdominal pain, nausea, vomiting or diarrhea.    GENITOURINARY: denies dysuria, frequency or urgency.    NEUROLOGIC:  denies numbness, tingling, seizures or weakness.    PSYCHIATRIC:  denies disorder of thought or mood.    MSK: denies swelling, redness      PHYSICAL EXAMINATION:    GENERAL: The patient is a well-developed, well-nourished, in no apparent distress.     Vital Signs Last 24 Hrs  T(C): 36.8 (22 Sep 2019 10:29), Max: 36.8 (22 Sep 2019 10:29)  T(F): 98.2 (22 Sep 2019 10:29), Max: 98.2 (22 Sep 2019 10:29)  HR: 62 (22 Sep 2019 10:29) (62 - 68)  BP: 120/71 (22 Sep 2019 10:29) (120/71 - 149/93)  BP(mean): --  RR: 16 (22 Sep 2019 10:29) (16 - 27)  SpO2: 96% (22 Sep 2019 10:29) (96% - 100%)   (if applicable)    Chest Tube (if applicable)    HEENT: Head is normocephalic and atraumatic. Extraocular muscles are intact. Mucous membranes are moist.     NECK: Supple, no palpable adenopathy. + trach    LUNGS: Clear to auscultation, no wheezing, rales, or rhonchi.    HEART: Regular rate and rhythm without murmur.    ABDOMEN: Soft, nontender, and nondistended.  No hepatosplenomegaly is noted.    RENAL: No difficulty voiding, no pelvic pain    EXTREMITIES: Without any cyanosis, clubbing, rash, lesions or edema.  + contracted b/l upper EXT    NEUROLOGIC: Awake, alert, oriented, grossly intact    SKIN: Warm, dry, good turgor.      LABS:                        12.5   11.33 )-----------( 220      ( 22 Sep 2019 13:07 )             42.1     09-22    147<H>  |  109<H>  |  31<H>  ----------------------------<  46<L>  4.8   |  28  |  1.23    Ca    10.2      22 Sep 2019 13:07      PT/INR - ( 22 Sep 2019 13:07 )   PT: 10.8 sec;   INR: 0.97 ratio         PTT - ( 22 Sep 2019 13:07 )  PTT:36.0 sec                    MICROBIOLOGY: (if applicable)    RADIOLOGY & ADDITIONAL STUDIES:  EKG:   CXR:  ECHO:    IMPRESSION: 64y Female PAST MEDICAL & SURGICAL HISTORY:  Alzheimer disease  Parkinson disease  Respiratory failure  Hypertension  Schizophrenia  Diabetic coma  Diabetes mellitus  S/P percutaneous endoscopic gastrostomy (PEG) tube placement  H/O tracheostomy   p/w         IMP: This is a 64 yr old woman with toxic metabolic encephalopathy due to hypoglycemia, prior mentioned medical condition including chronic resp failure s/p trach on 35 % O2 presented for dislodged PEG  tube. Pat noted to have copious oral secretions that she is unable to control due to encephalopathy and trach. Will need frequent suction and trial scopalmine patch      Sugg:  -continue meds  -scopalmanine patch  -frequent pulmo hygiene  -convert to iv meds until PEG is replace   -O2 via TC 35 %  -asp precaution  -dvt/ gi prophy

## 2019-09-22 NOTE — H&P ADULT - NSICDXPASTMEDICALHX_GEN_ALL_CORE_FT
PAST MEDICAL HISTORY:  Alzheimer disease     Diabetes mellitus     Diabetic coma     Hypertension     Parkinson disease     Respiratory failure     Schizophrenia

## 2019-09-22 NOTE — H&P ADULT - NSICDXPASTSURGICALHX_GEN_ALL_CORE_FT
PAST SURGICAL HISTORY:  H/O tracheostomy     S/P percutaneous endoscopic gastrostomy (PEG) tube placement

## 2019-09-22 NOTE — H&P ADULT - ASSESSMENT
64 years old Female from [facility] [place], DNR, with PMHx of Alzheimer's dementia , DM, HTN, non-verbal Parkinson's disease, s/p Tracheostomy presents to ED complaining of PEG tube dislodgment some time this morning. Pt states she pulled the tube this morning accidentally. Pt asks for transfer papers. Pt was sent here solely for the tube replacement. No other complaints. Pt allergic to angiotensin converting enzyme inhibitors. Source of history is nursing home chart. Patient is on 35 percent oxygen at nursing home.  Presenting Symptoms: dislodged PEG tube with closed opening.  GI dr michelle consult appreciated. Plan for PEG tube placement on Wednesday 9/25/2019. 64 years old Female from [facility], DNR, with PMHx of Alzheimer's dementia , CKD 3, Mild Protein Calorie Malnutrition, DM, HTN, non-verbal Parkinson's disease, s/p Tracheostomy presents to ED complaining of PEG tube dislodgment some time this morning. Pt states she pulled the tube this morning accidentally. Pt asks for transfer papers. Pt was sent here solely for the tube replacement. No other complaints. Pt allergic to angiotensin converting enzyme inhibitors. Source of history is nursing home chart. Patient is on 35 percent oxygen at nursing home.  Presenting Symptoms: dislodged PEG tube with closed opening.  GI dr michelle consult appreciated. Plan for PEG tube placement on Wednesday 9/25/2019.  ckd 3 - stable, avoid nephrotoxic rx  mild protein calorie malnutrition - supplement diet as tolerated, nutrition.   essetial htn - c/w antihypertensive rx   need for prophylactic measures - dvt/gi ppx

## 2019-09-22 NOTE — H&P ADULT - PROBLEM SELECTOR PLAN 8
IMPROVE VTE Individual Risk Assessment  RISK                                                          Points  [] Previous VTE                                           3  [] Thrombophilia                                        2  [] Lower limb paralysis                              2   [] Current Cancer                                       2   [] Immobilization > 24 hrs                        1  [] ICU/CCU stay > 24 hours                       1  [] Age > 60                                                   1  IMPROVE VTE Score = 2  hep sc q 8 hourly

## 2019-09-22 NOTE — H&P ADULT - PROBLEM SELECTOR PLAN 1
Pt came in with dislodged PEG tube with closed opening.  patient has it dislodged twice.  will keep pt NP0.  IV fluids.  Switched medications to IV.  GI dr michelle consult appreciated.   Plan for PEG tube placement on Wednesday 9/25/2019.  NPO after midnight on tuesday 9/24/2019.  Consent to be obtained from patient HCP before procedure.  F/U PT/INR

## 2019-09-22 NOTE — H&P ADULT - HISTORY OF PRESENT ILLNESS
64 years old Female from [facility] [place], DNR, with PMHx of Alzheimer's dementia , DM, HTN, non-verbal Parkinson's disease, s/p Tracheostomy presents to ED complaining of PEG tube dislodgment some time this morning. Pt states she pulled the tube this morning accidentally. Pt asks for transfer papers. Pt was sent here solely for the tube replacement. No other complaints. Pt allergic to angiotensin converting enzyme inhibitors. Source of history is nursing home chart. Patient is on 35 percent oxygen at nursing home.  Presenting Symptoms: dislodged PEG tube with closed opening.  GI dr michelle consult appreciated. Plan for PEG tube placement on Wednesday 9/25/2019. 64 years old Female from [facility] [place], DNR, with PMHx of Alzheimer's dementia , CKD 3, DM, HTN, non-verbal Parkinson's disease, s/p Tracheostomy presents to ED complaining of PEG tube dislodgment some time this morning. Pt states she pulled the tube this morning accidentally. Pt asks for transfer papers. Pt was sent here solely for the tube replacement. No other complaints. Pt allergic to angiotensin converting enzyme inhibitors. Source of history is nursing home chart. Patient is on 35 percent oxygen at nursing home.  Presenting Symptoms: dislodged PEG tube with closed opening.  GI dr michelle consult appreciated. Plan for PEG tube placement on Wednesday 9/25/2019.

## 2019-09-22 NOTE — H&P ADULT - ATTENDING COMMENTS
pt p/w peg tube dislodgement X 2   GI consulted -  64 years old F from [facility], DNR, with PMHx of Alzheimer's Dementia, DM II, Essential HTN, Non-verbal, Parkinson's disease, s/p Tracheostomy presents to ED complaining of PEG tube dislodgment X 2   plan for peg replacement via endoscopy on Wednesday   all team members and consultants management appreciated!

## 2019-09-22 NOTE — H&P ADULT - NSHPPHYSICALEXAM_GEN_ALL_CORE
Vital Signs Last 24 Hrs  T(C): 36.8 (22 Sep 2019 10:29), Max: 36.8 (22 Sep 2019 10:29)  T(F): 98.2 (22 Sep 2019 10:29), Max: 98.2 (22 Sep 2019 10:29)  HR: 100 (22 Sep 2019 16:09) (62 - 100)  BP: 131/92 (22 Sep 2019 16:09) (120/71 - 149/93)  BP(mean): --  RR: 16 (22 Sep 2019 16:09) (16 - 27)  SpO2: 96% (22 Sep 2019 16:09) (96% - 100%)  · Appearance: well appearing  · Distress: no apparent  · Mood: comfortable  · ENMT: Airway patent, Nasal mucosa clear. Mouth with normal mucosa. Throat has no vesicles, no oropharyngeal exudates and uvula is midline.  · EYES: Clear bilaterally, pupils equal, round and reactive to light.  · CARDIAC: Normal rate, regular rhythm.  Heart sounds S1, S2.  No murmurs, rubs or gallops.  · RESPIRATORY: Breath sounds clear and equal bilaterally.  · GASTROINTESTINAL: - - -  · Abdominal Exam: soft  · MUSCULOSKELETAL: Spine appears normal, range of motion is not limited, no muscle or joint tenderness  · NEUROLOGICAL: Alert and oriented, no focal deficits, no motor or sensory deficits.  · SKIN: Skin normal color for race, warm, dry and intact. No evidence of rash.

## 2019-09-22 NOTE — ED ADULT NURSE NOTE - OBJECTIVE STATEMENT
biba from nursing home for peg tube replacement. Pt has a buttock rash. Tracheostomy that is oxygenated. Upper extremity contraction. biba from nursing home for peg tube replacement. Pt has a buttock rash. Tracheostomy that is oxygenated.

## 2019-09-22 NOTE — H&P ADULT - PROBLEM SELECTOR PLAN 2
pt has history of HTN.  takes metoprolol and amlodipine through PEG tube.  will switch to IV lopressor 2.5 mg q 6 hourly with prameters  monitor blood pressure

## 2019-09-22 NOTE — CONSULT NOTE ADULT - SUBJECTIVE AND OBJECTIVE BOX
GI INITIAL CONSULT    HPI: 64F w/stated PMH presented from SNF after pulling out her PEG tube. This happened several hour ago and the PEG tube fistula has closed down. I attempted to replace the PEG tube at the bedside, but because the fistula already closed I was unable to replace it. Given pt's baseline dementia and nonverbal status unable to obtain any history.    PMH: HTN, asthma, seizure d/o, DM2, DVT, bipolar d/o, nonberbal  PSH: trach/PEG    Meds: MEDICATIONS  (STANDING):  dextrose 5% + sodium chloride 0.9%. 1000 milliLiter(s) (75 mL/Hr) IV Continuous <Continuous>  insulin lispro (HumaLOG) corrective regimen sliding scale   SubCutaneous three times a day before meals  insulin lispro (HumaLOG) corrective regimen sliding scale   SubCutaneous at bedtime  levETIRAcetam  IVPB 500 milliGRAM(s) IV Intermittent every 12 hours    SH: unable to obtain  FH: unable to obtain  ROS: unable to obtain    Vitals: T(C): 36.8 (09-22-19 @ 10:29)  T(F): 98.2 (09-22-19 @ 10:29), Max: 98.2 (09-22-19 @ 10:29)  HR: 62 (09-22-19 @ 10:29) (62 - 68)  BP: 120/71 (09-22-19 @ 10:29) (120/71 - 149/93)    Gen: NAD  CVS: S1/S2  Chest: CTABL  Abd: S/NT/ND                        12.5   11.33 )-----------( 220      ( 22 Sep 2019 13:07 )             42.1   09-22    147<H>  |  109<H>  |  31<H>  ----------------------------<  46<L>  4.8   |  28  |  1.23    Ca    10.2      22 Sep 2019 13:07    Imaging: No GI imaging at this time

## 2019-09-23 ENCOUNTER — TRANSCRIPTION ENCOUNTER (OUTPATIENT)
Age: 64
End: 2019-09-23

## 2019-09-23 DIAGNOSIS — Z02.9 ENCOUNTER FOR ADMINISTRATIVE EXAMINATIONS, UNSPECIFIED: ICD-10-CM

## 2019-09-23 DIAGNOSIS — Z71.89 OTHER SPECIFIED COUNSELING: ICD-10-CM

## 2019-09-23 DIAGNOSIS — R56.9 UNSPECIFIED CONVULSIONS: ICD-10-CM

## 2019-09-23 LAB
24R-OH-CALCIDIOL SERPL-MCNC: 33 NG/ML — SIGNIFICANT CHANGE UP (ref 30–80)
ALBUMIN SERPL ELPH-MCNC: 3.2 G/DL — LOW (ref 3.5–5)
ALP SERPL-CCNC: 86 U/L — SIGNIFICANT CHANGE UP (ref 40–120)
ALT FLD-CCNC: 32 U/L DA — SIGNIFICANT CHANGE UP (ref 10–60)
ANION GAP SERPL CALC-SCNC: 7 MMOL/L — SIGNIFICANT CHANGE UP (ref 5–17)
AST SERPL-CCNC: 21 U/L — SIGNIFICANT CHANGE UP (ref 10–40)
BASOPHILS # BLD AUTO: 0.02 K/UL — SIGNIFICANT CHANGE UP (ref 0–0.2)
BASOPHILS NFR BLD AUTO: 0.3 % — SIGNIFICANT CHANGE UP (ref 0–2)
BILIRUB DIRECT SERPL-MCNC: 0.1 MG/DL — SIGNIFICANT CHANGE UP (ref 0–0.2)
BILIRUB INDIRECT FLD-MCNC: 0.6 MG/DL — SIGNIFICANT CHANGE UP (ref 0.2–1)
BILIRUB SERPL-MCNC: 0.7 MG/DL — SIGNIFICANT CHANGE UP (ref 0.2–1.2)
BUN SERPL-MCNC: 24 MG/DL — HIGH (ref 7–18)
CALCIUM SERPL-MCNC: 9.3 MG/DL — SIGNIFICANT CHANGE UP (ref 8.4–10.5)
CHLORIDE SERPL-SCNC: 106 MMOL/L — SIGNIFICANT CHANGE UP (ref 96–108)
CHOLEST SERPL-MCNC: 150 MG/DL — SIGNIFICANT CHANGE UP (ref 10–199)
CO2 SERPL-SCNC: 29 MMOL/L — SIGNIFICANT CHANGE UP (ref 22–31)
CREAT SERPL-MCNC: 1.12 MG/DL — SIGNIFICANT CHANGE UP (ref 0.5–1.3)
EOSINOPHIL # BLD AUTO: 0.05 K/UL — SIGNIFICANT CHANGE UP (ref 0–0.5)
EOSINOPHIL NFR BLD AUTO: 0.6 % — SIGNIFICANT CHANGE UP (ref 0–6)
GLUCOSE BLDC GLUCOMTR-MCNC: 141 MG/DL — HIGH (ref 70–99)
GLUCOSE BLDC GLUCOMTR-MCNC: 185 MG/DL — HIGH (ref 70–99)
GLUCOSE BLDC GLUCOMTR-MCNC: 276 MG/DL — HIGH (ref 70–99)
GLUCOSE BLDC GLUCOMTR-MCNC: 300 MG/DL — HIGH (ref 70–99)
GLUCOSE SERPL-MCNC: 255 MG/DL — HIGH (ref 70–99)
HBA1C BLD-MCNC: 8.2 % — HIGH (ref 4–5.6)
HCT VFR BLD CALC: 37.8 % — SIGNIFICANT CHANGE UP (ref 34.5–45)
HDLC SERPL-MCNC: 50 MG/DL — SIGNIFICANT CHANGE UP
HGB BLD-MCNC: 11.3 G/DL — LOW (ref 11.5–15.5)
IMM GRANULOCYTES NFR BLD AUTO: 0.3 % — SIGNIFICANT CHANGE UP (ref 0–1.5)
LIPID PNL WITH DIRECT LDL SERPL: 80 MG/DL — SIGNIFICANT CHANGE UP
LYMPHOCYTES # BLD AUTO: 1.89 K/UL — SIGNIFICANT CHANGE UP (ref 1–3.3)
LYMPHOCYTES # BLD AUTO: 24 % — SIGNIFICANT CHANGE UP (ref 13–44)
MAGNESIUM SERPL-MCNC: 2.2 MG/DL — SIGNIFICANT CHANGE UP (ref 1.6–2.6)
MCHC RBC-ENTMCNC: 25.5 PG — LOW (ref 27–34)
MCHC RBC-ENTMCNC: 29.9 GM/DL — LOW (ref 32–36)
MCV RBC AUTO: 85.1 FL — SIGNIFICANT CHANGE UP (ref 80–100)
MONOCYTES # BLD AUTO: 0.42 K/UL — SIGNIFICANT CHANGE UP (ref 0–0.9)
MONOCYTES NFR BLD AUTO: 5.3 % — SIGNIFICANT CHANGE UP (ref 2–14)
NEUTROPHILS # BLD AUTO: 5.47 K/UL — SIGNIFICANT CHANGE UP (ref 1.8–7.4)
NEUTROPHILS NFR BLD AUTO: 69.5 % — SIGNIFICANT CHANGE UP (ref 43–77)
NRBC # BLD: 0 /100 WBCS — SIGNIFICANT CHANGE UP (ref 0–0)
PHOSPHATE SERPL-MCNC: 3.2 MG/DL — SIGNIFICANT CHANGE UP (ref 2.5–4.5)
PLATELET # BLD AUTO: 164 K/UL — SIGNIFICANT CHANGE UP (ref 150–400)
POTASSIUM SERPL-MCNC: 4.4 MMOL/L — SIGNIFICANT CHANGE UP (ref 3.5–5.3)
POTASSIUM SERPL-SCNC: 4.4 MMOL/L — SIGNIFICANT CHANGE UP (ref 3.5–5.3)
PROT SERPL-MCNC: 7.2 G/DL — SIGNIFICANT CHANGE UP (ref 6–8.3)
RBC # BLD: 4.44 M/UL — SIGNIFICANT CHANGE UP (ref 3.8–5.2)
RBC # FLD: 14.4 % — SIGNIFICANT CHANGE UP (ref 10.3–14.5)
SODIUM SERPL-SCNC: 142 MMOL/L — SIGNIFICANT CHANGE UP (ref 135–145)
TOTAL CHOLESTEROL/HDL RATIO MEASUREMENT: 3 RATIO — LOW (ref 3.3–7.1)
TRIGL SERPL-MCNC: 102 MG/DL — SIGNIFICANT CHANGE UP (ref 10–149)
TSH SERPL-MCNC: 1.85 UU/ML — SIGNIFICANT CHANGE UP (ref 0.34–4.82)
VIT B12 SERPL-MCNC: 1797 PG/ML — HIGH (ref 232–1245)
WBC # BLD: 7.87 K/UL — SIGNIFICANT CHANGE UP (ref 3.8–10.5)
WBC # FLD AUTO: 7.87 K/UL — SIGNIFICANT CHANGE UP (ref 3.8–10.5)

## 2019-09-23 PROCEDURE — 71045 X-RAY EXAM CHEST 1 VIEW: CPT | Mod: 26

## 2019-09-23 RX ORDER — MAGNESIUM SULFATE 500 MG/ML
2 VIAL (ML) INJECTION ONCE
Refills: 0 | Status: DISCONTINUED | OUTPATIENT
Start: 2019-09-23 | End: 2019-09-23

## 2019-09-23 RX ORDER — SCOPALAMINE 1 MG/3D
1 PATCH, EXTENDED RELEASE TRANSDERMAL
Refills: 0 | Status: DISCONTINUED | OUTPATIENT
Start: 2019-09-23 | End: 2019-09-30

## 2019-09-23 RX ORDER — SODIUM CHLORIDE 9 MG/ML
1000 INJECTION, SOLUTION INTRAVENOUS
Refills: 0 | Status: DISCONTINUED | OUTPATIENT
Start: 2019-09-23 | End: 2019-09-28

## 2019-09-23 RX ADMIN — SCOPALAMINE 1 PATCH: 1 PATCH, EXTENDED RELEASE TRANSDERMAL at 18:30

## 2019-09-23 RX ADMIN — HEPARIN SODIUM 5000 UNIT(S): 5000 INJECTION INTRAVENOUS; SUBCUTANEOUS at 21:23

## 2019-09-23 RX ADMIN — PANTOPRAZOLE SODIUM 40 MILLIGRAM(S): 20 TABLET, DELAYED RELEASE ORAL at 12:07

## 2019-09-23 RX ADMIN — Medication 3: at 12:11

## 2019-09-23 RX ADMIN — SCOPALAMINE 1 PATCH: 1 PATCH, EXTENDED RELEASE TRANSDERMAL at 20:29

## 2019-09-23 RX ADMIN — Medication 0.5 MILLIGRAM(S): at 05:30

## 2019-09-23 RX ADMIN — Medication 2.5 MILLIGRAM(S): at 00:07

## 2019-09-23 RX ADMIN — Medication 2.5 MILLIGRAM(S): at 23:19

## 2019-09-23 RX ADMIN — Medication 2.5 MILLIGRAM(S): at 12:07

## 2019-09-23 RX ADMIN — HEPARIN SODIUM 5000 UNIT(S): 5000 INJECTION INTRAVENOUS; SUBCUTANEOUS at 05:25

## 2019-09-23 RX ADMIN — LEVETIRACETAM 420 MILLIGRAM(S): 250 TABLET, FILM COATED ORAL at 05:25

## 2019-09-23 RX ADMIN — HEPARIN SODIUM 5000 UNIT(S): 5000 INJECTION INTRAVENOUS; SUBCUTANEOUS at 18:19

## 2019-09-23 RX ADMIN — Medication 2.5 MILLIGRAM(S): at 18:19

## 2019-09-23 RX ADMIN — Medication 3: at 08:55

## 2019-09-23 RX ADMIN — LEVETIRACETAM 420 MILLIGRAM(S): 250 TABLET, FILM COATED ORAL at 18:18

## 2019-09-23 NOTE — CHART NOTE - NSCHARTNOTEFT_GEN_A_CORE
Pt is NPO now   Tried to place NG tube this afternoon, unsuccessful   Pt is agitated, kept moving and kicking and unable to follow the commands. Tried once and xray was performed and tech said coil was noted in th mouth. checked it was not in the mouth but unable to adjust placement.   continue NPO and IVF. PEG will be replaced on 9.25.2019 by Dr. Zambrano. Dr. Lau agreed not to try NGT anymore.

## 2019-09-23 NOTE — DISCHARGE NOTE PROVIDER - PROVIDER TOKENS
PROVIDER:[TOKEN:[[id number]]] PROVIDER:[TOKEN:[[id number]]],FREE:[LAST:[Facility provider],PHONE:[(   )    -],FAX:[(   )    -]]

## 2019-09-23 NOTE — DISCHARGE NOTE PROVIDER - CARE PROVIDER_API CALL
Houston Kunz ([facility])  Internal Medicine  [address] [place] [address]  Phone: [phone]  Fax: [phone]  Follow Up Time: Houston Kunz ([facility])  Internal Medicine  [address] [place] [address]  Phone: [phone]  Fax: [phone]  Follow Up Time:     Facility provider,   Phone: (   )    -  Fax: (   )    -  Follow Up Time:

## 2019-09-23 NOTE — PROGRESS NOTE ADULT - PROBLEM SELECTOR PLAN 1
Pt came in with dislodged PEG tube with closed opening.  NPO for now, continue IVF   Switched medications to IV.  GI dr michelle consult appreciated0- PEG  tube placement on Wednesday 9/25/2019, 7:30am .  NPO after midnight on tuesday 9/24/2019.  Consent to be obtained from patient HCP before procedure.  F/U PT/INR.

## 2019-09-23 NOTE — PROGRESS NOTE ADULT - SUBJECTIVE AND OBJECTIVE BOX
Time of Visit:  Patient seen and examined.     MEDICATIONS  (STANDING):  dextrose 5%. 1000 milliLiter(s) (70 mL/Hr) IV Continuous <Continuous>  heparin  Injectable 5000 Unit(s) SubCutaneous every 8 hours  insulin lispro (HumaLOG) corrective regimen sliding scale   SubCutaneous three times a day before meals  insulin lispro (HumaLOG) corrective regimen sliding scale   SubCutaneous at bedtime  levETIRAcetam  IVPB 500 milliGRAM(s) IV Intermittent every 12 hours  metoprolol tartrate Injectable 2.5 milliGRAM(s) IV Push every 6 hours  pantoprazole  Injectable 40 milliGRAM(s) IV Push daily      MEDICATIONS  (PRN):  LORazepam   Injectable 0.5 milliGRAM(s) IV Push every 8 hours PRN Agitation       Medications up to date at time of exam.      PHYSICAL EXAMINATION:  Patient has no new complaints.  GENERAL: The patient is a well-developed, well-nourished, in no apparent distress.     Vital Signs Last 24 Hrs  T(C): 36.9 (23 Sep 2019 13:06), Max: 37 (22 Sep 2019 21:11)  T(F): 98.4 (23 Sep 2019 13:06), Max: 98.6 (22 Sep 2019 21:11)  HR: 58 (23 Sep 2019 13:06) (57 - 82)  BP: 151/76 (23 Sep 2019 13:06) (112/56 - 157/80)  BP(mean): 96 (22 Sep 2019 20:00) (96 - 96)  RR: 18 (23 Sep 2019 13:06) (16 - 18)  SpO2: 97% (23 Sep 2019 13:06) (97% - 100%)   (if applicable)    Chest Tube (if applicable)    HEENT: Head is normocephalic and atraumatic. Extraocular muscles are intact. Mucous membranes are moist.     NECK: Supple, no palpable adenopathy.    LUNGS: Clear to auscultation, no wheezing, rales, or rhonchi.    HEART: Regular rate and rhythm without murmur.    ABDOMEN: Soft, nontender, and nondistended.  No hepatosplenomegaly is noted.    : No painful voiding, no pelvic pain    EXTREMITIES: Without any cyanosis, clubbing, rash, lesions or edema.    NEUROLOGIC: Awake, alert, oriented, grossly intact    SKIN: Warm, dry, good turgor.      LABS:                        11.3   7.87  )-----------( 164      ( 23 Sep 2019 08:36 )             37.8     09-23    142  |  106  |  24<H>  ----------------------------<  255<H>  4.4   |  29  |  1.12    Ca    9.3      23 Sep 2019 08:36  Phos  3.2     09-23  Mg     2.2     09-23    TPro  7.2  /  Alb  3.2<L>  /  TBili  0.7  /  DBili  0.1  /  AST  21  /  ALT  32  /  AlkPhos  86  09-23    PT/INR - ( 22 Sep 2019 13:07 )   PT: 10.8 sec;   INR: 0.97 ratio         PTT - ( 22 Sep 2019 13:07 )  PTT:36.0 sec                    MICROBIOLOGY: (if applicable)    RADIOLOGY & ADDITIONAL STUDIES:  EKG:   CXR:  ECHO:    IMPRESSION: 64y Female PAST MEDICAL & SURGICAL HISTORY:  Alzheimer disease  Parkinson disease  Respiratory failure  Hypertension  Schizophrenia  Diabetic coma  Diabetes mellitus  S/P percutaneous endoscopic gastrostomy (PEG) tube placement  H/O tracheostomy   p/w           RECOMMENDATIONS: Time of Visit:  Patient seen and examined.     MEDICATIONS  (STANDING):  dextrose 5%. 1000 milliLiter(s) (70 mL/Hr) IV Continuous <Continuous>  heparin  Injectable 5000 Unit(s) SubCutaneous every 8 hours  insulin lispro (HumaLOG) corrective regimen sliding scale   SubCutaneous three times a day before meals  insulin lispro (HumaLOG) corrective regimen sliding scale   SubCutaneous at bedtime  levETIRAcetam  IVPB 500 milliGRAM(s) IV Intermittent every 12 hours  metoprolol tartrate Injectable 2.5 milliGRAM(s) IV Push every 6 hours  pantoprazole  Injectable 40 milliGRAM(s) IV Push daily      MEDICATIONS  (PRN):  LORazepam   Injectable 0.5 milliGRAM(s) IV Push every 8 hours PRN Agitation       Medications up to date at time of exam.      PHYSICAL EXAMINATION:  Patient has no new complaints.  GENERAL: The patient is a well-developed, well-nourished, in no apparent distress.     Vital Signs Last 24 Hrs  T(C): 36.9 (23 Sep 2019 13:06), Max: 37 (22 Sep 2019 21:11)  T(F): 98.4 (23 Sep 2019 13:06), Max: 98.6 (22 Sep 2019 21:11)  HR: 58 (23 Sep 2019 13:06) (57 - 82)  BP: 151/76 (23 Sep 2019 13:06) (112/56 - 157/80)  BP(mean): 96 (22 Sep 2019 20:00) (96 - 96)  RR: 18 (23 Sep 2019 13:06) (16 - 18)  SpO2: 97% (23 Sep 2019 13:06) (97% - 100%)   (if applicable)    Chest Tube (if applicable)    HEENT: Head is normocephalic and atraumatic. Extraocular muscles are intact. Mucous membranes are moist.     NECK: Supple, + trach    LUNGS: Clear to auscultation, no wheezing, rales, or rhonchi.    HEART: Regular rate and rhythm without murmur.    ABDOMEN: Soft, nontender, and nondistended.  No hepatosplenomegaly is noted.    : No painful voiding, no pelvic pain    EXTREMITIES: Without any cyanosis, clubbing, rash, lesions or edema.    NEUROLOGIC: eyes open. + encephalopathy. do not follow commands    SKIN: Warm, dry, good turgor.      LABS:                        11.3   7.87  )-----------( 164      ( 23 Sep 2019 08:36 )             37.8     09-23    142  |  106  |  24<H>  ----------------------------<  255<H>  4.4   |  29  |  1.12    Ca    9.3      23 Sep 2019 08:36  Phos  3.2     09-23  Mg     2.2     09-23    TPro  7.2  /  Alb  3.2<L>  /  TBili  0.7  /  DBili  0.1  /  AST  21  /  ALT  32  /  AlkPhos  86  09-23    PT/INR - ( 22 Sep 2019 13:07 )   PT: 10.8 sec;   INR: 0.97 ratio         PTT - ( 22 Sep 2019 13:07 )  PTT:36.0 sec                    MICROBIOLOGY: (if applicable)    RADIOLOGY & ADDITIONAL STUDIES:  EKG:   CXR:  ECHO:    IMPRESSION: 64y Female PAST MEDICAL & SURGICAL HISTORY:  Alzheimer disease  Parkinson disease  Respiratory failure  Hypertension  Schizophrenia  Diabetic coma  Diabetes mellitus  S/P percutaneous endoscopic gastrostomy (PEG) tube placement  H/O tracheostomy   p/w         IMP: This is a 64 yr old woman with toxic metabolic encephalopathy due to hypoglycemia, prior mentioned medical condition including chronic resp failure s/p trach on 35 % O2 presented for dislodged PEG  tube. Pat noted to have copious oral secretions that she is unable to control due to encephalopathy and trach. Will need frequent suction and trial scopalmine patch      Sugg:  -continue meds  -scopalmanine patch  -frequent pulmo hygiene  -convert to iv meds until PEG is replace   -O2 via TC 35 %  -asp precaution  -dvt/ gi prophy

## 2019-09-23 NOTE — DISCHARGE NOTE PROVIDER - HOSPITAL COURSE
64 years old Female from [facility] [place], DNR, with PMHx of Alzheimer's dementia , DM, HTN, non-verbal Parkinson's disease, s/p Tracheostomy presents to ED complaining of PEG tube dislodgment some time this morning. Pt states she pulled the tube this morning accidentally. Pt asks for transfer papers. Pt was sent here solely for the tube replacement. No other complaints. Pt allergic to angiotensin converting enzyme inhibitors. Source of history is nursing home chart. Patient is on 35 percent oxygen at nursing home.    Presenting Symptoms: dislodged PEG tube with closed opening.     (TO BE COMPLETED) 64 years old Female from [facility] [place], DNR, with PMHx of Alzheimer's dementia , DM, HTN, non-verbal Parkinson's disease, s/p Tracheostomy presents to ED complaining of PEG tube dislodgment some time this morning. Pt states she pulled the tube this morning accidentally. Pt asks for transfer papers. Pt was sent here solely for the tube replacement. No other complaints. Pt allergic to angiotensin converting enzyme inhibitors. Source of history is nursing home chart. Patient is on 35 percent oxygen at nursing home.    Presenting Symptoms: dislodged PEG tube with closed opening. Dr. Zambrano replaced PEG tube on 9/25/2019, procedure done well, stable for discharge back to LTC.          (TO BE COMPLETED) 64 years old Female from [facility] [place], DNR, with PMHx of Alzheimer's dementia , DM, HTN, non-verbal Parkinson's disease, s/p Tracheostomy presents to ED complaining of PEG tube dislodgment some time this morning. Pt states she pulled the tube this morning accidentally. Pt asks for transfer papers. Pt was sent here solely for the tube replacement. No other complaints. Pt allergic to angiotensin converting enzyme inhibitors. Source of history is nursing home chart. Patient is on 35 percent oxygen at nursing home.    Presenting Symptoms: dislodged PEG tube with closed opening. Dr. Zambrano replaced PEG tube on 9/25/2019, procedure done well, stable for discharge back to LTC.     9/27/19 Peg placed in OR

## 2019-09-23 NOTE — DISCHARGE NOTE PROVIDER - NSDCCPCAREPLAN_GEN_ALL_CORE_FT
PRINCIPAL DISCHARGE DIAGNOSIS  Diagnosis: PEG tube malfunction  Assessment and Plan of Treatment: you went to percutaneous endoscopic gastrostomy procedure on 9.25.2019. you can start have medication via new PEG  tonight and can start feeding tomorrow at (   ) PRINCIPAL DISCHARGE DIAGNOSIS  Diagnosis: PEG tube malfunction  Assessment and Plan of Treatment: you went to percutaneous endoscopic gastrostomy procedure on 9.25.2019.  Continue Peg feeds as ordered.      SECONDARY DISCHARGE DIAGNOSES  Diagnosis: Chronic neuromuscular respiratory failure  Assessment and Plan of Treatment: Continue oxygen via trach collar. Continue trach care, suction as needed. Continue bronchodilators.    Diagnosis: Seizure  Assessment and Plan of Treatment: Seizure  Continue keppra. Maintain seizure precautions.    Diagnosis: Schizophrenia  Assessment and Plan of Treatment: Schizophrenia  Continue medications as ordered.  Maintain safety precautions.    Diagnosis: Alzheimer's dementia with behavioral disturbance, unspecified timing of dementia onset  Assessment and Plan of Treatment: Alzheimer's dementia with behavioral disturbance, unspecified timing of dementia onset. Continue medications as ordered. Maintain seizure precautions.

## 2019-09-23 NOTE — DISCHARGE NOTE PROVIDER - NSDCQMCOGNITION_NEU_ALL_CORE
Difficulty concentrating/Difficulty remembering Difficulty remembering/Difficulty concentrating/Difficulty making decisions

## 2019-09-23 NOTE — PROGRESS NOTE ADULT - SUBJECTIVE AND OBJECTIVE BOX
NP Note discussed with  Primary Attending    Patient is a 64y old  Female who presents with a chief complaint of PEG Tube Dislodgement (23 Sep 2019 05:00)     64 years old Female from [facility], D[facility], with PMHx of Alzheimer's dementia , CKD 3, DM, HTN, non-verbal Parkinson's disease, s/p Tracheostomy presents to ED complaining of PEG tube dislodgment some time this morning. Pt states she pulled the tube this morning accidentally. Pt asks for transfer papers. Pt was sent here solely for the tube replacement. No other complaints. Pt allergic to angiotensin converting enzyme inhibitors. Source of history is nursing home chart. Patient is on 35 percent oxygen at nursing home.  Presenting Symptoms: dislodged PEG tube with closed opening.  Admitted to medicine for PEG tube replacement. GI dr Zambrano consult appreciated. Plan for PEG tube placement on Wednesday 9/25/2019.  Pt is stable, no NGT since pt is agitated ( kicking staff ),  continue IVF and meds via IV. Not in acute distress, VSS, afebrile. Spoke with sister Sharon and informed a schedule for PEG replacement.     INTERVAL HPI/OVERNIGHT EVENTS: no new complaints    MEDICATIONS  (STANDING):  dextrose 5%. 1000 milliLiter(s) (70 mL/Hr) IV Continuous <Continuous>  heparin  Injectable 5000 Unit(s) SubCutaneous every 8 hours  insulin lispro (HumaLOG) corrective regimen sliding scale   SubCutaneous three times a day before meals  insulin lispro (HumaLOG) corrective regimen sliding scale   SubCutaneous at bedtime  levETIRAcetam  IVPB 500 milliGRAM(s) IV Intermittent every 12 hours  metoprolol tartrate Injectable 2.5 milliGRAM(s) IV Push every 6 hours  pantoprazole  Injectable 40 milliGRAM(s) IV Push daily    MEDICATIONS  (PRN):  LORazepam   Injectable 0.5 milliGRAM(s) IV Push every 8 hours PRN Agitation      __________________________________________________  REVIEW OF SYSTEMS:    pt is non- verbal, unable to obtain    Vital Signs Last 24 Hrs  T(C): 36.9 (23 Sep 2019 05:10), Max: 37 (22 Sep 2019 21:11)  T(F): 98.4 (23 Sep 2019 05:10), Max: 98.6 (22 Sep 2019 21:11)  HR: 57 (22 Sep 2019 21:11) (57 - 100)  BP: 112/56 (23 Sep 2019 05:10) (112/56 - 157/80)  BP(mean): 96 (22 Sep 2019 20:00) (96 - 96)  RR: 17 (23 Sep 2019 05:10) (16 - 18)  SpO2: 100% (23 Sep 2019 05:10) (96% - 100%)    ________________________________________________  PHYSICAL EXAM:  GENERAL: NAD  HEENT: Normocephalic;  conjunctivae and sclerae clear; moist mucous membranes;   NECK : supple  CHEST/LUNG: Clear to auscultation bilaterally with good air entry   HEART: S1 S2  regular; no murmurs, gallops or rubs  ABDOMEN: Soft, Nontender, Nondistended; Bowel sounds present  EXTREMITIES: no cyanosis; no edema; no calf tenderness, contacted joints ( b/l Extremities and fingers )   SKIN: warm and dry; no rash  NERVOUS SYSTEM:  Awake     _________________________________________________  LABS:                        11.3   7.87  )-----------( 164      ( 23 Sep 2019 08:36 )             37.8     09-23    142  |  106  |  24<H>  ----------------------------<  255<H>  4.4   |  29  |  1.12    Ca    9.3      23 Sep 2019 08:36  Phos  3.2     09-23  Mg     2.2     09-23    TPro  7.2  /  Alb  3.2<L>  /  TBili  0.7  /  DBili  0.1  /  AST  21  /  ALT  32  /  AlkPhos  86  09-23    PT/INR - ( 22 Sep 2019 13:07 )   PT: 10.8 sec;   INR: 0.97 ratio         PTT - ( 22 Sep 2019 13:07 )  PTT:36.0 sec    CAPILLARY BLOOD GLUCOSE      POCT Blood Glucose.: 276 mg/dL (23 Sep 2019 11:20)  POCT Blood Glucose.: 300 mg/dL (23 Sep 2019 08:15)  POCT Blood Glucose.: 264 mg/dL (22 Sep 2019 20:55)  POCT Blood Glucose.: 119 mg/dL (22 Sep 2019 17:19)        RADIOLOGY & ADDITIONAL TESTS:    Imaging Personally Reviewed:  YES/NO    Consultant(s) Notes Reviewed:   YES/ No    Care Discussed with Consultants :     Plan of care was discussed with patient and /or primary care giver; all questions and concerns were addressed and care was aligned with patient's wishes.

## 2019-09-24 DIAGNOSIS — R19.7 DIARRHEA, UNSPECIFIED: ICD-10-CM

## 2019-09-24 LAB
ANION GAP SERPL CALC-SCNC: 6 MMOL/L — SIGNIFICANT CHANGE UP (ref 5–17)
BUN SERPL-MCNC: 13 MG/DL — SIGNIFICANT CHANGE UP (ref 7–18)
CALCIUM SERPL-MCNC: 9.2 MG/DL — SIGNIFICANT CHANGE UP (ref 8.4–10.5)
CHLORIDE SERPL-SCNC: 105 MMOL/L — SIGNIFICANT CHANGE UP (ref 96–108)
CO2 SERPL-SCNC: 27 MMOL/L — SIGNIFICANT CHANGE UP (ref 22–31)
CREAT SERPL-MCNC: 0.95 MG/DL — SIGNIFICANT CHANGE UP (ref 0.5–1.3)
GLUCOSE BLDC GLUCOMTR-MCNC: 134 MG/DL — HIGH (ref 70–99)
GLUCOSE BLDC GLUCOMTR-MCNC: 209 MG/DL — HIGH (ref 70–99)
GLUCOSE BLDC GLUCOMTR-MCNC: 294 MG/DL — HIGH (ref 70–99)
GLUCOSE BLDC GLUCOMTR-MCNC: 96 MG/DL — SIGNIFICANT CHANGE UP (ref 70–99)
GLUCOSE SERPL-MCNC: 236 MG/DL — HIGH (ref 70–99)
HCT VFR BLD CALC: 37.7 % — SIGNIFICANT CHANGE UP (ref 34.5–45)
HGB BLD-MCNC: 11.5 G/DL — SIGNIFICANT CHANGE UP (ref 11.5–15.5)
MCHC RBC-ENTMCNC: 25.3 PG — LOW (ref 27–34)
MCHC RBC-ENTMCNC: 30.5 GM/DL — LOW (ref 32–36)
MCV RBC AUTO: 82.9 FL — SIGNIFICANT CHANGE UP (ref 80–100)
NRBC # BLD: 0 /100 WBCS — SIGNIFICANT CHANGE UP (ref 0–0)
PLATELET # BLD AUTO: 153 K/UL — SIGNIFICANT CHANGE UP (ref 150–400)
POTASSIUM SERPL-MCNC: 4.5 MMOL/L — SIGNIFICANT CHANGE UP (ref 3.5–5.3)
POTASSIUM SERPL-SCNC: 4.5 MMOL/L — SIGNIFICANT CHANGE UP (ref 3.5–5.3)
RBC # BLD: 4.55 M/UL — SIGNIFICANT CHANGE UP (ref 3.8–5.2)
RBC # FLD: 14.1 % — SIGNIFICANT CHANGE UP (ref 10.3–14.5)
SODIUM SERPL-SCNC: 138 MMOL/L — SIGNIFICANT CHANGE UP (ref 135–145)
WBC # BLD: 8.04 K/UL — SIGNIFICANT CHANGE UP (ref 3.8–10.5)
WBC # FLD AUTO: 8.04 K/UL — SIGNIFICANT CHANGE UP (ref 3.8–10.5)

## 2019-09-24 RX ORDER — NYSTATIN CREAM 100000 [USP'U]/G
1 CREAM TOPICAL
Refills: 0 | Status: DISCONTINUED | OUTPATIENT
Start: 2019-09-24 | End: 2019-09-29

## 2019-09-24 RX ADMIN — HEPARIN SODIUM 5000 UNIT(S): 5000 INJECTION INTRAVENOUS; SUBCUTANEOUS at 05:49

## 2019-09-24 RX ADMIN — PANTOPRAZOLE SODIUM 40 MILLIGRAM(S): 20 TABLET, DELAYED RELEASE ORAL at 14:34

## 2019-09-24 RX ADMIN — HEPARIN SODIUM 5000 UNIT(S): 5000 INJECTION INTRAVENOUS; SUBCUTANEOUS at 14:34

## 2019-09-24 RX ADMIN — Medication 1 DROP(S): at 05:50

## 2019-09-24 RX ADMIN — Medication 2.5 MILLIGRAM(S): at 05:49

## 2019-09-24 RX ADMIN — LEVETIRACETAM 420 MILLIGRAM(S): 250 TABLET, FILM COATED ORAL at 05:50

## 2019-09-24 RX ADMIN — SCOPALAMINE 1 PATCH: 1 PATCH, EXTENDED RELEASE TRANSDERMAL at 20:03

## 2019-09-24 RX ADMIN — SCOPALAMINE 1 PATCH: 1 PATCH, EXTENDED RELEASE TRANSDERMAL at 07:04

## 2019-09-24 RX ADMIN — Medication 2: at 18:47

## 2019-09-24 RX ADMIN — Medication 3: at 08:32

## 2019-09-24 RX ADMIN — Medication 2.5 MILLIGRAM(S): at 23:45

## 2019-09-24 RX ADMIN — Medication 2.5 MILLIGRAM(S): at 14:34

## 2019-09-24 RX ADMIN — Medication 0.5 MILLIGRAM(S): at 06:05

## 2019-09-24 RX ADMIN — NYSTATIN CREAM 1 APPLICATION(S): 100000 CREAM TOPICAL at 18:49

## 2019-09-24 RX ADMIN — Medication 1 DROP(S): at 18:48

## 2019-09-24 RX ADMIN — Medication 2.5 MILLIGRAM(S): at 19:35

## 2019-09-24 RX ADMIN — LEVETIRACETAM 420 MILLIGRAM(S): 250 TABLET, FILM COATED ORAL at 18:47

## 2019-09-24 RX ADMIN — Medication 0.5 MILLIGRAM(S): at 14:32

## 2019-09-24 NOTE — DIETITIAN INITIAL EVALUATION ADULT. - PERTINENT MEDS FT
MEDICATIONS  (STANDING):  artificial tears (preservative free) Ophthalmic Solution 1 Drop(s) Both EYES two times a day  dextrose 5%. 1000 milliLiter(s) (70 mL/Hr) IV Continuous <Continuous>  dextrose 5%. 1000 milliLiter(s) (70 mL/Hr) IV Continuous <Continuous>  heparin  Injectable 5000 Unit(s) SubCutaneous every 8 hours  insulin lispro (HumaLOG) corrective regimen sliding scale   SubCutaneous three times a day before meals  insulin lispro (HumaLOG) corrective regimen sliding scale   SubCutaneous at bedtime  levETIRAcetam  IVPB 500 milliGRAM(s) IV Intermittent every 12 hours  metoprolol tartrate Injectable 2.5 milliGRAM(s) IV Push every 6 hours  nystatin Cream 1 Application(s) Topical two times a day  pantoprazole  Injectable 40 milliGRAM(s) IV Push daily  scopolamine   Patch 1 Patch Transdermal every 72 hours    MEDICATIONS  (PRN):  LORazepam   Injectable 0.5 milliGRAM(s) IV Push every 8 hours PRN Agitation

## 2019-09-24 NOTE — DIETITIAN INITIAL EVALUATION ADULT. - ENTERAL
Glucerna 1.5 50x24 (initial goal): 800 ml, 1200 kcals, 66 gm protein. MD to monitor. RD available. Glucerna 1.5 50x16 (initial goal): 800 ml, 1200 kcals, 66 gm protein. MD to monitor. RD available.

## 2019-09-24 NOTE — PROGRESS NOTE ADULT - PROBLEM SELECTOR PLAN 1
Pt came in with dislodged PEG tube with closed opening. Will need abd binder post PEG to prevent dislodging.  NPO for now, continue IVF   All meds via IV  GI Dr Zambrano consult appreciated, PEG  tube placement on Wednesday 9/25/2019, 7:30 AM  NPO after midnight on tuesday 9/24/2019.  Consent to be obtained from patient HCP before procedure.  F/U PT/INR.

## 2019-09-24 NOTE — DIETITIAN INITIAL EVALUATION ADULT. - PERTINENT LABORATORY DATA
09-24 Na138 mmol/L Glu 236 mg/dL<H> K+ 4.5 mmol/L Cr  0.95 mg/dL BUN 13 mg/dL 09-23 Phos 3.2 mg/dL 09-23 Alb 3.2 g/dL<L> 09-23 [id number] 8.2 %<H> 09-23 Chol 150 mg/dL LDL 80 mg/dL HDL 50 mg/dL Trig 102 mg/dL

## 2019-09-24 NOTE — PROGRESS NOTE ADULT - SUBJECTIVE AND OBJECTIVE BOX
Time of Visit:  Patient seen and examined.     MEDICATIONS  (STANDING):  artificial tears (preservative free) Ophthalmic Solution 1 Drop(s) Both EYES two times a day  dextrose 5%. 1000 milliLiter(s) (70 mL/Hr) IV Continuous <Continuous>  dextrose 5%. 1000 milliLiter(s) (70 mL/Hr) IV Continuous <Continuous>  heparin  Injectable 5000 Unit(s) SubCutaneous every 8 hours  insulin lispro (HumaLOG) corrective regimen sliding scale   SubCutaneous three times a day before meals  insulin lispro (HumaLOG) corrective regimen sliding scale   SubCutaneous at bedtime  levETIRAcetam  IVPB 500 milliGRAM(s) IV Intermittent every 12 hours  metoprolol tartrate Injectable 2.5 milliGRAM(s) IV Push every 6 hours  pantoprazole  Injectable 40 milliGRAM(s) IV Push daily  scopolamine   Patch 1 Patch Transdermal every 72 hours      MEDICATIONS  (PRN):  LORazepam   Injectable 0.5 milliGRAM(s) IV Push every 8 hours PRN Agitation       Medications up to date at time of exam.    ROS; Non verbal. No fever, chills, cough, bleeding on exam.   PHYSICAL EXAMINATION:    Vital Signs Last 24 Hrs  T(C): 36.6 (24 Sep 2019 05:26), Max: 36.8 (23 Sep 2019 23:20)  T(F): 97.9 (24 Sep 2019 05:26), Max: 98.2 (23 Sep 2019 23:20)  HR: 78 (24 Sep 2019 05:26) (78 - 97)  BP: 118/64 (24 Sep 2019 14:29) (118/64 - 144/76)  BP(mean): --  RR: 19 (24 Sep 2019 05:26) (17 - 19)  SpO2: 95% (24 Sep 2019 05:26) (92% - 100%)   (if applicable)    General; Non verbal, Eyes open but no tracking. No acute distress.      HEENT: Head is normocephalic and atraumatic. No nasal tenderness.  Moist mucosa.      NECK: Has non infected tracheostomy .      LUNGS: Oxygen supplementation via Trach collar. Clear to auscultation, no wheezing, rales, or rhonchi. No use of accessory muscle.     HEART: S1 S2 Regular rate and no click/ rub.     ABDOMEN: Soft, nontender, and nondistended.  No abdominal guarding .  Peg stoma site no bleeding, no drainage.     EXTREMITIES: Total care. Non ambulatory.     NEUROLOGIC: Cognitive impaired. Non verbal.      SKIN: Warm and moist . Non diaphoretic.       LABS:                        11.5   8.04  )-----------( 153      ( 24 Sep 2019 07:17 )             37.7     09-24    138  |  105  |  13  ----------------------------<  236<H>  4.5   |  27  |  0.95    Ca    9.2      24 Sep 2019 07:17  Phos  3.2     09-23  Mg     2.2     09-23    TPro  7.2  /  Alb  3.2<L>  /  TBili  0.7  /  DBili  0.1  /  AST  21  /  ALT  32  /  AlkPhos  86  09-23      RADIOLOGY & ADDITIONAL STUDIES:  EKG:   CXR: < from: Xray Chest 1 View- PORTABLE-Routine (09.23.19 @ 17:29) >  PROCEDURE DATE:  09/23/2019          INTERPRETATION:  AP semierect chest on September 23, 2019 at 4:51 PM.   Patient had NG tube placement.    Markedly elevated right hemidiaphragm again noted. Heart does not suggest   enlargement.    Tracheostomy again seen.    There may be mild atelectasis developing off right hilum compared to May   4 of this year.    On present examination NG tube is noted but the tip ends in mid thoracic   level and the NG tube may coil in the throat.    IMPRESSION: NG tube not in place as above.    IMPRESSION: 64y Female PAST MEDICAL & SURGICAL HISTORY:  Alzheimer disease  Parkinson disease  Respiratory failure  Hypertension  Schizophrenia  Diabetic coma  Diabetes mellitus  S/P percutaneous endoscopic gastrostomy (PEG) tube placement  H/O tracheostomy     Impression; 65 Y/O Female from [facility] with acute on chronic respiratory failure and on oxygen supplementation FIO2 35% via Trach collar. Was admitted also here at [facility] September 2018.    Suggestion;   Continue Oxygen supplementation FIo2 35% via Trach collar. Not a candidate for decannulation at this time.   NPO. For Peg placement tomorrow per primary team.  Oral, trach care, suction. He trach change if needed.  DVT/ GI prophylactic.   Total care for Daily ADL needs.   can benefit from DuoNeb Q 6 hours. Time of Visit:  Patient seen and examined.     MEDICATIONS  (STANDING):  artificial tears (preservative free) Ophthalmic Solution 1 Drop(s) Both EYES two times a day  dextrose 5%. 1000 milliLiter(s) (70 mL/Hr) IV Continuous <Continuous>  dextrose 5%. 1000 milliLiter(s) (70 mL/Hr) IV Continuous <Continuous>  heparin  Injectable 5000 Unit(s) SubCutaneous every 8 hours  insulin lispro (HumaLOG) corrective regimen sliding scale   SubCutaneous three times a day before meals  insulin lispro (HumaLOG) corrective regimen sliding scale   SubCutaneous at bedtime  levETIRAcetam  IVPB 500 milliGRAM(s) IV Intermittent every 12 hours  metoprolol tartrate Injectable 2.5 milliGRAM(s) IV Push every 6 hours  pantoprazole  Injectable 40 milliGRAM(s) IV Push daily  scopolamine   Patch 1 Patch Transdermal every 72 hours      MEDICATIONS  (PRN):  LORazepam   Injectable 0.5 milliGRAM(s) IV Push every 8 hours PRN Agitation       Medications up to date at time of exam.    ROS; Non verbal. No fever, chills, cough, bleeding on exam.   PHYSICAL EXAMINATION:    Vital Signs Last 24 Hrs  T(C): 36.6 (24 Sep 2019 05:26), Max: 36.8 (23 Sep 2019 23:20)  T(F): 97.9 (24 Sep 2019 05:26), Max: 98.2 (23 Sep 2019 23:20)  HR: 78 (24 Sep 2019 05:26) (78 - 97)  BP: 118/64 (24 Sep 2019 14:29) (118/64 - 144/76)  BP(mean): --  RR: 19 (24 Sep 2019 05:26) (17 - 19)  SpO2: 95% (24 Sep 2019 05:26) (92% - 100%)   (if applicable)    General; Non verbal, Eyes open but no tracking. No acute distress.      HEENT: Head is normocephalic and atraumatic. No nasal tenderness.  Moist mucosa.      NECK: Has non infected tracheostomy .      LUNGS: Oxygen supplementation via Trach collar. Clear to auscultation, no wheezing, rales, or rhonchi. No use of accessory muscle.     HEART: S1 S2 Regular rate and no click/ rub.     ABDOMEN: Soft, nontender, and nondistended.  No abdominal guarding .  Peg stoma site no bleeding, no drainage.     EXTREMITIES: Total care. Non ambulatory.     NEUROLOGIC: Cognitive impaired. Non verbal.      SKIN: Warm and moist . Non diaphoretic.       LABS:                        11.5   8.04  )-----------( 153      ( 24 Sep 2019 07:17 )             37.7     09-24    138  |  105  |  13  ----------------------------<  236<H>  4.5   |  27  |  0.95    Ca    9.2      24 Sep 2019 07:17  Phos  3.2     09-23  Mg     2.2     09-23    TPro  7.2  /  Alb  3.2<L>  /  TBili  0.7  /  DBili  0.1  /  AST  21  /  ALT  32  /  AlkPhos  86  09-23      RADIOLOGY & ADDITIONAL STUDIES:  EKG:   CXR: < from: Xray Chest 1 View- PORTABLE-Routine (09.23.19 @ 17:29) >  PROCEDURE DATE:  09/23/2019          INTERPRETATION:  AP semierect chest on September 23, 2019 at 4:51 PM.   Patient had NG tube placement.    Markedly elevated right hemidiaphragm again noted. Heart does not suggest   enlargement.    Tracheostomy again seen.    There may be mild atelectasis developing off right hilum compared to May   4 of this year.    On present examination NG tube is noted but the tip ends in mid thoracic   level and the NG tube may coil in the throat.    IMPRESSION: NG tube not in place as above.    IMPRESSION: 64y Female PAST MEDICAL & SURGICAL HISTORY:  Alzheimer disease  Parkinson disease  Respiratory failure  Hypertension  Schizophrenia  Diabetic coma  Diabetes mellitus  S/P percutaneous endoscopic gastrostomy (PEG) tube placement  H/O tracheostomy     Impression; 65 Y/O Female from [facility] with acute on chronic respiratory failure and on oxygen supplementation FIO2 35% via Trach collar. Was admitted also here at [place] September 2018.    Suggestion;   Continue Oxygen supplementation FIo2 35% via Trach collar. Not a candidate for decannulation at this time.   NPO. For Peg placement tomorrow per primary team.  Oral, trach care, suction. He trach change if needed.  DVT/ GI prophylactic.   Total care for Daily ADL needs.   can benefit from DuoNeb Q 6 hours.    Agree with above assessment and plan as transcribed.

## 2019-09-24 NOTE — PROGRESS NOTE ADULT - SUBJECTIVE AND OBJECTIVE BOX
64 years old Female from [facility], DNR, with PMH of Alzheimer's dementia , CKD 3, DM, HTN, non-verbal Parkinson's disease, s/p Tracheostomy presents to ED complaining of PEG tube dislodgment some time this morning. Pt states she pulled the tube this morning accidentally.  Pt was sent here for the tube replacement. No other complaints. Pt allergic to angiotensin converting enzyme inhibitors. Source of history is nursing home chart. Patient is on 35 percent oxygen at nursing home.  Presenting Symptoms: dislodged PEG tube with closed opening.  Admitted to medicine for PEG tube replacement. GI dr Zambrano consult appreciated. Plan for PEG tube placement on Wednesday 9/25/2019.        INTERVAL HPI/OVERNIGHT EVENTS: no new complaints    MEDICATIONS  (STANDING):  artificial tears (preservative free) Ophthalmic Solution 1 Drop(s) Both EYES two times a day  dextrose 5%. 1000 milliLiter(s) (70 mL/Hr) IV Continuous <Continuous>  dextrose 5%. 1000 milliLiter(s) (70 mL/Hr) IV Continuous <Continuous>  heparin  Injectable 5000 Unit(s) SubCutaneous every 8 hours  insulin lispro (HumaLOG) corrective regimen sliding scale   SubCutaneous three times a day before meals  insulin lispro (HumaLOG) corrective regimen sliding scale   SubCutaneous at bedtime  levETIRAcetam  IVPB 500 milliGRAM(s) IV Intermittent every 12 hours  metoprolol tartrate Injectable 2.5 milliGRAM(s) IV Push every 6 hours  pantoprazole  Injectable 40 milliGRAM(s) IV Push daily  scopolamine   Patch 1 Patch Transdermal every 72 hours    MEDICATIONS  (PRN):  LORazepam   Injectable 0.5 milliGRAM(s) IV Push every 8 hours PRN Agitation      __________________________________________________  REVIEW OF SYSTEMS:    Unable 2/2 dementia      Vital Signs Last 24 Hrs  T(C): 36.6 (24 Sep 2019 05:26), Max: 36.9 (23 Sep 2019 13:06)  T(F): 97.9 (24 Sep 2019 05:26), Max: 98.4 (23 Sep 2019 13:06)  HR: 78 (24 Sep 2019 05:26) (58 - 97)  BP: 141/68 (24 Sep 2019 05:26) (120/76 - 151/76)  BP(mean): --  RR: 19 (24 Sep 2019 05:26) (17 - 19)  SpO2: 95% (24 Sep 2019 05:26) (92% - 100%)    ________________________________________________  PHYSICAL EXAM:  GENERAL: NAD  HEENT: Normocephalic;  conjunctivae and sclerae clear; moist mucous membranes;   NECK : supple  CHEST/LUNG: Clear to auscultation bilaterally  HEART: S1 S2  regular;  ABDOMEN: Soft, Nontender, Nondistended; Bowel sounds present old closed peg site noted. No bleeding or oozing  EXTREMITIES: no cyanosis; no edema; no calf tenderness  SKIN: warm and dry; no rash  NERVOUS SYSTEM:  Awake and alert; Oriented  to place, person and time ; no new deficits    _________________________________________________  LABS:                        11.5   8.04  )-----------( 153      ( 24 Sep 2019 07:17 )             37.7     09-24    138  |  105  |  13  ----------------------------<  236<H>  4.5   |  27  |  0.95    Ca    9.2      24 Sep 2019 07:17  Phos  3.2     09-23  Mg     2.2     09-23    TPro  7.2  /  Alb  3.2<L>  /  TBili  0.7  /  DBili  0.1  /  AST  21  /  ALT  32  /  AlkPhos  86  09-23    PT/INR - ( 22 Sep 2019 13:07 )   PT: 10.8 sec;   INR: 0.97 ratio         PTT - ( 22 Sep 2019 13:07 )  PTT:36.0 sec    CAPILLARY BLOOD GLUCOSE      POCT Blood Glucose.: 134 mg/dL (24 Sep 2019 11:51)  POCT Blood Glucose.: 294 mg/dL (24 Sep 2019 08:18)  POCT Blood Glucose.: 185 mg/dL (23 Sep 2019 21:21)  POCT Blood Glucose.: 141 mg/dL (23 Sep 2019 16:38)        RADIOLOGY & ADDITIONAL TESTS:    Imaging Personally Reviewed:  YES  Consultant(s) Notes Reviewed:   YES    Care Discussed with Consultants :     Plan of care was discussed primary care giver; all questions and concerns were addressed and care was aligned with patient's wishes. 64 years old Female from [facility], DNR, with PMH of Alzheimer's dementia , CKD 3, DM, HTN, non-verbal Parkinson's disease, s/p Tracheostomy presents to ED complaining of PEG tube dislodgment some time this morning. Pt states she pulled the tube this morning accidentally.  Pt was sent here for the tube replacement. No other complaints.  Patient is on 35 percent oxygen at nursing home.  Presenting Symptoms: dislodged PEG tube with closed opening.  Admitted to medicine for PEG tube replacement. GI dr Zambrano consult appreciated. Plan for PEG tube placement on Wednesday 9/25/2019.        INTERVAL HPI/OVERNIGHT EVENTS: no new complaints    MEDICATIONS  (STANDING):  artificial tears (preservative free) Ophthalmic Solution 1 Drop(s) Both EYES two times a day  dextrose 5%. 1000 milliLiter(s) (70 mL/Hr) IV Continuous <Continuous>  dextrose 5%. 1000 milliLiter(s) (70 mL/Hr) IV Continuous <Continuous>  heparin  Injectable 5000 Unit(s) SubCutaneous every 8 hours  insulin lispro (HumaLOG) corrective regimen sliding scale   SubCutaneous three times a day before meals  insulin lispro (HumaLOG) corrective regimen sliding scale   SubCutaneous at bedtime  levETIRAcetam  IVPB 500 milliGRAM(s) IV Intermittent every 12 hours  metoprolol tartrate Injectable 2.5 milliGRAM(s) IV Push every 6 hours  pantoprazole  Injectable 40 milliGRAM(s) IV Push daily  scopolamine   Patch 1 Patch Transdermal every 72 hours    MEDICATIONS  (PRN):  LORazepam   Injectable 0.5 milliGRAM(s) IV Push every 8 hours PRN Agitation      __________________________________________________  REVIEW OF SYSTEMS:    Unable 2/2 dementia      Vital Signs Last 24 Hrs  T(C): 36.6 (24 Sep 2019 05:26), Max: 36.9 (23 Sep 2019 13:06)  T(F): 97.9 (24 Sep 2019 05:26), Max: 98.4 (23 Sep 2019 13:06)  HR: 78 (24 Sep 2019 05:26) (58 - 97)  BP: 141/68 (24 Sep 2019 05:26) (120/76 - 151/76)  BP(mean): --  RR: 19 (24 Sep 2019 05:26) (17 - 19)  SpO2: 95% (24 Sep 2019 05:26) (92% - 100%)    ________________________________________________  PHYSICAL EXAM:  GENERAL: NAD  HEENT: Normocephalic;  conjunctivae and sclerae clear; moist mucous membranes;   NECK : supple  CHEST/LUNG: Clear to auscultation bilaterally  HEART: S1 S2  regular;  ABDOMEN: Soft, Nontender, Nondistended; Bowel sounds present old closed peg site noted. No bleeding or oozing  EXTREMITIES: no cyanosis; no edema; no calf tenderness  SKIN: warm and dry; no rash  NERVOUS SYSTEM:  Awake and alert; Oriented  to place, person and time ; no new deficits    _________________________________________________  LABS:                        11.5   8.04  )-----------( 153      ( 24 Sep 2019 07:17 )             37.7     09-24    138  |  105  |  13  ----------------------------<  236<H>  4.5   |  27  |  0.95    Ca    9.2      24 Sep 2019 07:17  Phos  3.2     09-23  Mg     2.2     09-23    TPro  7.2  /  Alb  3.2<L>  /  TBili  0.7  /  DBili  0.1  /  AST  21  /  ALT  32  /  AlkPhos  86  09-23    PT/INR - ( 22 Sep 2019 13:07 )   PT: 10.8 sec;   INR: 0.97 ratio         PTT - ( 22 Sep 2019 13:07 )  PTT:36.0 sec    CAPILLARY BLOOD GLUCOSE      POCT Blood Glucose.: 134 mg/dL (24 Sep 2019 11:51)  POCT Blood Glucose.: 294 mg/dL (24 Sep 2019 08:18)  POCT Blood Glucose.: 185 mg/dL (23 Sep 2019 21:21)  POCT Blood Glucose.: 141 mg/dL (23 Sep 2019 16:38)        RADIOLOGY & ADDITIONAL TESTS:    Imaging Personally Reviewed:  YES  Consultant(s) Notes Reviewed:   YES    Care Discussed with Consultants :     Plan of care was discussed primary care giver; all questions and concerns were addressed and care was aligned with patient's wishes.

## 2019-09-24 NOTE — DIETITIAN INITIAL EVALUATION ADULT. - PROBLEM SELECTOR PLAN 3
pt was on tube feeds at NH.  will keep her NPO.   IV fluids.  Pt was on lantus 40 units at nursing home along with Insulin HSS.  will decrease lantus to 20 units and insulin HSS.  F/u hba1c

## 2019-09-25 DIAGNOSIS — Z93.0 TRACHEOSTOMY STATUS: ICD-10-CM

## 2019-09-25 DIAGNOSIS — G20 PARKINSON'S DISEASE: ICD-10-CM

## 2019-09-25 DIAGNOSIS — G30.9 ALZHEIMER'S DISEASE, UNSPECIFIED: ICD-10-CM

## 2019-09-25 LAB
ANION GAP SERPL CALC-SCNC: 10 MMOL/L — SIGNIFICANT CHANGE UP (ref 5–17)
BUN SERPL-MCNC: 8 MG/DL — SIGNIFICANT CHANGE UP (ref 7–18)
CALCIUM SERPL-MCNC: 9.9 MG/DL — SIGNIFICANT CHANGE UP (ref 8.4–10.5)
CHLORIDE SERPL-SCNC: 108 MMOL/L — SIGNIFICANT CHANGE UP (ref 96–108)
CO2 SERPL-SCNC: 26 MMOL/L — SIGNIFICANT CHANGE UP (ref 22–31)
CREAT SERPL-MCNC: 1.16 MG/DL — SIGNIFICANT CHANGE UP (ref 0.5–1.3)
GLUCOSE BLDC GLUCOMTR-MCNC: 199 MG/DL — HIGH (ref 70–99)
GLUCOSE BLDC GLUCOMTR-MCNC: 203 MG/DL — HIGH (ref 70–99)
GLUCOSE BLDC GLUCOMTR-MCNC: 218 MG/DL — HIGH (ref 70–99)
GLUCOSE BLDC GLUCOMTR-MCNC: 244 MG/DL — HIGH (ref 70–99)
GLUCOSE SERPL-MCNC: 232 MG/DL — HIGH (ref 70–99)
HCT VFR BLD CALC: 40.2 % — SIGNIFICANT CHANGE UP (ref 34.5–45)
HGB BLD-MCNC: 12 G/DL — SIGNIFICANT CHANGE UP (ref 11.5–15.5)
MCHC RBC-ENTMCNC: 25.4 PG — LOW (ref 27–34)
MCHC RBC-ENTMCNC: 29.9 GM/DL — LOW (ref 32–36)
MCV RBC AUTO: 85.2 FL — SIGNIFICANT CHANGE UP (ref 80–100)
NRBC # BLD: 0 /100 WBCS — SIGNIFICANT CHANGE UP (ref 0–0)
PLATELET # BLD AUTO: 184 K/UL — SIGNIFICANT CHANGE UP (ref 150–400)
POTASSIUM SERPL-MCNC: 5.1 MMOL/L — SIGNIFICANT CHANGE UP (ref 3.5–5.3)
POTASSIUM SERPL-SCNC: 5.1 MMOL/L — SIGNIFICANT CHANGE UP (ref 3.5–5.3)
RBC # BLD: 4.72 M/UL — SIGNIFICANT CHANGE UP (ref 3.8–5.2)
RBC # FLD: 14 % — SIGNIFICANT CHANGE UP (ref 10.3–14.5)
SODIUM SERPL-SCNC: 144 MMOL/L — SIGNIFICANT CHANGE UP (ref 135–145)
WBC # BLD: 10.91 K/UL — HIGH (ref 3.8–10.5)
WBC # FLD AUTO: 10.91 K/UL — HIGH (ref 3.8–10.5)

## 2019-09-25 PROCEDURE — 99223 1ST HOSP IP/OBS HIGH 75: CPT

## 2019-09-25 RX ORDER — CEFAZOLIN SODIUM 1 G
2000 VIAL (EA) INJECTION ONCE
Refills: 0 | Status: COMPLETED | OUTPATIENT
Start: 2019-09-25 | End: 2019-09-25

## 2019-09-25 RX ORDER — CHLORHEXIDINE GLUCONATE 213 G/1000ML
1 SOLUTION TOPICAL ONCE
Refills: 0 | Status: COMPLETED | OUTPATIENT
Start: 2019-09-25 | End: 2019-09-25

## 2019-09-25 RX ORDER — SODIUM CHLORIDE 9 MG/ML
1000 INJECTION, SOLUTION INTRAVENOUS
Refills: 0 | Status: DISCONTINUED | OUTPATIENT
Start: 2019-09-25 | End: 2019-09-29

## 2019-09-25 RX ORDER — DIPHENHYDRAMINE HCL 50 MG
25 CAPSULE ORAL ONCE
Refills: 0 | Status: DISCONTINUED | OUTPATIENT
Start: 2019-09-25 | End: 2019-09-26

## 2019-09-25 RX ORDER — INSULIN LISPRO 100/ML
VIAL (ML) SUBCUTANEOUS EVERY 6 HOURS
Refills: 0 | Status: DISCONTINUED | OUTPATIENT
Start: 2019-09-25 | End: 2019-09-30

## 2019-09-25 RX ADMIN — SCOPALAMINE 1 PATCH: 1 PATCH, EXTENDED RELEASE TRANSDERMAL at 18:28

## 2019-09-25 RX ADMIN — Medication 2.5 MILLIGRAM(S): at 18:25

## 2019-09-25 RX ADMIN — Medication 100 MILLIGRAM(S): at 07:48

## 2019-09-25 RX ADMIN — CHLORHEXIDINE GLUCONATE 1 APPLICATION(S): 213 SOLUTION TOPICAL at 06:47

## 2019-09-25 RX ADMIN — Medication 0.5 MILLIGRAM(S): at 14:33

## 2019-09-25 RX ADMIN — PANTOPRAZOLE SODIUM 40 MILLIGRAM(S): 20 TABLET, DELAYED RELEASE ORAL at 18:27

## 2019-09-25 RX ADMIN — SCOPALAMINE 1 PATCH: 1 PATCH, EXTENDED RELEASE TRANSDERMAL at 08:00

## 2019-09-25 RX ADMIN — Medication 1 MILLIGRAM(S): at 15:32

## 2019-09-25 RX ADMIN — SODIUM CHLORIDE 50 MILLILITER(S): 9 INJECTION, SOLUTION INTRAVENOUS at 18:27

## 2019-09-25 RX ADMIN — Medication 2: at 18:26

## 2019-09-25 RX ADMIN — NYSTATIN CREAM 1 APPLICATION(S): 100000 CREAM TOPICAL at 05:20

## 2019-09-25 RX ADMIN — NYSTATIN CREAM 1 APPLICATION(S): 100000 CREAM TOPICAL at 18:25

## 2019-09-25 RX ADMIN — Medication 2.5 MILLIGRAM(S): at 05:20

## 2019-09-25 RX ADMIN — LEVETIRACETAM 420 MILLIGRAM(S): 250 TABLET, FILM COATED ORAL at 18:25

## 2019-09-25 RX ADMIN — Medication 0.5 MILLIGRAM(S): at 13:57

## 2019-09-25 RX ADMIN — LEVETIRACETAM 420 MILLIGRAM(S): 250 TABLET, FILM COATED ORAL at 05:19

## 2019-09-25 RX ADMIN — Medication 1 DROP(S): at 06:20

## 2019-09-25 NOTE — PROGRESS NOTE ADULT - PROBLEM SELECTOR PLAN 2
Pt was on Seroquel 50mg via PEG q12h during previous hospitalization  C/w Ativan 0.5 mg IVP q8hr prn agitation  Nonviolent mitten restraints as pt becomes easily agitated and combative

## 2019-09-25 NOTE — PROGRESS NOTE ADULT - SUBJECTIVE AND OBJECTIVE BOX
NP Note discussed with  Primary Attending    Patient is a 64y old  Female who presents with a chief complaint of PEG Tube Dislodgement (25 Sep 2019 16:12)    HPI  64 years old Female from [facility], DNR, with PMHx of Alzheimer's dementia , CKD 3, DM, HTN, non-verbal Parkinson's disease, s/p Tracheostomy, on FiO2 35% per TC , who  presented to ED with dislodged PEG with closed opening. Unsuccessful attempt by GI for PEG tube placement at bedside due to esophageal stricture. IR consulted and unable to perform procedure due to pt agitation despite total of 2 mg IVP.     INTERVAL HPI/OVERNIGHT EVENTS: Pt seen this morning s/p PEG placement attempt at bedside. Pt was sedated, then becomes agitated with nursing care.     MEDICATIONS  (STANDING):  artificial tears (preservative free) Ophthalmic Solution 1 Drop(s) Both EYES two times a day  dextrose 5%. 1000 milliLiter(s) (70 mL/Hr) IV Continuous <Continuous>  dextrose 5%. 1000 milliLiter(s) (70 mL/Hr) IV Continuous <Continuous>  heparin  Injectable 5000 Unit(s) SubCutaneous every 8 hours  insulin lispro (HumaLOG) corrective regimen sliding scale   SubCutaneous three times a day before meals  insulin lispro (HumaLOG) corrective regimen sliding scale   SubCutaneous at bedtime  levETIRAcetam  IVPB 500 milliGRAM(s) IV Intermittent every 12 hours  metoprolol tartrate Injectable 2.5 milliGRAM(s) IV Push every 6 hours  nystatin Cream 1 Application(s) Topical two times a day  pantoprazole  Injectable 40 milliGRAM(s) IV Push daily  scopolamine   Patch 1 Patch Transdermal every 72 hours  sodium chloride 0.45%. 1000 milliLiter(s) (50 mL/Hr) IV Continuous <Continuous>    MEDICATIONS  (PRN):  diphenhydrAMINE   Injectable 25 milliGRAM(s) IV Push once PRN Rash and/or Itching  LORazepam   Injectable 0.5 milliGRAM(s) IV Push every 8 hours PRN Agitation      __________________________________________________  REVIEW OF SYSTEMS:    Unable to illicit ROS as pt was sedated this morning.       Vital Signs Last 24 Hrs  T(C): 36.9 (25 Sep 2019 14:00), Max: 36.9 (25 Sep 2019 14:00)  T(F): 98.5 (25 Sep 2019 14:00), Max: 98.5 (25 Sep 2019 14:00)  HR: 103 (25 Sep 2019 14:00) (75 - 103)  BP: 136/79 (25 Sep 2019 14:00) (130/44 - 150/58)  BP(mean): --  RR: 20 (25 Sep 2019 14:00) (18 - 20)  SpO2: 100% (25 Sep 2019 14:00) (97% - 100%)    ________________________________________________  PHYSICAL EXAM:  GENERAL: NAD  HEENT: Normocephalic;  conjunctivae and sclerae clear; moist mucous membranes;   NECK : supple  CHEST/LUNG: Effort normal. Clear to auscultation bilaterally with good air entry   HEART: S1 S2  regular; no murmurs, gallops or rubs  ABDOMEN: Soft, Nontender, Nondistended; Bowel sounds present  EXTREMITIES: no cyanosis; no edema; no calf tenderness  SKIN: warm and dry; no rash  Psyche: agitated during nursing care .   NERVOUS SYSTEM:  Sedated this morning , awakens easily.  Oriented  to  person .No new deficits.     _________________________________________________  LABS:                        12.0   10.91 )-----------( 184      ( 25 Sep 2019 07:02 )             40.2     09-25    144  |  108  |  8   ----------------------------<  232<H>  5.1   |  26  |  1.16    Ca    9.9      25 Sep 2019 07:02          CAPILLARY BLOOD GLUCOSE      POCT Blood Glucose.: 199 mg/dL (25 Sep 2019 12:06)  POCT Blood Glucose.: 218 mg/dL (25 Sep 2019 11:56)  POCT Blood Glucose.: 244 mg/dL (25 Sep 2019 07:54)  POCT Blood Glucose.: 96 mg/dL (24 Sep 2019 22:29)  POCT Blood Glucose.: 209 mg/dL (24 Sep 2019 18:01)        RADIOLOGY & ADDITIONAL TESTS:    Imaging Personally Reviewed:  YES/NO    Consultant(s) Notes Reviewed:   YES/ No    Care Discussed with Consultants :     Plan of care was discussed with patient and /or primary care giver; all questions and concerns were addressed and care was aligned with patient's wishes.

## 2019-09-25 NOTE — PROGRESS NOTE ADULT - SUBJECTIVE AND OBJECTIVE BOX
Patient presented to IR suite for G-tube reinsertion. As per the chart, the tube has been out for at least 3 days. Telephone consent for the procedure was obtained from the patient's sister (Sharon Prakash). However, the patient was extremely agitated and combative the entire time she was in the IR suite, even after receiving two stat doses of ativan 1 hour apart. Due to patient safety concerns, the procedure was cancelled and the patient was sent back to her room.

## 2019-09-25 NOTE — PROGRESS NOTE ADULT - SUBJECTIVE AND OBJECTIVE BOX
EGD to place a PEG tube attempted this morning. EGD showed a very tight esophageal stricture at 28 cm from incisors. I was unable to pass the endoscope beyond this stricture, which made it impossible to place the PEG tube.    Medications: MEDICATIONS  (STANDING):  artificial tears (preservative free) Ophthalmic Solution 1 Drop(s) Both EYES two times a day  dextrose 5%. 1000 milliLiter(s) (70 mL/Hr) IV Continuous <Continuous>  dextrose 5%. 1000 milliLiter(s) (70 mL/Hr) IV Continuous <Continuous>  heparin  Injectable 5000 Unit(s) SubCutaneous every 8 hours  insulin lispro (HumaLOG) corrective regimen sliding scale   SubCutaneous three times a day before meals  insulin lispro (HumaLOG) corrective regimen sliding scale   SubCutaneous at bedtime  levETIRAcetam  IVPB 500 milliGRAM(s) IV Intermittent every 12 hours  metoprolol tartrate Injectable 2.5 milliGRAM(s) IV Push every 6 hours  nystatin Cream 1 Application(s) Topical two times a day  pantoprazole  Injectable 40 milliGRAM(s) IV Push daily  scopolamine   Patch 1 Patch Transdermal every 72 hours    Vitals: HR: 95 (09-25-19 @ 04:37) (76 - 100)  BP: 138/64 (09-25-19 @ 04:37) (118/64 - 150/58)    Gen: NAD, A&O x0  CVS: S1/S2  Chest: CTABL  Abd: S/NT/ND; almost healed prior PEG tube site                        12.0   10.91 )-----------( 184      ( 25 Sep 2019 07:02 )             40.2   09-25    144  |  108  |  8   ----------------------------<  232<H>  5.1   |  26  |  1.16    Ca    9.9      25 Sep 2019 07:02  Phos  3.2     09-23  Mg     2.2     09-23    TPro  7.2  /  Alb  3.2<L>  /  TBili  0.7  /  DBili  0.1  /  AST  21  /  ALT  32  /  AlkPhos  86  09-23

## 2019-09-25 NOTE — PROGRESS NOTE ADULT - SUBJECTIVE AND OBJECTIVE BOX
Patient seen and examined at bedside  unsuccessful peg placement this AM 2/2 pt's anatomy  Case discussed with medical team    HPI:  64 years old Female from [facility], DNR, with PMHx of Alzheimer's dementia , CKD 3, DM, HTN, non-verbal Parkinson's disease, s/p Tracheostomy presents to ED complaining of PEG tube dislodgment some time this morning. Pt states she pulled the tube this morning accidentally. Pt asks for transfer papers. Pt was sent here solely for the tube replacement. No other complaints. Pt allergic to angiotensin converting enzyme inhibitors. Source of history is nursing home chart. Patient is on 35 percent oxygen at nursing home.  Presenting Symptoms: dislodged PEG tube with closed opening.  GI dr michelle consult appreciated. Plan for PEG tube placement on Wednesday 9/25/2019. (22 Sep 2019 12:03)      PAST MEDICAL & SURGICAL HISTORY:  Alzheimer disease  Parkinson disease  Respiratory failure  Hypertension  Schizophrenia  Diabetic coma  Diabetes mellitus  S/P percutaneous endoscopic gastrostomy (PEG) tube placement  H/O tracheostomy      angiotensin converting enzyme inhibitors (Unknown)       MEDICATIONS  (STANDING):  artificial tears (preservative free) Ophthalmic Solution 1 Drop(s) Both EYES two times a day  dextrose 5%. 1000 milliLiter(s) (70 mL/Hr) IV Continuous <Continuous>  dextrose 5%. 1000 milliLiter(s) (70 mL/Hr) IV Continuous <Continuous>  heparin  Injectable 5000 Unit(s) SubCutaneous every 8 hours  insulin lispro (HumaLOG) corrective regimen sliding scale   SubCutaneous three times a day before meals  insulin lispro (HumaLOG) corrective regimen sliding scale   SubCutaneous at bedtime  levETIRAcetam  IVPB 500 milliGRAM(s) IV Intermittent every 12 hours  metoprolol tartrate Injectable 2.5 milliGRAM(s) IV Push every 6 hours  nystatin Cream 1 Application(s) Topical two times a day  pantoprazole  Injectable 40 milliGRAM(s) IV Push daily  scopolamine   Patch 1 Patch Transdermal every 72 hours  sodium chloride 0.45%. 1000 milliLiter(s) (50 mL/Hr) IV Continuous <Continuous>    MEDICATIONS  (PRN):  LORazepam   Injectable 0.5 milliGRAM(s) IV Push every 8 hours PRN Agitation      REVIEW OF SYSTEMS: limited from pt 2/2 mental/medical status    T(C): --  HR: 82 (09-25-19 @ 09:03) (75 - 100)  BP: 136/66 (09-25-19 @ 08:48) (118/64 - 150/58)  RR: 18 (09-24-19 @ 21:35) (18 - 18)  SpO2: 99% (09-25-19 @ 09:03) (97% - 100%)    PHYSICAL EXAMINATION:   Constitutional: stable appearance  HEENT: AT  Neck:  trach intact. Supple  Respiratory:  Adequate airflow b/l. Not using accessory muscles of respiration.  Cardiovascular:  stabel sys murmur, S1 & S2 intact, no R/G, 2+ radial pulses b/l  Gastrointestinal: Soft, ND, normoactive b.s.  Extremities: stable pallor, stable warmth, 2+ pulses b/l  Neurological:  Awake. nonverbal. stable status.      Labs and imaging reviewed    LABS:                        12.0   10.91 )-----------( 184      ( 25 Sep 2019 07:02 )             40.2     09-25    144  |  108  |  8   ----------------------------<  232<H>  5.1   |  26  |  1.16    Ca    9.9      25 Sep 2019 07:02              CAPILLARY BLOOD GLUCOSE      POCT Blood Glucose.: 199 mg/dL (25 Sep 2019 12:06)  POCT Blood Glucose.: 218 mg/dL (25 Sep 2019 11:56)  POCT Blood Glucose.: 244 mg/dL (25 Sep 2019 07:54)  POCT Blood Glucose.: 96 mg/dL (24 Sep 2019 22:29)  POCT Blood Glucose.: 209 mg/dL (24 Sep 2019 18:01)              RADIOLOGY & ADDITIONAL STUDIES:

## 2019-09-25 NOTE — PROGRESS NOTE ADULT - PROBLEM SELECTOR PLAN 10
eventual plan to d/c back to [facility]   case management
Pt will be d/boaz back to [facility] after PEG and tolerating feeds

## 2019-09-25 NOTE — CONSULT NOTE ADULT - ASSESSMENT
64 y.o. F with dislodged PEG tube    -Plan for open gastrostomy tube placement 9/27/19  -Medical optimization

## 2019-09-25 NOTE — CONSULT NOTE ADULT - SUBJECTIVE AND OBJECTIVE BOX
Patient is a 64y old  Female who presents with a chief complaint of PEG Tube Dislodgement (25 Sep 2019 16:35)      HPI  Called see and eval 64y.o. Female w/PMH as below for PEG placement. Pt sent from NH 9/22/19 after pt pulled PEG tube out. GI attempted PEG placement but pt was noted to have esophageal stricture. IR attempted to place gastrostomy tube today but pt was unable to tolerate procedure at IR suite.     PAST MEDICAL & SURGICAL HISTORY:  Alzheimer disease  Parkinson disease  Respiratory failure  Hypertension  Schizophrenia  Diabetic coma  Diabetes mellitus  S/P percutaneous endoscopic gastrostomy (PEG) tube placement  H/O tracheostomy      MEDICATIONS  (STANDING):  artificial tears (preservative free) Ophthalmic Solution 1 Drop(s) Both EYES two times a day  dextrose 5%. 1000 milliLiter(s) (70 mL/Hr) IV Continuous <Continuous>  dextrose 5%. 1000 milliLiter(s) (70 mL/Hr) IV Continuous <Continuous>  insulin lispro (HumaLOG) corrective regimen sliding scale   SubCutaneous every 6 hours  levETIRAcetam  IVPB 500 milliGRAM(s) IV Intermittent every 12 hours  metoprolol tartrate Injectable 2.5 milliGRAM(s) IV Push every 6 hours  nystatin Cream 1 Application(s) Topical two times a day  pantoprazole  Injectable 40 milliGRAM(s) IV Push daily  scopolamine   Patch 1 Patch Transdermal every 72 hours  sodium chloride 0.45%. 1000 milliLiter(s) (50 mL/Hr) IV Continuous <Continuous>    MEDICATIONS  (PRN):  diphenhydrAMINE   Injectable 25 milliGRAM(s) IV Push once PRN Rash and/or Itching  LORazepam   Injectable 0.5 milliGRAM(s) IV Push every 8 hours PRN Agitation      Allergies    angiotensin converting enzyme inhibitors (Unknown)    Vital Signs Last 24 Hrs  T(C): 36.4 (25 Sep 2019 16:41), Max: 36.9 (25 Sep 2019 14:00)  T(F): 97.6 (25 Sep 2019 16:41), Max: 98.5 (25 Sep 2019 14:00)  HR: 102 (25 Sep 2019 16:41) (75 - 103)  BP: 116/97 (25 Sep 2019 16:41) (116/97 - 150/58)  BP(mean): --  RR: 24 (25 Sep 2019 16:41) (18 - 24)  SpO2: 95% (25 Sep 2019 16:41) (95% - 100%)    Physical:  Gen: NAD. Restrained  Abd: Soft ND, NT. PEG site above umbilicus without active discharge. Unable to advance cotton tip probe past 1cm into wound.     LABS:                        12.0   10.91 )-----------( 184      ( 25 Sep 2019 07:02 )             40.2              09-25    144  |  108  |  8   ----------------------------<  232<H>  5.1   |  26  |  1.16    Ca    9.9      25 Sep 2019 07:02

## 2019-09-25 NOTE — PROGRESS NOTE ADULT - PROBLEM SELECTOR PLAN 1
Unable to replace PEG tube today , by GI at bedside due to pt's esophageal stricture, and in IR due to pt's agitation despite Ativan 2mg IVP total.   No additional ativan in IR per attending  C/w IVF 0.45% NS at 50ml/hr while pt NPO  Consulted Surgery

## 2019-09-26 ENCOUNTER — TRANSCRIPTION ENCOUNTER (OUTPATIENT)
Age: 64
End: 2019-09-26

## 2019-09-26 DIAGNOSIS — G25.71 DRUG INDUCED AKATHISIA: ICD-10-CM

## 2019-09-26 DIAGNOSIS — G24.01 DRUG INDUCED SUBACUTE DYSKINESIA: ICD-10-CM

## 2019-09-26 LAB
GLUCOSE BLDC GLUCOMTR-MCNC: 106 MG/DL — HIGH (ref 70–99)
GLUCOSE BLDC GLUCOMTR-MCNC: 134 MG/DL — HIGH (ref 70–99)
GLUCOSE BLDC GLUCOMTR-MCNC: 171 MG/DL — HIGH (ref 70–99)
GLUCOSE BLDC GLUCOMTR-MCNC: 177 MG/DL — HIGH (ref 70–99)
GLUCOSE BLDC GLUCOMTR-MCNC: 193 MG/DL — HIGH (ref 70–99)
GLUCOSE BLDC GLUCOMTR-MCNC: 198 MG/DL — HIGH (ref 70–99)

## 2019-09-26 PROCEDURE — 99223 1ST HOSP IP/OBS HIGH 75: CPT

## 2019-09-26 RX ORDER — MUPIROCIN 20 MG/G
1 OINTMENT TOPICAL
Refills: 0 | Status: DISCONTINUED | OUTPATIENT
Start: 2019-09-26 | End: 2019-09-26

## 2019-09-26 RX ORDER — CHLORHEXIDINE GLUCONATE 213 G/1000ML
1 SOLUTION TOPICAL ONCE
Refills: 0 | Status: DISCONTINUED | OUTPATIENT
Start: 2019-09-26 | End: 2019-09-26

## 2019-09-26 RX ORDER — MUPIROCIN 20 MG/G
1 OINTMENT TOPICAL
Refills: 0 | Status: DISCONTINUED | OUTPATIENT
Start: 2019-09-26 | End: 2019-09-30

## 2019-09-26 RX ORDER — HEPARIN SODIUM 5000 [USP'U]/ML
5000 INJECTION INTRAVENOUS; SUBCUTANEOUS ONCE
Refills: 0 | Status: COMPLETED | OUTPATIENT
Start: 2019-09-26 | End: 2019-09-26

## 2019-09-26 RX ORDER — CHLORHEXIDINE GLUCONATE 213 G/1000ML
1 SOLUTION TOPICAL DAILY
Refills: 0 | Status: COMPLETED | OUTPATIENT
Start: 2019-09-26 | End: 2019-09-27

## 2019-09-26 RX ADMIN — SCOPALAMINE 1 PATCH: 1 PATCH, EXTENDED RELEASE TRANSDERMAL at 18:59

## 2019-09-26 RX ADMIN — NYSTATIN CREAM 1 APPLICATION(S): 100000 CREAM TOPICAL at 05:07

## 2019-09-26 RX ADMIN — Medication 1 MILLIGRAM(S): at 11:30

## 2019-09-26 RX ADMIN — HEPARIN SODIUM 5000 UNIT(S): 5000 INJECTION INTRAVENOUS; SUBCUTANEOUS at 18:58

## 2019-09-26 RX ADMIN — MUPIROCIN 1 APPLICATION(S): 20 OINTMENT TOPICAL at 18:59

## 2019-09-26 RX ADMIN — SCOPALAMINE 1 PATCH: 1 PATCH, EXTENDED RELEASE TRANSDERMAL at 22:17

## 2019-09-26 RX ADMIN — Medication 1 DROP(S): at 19:01

## 2019-09-26 RX ADMIN — Medication 1: at 12:28

## 2019-09-26 RX ADMIN — Medication 1 MILLIGRAM(S): at 18:59

## 2019-09-26 RX ADMIN — SCOPALAMINE 1 PATCH: 1 PATCH, EXTENDED RELEASE TRANSDERMAL at 07:59

## 2019-09-26 RX ADMIN — SCOPALAMINE 1 PATCH: 1 PATCH, EXTENDED RELEASE TRANSDERMAL at 18:41

## 2019-09-26 RX ADMIN — Medication 1 DROP(S): at 05:08

## 2019-09-26 RX ADMIN — Medication 2.5 MILLIGRAM(S): at 19:16

## 2019-09-26 RX ADMIN — LEVETIRACETAM 420 MILLIGRAM(S): 250 TABLET, FILM COATED ORAL at 05:05

## 2019-09-26 RX ADMIN — LEVETIRACETAM 420 MILLIGRAM(S): 250 TABLET, FILM COATED ORAL at 19:00

## 2019-09-26 RX ADMIN — NYSTATIN CREAM 1 APPLICATION(S): 100000 CREAM TOPICAL at 19:00

## 2019-09-26 RX ADMIN — Medication 1: at 19:03

## 2019-09-26 RX ADMIN — Medication 2.5 MILLIGRAM(S): at 12:27

## 2019-09-26 RX ADMIN — Medication 2.5 MILLIGRAM(S): at 05:06

## 2019-09-26 RX ADMIN — CHLORHEXIDINE GLUCONATE 1 APPLICATION(S): 213 SOLUTION TOPICAL at 22:50

## 2019-09-26 RX ADMIN — PANTOPRAZOLE SODIUM 40 MILLIGRAM(S): 20 TABLET, DELAYED RELEASE ORAL at 12:27

## 2019-09-26 NOTE — BEHAVIORAL HEALTH ASSESSMENT NOTE - RISK ASSESSMENT
Low Acute Suicide Risk No known h/o suicidality, and pt is in long-term high level of care (nursing home)

## 2019-09-26 NOTE — PROGRESS NOTE ADULT - SUBJECTIVE AND OBJECTIVE BOX
INTERVAL HPI/OVERNIGHT EVENTS:    Pt lying down, mitten restraints. Exam deferred.     MEDICATIONS  (STANDING):  artificial tears (preservative free) Ophthalmic Solution 1 Drop(s) Both EYES two times a day  dextrose 5%. 1000 milliLiter(s) (70 mL/Hr) IV Continuous <Continuous>  dextrose 5%. 1000 milliLiter(s) (70 mL/Hr) IV Continuous <Continuous>  insulin lispro (HumaLOG) corrective regimen sliding scale   SubCutaneous every 6 hours  levETIRAcetam  IVPB 500 milliGRAM(s) IV Intermittent every 12 hours  LORazepam   Injectable 1 milliGRAM(s) IV Push every 6 hours  metoprolol tartrate Injectable 2.5 milliGRAM(s) IV Push every 6 hours  mupirocin 2% Nasal 1 Application(s) Both Nostrils two times a day  nystatin Cream 1 Application(s) Topical two times a day  pantoprazole  Injectable 40 milliGRAM(s) IV Push daily  scopolamine   Patch 1 Patch Transdermal every 72 hours  sodium chloride 0.45%. 1000 milliLiter(s) (50 mL/Hr) IV Continuous <Continuous>    MEDICATIONS  (PRN):  LORazepam   Injectable 0.5 milliGRAM(s) IV Push every 8 hours PRN Agitation      Vital Signs Last 24 Hrs  T(C): 37.5 (26 Sep 2019 12:23), Max: 37.5 (26 Sep 2019 12:23)  T(F): 99.5 (26 Sep 2019 12:23), Max: 99.5 (26 Sep 2019 12:23)  HR: 110 (26 Sep 2019 12:23) (60 - 110)  BP: 125/90 (26 Sep 2019 12:23) (107/54 - 137/62)  BP(mean): --  RR: 18 (26 Sep 2019 12:23) (16 - 24)  SpO2: 100% (26 Sep 2019 12:23) (95% - 100%)      PHYSICAL EXAM  General: not in acute distress  Resp: Breathing unlabored  Abdomen: Exam deferred  Extremities: No pedal edema    I&O's Detail    25 Sep 2019 07:01  -  26 Sep 2019 07:00  --------------------------------------------------------  IN:    sodium chloride 0.45%.: 300 mL  Total IN: 300 mL    OUT:  Total OUT: 0 mL    Total NET: 300 mL          LABS:                        12.0   10.91 )-----------( 184      ( 25 Sep 2019 07:02 )             40.2             09-25    144  |  108  |  8   ----------------------------<  232<H>  5.1   |  26  |  1.16    Ca    9.9      25 Sep 2019 07:02

## 2019-09-26 NOTE — BEHAVIORAL HEALTH ASSESSMENT NOTE - NSBHCONSULTRECOMMENDOTHER_PSY_A_CORE FT
1. Recommend increasing dose of Ativan to 1 mg IV q6h standing (this is the same dose pt was receiving during last admission)  2. After PEG has been replaced and is ready for use, recommend restarting home Seroquel 50 mg q12h standing  3. Calls placed to pt's sister/HCP Sharon ([phone]) and NH prescriber Houston Kunz ([phone]), awaiting response  4. Pt is psych cleared for D/C back to nursing home as soon as she is medically optimized  5. Psychiatry will continue to follow while admitted  6. Case d/w Dianelys White NP of primary team    Delmis Gallardo MD  Director, Consultation-Liaison Psychiatry Service  [phone]

## 2019-09-26 NOTE — PROGRESS NOTE ADULT - SUBJECTIVE AND OBJECTIVE BOX
Time of Visit:  Patient seen and examined.     MEDICATIONS  (STANDING):  artificial tears (preservative free) Ophthalmic Solution 1 Drop(s) Both EYES two times a day  dextrose 5%. 1000 milliLiter(s) (70 mL/Hr) IV Continuous <Continuous>  dextrose 5%. 1000 milliLiter(s) (70 mL/Hr) IV Continuous <Continuous>  insulin lispro (HumaLOG) corrective regimen sliding scale   SubCutaneous every 6 hours  levETIRAcetam  IVPB 500 milliGRAM(s) IV Intermittent every 12 hours  LORazepam   Injectable 1 milliGRAM(s) IV Push every 6 hours  metoprolol tartrate Injectable 2.5 milliGRAM(s) IV Push every 6 hours  mupirocin 2% Nasal 1 Application(s) Both Nostrils two times a day  nystatin Cream 1 Application(s) Topical two times a day  pantoprazole  Injectable 40 milliGRAM(s) IV Push daily  scopolamine   Patch 1 Patch Transdermal every 72 hours  sodium chloride 0.45%. 1000 milliLiter(s) (50 mL/Hr) IV Continuous <Continuous>      MEDICATIONS  (PRN):  LORazepam   Injectable 0.5 milliGRAM(s) IV Push every 8 hours PRN Agitation       Medications up to date at time of exam.    ROS; Non verbal. No fever, chills, cough, congestion on exam. cannot examined earlier this morning due to restlessness, kicking  and has B/L hand mittens but improved after receiving Ativan. Has moderate whitish trach secretions.     PHYSICAL EXAMINATION:      Vital Signs Last 24 Hrs  T(C): 37.5 (26 Sep 2019 12:23), Max: 37.5 (26 Sep 2019 12:23)  T(F): 99.5 (26 Sep 2019 12:23), Max: 99.5 (26 Sep 2019 12:23)  HR: 110 (26 Sep 2019 12:23) (60 - 110)  BP: 125/90 (26 Sep 2019 12:23) (107/54 - 137/62)  BP(mean): --  RR: 18 (26 Sep 2019 12:23) (16 - 24)  SpO2: 100% (26 Sep 2019 12:23) (95% - 100%)   (if applicable)    General; Non verbal, Eyes open but no tracking. No acute distress.      HEENT: Head is normocephalic and atraumatic. No nasal tenderness.  Moist mucosa.      NECK: Has non infected tracheostomy .      LUNGS: Oxygen supplementation via Trach collar. Clear to auscultation, no wheezing, rales, or rhonchi. No use of accessory muscle.     HEART: S1 S2 Regular rate and no click/ rub.     ABDOMEN: Soft, nontender, and nondistended.  No abdominal guarding .  Peg stoma site no bleeding, no drainage.     EXTREMITIES: Total care. Non ambulatory.     NEUROLOGIC: Cognitive impaired. Non verbal.      SKIN: Warm and moist . Non diaphoretic.     LABS:                        12.0   10.91 )-----------( 184      ( 25 Sep 2019 07:02 )             40.2     09-25    144  |  108  |  8   ----------------------------<  232<H>  5.1   |  26  |  1.16    Ca    9.9      25 Sep 2019 07:02        RADIOLOGY & ADDITIONAL STUDIES:  EKG:   CXR: < from: Xray Chest 1 View- PORTABLE-Routine (09.23.19 @ 17:29) >  PROCEDURE DATE:  09/23/2019          INTERPRETATION:  AP semierect chest on September 23, 2019 at 4:51 PM.   Patient had NG tube placement.    Markedly elevated right hemidiaphragm again noted. Heart does not suggest   enlargement.    Tracheostomy again seen.    There may be mild atelectasis developing off right hilum compared to May   4 of this year.    On present examination NG tube is noted but the tip ends in mid thoracic   level and the NG tube may coil in the throat.    IMPRESSION: NG tube not in place as above.    IMPRESSION: 64y Female PAST MEDICAL & SURGICAL HISTORY:  Alzheimer disease  Parkinson disease  Respiratory failure  Hypertension  Schizophrenia  Diabetic coma  Diabetes mellitus  S/P percutaneous endoscopic gastrostomy (PEG) tube placement  H/O tracheostomy     Impression; 63 Y/O Female from [facility] with acute on chronic respiratory failure and on oxygen supplementation FIO2 35% via Trach collar. Was admitted also here at [place] September 2018.    Suggestion;   Continue Oxygen supplementation FIo2 35% via Trach collar. Not a candidate for decannulation at this time.   For Open Gastrostomy Tube placement in OR tomorrow per primary team.  Oral, trach care, suction. He trach change if needed.  DVT/ GI prophylactic.   Total care for Daily ADL needs.   can benefit from DuoNeb Q 6 hours.   On Scopolamine patch Q 72 hours.   Seen By Psych for behavior management, on Ativan . Has B/L Hand mittens to prevent pulling the trach and other medical device.

## 2019-09-26 NOTE — PROGRESS NOTE ADULT - PROBLEM SELECTOR PLAN 1
Unable to replace PEG tube today , by GI at bedside due to pt's esophageal stricture, and in IR due to pt's agitation despite Ativan 2mg IVP total.   No additional ativan in IR per attending  C/w IVF 0.45% NS at 50ml/hr while pt NPO  Consulted Surgery Unable to replace PEG tube by GI at bedside due to pt's esophageal stricture, and in IR due to pt's agitation despite Ativan 2mg IVP total.   Planned for open gastrostomy tube  placement in OR by Surg tomorrow 9/27  C/w IVF 0.45% NS at 50ml/hr while pt NPO

## 2019-09-26 NOTE — BEHAVIORAL HEALTH ASSESSMENT NOTE - DETAILS
No information available Protein-calorie malnutrition Dislodged PEG tube Restless movements, lip-smacking DM2

## 2019-09-26 NOTE — BEHAVIORAL HEALTH ASSESSMENT NOTE - HPI (INCLUDE ILLNESS QUALITY, SEVERITY, DURATION, TIMING, CONTEXT, MODIFYING FACTORS, ASSOCIATED SIGNS AND SYMPTOMS)
65 yo  F, single and living in NH ([facility]), with PHx of Parkinson's disease, aphonic @BL s/p trach and PEG and SAD (?per chart), known to [facility] from consult during prior admission in 7/2018, and MHx of DM, HTN, CKD3 and seizure DO, BIBEMS from NH on 9/22 after having dislodged PEG and admitted for PEG replacement. Psych consult was requested for assistance in managing agitation. Pt seen in her room, awake but aphonic with trach in place, wearing protective mittens which were applied by staff due to pt's h/o pulling out lines and tubes. Pt does not speak, make direct EC or otherwise engage with MD. Pt appears quite restless, with continual lip-smacking and writhing movements of limbs. MT on brief PE is grossly normal.

## 2019-09-26 NOTE — PROGRESS NOTE ADULT - PROBLEM SELECTOR PLAN 2
Pt was on Seroquel 50mg via PEG q12h during previous hospitalization  C/w Ativan 0.5 mg IVP q8hr prn agitation  Nonviolent mitten restraints as pt becomes easily agitated and combative Pt was on Seroquel 50mg via PEG q12h during previous hospitalization  C/w Ativan 0.5 mg IVP q8hr prn agitation  Ativan 1 mg IVP q 6hr round the clock  Nonviolent mitten restraints as pt becomes easily agitated and combative. Re-eval every 24 hrs.   Appreciate Psyche

## 2019-09-26 NOTE — BEHAVIORAL HEALTH ASSESSMENT NOTE - OTHER
Aphonic s/p trach Unable to assess (aphonic s/p trach) S/p trach Restless Continual purposeless movements Choreoathetosis, lip-smacking Bed-bound

## 2019-09-26 NOTE — PROGRESS NOTE ADULT - SUBJECTIVE AND OBJECTIVE BOX
NP Note discussed with  Primary Attending    Patient is a 64y old  Female who presents with a chief complaint of PEG Tube Dislodgement (25 Sep 2019 19:24)    HPI  64 [place] old [place] from [place] [facility], DNR, with PMHx of Alzheimer's dementia , CKD 3, DM, HTN, non-verbal Parkinson's disease, s/p Tracheostomy, on FiO2 35% per TC , who  presented to ED with dislodged PEG with closed opening. Unsuccessful attempt by GI for PEG tube placement at bedside due to esophageal stricture. IR consulted and unable to perform procedure due to pt agitation despite total of 2 mg IVP.       INTERVAL HPI/OVERNIGHT EVENTS:     MEDICATIONS  (STANDING):  artificial tears (preservative free) Ophthalmic Solution 1 Drop(s) Both EYES two times a day  dextrose 5%. 1000 milliLiter(s) (70 mL/Hr) IV Continuous <Continuous>  dextrose 5%. 1000 milliLiter(s) (70 mL/Hr) IV Continuous <Continuous>  insulin lispro (HumaLOG) corrective regimen sliding scale   SubCutaneous every 6 hours  levETIRAcetam  IVPB 500 milliGRAM(s) IV Intermittent every 12 hours  metoprolol tartrate Injectable 2.5 milliGRAM(s) IV Push every 6 hours  nystatin Cream 1 Application(s) Topical two times a day  pantoprazole  Injectable 40 milliGRAM(s) IV Push daily  scopolamine   Patch 1 Patch Transdermal every 72 hours  sodium chloride 0.45%. 1000 milliLiter(s) (50 mL/Hr) IV Continuous <Continuous>    MEDICATIONS  (PRN):  diphenhydrAMINE   Injectable 25 milliGRAM(s) IV Push once PRN Rash and/or Itching  LORazepam   Injectable 0.5 milliGRAM(s) IV Push every 8 hours PRN Agitation      __________________________________________________  REVIEW OF SYSTEMS:    CONSTITUTIONAL: No fever,   EYES: no acute visual disturbances  NECK: No pain or stiffness  RESPIRATORY: No cough; No shortness of breath  CARDIOVASCULAR: No chest pain, no palpitations  GASTROINTESTINAL: No pain. No nausea or vomiting; No diarrhea   NEUROLOGICAL: No headache or numbness, no tremors  MUSCULOSKELETAL: No joint pain, no muscle pain  GENITOURINARY: no dysuria, no frequency, no hesitancy  PSYCHIATRY: no depression , no anxiety  ALL OTHER  ROS negative        Vital Signs Last 24 Hrs  T(C): 36.8 (26 Sep 2019 05:38), Max: 36.9 (25 Sep 2019 14:00)  T(F): 98.3 (26 Sep 2019 05:38), Max: 98.5 (25 Sep 2019 14:00)  HR: 71 (26 Sep 2019 05:41) (60 - 103)  BP: 137/62 (26 Sep 2019 05:41) (107/54 - 137/62)  BP(mean): --  RR: 16 (26 Sep 2019 05:38) (16 - 24)  SpO2: 100% (26 Sep 2019 05:38) (95% - 100%)    ________________________________________________  PHYSICAL EXAM:  GENERAL: NAD  HEENT: Normocephalic;  conjunctivae and sclerae clear; moist mucous membranes;   NECK : supple  CHEST/LUNG: Clear to auscultation bilaterally with good air entry   HEART: S1 S2  regular; no murmurs, gallops or rubs  ABDOMEN: Soft, Nontender, Nondistended; Bowel sounds present  EXTREMITIES: no cyanosis; no edema; no calf tenderness  SKIN: warm and dry; no rash  NERVOUS SYSTEM:  Awake and alert; Oriented  to place, person and time ; no new deficits    _________________________________________________  LABS:                        12.0   10.91 )-----------( 184      ( 25 Sep 2019 07:02 )             40.2     09-25    144  |  108  |  8   ----------------------------<  232<H>  5.1   |  26  |  1.16    Ca    9.9      25 Sep 2019 07:02          CAPILLARY BLOOD GLUCOSE      POCT Blood Glucose.: 171 mg/dL (26 Sep 2019 08:00)  POCT Blood Glucose.: 134 mg/dL (26 Sep 2019 06:35)  POCT Blood Glucose.: 106 mg/dL (26 Sep 2019 01:01)  POCT Blood Glucose.: 203 mg/dL (25 Sep 2019 17:32)  POCT Blood Glucose.: 199 mg/dL (25 Sep 2019 12:06)  POCT Blood Glucose.: 218 mg/dL (25 Sep 2019 11:56)        RADIOLOGY & ADDITIONAL TESTS:    Imaging Personally Reviewed:  YES/NO    Consultant(s) Notes Reviewed:   YES/ No    Care Discussed with Consultants :     Plan of care was discussed with patient and /or primary care giver; all questions and concerns were addressed and care was aligned with patient's wishes. NP Note discussed with  Primary Attending    Patient is a 64y old  Female who presents with a chief complaint of PEG Tube Dislodgement (25 Sep 2019 19:24)    HPI  64 years old Female from [place] [facility], DNR, with PMHx of Alzheimer's dementia , CKD 3, DM, HTN, non-verbal Parkinson's disease, s/p Tracheostomy, on FiO2 35% per TC , who  presented to ED with dislodged PEG with closed opening. Unsuccessful attempt by GI for PEG tube placement at bedside due to esophageal stricture. IR consulted and unable to perform procedure due to pt agitation despite total of 2 mg IVP. Now pt is planned for open gastrostomy tube placement in OR per Surgery, tomorrow. Psyche consulted for behavior management.       INTERVAL HPI/OVERNIGHT EVENTS: Pt awake, agitated and combative during nursing care, despite reorientation and redirection.      MEDICATIONS  (STANDING):  artificial tears (preservative free) Ophthalmic Solution 1 Drop(s) Both EYES two times a day  dextrose 5%. 1000 milliLiter(s) (70 mL/Hr) IV Continuous <Continuous>  dextrose 5%. 1000 milliLiter(s) (70 mL/Hr) IV Continuous <Continuous>  insulin lispro (HumaLOG) corrective regimen sliding scale   SubCutaneous every 6 hours  levETIRAcetam  IVPB 500 milliGRAM(s) IV Intermittent every 12 hours  metoprolol tartrate Injectable 2.5 milliGRAM(s) IV Push every 6 hours  nystatin Cream 1 Application(s) Topical two times a day  pantoprazole  Injectable 40 milliGRAM(s) IV Push daily  scopolamine   Patch 1 Patch Transdermal every 72 hours  sodium chloride 0.45%. 1000 milliLiter(s) (50 mL/Hr) IV Continuous <Continuous>    MEDICATIONS  (PRN):  diphenhydrAMINE   Injectable 25 milliGRAM(s) IV Push once PRN Rash and/or Itching  LORazepam   Injectable 0.5 milliGRAM(s) IV Push every 8 hours PRN Agitation      __________________________________________________  REVIEW OF SYSTEMS:    Unable to illicit ROS as pt is agitated and combative.       Vital Signs Last 24 Hrs  T(C): 36.8 (26 Sep 2019 05:38), Max: 36.9 (25 Sep 2019 14:00)  T(F): 98.3 (26 Sep 2019 05:38), Max: 98.5 (25 Sep 2019 14:00)  HR: 71 (26 Sep 2019 05:41) (60 - 103)  BP: 137/62 (26 Sep 2019 05:41) (107/54 - 137/62)  BP(mean): --  RR: 16 (26 Sep 2019 05:38) (16 - 24)  SpO2: 100% (26 Sep 2019 05:38) (95% - 100%)    ________________________________________________  PHYSICAL EXAM:  GENERAL: agitated, combative, kicking staff.   HEENT: Normocephalic;  conjunctivae and sclerae clear; moist mucous membranes;   NECK : Trach  CHEST/LUNG: Trach in place  HEART: Unable to assess d/t pt kicking/combative  ABDOMEN:  Unable to assess d/t pt kicking/combative  EXTREMITIES:  Unable to assess d/t pt kicking/combative  SKIN: warm and dry;  Unable to assess d/t pt kicking/combative  NERVOUS SYSTEM:  Awake  no new deficits    _________________________________________________  LABS:                        12.0   10.91 )-----------( 184      ( 25 Sep 2019 07:02 )             40.2     09-25    144  |  108  |  8   ----------------------------<  232<H>  5.1   |  26  |  1.16    Ca    9.9      25 Sep 2019 07:02          CAPILLARY BLOOD GLUCOSE      POCT Blood Glucose.: 171 mg/dL (26 Sep 2019 08:00)  POCT Blood Glucose.: 134 mg/dL (26 Sep 2019 06:35)  POCT Blood Glucose.: 106 mg/dL (26 Sep 2019 01:01)  POCT Blood Glucose.: 203 mg/dL (25 Sep 2019 17:32)  POCT Blood Glucose.: 199 mg/dL (25 Sep 2019 12:06)  POCT Blood Glucose.: 218 mg/dL (25 Sep 2019 11:56)        RADIOLOGY & ADDITIONAL TESTS:    Imaging Personally Reviewed:  YES/NO    Consultant(s) Notes Reviewed:   YES/ No    Care Discussed with Consultants :     Plan of care was discussed with patient and /or primary care giver; all questions and concerns were addressed and care was aligned with patient's wishes.

## 2019-09-26 NOTE — BEHAVIORAL HEALTH ASSESSMENT NOTE - NSBHCHARTREVIEWVS_PSY_A_CORE FT
Vital Signs Last 24 Hrs  T(C): 36.8 (26 Sep 2019 05:38), Max: 36.9 (25 Sep 2019 14:00)  T(F): 98.3 (26 Sep 2019 05:38), Max: 98.5 (25 Sep 2019 14:00)  HR: 71 (26 Sep 2019 05:41) (60 - 103)  BP: 137/62 (26 Sep 2019 05:41) (107/54 - 137/62)  BP(mean): --  RR: 16 (26 Sep 2019 05:38) (16 - 24)  SpO2: 100% (26 Sep 2019 05:38) (95% - 100%)

## 2019-09-26 NOTE — BEHAVIORAL HEALTH ASSESSMENT NOTE - NSBHSOCIALHXDETAILSFT_PSY_A_CORE
Unable to obtain hx due to aphonia. Per chart, pt has been in NH for multiple years and HCP is her sister Sharon ([phone]).

## 2019-09-26 NOTE — CHART NOTE - NSCHARTNOTEFT_GEN_A_CORE
Assessment:   Patient reports [ ] nausea  [ ] vomiting [ ] diarrhea [ ] constipation  [ ]chewing problems [ ] swallowing issues  [ ] other:   Pt visited. Pt  with  Mittens @ bilateral hands.  Pt is NPO = ~ 5 days. Pt with PEG Dislodgement. plan is for Open Gastrostomy on 9/27. Labs Noted     PO intake:   Source for PO intake [ ] Patient/family [ ] chart [ ] staff NPO    Current Weight: Weight (kg): 63.5 (09-22 @ 10:29), 54.4 (09-21 @ 19:09), 59 (09-21 @ 00:03)  % Weight Change    Pertinent Medications: MEDICATIONS  (STANDING):  artificial tears (preservative free) Ophthalmic Solution 1 Drop(s) Both EYES two times a day  dextrose 5%. 1000 milliLiter(s) (70 mL/Hr) IV Continuous <Continuous>  dextrose 5%. 1000 milliLiter(s) (70 mL/Hr) IV Continuous <Continuous>  insulin lispro (HumaLOG) corrective regimen sliding scale   SubCutaneous every 6 hours  levETIRAcetam  IVPB 500 milliGRAM(s) IV Intermittent every 12 hours  LORazepam   Injectable 1 milliGRAM(s) IV Push every 6 hours  metoprolol tartrate Injectable 2.5 milliGRAM(s) IV Push every 6 hours  mupirocin 2% Nasal 1 Application(s) Both Nostrils two times a day  nystatin Cream 1 Application(s) Topical two times a day  pantoprazole  Injectable 40 milliGRAM(s) IV Push daily  scopolamine   Patch 1 Patch Transdermal every 72 hours  sodium chloride 0.45%. 1000 milliLiter(s) (50 mL/Hr) IV Continuous <Continuous>    MEDICATIONS  (PRN):  LORazepam   Injectable 0.5 milliGRAM(s) IV Push every 8 hours PRN Agitation    Pertinent Labs:  09-25 Na144 mmol/L Glu 232 mg/dL<H> K+ 5.1 mmol/L Cr  1.16 mg/dL BUN 8 mg/dL 09-23 Phos 3.2 mg/dL 09-23 Alb 3.2 g/dL<L> 09-23 [id number] 8.2 %<H> 09-23 Chol 150 mg/dL LDL 80 mg/dL HDL 50 mg/dL Trig 102 mg/dL      Skin:     Estimated Needs:   [ ] no change since previous assessment  [ ] recalculated:       Previous Nutrition Diagnosis:   [x ] Inadequate Energy Intake [ ]Inadequate Oral Intake [ ] Excessive Energy Intake   [ ] Underweight [ ] Increased Nutrient Needs [ ] Overweight/Obesity   [ ] Altered GI Function [ ] Unintended Weight Loss [ ] Food & Nutrition Related Knowledge Deficit [ ] Malnutrition     Nutrition Diagnosis is [x ] ongoing  [ ] resolved [ ] not applicable     New Nutrition Diagnosis: [ ] not applicable  [ ] Inadequate Energy Intake [ ]Inadequate Oral Intake [ ] Excessive Energy Intake   [ ] Underweight [ ] Increased Nutrient Needs [ ] Overweight/Obesity   [ ] Altered GI Function [ ] Unintended Weight Loss [ ] Food & Nutrition Related Knowledge Deficit [ ] Malnutrition     Related to:     As evidenced by:     Interventions:   Recommend  [ ] Change Diet To:  [ ] Nutrition Supplement  [ ] Nutrition Support  [ x ] Other: Plan is for open Gastrostomy on 9/27.     Monitoring and Evaluation:   [ ] PO intake [ ] Tolerance to diet prescription [ ] weights [x ] follow up per protocol  [ ] other:

## 2019-09-26 NOTE — BEHAVIORAL HEALTH ASSESSMENT NOTE - SUMMARY
63 yo  F, single and living in [facility] ([facility]), with PHx of Parkinson's disease, aphonic @BL s/p trach and PEG and SAD (?per chart), known to [facility] from consult during prior admission in 7/2018, and MHx of DM, HTN, CKD3 and seizure DO, BIBEMS from NH on 9/22 after having dislodged PEG and admitted for PEG replacement. Psych consult was requested for assistance in managing agitation. On exam, pt is aphonic and unable to engage in any meaningful way with interview, but she displays marked s/s of tardive dyskinesia (lip-smacking, writing movements) which per chart appear to be c/w her prior baseline. Options for medical management of pt's TD are very limited, as she already has moderate to severe Parkinson's disease which would likely be exacerbated by available TD medications such as tetrabenazine and valbenazine, and pt is unable to take her home Seroquel (which was reported to be helpful during previous admission in 7/2018) due to absence of PEG. Therefore, we recommend increasing her dose of Ativan to 1 mg IV q6h standing, in effort to reduce akathisia enough to enable replacement of PEG. Once PEG is replaced and ready for use, we recommend reinstating home dose of Seroquel 50 mg q12h standing. Pt does not appear to present an acute risk of harm to self or others at the time of assessment, and does not appear to be in need of admission to  psych at the time of assessment.

## 2019-09-26 NOTE — BEHAVIORAL HEALTH ASSESSMENT NOTE - NSBHCHARTREVIEWLAB_PSY_A_CORE FT
09-25    144  |  108  |  8   ----------------------------<  232<H>  5.1   |  26  |  1.16    Ca    9.9      25 Sep 2019 07:02    Complete Blood Count in AM (09.25.19 @ 07:02)    Nucleated RBC: 0 /100 WBCs    WBC Count: 10.91 K/uL    RBC Count: 4.72 M/uL    Hemoglobin: 12.0 g/dL    Hematocrit: 40.2 %    Mean Cell Volume: 85.2 fl    Mean Cell Hemoglobin: 25.4 pg    Mean Cell Hemoglobin Conc: 29.9 gm/dL    Red Cell Distrib Width: 14.0 %    Platelet Count - Automated: 184 K/uL

## 2019-09-27 LAB
ANION GAP SERPL CALC-SCNC: 6 MMOL/L — SIGNIFICANT CHANGE UP (ref 5–17)
BLD GP AB SCN SERPL QL: SIGNIFICANT CHANGE UP
BUN SERPL-MCNC: 20 MG/DL — HIGH (ref 7–18)
CALCIUM SERPL-MCNC: 9.2 MG/DL — SIGNIFICANT CHANGE UP (ref 8.4–10.5)
CHLORIDE SERPL-SCNC: 111 MMOL/L — HIGH (ref 96–108)
CO2 SERPL-SCNC: 27 MMOL/L — SIGNIFICANT CHANGE UP (ref 22–31)
CREAT SERPL-MCNC: 1.04 MG/DL — SIGNIFICANT CHANGE UP (ref 0.5–1.3)
GLUCOSE BLDC GLUCOMTR-MCNC: 116 MG/DL — HIGH (ref 70–99)
GLUCOSE BLDC GLUCOMTR-MCNC: 127 MG/DL — HIGH (ref 70–99)
GLUCOSE BLDC GLUCOMTR-MCNC: 155 MG/DL — HIGH (ref 70–99)
GLUCOSE BLDC GLUCOMTR-MCNC: 173 MG/DL — HIGH (ref 70–99)
GLUCOSE SERPL-MCNC: 130 MG/DL — HIGH (ref 70–99)
HCT VFR BLD CALC: 34.5 % — SIGNIFICANT CHANGE UP (ref 34.5–45)
HGB BLD-MCNC: 10.6 G/DL — LOW (ref 11.5–15.5)
MCHC RBC-ENTMCNC: 25.5 PG — LOW (ref 27–34)
MCHC RBC-ENTMCNC: 30.7 GM/DL — LOW (ref 32–36)
MCV RBC AUTO: 83.1 FL — SIGNIFICANT CHANGE UP (ref 80–100)
NRBC # BLD: 0 /100 WBCS — SIGNIFICANT CHANGE UP (ref 0–0)
PLATELET # BLD AUTO: 163 K/UL — SIGNIFICANT CHANGE UP (ref 150–400)
POTASSIUM SERPL-MCNC: 4.5 MMOL/L — SIGNIFICANT CHANGE UP (ref 3.5–5.3)
POTASSIUM SERPL-SCNC: 4.5 MMOL/L — SIGNIFICANT CHANGE UP (ref 3.5–5.3)
RBC # BLD: 4.15 M/UL — SIGNIFICANT CHANGE UP (ref 3.8–5.2)
RBC # FLD: 14.7 % — HIGH (ref 10.3–14.5)
SODIUM SERPL-SCNC: 144 MMOL/L — SIGNIFICANT CHANGE UP (ref 135–145)
WBC # BLD: 7.06 K/UL — SIGNIFICANT CHANGE UP (ref 3.8–10.5)
WBC # FLD AUTO: 7.06 K/UL — SIGNIFICANT CHANGE UP (ref 3.8–10.5)

## 2019-09-27 PROCEDURE — 99233 SBSQ HOSP IP/OBS HIGH 50: CPT

## 2019-09-27 PROCEDURE — 43830 GSTRST OPEN WO CONSTJ TUBE: CPT

## 2019-09-27 RX ORDER — ACETAMINOPHEN 500 MG
1000 TABLET ORAL ONCE
Refills: 0 | Status: DISCONTINUED | OUTPATIENT
Start: 2019-09-27 | End: 2019-09-27

## 2019-09-27 RX ORDER — SODIUM CHLORIDE 9 MG/ML
1000 INJECTION, SOLUTION INTRAVENOUS
Refills: 0 | Status: DISCONTINUED | OUTPATIENT
Start: 2019-09-27 | End: 2019-09-27

## 2019-09-27 RX ORDER — FENTANYL CITRATE 50 UG/ML
25 INJECTION INTRAVENOUS
Refills: 0 | Status: DISCONTINUED | OUTPATIENT
Start: 2019-09-27 | End: 2019-09-27

## 2019-09-27 RX ORDER — ONDANSETRON 8 MG/1
4 TABLET, FILM COATED ORAL ONCE
Refills: 0 | Status: DISCONTINUED | OUTPATIENT
Start: 2019-09-27 | End: 2019-09-27

## 2019-09-27 RX ORDER — MIDAZOLAM HYDROCHLORIDE 1 MG/ML
1 INJECTION, SOLUTION INTRAMUSCULAR; INTRAVENOUS
Refills: 0 | Status: DISCONTINUED | OUTPATIENT
Start: 2019-09-27 | End: 2019-09-27

## 2019-09-27 RX ADMIN — MIDAZOLAM HYDROCHLORIDE 1 MILLIGRAM(S): 1 INJECTION, SOLUTION INTRAMUSCULAR; INTRAVENOUS at 16:13

## 2019-09-27 RX ADMIN — Medication 1: at 06:56

## 2019-09-27 RX ADMIN — Medication 1 MILLIGRAM(S): at 18:27

## 2019-09-27 RX ADMIN — Medication 1 MILLIGRAM(S): at 06:12

## 2019-09-27 RX ADMIN — Medication 1 DROP(S): at 06:56

## 2019-09-27 RX ADMIN — MIDAZOLAM HYDROCHLORIDE 1 MILLIGRAM(S): 1 INJECTION, SOLUTION INTRAMUSCULAR; INTRAVENOUS at 16:30

## 2019-09-27 RX ADMIN — Medication 2.5 MILLIGRAM(S): at 18:26

## 2019-09-27 RX ADMIN — Medication 2.5 MILLIGRAM(S): at 06:10

## 2019-09-27 RX ADMIN — FENTANYL CITRATE 25 MICROGRAM(S): 50 INJECTION INTRAVENOUS at 16:35

## 2019-09-27 RX ADMIN — NYSTATIN CREAM 1 APPLICATION(S): 100000 CREAM TOPICAL at 06:13

## 2019-09-27 RX ADMIN — Medication 2.5 MILLIGRAM(S): at 02:05

## 2019-09-27 RX ADMIN — CHLORHEXIDINE GLUCONATE 1 APPLICATION(S): 213 SOLUTION TOPICAL at 12:59

## 2019-09-27 RX ADMIN — FENTANYL CITRATE 25 MICROGRAM(S): 50 INJECTION INTRAVENOUS at 16:50

## 2019-09-27 RX ADMIN — Medication 1 MILLIGRAM(S): at 12:59

## 2019-09-27 RX ADMIN — NYSTATIN CREAM 1 APPLICATION(S): 100000 CREAM TOPICAL at 18:28

## 2019-09-27 RX ADMIN — FENTANYL CITRATE 25 MICROGRAM(S): 50 INJECTION INTRAVENOUS at 16:40

## 2019-09-27 RX ADMIN — Medication 0.5 MILLIGRAM(S): at 02:16

## 2019-09-27 RX ADMIN — LEVETIRACETAM 420 MILLIGRAM(S): 250 TABLET, FILM COATED ORAL at 06:10

## 2019-09-27 RX ADMIN — LEVETIRACETAM 420 MILLIGRAM(S): 250 TABLET, FILM COATED ORAL at 18:27

## 2019-09-27 RX ADMIN — MUPIROCIN 1 APPLICATION(S): 20 OINTMENT TOPICAL at 18:27

## 2019-09-27 RX ADMIN — SCOPALAMINE 1 PATCH: 1 PATCH, EXTENDED RELEASE TRANSDERMAL at 19:19

## 2019-09-27 RX ADMIN — MUPIROCIN 1 APPLICATION(S): 20 OINTMENT TOPICAL at 06:09

## 2019-09-27 RX ADMIN — PANTOPRAZOLE SODIUM 40 MILLIGRAM(S): 20 TABLET, DELAYED RELEASE ORAL at 12:59

## 2019-09-27 RX ADMIN — SCOPALAMINE 1 PATCH: 1 PATCH, EXTENDED RELEASE TRANSDERMAL at 07:50

## 2019-09-27 NOTE — CHART NOTE - NSCHARTNOTEFT_GEN_A_CORE
Medicine NP    This is a 65 y/o F from [facility] with PMHx significant for of Alzheimer's dementia , CKD 3, DM, HTN, non-verbal Parkinson's disease, s/p Tracheostomy presents to ED for PEG tube dislodgment. Pt is medically cleared and optimized for surgical intervention. Plans for open gastrostomy today -9/27/19. Seen and evaluated by psych. Per Psych - once peg in place, convert Ativan to PO, same dose as IV and c/w Seroquel. Pt remains stable at this time.     Alyssa Guillen ANP-BC  Dept of Medicine

## 2019-09-27 NOTE — PROGRESS NOTE BEHAVIORAL HEALTH - SUMMARY
65 yo  F, single and living in NH ([facility]), with PHx of Parkinson's disease, aphonic @BL s/p trach and PEG and SAD (?per chart), known to [facility] from consult during prior admission in 7/2018, and MHx of DM, HTN, CKD3 and seizure DO, BIBEMS from NH on 9/22 after having dislodged PEG and admitted for PEG replacement. Psych consult was requested for assistance in managing agitation. On exam, pt is aphonic and unable to engage in any meaningful way with interview, but she displays marked s/s of tardive dyskinesia (lip-smacking, writing movements) which per chart appear to be c/w her prior baseline. Options for medical management of pt's TD are very limited, as she already has moderate to severe Parkinson's disease which would likely be exacerbated by available TD medications such as tetrabenazine and valbenazine, and pt is unable to take her home Seroquel (which was reported to be helpful during previous admission in 7/2018) due to absence of PEG. Therefore, we recommend c/w Ativan 1 mg IV q6h standing, in effort to reduce akathisia enough to enable replacement of PEG. Once PEG is replaced and ready for use, we recommend reinstating home dose of Seroquel 50 mg q12h standing and continuing Ativan 1 mg q6h standing as PO via PEG. Pt does not appear to present an acute risk of harm to self or others at the time of assessment, and does not appear to be in need of admission to IP psych at the time of assessment.

## 2019-09-27 NOTE — PROGRESS NOTE BEHAVIORAL HEALTH - NSBHCONSULTRECOMMENDOTHER_PSY_A_CORE FT
1. Recommend c/w Ativan 1 mg IV q6h standing (this is the same dose pt was receiving during last admission)  2. After PEG has been replaced and is ready for use, recommend restarting home Seroquel 50 mg q12h standing and converting Ativan IV to PO at same dose as above, both via PEG  --pt can be DC'd back to NH on this regimen  3. Calls placed to pt's sister/HCP Sharon ([phone]) and [facility] prescriber Houston Kunz ([phone]), awaiting response  4. Pt is psych cleared for D/C back to nursing home as soon as she is medically optimized  5. Psychiatry is signing off  6. Case d/w 4 [place] primary team    Delmis Gallardo MD  Director, Consultation-Liaison Psychiatry Service  [id number]

## 2019-09-27 NOTE — PROGRESS NOTE ADULT - SUBJECTIVE AND OBJECTIVE BOX
Patient seen and examined at bedside  No acute events noted overnight from staff  Case discussed with medical team    HPI:  64 years old Female from [facility], DNR, with PMHx of Alzheimer's dementia , CKD 3, DM, HTN, non-verbal Parkinson's disease, s/p Tracheostomy presents to ED complaining of PEG tube dislodgment some time this morning. Pt states she pulled the tube this morning accidentally. Pt asks for transfer papers. Pt was sent here solely for the tube replacement. No other complaints. Pt allergic to angiotensin converting enzyme inhibitors. Source of history is nursing home chart. Patient is on 35 percent oxygen at nursing home.  Presenting Symptoms: dislodged PEG tube with closed opening.  GI dr michelle consult appreciated. Plan for PEG tube placement on Wednesday 9/25/2019. (22 Sep 2019 12:03)      PAST MEDICAL & SURGICAL HISTORY:  Alzheimer disease  Parkinson disease  Respiratory failure  Hypertension  Schizophrenia  Diabetic coma  Diabetes mellitus  S/P percutaneous endoscopic gastrostomy (PEG) tube placement  H/O tracheostomy      angiotensin converting enzyme inhibitors (Unknown)       MEDICATIONS  (STANDING):  artificial tears (preservative free) Ophthalmic Solution 1 Drop(s) Both EYES two times a day  chlorhexidine 2% Cloths 1 Application(s) Topical daily  dextrose 5%. 1000 milliLiter(s) (70 mL/Hr) IV Continuous <Continuous>  dextrose 5%. 1000 milliLiter(s) (70 mL/Hr) IV Continuous <Continuous>  insulin lispro (HumaLOG) corrective regimen sliding scale   SubCutaneous every 6 hours  levETIRAcetam  IVPB 500 milliGRAM(s) IV Intermittent every 12 hours  LORazepam   Injectable 1 milliGRAM(s) IV Push every 6 hours  metoprolol tartrate Injectable 2.5 milliGRAM(s) IV Push every 6 hours  mupirocin 2% Nasal 1 Application(s) Both Nostrils two times a day  nystatin Cream 1 Application(s) Topical two times a day  pantoprazole  Injectable 40 milliGRAM(s) IV Push daily  scopolamine   Patch 1 Patch Transdermal every 72 hours  sodium chloride 0.45%. 1000 milliLiter(s) (50 mL/Hr) IV Continuous <Continuous>    MEDICATIONS  (PRN):  LORazepam   Injectable 0.5 milliGRAM(s) IV Push every 8 hours PRN Agitation      REVIEW OF SYSTEMS: limited from pt 2/2 medical/mental state    T(C): 36.3 (09-27-19 @ 05:07), Max: 37.6 (09-26-19 @ 18:57)  HR: 62 (09-27-19 @ 12:54) (53 - 109)  BP: 128/58 (09-27-19 @ 12:54) (121/76 - 154/96)  RR: 18 (09-27-19 @ 08:15) (17 - 18)  SpO2: 99% (09-27-19 @ 08:15) (98% - 100%)    PHYSICAL EXAMINATION:   Constitutional: stable appearance. NAD  HEENT: AT  Neck:  Supple  Respiratory: trach intact.  Adequate airflow b/l. Not using accessory muscles of respiration.  Cardiovascular:  S1 & S2 intact, sys murmur stable. no R/G, 2+ radial pulses b/l  Gastrointestinal: Soft, ND, normoactive b.s.,  Extremities: stable. WWP  Neurological: awake. nonverbal stable. stable mental status otherwise     Labs and imaging reviewed    LABS:                        10.6   7.06  )-----------( 163      ( 27 Sep 2019 08:51 )             34.5     09-27    144  |  111<H>  |  20<H>  ----------------------------<  130<H>  4.5   |  27  |  1.04    Ca    9.2      27 Sep 2019 08:51              CAPILLARY BLOOD GLUCOSE      POCT Blood Glucose.: 127 mg/dL (27 Sep 2019 11:47)  POCT Blood Glucose.: 155 mg/dL (27 Sep 2019 06:29)  POCT Blood Glucose.: 116 mg/dL (27 Sep 2019 00:15)  POCT Blood Glucose.: 193 mg/dL (26 Sep 2019 18:55)  POCT Blood Glucose.: 177 mg/dL (26 Sep 2019 16:55)              RADIOLOGY & ADDITIONAL STUDIES:

## 2019-09-27 NOTE — PROGRESS NOTE ADULT - SUBJECTIVE AND OBJECTIVE BOX
Time of Visit:  Patient seen and examined.     MEDICATIONS  (STANDING):  artificial tears (preservative free) Ophthalmic Solution 1 Drop(s) Both EYES two times a day  chlorhexidine 2% Cloths 1 Application(s) Topical daily  dextrose 5%. 1000 milliLiter(s) (70 mL/Hr) IV Continuous <Continuous>  dextrose 5%. 1000 milliLiter(s) (70 mL/Hr) IV Continuous <Continuous>  insulin lispro (HumaLOG) corrective regimen sliding scale   SubCutaneous every 6 hours  levETIRAcetam  IVPB 500 milliGRAM(s) IV Intermittent every 12 hours  LORazepam   Injectable 1 milliGRAM(s) IV Push every 6 hours  metoprolol tartrate Injectable 2.5 milliGRAM(s) IV Push every 6 hours  mupirocin 2% Nasal 1 Application(s) Both Nostrils two times a day  nystatin Cream 1 Application(s) Topical two times a day  pantoprazole  Injectable 40 milliGRAM(s) IV Push daily  scopolamine   Patch 1 Patch Transdermal every 72 hours  sodium chloride 0.45%. 1000 milliLiter(s) (50 mL/Hr) IV Continuous <Continuous>      MEDICATIONS  (PRN):  LORazepam   Injectable 0.5 milliGRAM(s) IV Push every 8 hours PRN Agitation       Medications up to date at time of exam.      PHYSICAL EXAMINATION:    Vital Signs Last 24 Hrs  T(C): 36.3 (27 Sep 2019 05:07), Max: 37.6 (26 Sep 2019 18:57)  T(F): 97.4 (27 Sep 2019 05:07), Max: 99.6 (26 Sep 2019 18:57)  HR: 53 (27 Sep 2019 07:09) (53 - 110)  BP: 142/62 (27 Sep 2019 07:09) (121/76 - 154/96)  BP(mean): --  RR: 18 (27 Sep 2019 08:15) (17 - 18)  SpO2: 99% (27 Sep 2019 08:15) (98% - 100%)   (if applicable)    General; Non verbal, Eyes open but no tracking. No acute distress.      HEENT: Head is normocephalic and atraumatic. No nasal tenderness.  Moist mucosa.      NECK: Has non infected tracheostomy .      LUNGS: Oxygen supplementation via Trach collar. Clear to auscultation B/L. No wheezing, rales, or rhonchi. No use of accessory muscle.     HEART: S1 S2 Regular rate and no click/ rub.     ABDOMEN: Soft, nontender, and nondistended.  No abdominal guarding .  Peg stoma site no bleeding, no drainage.     EXTREMITIES: Total care. Non ambulatory.     NEUROLOGIC: Cognitive impaired. Non verbal.      SKIN: Warm and moist . Non diaphoretic.       LABS:                        10.6   7.06  )-----------( 163      ( 27 Sep 2019 08:51 )             34.5     09-27    144  |  111<H>  |  20<H>  ----------------------------<  130<H>  4.5   |  27  |  1.04    Ca    9.2      27 Sep 2019 08:51      RADIOLOGY & ADDITIONAL STUDIES:  EKG:   CXR: < from: Xray Chest 1 View- PORTABLE-Routine (09.23.19 @ 17:29) >  PROCEDURE DATE:  09/23/2019          INTERPRETATION:  AP semierect chest on September 23, 2019 at 4:51 PM.   Patient had NG tube placement.    Markedly elevated right hemidiaphragm again noted. Heart does not suggest   enlargement.    Tracheostomy again seen.    There may be mild atelectasis developing off right hilum compared to May   4 of this year.    On present examination NG tube is noted but the tip ends in mid thoracic   level and the NG tube may coil in the throat.    IMPRESSION: NG tube not in place as above.      IMPRESSION: 64y Female PAST MEDICAL & SURGICAL HISTORY:  Alzheimer disease  Parkinson disease  Respiratory failure  Hypertension  Schizophrenia  Diabetic coma  Diabetes mellitus  S/P percutaneous endoscopic gastrostomy (PEG) tube placement  H/O tracheostomy    Impression; 63 Y/O Female from [facility] with acute on chronic respiratory failure and on oxygen supplementation FIO2 35% via Trach collar. Was admitted also here at [place] September 2018.    Suggestion;   Continue Oxygen supplementation FIo2 35% via Trach collar. Not a candidate for decannulation at this time.   For Open Gastrostomy Tube placement in OR tomorrow per primary team.  Oral, trach care, suction. He trach change if needed.  DVT/ GI prophylactic.   Total care for Daily ADL needs.   can benefit from DuoNeb Q 6 hours.   On Scopolamine patch Q 72 hours.   Seen By Psych for behavior management, on Ativan . Has B/L Hand mittens to prevent pulling the trach and other medical device.

## 2019-09-27 NOTE — PROGRESS NOTE BEHAVIORAL HEALTH - OTHER
S/p trach Restless Continual purposeless movements Choreoathetosis Bed-bound Aphonic s/p trach Unable to assess (aphonic s/p trach)

## 2019-09-27 NOTE — PROGRESS NOTE ADULT - PROBLEM SELECTOR PLAN 1
pt is medically cleared and optimized for surgical intervention  open gastrostomy planned for today  All team members management and patient care greatly appreciated

## 2019-09-27 NOTE — PROGRESS NOTE BEHAVIORAL HEALTH - NSBHFUPINTERVALHXFT_PSY_A_CORE
Per chart, pt has been started on Ativan IV 1 mg q6h standing as recommended by psychiatry. Pt is planned for PEG replacement today. Pt seen in her room, sitting up in bed awake and alert, but aphonic as before s/p trach. Pt again displays continual restless movements of her BLE, but is no longer smacking her lips. Pt does not make direct EC or follow any commands.

## 2019-09-28 LAB
GLUCOSE BLDC GLUCOMTR-MCNC: 159 MG/DL — HIGH (ref 70–99)
GLUCOSE BLDC GLUCOMTR-MCNC: 159 MG/DL — HIGH (ref 70–99)
GLUCOSE BLDC GLUCOMTR-MCNC: 162 MG/DL — HIGH (ref 70–99)
GLUCOSE BLDC GLUCOMTR-MCNC: 173 MG/DL — HIGH (ref 70–99)

## 2019-09-28 RX ADMIN — Medication 1 MILLIGRAM(S): at 05:56

## 2019-09-28 RX ADMIN — MUPIROCIN 1 APPLICATION(S): 20 OINTMENT TOPICAL at 05:53

## 2019-09-28 RX ADMIN — Medication 1 MILLIGRAM(S): at 12:49

## 2019-09-28 RX ADMIN — SODIUM CHLORIDE 50 MILLILITER(S): 9 INJECTION, SOLUTION INTRAVENOUS at 17:51

## 2019-09-28 RX ADMIN — Medication 2.5 MILLIGRAM(S): at 12:49

## 2019-09-28 RX ADMIN — NYSTATIN CREAM 1 APPLICATION(S): 100000 CREAM TOPICAL at 05:53

## 2019-09-28 RX ADMIN — MUPIROCIN 1 APPLICATION(S): 20 OINTMENT TOPICAL at 18:57

## 2019-09-28 RX ADMIN — LEVETIRACETAM 420 MILLIGRAM(S): 250 TABLET, FILM COATED ORAL at 17:42

## 2019-09-28 RX ADMIN — PANTOPRAZOLE SODIUM 40 MILLIGRAM(S): 20 TABLET, DELAYED RELEASE ORAL at 12:43

## 2019-09-28 RX ADMIN — Medication 1: at 18:00

## 2019-09-28 RX ADMIN — SCOPALAMINE 1 PATCH: 1 PATCH, EXTENDED RELEASE TRANSDERMAL at 22:36

## 2019-09-28 RX ADMIN — Medication 2.5 MILLIGRAM(S): at 17:44

## 2019-09-28 RX ADMIN — LEVETIRACETAM 420 MILLIGRAM(S): 250 TABLET, FILM COATED ORAL at 05:52

## 2019-09-28 RX ADMIN — Medication 1: at 00:20

## 2019-09-28 RX ADMIN — Medication 1 MILLIGRAM(S): at 00:21

## 2019-09-28 RX ADMIN — Medication 2.5 MILLIGRAM(S): at 05:50

## 2019-09-28 RX ADMIN — Medication 1 DROP(S): at 05:57

## 2019-09-28 RX ADMIN — Medication 1 DROP(S): at 18:59

## 2019-09-28 RX ADMIN — Medication 1: at 07:38

## 2019-09-28 RX ADMIN — Medication 1 MILLIGRAM(S): at 17:43

## 2019-09-28 RX ADMIN — Medication 1: at 12:42

## 2019-09-28 RX ADMIN — Medication 2.5 MILLIGRAM(S): at 00:20

## 2019-09-28 RX ADMIN — NYSTATIN CREAM 1 APPLICATION(S): 100000 CREAM TOPICAL at 17:44

## 2019-09-28 NOTE — PROGRESS NOTE ADULT - SUBJECTIVE AND OBJECTIVE BOX
Patient seen and examined at bedside  s/p gastrostomy  No new acute events noted overnight from staff  Case discussed with medical team    HPI:  64 years old Female from [facility], DNR, with PMHx of Alzheimer's dementia , CKD 3, DM, HTN, non-verbal Parkinson's disease, s/p Tracheostomy presents to ED complaining of PEG tube dislodgment some time this morning. Pt states she pulled the tube this morning accidentally. Pt asks for transfer papers. Pt was sent here solely for the tube replacement. No other complaints. Pt allergic to angiotensin converting enzyme inhibitors. Source of history is nursing home chart. Patient is on 35 percent oxygen at nursing home.  Presenting Symptoms: dislodged PEG tube with closed opening.  GI dr michelle consult appreciated. Plan for PEG tube placement on Wednesday 9/25/2019. (22 Sep 2019 12:03)      PAST MEDICAL & SURGICAL HISTORY:  Alzheimer disease  Parkinson disease  Respiratory failure  Hypertension  Schizophrenia  Diabetic coma  Diabetes mellitus  S/P percutaneous endoscopic gastrostomy (PEG) tube placement  H/O tracheostomy      angiotensin converting enzyme inhibitors (Unknown)       MEDICATIONS  (STANDING):  artificial tears (preservative free) Ophthalmic Solution 1 Drop(s) Both EYES two times a day  chlorhexidine 2% Cloths 1 Application(s) Topical daily  dextrose 5%. 1000 milliLiter(s) (70 mL/Hr) IV Continuous <Continuous>  dextrose 5%. 1000 milliLiter(s) (70 mL/Hr) IV Continuous <Continuous>  insulin lispro (HumaLOG) corrective regimen sliding scale   SubCutaneous every 6 hours  levETIRAcetam  IVPB 500 milliGRAM(s) IV Intermittent every 12 hours  LORazepam   Injectable 1 milliGRAM(s) IV Push every 6 hours  metoprolol tartrate Injectable 2.5 milliGRAM(s) IV Push every 6 hours  mupirocin 2% Nasal 1 Application(s) Both Nostrils two times a day  nystatin Cream 1 Application(s) Topical two times a day  pantoprazole  Injectable 40 milliGRAM(s) IV Push daily  scopolamine   Patch 1 Patch Transdermal every 72 hours  sodium chloride 0.45%. 1000 milliLiter(s) (50 mL/Hr) IV Continuous <Continuous>    MEDICATIONS  (PRN):  LORazepam   Injectable 0.5 milliGRAM(s) IV Push every 8 hours PRN Agitation      REVIEW OF SYSTEMS: limited from pt 2/2 medical/mental state    Vital Signs Last 24 Hrs  T(C): 37 (28 Sep 2019 05:08), Max: 37 (28 Sep 2019 05:08)  T(F): 98.6 (28 Sep 2019 05:08), Max: 98.6 (28 Sep 2019 05:08)  HR: 76 (28 Sep 2019 07:33) (60 - 76)  BP: 166/67 (28 Sep 2019 07:33) (128/58 - 166/67)  BP(mean): 82 (28 Sep 2019 00:38) (82 - 99)  RR: 18 (28 Sep 2019 05:08) (13 - 24)  SpO2: 98% (28 Sep 2019 05:08) (94% - 100%)    PHYSICAL EXAMINATION:   Constitutional: stable appearance. NAD  HEENT: AT  Neck:  Supple  Respiratory: trach intact.  Adequate airflow b/l. Not using accessory muscles of respiration.  Cardiovascular:  S1 & S2 intact, sys murmur stable. no R/G, 2+ radial pulses b/l  Gastrointestinal: Soft, ND, normoactive b.s.,  Extremities: stable. WWP  Neurological: awake. nonverbal stable. stable mental status otherwise     Labs and imaging reviewed    RADIOLOGY & ADDITIONAL STUDIES:

## 2019-09-28 NOTE — PROGRESS NOTE ADULT - PROBLEM SELECTOR PLAN 1
s/p gastrostomy placement  All team members and consultants management and patient care greatly appreciated  resume PEG feeding after 24 hrs post gastrostomy and/or pending nutrition/consultants clearance to start feeding  case management for d/c planning

## 2019-09-28 NOTE — PROGRESS NOTE ADULT - SUBJECTIVE AND OBJECTIVE BOX
S/p open gastrostomy tube placement POD#1  Patient seen and examined at bedside.     Vital Signs Last 24 Hrs  T(F): 98.6 (09-28-19 @ 05:08), Max: 98.6 (09-28-19 @ 05:08)  HR: 76 (09-28-19 @ 07:33)  BP: 166/67 (09-28-19 @ 07:33)  RR: 18 (09-28-19 @ 05:08)  SpO2: 98% (09-28-19 @ 05:08)  POCT Blood Glucose.: 173 mg/dL (28 Sep 2019 12:01)    GENERAL: Alert  ABDOMEN: G-tube in place, dressing clean, dry and intact, no bleeding or active drainage from site, no erythema noted.     I&O's Detail    LABS:                        10.6   7.06  )-----------( 163      ( 27 Sep 2019 08:51 )             34.5     09-27    144  |  111<H>  |  20<H>  ----------------------------<  130<H>  4.5   |  27  |  1.04    Ca    9.2      27 Sep 2019 08:51

## 2019-09-29 LAB
ANION GAP SERPL CALC-SCNC: 4 MMOL/L — LOW (ref 5–17)
BUN SERPL-MCNC: 18 MG/DL — SIGNIFICANT CHANGE UP (ref 7–18)
CALCIUM SERPL-MCNC: 8.8 MG/DL — SIGNIFICANT CHANGE UP (ref 8.4–10.5)
CHLORIDE SERPL-SCNC: 110 MMOL/L — HIGH (ref 96–108)
CO2 SERPL-SCNC: 29 MMOL/L — SIGNIFICANT CHANGE UP (ref 22–31)
CREAT SERPL-MCNC: 0.73 MG/DL — SIGNIFICANT CHANGE UP (ref 0.5–1.3)
GLUCOSE BLDC GLUCOMTR-MCNC: 118 MG/DL — HIGH (ref 70–99)
GLUCOSE BLDC GLUCOMTR-MCNC: 200 MG/DL — HIGH (ref 70–99)
GLUCOSE BLDC GLUCOMTR-MCNC: 203 MG/DL — HIGH (ref 70–99)
GLUCOSE BLDC GLUCOMTR-MCNC: 226 MG/DL — HIGH (ref 70–99)
GLUCOSE BLDC GLUCOMTR-MCNC: 233 MG/DL — HIGH (ref 70–99)
GLUCOSE SERPL-MCNC: 202 MG/DL — HIGH (ref 70–99)
HCT VFR BLD CALC: 31.6 % — LOW (ref 34.5–45)
HGB BLD-MCNC: 9.7 G/DL — LOW (ref 11.5–15.5)
MCHC RBC-ENTMCNC: 25.4 PG — LOW (ref 27–34)
MCHC RBC-ENTMCNC: 30.7 GM/DL — LOW (ref 32–36)
MCV RBC AUTO: 82.7 FL — SIGNIFICANT CHANGE UP (ref 80–100)
NRBC # BLD: 0 /100 WBCS — SIGNIFICANT CHANGE UP (ref 0–0)
PLATELET # BLD AUTO: 144 K/UL — LOW (ref 150–400)
POTASSIUM SERPL-MCNC: 4.1 MMOL/L — SIGNIFICANT CHANGE UP (ref 3.5–5.3)
POTASSIUM SERPL-SCNC: 4.1 MMOL/L — SIGNIFICANT CHANGE UP (ref 3.5–5.3)
RBC # BLD: 3.82 M/UL — SIGNIFICANT CHANGE UP (ref 3.8–5.2)
RBC # FLD: 14.6 % — HIGH (ref 10.3–14.5)
SODIUM SERPL-SCNC: 143 MMOL/L — SIGNIFICANT CHANGE UP (ref 135–145)
WBC # BLD: 6.7 K/UL — SIGNIFICANT CHANGE UP (ref 3.8–10.5)
WBC # FLD AUTO: 6.7 K/UL — SIGNIFICANT CHANGE UP (ref 3.8–10.5)

## 2019-09-29 RX ORDER — METOPROLOL TARTRATE 50 MG
100 TABLET ORAL
Refills: 0 | Status: DISCONTINUED | OUTPATIENT
Start: 2019-09-29 | End: 2019-09-30

## 2019-09-29 RX ORDER — PANTOPRAZOLE SODIUM 20 MG/1
40 TABLET, DELAYED RELEASE ORAL DAILY
Refills: 0 | Status: DISCONTINUED | OUTPATIENT
Start: 2019-09-29 | End: 2019-09-30

## 2019-09-29 RX ORDER — LEVETIRACETAM 250 MG/1
500 TABLET, FILM COATED ORAL EVERY 12 HOURS
Refills: 0 | Status: DISCONTINUED | OUTPATIENT
Start: 2019-09-29 | End: 2019-09-30

## 2019-09-29 RX ORDER — NYSTATIN CREAM 100000 [USP'U]/G
1 CREAM TOPICAL
Refills: 0 | Status: DISCONTINUED | OUTPATIENT
Start: 2019-09-29 | End: 2019-09-30

## 2019-09-29 RX ADMIN — MUPIROCIN 1 APPLICATION(S): 20 OINTMENT TOPICAL at 17:46

## 2019-09-29 RX ADMIN — Medication 2: at 17:45

## 2019-09-29 RX ADMIN — Medication 0.5 MILLIGRAM(S): at 00:58

## 2019-09-29 RX ADMIN — LEVETIRACETAM 500 MILLIGRAM(S): 250 TABLET, FILM COATED ORAL at 06:22

## 2019-09-29 RX ADMIN — MUPIROCIN 1 APPLICATION(S): 20 OINTMENT TOPICAL at 06:22

## 2019-09-29 RX ADMIN — SCOPALAMINE 1 PATCH: 1 PATCH, EXTENDED RELEASE TRANSDERMAL at 23:24

## 2019-09-29 RX ADMIN — Medication 1 MILLIGRAM(S): at 17:45

## 2019-09-29 RX ADMIN — NYSTATIN CREAM 1 APPLICATION(S): 100000 CREAM TOPICAL at 17:46

## 2019-09-29 RX ADMIN — Medication 100 MILLIGRAM(S): at 17:49

## 2019-09-29 RX ADMIN — SCOPALAMINE 1 PATCH: 1 PATCH, EXTENDED RELEASE TRANSDERMAL at 17:49

## 2019-09-29 RX ADMIN — Medication 100 MILLIGRAM(S): at 06:22

## 2019-09-29 RX ADMIN — Medication 1: at 12:44

## 2019-09-29 RX ADMIN — LEVETIRACETAM 500 MILLIGRAM(S): 250 TABLET, FILM COATED ORAL at 17:46

## 2019-09-29 RX ADMIN — SCOPALAMINE 1 PATCH: 1 PATCH, EXTENDED RELEASE TRANSDERMAL at 08:26

## 2019-09-29 RX ADMIN — Medication 2: at 00:55

## 2019-09-29 RX ADMIN — PANTOPRAZOLE SODIUM 40 MILLIGRAM(S): 20 TABLET, DELAYED RELEASE ORAL at 15:36

## 2019-09-29 RX ADMIN — Medication 1 DROP(S): at 17:50

## 2019-09-29 RX ADMIN — Medication 1 MILLIGRAM(S): at 06:40

## 2019-09-29 RX ADMIN — Medication 1 DROP(S): at 06:45

## 2019-09-29 RX ADMIN — Medication 1 MILLIGRAM(S): at 15:35

## 2019-09-29 NOTE — PROGRESS NOTE ADULT - ATTENDING COMMENTS
All team members and consultants management greatly appreciated!
All team members and consultants management greatly appreciated!
I agree with the above information  pt seen and examined  peg placement scheduled for Wednesday 9/25/19  ngt placement for feeds/rx  chronic respiratory failure with tracheostomy - c/w trach care  protein calorie malnutrition - low albumin and diffuse muscle wasting - supplement diet  all team members and consultants management appreciated   debility and functional quadraplegia- assist with adl's and iadl's. nursing care with repositioning
All team members and consultants management greatly appreciated!
I agree with the above information  planned surgical intervention tmrw for peg placement  pt is medically cleared for surgery   pt is moderate risk for post op cardiac complications for non cardiac surgery   All team members and consultants management greatly appreciated!
I agree with the above information  pt seen and examined  peg placement scheduled for tmrw   unable to place ngt yesterday s/p multiple attempts  chronic respiratory failure with tracheostomy - c/w trach care, pulm management appreciated  protein calorie malnutrition - low albumin and diffuse muscle wasting - supplement diet  all team members and consultants management appreciated   debility and functional quadriplegia- assist with adl's and iadl's. nursing care with repositioning

## 2019-09-29 NOTE — PROGRESS NOTE ADULT - PROBLEM SELECTOR PROBLEM 9
Discharge planning issues
Discharge planning issues
Goals of care, counseling/discussion
Discharge planning issues
Goals of care, counseling/discussion
Discharge planning issues

## 2019-09-29 NOTE — PROGRESS NOTE ADULT - SUBJECTIVE AND OBJECTIVE BOX
Patient seen and examined at bedside  tolerating peg feeds  No new acute events noted overnight from staff  Case discussed with medical team    HPI:  64 years old Female from [facility], DNR, with PMHx of Alzheimer's dementia , CKD 3, DM, HTN, non-verbal Parkinson's disease, s/p Tracheostomy presents to ED complaining of PEG tube dislodgment some time this morning. Pt states she pulled the tube this morning accidentally. Pt asks for transfer papers. Pt was sent here solely for the tube replacement. No other complaints. Pt allergic to angiotensin converting enzyme inhibitors. Source of history is nursing home chart. Patient is on 35 percent oxygen at nursing home.  Presenting Symptoms: dislodged PEG tube with closed opening.  GI dr michelle consult appreciated. Plan for PEG tube placement on Wednesday 9/25/2019. (22 Sep 2019 12:03)      PAST MEDICAL & SURGICAL HISTORY:  Alzheimer disease  Parkinson disease  Respiratory failure  Hypertension  Schizophrenia  Diabetic coma  Diabetes mellitus  S/P percutaneous endoscopic gastrostomy (PEG) tube placement  H/O tracheostomy      angiotensin converting enzyme inhibitors (Unknown)       MEDICATIONS  (STANDING):  artificial tears (preservative free) Ophthalmic Solution 1 Drop(s) Both EYES two times a day  chlorhexidine 2% Cloths 1 Application(s) Topical daily  dextrose 5%. 1000 milliLiter(s) (70 mL/Hr) IV Continuous <Continuous>  dextrose 5%. 1000 milliLiter(s) (70 mL/Hr) IV Continuous <Continuous>  insulin lispro (HumaLOG) corrective regimen sliding scale   SubCutaneous every 6 hours  levETIRAcetam  IVPB 500 milliGRAM(s) IV Intermittent every 12 hours  LORazepam   Injectable 1 milliGRAM(s) IV Push every 6 hours  metoprolol tartrate Injectable 2.5 milliGRAM(s) IV Push every 6 hours  mupirocin 2% Nasal 1 Application(s) Both Nostrils two times a day  nystatin Cream 1 Application(s) Topical two times a day  pantoprazole  Injectable 40 milliGRAM(s) IV Push daily  scopolamine   Patch 1 Patch Transdermal every 72 hours  sodium chloride 0.45%. 1000 milliLiter(s) (50 mL/Hr) IV Continuous <Continuous>    MEDICATIONS  (PRN):  LORazepam   Injectable 0.5 milliGRAM(s) IV Push every 8 hours PRN Agitation      REVIEW OF SYSTEMS: limited from pt 2/2 medical/mental state    Vital Signs Last 24 Hrs  T(C): 36.5 (29 Sep 2019 04:40), Max: 36.7 (28 Sep 2019 14:15)  T(F): 97.7 (29 Sep 2019 04:40), Max: 98 (28 Sep 2019 14:15)  HR: 63 (29 Sep 2019 06:27) (63 - 81)  BP: 134/61 (29 Sep 2019 06:27) (122/43 - 142/84)  BP(mean): --  RR: 20 (29 Sep 2019 06:10) (18 - 22)  SpO2: 99% (29 Sep 2019 06:10) (98% - 100%)    PHYSICAL EXAMINATION:   Constitutional: stable appearance. NAD  HEENT: AT  Neck:  trach intact. Supple  Respiratory: trach intact.  Adequate airflow b/l. Not using accessory muscles of respiration.  Cardiovascular:  S1 & S2 intact, sys murmur stable. no R/G, 2+ radial pulses b/l  Gastrointestinal: Soft, ND, normoactive b.s.,  Extremities: stable. WWP  Neurological: awake. nonverbal stable. stable mental status otherwise     Labs and imaging reviewed    RADIOLOGY & ADDITIONAL STUDIES:

## 2019-09-29 NOTE — PROGRESS NOTE ADULT - PROBLEM SELECTOR PLAN 9
Dispo to [facility] when cleared
Dispo to [facility] when cleared
DNR, MOLST in the chart
Dispo to [facility] when cleared
Pt will be d/boaz back to LTC once medically stable
Dispo to [facility] when cleared
Dispo to [facility]
DNR per [facility] in the chart from [facility]

## 2019-09-29 NOTE — PROGRESS NOTE ADULT - PROBLEM SELECTOR PLAN 1
peg feeding started yesterday and pt is tolerating, c/w peg feeding to goal rate  -> Plan for discharge tmrw 9/30/19 back to facility   All team members and consultants management and patient care appreciated  case management for d/c

## 2019-09-29 NOTE — PROGRESS NOTE ADULT - PROBLEM SELECTOR PROBLEM 8
Prophylactic measure
Goals of care, counseling/discussion
Prophylactic measure

## 2019-09-29 NOTE — PROGRESS NOTE ADULT - PROBLEM SELECTOR PLAN 8
Heparin SC  PPI
heparin for  DVT ppx  PPI for GI ppx
DNR per [facility] in the chart from [facility]
Heparin SC  PPI
heparin for  DVT ppx  PPI for GI ppx
Heparin SC  PPI

## 2019-09-30 ENCOUNTER — TRANSCRIPTION ENCOUNTER (OUTPATIENT)
Age: 64
End: 2019-09-30

## 2019-09-30 VITALS
TEMPERATURE: 99 F | SYSTOLIC BLOOD PRESSURE: 155 MMHG | DIASTOLIC BLOOD PRESSURE: 64 MMHG | OXYGEN SATURATION: 100 % | HEART RATE: 53 BPM | RESPIRATION RATE: 15 BRPM

## 2019-09-30 DIAGNOSIS — J96.10 CHRONIC RESPIRATORY FAILURE, UNSPECIFIED WHETHER WITH HYPOXIA OR HYPERCAPNIA: ICD-10-CM

## 2019-09-30 LAB
ANION GAP SERPL CALC-SCNC: 5 MMOL/L — SIGNIFICANT CHANGE UP (ref 5–17)
BUN SERPL-MCNC: 19 MG/DL — HIGH (ref 7–18)
CALCIUM SERPL-MCNC: 9.3 MG/DL — SIGNIFICANT CHANGE UP (ref 8.4–10.5)
CHLORIDE SERPL-SCNC: 110 MMOL/L — HIGH (ref 96–108)
CO2 SERPL-SCNC: 31 MMOL/L — SIGNIFICANT CHANGE UP (ref 22–31)
CREAT SERPL-MCNC: 0.82 MG/DL — SIGNIFICANT CHANGE UP (ref 0.5–1.3)
GLUCOSE BLDC GLUCOMTR-MCNC: 113 MG/DL — HIGH (ref 70–99)
GLUCOSE BLDC GLUCOMTR-MCNC: 203 MG/DL — HIGH (ref 70–99)
GLUCOSE BLDC GLUCOMTR-MCNC: 221 MG/DL — HIGH (ref 70–99)
GLUCOSE SERPL-MCNC: 102 MG/DL — HIGH (ref 70–99)
HCT VFR BLD CALC: 33.5 % — LOW (ref 34.5–45)
HGB BLD-MCNC: 9.9 G/DL — LOW (ref 11.5–15.5)
MCHC RBC-ENTMCNC: 24.8 PG — LOW (ref 27–34)
MCHC RBC-ENTMCNC: 29.6 GM/DL — LOW (ref 32–36)
MCV RBC AUTO: 84 FL — SIGNIFICANT CHANGE UP (ref 80–100)
NRBC # BLD: 0 /100 WBCS — SIGNIFICANT CHANGE UP (ref 0–0)
PLATELET # BLD AUTO: 143 K/UL — LOW (ref 150–400)
POTASSIUM SERPL-MCNC: 3.9 MMOL/L — SIGNIFICANT CHANGE UP (ref 3.5–5.3)
POTASSIUM SERPL-SCNC: 3.9 MMOL/L — SIGNIFICANT CHANGE UP (ref 3.5–5.3)
RBC # BLD: 3.99 M/UL — SIGNIFICANT CHANGE UP (ref 3.8–5.2)
RBC # FLD: 14.6 % — HIGH (ref 10.3–14.5)
SODIUM SERPL-SCNC: 146 MMOL/L — HIGH (ref 135–145)
WBC # BLD: 6.98 K/UL — SIGNIFICANT CHANGE UP (ref 3.8–10.5)
WBC # FLD AUTO: 6.98 K/UL — SIGNIFICANT CHANGE UP (ref 3.8–10.5)

## 2019-09-30 PROCEDURE — 99285 EMERGENCY DEPT VISIT HI MDM: CPT | Mod: 25

## 2019-09-30 PROCEDURE — 85027 COMPLETE CBC AUTOMATED: CPT

## 2019-09-30 PROCEDURE — 36415 COLL VENOUS BLD VENIPUNCTURE: CPT

## 2019-09-30 PROCEDURE — 86803 HEPATITIS C AB TEST: CPT

## 2019-09-30 PROCEDURE — 84100 ASSAY OF PHOSPHORUS: CPT

## 2019-09-30 PROCEDURE — 85730 THROMBOPLASTIN TIME PARTIAL: CPT

## 2019-09-30 PROCEDURE — 80076 HEPATIC FUNCTION PANEL: CPT

## 2019-09-30 PROCEDURE — 82962 GLUCOSE BLOOD TEST: CPT

## 2019-09-30 PROCEDURE — 83036 HEMOGLOBIN GLYCOSYLATED A1C: CPT

## 2019-09-30 PROCEDURE — 83735 ASSAY OF MAGNESIUM: CPT

## 2019-09-30 PROCEDURE — 82607 VITAMIN B-12: CPT

## 2019-09-30 PROCEDURE — 80048 BASIC METABOLIC PNL TOTAL CA: CPT

## 2019-09-30 PROCEDURE — 84443 ASSAY THYROID STIM HORMONE: CPT

## 2019-09-30 PROCEDURE — 82306 VITAMIN D 25 HYDROXY: CPT

## 2019-09-30 PROCEDURE — 80061 LIPID PANEL: CPT

## 2019-09-30 PROCEDURE — 86901 BLOOD TYPING SEROLOGIC RH(D): CPT

## 2019-09-30 PROCEDURE — 71045 X-RAY EXAM CHEST 1 VIEW: CPT

## 2019-09-30 PROCEDURE — 86850 RBC ANTIBODY SCREEN: CPT

## 2019-09-30 PROCEDURE — 85610 PROTHROMBIN TIME: CPT

## 2019-09-30 PROCEDURE — 86900 BLOOD TYPING SEROLOGIC ABO: CPT

## 2019-09-30 PROCEDURE — C1729: CPT

## 2019-09-30 RX ORDER — HEPARIN SODIUM 5000 [USP'U]/ML
5000 INJECTION INTRAVENOUS; SUBCUTANEOUS
Qty: 0 | Refills: 0 | DISCHARGE
Start: 2019-09-30

## 2019-09-30 RX ORDER — SCOPALAMINE 1 MG/3D
0 PATCH, EXTENDED RELEASE TRANSDERMAL
Qty: 0 | Refills: 0 | DISCHARGE
Start: 2019-09-30

## 2019-09-30 RX ORDER — LEVETIRACETAM 250 MG/1
5 TABLET, FILM COATED ORAL
Qty: 0 | Refills: 0 | DISCHARGE
Start: 2019-09-30

## 2019-09-30 RX ORDER — AMLODIPINE BESYLATE 2.5 MG/1
1 TABLET ORAL
Qty: 0 | Refills: 0 | DISCHARGE

## 2019-09-30 RX ORDER — INSULIN LISPRO 100/ML
1 VIAL (ML) SUBCUTANEOUS
Qty: 1 | Refills: 0
Start: 2019-09-30

## 2019-09-30 RX ADMIN — Medication 1 MILLIGRAM(S): at 11:25

## 2019-09-30 RX ADMIN — Medication 1 DROP(S): at 06:36

## 2019-09-30 RX ADMIN — NYSTATIN CREAM 1 APPLICATION(S): 100000 CREAM TOPICAL at 06:35

## 2019-09-30 RX ADMIN — Medication 1 MILLIGRAM(S): at 00:09

## 2019-09-30 RX ADMIN — Medication 2: at 15:43

## 2019-09-30 RX ADMIN — Medication 1 MILLIGRAM(S): at 06:35

## 2019-09-30 RX ADMIN — SCOPALAMINE 1 PATCH: 1 PATCH, EXTENDED RELEASE TRANSDERMAL at 15:10

## 2019-09-30 RX ADMIN — PANTOPRAZOLE SODIUM 40 MILLIGRAM(S): 20 TABLET, DELAYED RELEASE ORAL at 11:25

## 2019-09-30 RX ADMIN — LEVETIRACETAM 500 MILLIGRAM(S): 250 TABLET, FILM COATED ORAL at 06:35

## 2019-09-30 RX ADMIN — Medication 2: at 00:08

## 2019-09-30 RX ADMIN — Medication 100 MILLIGRAM(S): at 06:35

## 2019-09-30 RX ADMIN — MUPIROCIN 1 APPLICATION(S): 20 OINTMENT TOPICAL at 06:35

## 2019-09-30 NOTE — PROGRESS NOTE ADULT - PROBLEM SELECTOR PROBLEM 7
Hypertension
Diabetes mellitus
Hypertension
Hypertension
Prophylactic measure
Prophylactic measure
Hypertension
Diabetes mellitus
Hypertension

## 2019-09-30 NOTE — PROGRESS NOTE ADULT - PROBLEM SELECTOR PLAN 5
[facility]
antihypertensive rx
A1C 8.2  c/w Monitoring BG q6h, and correct with HISS
c/w IV keppra  seizure precaution
Continue sliding scale insulin coverage.  Monitor glucose.
[facility]
IV lopressor 2.5 mg q 6 hourly with parameters  monitor blood pressure and HR
[facility]
A1C 8.2  c/w Monitoring BG q6h, and correct with HISS

## 2019-09-30 NOTE — PROGRESS NOTE ADULT - PROBLEM SELECTOR PLAN 4
Supportive treatment.
c/w  35% FiO2 per TC  c/w Scopalamine  Suction prn
switched to IV lopressor 2.5 mg q 6 hourly with prameters  monitor blood pressure.
Continue Keppra.  Maintain seizure precautions.
trach care  Suction prn  nursing care
Supportive treatment.  Mitts applied to protect IV  and maintain Oxygen
c/w  35% FiO2 per TC  Suction prn  nursing care
c/w  35% FiO2 per TC  c/w Scopalamine  Suction prn
c/w  35% FiO2 per TC  Suction prn  nursing care

## 2019-09-30 NOTE — PROGRESS NOTE ADULT - PROBLEM SELECTOR PLAN 6
c/w Keppra  seizure precautions
Keppra  seizure precaution
Pt was on lantus 40 units at nursing home along with Insulin HSS.Lantus on hold since pti s NPO.   C/w HISS   F/u [id number].
c/w Keppra
Continue Metoprolol.
c/w Keppra  seizure precautions
IV Keppra  seizure precaution
c/w Keppra  seizure precautions
c/w Keppra

## 2019-09-30 NOTE — PROGRESS NOTE ADULT - PROBLEM SELECTOR PROBLEM 3
Diarrhea
Parkinson disease
Alzheimer disease
Chronic neuromuscular respiratory failure
Parkinson disease
Parkinson disease
Diarrhea
Parkinson disease
Parkinson disease

## 2019-09-30 NOTE — PROGRESS NOTE ADULT - PROBLEM SELECTOR PROBLEM 2
Alzheimer's dementia with behavioral disturbance, unspecified timing of dementia onset
Tracheostomy care
Alzheimer's dementia with behavioral disturbance, unspecified timing of dementia onset
Tracheostomy care
Alzheimer's dementia with behavioral disturbance, unspecified timing of dementia onset
Alzheimer's dementia with behavioral disturbance, unspecified timing of dementia onset
Tracheostomy care

## 2019-09-30 NOTE — CHART NOTE - NSCHARTNOTEFT_GEN_A_CORE
Assessment:   Patient reports [ ] nausea  [ ] vomiting [ ] diarrhea [ ] constipation  [ ]chewing problems [ ] swallowing issues  [ ] other:  Pt visited. Pt with Tracheostomy. Pt w Bilateral Mittens. TF Glucerna 1.5 infusing @ 30 ml/ hr. S/P open gastrostomy. Nutrition consult noted.  TF  tolerated.      PO intake:   Source for PO intake [ ] Patient/family [ ] chart [ ] staff PEG    Current Weight: Weight (kg): 63.5 (09-27 @ 14:38)  % Weight Change bed scale With out  lb with out SCD device     Pertinent Medications: MEDICATIONS  (STANDING):  artificial tears (preservative free) Ophthalmic Solution 1 Drop(s) Both EYES two times a day  insulin lispro (HumaLOG) corrective regimen sliding scale   SubCutaneous every 6 hours  levETIRAcetam  Solution 500 milliGRAM(s) Enteral Tube every 12 hours  LORazepam     Tablet 1 milliGRAM(s) Oral every 6 hours  metoprolol tartrate 100 milliGRAM(s) Enteral Tube two times a day  mupirocin 2% Nasal 1 Application(s) Both Nostrils two times a day  nystatin Powder 1 Application(s) Topical two times a day  pantoprazole   Suspension 40 milliGRAM(s) Enteral Tube daily  scopolamine   Patch 1 Patch Transdermal every 72 hours    MEDICATIONS  (PRN):  LORazepam   Injectable 0.5 milliGRAM(s) IntraMuscular every 8 hours PRN Agitation    Pertinent Labs:  09-30 Na146 mmol/L<H> Glu 102 mg/dL<H> K+ 3.9 mmol/L Cr  0.82 mg/dL BUN 19 mg/dL<H> 09-23 [id number] 8.2 %<H> 09-23 Chol 150 mg/dL LDL 80 mg/dL HDL 50 mg/dL Trig 102 mg/dL      Skin:     Estimated Needs:   [ ] no change since previous assessment  [ ] recalculated:       Previous Nutrition Diagnosis:   [x ] Inadequate Energy Intake [ ]Inadequate Oral Intake [ ] Excessive Energy Intake   [ ] Underweight [ ] Increased Nutrient Needs [ ] Overweight/Obesity   [ ] Altered GI Function [ ] Unintended Weight Loss [ ] Food & Nutrition Related Knowledge Deficit [ ] Malnutrition     Nutrition Diagnosis is [ ] ongoing  [x ] resolved [ ] not applicable     New Nutrition Diagnosis: [ ] not applicable  [ ] Inadequate Energy Intake [ ]Inadequate Oral Intake [ ] Excessive Energy Intake   [ ] Underweight [ ] Increased Nutrient Needs [ ] Overweight/Obesity   [ ] Altered GI Function [ ] Unintended Weight Loss [ ] Food & Nutrition Related Knowledge Deficit [ ] Malnutrition   (x) Swallowing Difficulty     Related to:  Alzheimer's     As evidenced by: PEG    Interventions:   Recommend  [ ] Change Diet To:  [ ] Nutrition Supplement  [x ] Nutrition Support Glucerna 1.5 Initial Goal rate @  50 ml/hr x 16 hr as tolerated to provide 800 ml, 1200 calories, protein 66 gms, free water 607 ml/day  [ ] Other:     Monitoring and Evaluation:   [ ] PO intake [ ] Tolerance to diet prescription [ ] weights [ x] follow up per protocol  [ ] other:

## 2019-09-30 NOTE — PROGRESS NOTE ADULT - PROBLEM SELECTOR PLAN 1
S/P Peg replacement.  Tolerating Peg feeds well.  Keep head of bed elevated during feeds, strict aspiration precautions.

## 2019-09-30 NOTE — PROGRESS NOTE ADULT - SUBJECTIVE AND OBJECTIVE BOX
NP Note discussed with  Primary Attending    Patient is a 64y old  Female who presents with a chief complaint of PEG Tube Dislodgement (29 Sep 2019 11:15)      INTERVAL HPI/OVERNIGHT EVENTS: no new complaints    MEDICATIONS  (STANDING):  artificial tears (preservative free) Ophthalmic Solution 1 Drop(s) Both EYES two times a day  insulin lispro (HumaLOG) corrective regimen sliding scale   SubCutaneous every 6 hours  levETIRAcetam  Solution 500 milliGRAM(s) Enteral Tube every 12 hours  LORazepam     Tablet 1 milliGRAM(s) Oral every 6 hours  metoprolol tartrate 100 milliGRAM(s) Enteral Tube two times a day  mupirocin 2% Nasal 1 Application(s) Both Nostrils two times a day  nystatin Powder 1 Application(s) Topical two times a day  pantoprazole   Suspension 40 milliGRAM(s) Enteral Tube daily  scopolamine   Patch 1 Patch Transdermal every 72 hours    MEDICATIONS  (PRN):  LORazepam   Injectable 0.5 milliGRAM(s) IntraMuscular every 8 hours PRN Agitation      __________________________________________________  REVIEW OF SYSTEMS:  Unable to obtain      Vital Signs Last 24 Hrs  T(C): 36.7 (30 Sep 2019 05:38), Max: 37.5 (29 Sep 2019 14:50)  T(F): 98.1 (30 Sep 2019 05:38), Max: 99.5 (29 Sep 2019 14:50)  HR: 115 (30 Sep 2019 05:38) (61 - 115)  BP: 121/67 (30 Sep 2019 04:31) (121/67 - 138/65)  BP(mean): --  RR: 18 (30 Sep 2019 05:38) (18 - 19)  SpO2: 98% (30 Sep 2019 05:38) (98% - 100%)    ________________________________________________  PHYSICAL EXAM:  GENERAL: NAD  HEENT: Normocephalic;  conjunctivae and sclerae clear; moist mucous membranes;   NECK : supple, tracheostomy intact  CHEST/LUNG: Diminished breath sounds bilateral bases, few scattered rhonchi  HEART: S1 S2  regular; no murmurs, gallops or rubs  ABDOMEN: Soft, Nontender, Nondistended; Bowel sounds present; Peg intact  EXTREMITIES: no cyanosis; no edema; Partially contracted.  NERVOUS SYSTEM:  Awake and alert; Not orientated. Moving all extremities. Extremities partially contracted    _________________________________________________  LABS:                        9.9    6.98  )-----------( 143      ( 30 Sep 2019 06:37 )             33.5     09-30    146<H>  |  110<H>  |  19<H>  ----------------------------<  102<H>  3.9   |  31  |  0.82    Ca    9.3      30 Sep 2019 06:37          CAPILLARY BLOOD GLUCOSE      POCT Blood Glucose.: 203 mg/dL (30 Sep 2019 11:51)  POCT Blood Glucose.: 113 mg/dL (30 Sep 2019 05:38)  POCT Blood Glucose.: 226 mg/dL (29 Sep 2019 23:34)  POCT Blood Glucose.: 233 mg/dL (29 Sep 2019 17:19)        RADIOLOGY & ADDITIONAL TESTS:    Imaging Personally Reviewed:  YES  < from: Xray Chest 1 View- PORTABLE-Routine (09.23.19 @ 17:29) >  Markedly elevated right hemidiaphragm again noted. Heart does not suggest   enlargement.    Tracheostomy again seen.    There may be mild atelectasis developing off right hilum compared to May   4 of this year.    On present examination NG tube is noted but the tip ends in mid thoracic   level and the NG tube may coil in the throat.    IMPRESSION: NG tube not in place as above.    < end of copied text >      Consultant(s) Notes Reviewed:   YES    Care Discussed with Consultants :     Plan of care was discussed with patient and /or primary care giver; all questions and concerns were addressed and care was aligned with patient's wishes.

## 2019-09-30 NOTE — PROGRESS NOTE ADULT - ASSESSMENT
64 years old Female from [facility], DNR, with PMHx of Alzheimer's dementia , CKD 3, DM, HTN, non-verbal Parkinson's disease, s/p Tracheostomy presents to ED complaining of PEG tube dislodgment
64 years old Female from [facility], DNR, with PMHx of Alzheimer's dementia , CKD 3, DM, HTN, non-verbal Parkinson's disease, s/p Tracheostomy presents to ED complaining of PEG tube dislodgment   S/P OR for Peg replacement after unsuccessful bedside and IR attempts.  (/30/19  Tolerating Peg feeds well.
64 y.o. F with dislodged PEG tube    -Plan for open gastrostomy tube placement 9/27/19  -Preop tonight, medical optimization  -continue medical mng per primary
64 year old female s/p open gastrostomy tube placement POD#1    - may begin tube feeds   - no further surgical intervention at this time  - please reconsult as needed
64 years old Female from [facility], DNR, with PMHx of Alzheimer's dementia , CKD 3, DM, HTN, non-verbal Parkinson's disease, s/p Tracheostomy presents to ED complaining of PEG tube dislodgment
64F w/dysphagia.  -pt needs a feeding tube  -given esophageal stricture it is impossible to place the PEG endoscopically  -consult IR for percutaneous PEG tube placement  -further care per primary team  -please reconsult prn
Awaiting Peg insertion tomorrow
64 years old Female from [facility], DNR, with PMHx of Alzheimer's dementia , CKD 3, DM, HTN, non-verbal Parkinson's disease, s/p Tracheostomy presents to ED complaining of PEG tube dislodgment
64 years old Female from [facility], DNR, with PMHx of Alzheimer's dementia , CKD 3, DM, HTN, non-verbal Parkinson's disease, s/p Tracheostomy presents to ED complaining of PEG tube dislodgment    -peg tube dislodgement - unsuccessful attempt at peg placement 2/2 pt's anatomy. IR/Surgery consult for PEG placement. All consultants and all team members management appreciated. Previously unable to place ngt despite numerous attempts. Adjust Rx to IV as possible.   -chronic respiratory failure with tracheostomy - c/w trach care, pulm management appreciated  -protein calorie malnutrition - low albumin and diffuse muscle wasting - supplement diet  -debility and functional quadriplegia- assist with adl's and iadl's. nursing care with repositioning    all team members and consultants management appreciated

## 2019-09-30 NOTE — PROGRESS NOTE ADULT - PROBLEM SELECTOR PLAN 7
c/w Metoprolol   monitor vitals
dm rx
c/w Metoprolol  2.5mg IVP q6h while NPO
c/w heparin as DVT ppx  PPI for GI ppx
DVT and GI prophylaxis.  Discharge planning underway.  Medically cleared for discharge to NH.
c/w Metoprolol   monitor vitals
Pt was on Lantus 40 units at nursing home along with Insulin HSS.   Lantus on hold   C/w HISS   F/u hba1c.
c/w Metoprolol   monitor vitals
c/w Metoprolol  2.5mg IVP q6h while NPO

## 2019-09-30 NOTE — PROGRESS NOTE ADULT - PROBLEM SELECTOR PLAN 3
no additional events noted
Continue trach collar.  Monitor oxygen saturation.  Suction as needed.  Continue scopolamine patch to help dry secretions.
Supportive measures  Assist with all ADL's  Fall precautions  Aspiration precautions
supportive treatment.
Supportive measures  Assist with all ADL's/IADL's  Fall precautions  Aspiration precautions
Pt had 1 loose stool today but was unable to be sent  Contact isolation ordered
Supportive measures  Assist with all ADL's/IADL's  Fall precautions  Aspiration precautions
Supportive measures  Assist with all ADL's  Fall precautions  Aspiration precautions
Supportive measures  Assist with all ADL's/IADL's  Fall precautions  Aspiration precautions

## 2019-09-30 NOTE — PROGRESS NOTE ADULT - PROBLEM SELECTOR PROBLEM 5
Hypertension
Diabetes mellitus
Diabetes mellitus
Seizure
Diabetes mellitus
Diabetes mellitus
Hypertension
Diabetes mellitus
Diabetes mellitus

## 2019-09-30 NOTE — PROGRESS NOTE ADULT - REASON FOR ADMISSION
PEG Tube Dislodgement

## 2019-09-30 NOTE — PROGRESS NOTE ADULT - PROBLEM SELECTOR PROBLEM 1
PEG tube malfunction

## 2019-09-30 NOTE — PROGRESS NOTE ADULT - PROVIDER SPECIALTY LIST ADULT
Gastroenterology
Internal Medicine
Intervent Radiology
Pulmonology
Surgery
Surgery
Internal Medicine

## 2019-10-26 ENCOUNTER — INPATIENT (INPATIENT)
Facility: HOSPITAL | Age: 64
LOS: 9 days | Discharge: NURSING, REHAB OR LONG-TERM CARE | DRG: 871 | End: 2019-11-05
Attending: SURGERY | Admitting: SURGERY
Payer: MEDICAID

## 2019-10-26 VITALS
DIASTOLIC BLOOD PRESSURE: 70 MMHG | RESPIRATION RATE: 24 BRPM | TEMPERATURE: 98 F | HEART RATE: 102 BPM | SYSTOLIC BLOOD PRESSURE: 96 MMHG | OXYGEN SATURATION: 100 %

## 2019-10-26 DIAGNOSIS — Z98.89 OTHER SPECIFIED POSTPROCEDURAL STATES: Chronic | ICD-10-CM

## 2019-10-26 DIAGNOSIS — Z93.1 GASTROSTOMY STATUS: Chronic | ICD-10-CM

## 2019-10-26 LAB
ALBUMIN SERPL ELPH-MCNC: 2.9 G/DL — LOW (ref 3.5–5)
ALP SERPL-CCNC: 86 U/L — SIGNIFICANT CHANGE UP (ref 40–120)
ALT FLD-CCNC: 36 U/L DA — SIGNIFICANT CHANGE UP (ref 10–60)
ANION GAP SERPL CALC-SCNC: 8 MMOL/L — SIGNIFICANT CHANGE UP (ref 5–17)
ANISOCYTOSIS BLD QL: SIGNIFICANT CHANGE UP
APPEARANCE UR: ABNORMAL
APTT BLD: 30.8 SEC — SIGNIFICANT CHANGE UP (ref 27.5–36.3)
AST SERPL-CCNC: 20 U/L — SIGNIFICANT CHANGE UP (ref 10–40)
BACTERIA # UR AUTO: ABNORMAL /HPF
BASE EXCESS BLDA CALC-SCNC: 2.6 MMOL/L — HIGH (ref -2–2)
BASO STIPL BLD QL SMEAR: PRESENT — SIGNIFICANT CHANGE UP
BASOPHILS # BLD AUTO: 0 K/UL — SIGNIFICANT CHANGE UP (ref 0–0.2)
BASOPHILS NFR BLD AUTO: 0 % — SIGNIFICANT CHANGE UP (ref 0–2)
BILIRUB SERPL-MCNC: 0.5 MG/DL — SIGNIFICANT CHANGE UP (ref 0.2–1.2)
BILIRUB UR-MCNC: NEGATIVE — SIGNIFICANT CHANGE UP
BLOOD GAS COMMENTS ARTERIAL: SIGNIFICANT CHANGE UP
BUN SERPL-MCNC: 111 MG/DL — HIGH (ref 7–18)
CALCIUM SERPL-MCNC: 9.3 MG/DL — SIGNIFICANT CHANGE UP (ref 8.4–10.5)
CHLORIDE SERPL-SCNC: 109 MMOL/L — HIGH (ref 96–108)
CO2 SERPL-SCNC: 31 MMOL/L — SIGNIFICANT CHANGE UP (ref 22–31)
COLOR SPEC: YELLOW — SIGNIFICANT CHANGE UP
CREAT SERPL-MCNC: 3.81 MG/DL — HIGH (ref 0.5–1.3)
DIFF PNL FLD: ABNORMAL
ELLIPTOCYTES BLD QL SMEAR: SLIGHT — SIGNIFICANT CHANGE UP
EOSINOPHIL # BLD AUTO: 0 K/UL — SIGNIFICANT CHANGE UP (ref 0–0.5)
EOSINOPHIL NFR BLD AUTO: 0 % — SIGNIFICANT CHANGE UP (ref 0–6)
EPI CELLS # UR: SIGNIFICANT CHANGE UP /HPF
GLUCOSE SERPL-MCNC: 890 MG/DL — CRITICAL HIGH (ref 70–99)
GLUCOSE UR QL: 1000 MG/DL
HCO3 BLDA-SCNC: 28 MMOL/L — HIGH (ref 23–27)
HCT VFR BLD CALC: 45.1 % — HIGH (ref 34.5–45)
HGB BLD-MCNC: 13 G/DL — SIGNIFICANT CHANGE UP (ref 11.5–15.5)
HOROWITZ INDEX BLDA+IHG-RTO: 35 — SIGNIFICANT CHANGE UP
INR BLD: 0.98 RATIO — SIGNIFICANT CHANGE UP (ref 0.88–1.16)
KETONES UR-MCNC: ABNORMAL
LACTATE SERPL-SCNC: 3.7 MMOL/L — HIGH (ref 0.7–2)
LEUKOCYTE ESTERASE UR-ACNC: ABNORMAL
LYMPHOCYTES # BLD AUTO: 17 % — SIGNIFICANT CHANGE UP (ref 13–44)
LYMPHOCYTES # BLD AUTO: 2.27 K/UL — SIGNIFICANT CHANGE UP (ref 1–3.3)
MANUAL SMEAR VERIFICATION: SIGNIFICANT CHANGE UP
MCHC RBC-ENTMCNC: 25.3 PG — LOW (ref 27–34)
MCHC RBC-ENTMCNC: 28.8 GM/DL — LOW (ref 32–36)
MCV RBC AUTO: 87.9 FL — SIGNIFICANT CHANGE UP (ref 80–100)
MONOCYTES # BLD AUTO: 1.2 K/UL — HIGH (ref 0–0.9)
MONOCYTES NFR BLD AUTO: 9 % — SIGNIFICANT CHANGE UP (ref 2–14)
NEUTROPHILS # BLD AUTO: 9.06 K/UL — HIGH (ref 1.8–7.4)
NEUTROPHILS NFR BLD AUTO: 68 % — SIGNIFICANT CHANGE UP (ref 43–77)
NITRITE UR-MCNC: NEGATIVE — SIGNIFICANT CHANGE UP
NRBC # BLD: 0 /100 — SIGNIFICANT CHANGE UP (ref 0–0)
OVALOCYTES BLD QL SMEAR: SLIGHT — SIGNIFICANT CHANGE UP
PCO2 BLDA: 49 MMHG — HIGH (ref 32–46)
PH BLDA: 7.38 — SIGNIFICANT CHANGE UP (ref 7.35–7.45)
PH UR: 5 — SIGNIFICANT CHANGE UP (ref 5–8)
PLAT MORPH BLD: NORMAL — SIGNIFICANT CHANGE UP
PLATELET # BLD AUTO: 267 K/UL — SIGNIFICANT CHANGE UP (ref 150–400)
PO2 BLDA: 140 MMHG — HIGH (ref 74–108)
POIKILOCYTOSIS BLD QL AUTO: SLIGHT — SIGNIFICANT CHANGE UP
POLYCHROMASIA BLD QL SMEAR: SLIGHT — SIGNIFICANT CHANGE UP
POTASSIUM SERPL-MCNC: 4.1 MMOL/L — SIGNIFICANT CHANGE UP (ref 3.5–5.3)
POTASSIUM SERPL-SCNC: 4.1 MMOL/L — SIGNIFICANT CHANGE UP (ref 3.5–5.3)
PROT SERPL-MCNC: 7.5 G/DL — SIGNIFICANT CHANGE UP (ref 6–8.3)
PROT UR-MCNC: 30 MG/DL
PROTHROM AB SERPL-ACNC: 10.9 SEC — SIGNIFICANT CHANGE UP (ref 10–12.9)
RBC # BLD: 5.13 M/UL — SIGNIFICANT CHANGE UP (ref 3.8–5.2)
RBC # FLD: 14.7 % — HIGH (ref 10.3–14.5)
RBC BLD AUTO: ABNORMAL
RBC CASTS # UR COMP ASSIST: ABNORMAL /HPF (ref 0–2)
SAO2 % BLDA: 99 % — HIGH (ref 92–96)
SODIUM SERPL-SCNC: 148 MMOL/L — HIGH (ref 135–145)
SP GR SPEC: 1.01 — SIGNIFICANT CHANGE UP (ref 1.01–1.02)
SPHEROCYTES BLD QL SMEAR: SLIGHT — SIGNIFICANT CHANGE UP
UROBILINOGEN FLD QL: NEGATIVE — SIGNIFICANT CHANGE UP
VARIANT LYMPHS # BLD: 6 % — SIGNIFICANT CHANGE UP (ref 0–6)
WBC # BLD: 13.33 K/UL — HIGH (ref 3.8–10.5)
WBC # FLD AUTO: 13.33 K/UL — HIGH (ref 3.8–10.5)
WBC UR QL: ABNORMAL /HPF (ref 0–5)

## 2019-10-26 PROCEDURE — 71045 X-RAY EXAM CHEST 1 VIEW: CPT | Mod: 26

## 2019-10-26 RX ORDER — SODIUM CHLORIDE 9 MG/ML
1000 INJECTION INTRAMUSCULAR; INTRAVENOUS; SUBCUTANEOUS ONCE
Refills: 0 | Status: COMPLETED | OUTPATIENT
Start: 2019-10-26 | End: 2019-10-26

## 2019-10-26 RX ORDER — PIPERACILLIN AND TAZOBACTAM 4; .5 G/20ML; G/20ML
3.38 INJECTION, POWDER, LYOPHILIZED, FOR SOLUTION INTRAVENOUS ONCE
Refills: 0 | Status: COMPLETED | OUTPATIENT
Start: 2019-10-26 | End: 2019-10-26

## 2019-10-26 RX ORDER — INSULIN HUMAN 100 [IU]/ML
10 INJECTION, SOLUTION SUBCUTANEOUS ONCE
Refills: 0 | Status: COMPLETED | OUTPATIENT
Start: 2019-10-26 | End: 2019-10-26

## 2019-10-26 RX ORDER — ACETAMINOPHEN 500 MG
650 TABLET ORAL ONCE
Refills: 0 | Status: COMPLETED | OUTPATIENT
Start: 2019-10-26 | End: 2019-10-26

## 2019-10-26 RX ORDER — SODIUM CHLORIDE 9 MG/ML
1000 INJECTION INTRAMUSCULAR; INTRAVENOUS; SUBCUTANEOUS ONCE
Refills: 0 | Status: COMPLETED | OUTPATIENT
Start: 2019-10-26 | End: 2019-10-27

## 2019-10-26 RX ADMIN — Medication 650 MILLIGRAM(S): at 22:49

## 2019-10-26 RX ADMIN — SODIUM CHLORIDE 1000 MILLILITER(S): 9 INJECTION INTRAMUSCULAR; INTRAVENOUS; SUBCUTANEOUS at 22:00

## 2019-10-26 NOTE — ED PROVIDER NOTE - CLINICAL SUMMARY MEDICAL DECISION MAKING FREE TEXT BOX
Fever in patient with PEG and Trach sent from nursing home. Concerned for UTI and pneumonia  among other causes. Will do sepsis work up, labs , reassess and anticipate admission.

## 2019-10-26 NOTE — ED PROVIDER NOTE - CRITICAL CARE PROVIDED
documentation/consult w/ pt's family directly relating to pts condition/direct patient care (not related to procedure)/interpretation of diagnostic studies/additional history taking/consultation with other physicians

## 2019-10-26 NOTE — ED PROVIDER NOTE - OBJECTIVE STATEMENT
65 y/o female with a PMHx of DM, HTN, parkinson's , alzheimers, respiratory failure, and schizophrenia with a PSHx of tracheostomy and PEG  presents to the ER c/o fever. T max noted as 102 at nursing home today. PEG tube placement was last month. History is limited due to patient's mental status. Patient denies any other acute complaints. Allergies: Angiotensin converting enzyme inhibitors.

## 2019-10-26 NOTE — ED ADULT TRIAGE NOTE - CHIEF COMPLAINT QUOTE
as per ems pt was having fever for 1 week , pt got the tylenol 650mg at 7:20 pm  in Nursing Home , fs was 533

## 2019-10-26 NOTE — ED PROVIDER NOTE - PROGRESS NOTE DETAILS
D/w pt sister Sharon who reiterates pt full code, already knows of pt fever, hyperglycemia, states pt received Insulin from NH already pta. Made her aware of critical illness, ICU evaluation.

## 2019-10-27 DIAGNOSIS — J96.10 CHRONIC RESPIRATORY FAILURE, UNSPECIFIED WHETHER WITH HYPOXIA OR HYPERCAPNIA: ICD-10-CM

## 2019-10-27 DIAGNOSIS — E11.01 TYPE 2 DIABETES MELLITUS WITH HYPEROSMOLARITY WITH COMA: ICD-10-CM

## 2019-10-27 DIAGNOSIS — E87.0 HYPEROSMOLALITY AND HYPERNATREMIA: ICD-10-CM

## 2019-10-27 DIAGNOSIS — A41.9 SEPSIS, UNSPECIFIED ORGANISM: ICD-10-CM

## 2019-10-27 DIAGNOSIS — G20 PARKINSON'S DISEASE: ICD-10-CM

## 2019-10-27 DIAGNOSIS — Z29.9 ENCOUNTER FOR PROPHYLACTIC MEASURES, UNSPECIFIED: ICD-10-CM

## 2019-10-27 DIAGNOSIS — N17.9 ACUTE KIDNEY FAILURE, UNSPECIFIED: ICD-10-CM

## 2019-10-27 DIAGNOSIS — G30.9 ALZHEIMER'S DISEASE, UNSPECIFIED: ICD-10-CM

## 2019-10-27 LAB
ALBUMIN SERPL ELPH-MCNC: 2.3 G/DL — LOW (ref 3.5–5)
ALP SERPL-CCNC: 64 U/L — SIGNIFICANT CHANGE UP (ref 40–120)
ALT FLD-CCNC: 29 U/L DA — SIGNIFICANT CHANGE UP (ref 10–60)
ANION GAP SERPL CALC-SCNC: 1 MMOL/L — LOW (ref 5–17)
ANION GAP SERPL CALC-SCNC: 2 MMOL/L — LOW (ref 5–17)
ANION GAP SERPL CALC-SCNC: 2 MMOL/L — LOW (ref 5–17)
ANION GAP SERPL CALC-SCNC: 7 MMOL/L — SIGNIFICANT CHANGE UP (ref 5–17)
AST SERPL-CCNC: 26 U/L — SIGNIFICANT CHANGE UP (ref 10–40)
BASOPHILS # BLD AUTO: 0.01 K/UL — SIGNIFICANT CHANGE UP (ref 0–0.2)
BASOPHILS NFR BLD AUTO: 0.1 % — SIGNIFICANT CHANGE UP (ref 0–2)
BILIRUB SERPL-MCNC: 0.3 MG/DL — SIGNIFICANT CHANGE UP (ref 0.2–1.2)
BUN SERPL-MCNC: 50 MG/DL — HIGH (ref 7–18)
BUN SERPL-MCNC: 54 MG/DL — HIGH (ref 7–18)
BUN SERPL-MCNC: 65 MG/DL — HIGH (ref 7–18)
BUN SERPL-MCNC: 85 MG/DL — HIGH (ref 7–18)
CALCIUM SERPL-MCNC: 6.8 MG/DL — LOW (ref 8.4–10.5)
CALCIUM SERPL-MCNC: 7.5 MG/DL — LOW (ref 8.4–10.5)
CALCIUM SERPL-MCNC: 7.6 MG/DL — LOW (ref 8.4–10.5)
CALCIUM SERPL-MCNC: 8.1 MG/DL — LOW (ref 8.4–10.5)
CHLORIDE SERPL-SCNC: 124 MMOL/L — HIGH (ref 96–108)
CHLORIDE SERPL-SCNC: 124 MMOL/L — HIGH (ref 96–108)
CHLORIDE SERPL-SCNC: 127 MMOL/L — HIGH (ref 96–108)
CHLORIDE SERPL-SCNC: 127 MMOL/L — HIGH (ref 96–108)
CO2 SERPL-SCNC: 24 MMOL/L — SIGNIFICANT CHANGE UP (ref 22–31)
CO2 SERPL-SCNC: 25 MMOL/L — SIGNIFICANT CHANGE UP (ref 22–31)
CO2 SERPL-SCNC: 29 MMOL/L — SIGNIFICANT CHANGE UP (ref 22–31)
CO2 SERPL-SCNC: 30 MMOL/L — SIGNIFICANT CHANGE UP (ref 22–31)
CREAT SERPL-MCNC: 1.28 MG/DL — SIGNIFICANT CHANGE UP (ref 0.5–1.3)
CREAT SERPL-MCNC: 1.55 MG/DL — HIGH (ref 0.5–1.3)
CREAT SERPL-MCNC: 1.7 MG/DL — HIGH (ref 0.5–1.3)
CREAT SERPL-MCNC: 2.53 MG/DL — HIGH (ref 0.5–1.3)
EOSINOPHIL # BLD AUTO: 0 K/UL — SIGNIFICANT CHANGE UP (ref 0–0.5)
EOSINOPHIL NFR BLD AUTO: 0 % — SIGNIFICANT CHANGE UP (ref 0–6)
FLU A RESULT: SIGNIFICANT CHANGE UP
FLU A RESULT: SIGNIFICANT CHANGE UP
FLUAV AG NPH QL: SIGNIFICANT CHANGE UP
FLUBV AG NPH QL: SIGNIFICANT CHANGE UP
GLUCOSE SERPL-MCNC: 202 MG/DL — HIGH (ref 70–99)
GLUCOSE SERPL-MCNC: 215 MG/DL — HIGH (ref 70–99)
GLUCOSE SERPL-MCNC: 534 MG/DL — CRITICAL HIGH (ref 70–99)
GLUCOSE SERPL-MCNC: 97 MG/DL — SIGNIFICANT CHANGE UP (ref 70–99)
HBA1C BLD-MCNC: 12 % — HIGH (ref 4–5.6)
HCT VFR BLD CALC: 36.3 % — SIGNIFICANT CHANGE UP (ref 34.5–45)
HGB BLD-MCNC: 10.5 G/DL — LOW (ref 11.5–15.5)
IMM GRANULOCYTES NFR BLD AUTO: 0.2 % — SIGNIFICANT CHANGE UP (ref 0–1.5)
LYMPHOCYTES # BLD AUTO: 2.28 K/UL — SIGNIFICANT CHANGE UP (ref 1–3.3)
LYMPHOCYTES # BLD AUTO: 22 % — SIGNIFICANT CHANGE UP (ref 13–44)
MAGNESIUM SERPL-MCNC: 2.3 MG/DL — SIGNIFICANT CHANGE UP (ref 1.6–2.6)
MAGNESIUM SERPL-MCNC: 2.5 MG/DL — SIGNIFICANT CHANGE UP (ref 1.6–2.6)
MAGNESIUM SERPL-MCNC: 2.9 MG/DL — HIGH (ref 1.6–2.6)
MAGNESIUM SERPL-MCNC: 3 MG/DL — HIGH (ref 1.6–2.6)
MCHC RBC-ENTMCNC: 25.2 PG — LOW (ref 27–34)
MCHC RBC-ENTMCNC: 28.9 GM/DL — LOW (ref 32–36)
MCV RBC AUTO: 87.1 FL — SIGNIFICANT CHANGE UP (ref 80–100)
MONOCYTES # BLD AUTO: 0.35 K/UL — SIGNIFICANT CHANGE UP (ref 0–0.9)
MONOCYTES NFR BLD AUTO: 3.4 % — SIGNIFICANT CHANGE UP (ref 2–14)
NEUTROPHILS # BLD AUTO: 7.69 K/UL — HIGH (ref 1.8–7.4)
NEUTROPHILS NFR BLD AUTO: 74.3 % — SIGNIFICANT CHANGE UP (ref 43–77)
NRBC # BLD: 0 /100 WBCS — SIGNIFICANT CHANGE UP (ref 0–0)
OSMOLALITY SERPL: 402 MOSMOL/KG — HIGH (ref 280–301)
PHOSPHATE SERPL-MCNC: 2.4 MG/DL — LOW (ref 2.5–4.5)
PHOSPHATE SERPL-MCNC: 2.4 MG/DL — LOW (ref 2.5–4.5)
PHOSPHATE SERPL-MCNC: 2.9 MG/DL — SIGNIFICANT CHANGE UP (ref 2.5–4.5)
PHOSPHATE SERPL-MCNC: 3.4 MG/DL — SIGNIFICANT CHANGE UP (ref 2.5–4.5)
PHOSPHATE SERPL-MCNC: 7.1 MG/DL — HIGH (ref 2.5–4.5)
PLATELET # BLD AUTO: 157 K/UL — SIGNIFICANT CHANGE UP (ref 150–400)
POTASSIUM SERPL-MCNC: 3 MMOL/L — LOW (ref 3.5–5.3)
POTASSIUM SERPL-MCNC: 3.6 MMOL/L — SIGNIFICANT CHANGE UP (ref 3.5–5.3)
POTASSIUM SERPL-MCNC: 4.4 MMOL/L — SIGNIFICANT CHANGE UP (ref 3.5–5.3)
POTASSIUM SERPL-MCNC: 6.3 MMOL/L — CRITICAL HIGH (ref 3.5–5.3)
POTASSIUM SERPL-SCNC: 3 MMOL/L — LOW (ref 3.5–5.3)
POTASSIUM SERPL-SCNC: 3.6 MMOL/L — SIGNIFICANT CHANGE UP (ref 3.5–5.3)
POTASSIUM SERPL-SCNC: 4.4 MMOL/L — SIGNIFICANT CHANGE UP (ref 3.5–5.3)
POTASSIUM SERPL-SCNC: 6.3 MMOL/L — CRITICAL HIGH (ref 3.5–5.3)
PROCALCITONIN SERPL-MCNC: 0.15 NG/ML — HIGH (ref 0.02–0.1)
PROT SERPL-MCNC: 5.8 G/DL — LOW (ref 6–8.3)
RBC # BLD: 4.17 M/UL — SIGNIFICANT CHANGE UP (ref 3.8–5.2)
RBC # FLD: 14.7 % — HIGH (ref 10.3–14.5)
RSV RESULT: SIGNIFICANT CHANGE UP
RSV RNA RESP QL NAA+PROBE: SIGNIFICANT CHANGE UP
SODIUM SERPL-SCNC: 154 MMOL/L — HIGH (ref 135–145)
SODIUM SERPL-SCNC: 154 MMOL/L — HIGH (ref 135–145)
SODIUM SERPL-SCNC: 155 MMOL/L — HIGH (ref 135–145)
SODIUM SERPL-SCNC: 159 MMOL/L — HIGH (ref 135–145)
TSH SERPL-MCNC: 0.8 UU/ML — SIGNIFICANT CHANGE UP (ref 0.34–4.82)
VIT B12 SERPL-MCNC: 1433 PG/ML — HIGH (ref 232–1245)
WBC # BLD: 10.35 K/UL — SIGNIFICANT CHANGE UP (ref 3.8–10.5)
WBC # FLD AUTO: 10.35 K/UL — SIGNIFICANT CHANGE UP (ref 3.8–10.5)

## 2019-10-27 PROCEDURE — 99291 CRITICAL CARE FIRST HOUR: CPT

## 2019-10-27 RX ORDER — INSULIN HUMAN 100 [IU]/ML
8 INJECTION, SOLUTION SUBCUTANEOUS ONCE
Refills: 0 | Status: DISCONTINUED | OUTPATIENT
Start: 2019-10-27 | End: 2019-10-27

## 2019-10-27 RX ORDER — ATORVASTATIN CALCIUM 80 MG/1
20 TABLET, FILM COATED ORAL AT BEDTIME
Refills: 0 | Status: DISCONTINUED | OUTPATIENT
Start: 2019-10-27 | End: 2019-11-05

## 2019-10-27 RX ORDER — SENNA PLUS 8.6 MG/1
1 TABLET ORAL
Qty: 0 | Refills: 0 | DISCHARGE

## 2019-10-27 RX ORDER — PANTOPRAZOLE SODIUM 20 MG/1
40 TABLET, DELAYED RELEASE ORAL DAILY
Refills: 0 | Status: DISCONTINUED | OUTPATIENT
Start: 2019-10-27 | End: 2019-11-03

## 2019-10-27 RX ORDER — INSULIN LISPRO 100/ML
VIAL (ML) SUBCUTANEOUS EVERY 4 HOURS
Refills: 0 | Status: DISCONTINUED | OUTPATIENT
Start: 2019-10-27 | End: 2019-10-29

## 2019-10-27 RX ORDER — DEXTROSE MONOHYDRATE, SODIUM CHLORIDE, AND POTASSIUM CHLORIDE 50; .745; 4.5 G/1000ML; G/1000ML; G/1000ML
1000 INJECTION, SOLUTION INTRAVENOUS
Refills: 0 | Status: DISCONTINUED | OUTPATIENT
Start: 2019-10-27 | End: 2019-10-28

## 2019-10-27 RX ORDER — METOPROLOL TARTRATE 50 MG
100 TABLET ORAL
Refills: 0 | Status: DISCONTINUED | OUTPATIENT
Start: 2019-10-27 | End: 2019-10-30

## 2019-10-27 RX ORDER — ALBUTEROL 90 UG/1
2.5 AEROSOL, METERED ORAL ONCE
Refills: 0 | Status: DISCONTINUED | OUTPATIENT
Start: 2019-10-27 | End: 2019-10-27

## 2019-10-27 RX ORDER — SODIUM ZIRCONIUM CYCLOSILICATE 10 G/10G
10 POWDER, FOR SUSPENSION ORAL ONCE
Refills: 0 | Status: DISCONTINUED | OUTPATIENT
Start: 2019-10-27 | End: 2019-10-27

## 2019-10-27 RX ORDER — ATORVASTATIN CALCIUM 80 MG/1
1 TABLET, FILM COATED ORAL
Qty: 0 | Refills: 0 | DISCHARGE

## 2019-10-27 RX ORDER — CALCIUM GLUCONATE 100 MG/ML
2 VIAL (ML) INTRAVENOUS ONCE
Refills: 0 | Status: DISCONTINUED | OUTPATIENT
Start: 2019-10-27 | End: 2019-10-27

## 2019-10-27 RX ORDER — ACETAMINOPHEN 500 MG
2 TABLET ORAL
Qty: 0 | Refills: 0 | DISCHARGE

## 2019-10-27 RX ORDER — POTASSIUM PHOSPHATE, MONOBASIC POTASSIUM PHOSPHATE, DIBASIC 236; 224 MG/ML; MG/ML
15 INJECTION, SOLUTION INTRAVENOUS ONCE
Refills: 0 | Status: COMPLETED | OUTPATIENT
Start: 2019-10-27 | End: 2019-10-27

## 2019-10-27 RX ORDER — ACETAMINOPHEN 500 MG
1000 TABLET ORAL ONCE
Refills: 0 | Status: COMPLETED | OUTPATIENT
Start: 2019-10-27 | End: 2019-10-27

## 2019-10-27 RX ORDER — INSULIN HUMAN 100 [IU]/ML
8 INJECTION, SOLUTION SUBCUTANEOUS ONCE
Refills: 0 | Status: COMPLETED | OUTPATIENT
Start: 2019-10-27 | End: 2019-10-27

## 2019-10-27 RX ORDER — SODIUM CHLORIDE 9 MG/ML
1000 INJECTION INTRAMUSCULAR; INTRAVENOUS; SUBCUTANEOUS
Refills: 0 | Status: DISCONTINUED | OUTPATIENT
Start: 2019-10-27 | End: 2019-10-27

## 2019-10-27 RX ORDER — CHLORHEXIDINE GLUCONATE 213 G/1000ML
1 SOLUTION TOPICAL DAILY
Refills: 0 | Status: DISCONTINUED | OUTPATIENT
Start: 2019-10-27 | End: 2019-11-05

## 2019-10-27 RX ORDER — PIPERACILLIN AND TAZOBACTAM 4; .5 G/20ML; G/20ML
3.38 INJECTION, POWDER, LYOPHILIZED, FOR SOLUTION INTRAVENOUS EVERY 8 HOURS
Refills: 0 | Status: DISCONTINUED | OUTPATIENT
Start: 2019-10-27 | End: 2019-10-27

## 2019-10-27 RX ORDER — HEPARIN SODIUM 5000 [USP'U]/ML
5000 INJECTION INTRAVENOUS; SUBCUTANEOUS EVERY 8 HOURS
Refills: 0 | Status: DISCONTINUED | OUTPATIENT
Start: 2019-10-27 | End: 2019-11-05

## 2019-10-27 RX ORDER — DEXTROSE 50 % IN WATER 50 %
50 SYRINGE (ML) INTRAVENOUS ONCE
Refills: 0 | Status: COMPLETED | OUTPATIENT
Start: 2019-10-27 | End: 2019-10-27

## 2019-10-27 RX ORDER — DEXTROSE MONOHYDRATE, SODIUM CHLORIDE, AND POTASSIUM CHLORIDE 50; .745; 4.5 G/1000ML; G/1000ML; G/1000ML
1000 INJECTION, SOLUTION INTRAVENOUS
Refills: 0 | Status: DISCONTINUED | OUTPATIENT
Start: 2019-10-27 | End: 2019-10-27

## 2019-10-27 RX ORDER — LEVETIRACETAM 250 MG/1
500 TABLET, FILM COATED ORAL EVERY 12 HOURS
Refills: 0 | Status: DISCONTINUED | OUTPATIENT
Start: 2019-10-27 | End: 2019-11-05

## 2019-10-27 RX ORDER — OMEPRAZOLE 10 MG/1
1 CAPSULE, DELAYED RELEASE ORAL
Qty: 0 | Refills: 0 | DISCHARGE

## 2019-10-27 RX ORDER — INSULIN GLARGINE 100 [IU]/ML
15 INJECTION, SOLUTION SUBCUTANEOUS ONCE
Refills: 0 | Status: COMPLETED | OUTPATIENT
Start: 2019-10-27 | End: 2019-10-27

## 2019-10-27 RX ORDER — INSULIN HUMAN 100 [IU]/ML
5 INJECTION, SOLUTION SUBCUTANEOUS
Qty: 100 | Refills: 0 | Status: DISCONTINUED | OUTPATIENT
Start: 2019-10-27 | End: 2019-10-27

## 2019-10-27 RX ORDER — PIPERACILLIN AND TAZOBACTAM 4; .5 G/20ML; G/20ML
3.38 INJECTION, POWDER, LYOPHILIZED, FOR SOLUTION INTRAVENOUS EVERY 8 HOURS
Refills: 0 | Status: DISCONTINUED | OUTPATIENT
Start: 2019-10-27 | End: 2019-10-30

## 2019-10-27 RX ORDER — IPRATROPIUM/ALBUTEROL SULFATE 18-103MCG
3 AEROSOL WITH ADAPTER (GRAM) INHALATION
Qty: 0 | Refills: 0 | DISCHARGE

## 2019-10-27 RX ADMIN — PIPERACILLIN AND TAZOBACTAM 200 GRAM(S): 4; .5 INJECTION, POWDER, LYOPHILIZED, FOR SOLUTION INTRAVENOUS at 00:25

## 2019-10-27 RX ADMIN — LEVETIRACETAM 500 MILLIGRAM(S): 250 TABLET, FILM COATED ORAL at 05:13

## 2019-10-27 RX ADMIN — POTASSIUM PHOSPHATE, MONOBASIC POTASSIUM PHOSPHATE, DIBASIC 62.5 MILLIMOLE(S): 236; 224 INJECTION, SOLUTION INTRAVENOUS at 16:39

## 2019-10-27 RX ADMIN — INSULIN HUMAN 8 UNIT(S): 100 INJECTION, SOLUTION SUBCUTANEOUS at 02:43

## 2019-10-27 RX ADMIN — ALBUTEROL 2.5 MILLIGRAM(S): 90 AEROSOL, METERED ORAL at 22:55

## 2019-10-27 RX ADMIN — HEPARIN SODIUM 5000 UNIT(S): 5000 INJECTION INTRAVENOUS; SUBCUTANEOUS at 21:30

## 2019-10-27 RX ADMIN — HEPARIN SODIUM 5000 UNIT(S): 5000 INJECTION INTRAVENOUS; SUBCUTANEOUS at 14:23

## 2019-10-27 RX ADMIN — INSULIN GLARGINE 15 UNIT(S): 100 INJECTION, SOLUTION SUBCUTANEOUS at 17:42

## 2019-10-27 RX ADMIN — INSULIN HUMAN 10 UNIT(S): 100 INJECTION, SOLUTION SUBCUTANEOUS at 00:26

## 2019-10-27 RX ADMIN — INSULIN HUMAN 5 UNIT(S)/HR: 100 INJECTION, SOLUTION SUBCUTANEOUS at 09:39

## 2019-10-27 RX ADMIN — LEVETIRACETAM 500 MILLIGRAM(S): 250 TABLET, FILM COATED ORAL at 17:42

## 2019-10-27 RX ADMIN — HEPARIN SODIUM 5000 UNIT(S): 5000 INJECTION INTRAVENOUS; SUBCUTANEOUS at 05:08

## 2019-10-27 RX ADMIN — SODIUM CHLORIDE 1000 MILLILITER(S): 9 INJECTION INTRAMUSCULAR; INTRAVENOUS; SUBCUTANEOUS at 02:43

## 2019-10-27 RX ADMIN — Medication 100 MILLIGRAM(S): at 05:20

## 2019-10-27 RX ADMIN — Medication 4: at 22:16

## 2019-10-27 RX ADMIN — ATORVASTATIN CALCIUM 20 MILLIGRAM(S): 80 TABLET, FILM COATED ORAL at 21:31

## 2019-10-27 RX ADMIN — Medication 6: at 19:58

## 2019-10-27 RX ADMIN — PIPERACILLIN AND TAZOBACTAM 25 GRAM(S): 4; .5 INJECTION, POWDER, LYOPHILIZED, FOR SOLUTION INTRAVENOUS at 05:07

## 2019-10-27 RX ADMIN — PANTOPRAZOLE SODIUM 40 MILLIGRAM(S): 20 TABLET, DELAYED RELEASE ORAL at 12:46

## 2019-10-27 RX ADMIN — CHLORHEXIDINE GLUCONATE 1 APPLICATION(S): 213 SOLUTION TOPICAL at 17:42

## 2019-10-27 RX ADMIN — INSULIN HUMAN 8 UNIT(S): 100 INJECTION, SOLUTION SUBCUTANEOUS at 05:00

## 2019-10-27 RX ADMIN — PIPERACILLIN AND TAZOBACTAM 25 GRAM(S): 4; .5 INJECTION, POWDER, LYOPHILIZED, FOR SOLUTION INTRAVENOUS at 21:31

## 2019-10-27 RX ADMIN — Medication 650 MILLIGRAM(S): at 01:15

## 2019-10-27 RX ADMIN — DEXTROSE MONOHYDRATE, SODIUM CHLORIDE, AND POTASSIUM CHLORIDE 100 MILLILITER(S): 50; .745; 4.5 INJECTION, SOLUTION INTRAVENOUS at 03:57

## 2019-10-27 RX ADMIN — Medication 1 MILLIGRAM(S): at 21:52

## 2019-10-27 RX ADMIN — DEXTROSE MONOHYDRATE, SODIUM CHLORIDE, AND POTASSIUM CHLORIDE 100 MILLILITER(S): 50; .745; 4.5 INJECTION, SOLUTION INTRAVENOUS at 16:30

## 2019-10-27 RX ADMIN — PIPERACILLIN AND TAZOBACTAM 25 GRAM(S): 4; .5 INJECTION, POWDER, LYOPHILIZED, FOR SOLUTION INTRAVENOUS at 14:18

## 2019-10-27 RX ADMIN — SODIUM CHLORIDE 1000 MILLILITER(S): 9 INJECTION INTRAMUSCULAR; INTRAVENOUS; SUBCUTANEOUS at 00:26

## 2019-10-27 RX ADMIN — SODIUM CHLORIDE 150 MILLILITER(S): 9 INJECTION INTRAMUSCULAR; INTRAVENOUS; SUBCUTANEOUS at 02:44

## 2019-10-27 RX ADMIN — INSULIN HUMAN 8 UNIT(S): 100 INJECTION, SOLUTION SUBCUTANEOUS at 09:39

## 2019-10-27 RX ADMIN — INSULIN HUMAN 5 UNIT(S)/HR: 100 INJECTION, SOLUTION SUBCUTANEOUS at 02:43

## 2019-10-27 NOTE — PROGRESS NOTE ADULT - SUBJECTIVE AND OBJECTIVE BOX
INTERVAL HPI/OVERNIGHT EVENTS:     PRESSORS: [ ] YES [x] NO  WHICH:    ANTIBIOTICS:zosyn         DATE STARTED: 10/26  ANTIBIOTICS:                  DATE STARTED:  ANTIBIOTICS:                  DATE STARTED:    Antimicrobial:  piperacillin/tazobactam IVPB.. 3.375 Gram(s) IV Intermittent every 8 hours    Cardiovascular:  metoprolol tartrate 100 milliGRAM(s) Oral two times a day    Pulmonary:    Hematalogic:  heparin  Injectable 5000 Unit(s) SubCutaneous every 8 hours    Other:  atorvastatin 20 milliGRAM(s) Oral at bedtime  insulin regular  human recombinant. 8 Unit(s) IV Push once  insulin regular Infusion 5 Unit(s)/Hr IV Continuous <Continuous>  levETIRAcetam  Oral Liquid - Peds 500 milliGRAM(s) Oral every 12 hours  pantoprazole  Injectable 40 milliGRAM(s) IV Push daily  sodium chloride 0.45% with potassium chloride 20 mEq/L 1000 milliLiter(s) IV Continuous <Continuous>    atorvastatin 20 milliGRAM(s) Oral at bedtime  heparin  Injectable 5000 Unit(s) SubCutaneous every 8 hours  insulin regular  human recombinant. 8 Unit(s) IV Push once  insulin regular Infusion 5 Unit(s)/Hr IV Continuous <Continuous>  levETIRAcetam  Oral Liquid - Peds 500 milliGRAM(s) Oral every 12 hours  metoprolol tartrate 100 milliGRAM(s) Oral two times a day  pantoprazole  Injectable 40 milliGRAM(s) IV Push daily  piperacillin/tazobactam IVPB.. 3.375 Gram(s) IV Intermittent every 8 hours  sodium chloride 0.45% with potassium chloride 20 mEq/L 1000 milliLiter(s) IV Continuous <Continuous>    Drug Dosing Weight  Height (cm): 170.18 (27 Sep 2019 14:38)  Weight (kg): 56.1 (27 Oct 2019 02:00)  BMI (kg/m2): 19.4 (27 Oct 2019 02:00)  BSA (m2): 1.65 (27 Oct 2019 02:00)    CENTRAL LINE: [ ] YES [x ] NO  LOCATION:   DATE INSERTED:  REMOVE: [ ] YES [ ] NO  EXPLAIN:    HUSAIN: [ x] YES [ ] NO    DATE INSERTED:10/27  REMOVE:  [ ] YES [ ] NO  EXPLAIN:    A-LINE:  [ ] YES [ x] NO  LOCATION:   DATE INSERTED:  REMOVE:  [ ] YES [ ] NO  EXPLAIN:      ICU Vital Signs Last 24 Hrs  T(C): 35.3 (27 Oct 2019 05:00), Max: 39.2 (26 Oct 2019 22:44)  T(F): 95.5 (27 Oct 2019 05:00), Max: 102.6 (26 Oct 2019 22:44)  HR: 58 (27 Oct 2019 06:00) (58 - 102)  BP: 108/61 (27 Oct 2019 06:00) (96/70 - 115/61)  BP(mean): 73 (27 Oct 2019 06:00) (67 - 73)  ABP: --  ABP(mean): --  RR: 20 (27 Oct 2019 06:00) (12 - 31)  SpO2: 100% (27 Oct 2019 06:00) (100% - 100%)      ABG - ( 26 Oct 2019 23:42 )  pH, Arterial: 7.38  pH, Blood: x     /  pCO2: 49    /  pO2: 140   / HCO3: 28    / Base Excess: 2.6   /  SaO2: 99                            >>> <<<    PHYSICAL EXAM:    PHYSICAL EXAM:  GENERAL: NAD, lethargic, arousable to painful stimuli, contracted  HEAD:  Atraumatic, Normocephalic  EYES:  dry mucous membranes, conjunctiva and sclera clear  NECK: Supple, No JVD, Normal thyroid  CHEST/LUNG: Clear to auscultation. Clear to percussion bilaterally; No rales, rhonchi, wheezing, or rubs  HEART: Regular rate and rhythm; No murmurs, rubs, or gallops  ABDOMEN: Soft, Nontender, Nondistended; Bowel sounds present  NERVOUS SYSTEM:  Alert & Oriented X0,    EXTREMITIES:  2+ Peripheral Pulses, No clubbing, cyanosis, or edema  SKIN: warm dry      LABS:  CBC Full  -  ( 27 Oct 2019 04:21 )  WBC Count : 10.35 K/uL  RBC Count : 4.17 M/uL  Hemoglobin : 10.5 g/dL  Hematocrit : 36.3 %  Platelet Count - Automated : 157 K/uL  Mean Cell Volume : 87.1 fl  Mean Cell Hemoglobin : 25.2 pg  Mean Cell Hemoglobin Concentration : 28.9 gm/dL  Auto Neutrophil # : 7.69 K/uL  Auto Lymphocyte # : 2.28 K/uL  Auto Monocyte # : 0.35 K/uL  Auto Eosinophil # : 0.00 K/uL  Auto Basophil # : 0.01 K/uL  Auto Neutrophil % : 74.3 %  Auto Lymphocyte % : 22.0 %  Auto Monocyte % : 3.4 %  Auto Eosinophil % : 0.0 %  Auto Basophil % : 0.1 %    10-27    155<H>  |  124<H>  |  85<H>  ----------------------------<  534<HH>  3.0<L>   |  24  |  2.53<H>    Ca    7.5<L>      27 Oct 2019 04:21  Phos  2.4     10-27  Mg     3.0     10-27    TPro  5.8<L>  /  Alb  2.3<L>  /  TBili  0.3  /  DBili  x   /  AST  26  /  ALT  29  /  AlkPhos  64  10-27    PT/INR - ( 26 Oct 2019 22:21 )   PT: 10.9 sec;   INR: 0.98 ratio         PTT - ( 26 Oct 2019 22:21 )  PTT:30.8 sec  Urinalysis Basic - ( 26 Oct 2019 22:41 )    Color: Yellow / Appearance: very cloudy / S.010 / pH: x  Gluc: x / Ketone: Trace  / Bili: Negative / Urobili: Negative   Blood: x / Protein: 30 mg/dL / Nitrite: Negative   Leuk Esterase: Trace / RBC: 2-5 /HPF / WBC 6-10 /HPF   Sq Epi: x / Non Sq Epi: Few /HPF / Bacteria: Many /HPF          RADIOLOGY & ADDITIONAL STUDIES REVIEWED:  ***    [ ]GOALS OF CARE DISCUSSION WITH PATIENT/FAMILY/PROXY:    CRITICAL CARE TIME SPENT: 35 minutes INTERVAL HPI/OVERNIGHT EVENTS: Patient sodium was found to be 155 , started freee water with 1/2 NS , repeat BMP showed Na of 159, gave free water 300 cc stat at 3 pm in afternoon.    PRESSORS: [ ] YES [x] NO  WHICH:    ANTIBIOTICS:zosyn         DATE STARTED: 10/26  ANTIBIOTICS:                  DATE STARTED:  ANTIBIOTICS:                  DATE STARTED:    Antimicrobial:  piperacillin/tazobactam IVPB.. 3.375 Gram(s) IV Intermittent every 8 hours    Cardiovascular:  metoprolol tartrate 100 milliGRAM(s) Oral two times a day    Pulmonary:    Hematalogic:  heparin  Injectable 5000 Unit(s) SubCutaneous every 8 hours    Other:  atorvastatin 20 milliGRAM(s) Oral at bedtime  insulin regular  human recombinant. 8 Unit(s) IV Push once  insulin regular Infusion 5 Unit(s)/Hr IV Continuous <Continuous>  levETIRAcetam  Oral Liquid - Peds 500 milliGRAM(s) Oral every 12 hours  pantoprazole  Injectable 40 milliGRAM(s) IV Push daily  sodium chloride 0.45% with potassium chloride 20 mEq/L 1000 milliLiter(s) IV Continuous <Continuous>    atorvastatin 20 milliGRAM(s) Oral at bedtime  heparin  Injectable 5000 Unit(s) SubCutaneous every 8 hours  insulin regular  human recombinant. 8 Unit(s) IV Push once  insulin regular Infusion 5 Unit(s)/Hr IV Continuous <Continuous>  levETIRAcetam  Oral Liquid - Peds 500 milliGRAM(s) Oral every 12 hours  metoprolol tartrate 100 milliGRAM(s) Oral two times a day  pantoprazole  Injectable 40 milliGRAM(s) IV Push daily  piperacillin/tazobactam IVPB.. 3.375 Gram(s) IV Intermittent every 8 hours  sodium chloride 0.45% with potassium chloride 20 mEq/L 1000 milliLiter(s) IV Continuous <Continuous>    Drug Dosing Weight  Height (cm): 170.18 (27 Sep 2019 14:38)  Weight (kg): 56.1 (27 Oct 2019 02:00)  BMI (kg/m2): 19.4 (27 Oct 2019 02:00)  BSA (m2): 1.65 (27 Oct 2019 02:00)    CENTRAL LINE: [ ] YES [x ] NO  LOCATION:   DATE INSERTED:  REMOVE: [ ] YES [ ] NO  EXPLAIN:    HUSAIN: [ x] YES [ ] NO    DATE INSERTED:10/27  REMOVE:  [ ] YES [ ] NO  EXPLAIN:    A-LINE:  [ ] YES [ x] NO  LOCATION:   DATE INSERTED:  REMOVE:  [ ] YES [ ] NO  EXPLAIN:      ICU Vital Signs Last 24 Hrs  T(C): 35.3 (27 Oct 2019 05:00), Max: 39.2 (26 Oct 2019 22:44)  T(F): 95.5 (27 Oct 2019 05:00), Max: 102.6 (26 Oct 2019 22:44)  HR: 58 (27 Oct 2019 06:00) (58 - 102)  BP: 108/61 (27 Oct 2019 06:00) (96/70 - 115/61)  BP(mean): 73 (27 Oct 2019 06:00) (67 - 73)  ABP: --  ABP(mean): --  RR: 20 (27 Oct 2019 06:00) (12 - 31)  SpO2: 100% (27 Oct 2019 06:00) (100% - 100%)      ABG - ( 26 Oct 2019 23:42 )  pH, Arterial: 7.38  pH, Blood: x     /  pCO2: 49    /  pO2: 140   / HCO3: 28    / Base Excess: 2.6   /  SaO2: 99                            >>> <<<    PHYSICAL EXAM:    PHYSICAL EXAM:  GENERAL: NAD, lethargic, arousable to painful stimuli, contracted  HEAD:  Atraumatic, Normocephalic  EYES:  dry mucous membranes, conjunctiva and sclera clear  NECK: Supple, No JVD, Normal thyroid  CHEST/LUNG: Clear to auscultation. Clear to percussion bilaterally; No rales, rhonchi, wheezing, or rubs  HEART: Regular rate and rhythm; No murmurs, rubs, or gallops  ABDOMEN: Soft, Nontender, Nondistended; Bowel sounds present  NERVOUS SYSTEM:  Alert & Oriented X0,    EXTREMITIES:  2+ Peripheral Pulses, No clubbing, cyanosis, or edema  SKIN: warm dry      LABS:  CBC Full  -  ( 27 Oct 2019 04:21 )  WBC Count : 10.35 K/uL  RBC Count : 4.17 M/uL  Hemoglobin : 10.5 g/dL  Hematocrit : 36.3 %  Platelet Count - Automated : 157 K/uL  Mean Cell Volume : 87.1 fl  Mean Cell Hemoglobin : 25.2 pg  Mean Cell Hemoglobin Concentration : 28.9 gm/dL  Auto Neutrophil # : 7.69 K/uL  Auto Lymphocyte # : 2.28 K/uL  Auto Monocyte # : 0.35 K/uL  Auto Eosinophil # : 0.00 K/uL  Auto Basophil # : 0.01 K/uL  Auto Neutrophil % : 74.3 %  Auto Lymphocyte % : 22.0 %  Auto Monocyte % : 3.4 %  Auto Eosinophil % : 0.0 %  Auto Basophil % : 0.1 %    10-27    155<H>  |  124<H>  |  85<H>  ----------------------------<  534<HH>  3.0<L>   |  24  |  2.53<H>    Ca    7.5<L>      27 Oct 2019 04:21  Phos  2.4     10-27  Mg     3.0     10-27    TPro  5.8<L>  /  Alb  2.3<L>  /  TBili  0.3  /  DBili  x   /  AST  26  /  ALT  29  /  AlkPhos  64  10-27    PT/INR - ( 26 Oct 2019 22:21 )   PT: 10.9 sec;   INR: 0.98 ratio         PTT - ( 26 Oct 2019 22:21 )  PTT:30.8 sec  Urinalysis Basic - ( 26 Oct 2019 22:41 )    Color: Yellow / Appearance: very cloudy / S.010 / pH: x  Gluc: x / Ketone: Trace  / Bili: Negative / Urobili: Negative   Blood: x / Protein: 30 mg/dL / Nitrite: Negative   Leuk Esterase: Trace / RBC: 2-5 /HPF / WBC 6-10 /HPF   Sq Epi: x / Non Sq Epi: Few /HPF / Bacteria: Many /HPF          RADIOLOGY & ADDITIONAL STUDIES REVIEWED:  ***    [ ]GOALS OF CARE DISCUSSION WITH PATIENT/FAMILY/PROXY:    CRITICAL CARE TIME SPENT: 35 minutes

## 2019-10-27 NOTE — H&P ADULT - PROBLEM SELECTOR PLAN 4
p/w BUN/CR: 111/3.81 ( baseline Cr:0.82)  likely prerenal azotemia  F/u urine electrolytes  F/u ultrasound Kidney  c/w coe catheter  c/w IV hydration  F/u BMP

## 2019-10-27 NOTE — H&P ADULT - PROBLEM SELECTOR PLAN 1
p/w BS of 800 with normal anion gap and normal PH, encephalopathy likely HHS and metabolic encephalopathy  s/p 3L NS bolus and 10 U of Regular insulin  c/w aggressive IV hydration and insulin drip as per protocol  BMP q 4hr  started 1/2 NS with potassium  MOnitor electrolytes  accuchecks q 1 hr  F/u HBA1C  Endo consult Dr. Lopez

## 2019-10-27 NOTE — PATIENT PROFILE ADULT - HOME ACCESSIBILITY CONCERNS - OTHER
pt comes from nursing home - total care. Above concerns - pt has significant dementia and is nonverbal, pt is under the total care of nursing home covered by Medicaid therefore no concerns regarding care

## 2019-10-27 NOTE — CONSULT NOTE ADULT - ASSESSMENT
65 yo F with PMH Alzheimer's dementia, DM, HTN, non-verbal Parkinson's disease, chronic respiratory failure s/p Tracheostomy, dysphagia s/p peg tube, major depressive disorder who was sent from [facility] for evaluation of fever of 102F. Found to have     LAYLA  - baseline Cr 0.8; admitted w/ Cr 3.81 which is improving today  - LAYLA is likely pre-renal from hypovolemia/dehydration    - Avoid nephrotoxins including NSAIDs, IV contrast (if possible), [employer] products  - maintain MAP >65    - recommend changing IVF to NS w/ KCl given low normal MAP (MAP 65 at the time of my eval)    - monitor I/O's closely (neisha w/ improving LAYLA and worsening hypernatremia)    - repeat BMP at 8 PM and call for recs     Hypophosphatemia  - likely from poor intake  - repleted by ICU team  - monitor    Hypernatremia  - likely from poor intake + infection + improving renal function/inc UOP  - Free water deficit is ~3.8L  - recommend IV NS as above for hypovolemia + free water flushes of 470 mL Q6 hrs  - repeat BMP at 8 PM and call for recs    Hypokalemia  - likely from poor intake  - started on tube feeds by ICU team  - repleted by ICU team  - IV NS w/ KCl   - repeat BMP 8 PM    UTI  - continue zosyn
64 years old Female with PMH DM, HTN, non-verbal Parkinson's disease, chronic respiratory failure s/p Tracheostomy, dysphagia s/p peg tube, major depressive disorder,  sent from NH w/ c/o fever    -> Sepsis 2/2 Acute Cystitis:      - MICU and all team members management and pt care greatly appreciated       - abx, fluids      - f/u cultures      - monitor vitals and clinical status closely   -> DKA:      - aggressive fluid hydration      - dm optimization       - lytes optimization      - monitor acks, adjust management accordingly   -> LAYLA:      - improving       - c/w fluid hydration   -> Hypernatremia:      - fluids, dm optimization   -> Hypophosphatemia:      - replaced   -> Advance Care Planning:       - full code

## 2019-10-27 NOTE — H&P ADULT - NSHPPHYSICALEXAM_GEN_ALL_CORE
ICU Vital Signs Last 24 Hrs  T(C): 39.2 (26 Oct 2019 22:44), Max: 39.2 (26 Oct 2019 22:44)  T(F): 102.6 (26 Oct 2019 22:44), Max: 102.6 (26 Oct 2019 22:44)  HR: 87 (26 Oct 2019 22:44) (87 - 102)  BP: 113/65 (26 Oct 2019 22:44) (96/70 - 113/65)  BP(mean): --  ABP: --  ABP(mean): --  RR: 31 (26 Oct 2019 22:44) (24 - 31)  SpO2: 100% (26 Oct 2019 22:44) (100% - 100%)    PHYSICAL EXAM:  GENERAL: NAD, lethargic, arousable to painful stimuli, contracted  HEAD:  Atraumatic, Normocephalic  EYES:  dry mucous membranes, conjunctiva and sclera clear  NECK: Supple, No JVD, Normal thyroid  CHEST/LUNG: Clear to auscultation. Clear to percussion bilaterally; No rales, rhonchi, wheezing, or rubs  HEART: Regular rate and rhythm; No murmurs, rubs, or gallops  ABDOMEN: Soft, Nontender, Nondistended; Bowel sounds present  NERVOUS SYSTEM:  Alert & Oriented X0,    EXTREMITIES:  2+ Peripheral Pulses, No clubbing, cyanosis, or edema  SKIN: warm dry

## 2019-10-27 NOTE — PROGRESS NOTE ADULT - PROBLEM SELECTOR PLAN 1
p/w BS of 800 with normal anion gap and normal PH, encephalopathy likely HHS and metabolic encephalopathy  s/p 3L NS bolus and 10 U of Regular insulin  c/w aggressive IV hydration and insulin drip as per protocol  BMP q 4hr  started 1/2 NS with potassium  MOnitor electrolytes  accuchecks q 1 hr  F/u HBA1C  Endo consult Dr. Lopez p/w BS of 800 with normal anion gap and normal PH, encephalopathy likely HHS and metabolic encephalopathy  s/p 3L NS bolus and 10 U of Regular insulin  c/w aggressive IV hydration and insulin drip as per protocol  BMP q 4hr  started 1/2 NS with potassium  MOnitor electrolytes  accuchecks q 1 hr   HBA1C 12.01  Endo consult Dr. Lopez

## 2019-10-27 NOTE — PROGRESS NOTE ADULT - PROBLEM SELECTOR PLAN 4
p/w BUN/CR: 111/3.81 ( baseline Cr:0.82)  likely prerenal azotemia  F/u urine electrolytes  F/u ultrasound Kidney  c/w coe catheter  c/w IV hydration  F/u BMP p/w BUN/CR: 111/3.81 ( baseline Cr:0.82)  likely prerenal azotemia  F/u urine electrolytes  F/u ultrasound Kidney  c/w coe catheter  c/w IV hydration + free water  F/u BMP

## 2019-10-27 NOTE — H&P ADULT - HISTORY OF PRESENT ILLNESS
64 years old Female with PMH Alzheimer's dementia , CKD 3, DM, HTN, non-verbal Parkinson's disease, s/p Tracheostomy sent from [facility] with 64 years old Female with PMH Alzheimer's dementia , CKD 3, DM, HTN, non-verbal Parkinson's disease s/p Tracheostomy and peg tube, major depressive disorder,  sent from [facility] with complain of fever of 102F since 1 day.    Patient is AXO=0, not responding well, opening eyes only on painful stimuli. As per chart review, patient was sent for high grade fever. BS in NH was high in 600s and was given 15U Regular insulin before sending to hospital. Further hx is limited as patient is AXO=0.    In ED, patient's vital signs were remarkable for Temp of 102.6 F, HR: 102, BP: 96/70, RR: 24. 64 years old Female with PMH Alzheimer's dementia , CKD 3, DM, HTN, non-verbal Parkinson's disease, chronic respiratory failure s/p Tracheostomy, dysphagia s/p peg tube, major depressive disorder,  sent from [facility] with complain of fever of 102F since 1 day.    Patient is AXO=0, not responding well, opening eyes only on painful stimuli. As per chart review, patient was sent for high grade fever. BS in NH was high in 600s and was given 15U Regular insulin before sending to hospital. Further hx is limited as patient is AXO=0.    In ED, patient's vital signs were remarkable for Temp of 102.6 F, HR: 102, BP: 96/70, RR: 24. Labs were remarkable for BS: 890, Corrected A, BUN/CR: 111/3.81, Acetone: small, Blood gases shows PH: 7.38, UA positive for UTI,  Patient was given 3L NS bolus and 10 Units Regular insulin, BS were still high    Goals of care: As per NH paper and ED provider note: HCP is sister Sharon mead,>> Patient is Full code

## 2019-10-27 NOTE — PROGRESS NOTE ADULT - SUBJECTIVE AND OBJECTIVE BOX
INTERVAL HPI/OVERNIGHT EVENTS:       Antimicrobial:  piperacillin/tazobactam IVPB.. 3.375 Gram(s) IV Intermittent every 8 hours    Cardiovascular:  metoprolol tartrate 100 milliGRAM(s) Oral two times a day    Pulmonary:    Hematalogic:  heparin  Injectable 5000 Unit(s) SubCutaneous every 8 hours    Other:  atorvastatin 20 milliGRAM(s) Oral at bedtime  insulin regular Infusion 5 Unit(s)/Hr IV Continuous <Continuous>  levETIRAcetam  Oral Liquid - Peds 500 milliGRAM(s) Oral every 12 hours  pantoprazole  Injectable 40 milliGRAM(s) IV Push daily  sodium chloride 0.45% with potassium chloride 20 mEq/L 1000 milliLiter(s) IV Continuous <Continuous>      Drug Dosing Weight  Height (cm): 170.18 (27 Sep 2019 14:38)  Weight (kg): 56.1 (27 Oct 2019 02:00)  BMI (kg/m2): 19.4 (27 Oct 2019 02:00)  BSA (m2): 1.65 (27 Oct 2019 02:00)    CENTRAL LINE: [ ] YES [ ] NO  LOCATION:   DATE INSERTED:    COE: [ ] YES [ ] NO    DATE INSERTED:    A-LINE:  [ ] YES [ ] NO  LOCATION:   DATE INSERTED:    PMH/Social Hx/Fam Hx -reviewed admission note, no change since admission  PAST MEDICAL & SURGICAL HISTORY:  Alzheimer disease  Parkinson disease  Respiratory failure  Hypertension  Schizophrenia  Diabetic coma  Diabetes mellitus  S/P percutaneous endoscopic gastrostomy (PEG) tube placement  H/O tracheostomy      T(C): 35.9 (10-27-19 @ 09:00), Max: 39.2 (10-26-19 @ 22:44)  HR: 63 (10-27-19 @ 14:00)  BP: 95/53 (10-27-19 @ 14:00)  BP(mean): 63 (10-27-19 @ 14:00)  ABP: --  ABP(mean): --  RR: 0 (10-27-19 @ 14:00)  SpO2: 100% (10-27-19 @ 14:00)  Wt(kg): --    ABG - ( 26 Oct 2019 23:42 )  pH, Arterial: 7.38  pH, Blood: x     /  pCO2: 49    /  pO2: 140   / HCO3: 28    / Base Excess: 2.6   /  SaO2: 99                    10-26 @ 07:01  -  10-27 @ 07:00  --------------------------------------------------------  IN: 1539 mL / OUT: 700 mL / NET: 839 mL            PHYSICAL EXAM:    GENERAL: No signs of distress, comfortable  HEAD: Atraumatic, Normocephalic  EYES: EOMI, PERRLA  ENMT: No erythema, exudates, or enlargement, Moist mucous membranes  NECK: Supple, normal appearance, + trach  CHEST/LUNG: No chest deformity, fair bilateral air entry; No rales, rhonchi, wheezing; crackles  HEART: Regular rate and rhythm; No murmurs, rubs, or gallops;   ABDOMEN: Soft, Nontender, Nondistended; Bowel sounds present + PEG  EXTREMITIES:  + Peripheral Pulses, No clubbing, cyanosis, or edema  NERVOUS SYSTEM: + encephalopathy  LYMPH: No lymphadenopathy noted  SKIN: No rashes or lesions; good turgor, warm, dry      LABS:  CBC Full  -  ( 27 Oct 2019 04:21 )  WBC Count : 10.35 K/uL  RBC Count : 4.17 M/uL  Hemoglobin : 10.5 g/dL  Hematocrit : 36.3 %  Platelet Count - Automated : 157 K/uL  Mean Cell Volume : 87.1 fl  Mean Cell Hemoglobin : 25.2 pg  Mean Cell Hemoglobin Concentration : 28.9 gm/dL  Auto Neutrophil # : 7.69 K/uL  Auto Lymphocyte # : 2.28 K/uL  Auto Monocyte # : 0.35 K/uL  Auto Eosinophil # : 0.00 K/uL  Auto Basophil # : 0.01 K/uL  Auto Neutrophil % : 74.3 %  Auto Lymphocyte % : 22.0 %  Auto Monocyte % : 3.4 %  Auto Eosinophil % : 0.0 %  Auto Basophil % : 0.1 %    10-27    159<H>  |  127<H>  |  65<H>  ----------------------------<  97  3.6   |  30  |  1.70<H>    Ca    8.1<L>      27 Oct 2019 13:38  Phos  2.4     10-27  Mg     2.9     10-27    TPro  5.8<L>  /  Alb  2.3<L>  /  TBili  0.3  /  DBili  x   /  AST  26  /  ALT  29  /  AlkPhos  64  10-27    PT/INR - ( 26 Oct 2019 22:21 )   PT: 10.9 sec;   INR: 0.98 ratio         PTT - ( 26 Oct 2019 22:21 )  PTT:30.8 sec  Urinalysis Basic - ( 26 Oct 2019 22:41 )    Color: Yellow / Appearance: very cloudy / S.010 / pH: x  Gluc: x / Ketone: Trace  / Bili: Negative / Urobili: Negative   Blood: x / Protein: 30 mg/dL / Nitrite: Negative   Leuk Esterase: Trace / RBC: 2-5 /HPF / WBC 6-10 /HPF   Sq Epi: x / Non Sq Epi: Few /HPF / Bacteria: Many /HPF          RADIOLOGY & ADDITIONAL STUDIES REVIEWED     CXR:< from: Xray Chest 1 View-PORTABLE IMMEDIATE (10.26.19 @ 21:20) >    EXAM:  XR CHEST PORTABLE IMMED 1V                            PROCEDURE DATE:  10/26/2019          INTERPRETATION:  INDICATION: fever    PRIORS: 2019    VIEWS: Portable AP radiography of the chest performed.    FINDINGS: Heart size appears within normal limits. No superior   mediastinal widening is identified. A midline tracheostomy is noted.   Elevation of the right hemidiaphragm is noted. Stable band type opacity   within the right basilar region likely related to platelike atelectasis.   No evidence for focal infiltrate or lobar consolidation. No pulmonary   edema is identified. There is no evidence for pleural effusion or   pneumothorax. No mediastinal shift is noted. No acute osseous fractures   demonstrated.    IMPRESSION: No evidence for interval development of focal infiltrate or   lobar consolidation. Band type opacity within the right basilar region   likely related to platelike atelectasis secondary to elevation of the   right hemidiaphragm.                LUCI FELIZ   This document has been electronically signed. Oct 27 2019  8:36AM                < end of copied text >      IMPRESSION:  PAST MEDICAL & SURGICAL HISTORY:  Alzheimer disease  Parkinson disease  Respiratory failure  Hypertension  Schizophrenia  Diabetic coma  Diabetes mellitus  S/P percutaneous endoscopic gastrostomy (PEG) tube placement  H/O tracheostomy   p/w       IMP: This is a 64 years old Female with PMH Alzheimer's dementia , CKD 3, DM, HTN, non-verbal Parkinson's disease, chronic respiratory failure s/p Tracheostomy, dysphagia s/p peg tube, major depressive disorder,  sent from [facility] with complain of fever of 102F since 1 day.     Problem/Plan - 1:  ·  Problem: Hyperglycemic hyperosmolar nonketotic coma.  Plan: p/w BS of 800 with normal anion gap and normal PH, encephalopathy likely HHS and metabolic encephalopathy  s/p 3L NS bolus and 10 U of Regular insulin  c/w aggressive IV hydration and insulin drip as per protocol  BMP q 4hr  started 1/2 NS with potassium  MOnitor electrolytes  accuchecks q 1 hr  F/u HBA1C  Endo consult Dr. Lopez.      Problem/Plan - 2:  ·  Problem: Sepsis secondary to UTI.  Plan: p/w Fever of 101 with UA positive for UTI, with WBC 13.33  c/w zosyn  F/u BC and UC.      Problem/Plan - 3:  ·  Problem: Hypernatremia.  Plan: Na: 148 likely due to dehydration  s/p IV hydration  F/u BMP.      Problem/Plan - 4:  ·  Problem: LAYLA (acute kidney injury).  Plan: p/w BUN/CR: 111/3.81 ( baseline Cr:0.82)  likely prerenal azotemia  F/u urine electrolytes  F/u ultrasound Kidney  c/w coe catheter  c/w IV hydration  F/u BMP.      Problem/Plan - 5:  ·  Problem: Alzheimer disease.  Plan: Not verbal, currently encephalopathic.        GOALS OF CARE DISCUSSION WITH PATIENT/FAMILY/PROXY/ icu team on rounds    CRITICAL CARE TIME SPENT: 35 minutes

## 2019-10-27 NOTE — H&P ADULT - PROBLEM SELECTOR PROBLEM 4
Chronic respiratory failure requiring continuous mechanical ventilation through tracheostomy LAYLA (acute kidney injury)

## 2019-10-27 NOTE — H&P ADULT - ATTENDING COMMENTS
Patient is a 65 y/o female with PMH of DM, HTN, Alzhiemer's  dementia, Parkinson's disease and CKD 3 admitted with chief complaint of fever ( 102.6 ). She has severe hyperglycemia ( 800 mg/dl ) but only trace ketones in the serum. She has a normal anion gap. Her urinalysis is significant for a cloudy appearance with many bacteria. The patient also has mild hypernatremia likely due febrile illness with insensible losses + poor kidney function. She will be started on an insulin drip and continue to receive aggressive resuscitation to correct fluid and free water deficits. Will treat with antibiotics. I reviewed all laboratory and roentgenographic data and any previous medical record from [facility] that existed. I also discussed the case with the ED attending or referring physician. Dx- hyperglycemic non ketotic coma, r/o urinary tract infection. Critical care time 40 minutes. Patient is a 65 y/o female with PMH of DM, HTN, Alzhiemer's  dementia, Parkinson's disease and CKD 3 admitted with chief complaint of fever ( 102.6 ). She has severe hyperglycemia ( 800 mg/dl ) but only trace ketones in the serum. She has a normal anion gap. Her urinalysis is significant for a cloudy appearance with many bacteria. The patient also has mild hypernatremia likely due febrile illness with insensible losses + poor kidney function. She will be started on an insulin drip and continue to receive aggressive resuscitation to correct fluid and free water deficits. Will treat with antibiotics. I reviewed all laboratory and roentgenographic data and any previous medical record from Counts include 234 beds at the [facility] that existed. I also discussed the case with the ED attending or referring physician. Continue Keppra. Dx- hyperglycemic non ketotic coma, r/o urinary tract infection. Critical care time 40 minutes. Patient is a 65 y/o female with PMH of DM, HTN, Alzhiemer's  dementia, Parkinson's disease and CKD 3 admitted with chief complaint of fever ( 102.6 ). She has severe hyperglycemia ( 800 mg/dl ) but only trace ketones in the serum. She has a normal anion gap. Her urinalysis is significant for a cloudy appearance with many bacteria. The patient also has mild hypernatremia likely due febrile illness with insensible losses + poor kidney function. She will be started on an insulin drip and continue to receive aggressive resuscitation to correct fluid and free water deficits. Will treat with antibiotics. I reviewed all laboratory and roentgenographic data and [facility] I also discussed the case with the ED attending or referring physician. Continue Keppra. Begin enteral nutrition therapy via PEG. Dx- hyperglycemic non ketotic coma, r/o urinary tract infection. Critical care time 40 minutes.

## 2019-10-27 NOTE — H&P ADULT - ASSESSMENT
64 years old Female with PMH Alzheimer's dementia , CKD 3, DM, HTN, non-verbal Parkinson's disease, chronic respiratory failure s/p Tracheostomy, dysphagia s/p peg tube, major depressive disorder,  sent from [facility] with complain of fever of 102F since 1 day.

## 2019-10-28 LAB
ALBUMIN SERPL ELPH-MCNC: 2.1 G/DL — LOW (ref 3.5–5)
ALP SERPL-CCNC: 55 U/L — SIGNIFICANT CHANGE UP (ref 40–120)
ALT FLD-CCNC: 32 U/L DA — SIGNIFICANT CHANGE UP (ref 10–60)
ANION GAP SERPL CALC-SCNC: 2 MMOL/L — LOW (ref 5–17)
ANION GAP SERPL CALC-SCNC: 3 MMOL/L — LOW (ref 5–17)
ANION GAP SERPL CALC-SCNC: 5 MMOL/L — SIGNIFICANT CHANGE UP (ref 5–17)
AST SERPL-CCNC: 58 U/L — HIGH (ref 10–40)
BASOPHILS # BLD AUTO: 0.02 K/UL — SIGNIFICANT CHANGE UP (ref 0–0.2)
BASOPHILS NFR BLD AUTO: 0.1 % — SIGNIFICANT CHANGE UP (ref 0–2)
BILIRUB SERPL-MCNC: 0.3 MG/DL — SIGNIFICANT CHANGE UP (ref 0.2–1.2)
BUN SERPL-MCNC: 33 MG/DL — HIGH (ref 7–18)
BUN SERPL-MCNC: 35 MG/DL — HIGH (ref 7–18)
BUN SERPL-MCNC: 42 MG/DL — HIGH (ref 7–18)
CALCIUM SERPL-MCNC: 7.5 MG/DL — LOW (ref 8.4–10.5)
CALCIUM SERPL-MCNC: 7.8 MG/DL — LOW (ref 8.4–10.5)
CALCIUM SERPL-MCNC: 7.9 MG/DL — LOW (ref 8.4–10.5)
CHLORIDE SERPL-SCNC: 124 MMOL/L — HIGH (ref 96–108)
CHLORIDE SERPL-SCNC: 126 MMOL/L — HIGH (ref 96–108)
CHLORIDE SERPL-SCNC: 126 MMOL/L — HIGH (ref 96–108)
CO2 SERPL-SCNC: 23 MMOL/L — SIGNIFICANT CHANGE UP (ref 22–31)
CO2 SERPL-SCNC: 25 MMOL/L — SIGNIFICANT CHANGE UP (ref 22–31)
CO2 SERPL-SCNC: 28 MMOL/L — SIGNIFICANT CHANGE UP (ref 22–31)
CREAT SERPL-MCNC: 1.09 MG/DL — SIGNIFICANT CHANGE UP (ref 0.5–1.3)
CREAT SERPL-MCNC: 1.16 MG/DL — SIGNIFICANT CHANGE UP (ref 0.5–1.3)
CREAT SERPL-MCNC: 1.35 MG/DL — HIGH (ref 0.5–1.3)
EOSINOPHIL # BLD AUTO: 0.04 K/UL — SIGNIFICANT CHANGE UP (ref 0–0.5)
EOSINOPHIL NFR BLD AUTO: 0.3 % — SIGNIFICANT CHANGE UP (ref 0–6)
GLUCOSE SERPL-MCNC: 197 MG/DL — HIGH (ref 70–99)
GLUCOSE SERPL-MCNC: 220 MG/DL — HIGH (ref 70–99)
GLUCOSE SERPL-MCNC: 272 MG/DL — HIGH (ref 70–99)
HCT VFR BLD CALC: 35.7 % — SIGNIFICANT CHANGE UP (ref 34.5–45)
HGB BLD-MCNC: 10.4 G/DL — LOW (ref 11.5–15.5)
IMM GRANULOCYTES NFR BLD AUTO: 0.4 % — SIGNIFICANT CHANGE UP (ref 0–1.5)
LYMPHOCYTES # BLD AUTO: 26.4 % — SIGNIFICANT CHANGE UP (ref 13–44)
LYMPHOCYTES # BLD AUTO: 3.6 K/UL — HIGH (ref 1–3.3)
MAGNESIUM SERPL-MCNC: 2.6 MG/DL — SIGNIFICANT CHANGE UP (ref 1.6–2.6)
MCHC RBC-ENTMCNC: 25.7 PG — LOW (ref 27–34)
MCHC RBC-ENTMCNC: 29.1 GM/DL — LOW (ref 32–36)
MCV RBC AUTO: 88.1 FL — SIGNIFICANT CHANGE UP (ref 80–100)
MONOCYTES # BLD AUTO: 0.49 K/UL — SIGNIFICANT CHANGE UP (ref 0–0.9)
MONOCYTES NFR BLD AUTO: 3.6 % — SIGNIFICANT CHANGE UP (ref 2–14)
NEUTROPHILS # BLD AUTO: 9.43 K/UL — HIGH (ref 1.8–7.4)
NEUTROPHILS NFR BLD AUTO: 69.2 % — SIGNIFICANT CHANGE UP (ref 43–77)
NRBC # BLD: 0 /100 WBCS — SIGNIFICANT CHANGE UP (ref 0–0)
PHOSPHATE SERPL-MCNC: 2.2 MG/DL — LOW (ref 2.5–4.5)
PLATELET # BLD AUTO: 152 K/UL — SIGNIFICANT CHANGE UP (ref 150–400)
POTASSIUM SERPL-MCNC: 4.3 MMOL/L — SIGNIFICANT CHANGE UP (ref 3.5–5.3)
POTASSIUM SERPL-MCNC: 4.6 MMOL/L — SIGNIFICANT CHANGE UP (ref 3.5–5.3)
POTASSIUM SERPL-MCNC: 5.1 MMOL/L — SIGNIFICANT CHANGE UP (ref 3.5–5.3)
POTASSIUM SERPL-SCNC: 4.3 MMOL/L — SIGNIFICANT CHANGE UP (ref 3.5–5.3)
POTASSIUM SERPL-SCNC: 4.6 MMOL/L — SIGNIFICANT CHANGE UP (ref 3.5–5.3)
POTASSIUM SERPL-SCNC: 5.1 MMOL/L — SIGNIFICANT CHANGE UP (ref 3.5–5.3)
PROT SERPL-MCNC: 5.5 G/DL — LOW (ref 6–8.3)
RBC # BLD: 4.05 M/UL — SIGNIFICANT CHANGE UP (ref 3.8–5.2)
RBC # FLD: 14.7 % — HIGH (ref 10.3–14.5)
SODIUM SERPL-SCNC: 154 MMOL/L — HIGH (ref 135–145)
WBC # BLD: 13.63 K/UL — HIGH (ref 3.8–10.5)
WBC # FLD AUTO: 13.63 K/UL — HIGH (ref 3.8–10.5)

## 2019-10-28 PROCEDURE — 76770 US EXAM ABDO BACK WALL COMP: CPT | Mod: 26

## 2019-10-28 RX ORDER — CLONAZEPAM 1 MG
0.5 TABLET ORAL EVERY 8 HOURS
Refills: 0 | Status: DISCONTINUED | OUTPATIENT
Start: 2019-10-28 | End: 2019-10-28

## 2019-10-28 RX ORDER — MORPHINE SULFATE 50 MG/1
1 CAPSULE, EXTENDED RELEASE ORAL ONCE
Refills: 0 | Status: DISCONTINUED | OUTPATIENT
Start: 2019-10-28 | End: 2019-10-28

## 2019-10-28 RX ORDER — POTASSIUM PHOSPHATE, MONOBASIC POTASSIUM PHOSPHATE, DIBASIC 236; 224 MG/ML; MG/ML
15 INJECTION, SOLUTION INTRAVENOUS ONCE
Refills: 0 | Status: COMPLETED | OUTPATIENT
Start: 2019-10-28 | End: 2019-10-28

## 2019-10-28 RX ADMIN — Medication 4: at 15:11

## 2019-10-28 RX ADMIN — Medication 1 MILLIGRAM(S): at 05:37

## 2019-10-28 RX ADMIN — Medication 2: at 05:30

## 2019-10-28 RX ADMIN — Medication 100 MILLIGRAM(S): at 05:37

## 2019-10-28 RX ADMIN — Medication 6: at 10:52

## 2019-10-28 RX ADMIN — PIPERACILLIN AND TAZOBACTAM 25 GRAM(S): 4; .5 INJECTION, POWDER, LYOPHILIZED, FOR SOLUTION INTRAVENOUS at 05:38

## 2019-10-28 RX ADMIN — LEVETIRACETAM 500 MILLIGRAM(S): 250 TABLET, FILM COATED ORAL at 05:39

## 2019-10-28 RX ADMIN — MORPHINE SULFATE 1 MILLIGRAM(S): 50 CAPSULE, EXTENDED RELEASE ORAL at 15:14

## 2019-10-28 RX ADMIN — PIPERACILLIN AND TAZOBACTAM 25 GRAM(S): 4; .5 INJECTION, POWDER, LYOPHILIZED, FOR SOLUTION INTRAVENOUS at 14:59

## 2019-10-28 RX ADMIN — LEVETIRACETAM 500 MILLIGRAM(S): 250 TABLET, FILM COATED ORAL at 18:36

## 2019-10-28 RX ADMIN — Medication 4: at 02:18

## 2019-10-28 RX ADMIN — Medication 1 MILLIGRAM(S): at 16:02

## 2019-10-28 RX ADMIN — HEPARIN SODIUM 5000 UNIT(S): 5000 INJECTION INTRAVENOUS; SUBCUTANEOUS at 05:37

## 2019-10-28 RX ADMIN — MORPHINE SULFATE 1 MILLIGRAM(S): 50 CAPSULE, EXTENDED RELEASE ORAL at 15:46

## 2019-10-28 RX ADMIN — ATORVASTATIN CALCIUM 20 MILLIGRAM(S): 80 TABLET, FILM COATED ORAL at 22:05

## 2019-10-28 RX ADMIN — HEPARIN SODIUM 5000 UNIT(S): 5000 INJECTION INTRAVENOUS; SUBCUTANEOUS at 22:05

## 2019-10-28 RX ADMIN — Medication 1 MILLIGRAM(S): at 22:05

## 2019-10-28 RX ADMIN — Medication 2: at 22:05

## 2019-10-28 RX ADMIN — PANTOPRAZOLE SODIUM 40 MILLIGRAM(S): 20 TABLET, DELAYED RELEASE ORAL at 12:00

## 2019-10-28 RX ADMIN — Medication 4: at 18:57

## 2019-10-28 RX ADMIN — CHLORHEXIDINE GLUCONATE 1 APPLICATION(S): 213 SOLUTION TOPICAL at 12:00

## 2019-10-28 RX ADMIN — HEPARIN SODIUM 5000 UNIT(S): 5000 INJECTION INTRAVENOUS; SUBCUTANEOUS at 15:00

## 2019-10-28 RX ADMIN — POTASSIUM PHOSPHATE, MONOBASIC POTASSIUM PHOSPHATE, DIBASIC 62.5 MILLIMOLE(S): 236; 224 INJECTION, SOLUTION INTRAVENOUS at 10:10

## 2019-10-28 RX ADMIN — PIPERACILLIN AND TAZOBACTAM 25 GRAM(S): 4; .5 INJECTION, POWDER, LYOPHILIZED, FOR SOLUTION INTRAVENOUS at 22:06

## 2019-10-28 RX ADMIN — DEXTROSE MONOHYDRATE, SODIUM CHLORIDE, AND POTASSIUM CHLORIDE 100 MILLILITER(S): 50; .745; 4.5 INJECTION, SOLUTION INTRAVENOUS at 05:39

## 2019-10-28 NOTE — PROGRESS NOTE ADULT - SUBJECTIVE AND OBJECTIVE BOX
INTERVAL HPI/OVERNIGHT EVENTS: ***    PRESSORS: [ ] YES [ ] NO  WHICH:    ANTIBIOTICS:                  DATE STARTED:  ANTIBIOTICS:                  DATE STARTED:  ANTIBIOTICS:                  DATE STARTED:    Antimicrobial:  piperacillin/tazobactam IVPB.. 3.375 Gram(s) IV Intermittent every 8 hours    Cardiovascular:  metoprolol tartrate 100 milliGRAM(s) Oral two times a day    Pulmonary:    Hematalogic:  heparin  Injectable 5000 Unit(s) SubCutaneous every 8 hours    Other:  atorvastatin 20 milliGRAM(s) Oral at bedtime  chlorhexidine 4% Liquid 1 Application(s) Topical daily  insulin lispro (HumaLOG) corrective regimen sliding scale   SubCutaneous every 4 hours  levETIRAcetam  Oral Liquid - Peds 500 milliGRAM(s) Oral every 12 hours  LORazepam     Tablet 1 milliGRAM(s) Oral once  pantoprazole  Injectable 40 milliGRAM(s) IV Push daily  sodium chloride 0.9% with potassium chloride 20 mEq/L 1000 milliLiter(s) IV Continuous <Continuous>    atorvastatin 20 milliGRAM(s) Oral at bedtime  chlorhexidine 4% Liquid 1 Application(s) Topical daily  heparin  Injectable 5000 Unit(s) SubCutaneous every 8 hours  insulin lispro (HumaLOG) corrective regimen sliding scale   SubCutaneous every 4 hours  levETIRAcetam  Oral Liquid - Peds 500 milliGRAM(s) Oral every 12 hours  LORazepam     Tablet 1 milliGRAM(s) Oral once  metoprolol tartrate 100 milliGRAM(s) Oral two times a day  pantoprazole  Injectable 40 milliGRAM(s) IV Push daily  piperacillin/tazobactam IVPB.. 3.375 Gram(s) IV Intermittent every 8 hours  sodium chloride 0.9% with potassium chloride 20 mEq/L 1000 milliLiter(s) IV Continuous <Continuous>    Drug Dosing Weight  Height (cm): 170.18 (27 Sep 2019 14:38)  Weight (kg): 56.1 (27 Oct 2019 02:00)  BMI (kg/m2): 19.4 (27 Oct 2019 02:00)  BSA (m2): 1.65 (27 Oct 2019 02:00)    CENTRAL LINE: [ ] YES [ ] NO  LOCATION:   DATE INSERTED:  REMOVE: [ ] YES [ ] NO  EXPLAIN:    HUSAIN: [ ] YES [ ] NO    DATE INSERTED:  REMOVE:  [ ] YES [ ] NO  EXPLAIN:    A-LINE:  [ ] YES [ ] NO  LOCATION:   DATE INSERTED:  REMOVE:  [ ] YES [ ] NO  EXPLAIN:    PMH -reviewed admission note, no change since admission  Heart faliure: acute [ ] chronic [ ] acute or chronic [ ] diastolic [ ] systolic [ ] combied systolic and diastolic[ ]  LAYLA: ATN[ ] renal medullary necrosis [ ] CKD I [ ]CKDII [ ]CKD III [ ]CKD IV [ ]CKD V [ ]Other pathological lesions [ ]  Abdominal Nutrition Status: malnutrition [ ] cachexia [ ] morbid obesity/BMI=40 [ ] Supplement ordered [___________]     ICU Vital Signs Last 24 Hrs  T(C): 36.2 (28 Oct 2019 04:48), Max: 36.8 (27 Oct 2019 20:00)  T(F): 97.2 (28 Oct 2019 04:48), Max: 98.3 (27 Oct 2019 20:00)  HR: 71 (28 Oct 2019 04:00) (53 - 76)  BP: 101/61 (28 Oct 2019 04:00) (91/55 - 119/50)  BP(mean): 70 (28 Oct 2019 04:00) (62 - 76)  ABP: --  ABP(mean): --  RR: 18 (28 Oct 2019 04:00) (14 - 30)  SpO2: 100% (28 Oct 2019 04:00) (96% - 100%)      ABG - ( 26 Oct 2019 23:42 )  pH, Arterial: 7.38  pH, Blood: x     /  pCO2: 49    /  pO2: 140   / HCO3: 28    / Base Excess: 2.6   /  SaO2: 99                    10-26 @ 07:01  -  10-27 @ 07:00  --------------------------------------------------------  IN: 1539 mL / OUT: 700 mL / NET: 839 mL            >>> <<<    PHYSICAL EXAM:    GENERAL: NAD, well-groomed, well-developed  HEAD:  Atraumatic, Normocephalic  EYES: EOMI, PERRLA, conjunctiva and sclera clear  ENMT: No tonsillar erythema, exudates, or enlargement; Moist mucous membranes, Good dentition, No lesions  NECK: Supple, normal appearance, No JVD; Normal thyroid; Trachea midline  NERVOUS SYSTEM:  Alert & Oriented X3, Good concentration; Motor Strength 5/5 B/L upper and lower extremities; DTRs 2+ intact and symmetric  CHEST/LUNG: No chest deformity; Normal percussion bilaterally; No rales, rhonchi, wheezing   HEART: Regular rate and rhythm; No murmurs, rubs, or gallops  ABDOMEN: Soft, Nontender, Nondistended; Bowel sounds present  EXTREMITIES:  2+ Peripheral Pulses, No clubbing, cyanosis, or edema  LYMPH: No lymphadenopathy noted  SKIN: No rashes or lesions; Good capillary refill      LABS:  CBC Full  -  ( 28 Oct 2019 04:49 )  WBC Count : 13.63 K/uL  RBC Count : 4.05 M/uL  Hemoglobin : 10.4 g/dL  Hematocrit : 35.7 %  Platelet Count - Automated : 152 K/uL  Mean Cell Volume : 88.1 fl  Mean Cell Hemoglobin : 25.7 pg  Mean Cell Hemoglobin Concentration : 29.1 gm/dL  Auto Neutrophil # : 9.43 K/uL  Auto Lymphocyte # : 3.60 K/uL  Auto Monocyte # : 0.49 K/uL  Auto Eosinophil # : 0.04 K/uL  Auto Basophil # : 0.02 K/uL  Auto Neutrophil % : 69.2 %  Auto Lymphocyte % : 26.4 %  Auto Monocyte % : 3.6 %  Auto Eosinophil % : 0.3 %  Auto Basophil % : 0.1 %    10-28    154<H>  |  124<H>  |  42<H>  ----------------------------<  197<H>  4.3   |  28  |  1.35<H>    Ca    7.5<L>      28 Oct 2019 04:49  Phos  2.2     10-28  Mg     2.6     10-28    TPro  5.5<L>  /  Alb  2.1<L>  /  TBili  0.3  /  DBili  x   /  AST  58<H>  /  ALT  32  /  AlkPhos  55  10-28    PT/INR - ( 26 Oct 2019 22:21 )   PT: 10.9 sec;   INR: 0.98 ratio         PTT - ( 26 Oct 2019 22:21 )  PTT:30.8 sec  Urinalysis Basic - ( 26 Oct 2019 22:41 )    Color: Yellow / Appearance: very cloudy / S.010 / pH: x  Gluc: x / Ketone: Trace  / Bili: Negative / Urobili: Negative   Blood: x / Protein: 30 mg/dL / Nitrite: Negative   Leuk Esterase: Trace / RBC: 2-5 /HPF / WBC 6-10 /HPF   Sq Epi: x / Non Sq Epi: Few /HPF / Bacteria: Many /HPF          RADIOLOGY & ADDITIONAL STUDIES REVIEWED:  ***    [ ]GOALS OF CARE DISCUSSION WITH PATIENT/FAMILY/PROXY:    CRITICAL CARE TIME SPENT: 35 minutes INTERVAL HPI/OVERNIGHT EVENTS: Pt is 64 female admitted to icu for sepsis s/s to uti, [place] and hypernatremia. Pt is now off insulin drip started on sliding scale.     PRESSORS: [ ] YES [x ] NO  WHICH:    Antimicrobial:  piperacillin/tazobactam IVPB.. 3.375 Gram(s) IV Intermittent every 8 hours    Cardiovascular:  metoprolol tartrate 100 milliGRAM(s) Oral two times a day    Pulmonary:    Hematalogic:  heparin  Injectable 5000 Unit(s) SubCutaneous every 8 hours    Other:  atorvastatin 20 milliGRAM(s) Oral at bedtime  chlorhexidine 4% Liquid 1 Application(s) Topical daily  insulin lispro (HumaLOG) corrective regimen sliding scale   SubCutaneous every 4 hours  levETIRAcetam  Oral Liquid - Peds 500 milliGRAM(s) Oral every 12 hours  LORazepam     Tablet 1 milliGRAM(s) Oral once  pantoprazole  Injectable 40 milliGRAM(s) IV Push daily  sodium chloride 0.9% with potassium chloride 20 mEq/L 1000 milliLiter(s) IV Continuous <Continuous>    atorvastatin 20 milliGRAM(s) Oral at bedtime  chlorhexidine 4% Liquid 1 Application(s) Topical daily  heparin  Injectable 5000 Unit(s) SubCutaneous every 8 hours  insulin lispro (HumaLOG) corrective regimen sliding scale   SubCutaneous every 4 hours  levETIRAcetam  Oral Liquid - Peds 500 milliGRAM(s) Oral every 12 hours  LORazepam     Tablet 1 milliGRAM(s) Oral once  metoprolol tartrate 100 milliGRAM(s) Oral two times a day  pantoprazole  Injectable 40 milliGRAM(s) IV Push daily  piperacillin/tazobactam IVPB.. 3.375 Gram(s) IV Intermittent every 8 hours  sodium chloride 0.9% with potassium chloride 20 mEq/L 1000 milliLiter(s) IV Continuous <Continuous>    Drug Dosing Weight  Height (cm): 170.18 (27 Sep 2019 14:38)  Weight (kg): 56.1 (27 Oct 2019 02:00)  BMI (kg/m2): 19.4 (27 Oct 2019 02:00)  BSA (m2): 1.65 (27 Oct 2019 02:00)    CENTRAL LINE: [ ] YES [ x] NO  LOCATION:   DATE INSERTED:  REMOVE: [ ] YES [ ] NO  EXPLAIN:    HUSAIN: [x ] YES [ ] NO    DATE INSERTED:  REMOVE:  [ ] YES [ ] NO  EXPLAIN:    A-LINE:  [ ] YES [x ] NO  LOCATION:   DATE INSERTED:  REMOVE:  [ ] YES [ ] NO  EXPLAIN:        ICU Vital Signs Last 24 Hrs  T(C): 36.2 (28 Oct 2019 04:48), Max: 36.8 (27 Oct 2019 20:00)  T(F): 97.2 (28 Oct 2019 04:48), Max: 98.3 (27 Oct 2019 20:00)  HR: 71 (28 Oct 2019 04:00) (53 - 76)  BP: 101/61 (28 Oct 2019 04:00) (91/55 - 119/50)  BP(mean): 70 (28 Oct 2019 04:00) (62 - 76)  ABP: --  ABP(mean): --  RR: 18 (28 Oct 2019 04:00) (14 - 30)  SpO2: 100% (28 Oct 2019 04:00) (96% - 100%)      ABG - ( 26 Oct 2019 23:42 )  pH, Arterial: 7.38  pH, Blood: x     /  pCO2: 49    /  pO2: 140   / HCO3: 28    / Base Excess: 2.6   /  SaO2: 99                    10-26 @ 07:01  -  10-27 @ 07:00  --------------------------------------------------------  IN: 1539 mL / OUT: 700 mL / NET: 839 mL            PHYSICAL EXAM:    GENERAL: non verbal female, awake, lying on bed, spontaneous head movement and eye opening  HEAD:  Atraumatic, Normocephalic  EYES: PERRLA, conjunctiva and sclera clear  ENMT: No tonsillar erythema, exudates, or enlargement; Moist mucous membranes, Good dentition, No lesions  NECK: Trach collar in place  NERVOUS SYSTEM:  Alert & Oriented X0, contracted posture  CHEST/LUNG: clear to auscultation b/l  HEART: Regular rate and rhythm; No murmurs, rubs, or gallops  ABDOMEN: Soft, Nontender, Nondistended; Bowel sounds present, PEG in place  EXTREMITIES:  2+ Peripheral Pulses, No clubbing, cyanosis, or edema  LYMPH: No lymphadenopathy noted  SKIN: No rashes or lesions; Good capillary refill      LABS:  CBC Full  -  ( 28 Oct 2019 04:49 )  WBC Count : 13.63 K/uL  RBC Count : 4.05 M/uL  Hemoglobin : 10.4 g/dL  Hematocrit : 35.7 %  Platelet Count - Automated : 152 K/uL  Mean Cell Volume : 88.1 fl  Mean Cell Hemoglobin : 25.7 pg  Mean Cell Hemoglobin Concentration : 29.1 gm/dL  Auto Neutrophil # : 9.43 K/uL  Auto Lymphocyte # : 3.60 K/uL  Auto Monocyte # : 0.49 K/uL  Auto Eosinophil # : 0.04 K/uL  Auto Basophil # : 0.02 K/uL  Auto Neutrophil % : 69.2 %  Auto Lymphocyte % : 26.4 %  Auto Monocyte % : 3.6 %  Auto Eosinophil % : 0.3 %  Auto Basophil % : 0.1 %    10    154<H>  |  124<H>  |  42<H>  ----------------------------<  197<H>  4.3   |  28  |  1.35<H>    Ca    7.5<L>      28 Oct 2019 04:49  Phos  2.2     10-  Mg     2.6     10-28    TPro  5.5<L>  /  Alb  2.1<L>  /  TBili  0.3  /  DBili  x   /  AST  58<H>  /  ALT  32  /  AlkPhos  55  10-    PT/INR - ( 26 Oct 2019 22:21 )   PT: 10.9 sec;   INR: 0.98 ratio         PTT - ( 26 Oct 2019 22:21 )  PTT:30.8 sec  Urinalysis Basic - ( 26 Oct 2019 22:41 )    Color: Yellow / Appearance: very cloudy / S.010 / pH: x  Gluc: x / Ketone: Trace  / Bili: Negative / Urobili: Negative   Blood: x / Protein: 30 mg/dL / Nitrite: Negative   Leuk Esterase: Trace / RBC: 2-5 /HPF / WBC 6-10 /HPF   Sq Epi: x / Non Sq Epi: Few /HPF / Bacteria: Many /HPF          RADIOLOGY & ADDITIONAL STUDIES REVIEWED:  ***    [ ]GOALS OF CARE DISCUSSION WITH PATIENT/FAMILY/PROXY:    CRITICAL CARE TIME SPENT: 35 minutes

## 2019-10-28 NOTE — PROGRESS NOTE ADULT - SUBJECTIVE AND OBJECTIVE BOX
[facility] [place] NEPHROLOGY PRACTICE   MD Barbara Donis D.O. Fatima Sheikh, D.O. Ruoru Wong, PA    From 7 AM - 5 PM:  OFFICE: [phone]  Dr. Irwin cell: [phone]  Dr. Hoffman cell: [phone]  Dr. Purvis cell: [phone]  WENDI Bojorquez cell: [phone]    From 5 PM - 7 AM: Answering Service: [phone]        RENAL FOLLOW UP NOTE  --------------------------------------------------------------------------------  HPI: Pt seen and examined. Non-verbal at baseline. More awake today        PAST HISTORY  --------------------------------------------------------------------------------  No significant changes to PMH, PSH, FHx, SHx, unless otherwise noted    ALLERGIES & MEDICATIONS  --------------------------------------------------------------------------------  Allergies    angiotensin converting enzyme inhibitors (Unknown)    Intolerances      Standing Inpatient Medications  atorvastatin 20 milliGRAM(s) Oral at bedtime  chlorhexidine 4% Liquid 1 Application(s) Topical daily  heparin  Injectable 5000 Unit(s) SubCutaneous every 8 hours  insulin lispro (HumaLOG) corrective regimen sliding scale   SubCutaneous every 4 hours  levETIRAcetam  Oral Liquid - Peds 500 milliGRAM(s) Oral every 12 hours  metoprolol tartrate 100 milliGRAM(s) Oral two times a day  pantoprazole  Injectable 40 milliGRAM(s) IV Push daily  piperacillin/tazobactam IVPB.. 3.375 Gram(s) IV Intermittent every 8 hours    PRN Inpatient Medications      REVIEW OF SYSTEMS  --------------------------------------------------------------------------------  unable to obtain    VITALS/PHYSICAL EXAM  --------------------------------------------------------------------------------  T(C): 36.1 (10-28-19 @ 08:00), Max: 36.8 (10-27-19 @ 20:00)  HR: 77 (10-28-19 @ 13:00) (56 - 77)  BP: 138/90 (10-28-19 @ 13:00) (92/43 - 138/97)  RR: 25 (10-28-19 @ 13:00) (14 - 30)  SpO2: 100% (10-28-19 @ 13:00) (95% - 100%)  Wt(kg): --    Weight (kg): 56.1 (10-27-19 @ 02:00)      10-27-19 @ 07:01  -  10-28-19 @ 07:00  --------------------------------------------------------  IN: 5215 mL / OUT: 1160 mL / NET: 4055 mL    10-28-19 @ 07:01  -  10-28-19 @ 13:09  --------------------------------------------------------  IN: 300 mL / OUT: 145 mL / NET: 155 mL      Physical Exam:  	Gen: NAD, trach   	HEENT: MMM  	Pulm: CTA B/L  	CV: S1S2  	Abd: Soft, +BS   	Ext: No LE edema B/L              : coe in place, draining yellow urine w/ sediment  	Neuro: Awake  	Skin: Warm and dry    LABS/STUDIES  --------------------------------------------------------------------------------              10.4   13.63 >-----------<  152      [10-28-19 @ 04:49]              35.7     154  |  126  |  35  ----------------------------<  272      [10-28-19 @ 10:37]  5.1   |  25  |  1.16        Ca     7.8     [10-28-19 @ 10:37]      Mg     2.6     [10-28-19 @ 04:49]      Phos  2.2     [10-28-19 @ 04:49]    TPro  5.5  /  Alb  2.1  /  TBili  0.3  /  DBili  x   /  AST  58  /  ALT  32  /  AlkPhos  55  [10-28-19 @ 04:49]    PT/INR: PT 10.9 , INR 0.98       [10-26-19 @ 22:21]  PTT: 30.8       [10-26-19 @ 22:21]    Serum Osmolality 402      [10-27-19 @ 00:38]    Creatinine Trend:  SCr 1.16 [10-28 @ 10:37]  SCr 1.35 [10-28 @ 04:49]  SCr 1.55 [10-27 @ 21:41]  SCr 1.28 [10-27 @ 20:52]  SCr 1.70 [10-27 @ 13:38]    Urinalysis - [10-26-19 @ 22:41]      Color Yellow / Appearance very cloudy / SG 1.010 / pH 5.0      Gluc 1000 / Ketone Trace  / Bili Negative / Urobili Negative       Blood Trace / Protein 30 / Leuk Est Trace / Nitrite Negative      RBC 2-5 / WBC 6-10 / Hyaline  / Gran  / Sq Epi  / Non Sq Epi Few / Bacteria Many      Vitamin D (25OH) 33.0      [09-23-19 @ 14:43]  HbA1c 12.0      [10-27-19 @ 12:00]  TSH 0.80      [10-27-19 @ 04:21]  Lipid: chol 150, , HDL 50, LDL 80      [09-23-19 @ 08:36]

## 2019-10-28 NOTE — PROGRESS NOTE ADULT - SUBJECTIVE AND OBJECTIVE BOX
Patient seen and examined at bedside earlier    HPI:  64 years old Female with PMH Alzheimer's dementia , CKD 3, DM, HTN, non-verbal Parkinson's disease, chronic respiratory failure s/p Tracheostomy, dysphagia s/p peg tube, major depressive disorder,  [facility] fever of 102F since 1 day.  Patient is AXO=0, not responding well, opening eyes only on painful stimuli. As per chart review, patient was sent for high grade fever. BS in NH was high in 600s and was given 15U Regular insulin before sending to hospital. Further hx is limited as patient is AXO=0.  In ED, patient's vital signs were remarkable for Temp of 102.6 F, HR: 102, BP: 96/70, RR: 24. Labs were remarkable for BS: 890, Corrected A, BUN/CR: 111/3.81, Acetone: small, Blood gases shows PH: 7.38, UA positive for UTI,  Patient was given 3L NS bolus and 10 Units Regular insulin, BS were still high  Goals of care: As per NH paper and ED provider note: HCP is sister Sharon mead,>> Patient is Full code (27 Oct 2019 00:44)      PAST MEDICAL & SURGICAL HISTORY:  Alzheimer disease  Parkinson disease  Respiratory failure  Hypertension  Schizophrenia  Diabetic coma  Diabetes mellitus  S/P percutaneous endoscopic gastrostomy (PEG) tube placement  H/O tracheostomy      angiotensin converting enzyme inhibitors (Unknown)       MEDICATIONS  (STANDING):  atorvastatin 20 milliGRAM(s) Oral at bedtime  chlorhexidine 4% Liquid 1 Application(s) Topical daily  heparin  Injectable 5000 Unit(s) SubCutaneous every 8 hours  insulin lispro (HumaLOG) corrective regimen sliding scale   SubCutaneous every 4 hours  levETIRAcetam  Oral Liquid - Peds 500 milliGRAM(s) Oral every 12 hours  metoprolol tartrate 100 milliGRAM(s) Oral two times a day  pantoprazole  Injectable 40 milliGRAM(s) IV Push daily  piperacillin/tazobactam IVPB.. 3.375 Gram(s) IV Intermittent every 8 hours    MEDICATIONS  (PRN):      REVIEW OF SYSTEMS: limited from pt 2/2 medical/mental state	    Vital Signs Last 24 Hrs  T(C): 36.1 (28 Oct 2019 08:00), Max: 36.8 (27 Oct 2019 20:00)  T(F): 96.9 (28 Oct 2019 08:00), Max: 98.3 (27 Oct 2019 20:00)  HR: 77 (28 Oct 2019 13:00) (56 - 77)  BP: 138/90 (28 Oct 2019 13:00) (92/43 - 138/97)  BP(mean): 102 (28 Oct 2019 13:00) (55 - 105)  RR: 25 (28 Oct 2019 13:00) (14 - 30)  SpO2: 100% (28 Oct 2019 13:00) (95% - 100%)    PHYSICAL EXAMINATION:   Constitutional: stable ill appearance  HEENT: AT  Neck:  trach intact  Respiratory: bronchial breath sounds. Adequate airflow b/l.   Cardiovascular: sys murmur, S1 & S2 intact, no R/G, 2+ radial pulses b/l  Gastrointestinal: Soft, ND, normoactive b.s.,   Extremities: poor skin turgor/elasticity. warm.   Neurological:  awake.  Crude sensation intact.     Labs and imaging reviewed

## 2019-10-28 NOTE — CONSULT NOTE ADULT - SUBJECTIVE AND OBJECTIVE BOX
[facility] [place] NEPHROLOGY PRACTICE   MD Barbara Donis D.O. Fatima Sheikh, D.O. Ruoro Wong, PA    From 7 AM - 5 PM:  OFFICE: [phone]  Dr. Irwin cell: [phone]  Dr. Hoffman cell: [phone]  Dr. Purvis cell: [phone]  WENDI Bojorquez cell: [phone]    From 5 PM - 7 AM: Answering Service: [phone]      -- INITIAL RENAL CONSULT NOTE  --------------------------------------------------------------------------------  HPI: Ms. Prakash is a 65 yo F with PMH Alzheimer's dementia, DM, HTN, non-verbal Parkinson's disease, chronic respiratory failure s/p Tracheostomy, dysphagia s/p peg tube, major depressive disorder who was sent from [facility] for evaluation of fever of 102F. Pt is lethargic so Hx obtained from chart.         PAST HISTORY  --------------------------------------------------------------------------------  PAST MEDICAL & SURGICAL HISTORY:  Alzheimer disease  Parkinson disease  Respiratory failure  Hypertension  Schizophrenia  Diabetic coma  Diabetes mellitus  S/P percutaneous endoscopic gastrostomy (PEG) tube placement  H/O tracheostomy    FAMILY HISTORY:  No pertinent family history in first degree relatives    PAST SOCIAL HISTORY:    ALLERGIES & MEDICATIONS  --------------------------------------------------------------------------------  Allergies    angiotensin converting enzyme inhibitors (Unknown)    Intolerances      Standing Inpatient Medications  atorvastatin 20 milliGRAM(s) Oral at bedtime  heparin  Injectable 5000 Unit(s) SubCutaneous every 8 hours  insulin regular Infusion 5 Unit(s)/Hr IV Continuous <Continuous>  levETIRAcetam  Oral Liquid - Peds 500 milliGRAM(s) Oral every 12 hours  metoprolol tartrate 100 milliGRAM(s) Oral two times a day  pantoprazole  Injectable 40 milliGRAM(s) IV Push daily  piperacillin/tazobactam IVPB.. 3.375 Gram(s) IV Intermittent every 8 hours  potassium phosphate IVPB 15 milliMole(s) IV Intermittent once  sodium chloride 0.9% with potassium chloride 20 mEq/L 1000 milliLiter(s) IV Continuous <Continuous>    PRN Inpatient Medications      REVIEW OF SYSTEMS  --------------------------------------------------------------------------------  unable to obtain    VITALS/PHYSICAL EXAM  --------------------------------------------------------------------------------  T(C): 35.9 (10-27-19 @ 09:00), Max: 39.2 (10-26-19 @ 22:44)  HR: 63 (10-27-19 @ 14:00) (53 - 102)  BP: 95/53 (10-27-19 @ 14:00) (91/55 - 115/61)  RR: 0 (10-27-19 @ 14:00) (0 - 31)  SpO2: 100% (10-27-19 @ 14:00) (100% - 100%)  Wt(kg): --    Weight (kg): 56.1 (10-27-19 @ 02:00)      10-26-19 @ 07:01  -  10-27-19 @ 07:00  --------------------------------------------------------  IN: 1539 mL / OUT: 700 mL / NET: 839 mL    10-27-19 @ 07:01  -  10-27-19 @ 15:06  --------------------------------------------------------  IN: 1269 mL / OUT: 450 mL / NET: 819 mL      Physical Exam:  	Gen: NAD, dalila   	HEENT: MMM  	Pulm: CTA B/L  	CV: S1S2  	Abd: Soft, +BS   	Ext: No LE edema B/L              : coe in place, draining yellow urine w/ sediment  	Neuro: Awake  	Skin: Warm and dry  	    LABS/STUDIES  --------------------------------------------------------------------------------              10.5   10.35 >-----------<  157      [10-27-19 @ 04:21]              36.3     159  |  127  |  65  ----------------------------<  97      [10-27-19 @ 13:38]  3.6   |  30  |  1.70        Ca     8.1     [10-27-19 @ 13:38]      Mg     2.9     [10-27-19 @ 13:38]      Phos  2.4     [10-27-19 @ 13:38]    TPro  5.8  /  Alb  2.3  /  TBili  0.3  /  DBili  x   /  AST  26  /  ALT  29  /  AlkPhos  64  [10-27-19 @ 04:21]    PT/INR: PT 10.9 , INR 0.98       [10-26-19 @ 22:21]  PTT: 30.8       [10-26-19 @ 22:21]    Serum Osmolality 402      [10-27-19 @ 00:38]    Creatinine Trend:  SCr 1.70 [10-27 @ 13:38]  SCr 2.53 [10-27 @ 04:21]  SCr 3.81 [10-26 @ 22:21]  SCr 0.82 [09-30 @ 06:37]  SCr 0.73 [09-29 @ 12:35]    Urinalysis - [10-26-19 @ 22:41]      Color Yellow / Appearance very cloudy / SG 1.010 / pH 5.0      Gluc 1000 / Ketone Trace  / Bili Negative / Urobili Negative       Blood Trace / Protein 30 / Leuk Est Trace / Nitrite Negative      RBC 2-5 / WBC 6-10 / Hyaline  / Gran  / Sq Epi  / Non Sq Epi Few / Bacteria Many      Vitamin D (25OH) 33.0      [09-23-19 @ 14:43]  HbA1c 12.0      [10-27-19 @ 12:00]  TSH 0.80      [10-27-19 @ 04:21]  Lipid: chol 150, , HDL 50, LDL 80      [09-23-19 @ 08:36]    HCV 0.15, Nonreact      [09-22-19 @ 22:03]
Time of visit:    CHIEF COMPLAINT: Patient is a 64y old  Female who presents with a chief complaint of Fever (28 Oct 2019 13:42)      HPI:  64 years old Female with PMH Alzheimer's dementia , CKD 3, DM, HTN, non-verbal Parkinson's disease, chronic respiratory failure s/p Tracheostomy, dysphagia s/p peg tube, major depressive disorder,  sent from [facility] with complain of fever of 102F since 1 day.    Patient is AXO=0, not responding well, opening eyes only on painful stimuli. As per chart review, patient was sent for high grade fever. BS in NH was high in 600s and was given 15U Regular insulin before sending to hospital. Further hx is limited as patient is AXO=0.    In ED, patient's vital signs were remarkable for Temp of 102.6 F, HR: 102, BP: 96/70, RR: 24. Labs were remarkable for BS: 890, Corrected A, BUN/CR: 111/3.81, Acetone: small, Blood gases shows PH: 7.38, UA positive for UTI,  Patient was given 3L NS bolus and 10 Units Regular insulin, BS were still high    Goals of care: As per NH paper and ED provider note: HCP is sister Sharon mead,>> Patient is Full code (27 Oct 2019 00:44)   Patient seen and examined.     PAST MEDICAL & SURGICAL HISTORY:  Alzheimer disease  Parkinson disease  Respiratory failure  Hypertension  Schizophrenia  Diabetic coma  Diabetes mellitus  S/P percutaneous endoscopic gastrostomy (PEG) tube placement  H/O tracheostomy      Allergies    angiotensin converting enzyme inhibitors (Unknown)    Intolerances        MEDICATIONS  (STANDING):  atorvastatin 20 milliGRAM(s) Oral at bedtime  chlorhexidine 4% Liquid 1 Application(s) Topical daily  heparin  Injectable 5000 Unit(s) SubCutaneous every 8 hours  insulin lispro (HumaLOG) corrective regimen sliding scale   SubCutaneous every 4 hours  levETIRAcetam  Oral Liquid - Peds 500 milliGRAM(s) Oral every 12 hours  LORazepam     Tablet 1 milliGRAM(s) Oral three times a day  metoprolol tartrate 100 milliGRAM(s) Oral two times a day  pantoprazole  Injectable 40 milliGRAM(s) IV Push daily  piperacillin/tazobactam IVPB.. 3.375 Gram(s) IV Intermittent every 8 hours      MEDICATIONS  (PRN):   Medications up to date at time of exam.    Medications up to date at time of exam.    FAMILY HISTORY:  No pertinent family history in first degree relatives      SOCIAL HISTORY unknown  Smoking History: [   ] smoking/smoke exposure, [   ] former smoker  Living Condition: [   ] apartment, [   ] private house  Work History:   Travel History: denies recent travel  Illicit Substance Use: denies  Alcohol Use: denies    REVIEW OF SYSTEMS: pat is encephalopathic    CONSTITUTIONAL:  denies fevers, chills, sweats, weight loss    HEENT:  denies diplopia or blurred vision, sore throat or runny nose.    CARDIOVASCULAR:  denies pressure, squeezing, tightness, or heaviness about the chest; no palpitations.    RESPIRATORY:  denies SOB, cough, DIXON, wheezing.    GASTROINTESTINAL:  denies abdominal pain, nausea, vomiting or diarrhea.    GENITOURINARY: denies dysuria, frequency or urgency.    NEUROLOGIC:  denies numbness, tingling, seizures or weakness.    PSYCHIATRIC:  denies disorder of thought or mood.    MSK: denies swelling, redness      PHYSICAL EXAMINATION:    GENERAL: The patient is a well-developed, well-nourished, in no apparent distress.     Vital Signs Last 24 Hrs  T(C): 36.6 (28 Oct 2019 16:05), Max: 36.8 (27 Oct 2019 20:00)  T(F): 97.9 (28 Oct 2019 16:05), Max: 98.3 (27 Oct 2019 20:00)  HR: 75 (28 Oct 2019 19:00) (56 - 77)  BP: 103/70 (28 Oct 2019 19:00) (91/62 - 189/130)  BP(mean): 79 (28 Oct 2019 19:00) (55 - 143)  RR: 26 (28 Oct 2019 19:00) (14 - 31)  SpO2: 100% (28 Oct 2019 19:00) (95% - 100%)   (if applicable)    Chest Tube (if applicable)    HEENT: Head is normocephalic and atraumatic. Extraocular muscles are intact. Mucous membranes are moist.     NECK: Supple, no palpable adenopathy. + Trach    LUNGS: Clear to auscultation, no wheezing, rales, or rhonchi.    HEART: Regular rate and rhythm without murmur.    ABDOMEN: Soft, nontender, and nondistended.  No hepatosplenomegaly is noted. + PEG    RENAL: No difficulty voiding, no pelvic pain    EXTREMITIES: Without any cyanosis, clubbing, rash, lesions or edema.    NEUROLOGIC: do not follow commands, + metabolic encephalopathy    SKIN: Warm, dry, good turgor.      LABS:                        10.4   13.63 )-----------( 152      ( 28 Oct 2019 04:49 )             35.7     10-    154<H>  |  126<H>  |  33<H>  ----------------------------<  220<H>  4.6   |  23  |  1.09    Ca    7.9<L>      28 Oct 2019 15:21  Phos  2.2     10-28  Mg     2.6     10-28    TPro  5.5<L>  /  Alb  2.1<L>  /  TBili  0.3  /  DBili  x   /  AST  58<H>  /  ALT  32  /  AlkPhos  55  10-28    PT/INR - ( 26 Oct 2019 22:21 )   PT: 10.9 sec;   INR: 0.98 ratio         PTT - ( 26 Oct 2019 22:21 )  PTT:30.8 sec  Urinalysis Basic - ( 26 Oct 2019 22:41 )    Color: Yellow / Appearance: very cloudy / S.010 / pH: x  Gluc: x / Ketone: Trace  / Bili: Negative / Urobili: Negative   Blood: x / Protein: 30 mg/dL / Nitrite: Negative   Leuk Esterase: Trace / RBC: 2-5 /HPF / WBC 6-10 /HPF   Sq Epi: x / Non Sq Epi: Few /HPF / Bacteria: Many /HPF      ABG - ( 26 Oct 2019 23:42 )  pH, Arterial: 7.38  pH, Blood: x     /  pCO2: 49    /  pO2: 140   / HCO3: 28    / Base Excess: 2.6   /  SaO2: 99                          Procalcitonin, Serum: 0.15 ng/mL (10-27-19 @ 11:42)      MICROBIOLOGY: (if applicable)    RADIOLOGY & ADDITIONAL STUDIES:  EKG:   CXR: < from: Xray Chest 1 View-PORTABLE IMMEDIATE (10.26.19 @ 21:20) >    EXAM:  XR CHEST PORTABLE IMMED 1V                            PROCEDURE DATE:  10/26/2019          INTERPRETATION:  INDICATION: fever    PRIORS: 2019    VIEWS: Portable AP radiography of the chest performed.    FINDINGS: Heart size appears within normal limits. No superior   mediastinal widening is identified. A midline tracheostomy is noted.   Elevation of the right hemidiaphragm is noted. Stable band type opacity   within the right basilar region likely related to platelike atelectasis.   No evidence for focal infiltrate or lobar consolidation. No pulmonary   edema is identified. There is no evidence for pleural effusion or   pneumothorax. No mediastinal shift is noted. No acute osseous fractures   demonstrated.    IMPRESSION: No evidence for interval development of focal infiltrate or   lobar consolidation. Band type opacity within the right basilar region   likely related to platelike atelectasis secondary to elevation of the   right hemidiaphragm.                LUCI FELIZ   This document has been electronically signed. Oct 27 2019  8:36AM                < end of copied text >    ECHO:    IMPRESSION: 64y Female PAST MEDICAL & SURGICAL HISTORY:  Alzheimer disease  Parkinson disease  Respiratory failure  Hypertension  Schizophrenia  Diabetic coma  Diabetes mellitus  S/P percutaneous endoscopic gastrostomy (PEG) tube placement  H/O tracheostomy   p/w               IMP: This is a 64 years old Female with PMH Alzheimer's dementia , CKD 3, DM, HTN, non-verbal Parkinson's disease, chronic respiratory failure s/p Tracheostomy, dysphagia s/p peg tube, major depressive disorder,  sent from [facility] with complain of fever of 102F since 1 day.      Sugg:  -continue O2 via trach collar  -pul hygiene  -ivf  -peg feed  -monitor blood sugar  -dvt/gi prophy  -resume ativan 1 mg q8h     case discused with ICU attending
Patient seen and examined at bedside earlier  HPI:  64 years old Female with PMH Alzheimer's dementia , CKD 3, DM, HTN, non-verbal Parkinson's disease, chronic respiratory failure s/p Tracheostomy, dysphagia s/p peg tube, major depressive disorder,  sent from [facility] with complain of fever of 102F since 1 day.  Patient is AXO=0, not responding well, opening eyes only on painful stimuli. As per chart review, patient was sent for high grade fever. BS in NH was high in 600s and was given 15U Regular insulin before sending to hospital. Further hx is limited as patient is AXO=0.  In ED, patient's vital signs were remarkable for Temp of 102.6 F, HR: 102, BP: 96/70, RR: 24. Labs were remarkable for BS: 890, Corrected A, BUN/CR: 111/3.81, Acetone: small, Blood gases shows PH: 7.38, UA positive for UTI,  Patient was given 3L NS bolus and 10 Units Regular insulin, BS were still high  Goals of care: As per NH paper and ED provider note: HCP is sister Sharon mead,>> Patient is Full code (27 Oct 2019 00:44)      PAST MEDICAL & SURGICAL HISTORY:  Alzheimer disease  Parkinson disease  Respiratory failure  Hypertension  Schizophrenia  Diabetic coma  Diabetes mellitus  S/P percutaneous endoscopic gastrostomy (PEG) tube placement  H/O tracheostomy      angiotensin converting enzyme inhibitors (Unknown)       MEDICATIONS  (STANDING):  atorvastatin 20 milliGRAM(s) Oral at bedtime  heparin  Injectable 5000 Unit(s) SubCutaneous every 8 hours  insulin regular Infusion 5 Unit(s)/Hr (5 mL/Hr) IV Continuous <Continuous>  levETIRAcetam  Oral Liquid - Peds 500 milliGRAM(s) Oral every 12 hours  metoprolol tartrate 100 milliGRAM(s) Oral two times a day  pantoprazole  Injectable 40 milliGRAM(s) IV Push daily  piperacillin/tazobactam IVPB.. 3.375 Gram(s) IV Intermittent every 8 hours  potassium phosphate IVPB 15 milliMole(s) IV Intermittent once  sodium chloride 0.9% with potassium chloride 20 mEq/L 1000 milliLiter(s) (100 mL/Hr) IV Continuous <Continuous>    MEDICATIONS  (PRN):      REVIEW OF SYSTEMS: limited from pt 2/2 medical/mental state	    T(C): 35.9 (10-27-19 @ 09:00), Max: 39.2 (10-26-19 @ 22:44)  HR: 63 (10-27-19 @ 14:00) (53 - 102)  BP: 95/53 (10-27-19 @ 14:00) (91/55 - 115/61)  RR: 0 (10-27-19 @ 14:00) (0 - 31)  SpO2: 100% (10-27-19 @ 14:00) (100% - 100%)    PHYSICAL EXAMINATION:   Constitutional: ill appearing  HEENT: AT  Neck:  trach intact  Respiratory: bronchial breath sounds. Adequate airflow b/l.   Cardiovascular: sys murmur, S1 & S2 intact, no R/G, 2+ radial pulses b/l  Gastrointestinal: Soft, ND, normoactive b.s.,   Extremities: poor skin turgor/elasticity. warm.   Neurological:  awake.  Crude sensation intact.     Labs and imaging reviewed    LABS:                        10.5   10.35 )-----------( 157      ( 27 Oct 2019 04:21 )             36.3     10-27    159<H>  |  127<H>  |  65<H>  ----------------------------<  97  3.6   |  30  |  1.70<H>    Ca    8.1<L>      27 Oct 2019 13:38  Phos  2.4     10-27  Mg     2.9     10-27    TPro  5.8<L>  /  Alb  2.3<L>  /  TBili  0.3  /  DBili  x   /  AST  26  /  ALT  29  /  AlkPhos  64  10-27        PT/INR - ( 26 Oct 2019 22:21 )   PT: 10.9 sec;   INR: 0.98 ratio         PTT - ( 26 Oct 2019 22:21 )  PTT:30.8 sec  Urinalysis Basic - ( 26 Oct 2019 22:41 )    Color: Yellow / Appearance: very cloudy / S.010 / pH: x  Gluc: x / Ketone: Trace  / Bili: Negative / Urobili: Negative   Blood: x / Protein: 30 mg/dL / Nitrite: Negative   Leuk Esterase: Trace / RBC: 2-5 /HPF / WBC 6-10 /HPF   Sq Epi: x / Non Sq Epi: Few /HPF / Bacteria: Many /HPF      CAPILLARY BLOOD GLUCOSE      POCT Blood Glucose.: 123 mg/dL (27 Oct 2019 15:13)  POCT Blood Glucose.: 107 mg/dL (27 Oct 2019 14:09)  POCT Blood Glucose.: 134 mg/dL (27 Oct 2019 13:06)  POCT Blood Glucose.: 141 mg/dL (27 Oct 2019 12:05)  POCT Blood Glucose.: 169 mg/dL (27 Oct 2019 11:09)  POCT Blood Glucose.: 237 mg/dL (27 Oct 2019 10:05)  POCT Blood Glucose.: 309 mg/dL (27 Oct 2019 09:04)  POCT Blood Glucose.: 338 mg/dL (27 Oct 2019 07:54)  POCT Blood Glucose.: 389 mg/dL (27 Oct 2019 06:50)  POCT Blood Glucose.: 459 mg/dL (27 Oct 2019 06:09)  POCT Blood Glucose.: 509 mg/dL (27 Oct 2019 05:00)  POCT Blood Glucose.: 548 mg/dL (27 Oct 2019 04:58)  POCT Blood Glucose.: 558 mg/dL (27 Oct 2019 03:51)  POCT Blood Glucose.: 572 mg/dL (27 Oct 2019 03:49)  POCT Blood Glucose.: 527 mg/dL (27 Oct 2019 03:07)  POCT Blood Glucose.: >600 mg/dL (27 Oct 2019 03:06)  POCT Blood Glucose.: 578 mg/dL (27 Oct 2019 02:11)  POCT Blood Glucose.: >600 mg/dL (26 Oct 2019 22:27)        LIVER FUNCTIONS - ( 27 Oct 2019 04:21 )  Alb: 2.3 g/dL / Pro: 5.8 g/dL / ALK PHOS: 64 U/L / ALT: 29 U/L DA / AST: 26 U/L / GGT: x           ABG - ( 26 Oct 2019 23:42 )  pH, Arterial: 7.38  pH, Blood: x     /  pCO2: 49    /  pO2: 140   / HCO3: 28    / Base Excess: 2.6   /  SaO2: 99                  RADIOLOGY & ADDITIONAL STUDIES:

## 2019-10-28 NOTE — PROGRESS NOTE ADULT - PROBLEM SELECTOR PLAN 2
p/w Fever of 101 with UA positive for UTI, with WBC 13.33  c/w zosyn  F/u BC and UC p/w Fever of 101 with UA positive for UTI, with WBC 13.33  c/w zosyn Day 2  F/u BC   Urine culture show Prelim Gram negative rods, pending senstivity

## 2019-10-28 NOTE — PROGRESS NOTE ADULT - PROBLEM SELECTOR PLAN 4
p/w BUN/CR: 111/3.81 ( baseline Cr:0.82)  likely prerenal azotemia  F/u urine electrolytes  F/u ultrasound Kidney  c/w coe catheter  c/w IV hydration + free water  F/u BMP

## 2019-10-28 NOTE — PROGRESS NOTE ADULT - PROBLEM SELECTOR PLAN 3
Na: 148 likely due to dehydration  s/p IV hydration  repeat BMP Na 155 -> 159  gave free water Na 154, Pt was on free water and NS  Holding NS as no response  Free water deficit is 2.8L  Will continue with Free water through Peg and monitor bmp q 8

## 2019-10-28 NOTE — PROGRESS NOTE ADULT - PROBLEM SELECTOR PLAN 1
p/w BS of 800 with normal anion gap and normal PH, encephalopathy likely HHS and metabolic encephalopathy  s/p 3L NS bolus and 10 U of Regular insulin  c/w aggressive IV hydration and insulin drip as per protocol  BMP q 4hr  started 1/2 NS with potassium  MOnitor electrolytes  accuchecks q 1 hr   HBA1C 12.01  Endo consult Dr. Lopez Pt off insulin drip now  Insulin coverage Changed to sliding scale   Off IVF now  c/w Finger stick q 6 for now with sliding scale

## 2019-10-29 LAB
-  AMIKACIN: SIGNIFICANT CHANGE UP
-  AMPICILLIN/SULBACTAM: SIGNIFICANT CHANGE UP
-  AMPICILLIN: SIGNIFICANT CHANGE UP
-  AZTREONAM: SIGNIFICANT CHANGE UP
-  CEFAZOLIN: SIGNIFICANT CHANGE UP
-  CEFEPIME: SIGNIFICANT CHANGE UP
-  CEFOXITIN: SIGNIFICANT CHANGE UP
-  CEFTRIAXONE: SIGNIFICANT CHANGE UP
-  CIPROFLOXACIN: SIGNIFICANT CHANGE UP
-  GENTAMICIN: SIGNIFICANT CHANGE UP
-  IMIPENEM: SIGNIFICANT CHANGE UP
-  LEVOFLOXACIN: SIGNIFICANT CHANGE UP
-  MEROPENEM: SIGNIFICANT CHANGE UP
-  NITROFURANTOIN: SIGNIFICANT CHANGE UP
-  PIPERACILLIN/TAZOBACTAM: SIGNIFICANT CHANGE UP
-  TIGECYCLINE: SIGNIFICANT CHANGE UP
-  TOBRAMYCIN: SIGNIFICANT CHANGE UP
-  TRIMETHOPRIM/SULFAMETHOXAZOLE: SIGNIFICANT CHANGE UP
ALBUMIN SERPL ELPH-MCNC: 1.9 G/DL — LOW (ref 3.5–5)
ALBUMIN SERPL ELPH-MCNC: 2 G/DL — LOW (ref 3.5–5)
ALP SERPL-CCNC: 50 U/L — SIGNIFICANT CHANGE UP (ref 40–120)
ALP SERPL-CCNC: 54 U/L — SIGNIFICANT CHANGE UP (ref 40–120)
ALT FLD-CCNC: 27 U/L DA — SIGNIFICANT CHANGE UP (ref 10–60)
ALT FLD-CCNC: 28 U/L DA — SIGNIFICANT CHANGE UP (ref 10–60)
ANION GAP SERPL CALC-SCNC: 0 MMOL/L — LOW (ref 5–17)
ANION GAP SERPL CALC-SCNC: 1 MMOL/L — LOW (ref 5–17)
AST SERPL-CCNC: 44 U/L — HIGH (ref 10–40)
AST SERPL-CCNC: 45 U/L — HIGH (ref 10–40)
BASOPHILS # BLD AUTO: 0.01 K/UL — SIGNIFICANT CHANGE UP (ref 0–0.2)
BASOPHILS NFR BLD AUTO: 0.1 % — SIGNIFICANT CHANGE UP (ref 0–2)
BILIRUB SERPL-MCNC: 0.4 MG/DL — SIGNIFICANT CHANGE UP (ref 0.2–1.2)
BILIRUB SERPL-MCNC: 0.4 MG/DL — SIGNIFICANT CHANGE UP (ref 0.2–1.2)
BUN SERPL-MCNC: 25 MG/DL — HIGH (ref 7–18)
BUN SERPL-MCNC: 26 MG/DL — HIGH (ref 7–18)
CALCIUM SERPL-MCNC: 7.7 MG/DL — LOW (ref 8.4–10.5)
CALCIUM SERPL-MCNC: 7.8 MG/DL — LOW (ref 8.4–10.5)
CHLORIDE SERPL-SCNC: 119 MMOL/L — HIGH (ref 96–108)
CHLORIDE SERPL-SCNC: 123 MMOL/L — HIGH (ref 96–108)
CO2 SERPL-SCNC: 28 MMOL/L — SIGNIFICANT CHANGE UP (ref 22–31)
CO2 SERPL-SCNC: 29 MMOL/L — SIGNIFICANT CHANGE UP (ref 22–31)
CREAT SERPL-MCNC: 1.06 MG/DL — SIGNIFICANT CHANGE UP (ref 0.5–1.3)
CREAT SERPL-MCNC: 1.06 MG/DL — SIGNIFICANT CHANGE UP (ref 0.5–1.3)
CULTURE RESULTS: SIGNIFICANT CHANGE UP
EOSINOPHIL # BLD AUTO: 0.09 K/UL — SIGNIFICANT CHANGE UP (ref 0–0.5)
EOSINOPHIL NFR BLD AUTO: 1.2 % — SIGNIFICANT CHANGE UP (ref 0–6)
GLUCOSE SERPL-MCNC: 193 MG/DL — HIGH (ref 70–99)
GLUCOSE SERPL-MCNC: 235 MG/DL — HIGH (ref 70–99)
HCT VFR BLD CALC: 32.7 % — LOW (ref 34.5–45)
HGB BLD-MCNC: 9.4 G/DL — LOW (ref 11.5–15.5)
IMM GRANULOCYTES NFR BLD AUTO: 0.3 % — SIGNIFICANT CHANGE UP (ref 0–1.5)
LYMPHOCYTES # BLD AUTO: 1.92 K/UL — SIGNIFICANT CHANGE UP (ref 1–3.3)
LYMPHOCYTES # BLD AUTO: 25.1 % — SIGNIFICANT CHANGE UP (ref 13–44)
MAGNESIUM SERPL-MCNC: 2.3 MG/DL — SIGNIFICANT CHANGE UP (ref 1.6–2.6)
MCHC RBC-ENTMCNC: 25.2 PG — LOW (ref 27–34)
MCHC RBC-ENTMCNC: 28.7 GM/DL — LOW (ref 32–36)
MCV RBC AUTO: 87.7 FL — SIGNIFICANT CHANGE UP (ref 80–100)
METHOD TYPE: SIGNIFICANT CHANGE UP
MONOCYTES # BLD AUTO: 0.37 K/UL — SIGNIFICANT CHANGE UP (ref 0–0.9)
MONOCYTES NFR BLD AUTO: 4.8 % — SIGNIFICANT CHANGE UP (ref 2–14)
NEUTROPHILS # BLD AUTO: 5.23 K/UL — SIGNIFICANT CHANGE UP (ref 1.8–7.4)
NEUTROPHILS NFR BLD AUTO: 68.5 % — SIGNIFICANT CHANGE UP (ref 43–77)
NRBC # BLD: 0 /100 WBCS — SIGNIFICANT CHANGE UP (ref 0–0)
ORGANISM # SPEC MICROSCOPIC CNT: SIGNIFICANT CHANGE UP
ORGANISM # SPEC MICROSCOPIC CNT: SIGNIFICANT CHANGE UP
PHOSPHATE SERPL-MCNC: 2.6 MG/DL — SIGNIFICANT CHANGE UP (ref 2.5–4.5)
PLATELET # BLD AUTO: 104 K/UL — LOW (ref 150–400)
POTASSIUM SERPL-MCNC: 4.4 MMOL/L — SIGNIFICANT CHANGE UP (ref 3.5–5.3)
POTASSIUM SERPL-MCNC: 4.5 MMOL/L — SIGNIFICANT CHANGE UP (ref 3.5–5.3)
POTASSIUM SERPL-SCNC: 4.4 MMOL/L — SIGNIFICANT CHANGE UP (ref 3.5–5.3)
POTASSIUM SERPL-SCNC: 4.5 MMOL/L — SIGNIFICANT CHANGE UP (ref 3.5–5.3)
PROT SERPL-MCNC: 4.8 G/DL — LOW (ref 6–8.3)
PROT SERPL-MCNC: 5.1 G/DL — LOW (ref 6–8.3)
RBC # BLD: 3.73 M/UL — LOW (ref 3.8–5.2)
RBC # FLD: 15 % — HIGH (ref 10.3–14.5)
SODIUM SERPL-SCNC: 148 MMOL/L — HIGH (ref 135–145)
SODIUM SERPL-SCNC: 152 MMOL/L — HIGH (ref 135–145)
SPECIMEN SOURCE: SIGNIFICANT CHANGE UP
WBC # BLD: 7.64 K/UL — SIGNIFICANT CHANGE UP (ref 3.8–10.5)
WBC # FLD AUTO: 7.64 K/UL — SIGNIFICANT CHANGE UP (ref 3.8–10.5)

## 2019-10-29 PROCEDURE — 71045 X-RAY EXAM CHEST 1 VIEW: CPT | Mod: 26

## 2019-10-29 RX ORDER — SODIUM CHLORIDE 9 MG/ML
1000 INJECTION, SOLUTION INTRAVENOUS
Refills: 0 | Status: DISCONTINUED | OUTPATIENT
Start: 2019-10-29 | End: 2019-10-29

## 2019-10-29 RX ORDER — INSULIN LISPRO 100/ML
VIAL (ML) SUBCUTANEOUS EVERY 6 HOURS
Refills: 0 | Status: DISCONTINUED | OUTPATIENT
Start: 2019-10-29 | End: 2019-10-30

## 2019-10-29 RX ORDER — INSULIN LISPRO 100/ML
4 VIAL (ML) SUBCUTANEOUS
Refills: 0 | Status: DISCONTINUED | OUTPATIENT
Start: 2019-10-29 | End: 2019-10-29

## 2019-10-29 RX ORDER — INSULIN GLARGINE 100 [IU]/ML
10 INJECTION, SOLUTION SUBCUTANEOUS EVERY MORNING
Refills: 0 | Status: DISCONTINUED | OUTPATIENT
Start: 2019-10-29 | End: 2019-11-05

## 2019-10-29 RX ORDER — INSULIN LISPRO 100/ML
4 VIAL (ML) SUBCUTANEOUS EVERY 6 HOURS
Refills: 0 | Status: DISCONTINUED | OUTPATIENT
Start: 2019-10-29 | End: 2019-10-30

## 2019-10-29 RX ADMIN — LEVETIRACETAM 500 MILLIGRAM(S): 250 TABLET, FILM COATED ORAL at 05:14

## 2019-10-29 RX ADMIN — Medication 2: at 01:32

## 2019-10-29 RX ADMIN — PIPERACILLIN AND TAZOBACTAM 25 GRAM(S): 4; .5 INJECTION, POWDER, LYOPHILIZED, FOR SOLUTION INTRAVENOUS at 05:14

## 2019-10-29 RX ADMIN — Medication 1 MILLIGRAM(S): at 13:19

## 2019-10-29 RX ADMIN — LEVETIRACETAM 500 MILLIGRAM(S): 250 TABLET, FILM COATED ORAL at 17:38

## 2019-10-29 RX ADMIN — HEPARIN SODIUM 5000 UNIT(S): 5000 INJECTION INTRAVENOUS; SUBCUTANEOUS at 05:14

## 2019-10-29 RX ADMIN — PIPERACILLIN AND TAZOBACTAM 25 GRAM(S): 4; .5 INJECTION, POWDER, LYOPHILIZED, FOR SOLUTION INTRAVENOUS at 13:19

## 2019-10-29 RX ADMIN — INSULIN GLARGINE 10 UNIT(S): 100 INJECTION, SOLUTION SUBCUTANEOUS at 10:29

## 2019-10-29 RX ADMIN — SODIUM CHLORIDE 75 MILLILITER(S): 9 INJECTION, SOLUTION INTRAVENOUS at 00:56

## 2019-10-29 RX ADMIN — Medication 100 MILLIGRAM(S): at 17:38

## 2019-10-29 RX ADMIN — PIPERACILLIN AND TAZOBACTAM 25 GRAM(S): 4; .5 INJECTION, POWDER, LYOPHILIZED, FOR SOLUTION INTRAVENOUS at 21:14

## 2019-10-29 RX ADMIN — Medication 4 UNIT(S): at 17:31

## 2019-10-29 RX ADMIN — Medication 1 MILLIGRAM(S): at 21:13

## 2019-10-29 RX ADMIN — HEPARIN SODIUM 5000 UNIT(S): 5000 INJECTION INTRAVENOUS; SUBCUTANEOUS at 21:13

## 2019-10-29 RX ADMIN — Medication 4 UNIT(S): at 13:14

## 2019-10-29 RX ADMIN — Medication 4 UNIT(S): at 23:10

## 2019-10-29 RX ADMIN — Medication 4: at 17:49

## 2019-10-29 RX ADMIN — Medication 6: at 13:15

## 2019-10-29 RX ADMIN — Medication 4: at 05:30

## 2019-10-29 RX ADMIN — Medication 1 MILLIGRAM(S): at 05:14

## 2019-10-29 RX ADMIN — PANTOPRAZOLE SODIUM 40 MILLIGRAM(S): 20 TABLET, DELAYED RELEASE ORAL at 13:19

## 2019-10-29 RX ADMIN — Medication 6: at 10:30

## 2019-10-29 RX ADMIN — Medication 100 MILLIGRAM(S): at 05:16

## 2019-10-29 RX ADMIN — CHLORHEXIDINE GLUCONATE 1 APPLICATION(S): 213 SOLUTION TOPICAL at 13:13

## 2019-10-29 RX ADMIN — ATORVASTATIN CALCIUM 20 MILLIGRAM(S): 80 TABLET, FILM COATED ORAL at 21:14

## 2019-10-29 RX ADMIN — HEPARIN SODIUM 5000 UNIT(S): 5000 INJECTION INTRAVENOUS; SUBCUTANEOUS at 13:14

## 2019-10-29 NOTE — PROGRESS NOTE ADULT - SUBJECTIVE AND OBJECTIVE BOX
[facility] [place] [facility]   MD Barbara Donis D.O. Fatima Sheikh, D.O. Ruoru Wong, PA    From 7 AM - 5 PM:  OFFICE: [phone]  Dr. Irwin cell: [phone]  Dr. Hoffman cell: [phone]  Dr. Purvis cell: [phone]  WENDI Bojorquez cell: [phone]    From 5 PM - 7 AM: Answering Service: [phone]        RENAL FOLLOW UP NOTE  --------------------------------------------------------------------------------  HPI: Pt seen and examined.  Non-verbal at baseline. More awake today        PAST HISTORY  --------------------------------------------------------------------------------  No significant changes to PMH, PSH, FHx, SHx, unless otherwise noted    ALLERGIES & MEDICATIONS  --------------------------------------------------------------------------------  Allergies    angiotensin converting enzyme inhibitors (Unknown)    Intolerances      Standing Inpatient Medications  atorvastatin 20 milliGRAM(s) Oral at bedtime  chlorhexidine 4% Liquid 1 Application(s) Topical daily  heparin  Injectable 5000 Unit(s) SubCutaneous every 8 hours  insulin glargine Injectable (LANTUS) 10 Unit(s) SubCutaneous every morning  insulin lispro (HumaLOG) corrective regimen sliding scale   SubCutaneous every 6 hours  insulin lispro Injectable (HumaLOG) 4 Unit(s) SubCutaneous every 6 hours  levETIRAcetam  Oral Liquid - Peds 500 milliGRAM(s) Oral every 12 hours  LORazepam     Tablet 1 milliGRAM(s) Oral three times a day  metoprolol tartrate 100 milliGRAM(s) Oral two times a day  pantoprazole  Injectable 40 milliGRAM(s) IV Push daily  piperacillin/tazobactam IVPB.. 3.375 Gram(s) IV Intermittent every 8 hours    PRN Inpatient Medications      REVIEW OF SYSTEMS  --------------------------------------------------------------------------------  unable to obtain    VITALS/PHYSICAL EXAM  --------------------------------------------------------------------------------  T(C): 36 (10-29-19 @ 04:00), Max: 36.1 (10-28-19 @ 20:00)  HR: 60 (10-29-19 @ 18:00) (57 - 79)  BP: 152/110 (10-29-19 @ 18:00) (91/62 - 171/149)  RR: 16 (10-29-19 @ 18:00) (12 - 41)  SpO2: 99% (10-29-19 @ 18:00) (92% - 100%)  Wt(kg): --        10-28-19 @ 07:01  -  10-29-19 @ 07:00  --------------------------------------------------------  IN: 4055 mL / OUT: 955 mL / NET: 3100 mL    10-29-19 @ 07:01  -  10-29-19 @ 18:47  --------------------------------------------------------  IN: 830 mL / OUT: 395 mL / NET: 435 mL      Physical Exam:  	Gen: NAD, trach   	HEENT: MMM  	Pulm: CTA B/L  	CV: S1S2  	Abd: Soft, +BS   	Ext: No LE edema B/L              : coe in place, draining yellow urine w/ sediment  	Neuro: Awake  	Skin: Warm and dry    LABS/STUDIES  --------------------------------------------------------------------------------              9.4    7.64  >-----------<  104      [10-29-19 @ 04:43]              32.7     148  |  119  |  25  ----------------------------<  235      [10-29-19 @ 04:43]  4.5   |  28  |  1.06        Ca     7.7     [10-29-19 @ 04:43]      Mg     2.3     [10-29-19 @ 04:43]      Phos  2.6     [10-29-19 @ 04:43]    TPro  5.1  /  Alb  1.9  /  TBili  0.4  /  DBili  x   /  AST  44  /  ALT  27  /  AlkPhos  54  [10-29-19 @ 04:43]          Creatinine Trend:  SCr 1.06 [10-29 @ 04:43]  SCr 1.06 [10-29 @ 00:30]  SCr 1.09 [10-28 @ 15:21]  SCr 1.16 [10-28 @ 10:37]  SCr 1.35 [10-28 @ 04:49]    Urinalysis - [10-26-19 @ 22:41]      Color Yellow / Appearance very cloudy / SG 1.010 / pH 5.0      Gluc 1000 / Ketone Trace  / Bili Negative / Urobili Negative       Blood Trace / Protein 30 / Leuk Est Trace / Nitrite Negative      RBC 2-5 / WBC 6-10 / Hyaline  / Gran  / Sq Epi  / Non Sq Epi Few / Bacteria Many      Vitamin D (25OH) 33.0      [09-23-19 @ 14:43]  HbA1c 12.0      [10-27-19 @ 12:00]  TSH 0.80      [10-27-19 @ 04:21]  Lipid: chol 150, , HDL 50, LDL 80      [09-23-19 @ 08:36]

## 2019-10-29 NOTE — DIETITIAN INITIAL EVALUATION ADULT. - PERTINENT LABORATORY DATA
10-29 Na148 mmol/L<H> Glu 235 mg/dL<H> K+ 4.5 mmol/L Cr  1.06 mg/dL BUN 25 mg/dL<H> 10-29 Phos 2.6 mg/dL 10-29 Alb 1.9 g/dL<L> 10-27 [id number] 12.0 %<H>

## 2019-10-29 NOTE — DIETITIAN INITIAL EVALUATION ADULT. - ENTERAL
Glucerna 1.5 up to an initial goal rate of 40x24 (960 ml, 1440 kcals, 79 gm protein). MD to monitor. RD available.

## 2019-10-29 NOTE — PROGRESS NOTE ADULT - PROBLEM SELECTOR PLAN 3
Na 154, Pt was on free water and NS  Holding NS as no response  Free water deficit is 2.8L  Will continue with Free water through Peg and monitor bmp q 8 Na improved to 148  Dced IVF as pt is retaining  Will continue Free water through NGT for 1 more day  Monitor BMP q 12 for now

## 2019-10-29 NOTE — PROGRESS NOTE ADULT - SUBJECTIVE AND OBJECTIVE BOX
INTERVAL HPI/OVERNIGHT EVENTS: ***    PRESSORS: [ ] YES [ ] NO  WHICH:    ANTIBIOTICS:                  DATE STARTED:  ANTIBIOTICS:                  DATE STARTED:  ANTIBIOTICS:                  DATE STARTED:    Antimicrobial:  piperacillin/tazobactam IVPB.. 3.375 Gram(s) IV Intermittent every 8 hours    Cardiovascular:  metoprolol tartrate 100 milliGRAM(s) Oral two times a day    Pulmonary:    Hematalogic:  heparin  Injectable 5000 Unit(s) SubCutaneous every 8 hours    Other:  atorvastatin 20 milliGRAM(s) Oral at bedtime  chlorhexidine 4% Liquid 1 Application(s) Topical daily  insulin glargine Injectable (LANTUS) 10 Unit(s) SubCutaneous every morning  insulin lispro (HumaLOG) corrective regimen sliding scale   SubCutaneous every 4 hours  levETIRAcetam  Oral Liquid - Peds 500 milliGRAM(s) Oral every 12 hours  LORazepam     Tablet 1 milliGRAM(s) Oral three times a day  pantoprazole  Injectable 40 milliGRAM(s) IV Push daily    atorvastatin 20 milliGRAM(s) Oral at bedtime  chlorhexidine 4% Liquid 1 Application(s) Topical daily  heparin  Injectable 5000 Unit(s) SubCutaneous every 8 hours  insulin glargine Injectable (LANTUS) 10 Unit(s) SubCutaneous every morning  insulin lispro (HumaLOG) corrective regimen sliding scale   SubCutaneous every 4 hours  levETIRAcetam  Oral Liquid - Peds 500 milliGRAM(s) Oral every 12 hours  LORazepam     Tablet 1 milliGRAM(s) Oral three times a day  metoprolol tartrate 100 milliGRAM(s) Oral two times a day  pantoprazole  Injectable 40 milliGRAM(s) IV Push daily  piperacillin/tazobactam IVPB.. 3.375 Gram(s) IV Intermittent every 8 hours    Drug Dosing Weight  Height (cm): 170.18 (27 Sep 2019 14:38)  Weight (kg): 56.1 (27 Oct 2019 02:00)  BMI (kg/m2): 19.4 (27 Oct 2019 02:00)  BSA (m2): 1.65 (27 Oct 2019 02:00)    CENTRAL LINE: [ ] YES [ ] NO  LOCATION:   DATE INSERTED:  REMOVE: [ ] YES [ ] NO  EXPLAIN:    HUSAIN: [ ] YES [ ] NO    DATE INSERTED:  REMOVE:  [ ] YES [ ] NO  EXPLAIN:    A-LINE:  [ ] YES [ ] NO  LOCATION:   DATE INSERTED:  REMOVE:  [ ] YES [ ] NO  EXPLAIN:    PMH -reviewed admission note, no change since admission  Heart faliure: acute [ ] chronic [ ] acute or chronic [ ] diastolic [ ] systolic [ ] combied systolic and diastolic[ ]  LAYLA: ATN[ ] renal medullary necrosis [ ] CKD I [ ]CKDII [ ]CKD III [ ]CKD IV [ ]CKD V [ ]Other pathological lesions [ ]  Abdominal Nutrition Status: malnutrition [ ] cachexia [ ] morbid obesity/BMI=40 [ ] Supplement ordered [___________]     ICU Vital Signs Last 24 Hrs  T(C): 36 (29 Oct 2019 04:00), Max: 36.6 (28 Oct 2019 16:05)  T(F): 96.8 (29 Oct 2019 04:00), Max: 97.9 (28 Oct 2019 16:05)  HR: 60 (29 Oct 2019 10:00) (57 - 77)  BP: 98/47 (29 Oct 2019 10:00) (91/62 - 189/130)  BP(mean): 59 (29 Oct 2019 10:00) (47 - 143)  ABP: --  ABP(mean): --  RR: 15 (29 Oct 2019 10:00) (12 - 31)  SpO2: 100% (29 Oct 2019 10:00) (92% - 100%)            10-28 @ 07:01  -  10-29 @ 07:00  --------------------------------------------------------  IN: 4055 mL / OUT: 955 mL / NET: 3100 mL            >>> <<<    PHYSICAL EXAM:    GENERAL: NAD, well-groomed, well-developed  HEAD:  Atraumatic, Normocephalic  EYES: EOMI, PERRLA, conjunctiva and sclera clear  ENMT: No tonsillar erythema, exudates, or enlargement; Moist mucous membranes, Good dentition, No lesions  NECK: Supple, normal appearance, No JVD; Normal thyroid; Trachea midline  NERVOUS SYSTEM:  Alert & Oriented X3, Good concentration; Motor Strength 5/5 B/L upper and lower extremities; DTRs 2+ intact and symmetric  CHEST/LUNG: No chest deformity; Normal percussion bilaterally; No rales, rhonchi, wheezing   HEART: Regular rate and rhythm; No murmurs, rubs, or gallops  ABDOMEN: Soft, Nontender, Nondistended; Bowel sounds present  EXTREMITIES:  2+ Peripheral Pulses, No clubbing, cyanosis, or edema  LYMPH: No lymphadenopathy noted  SKIN: No rashes or lesions; Good capillary refill      LABS:  CBC Full  -  ( 29 Oct 2019 04:43 )  WBC Count : 7.64 K/uL  RBC Count : 3.73 M/uL  Hemoglobin : 9.4 g/dL  Hematocrit : 32.7 %  Platelet Count - Automated : 104 K/uL  Mean Cell Volume : 87.7 fl  Mean Cell Hemoglobin : 25.2 pg  Mean Cell Hemoglobin Concentration : 28.7 gm/dL  Auto Neutrophil # : 5.23 K/uL  Auto Lymphocyte # : 1.92 K/uL  Auto Monocyte # : 0.37 K/uL  Auto Eosinophil # : 0.09 K/uL  Auto Basophil # : 0.01 K/uL  Auto Neutrophil % : 68.5 %  Auto Lymphocyte % : 25.1 %  Auto Monocyte % : 4.8 %  Auto Eosinophil % : 1.2 %  Auto Basophil % : 0.1 %    10-29    148<H>  |  119<H>  |  25<H>  ----------------------------<  235<H>  4.5   |  28  |  1.06    Ca    7.7<L>      29 Oct 2019 04:43  Phos  2.6     10-29  Mg     2.3     10-29    TPro  5.1<L>  /  Alb  1.9<L>  /  TBili  0.4  /  DBili  x   /  AST  44<H>  /  ALT  27  /  AlkPhos  54  10-29        Culture Results:   >100,000 CFU/ml Gram Negative Rods (10-27 @ 12:39)  Culture Results:   No growth to date. (10-27 @ 12:28)  Culture Results:   No growth to date. (10-27 @ 12:25)      RADIOLOGY & ADDITIONAL STUDIES REVIEWED:  ***    [ ]GOALS OF CARE DISCUSSION WITH PATIENT/FAMILY/PROXY:    CRITICAL CARE TIME SPENT: 35 minutes INTERVAL HPI/OVERNIGHT EVENTS: Pt appears at her baseline, no acute events reported overnight    PRESSORS: [ ] YES [x ] NO    Antimicrobial:  piperacillin/tazobactam IVPB.. 3.375 Gram(s) IV Intermittent every 8 hours    Cardiovascular:  metoprolol tartrate 100 milliGRAM(s) Oral two times a day    Pulmonary:    Hematalogic:  heparin  Injectable 5000 Unit(s) SubCutaneous every 8 hours    Other:  atorvastatin 20 milliGRAM(s) Oral at bedtime  chlorhexidine 4% Liquid 1 Application(s) Topical daily  insulin glargine Injectable (LANTUS) 10 Unit(s) SubCutaneous every morning  insulin lispro (HumaLOG) corrective regimen sliding scale   SubCutaneous every 4 hours  levETIRAcetam  Oral Liquid - Peds 500 milliGRAM(s) Oral every 12 hours  LORazepam     Tablet 1 milliGRAM(s) Oral three times a day  pantoprazole  Injectable 40 milliGRAM(s) IV Push daily    atorvastatin 20 milliGRAM(s) Oral at bedtime  chlorhexidine 4% Liquid 1 Application(s) Topical daily  heparin  Injectable 5000 Unit(s) SubCutaneous every 8 hours  insulin glargine Injectable (LANTUS) 10 Unit(s) SubCutaneous every morning  insulin lispro (HumaLOG) corrective regimen sliding scale   SubCutaneous every 4 hours  levETIRAcetam  Oral Liquid - Peds 500 milliGRAM(s) Oral every 12 hours  LORazepam     Tablet 1 milliGRAM(s) Oral three times a day  metoprolol tartrate 100 milliGRAM(s) Oral two times a day  pantoprazole  Injectable 40 milliGRAM(s) IV Push daily  piperacillin/tazobactam IVPB.. 3.375 Gram(s) IV Intermittent every 8 hours    Drug Dosing Weight  Height (cm): 170.18 (27 Sep 2019 14:38)  Weight (kg): 56.1 (27 Oct 2019 02:00)  BMI (kg/m2): 19.4 (27 Oct 2019 02:00)  BSA (m2): 1.65 (27 Oct 2019 02:00)    CENTRAL LINE: [ ] YES [x ] NO  LOCATION:   DATE INSERTED:  REMOVE: [ ] YES [ ] NO  EXPLAIN:    HUSAIN: [x ] YES [ ] NO    DATE INSERTED:  REMOVE:  [ ] YES [ ] NO  EXPLAIN:    A-LINE:  [ ] YES [x ] NO  LOCATION:   DATE INSERTED:  REMOVE:  [ ] YES [ ] NO  EXPLAIN:      ICU Vital Signs Last 24 Hrs  T(C): 36 (29 Oct 2019 04:00), Max: 36.6 (28 Oct 2019 16:05)  T(F): 96.8 (29 Oct 2019 04:00), Max: 97.9 (28 Oct 2019 16:05)  HR: 60 (29 Oct 2019 10:00) (57 - 77)  BP: 98/47 (29 Oct 2019 10:00) (91/62 - 189/130)  BP(mean): 59 (29 Oct 2019 10:00) (47 - 143)  ABP: --  ABP(mean): --  RR: 15 (29 Oct 2019 10:00) (12 - 31)  SpO2: 100% (29 Oct 2019 10:00) (92% - 100%)            10-28 @ 07:01  -  10-29 @ 07:00  --------------------------------------------------------  IN: 4055 mL / OUT: 955 mL / NET: 3100 mL            >>> <<<    PHYSICAL EXAM:    GENERAL: NAD, sitting on bed with trach collar on, contracted  HEAD:  Atraumatic, Normocephalic  EYES: AYAN, moving eye spontaneously  NECK: Trach collar on place  NERVOUS SYSTEM:  Unable to access neurological fucntion, pt is non verbal and contracted  CHEST/LUNG: Appears slightly congested possibly due to secretions  HEART: Regular rate and rhythm; No murmurs, rubs, or gallops  ABDOMEN: Soft, Nontender, Nondistended; Peg in place  EXTREMITIES: + pulses, contracted  LMPH: No lymphadenopathy noted  SKIN: No rashes or lesions; Good capillary refill      LABS:  CBC Full  -  ( 29 Oct 2019 04:43 )  WBC Count : 7.64 K/uL  RBC Count : 3.73 M/uL  Hemoglobin : 9.4 g/dL  Hematocrit : 32.7 %  Platelet Count - Automated : 104 K/uL  Mean Cell Volume : 87.7 fl  Mean Cell Hemoglobin : 25.2 pg  Mean Cell Hemoglobin Concentration : 28.7 gm/dL  Auto Neutrophil # : 5.23 K/uL  Auto Lymphocyte # : 1.92 K/uL  Auto Monocyte # : 0.37 K/uL  Auto Eosinophil # : 0.09 K/uL  Auto Basophil # : 0.01 K/uL  Auto Neutrophil % : 68.5 %  Auto Lymphocyte % : 25.1 %  Auto Monocyte % : 4.8 %  Auto Eosinophil % : 1.2 %  Auto Basophil % : 0.1 %    10-29    148<H>  |  119<H>  |  25<H>  ----------------------------<  235<H>  4.5   |  28  |  1.06    Ca    7.7<L>      29 Oct 2019 04:43  Phos  2.6     10-29  Mg     2.3     10-29    TPro  5.1<L>  /  Alb  1.9<L>  /  TBili  0.4  /  DBili  x   /  AST  44<H>  /  ALT  27  /  AlkPhos  54  10-29        Culture Results:   >100,000 CFU/ml Gram Negative Rods (10-27 @ 12:39)  Culture Results:   No growth to date. (10-27 @ 12:28)  Culture Results:   No growth to date. (10-27 @ 12:25)      RADIOLOGY & ADDITIONAL STUDIES REVIEWED:  ***    [ ]GOALS OF CARE DISCUSSION WITH PATIENT/FAMILY/PROXY:    CRITICAL CARE TIME SPENT: 35 minutes

## 2019-10-29 NOTE — PROGRESS NOTE ADULT - PROBLEM SELECTOR PLAN 5
Not verbal, currently encephalopathic Not verbal, currently encephalopathic  Unsure if pt has diagnosis of Parkinson Disease, will confirm with fm

## 2019-10-29 NOTE — PROGRESS NOTE ADULT - PROBLEM SELECTOR PLAN 2
p/w Fever of 101 with UA positive for UTI, with WBC 13.33  c/w zosyn Day 2  F/u BC   Urine culture show Prelim Gram negative rods, pending senstivity Improved  So far BC negative, UC positive for E Coli  c/w antibiotics

## 2019-10-29 NOTE — PROGRESS NOTE ADULT - SUBJECTIVE AND OBJECTIVE BOX
Patient seen and examined at bedside earlier    HPI:  64 years old Female with PMH Alzheimer's dementia , CKD 3, DM, HTN, non-verbal Parkinson's disease, chronic respiratory failure s/p Tracheostomy, dysphagia s/p peg tube, major depressive disorder,  sent from [facility] with complain of fever of 102F since 1 day.  Patient is AXO=0, not responding well, opening eyes only on painful stimuli. As per chart review, patient was sent for high grade fever. BS in NH was high in 600s and was given 15U Regular insulin before sending to hospital. Further hx is limited as patient is AXO=0.  In ED, patient's vital signs were remarkable for Temp of 102.6 F, HR: 102, BP: 96/70, RR: 24. Labs were remarkable for BS: 890, Corrected A, BUN/CR: 111/3.81, Acetone: small, Blood gases shows PH: 7.38, UA positive for UTI,  Patient was given 3L NS bolus and 10 Units Regular insulin, BS were still high  Goals of care: As per NH paper and ED provider note: HCP is sister Sharon mead,>> Patient is Full code (27 Oct 2019 00:44)      PAST MEDICAL & SURGICAL HISTORY:  Alzheimer disease  Parkinson disease  Respiratory failure  Hypertension  Schizophrenia  Diabetic coma  Diabetes mellitus  S/P percutaneous endoscopic gastrostomy (PEG) tube placement  H/O tracheostomy      angiotensin converting enzyme inhibitors (Unknown)     MEDICATIONS  (STANDING):  atorvastatin 20 milliGRAM(s) Oral at bedtime  chlorhexidine 4% Liquid 1 Application(s) Topical daily  heparin  Injectable 5000 Unit(s) SubCutaneous every 8 hours  insulin glargine Injectable (LANTUS) 10 Unit(s) SubCutaneous every morning  insulin lispro (HumaLOG) corrective regimen sliding scale   SubCutaneous every 6 hours  insulin lispro Injectable (HumaLOG) 4 Unit(s) SubCutaneous every 6 hours  levETIRAcetam  Oral Liquid - Peds 500 milliGRAM(s) Oral every 12 hours  LORazepam     Tablet 1 milliGRAM(s) Oral three times a day  metoprolol tartrate 100 milliGRAM(s) Oral two times a day  pantoprazole  Injectable 40 milliGRAM(s) IV Push daily  piperacillin/tazobactam IVPB.. 3.375 Gram(s) IV Intermittent every 8 hours    MEDICATIONS  (PRN):      REVIEW OF SYSTEMS: limited from pt 2/2 medical/mental state	    Vital Signs Last 24 Hrs  T(C): 36 (29 Oct 2019 04:00), Max: 36.6 (28 Oct 2019 16:05)  T(F): 96.8 (29 Oct 2019 04:00), Max: 97.9 (28 Oct 2019 16:05)  HR: 66 (29 Oct 2019 14:00) (57 - 79)  BP: 150/90 (29 Oct 2019 14:00) (91/62 - 182/114)  BP(mean): 105 (29 Oct 2019 14:00) (47 - 133)  RR: 20 (29 Oct 2019 14:00) (12 - 31)  SpO2: 99% (29 Oct 2019 14:00) (92% - 100%)    PHYSICAL EXAMINATION:   Constitutional: stable ill appearance  HEENT: AT  Neck:  trach intact  Respiratory: bronchial breath sounds. Adequate airflow b/l.   Cardiovascular: sys murmur, S1 & S2 intact, no R/G, 2+ radial pulses b/l  Gastrointestinal: Soft, ND, normoactive b.s.,   Extremities: poor skin turgor/elasticity. warm.   Neurological:  awake.  Crude sensation intact.     Labs and imaging reviewed

## 2019-10-29 NOTE — DIETITIAN INITIAL EVALUATION ADULT. - PERTINENT MEDS FT
MEDICATIONS  (STANDING):  atorvastatin 20 milliGRAM(s) Oral at bedtime  chlorhexidine 4% Liquid 1 Application(s) Topical daily  heparin  Injectable 5000 Unit(s) SubCutaneous every 8 hours  insulin glargine Injectable (LANTUS) 10 Unit(s) SubCutaneous every morning  insulin lispro (HumaLOG) corrective regimen sliding scale   SubCutaneous every 6 hours  insulin lispro Injectable (HumaLOG) 4 Unit(s) SubCutaneous every 6 hours  levETIRAcetam  Oral Liquid - Peds 500 milliGRAM(s) Oral every 12 hours  LORazepam     Tablet 1 milliGRAM(s) Oral three times a day  metoprolol tartrate 100 milliGRAM(s) Oral two times a day  pantoprazole  Injectable 40 milliGRAM(s) IV Push daily  piperacillin/tazobactam IVPB.. 3.375 Gram(s) IV Intermittent every 8 hours    MEDICATIONS  (PRN):

## 2019-10-29 NOTE — PROGRESS NOTE ADULT - PROBLEM SELECTOR PLAN 1
Pt off insulin drip now  Insulin coverage Changed to sliding scale   Off IVF now  c/w Finger stick q 6 for now with sliding scale Resolved now  Off insulin drip and IVF

## 2019-10-29 NOTE — CHART NOTE - NSCHARTNOTEFT_GEN_A_CORE
Upon Nutritional Assessment by the Registered Dietitian your patient was determined to meet criteria / has evidence of the following diagnosis/diagnoses:          [ ]  Mild Protein Calorie Malnutrition        [x ]  Moderate Protein Calorie Malnutrition        [ ] Severe Protein Calorie Malnutrition        [ ] Unspecified Protein Calorie Malnutrition        [ ] Underweight / BMI <19        [ ] Morbid Obesity / BMI > 40      Findings as based on:  •  Comprehensive nutrition assessment and consultation  •  Calorie counts (nutrient intake analysis)  •  Food acceptance and intake status from observations by staff  •  Follow up  •  Patient education  •  Intervention secondary to interdisciplinary rounds  •   concerns      Treatment:    The following diet has been recommended:   Pt on Tube Feeds (see recommendations in initial assessment).   PROVIDER Section:     By signing this assessment you are acknowledging and agree with the diagnosis/diagnoses assigned by the Registered Dietitian    Comments:

## 2019-10-29 NOTE — PROGRESS NOTE ADULT - PROBLEM SELECTOR PLAN 4
p/w BUN/CR: 111/3.81 ( baseline Cr:0.82)  likely prerenal azotemia  F/u urine electrolytes  F/u ultrasound Kidney  c/w coe catheter  c/w IV hydration + free water  F/u BMP Likely due to pre-renal in setting of infection  Kidney functions improved now  Continue to monitor

## 2019-10-30 LAB
ALBUMIN SERPL ELPH-MCNC: 1.9 G/DL — LOW (ref 3.5–5)
ALP SERPL-CCNC: 46 U/L — SIGNIFICANT CHANGE UP (ref 40–120)
ALT FLD-CCNC: 30 U/L DA — SIGNIFICANT CHANGE UP (ref 10–60)
ANION GAP SERPL CALC-SCNC: 5 MMOL/L — SIGNIFICANT CHANGE UP (ref 5–17)
AST SERPL-CCNC: 48 U/L — HIGH (ref 10–40)
BASOPHILS # BLD AUTO: 0.02 K/UL — SIGNIFICANT CHANGE UP (ref 0–0.2)
BASOPHILS NFR BLD AUTO: 0.3 % — SIGNIFICANT CHANGE UP (ref 0–2)
BILIRUB SERPL-MCNC: 0.8 MG/DL — SIGNIFICANT CHANGE UP (ref 0.2–1.2)
BUN SERPL-MCNC: 14 MG/DL — SIGNIFICANT CHANGE UP (ref 7–18)
CALCIUM SERPL-MCNC: 8.3 MG/DL — LOW (ref 8.4–10.5)
CHLORIDE SERPL-SCNC: 115 MMOL/L — HIGH (ref 96–108)
CO2 SERPL-SCNC: 23 MMOL/L — SIGNIFICANT CHANGE UP (ref 22–31)
CREAT SERPL-MCNC: 0.77 MG/DL — SIGNIFICANT CHANGE UP (ref 0.5–1.3)
EOSINOPHIL # BLD AUTO: 0.07 K/UL — SIGNIFICANT CHANGE UP (ref 0–0.5)
EOSINOPHIL NFR BLD AUTO: 1 % — SIGNIFICANT CHANGE UP (ref 0–6)
GLUCOSE SERPL-MCNC: 133 MG/DL — HIGH (ref 70–99)
HCT VFR BLD CALC: 31.3 % — LOW (ref 34.5–45)
HGB BLD-MCNC: 9.6 G/DL — LOW (ref 11.5–15.5)
IMM GRANULOCYTES NFR BLD AUTO: 0.3 % — SIGNIFICANT CHANGE UP (ref 0–1.5)
LYMPHOCYTES # BLD AUTO: 2.17 K/UL — SIGNIFICANT CHANGE UP (ref 1–3.3)
LYMPHOCYTES # BLD AUTO: 31.1 % — SIGNIFICANT CHANGE UP (ref 13–44)
MAGNESIUM SERPL-MCNC: 2.2 MG/DL — SIGNIFICANT CHANGE UP (ref 1.6–2.6)
MCHC RBC-ENTMCNC: 25.7 PG — LOW (ref 27–34)
MCHC RBC-ENTMCNC: 30.7 GM/DL — LOW (ref 32–36)
MCV RBC AUTO: 83.7 FL — SIGNIFICANT CHANGE UP (ref 80–100)
MONOCYTES # BLD AUTO: 0.31 K/UL — SIGNIFICANT CHANGE UP (ref 0–0.9)
MONOCYTES NFR BLD AUTO: 4.4 % — SIGNIFICANT CHANGE UP (ref 2–14)
NEUTROPHILS # BLD AUTO: 4.39 K/UL — SIGNIFICANT CHANGE UP (ref 1.8–7.4)
NEUTROPHILS NFR BLD AUTO: 62.9 % — SIGNIFICANT CHANGE UP (ref 43–77)
NRBC # BLD: 0 /100 WBCS — SIGNIFICANT CHANGE UP (ref 0–0)
PHOSPHATE SERPL-MCNC: 2.5 MG/DL — SIGNIFICANT CHANGE UP (ref 2.5–4.5)
PLATELET # BLD AUTO: 102 K/UL — LOW (ref 150–400)
POTASSIUM SERPL-MCNC: 4.7 MMOL/L — SIGNIFICANT CHANGE UP (ref 3.5–5.3)
POTASSIUM SERPL-SCNC: 4.7 MMOL/L — SIGNIFICANT CHANGE UP (ref 3.5–5.3)
PROT SERPL-MCNC: 4.9 G/DL — LOW (ref 6–8.3)
RBC # BLD: 3.74 M/UL — LOW (ref 3.8–5.2)
RBC # FLD: 14.5 % — SIGNIFICANT CHANGE UP (ref 10.3–14.5)
SODIUM SERPL-SCNC: 143 MMOL/L — SIGNIFICANT CHANGE UP (ref 135–145)
WBC # BLD: 6.98 K/UL — SIGNIFICANT CHANGE UP (ref 3.8–10.5)
WBC # FLD AUTO: 6.98 K/UL — SIGNIFICANT CHANGE UP (ref 3.8–10.5)

## 2019-10-30 RX ORDER — DEXTROSE 50 % IN WATER 50 %
50 SYRINGE (ML) INTRAVENOUS ONCE
Refills: 0 | Status: COMPLETED | OUTPATIENT
Start: 2019-10-30 | End: 2019-10-30

## 2019-10-30 RX ORDER — METOPROLOL TARTRATE 50 MG
50 TABLET ORAL
Refills: 0 | Status: DISCONTINUED | OUTPATIENT
Start: 2019-10-30 | End: 2019-11-05

## 2019-10-30 RX ORDER — INSULIN LISPRO 100/ML
VIAL (ML) SUBCUTANEOUS EVERY 6 HOURS
Refills: 0 | Status: DISCONTINUED | OUTPATIENT
Start: 2019-10-30 | End: 2019-11-05

## 2019-10-30 RX ORDER — CEFTRIAXONE 500 MG/1
1000 INJECTION, POWDER, FOR SOLUTION INTRAMUSCULAR; INTRAVENOUS EVERY 24 HOURS
Refills: 0 | Status: COMPLETED | OUTPATIENT
Start: 2019-10-30 | End: 2019-11-01

## 2019-10-30 RX ORDER — INSULIN LISPRO 100/ML
2 VIAL (ML) SUBCUTANEOUS EVERY 6 HOURS
Refills: 0 | Status: DISCONTINUED | OUTPATIENT
Start: 2019-10-30 | End: 2019-11-05

## 2019-10-30 RX ADMIN — Medication 1: at 23:15

## 2019-10-30 RX ADMIN — PIPERACILLIN AND TAZOBACTAM 25 GRAM(S): 4; .5 INJECTION, POWDER, LYOPHILIZED, FOR SOLUTION INTRAVENOUS at 05:04

## 2019-10-30 RX ADMIN — Medication 4 UNIT(S): at 11:00

## 2019-10-30 RX ADMIN — Medication 2 UNIT(S): at 23:15

## 2019-10-30 RX ADMIN — HEPARIN SODIUM 5000 UNIT(S): 5000 INJECTION INTRAVENOUS; SUBCUTANEOUS at 13:11

## 2019-10-30 RX ADMIN — Medication 1 DROP(S): at 23:02

## 2019-10-30 RX ADMIN — Medication 2: at 11:00

## 2019-10-30 RX ADMIN — Medication 4 UNIT(S): at 05:04

## 2019-10-30 RX ADMIN — HEPARIN SODIUM 5000 UNIT(S): 5000 INJECTION INTRAVENOUS; SUBCUTANEOUS at 05:04

## 2019-10-30 RX ADMIN — INSULIN GLARGINE 10 UNIT(S): 100 INJECTION, SOLUTION SUBCUTANEOUS at 08:45

## 2019-10-30 RX ADMIN — LEVETIRACETAM 500 MILLIGRAM(S): 250 TABLET, FILM COATED ORAL at 05:03

## 2019-10-30 RX ADMIN — PANTOPRAZOLE SODIUM 40 MILLIGRAM(S): 20 TABLET, DELAYED RELEASE ORAL at 11:00

## 2019-10-30 RX ADMIN — ATORVASTATIN CALCIUM 20 MILLIGRAM(S): 80 TABLET, FILM COATED ORAL at 23:03

## 2019-10-30 RX ADMIN — Medication 1 MILLIGRAM(S): at 23:01

## 2019-10-30 RX ADMIN — Medication 1 MILLIGRAM(S): at 13:10

## 2019-10-30 RX ADMIN — CHLORHEXIDINE GLUCONATE 1 APPLICATION(S): 213 SOLUTION TOPICAL at 11:01

## 2019-10-30 RX ADMIN — Medication 50 MILLIGRAM(S): at 17:31

## 2019-10-30 RX ADMIN — LEVETIRACETAM 500 MILLIGRAM(S): 250 TABLET, FILM COATED ORAL at 17:31

## 2019-10-30 RX ADMIN — Medication 100 MILLIGRAM(S): at 05:09

## 2019-10-30 RX ADMIN — Medication 1 MILLIGRAM(S): at 05:04

## 2019-10-30 RX ADMIN — CEFTRIAXONE 100 MILLIGRAM(S): 500 INJECTION, POWDER, FOR SOLUTION INTRAMUSCULAR; INTRAVENOUS at 13:10

## 2019-10-30 RX ADMIN — HEPARIN SODIUM 5000 UNIT(S): 5000 INJECTION INTRAVENOUS; SUBCUTANEOUS at 23:03

## 2019-10-30 RX ADMIN — Medication 50 MILLILITER(S): at 17:37

## 2019-10-30 NOTE — PROGRESS NOTE ADULT - PROBLEM SELECTOR PLAN 3
Na improved to 148  Dced IVF as pt is retaining  Will continue Free water through NGT for 1 more day  Monitor BMP q 12 for now Na improved to 143  Will continue 250ml q 8 free water through NGT  bmp Daily

## 2019-10-30 NOTE — PROGRESS NOTE ADULT - SUBJECTIVE AND OBJECTIVE BOX
[facility] [place] [facility] NEPHROLOGY PRACTICE   MD Barbara Donis D.O. Fatima Sheikh, D.O. Ruoru Wong, PA    From 7 AM - 5 PM:  OFFICE: [phone]  Dr. Irwin cell: [phone]  Dr. Hoffman cell: [phone]  Dr. Purvis cell: [phone]  WENDI Bojorquez cell: [phone]    From 5 PM - 7 AM: Answering Service: [phone]        RENAL FOLLOW UP NOTE  --------------------------------------------------------------------------------  HPI: Pt seen and examined. Non-verbal.        PAST HISTORY  --------------------------------------------------------------------------------  No significant changes to PMH, PSH, FHx, SHx, unless otherwise noted    ALLERGIES & MEDICATIONS  --------------------------------------------------------------------------------  Allergies    angiotensin converting enzyme inhibitors (Unknown)    Intolerances      Standing Inpatient Medications  artificial tears (preservative free) Ophthalmic Solution 1 Drop(s) Both EYES two times a day  atorvastatin 20 milliGRAM(s) Oral at bedtime  cefTRIAXone   IVPB 1000 milliGRAM(s) IV Intermittent every 24 hours  chlorhexidine 4% Liquid 1 Application(s) Topical daily  heparin  Injectable 5000 Unit(s) SubCutaneous every 8 hours  insulin glargine Injectable (LANTUS) 10 Unit(s) SubCutaneous every morning  insulin lispro (HumaLOG) corrective regimen sliding scale   SubCutaneous every 6 hours  insulin lispro Injectable (HumaLOG) 2 Unit(s) SubCutaneous every 6 hours  levETIRAcetam  Oral Liquid - Peds 500 milliGRAM(s) Oral every 12 hours  LORazepam     Tablet 1 milliGRAM(s) Oral three times a day  metoprolol tartrate 50 milliGRAM(s) Oral two times a day  pantoprazole  Injectable 40 milliGRAM(s) IV Push daily    PRN Inpatient Medications      REVIEW OF SYSTEMS  --------------------------------------------------------------------------------  Unable to obtain    VITALS/PHYSICAL EXAM  --------------------------------------------------------------------------------  T(C): 36.6 (10-30-19 @ 15:35), Max: 36.7 (10-30-19 @ 08:00)  HR: 72 (10-30-19 @ 17:41) (53 - 72)  BP: 113/73 (10-30-19 @ 16:00) (85/53 - 165/131)  RR: 25 (10-30-19 @ 17:41) (0 - 26)  SpO2: 99% (10-30-19 @ 17:41) (90% - 100%)  Wt(kg): --        10-29-19 @ 07:01  -  10-30-19 @ 07:00  --------------------------------------------------------  IN: 3625 mL / OUT: 1400 mL / NET: 2225 mL    10-30-19 @ 07:01  -  10-30-19 @ 18:51  --------------------------------------------------------  IN: 410 mL / OUT: 705 mL / NET: -295 mL      Physical Exam:  	Gen: NAD, trach   	HEENT: MMM  	Pulm: CTA B/L  	CV: S1S2  	Abd: Soft, +BS   	Ext: No LE edema B/L              : coe in place, draining yellow urine w/ sediment  	Neuro: Awake  	Skin: Warm and dry    LABS/STUDIES  --------------------------------------------------------------------------------              9.6    6.98  >-----------<  102      [10-30-19 @ 06:05]              31.3     143  |  115  |  14  ----------------------------<  133      [10-30-19 @ 06:05]  4.7   |  23  |  0.77        Ca     8.3     [10-30-19 @ 06:05]      Mg     2.2     [10-30-19 @ 06:05]      Phos  2.5     [10-30-19 @ 06:05]    TPro  4.9  /  Alb  1.9  /  TBili  0.8  /  DBili  x   /  AST  48  /  ALT  30  /  AlkPhos  46  [10-30-19 @ 06:05]          Creatinine Trend:  SCr 0.77 [10-30 @ 06:05]  SCr 1.06 [10-29 @ 04:43]  SCr 1.06 [10-29 @ 00:30]  SCr 1.09 [10-28 @ 15:21]  SCr 1.16 [10-28 @ 10:37]    Urinalysis - [10-26-19 @ 22:41]      Color Yellow / Appearance very cloudy / SG 1.010 / pH 5.0      Gluc 1000 / Ketone Trace  / Bili Negative / Urobili Negative       Blood Trace / Protein 30 / Leuk Est Trace / Nitrite Negative      RBC 2-5 / WBC 6-10 / Hyaline  / Gran  / Sq Epi  / Non Sq Epi Few / Bacteria Many      Vitamin D (25OH) 33.0      [09-23-19 @ 14:43]  HbA1c 12.0      [10-27-19 @ 12:00]  TSH 0.80      [10-27-19 @ 04:21]  Lipid: chol 150, , HDL 50, LDL 80      [09-23-19 @ 08:36]

## 2019-10-30 NOTE — PROGRESS NOTE ADULT - SUBJECTIVE AND OBJECTIVE BOX
Patient seen and examined at bedside earlier today  no new acute events noted    HPI:  64 years old Female with PMH Alzheimer's dementia , CKD 3, DM, HTN, non-verbal Parkinson's disease, chronic respiratory failure s/p Tracheostomy, dysphagia s/p peg tube, major depressive disorder,  sent from [facility] with complain of fever of 102F since 1 day.  Patient is AXO=0, not responding well, opening eyes only on painful stimuli. As per chart review, patient was sent for high grade fever. BS in NH was high in 600s and was given 15U Regular insulin before sending to hospital. Further hx is limited as patient is AXO=0.  In ED, patient's vital signs were remarkable for Temp of 102.6 F, HR: 102, BP: 96/70, RR: 24. Labs were remarkable for BS: 890, Corrected A, BUN/CR: 111/3.81, Acetone: small, Blood gases shows PH: 7.38, UA positive for UTI,  Patient was given 3L NS bolus and 10 Units Regular insulin, BS were still high  Goals of care: As per NH paper and ED provider note: HCP is sister Sharon mead,>> Patient is Full code (27 Oct 2019 00:44)      PAST MEDICAL & SURGICAL HISTORY:  Alzheimer disease  Parkinson disease  Respiratory failure  Hypertension  Schizophrenia  Diabetic coma  Diabetes mellitus  S/P percutaneous endoscopic gastrostomy (PEG) tube placement  H/O tracheostomy      angiotensin converting enzyme inhibitors (Unknown)    MEDICATIONS  (STANDING):  artificial tears (preservative free) Ophthalmic Solution 1 Drop(s) Both EYES two times a day  atorvastatin 20 milliGRAM(s) Oral at bedtime  cefTRIAXone   IVPB 1000 milliGRAM(s) IV Intermittent every 24 hours  chlorhexidine 4% Liquid 1 Application(s) Topical daily  heparin  Injectable 5000 Unit(s) SubCutaneous every 8 hours  insulin glargine Injectable (LANTUS) 10 Unit(s) SubCutaneous every morning  insulin lispro (HumaLOG) corrective regimen sliding scale   SubCutaneous every 6 hours  insulin lispro Injectable (HumaLOG) 2 Unit(s) SubCutaneous every 6 hours  levETIRAcetam  Oral Liquid - Peds 500 milliGRAM(s) Oral every 12 hours  LORazepam     Tablet 1 milliGRAM(s) Oral three times a day  metoprolol tartrate 50 milliGRAM(s) Oral two times a day  pantoprazole  Injectable 40 milliGRAM(s) IV Push daily    MEDICATIONS  (PRN):        REVIEW OF SYSTEMS: limited from pt 2/2 medical/mental state	    Vital Signs Last 24 Hrs  T(C): 36.7 (30 Oct 2019 18:57), Max: 36.7 (30 Oct 2019 08:00)  T(F): 98 (30 Oct 2019 18:57), Max: 98 (30 Oct 2019 08:00)  HR: 56 (30 Oct 2019 18:57) (53 - 72)  BP: 114/83 (30 Oct 2019 18:57) (85/53 - 165/131)  BP(mean): 82 (30 Oct 2019 16:00) (40 - 763)  RR: 20 (30 Oct 2019 18:57) (0 - 26)  SpO2: 100% (30 Oct 2019 18:57) (90% - 100%)    PHYSICAL EXAMINATION:   Constitutional: stable appearance  HEENT: AT  Neck:  trach intact  Respiratory: bronchial breath sounds. Adequate airflow b/l.   Cardiovascular: stable sys murmur, S1 & S2 intact, no R/G, 2+ radial pulses b/l  Gastrointestinal: Soft, ND, normoactive b.s.,   Extremities:  warm.   Neurological:  awake.  Crude sensation intact.     Labs and imaging reviewed

## 2019-10-30 NOTE — PROGRESS NOTE ADULT - SUBJECTIVE AND OBJECTIVE BOX
INTERVAL HPI/OVERNIGHT EVENTS: ***    PRESSORS: [ ] YES [ ] NO  WHICH:    ANTIBIOTICS:                  DATE STARTED:  ANTIBIOTICS:                  DATE STARTED:  ANTIBIOTICS:                  DATE STARTED:    Antimicrobial:  piperacillin/tazobactam IVPB.. 3.375 Gram(s) IV Intermittent every 8 hours    Cardiovascular:  metoprolol tartrate 100 milliGRAM(s) Oral two times a day    Pulmonary:    Hematalogic:  heparin  Injectable 5000 Unit(s) SubCutaneous every 8 hours    Other:  atorvastatin 20 milliGRAM(s) Oral at bedtime  chlorhexidine 4% Liquid 1 Application(s) Topical daily  insulin glargine Injectable (LANTUS) 10 Unit(s) SubCutaneous every morning  insulin lispro (HumaLOG) corrective regimen sliding scale   SubCutaneous every 6 hours  insulin lispro Injectable (HumaLOG) 4 Unit(s) SubCutaneous every 6 hours  levETIRAcetam  Oral Liquid - Peds 500 milliGRAM(s) Oral every 12 hours  LORazepam     Tablet 1 milliGRAM(s) Oral three times a day  pantoprazole  Injectable 40 milliGRAM(s) IV Push daily    atorvastatin 20 milliGRAM(s) Oral at bedtime  chlorhexidine 4% Liquid 1 Application(s) Topical daily  heparin  Injectable 5000 Unit(s) SubCutaneous every 8 hours  insulin glargine Injectable (LANTUS) 10 Unit(s) SubCutaneous every morning  insulin lispro (HumaLOG) corrective regimen sliding scale   SubCutaneous every 6 hours  insulin lispro Injectable (HumaLOG) 4 Unit(s) SubCutaneous every 6 hours  levETIRAcetam  Oral Liquid - Peds 500 milliGRAM(s) Oral every 12 hours  LORazepam     Tablet 1 milliGRAM(s) Oral three times a day  metoprolol tartrate 100 milliGRAM(s) Oral two times a day  pantoprazole  Injectable 40 milliGRAM(s) IV Push daily  piperacillin/tazobactam IVPB.. 3.375 Gram(s) IV Intermittent every 8 hours    Drug Dosing Weight  Height (cm): 170.18 (27 Sep 2019 14:38)  Weight (kg): 56.1 (27 Oct 2019 02:00)  BMI (kg/m2): 19.4 (27 Oct 2019 02:00)  BSA (m2): 1.65 (27 Oct 2019 02:00)    CENTRAL LINE: [ ] YES [ ] NO  LOCATION:   DATE INSERTED:  REMOVE: [ ] YES [ ] NO  EXPLAIN:    HUSAIN: [ ] YES [ ] NO    DATE INSERTED:  REMOVE:  [ ] YES [ ] NO  EXPLAIN:    A-LINE:  [ ] YES [ ] NO  LOCATION:   DATE INSERTED:  REMOVE:  [ ] YES [ ] NO  EXPLAIN:    PMH -reviewed admission note, no change since admission  Heart faliure: acute [ ] chronic [ ] acute or chronic [ ] diastolic [ ] systolic [ ] combied systolic and diastolic[ ]  LAYLA: ATN[ ] renal medullary necrosis [ ] CKD I [ ]CKDII [ ]CKD III [ ]CKD IV [ ]CKD V [ ]Other pathological lesions [ ]  Abdominal Nutrition Status: malnutrition [ ] cachexia [ ] morbid obesity/BMI=40 [ ] Supplement ordered [___________]     ICU Vital Signs Last 24 Hrs  T(C): 36.3 (30 Oct 2019 04:00), Max: 36.3 (30 Oct 2019 00:00)  T(F): 97.4 (30 Oct 2019 04:00), Max: 97.4 (30 Oct 2019 04:00)  HR: 62 (30 Oct 2019 07:00) (54 - 79)  BP: 108/86 (30 Oct 2019 07:00) (87/33 - 171/149)  BP(mean): 91 (30 Oct 2019 07:00) (40 - 154)  ABP: --  ABP(mean): --  RR: 19 (30 Oct 2019 07:00) (15 - 41)  SpO2: 100% (30 Oct 2019 07:00) (90% - 100%)            10-29 @ 07:01  -  10-30 @ 07:00  --------------------------------------------------------  IN: 3605 mL / OUT: 1350 mL / NET: 2255 mL            >>> <<<    PHYSICAL EXAM:    GENERAL: NAD, well-groomed, well-developed  HEAD:  Atraumatic, Normocephalic  EYES: EOMI, PERRLA, conjunctiva and sclera clear  ENMT: No tonsillar erythema, exudates, or enlargement; Moist mucous membranes, Good dentition, No lesions  NECK: Supple, normal appearance, No JVD; Normal thyroid; Trachea midline  NERVOUS SYSTEM:  Alert & Oriented X3, Good concentration; Motor Strength 5/5 B/L upper and lower extremities; DTRs 2+ intact and symmetric  CHEST/LUNG: No chest deformity; Normal percussion bilaterally; No rales, rhonchi, wheezing   HEART: Regular rate and rhythm; No murmurs, rubs, or gallops  ABDOMEN: Soft, Nontender, Nondistended; Bowel sounds present  EXTREMITIES:  2+ Peripheral Pulses, No clubbing, cyanosis, or edema  LYMPH: No lymphadenopathy noted  SKIN: No rashes or lesions; Good capillary refill      LABS:  CBC Full  -  ( 30 Oct 2019 06:05 )  WBC Count : 6.98 K/uL  RBC Count : 3.74 M/uL  Hemoglobin : 9.6 g/dL  Hematocrit : 31.3 %  Platelet Count - Automated : 102 K/uL  Mean Cell Volume : 83.7 fl  Mean Cell Hemoglobin : 25.7 pg  Mean Cell Hemoglobin Concentration : 30.7 gm/dL  Auto Neutrophil # : 4.39 K/uL  Auto Lymphocyte # : 2.17 K/uL  Auto Monocyte # : 0.31 K/uL  Auto Eosinophil # : 0.07 K/uL  Auto Basophil # : 0.02 K/uL  Auto Neutrophil % : 62.9 %  Auto Lymphocyte % : 31.1 %  Auto Monocyte % : 4.4 %  Auto Eosinophil % : 1.0 %  Auto Basophil % : 0.3 %    10-30    143  |  115<H>  |  14  ----------------------------<  133<H>  4.7   |  23  |  0.77    Ca    8.3<L>      30 Oct 2019 06:05  Phos  2.5     10-30  Mg     2.2     10-30    TPro  4.9<L>  /  Alb  1.9<L>  /  TBili  0.8  /  DBili  x   /  AST  48<H>  /  ALT  30  /  AlkPhos  46  10-30        Culture Results:   >100,000 CFU/ml Escherichia coli (10-27 @ 12:39)  Culture Results:   No growth to date. (10-27 @ 12:28)  Culture Results:   No growth to date. (10-27 @ 12:25)      RADIOLOGY & ADDITIONAL STUDIES REVIEWED:  ***    [ ]GOALS OF CARE DISCUSSION WITH PATIENT/FAMILY/PROXY:    CRITICAL CARE TIME SPENT: 35 minutes INTERVAL HPI/OVERNIGHT EVENTS: Pt remains stable overnight. No acute events reported    PRESSORS: [ ] YES [x ] NO    Antimicrobial:  piperacillin/tazobactam IVPB.. 3.375 Gram(s) IV Intermittent every 8 hours    Cardiovascular:  metoprolol tartrate 100 milliGRAM(s) Oral two times a day    Pulmonary:    Hematalogic:  heparin  Injectable 5000 Unit(s) SubCutaneous every 8 hours    Other:  atorvastatin 20 milliGRAM(s) Oral at bedtime  chlorhexidine 4% Liquid 1 Application(s) Topical daily  insulin glargine Injectable (LANTUS) 10 Unit(s) SubCutaneous every morning  insulin lispro (HumaLOG) corrective regimen sliding scale   SubCutaneous every 6 hours  insulin lispro Injectable (HumaLOG) 4 Unit(s) SubCutaneous every 6 hours  levETIRAcetam  Oral Liquid - Peds 500 milliGRAM(s) Oral every 12 hours  LORazepam     Tablet 1 milliGRAM(s) Oral three times a day  pantoprazole  Injectable 40 milliGRAM(s) IV Push daily    atorvastatin 20 milliGRAM(s) Oral at bedtime  chlorhexidine 4% Liquid 1 Application(s) Topical daily  heparin  Injectable 5000 Unit(s) SubCutaneous every 8 hours  insulin glargine Injectable (LANTUS) 10 Unit(s) SubCutaneous every morning  insulin lispro (HumaLOG) corrective regimen sliding scale   SubCutaneous every 6 hours  insulin lispro Injectable (HumaLOG) 4 Unit(s) SubCutaneous every 6 hours  levETIRAcetam  Oral Liquid - Peds 500 milliGRAM(s) Oral every 12 hours  LORazepam     Tablet 1 milliGRAM(s) Oral three times a day  metoprolol tartrate 100 milliGRAM(s) Oral two times a day  pantoprazole  Injectable 40 milliGRAM(s) IV Push daily  piperacillin/tazobactam IVPB.. 3.375 Gram(s) IV Intermittent every 8 hours    Drug Dosing Weight  Height (cm): 170.18 (27 Sep 2019 14:38)  Weight (kg): 56.1 (27 Oct 2019 02:00)  BMI (kg/m2): 19.4 (27 Oct 2019 02:00)  BSA (m2): 1.65 (27 Oct 2019 02:00)    CENTRAL LINE: [ ] YES [x ] NO  LOCATION:   DATE INSERTED:  REMOVE: [ ] YES [ ] NO  EXPLAIN:    HUSAIN: [ x] YES [ ] NO    DATE INSERTED:  REMOVE:  [ ] YES [ ] NO  EXPLAIN:    A-LINE:  [ ] YES [x ] NO  LOCATION:   DATE INSERTED:  REMOVE:  [ ] YES [ ] NO  EXPLAIN:      ICU Vital Signs Last 24 Hrs  T(C): 36.3 (30 Oct 2019 04:00), Max: 36.3 (30 Oct 2019 00:00)  T(F): 97.4 (30 Oct 2019 04:00), Max: 97.4 (30 Oct 2019 04:00)  HR: 62 (30 Oct 2019 07:00) (54 - 79)  BP: 108/86 (30 Oct 2019 07:00) (87/33 - 171/149)  BP(mean): 91 (30 Oct 2019 07:00) (40 - 154)  ABP: --  ABP(mean): --  RR: 19 (30 Oct 2019 07:00) (15 - 41)  SpO2: 100% (30 Oct 2019 07:00) (90% - 100%)            10-29 @ 07:01  -  10-30 @ 07:00  --------------------------------------------------------  IN: 3605 mL / OUT: 1350 mL / NET: 2255 mL      PHYSICAL EXAM:    GENERAL: sitting on bed with trach collar on, purposeless head movements  HEAD:  Atraumatic, Normocephalic  EYES: PERRLA, conjunctiva and sclera clear  ENMT: No tonsillar erythema, exudates, or enlargement; Moist mucous membranes, Good dentition, No lesions  NECK: Supple, trach collar in place  NERVOUS SYSTEM:  Alert & Oriented X0, spontaneous eye opening and limb movements  CHEST/LUNG: No chest deformity; Normal percussion bilaterally; No rales, rhonchi, wheezing   HEART: Regular rate and rhythm; No murmurs, rubs, or gallops  ABDOMEN: Soft, Nontender, Nondistended; Bowel sounds present  EXTREMITIES:  2+ Peripheral Pulses, No clubbing, cyanosis, or edema  LYMPH: No lymphadenopathy noted  SKIN: No rashes or lesions; Good capillary refill      LABS:  CBC Full  -  ( 30 Oct 2019 06:05 )  WBC Count : 6.98 K/uL  RBC Count : 3.74 M/uL  Hemoglobin : 9.6 g/dL  Hematocrit : 31.3 %  Platelet Count - Automated : 102 K/uL  Mean Cell Volume : 83.7 fl  Mean Cell Hemoglobin : 25.7 pg  Mean Cell Hemoglobin Concentration : 30.7 gm/dL  Auto Neutrophil # : 4.39 K/uL  Auto Lymphocyte # : 2.17 K/uL  Auto Monocyte # : 0.31 K/uL  Auto Eosinophil # : 0.07 K/uL  Auto Basophil # : 0.02 K/uL  Auto Neutrophil % : 62.9 %  Auto Lymphocyte % : 31.1 %  Auto Monocyte % : 4.4 %  Auto Eosinophil % : 1.0 %  Auto Basophil % : 0.3 %    10-30    143  |  115<H>  |  14  ----------------------------<  133<H>  4.7   |  23  |  0.77    Ca    8.3<L>      30 Oct 2019 06:05  Phos  2.5     10-30  Mg     2.2     10-30    TPro  4.9<L>  /  Alb  1.9<L>  /  TBili  0.8  /  DBili  x   /  AST  48<H>  /  ALT  30  /  AlkPhos  46  10-30        Culture Results:   >100,000 CFU/ml Escherichia coli (10-27 @ 12:39)  Culture Results:   No growth to date. (10-27 @ 12:28)  Culture Results:   No growth to date. (10-27 @ 12:25)      RADIOLOGY & ADDITIONAL STUDIES REVIEWED:  ***    [ ]GOALS OF CARE DISCUSSION WITH PATIENT/FAMILY/PROXY:    CRITICAL CARE TIME SPENT: 35 minutes

## 2019-10-30 NOTE — PROGRESS NOTE ADULT - SUBJECTIVE AND OBJECTIVE BOX
Time of Visit:  Patient seen and examined.     MEDICATIONS  (STANDING):  atorvastatin 20 milliGRAM(s) Oral at bedtime  cefTRIAXone   IVPB 1000 milliGRAM(s) IV Intermittent every 24 hours  chlorhexidine 4% Liquid 1 Application(s) Topical daily  dextrose 50% Injectable 50 milliLiter(s) IV Push once  heparin  Injectable 5000 Unit(s) SubCutaneous every 8 hours  insulin glargine Injectable (LANTUS) 10 Unit(s) SubCutaneous every morning  insulin lispro (HumaLOG) corrective regimen sliding scale   SubCutaneous every 6 hours  insulin lispro Injectable (HumaLOG) 4 Unit(s) SubCutaneous every 6 hours  levETIRAcetam  Oral Liquid - Peds 500 milliGRAM(s) Oral every 12 hours  LORazepam     Tablet 1 milliGRAM(s) Oral three times a day  metoprolol tartrate 50 milliGRAM(s) Oral two times a day  pantoprazole  Injectable 40 milliGRAM(s) IV Push daily      MEDICATIONS  (PRN):       Medications up to date at time of exam.      PHYSICAL EXAMINATION:  Patient has no new complaints.  GENERAL: The patient is a well-developed, well-nourished, in no apparent distress.     Vital Signs Last 24 Hrs  T(C): 36.6 (30 Oct 2019 15:35), Max: 36.7 (30 Oct 2019 08:00)  T(F): 97.8 (30 Oct 2019 15:35), Max: 98 (30 Oct 2019 08:00)  HR: 72 (30 Oct 2019 17:00) (53 - 72)  BP: 113/73 (30 Oct 2019 16:00) (85/53 - 165/131)  BP(mean): 82 (30 Oct 2019 16:00) (40 - 763)  RR: 23 (30 Oct 2019 17:00) (0 - 26)  SpO2: 100% (30 Oct 2019 17:00) (90% - 100%)   (if applicable)    Chest Tube (if applicable)    HEENT: Head is normocephalic and atraumatic. Extraocular muscles are intact. Mucous membranes are moist.     NECK: Supple, no palpable adenopathy. + trach    LUNGS: Clear to auscultation, no wheezing, rales, or rhonchi.    HEART: Regular rate and rhythm without murmur.    ABDOMEN: Soft, nontender, and nondistended.  No hepatosplenomegaly is noted. + PEG    : No painful voiding, no pelvic pain    EXTREMITIES: Without any cyanosis, clubbing, rash, lesions or edema.    NEUROLOGIC: eyes open, do not follow    SKIN: Warm, dry, good turgor.      LABS:                        9.6    6.98  )-----------( 102      ( 30 Oct 2019 06:05 )             31.3     10-30    143  |  115<H>  |  14  ----------------------------<  133<H>  4.7   |  23  |  0.77    Ca    8.3<L>      30 Oct 2019 06:05  Phos  2.5     10-30  Mg     2.2     10-30    TPro  4.9<L>  /  Alb  1.9<L>  /  TBili  0.8  /  DBili  x   /  AST  48<H>  /  ALT  30  /  AlkPhos  46  10-30                        MICROBIOLOGY: (if applicable)    RADIOLOGY & ADDITIONAL STUDIES:  EKG:   CXR:  ECHO:    IMPRESSION: 64y Female PAST MEDICAL & SURGICAL HISTORY:  Alzheimer disease  Parkinson disease  Respiratory failure  Hypertension  Schizophrenia  Diabetic coma  Diabetes mellitus  S/P percutaneous endoscopic gastrostomy (PEG) tube placement  H/O tracheostomy   p/w       IMP: This is a 64 years old Female with PMH Alzheimer's dementia , CKD 3, DM, HTN, non-verbal Parkinson's disease, chronic respiratory failure s/p Tracheostomy, dysphagia s/p peg tube, major depressive disorder,  sent from [facility] with complain of fever of 102F since 1 day.      Sugg:  -continue O2 via trach collar  -pul hygiene  -ivf  -peg feed  -monitor blood sugar  -dvt/gi prophy  -resume ativan 1 mg q8h     case discused with ICU attending

## 2019-10-30 NOTE — PROGRESS NOTE ADULT - PROBLEM SELECTOR PLAN 5
Not verbal, currently encephalopathic  Unsure if pt has diagnosis of Parkinson Disease, will confirm with fm Not verbal, currently encephalopathic  No Dx of parkinson's as per family  Discussed with pt sister regarding goals of care, she wants to think as present

## 2019-10-30 NOTE — CHART NOTE - NSCHARTNOTEFT_GEN_A_CORE
This is a 64 years old Female with PMH Alzheimer's dementia , CKD 3, DM, HTN, non-verbal Parkinson's disease, chronic respiratory failure s/p Tracheostomy, dysphagia s/p peg tube, major depressive disorder who was sent in from NH for evaluation of fever. In ED, patient's vital signs were remarkable for Temp of 102.6 F, HR: 102, BP: 96/70, RR: 24. Labs were remarkable for BS: 890, Corrected A, BUN/CR: 111/3.81, Acetone: small, Blood gases shows PH: 7.38, UA positive for UTI,  Patient was given 3L NS bolus and 10 Units Regular insulin, BS were still high.    Pt was admitted to ICU for sepsis s/s to uti and hyperglycemia requiring insulin state. Pt was started on IVF for hypernatremia and sepsis. blood sugars improved and she was transitioned to sliding scale and Lantus. Humalog standing 4units every 6 hours was added for elevated blood sugars which are now acceptable.    For hypernatremia pt was initially started on NS with free water that was later changed to D5 with free water. Pt Na levels improved to 143. Will continue with Free water 250ml q 8 hours    Pt hemodynamics improved, IVF was discontinued. Urine culture showed pansensitive E Coli. Pt received zosyn for 4 days that was switched to Ceftriaxone for 3 more days. Pt had a coe catheter that was discontinued       THINGS TO FOLLOW:    Monitor serum sodium  Continue Rocephin for 3 more days  Safe discharge planning  Follow up on GOC discussion with sister   Monitor finger sticks

## 2019-10-30 NOTE — PROGRESS NOTE ADULT - PROBLEM SELECTOR PLAN 2
Improved  So far BC negative, UC positive for E Coli  c/w antibiotics Resolved  So far BC negative, UC positive for E Coli  c/w antibiotics  Will change zosyn to Rocephin for 3 more days to complete the antibiotic course

## 2019-10-31 DIAGNOSIS — R53.2 FUNCTIONAL QUADRIPLEGIA: ICD-10-CM

## 2019-10-31 DIAGNOSIS — I10 ESSENTIAL (PRIMARY) HYPERTENSION: ICD-10-CM

## 2019-10-31 DIAGNOSIS — R56.9 UNSPECIFIED CONVULSIONS: ICD-10-CM

## 2019-10-31 LAB
ANION GAP SERPL CALC-SCNC: 3 MMOL/L — LOW (ref 5–17)
BUN SERPL-MCNC: 10 MG/DL — SIGNIFICANT CHANGE UP (ref 7–18)
CALCIUM SERPL-MCNC: 9.1 MG/DL — SIGNIFICANT CHANGE UP (ref 8.4–10.5)
CHLORIDE SERPL-SCNC: 114 MMOL/L — HIGH (ref 96–108)
CO2 SERPL-SCNC: 29 MMOL/L — SIGNIFICANT CHANGE UP (ref 22–31)
CREAT SERPL-MCNC: 0.82 MG/DL — SIGNIFICANT CHANGE UP (ref 0.5–1.3)
GLUCOSE SERPL-MCNC: 118 MG/DL — HIGH (ref 70–99)
HCT VFR BLD CALC: 34.7 % — SIGNIFICANT CHANGE UP (ref 34.5–45)
HGB BLD-MCNC: 10.4 G/DL — LOW (ref 11.5–15.5)
MAGNESIUM SERPL-MCNC: 2.2 MG/DL — SIGNIFICANT CHANGE UP (ref 1.6–2.6)
MCHC RBC-ENTMCNC: 25.1 PG — LOW (ref 27–34)
MCHC RBC-ENTMCNC: 30 GM/DL — LOW (ref 32–36)
MCV RBC AUTO: 83.6 FL — SIGNIFICANT CHANGE UP (ref 80–100)
NRBC # BLD: 0 /100 WBCS — SIGNIFICANT CHANGE UP (ref 0–0)
PHOSPHATE SERPL-MCNC: 3.2 MG/DL — SIGNIFICANT CHANGE UP (ref 2.5–4.5)
PLATELET # BLD AUTO: 115 K/UL — LOW (ref 150–400)
POTASSIUM SERPL-MCNC: 4.7 MMOL/L — SIGNIFICANT CHANGE UP (ref 3.5–5.3)
POTASSIUM SERPL-SCNC: 4.7 MMOL/L — SIGNIFICANT CHANGE UP (ref 3.5–5.3)
RBC # BLD: 4.15 M/UL — SIGNIFICANT CHANGE UP (ref 3.8–5.2)
RBC # FLD: 14.5 % — SIGNIFICANT CHANGE UP (ref 10.3–14.5)
SODIUM SERPL-SCNC: 146 MMOL/L — HIGH (ref 135–145)
WBC # BLD: 6.77 K/UL — SIGNIFICANT CHANGE UP (ref 3.8–10.5)
WBC # FLD AUTO: 6.77 K/UL — SIGNIFICANT CHANGE UP (ref 3.8–10.5)

## 2019-10-31 RX ADMIN — HEPARIN SODIUM 5000 UNIT(S): 5000 INJECTION INTRAVENOUS; SUBCUTANEOUS at 06:44

## 2019-10-31 RX ADMIN — Medication 1 DROP(S): at 06:44

## 2019-10-31 RX ADMIN — HEPARIN SODIUM 5000 UNIT(S): 5000 INJECTION INTRAVENOUS; SUBCUTANEOUS at 13:49

## 2019-10-31 RX ADMIN — Medication 50 MILLIGRAM(S): at 17:40

## 2019-10-31 RX ADMIN — Medication 2 UNIT(S): at 06:46

## 2019-10-31 RX ADMIN — Medication 50 MILLIGRAM(S): at 06:46

## 2019-10-31 RX ADMIN — LEVETIRACETAM 500 MILLIGRAM(S): 250 TABLET, FILM COATED ORAL at 17:40

## 2019-10-31 RX ADMIN — HEPARIN SODIUM 5000 UNIT(S): 5000 INJECTION INTRAVENOUS; SUBCUTANEOUS at 22:54

## 2019-10-31 RX ADMIN — PANTOPRAZOLE SODIUM 40 MILLIGRAM(S): 20 TABLET, DELAYED RELEASE ORAL at 12:05

## 2019-10-31 RX ADMIN — ATORVASTATIN CALCIUM 20 MILLIGRAM(S): 80 TABLET, FILM COATED ORAL at 22:54

## 2019-10-31 RX ADMIN — Medication 2 UNIT(S): at 23:50

## 2019-10-31 RX ADMIN — Medication 1 DROP(S): at 17:41

## 2019-10-31 RX ADMIN — Medication 2 UNIT(S): at 17:22

## 2019-10-31 RX ADMIN — LEVETIRACETAM 500 MILLIGRAM(S): 250 TABLET, FILM COATED ORAL at 06:46

## 2019-10-31 RX ADMIN — Medication 1 MILLIGRAM(S): at 13:49

## 2019-10-31 RX ADMIN — Medication 1 MILLIGRAM(S): at 06:50

## 2019-10-31 RX ADMIN — CEFTRIAXONE 100 MILLIGRAM(S): 500 INJECTION, POWDER, FOR SOLUTION INTRAMUSCULAR; INTRAVENOUS at 12:05

## 2019-10-31 RX ADMIN — Medication 2 UNIT(S): at 12:05

## 2019-10-31 RX ADMIN — Medication 1 MILLIGRAM(S): at 22:58

## 2019-10-31 RX ADMIN — INSULIN GLARGINE 10 UNIT(S): 100 INJECTION, SOLUTION SUBCUTANEOUS at 08:27

## 2019-10-31 NOTE — PROGRESS NOTE ADULT - SUBJECTIVE AND OBJECTIVE BOX
Time of Visit:  Patient seen and examined.     MEDICATIONS  (STANDING):  artificial tears (preservative free) Ophthalmic Solution 1 Drop(s) Both EYES two times a day  atorvastatin 20 milliGRAM(s) Oral at bedtime  cefTRIAXone   IVPB 1000 milliGRAM(s) IV Intermittent every 24 hours  chlorhexidine 4% Liquid 1 Application(s) Topical daily  heparin  Injectable 5000 Unit(s) SubCutaneous every 8 hours  insulin glargine Injectable (LANTUS) 10 Unit(s) SubCutaneous every morning  insulin lispro (HumaLOG) corrective regimen sliding scale   SubCutaneous every 6 hours  insulin lispro Injectable (HumaLOG) 2 Unit(s) SubCutaneous every 6 hours  levETIRAcetam  Oral Liquid - Peds 500 milliGRAM(s) Oral every 12 hours  LORazepam     Tablet 1 milliGRAM(s) Oral three times a day  metoprolol tartrate 50 milliGRAM(s) Oral two times a day  pantoprazole  Injectable 40 milliGRAM(s) IV Push daily      MEDICATIONS  (PRN):       Medications up to date at time of exam.      PHYSICAL EXAMINATION:    Vital Signs Last 24 Hrs  T(C): 36.8 (31 Oct 2019 14:41), Max: 36.8 (30 Oct 2019 20:41)  T(F): 98.2 (31 Oct 2019 14:41), Max: 98.2 (30 Oct 2019 20:41)  HR: 65 (31 Oct 2019 15:06) (56 - 88)  BP: 145/78 (31 Oct 2019 14:41) (113/73 - 145/78)  BP(mean): 82 (30 Oct 2019 16:00) (82 - 82)  RR: 20 (31 Oct 2019 14:41) (18 - 25)  SpO2: 100% (31 Oct 2019 15:06) (90% - 100%)   (if applicable)    General; Non verbal. Eyes open with tracking to tactile stimuli. No acute distress.       HEENT: Head is normocephalic and atraumatic. No nasal tenderness. Moist mucosa.     NECK: Has non infected Trach #6 .     LUNGS: Clear to auscultation, no wheezing, rales, or rhonchi. No use of accessory muscle.     HEART: S1 S2 Regular rate and no JVD.    ABDOMEN: Soft, nontender, and nondistended.  No abdominal guarding. Active bowel sounds. + Peg.     EXTREMITIES: Total care. Non ambulatory.     NEUROLOGIC: Cognitive impaired .      SKIN: Warm and moist/ Non diaphoretic.       LABS:                        10.4   6.77  )-----------( 115      ( 31 Oct 2019 06:41 )             34.7     10-31    146<H>  |  114<H>  |  10  ----------------------------<  118<H>  4.7   |  29  |  0.82    Ca    9.1      31 Oct 2019 06:41  Phos  3.2     10-31  Mg     2.2     10-31    TPro  4.9<L>  /  Alb  1.9<L>  /  TBili  0.8  /  DBili  x   /  AST  48<H>  /  ALT  30  /  AlkPhos  46  10-30      RADIOLOGY & ADDITIONAL STUDIES:  EKG:   CXR: < from: Xray Chest 1 View- PORTABLE-Routine (10.29.19 @ 19:21) >  PROCEDURE DATE:  10/29/2019          INTERPRETATION:  CLINICAL INDICATION: 64 years  Female with icu follow up.    COMPARISON: 10/26/2019    A tracheostomy is reidentified.    AP view of the chest demonstrates mild bibasilar discoid atelectasis..   There is no pleural effusion. The right hemidiaphragm is again elevated.   There is no pneumothorax.    The heart is normal in size. The aorta is atherosclerotic. There is no   mediastinal or hilar mass.     The pulmonary vasculature is normal.     Mild thoracic degenerative changes are present.    IMPRESSION:    Tracheostomy.    Bibasilar discoid atelectasis.      IMPRESSION: 64y Female PAST MEDICAL & SURGICAL HISTORY:  Alzheimer disease  Parkinson disease  Respiratory failure  Hypertension  Schizophrenia  Diabetic coma  Diabetes mellitus  S/P percutaneous endoscopic gastrostomy (PEG) tube placement  H/O tracheostomy     Impression; 65 Y/O Female from [facility] presented with Temp 102.6. Was admitted here at [place] in  due to Peg dislodgement . On Oxygen dependent via trach collar due to Acute on chronic hypoxic  respiratory failure.     Suggestion;  Continue Oxygen supplementation FIO2 40% via Trach collar. Not a candidate for trach capping nor decannulation.   Consider starting Duoneb Q 6 hours.  Oral, trach care, suction if needed.   Aspiration precautions . HOB elevation during Peg feeding.   DVT/ GI prophylactic. Time of Visit:  Patient seen and examined.     MEDICATIONS  (STANDING):  artificial tears (preservative free) Ophthalmic Solution 1 Drop(s) Both EYES two times a day  atorvastatin 20 milliGRAM(s) Oral at bedtime  cefTRIAXone   IVPB 1000 milliGRAM(s) IV Intermittent every 24 hours  chlorhexidine 4% Liquid 1 Application(s) Topical daily  heparin  Injectable 5000 Unit(s) SubCutaneous every 8 hours  insulin glargine Injectable (LANTUS) 10 Unit(s) SubCutaneous every morning  insulin lispro (HumaLOG) corrective regimen sliding scale   SubCutaneous every 6 hours  insulin lispro Injectable (HumaLOG) 2 Unit(s) SubCutaneous every 6 hours  levETIRAcetam  Oral Liquid - Peds 500 milliGRAM(s) Oral every 12 hours  LORazepam     Tablet 1 milliGRAM(s) Oral three times a day  metoprolol tartrate 50 milliGRAM(s) Oral two times a day  pantoprazole  Injectable 40 milliGRAM(s) IV Push daily      MEDICATIONS  (PRN):       Medications up to date at time of exam.      PHYSICAL EXAMINATION:    Vital Signs Last 24 Hrs  T(C): 36.8 (31 Oct 2019 14:41), Max: 36.8 (30 Oct 2019 20:41)  T(F): 98.2 (31 Oct 2019 14:41), Max: 98.2 (30 Oct 2019 20:41)  HR: 65 (31 Oct 2019 15:06) (56 - 88)  BP: 145/78 (31 Oct 2019 14:41) (113/73 - 145/78)  BP(mean): 82 (30 Oct 2019 16:00) (82 - 82)  RR: 20 (31 Oct 2019 14:41) (18 - 25)  SpO2: 100% (31 Oct 2019 15:06) (90% - 100%)   (if applicable)    General; Non verbal. Eyes open with tracking to tactile stimuli. No acute distress.       HEENT: Head is normocephalic and atraumatic. No nasal tenderness. Moist mucosa.     NECK: Has non infected Trach #6 .     LUNGS: Clear to auscultation, no wheezing, rales, or rhonchi. No use of accessory muscle.     HEART: S1 S2 Regular rate and no JVD.    ABDOMEN: Soft, nontender, and nondistended.  No abdominal guarding. Active bowel sounds. + Peg.     EXTREMITIES: Total care. Non ambulatory.     NEUROLOGIC: Cognitive impaired .      SKIN: Warm and moist/ Non diaphoretic.       LABS:                        10.4   6.77  )-----------( 115      ( 31 Oct 2019 06:41 )             34.7     10-31    146<H>  |  114<H>  |  10  ----------------------------<  118<H>  4.7   |  29  |  0.82    Ca    9.1      31 Oct 2019 06:41  Phos  3.2     10-31  Mg     2.2     10-31    TPro  4.9<L>  /  Alb  1.9<L>  /  TBili  0.8  /  DBili  x   /  AST  48<H>  /  ALT  30  /  AlkPhos  46  10-30      RADIOLOGY & ADDITIONAL STUDIES:  EKG:   CXR: < from: Xray Chest 1 View- PORTABLE-Routine (10.29.19 @ 19:21) >  PROCEDURE DATE:  10/29/2019          INTERPRETATION:  CLINICAL INDICATION: 64 years  Female with icu follow up.    COMPARISON: 10/26/2019    A tracheostomy is reidentified.    AP view of the chest demonstrates mild bibasilar discoid atelectasis..   There is no pleural effusion. The right hemidiaphragm is again elevated.   There is no pneumothorax.    The heart is normal in size. The aorta is atherosclerotic. There is no   mediastinal or hilar mass.     The pulmonary vasculature is normal.     Mild thoracic degenerative changes are present.    IMPRESSION:    Tracheostomy.    Bibasilar discoid atelectasis.      IMPRESSION: 64y Female PAST MEDICAL & SURGICAL HISTORY:  Alzheimer disease  Parkinson disease  Respiratory failure  Hypertension  Schizophrenia  Diabetic coma  Diabetes mellitus  S/P percutaneous endoscopic gastrostomy (PEG) tube placement  H/O tracheostomy     Impression; 63 Y/O Female from [facility] presented with Temp 102.6. Was admitted here at [place] in  due to Peg dislodgement . On Oxygen dependent via trach collar due to Acute on chronic hypoxic  respiratory failure.     Suggestion;  Continue Oxygen supplementation FIO2 40% via Trach collar. Not a candidate for trach capping nor decannulation.   Consider starting Duoneb Q 6 hours.  Oral, trach care, suction if needed.   Aspiration precautions . HOB elevation during Peg feeding.   DVT/ GI prophylactic.       Agree with above assessment and plan as transcribed.

## 2019-10-31 NOTE — PROGRESS NOTE ADULT - SUBJECTIVE AND OBJECTIVE BOX
[facility]   MD Barbara Donis D.O. Fatima Sheikh, D.O. Ruoru Wong, PA    From 7 AM - 5 PM:  OFFICE: [phone]  Dr. Irwin cell: [phone]  Dr. Hoffman cell: [phone]  Dr. Purvis cell: [phone]  WENDI Bojorquez cell: [phone]    From 5 PM - 7 AM: Answering Service: [phone]        RENAL FOLLOW UP NOTE  --------------------------------------------------------------------------------  HPI: Pt seen and examined. Non-verbal. Appears comfortable.        PAST HISTORY  --------------------------------------------------------------------------------  No significant changes to PMH, PSH, FHx, SHx, unless otherwise noted    ALLERGIES & MEDICATIONS  --------------------------------------------------------------------------------  Allergies    angiotensin converting enzyme inhibitors (Unknown)    Intolerances      Standing Inpatient Medications  artificial tears (preservative free) Ophthalmic Solution 1 Drop(s) Both EYES two times a day  atorvastatin 20 milliGRAM(s) Oral at bedtime  cefTRIAXone   IVPB 1000 milliGRAM(s) IV Intermittent every 24 hours  chlorhexidine 4% Liquid 1 Application(s) Topical daily  heparin  Injectable 5000 Unit(s) SubCutaneous every 8 hours  insulin glargine Injectable (LANTUS) 10 Unit(s) SubCutaneous every morning  insulin lispro (HumaLOG) corrective regimen sliding scale   SubCutaneous every 6 hours  insulin lispro Injectable (HumaLOG) 2 Unit(s) SubCutaneous every 6 hours  levETIRAcetam  Oral Liquid - Peds 500 milliGRAM(s) Oral every 12 hours  LORazepam     Tablet 1 milliGRAM(s) Oral three times a day  metoprolol tartrate 50 milliGRAM(s) Oral two times a day  pantoprazole  Injectable 40 milliGRAM(s) IV Push daily    PRN Inpatient Medications      REVIEW OF SYSTEMS  --------------------------------------------------------------------------------  unable to obtain    VITALS/PHYSICAL EXAM  --------------------------------------------------------------------------------  T(C): 36.8 (10-31-19 @ 05:06), Max: 36.8 (10-30-19 @ 20:41)  HR: 74 (10-31-19 @ 05:06) (56 - 74)  BP: 117/76 (10-31-19 @ 05:06) (108/60 - 126/68)  RR: 18 (10-31-19 @ 05:06) (0 - 25)  SpO2: 100% (10-31-19 @ 05:06) (99% - 100%)  Wt(kg): --    Weight (kg): 65 (10-30-19 @ 18:57)      10-30-19 @ 07:01  -  10-31-19 @ 07:00  --------------------------------------------------------  IN: 410 mL / OUT: 705 mL / NET: -295 mL      Physical Exam:  	Gen: NAD, trach   	HEENT: MMM  	Pulm: CTA B/L  	CV: S1S2  	Abd: Soft, +BS   	Ext: No LE edema B/L        	Neuro: Awake  	Skin: Warm and dry    LABS/STUDIES  --------------------------------------------------------------------------------              10.4   6.77  >-----------<  115      [10-31-19 @ 06:41]              34.7     146  |  114  |  10  ----------------------------<  118      [10-31-19 @ 06:41]  4.7   |  29  |  0.82        Ca     9.1     [10-31-19 @ 06:41]      Mg     2.2     [10-31-19 @ 06:41]      Phos  3.2     [10-31-19 @ 06:41]    TPro  4.9  /  Alb  1.9  /  TBili  0.8  /  DBili  x   /  AST  48  /  ALT  30  /  AlkPhos  46  [10-30-19 @ 06:05]          Creatinine Trend:  SCr 0.82 [10-31 @ 06:41]  SCr 0.77 [10-30 @ 06:05]  SCr 1.06 [10-29 @ 04:43]  SCr 1.06 [10-29 @ 00:30]  SCr 1.09 [10-28 @ 15:21]    Urinalysis - [10-26-19 @ 22:41]      Color Yellow / Appearance very cloudy / SG 1.010 / pH 5.0      Gluc 1000 / Ketone Trace  / Bili Negative / Urobili Negative       Blood Trace / Protein 30 / Leuk Est Trace / Nitrite Negative      RBC 2-5 / WBC 6-10 / Hyaline  / Gran  / Sq Epi  / Non Sq Epi Few / Bacteria Many      Vitamin D (25OH) 33.0      [09-23-19 @ 14:43]  HbA1c 12.0      [10-27-19 @ 12:00]  TSH 0.80      [10-27-19 @ 04:21]  Lipid: chol 150, , HDL 50, LDL 80      [09-23-19 @ 08:36]

## 2019-10-31 NOTE — PROGRESS NOTE ADULT - SUBJECTIVE AND OBJECTIVE BOX
Patient seen and examined at bedside earlier today  no new acute events noted    HPI:  64 years old Female with PMH Alzheimer's dementia , CKD 3, DM, HTN, non-verbal Parkinson's disease, chronic respiratory failure s/p Tracheostomy, dysphagia s/p peg tube, major depressive disorder,  sent from [facility] with complain of fever of 102F since 1 day.  Patient is AXO=0, not responding well, opening eyes only on painful stimuli. As per chart review, patient was sent for high grade fever. BS in NH was high in 600s and was given 15U Regular insulin before sending to hospital. Further hx is limited as patient is AXO=0.  In ED, patient's vital signs were remarkable for Temp of 102.6 F, HR: 102, BP: 96/70, RR: 24. Labs were remarkable for BS: 890, Corrected A, BUN/CR: 111/3.81, Acetone: small, Blood gases shows PH: 7.38, UA positive for UTI,  Patient was given 3L NS bolus and 10 Units Regular insulin, BS were still high  Goals of care: As per NH paper and ED provider note: HCP is sister Sharon mead,>> Patient is Full code (27 Oct 2019 00:44)      PAST MEDICAL & SURGICAL HISTORY:  Alzheimer disease  Parkinson disease  Respiratory failure  Hypertension  Schizophrenia  Diabetic coma  Diabetes mellitus  S/P percutaneous endoscopic gastrostomy (PEG) tube placement  H/O tracheostomy      angiotensin converting enzyme inhibitors (Unknown)    MEDICATIONS  (STANDING):  artificial tears (preservative free) Ophthalmic Solution 1 Drop(s) Both EYES two times a day  atorvastatin 20 milliGRAM(s) Oral at bedtime  cefTRIAXone   IVPB 1000 milliGRAM(s) IV Intermittent every 24 hours  chlorhexidine 4% Liquid 1 Application(s) Topical daily  heparin  Injectable 5000 Unit(s) SubCutaneous every 8 hours  insulin glargine Injectable (LANTUS) 10 Unit(s) SubCutaneous every morning  insulin lispro (HumaLOG) corrective regimen sliding scale   SubCutaneous every 6 hours  insulin lispro Injectable (HumaLOG) 2 Unit(s) SubCutaneous every 6 hours  levETIRAcetam  Oral Liquid - Peds 500 milliGRAM(s) Oral every 12 hours  LORazepam     Tablet 1 milliGRAM(s) Oral three times a day  metoprolol tartrate 50 milliGRAM(s) Oral two times a day  pantoprazole  Injectable 40 milliGRAM(s) IV Push daily    MEDICATIONS  (PRN):      REVIEW OF SYSTEMS: limited from pt 2/2 medical/mental state	    Vital Signs Last 24 Hrs  T(C): 36.8 (31 Oct 2019 05:06), Max: 36.8 (30 Oct 2019 20:41)  T(F): 98.2 (31 Oct 2019 05:06), Max: 98.2 (30 Oct 2019 20:41)  HR: 74 (31 Oct 2019 05:06) (56 - 74)  BP: 117/76 (31 Oct 2019 05:06) (108/60 - 126/68)  BP(mean): 82 (30 Oct 2019 16:00) (77 - 763)  RR: 18 (31 Oct 2019 05:06) (0 - 25)  SpO2: 100% (31 Oct 2019 05:06) (99% - 100%)    PHYSICAL EXAMINATION:   Constitutional: stable appearance  HEENT: AT  Neck:  trach intact  Respiratory: bronchial breath sounds. Adequate airflow b/l.   Cardiovascular: stable sys murmur, S1 & S2 intact, no R/G, 2+ radial pulses b/l  Gastrointestinal: Soft, ND, normoactive b.s.,   Extremities:  warm.   Neurological:  awake.  Crude sensation intact.     Labs and imaging reviewed

## 2019-10-31 NOTE — PROGRESS NOTE ADULT - SUBJECTIVE AND OBJECTIVE BOX
DNR [ ]   DNI  [  ]   FULL CODE    INTERVAL HPI/OVERNIGHT EVENTS: ***Downgraded from ICU to SCU    PRESSORS: [ ] YES [x ] NO  WHICH:    ANTIBIOTICS:                  DATE STARTED:  ANTIBIOTICS:                  DATE STARTED:  ANTIBIOTICS:                  DATE STARTED:    cefTRIAXone   IVPB 1000 milliGRAM(s) IV Intermittent every 24 hours    Cardiovascular:  Heart Failure  Acute   Acute on Chronic  Chronic       metoprolol tartrate 50 milliGRAM(s) Oral two times a day    Pulmonary:    Hematalogic:  heparin  Injectable 5000 Unit(s) SubCutaneous every 8 hours    Other:  artificial tears (preservative free) Ophthalmic Solution 1 Drop(s) Both EYES two times a day  atorvastatin 20 milliGRAM(s) Oral at bedtime  chlorhexidine 4% Liquid 1 Application(s) Topical daily  insulin glargine Injectable (LANTUS) 10 Unit(s) SubCutaneous every morning  insulin lispro (HumaLOG) corrective regimen sliding scale   SubCutaneous every 6 hours  insulin lispro Injectable (HumaLOG) 2 Unit(s) SubCutaneous every 6 hours  levETIRAcetam  Oral Liquid - Peds 500 milliGRAM(s) Oral every 12 hours  LORazepam     Tablet 1 milliGRAM(s) Oral three times a day  pantoprazole  Injectable 40 milliGRAM(s) IV Push daily    artificial tears (preservative free) Ophthalmic Solution 1 Drop(s) Both EYES two times a day  atorvastatin 20 milliGRAM(s) Oral at bedtime  cefTRIAXone   IVPB 1000 milliGRAM(s) IV Intermittent every 24 hours  chlorhexidine 4% Liquid 1 Application(s) Topical daily  heparin  Injectable 5000 Unit(s) SubCutaneous every 8 hours  insulin glargine Injectable (LANTUS) 10 Unit(s) SubCutaneous every morning  insulin lispro (HumaLOG) corrective regimen sliding scale   SubCutaneous every 6 hours  insulin lispro Injectable (HumaLOG) 2 Unit(s) SubCutaneous every 6 hours  levETIRAcetam  Oral Liquid - Peds 500 milliGRAM(s) Oral every 12 hours  LORazepam     Tablet 1 milliGRAM(s) Oral three times a day  metoprolol tartrate 50 milliGRAM(s) Oral two times a day  pantoprazole  Injectable 40 milliGRAM(s) IV Push daily    Drug Dosing Weight  Height (cm): 170.18 (27 Sep 2019 14:38)  Weight (kg): 65 (30 Oct 2019 18:57)  BMI (kg/m2): 22.4 (30 Oct 2019 18:57)  BSA (m2): 1.75 (30 Oct 2019 18:57)    CENTRAL LINE: [ ] YES [x ] NO  LOCATION:   DATE INSERTED:  REMOVE: [ ] YES [ ] NO  EXPLAIN:    HUSAIN: [ ] YES [x ] NO    DATE INSERTED:  REMOVE:  [ ] YES [ ] NO  EXPLAIN:    A-LINE:  [ ] YES [x ] NO  LOCATION:   DATE INSERTED:  REMOVE:  [ ] YES [ ] NO  EXPLAIN:    PAST MEDICAL & SURGICAL HISTORY:  Alzheimer disease  Parkinson disease  Respiratory failure  Hypertension  Schizophrenia  Diabetic coma  Diabetes mellitus  S/P percutaneous endoscopic gastrostomy (PEG) tube placement  H/O tracheostomy              10-30 @ 07:01  -  10-31 @ 07:00  --------------------------------------------------------  IN: 410 mL / OUT: 705 mL / NET: -295 mL            PHYSICAL EXAM:    GENERAL: NAD, well-groomed, well-developed  HEAD:  Atraumatic, Normocephalic  EYES: EOMI, PERRLA, conjunctiva and sclera clear  ENMT: No tonsillar erythema, exudates.  NECK: Supple, No JVD, tracheostomy intact  NERVOUS SYSTEM:  Awake not orientated. Nonverbal. Spontaneous movement of extremities.  CHEST/LUNG: Clear to percussion bilaterally; No rales, rhonchi, wheezing, or rubs  HEART: Regular rate and rhythm; No murmurs, rubs, or gallops  ABDOMEN: Soft, Nontender, Nondistended; Bowel sounds present; Peg intact  EXTREMITIES:  2+ Peripheral Pulses, No clubbing, cyanosis, or edema  LYMPH: No lymphadenopathy noted  SKIN: No rashes or lesions      LABS:  CBC Full  -  ( 31 Oct 2019 06:41 )  WBC Count : 6.77 K/uL  RBC Count : 4.15 M/uL  Hemoglobin : 10.4 g/dL  Hematocrit : 34.7 %  Platelet Count - Automated : 115 K/uL  Mean Cell Volume : 83.6 fl  Mean Cell Hemoglobin : 25.1 pg  Mean Cell Hemoglobin Concentration : 30.0 gm/dL  Auto Neutrophil # : x  Auto Lymphocyte # : x  Auto Monocyte # : x  Auto Eosinophil # : x  Auto Basophil # : x  Auto Neutrophil % : x  Auto Lymphocyte % : x  Auto Monocyte % : x  Auto Eosinophil % : x  Auto Basophil % : x    10-31    146<H>  |  114<H>  |  10  ----------------------------<  118<H>  4.7   |  29  |  0.82    Ca    9.1      31 Oct 2019 06:41  Phos  3.2     10-31  Mg     2.2     10-31    TPro  4.9<L>  /  Alb  1.9<L>  /  TBili  0.8  /  DBili  x   /  AST  48<H>  /  ALT  30  /  AlkPhos  46  10-30              [  ]  DVT Prophylaxis  [  ]  Nutrition, Brand, Rate  Glucerna 1.2          Goal Rate   40cc/hour         Abdominal Nutritional Status -  Malnutrition   Cachexia          RADIOLOGY & ADDITIONAL STUDIES:  ***  < from: Xray Chest 1 View- PORTABLE-Routine (10.29.19 @ 19:21) >  A tracheostomy is reidentified.    AP view of the chest demonstrates mild bibasilar discoid atelectasis..   There is no pleural effusion. The right hemidiaphragm is again elevated.   There is no pneumothorax.    The heart is normal in size. The aorta is atherosclerotic. There is no   mediastinal or hilar mass.     The pulmonary vasculature is normal.     Mild thoracic degenerative changes are present.    IMPRESSION:    Tracheostomy.    Bibasilar discoid atelectasis.    < end of copied text >    [  ] Goals of Care Discussion with Family/Proxy/Other           Elements of Conversation Discussed: Patient/Family understanding of current illness   Advanced Directives                                                                       Prognosis  Treatment Options  Care Aligned with patient's wishes                                             TIME SPENT: 35 minutes

## 2019-10-31 NOTE — CHART NOTE - NSCHARTNOTEFT_GEN_A_CORE
Assessment:   Patient reports [ ] nausea  [ ] vomiting [ ] diarrhea [ ] constipation  [ ]chewing problems [ ] swallowing issues  [ ] other  Pt visited. Ot DG from ICU to 4 N om 10/30. Tf Glucerna 1.2 infusing @ 20 ml/hr. Tf tolerated. No diarrhea Reported . D/W NP. Pt  W Hypernatremia  improving Na 146. Pt w  edema on Left hand. Pt w Tracheostomy , TF Infusing via PEG.      PO intake:   Source for PO intake [ ] Patient/family [ ] chart [ ] staff     Current Weight: Weight (kg): 65 (10-30 @ 18:57)  % Weight Change Bed  scale 140 LB     Pertinent Medications: MEDICATIONS  (STANDING):  artificial tears (preservative free) Ophthalmic Solution 1 Drop(s) Both EYES two times a day  atorvastatin 20 milliGRAM(s) Oral at bedtime  cefTRIAXone   IVPB 1000 milliGRAM(s) IV Intermittent every 24 hours  chlorhexidine 4% Liquid 1 Application(s) Topical daily  heparin  Injectable 5000 Unit(s) SubCutaneous every 8 hours  insulin glargine Injectable (LANTUS) 10 Unit(s) SubCutaneous every morning  insulin lispro (HumaLOG) corrective regimen sliding scale   SubCutaneous every 6 hours  insulin lispro Injectable (HumaLOG) 2 Unit(s) SubCutaneous every 6 hours  levETIRAcetam  Oral Liquid - Peds 500 milliGRAM(s) Oral every 12 hours  LORazepam     Tablet 1 milliGRAM(s) Oral three times a day  metoprolol tartrate 50 milliGRAM(s) Oral two times a day  pantoprazole  Injectable 40 milliGRAM(s) IV Push daily    MEDICATIONS  (PRN):    Pertinent Labs:  10-31 Na146 mmol/L<H> Glu 118 mg/dL<H> K+ 4.7 mmol/L Cr  0.82 mg/dL BUN 10 mg/dL 10-31 Phos 3.2 mg/dL 10-30 Alb 1.9 g/dL<L> 10-27 [id number] 12.0 %<H>      Skin:     Estimated Needs:   [ ] no change since previous assessment  [ ] recalculated:       Previous Nutrition Diagnosis:   [ ] Inadequate Energy Intake [ ]Inadequate Oral Intake [ ] Excessive Energy Intake   [ ] Underweight [ ] Increased Nutrient Needs [ ] Overweight/Obesity   [ ] Altered GI Function [ ] Unintended Weight Loss [ ] Food & Nutrition Related Knowledge Deficit [ x] Malnutrition Moderate     Nutrition Diagnosis is [ x] ongoing  [ ] resolved [ ] not applicable     New Nutrition Diagnosis: [ ] not applicable  [ ] Inadequate Energy Intake [ ]Inadequate Oral Intake [ ] Excessive Energy Intake   [ ] Underweight [ ] Increased Nutrient Needs [ ] Overweight/Obesity   [ ] Altered GI Function [ ] Unintended Weight Loss [ ] Food & Nutrition Related Knowledge Deficit [ ] Malnutrition     Related to:     As evidenced by:     Interventions:   Recommend  [ ] Change Diet To:  [ ] Nutrition Supplement  [ x] Nutrition Support Glucerna 1.2 initial Goal rate @ 75 ml/hr x 16 hr as tolerated to provide 1200 ml,1440 calories, Protein 72 gms, free water 966 ml/day.    [x ] Other:  D/W NP. Diet verification order placed.     Monitoring and Evaluation:   [ ] PO intake [ ] Tolerance to diet prescription [ ] weights [ x] follow up per protocol  [ ] other:

## 2019-10-31 NOTE — PROGRESS NOTE ADULT - PROBLEM SELECTOR PLAN 4
Sodium increased to 146 today.  Increase free water to 400cc every 4 hours.  BMP daily to adjust free water.

## 2019-11-01 LAB
ALBUMIN SERPL ELPH-MCNC: 2 G/DL — LOW (ref 3.5–5)
ALP SERPL-CCNC: 47 U/L — SIGNIFICANT CHANGE UP (ref 40–120)
ALT FLD-CCNC: 85 U/L DA — HIGH (ref 10–60)
ANION GAP SERPL CALC-SCNC: 3 MMOL/L — LOW (ref 5–17)
AST SERPL-CCNC: 84 U/L — HIGH (ref 10–40)
BILIRUB SERPL-MCNC: 0.5 MG/DL — SIGNIFICANT CHANGE UP (ref 0.2–1.2)
BUN SERPL-MCNC: 7 MG/DL — SIGNIFICANT CHANGE UP (ref 7–18)
CALCIUM SERPL-MCNC: 8.9 MG/DL — SIGNIFICANT CHANGE UP (ref 8.4–10.5)
CHLORIDE SERPL-SCNC: 110 MMOL/L — HIGH (ref 96–108)
CO2 SERPL-SCNC: 28 MMOL/L — SIGNIFICANT CHANGE UP (ref 22–31)
CREAT SERPL-MCNC: 0.67 MG/DL — SIGNIFICANT CHANGE UP (ref 0.5–1.3)
CULTURE RESULTS: SIGNIFICANT CHANGE UP
CULTURE RESULTS: SIGNIFICANT CHANGE UP
GLUCOSE SERPL-MCNC: 107 MG/DL — HIGH (ref 70–99)
HCT VFR BLD CALC: 28.3 % — LOW (ref 34.5–45)
HGB BLD-MCNC: 8.7 G/DL — LOW (ref 11.5–15.5)
MCHC RBC-ENTMCNC: 25.6 PG — LOW (ref 27–34)
MCHC RBC-ENTMCNC: 30.7 GM/DL — LOW (ref 32–36)
MCV RBC AUTO: 83.2 FL — SIGNIFICANT CHANGE UP (ref 80–100)
NRBC # BLD: 0 /100 WBCS — SIGNIFICANT CHANGE UP (ref 0–0)
PHOSPHATE SERPL-MCNC: 3.5 MG/DL — SIGNIFICANT CHANGE UP (ref 2.5–4.5)
PLATELET # BLD AUTO: 96 K/UL — LOW (ref 150–400)
POTASSIUM SERPL-MCNC: 4.4 MMOL/L — SIGNIFICANT CHANGE UP (ref 3.5–5.3)
POTASSIUM SERPL-SCNC: 4.4 MMOL/L — SIGNIFICANT CHANGE UP (ref 3.5–5.3)
PROT SERPL-MCNC: 4.9 G/DL — LOW (ref 6–8.3)
RBC # BLD: 3.4 M/UL — LOW (ref 3.8–5.2)
RBC # FLD: 14.2 % — SIGNIFICANT CHANGE UP (ref 10.3–14.5)
SODIUM SERPL-SCNC: 141 MMOL/L — SIGNIFICANT CHANGE UP (ref 135–145)
SPECIMEN SOURCE: SIGNIFICANT CHANGE UP
SPECIMEN SOURCE: SIGNIFICANT CHANGE UP
WBC # BLD: 5.43 K/UL — SIGNIFICANT CHANGE UP (ref 3.8–10.5)
WBC # FLD AUTO: 5.43 K/UL — SIGNIFICANT CHANGE UP (ref 3.8–10.5)

## 2019-11-01 RX ORDER — NYSTATIN CREAM 100000 [USP'U]/G
1 CREAM TOPICAL
Refills: 0 | Status: DISCONTINUED | OUTPATIENT
Start: 2019-11-01 | End: 2019-11-05

## 2019-11-01 RX ADMIN — CHLORHEXIDINE GLUCONATE 1 APPLICATION(S): 213 SOLUTION TOPICAL at 11:45

## 2019-11-01 RX ADMIN — CEFTRIAXONE 100 MILLIGRAM(S): 500 INJECTION, POWDER, FOR SOLUTION INTRAMUSCULAR; INTRAVENOUS at 11:45

## 2019-11-01 RX ADMIN — Medication 1 DROP(S): at 05:43

## 2019-11-01 RX ADMIN — INSULIN GLARGINE 10 UNIT(S): 100 INJECTION, SOLUTION SUBCUTANEOUS at 08:24

## 2019-11-01 RX ADMIN — LEVETIRACETAM 500 MILLIGRAM(S): 250 TABLET, FILM COATED ORAL at 18:08

## 2019-11-01 RX ADMIN — Medication 2 UNIT(S): at 23:56

## 2019-11-01 RX ADMIN — PANTOPRAZOLE SODIUM 40 MILLIGRAM(S): 20 TABLET, DELAYED RELEASE ORAL at 11:46

## 2019-11-01 RX ADMIN — Medication 1 MILLIGRAM(S): at 14:52

## 2019-11-01 RX ADMIN — Medication 1 MILLIGRAM(S): at 22:42

## 2019-11-01 RX ADMIN — Medication 1 DROP(S): at 18:09

## 2019-11-01 RX ADMIN — Medication 2 UNIT(S): at 11:46

## 2019-11-01 RX ADMIN — LEVETIRACETAM 500 MILLIGRAM(S): 250 TABLET, FILM COATED ORAL at 05:42

## 2019-11-01 RX ADMIN — HEPARIN SODIUM 5000 UNIT(S): 5000 INJECTION INTRAVENOUS; SUBCUTANEOUS at 14:52

## 2019-11-01 RX ADMIN — Medication 2 UNIT(S): at 17:10

## 2019-11-01 RX ADMIN — NYSTATIN CREAM 1 APPLICATION(S): 100000 CREAM TOPICAL at 17:04

## 2019-11-01 RX ADMIN — Medication 50 MILLIGRAM(S): at 05:42

## 2019-11-01 RX ADMIN — HEPARIN SODIUM 5000 UNIT(S): 5000 INJECTION INTRAVENOUS; SUBCUTANEOUS at 05:41

## 2019-11-01 RX ADMIN — Medication 50 MILLIGRAM(S): at 17:04

## 2019-11-01 RX ADMIN — Medication 1 MILLIGRAM(S): at 05:42

## 2019-11-01 RX ADMIN — HEPARIN SODIUM 5000 UNIT(S): 5000 INJECTION INTRAVENOUS; SUBCUTANEOUS at 22:42

## 2019-11-01 RX ADMIN — Medication 2 UNIT(S): at 05:41

## 2019-11-01 RX ADMIN — ATORVASTATIN CALCIUM 20 MILLIGRAM(S): 80 TABLET, FILM COATED ORAL at 22:42

## 2019-11-01 NOTE — PROGRESS NOTE ADULT - SUBJECTIVE AND OBJECTIVE BOX
INTERVAL HPI/OVERNIGHT EVENTS: No acute events reported overnight.    Today pt presents in no acute distress.  Pt found resting comfortably in bed.      PRESSORS: [ ] YES [x ] NO  WHICH:    ANTIBIOTICS:                  DATE STARTED:  ANTIBIOTICS:                  DATE STARTED:  ANTIBIOTICS:                  DATE STARTED:    cefTRIAXone   IVPB 1000 milliGRAM(s) IV Intermittent every 24 hours    Cardiovascular:  Heart Failure  Acute   Acute on Chronic  Chronic       metoprolol tartrate 50 milliGRAM(s) Oral two times a day    Pulmonary:    Hematalogic:  heparin  Injectable 5000 Unit(s) SubCutaneous every 8 hours    Other:  artificial tears (preservative free) Ophthalmic Solution 1 Drop(s) Both EYES two times a day  atorvastatin 20 milliGRAM(s) Oral at bedtime  chlorhexidine 4% Liquid 1 Application(s) Topical daily  insulin glargine Injectable (LANTUS) 10 Unit(s) SubCutaneous every morning  insulin lispro (HumaLOG) corrective regimen sliding scale   SubCutaneous every 6 hours  insulin lispro Injectable (HumaLOG) 2 Unit(s) SubCutaneous every 6 hours  levETIRAcetam  Oral Liquid - Peds 500 milliGRAM(s) Oral every 12 hours  LORazepam     Tablet 1 milliGRAM(s) Oral three times a day  pantoprazole  Injectable 40 milliGRAM(s) IV Push daily    artificial tears (preservative free) Ophthalmic Solution 1 Drop(s) Both EYES two times a day  atorvastatin 20 milliGRAM(s) Oral at bedtime  cefTRIAXone   IVPB 1000 milliGRAM(s) IV Intermittent every 24 hours  chlorhexidine 4% Liquid 1 Application(s) Topical daily  heparin  Injectable 5000 Unit(s) SubCutaneous every 8 hours  insulin glargine Injectable (LANTUS) 10 Unit(s) SubCutaneous every morning  insulin lispro (HumaLOG) corrective regimen sliding scale   SubCutaneous every 6 hours  insulin lispro Injectable (HumaLOG) 2 Unit(s) SubCutaneous every 6 hours  levETIRAcetam  Oral Liquid - Peds 500 milliGRAM(s) Oral every 12 hours  LORazepam     Tablet 1 milliGRAM(s) Oral three times a day  metoprolol tartrate 50 milliGRAM(s) Oral two times a day  pantoprazole  Injectable 40 milliGRAM(s) IV Push daily    Drug Dosing Weight  Height (cm): 170.18 (27 Sep 2019 14:38)  Weight (kg): 65 (30 Oct 2019 18:57)  BMI (kg/m2): 22.4 (30 Oct 2019 18:57)  BSA (m2): 1.75 (30 Oct 2019 18:57)    CENTRAL LINE: [ ] YES [x ] NO  LOCATION:   DATE INSERTED:  REMOVE: [ ] YES [ ] NO  EXPLAIN:    HUSAIN: [ ] YES [x ] NO    DATE INSERTED:  REMOVE:  [ ] YES [ ] NO  EXPLAIN:    A-LINE:  [ ] YES [x ] NO  LOCATION:   DATE INSERTED:  REMOVE:  [ ] YES [ ] NO  EXPLAIN:    PAST MEDICAL & SURGICAL HISTORY:  Alzheimer disease  Parkinson disease  Respiratory failure  Hypertension  Schizophrenia  Diabetic coma  Diabetes mellitus  S/P percutaneous endoscopic gastrostomy (PEG) tube placement  H/O tracheostomy          10-30 @ 07:01  -  10-31 @ 07:00  --------------------------------------------------------  IN: 410 mL / OUT: 705 mL / NET: -295 mL            PHYSICAL EXAM:    GENERAL: NAD, well-groomed, well-developed  HEAD:  Atraumatic, Normocephalic  EYES: EOMI, PERRLA, conjunctiva and sclera clear  ENMT: No tonsillar erythema, exudates.  NECK: Supple, No JVD, tracheostomy intact  NERVOUS SYSTEM:  Awake not orientated. Nonverbal. Spontaneous movement of extremities.  CHEST/LUNG: Clear to percussion bilaterally; No rales, rhonchi, wheezing, or rubs  HEART: Regular rate and rhythm; No murmurs, rubs, or gallops  ABDOMEN: Soft, Nontender, Nondistended; Bowel sounds present; Peg intact  EXTREMITIES:  2+ Peripheral Pulses, No clubbing, cyanosis, or edema  LYMPH: No lymphadenopathy noted  SKIN: No rashes or lesions      LABS:                        8.7    5.43  )-----------( 96       ( 01 Nov 2019 07:01 )             28.3     11-01    141  |  110<H>  |  7   ----------------------------<  107<H>  4.4   |  28  |  0.67    Ca    8.9      01 Nov 2019 07:01  Phos  3.5     11-01  Mg     2.2     10-31    TPro  4.9<L>  /  Alb  2.0<L>  /  TBili  0.5  /  DBili  x   /  AST  84<H>  /  ALT  85<H>  /  AlkPhos  47  11-01              [  ]  DVT Prophylaxis  [  ]  Nutrition, Brand, Rate  Glucerna 1.2          Goal Rate   40cc/hour         Abdominal Nutritional Status -  Malnutrition   Cachexia          RADIOLOGY & ADDITIONAL STUDIES:  ***  < from: Xray Chest 1 View- PORTABLE-Routine (10.29.19 @ 19:21) >  A tracheostomy is reidentified.    AP view of the chest demonstrates mild bibasilar discoid atelectasis..   There is no pleural effusion. The right hemidiaphragm is again elevated.   There is no pneumothorax.    The heart is normal in size. The aorta is atherosclerotic. There is no   mediastinal or hilar mass.     The pulmonary vasculature is normal.     Mild thoracic degenerative changes are present.    IMPRESSION:    Tracheostomy.    Bibasilar discoid atelectasis.    < end of copied text >    [  ] Goals of Care Discussion with Family/Proxy/Other           Elements of Conversation Discussed: Patient/Family understanding of current illness   Advanced Directives                                                                       Prognosis  Treatment Options  Care Aligned with patient's wishes                                             TIME SPENT: 35 minutes INTERVAL HPI/OVERNIGHT EVENTS: No acute events reported overnight.    Today pt presents in no acute distress.  Pt found resting comfortably in bed.  No new events reported today      PRESSORS: [ ] YES [x ] NO  WHICH:    ANTIBIOTICS:                  DATE STARTED:  ANTIBIOTICS:                  DATE STARTED:  ANTIBIOTICS:                  DATE STARTED:    cefTRIAXone   IVPB 1000 milliGRAM(s) IV Intermittent every 24 hours    Cardiovascular:  Heart Failure  Acute   Acute on Chronic  Chronic       metoprolol tartrate 50 milliGRAM(s) Oral two times a day    Pulmonary:    Hematalogic:  heparin  Injectable 5000 Unit(s) SubCutaneous every 8 hours    Other:  artificial tears (preservative free) Ophthalmic Solution 1 Drop(s) Both EYES two times a day  atorvastatin 20 milliGRAM(s) Oral at bedtime  chlorhexidine 4% Liquid 1 Application(s) Topical daily  insulin glargine Injectable (LANTUS) 10 Unit(s) SubCutaneous every morning  insulin lispro (HumaLOG) corrective regimen sliding scale   SubCutaneous every 6 hours  insulin lispro Injectable (HumaLOG) 2 Unit(s) SubCutaneous every 6 hours  levETIRAcetam  Oral Liquid - Peds 500 milliGRAM(s) Oral every 12 hours  LORazepam     Tablet 1 milliGRAM(s) Oral three times a day  pantoprazole  Injectable 40 milliGRAM(s) IV Push daily    artificial tears (preservative free) Ophthalmic Solution 1 Drop(s) Both EYES two times a day  atorvastatin 20 milliGRAM(s) Oral at bedtime  cefTRIAXone   IVPB 1000 milliGRAM(s) IV Intermittent every 24 hours  chlorhexidine 4% Liquid 1 Application(s) Topical daily  heparin  Injectable 5000 Unit(s) SubCutaneous every 8 hours  insulin glargine Injectable (LANTUS) 10 Unit(s) SubCutaneous every morning  insulin lispro (HumaLOG) corrective regimen sliding scale   SubCutaneous every 6 hours  insulin lispro Injectable (HumaLOG) 2 Unit(s) SubCutaneous every 6 hours  levETIRAcetam  Oral Liquid - Peds 500 milliGRAM(s) Oral every 12 hours  LORazepam     Tablet 1 milliGRAM(s) Oral three times a day  metoprolol tartrate 50 milliGRAM(s) Oral two times a day  pantoprazole  Injectable 40 milliGRAM(s) IV Push daily    Drug Dosing Weight  Height (cm): 170.18 (27 Sep 2019 14:38)  Weight (kg): 65 (30 Oct 2019 18:57)  BMI (kg/m2): 22.4 (30 Oct 2019 18:57)  BSA (m2): 1.75 (30 Oct 2019 18:57)    CENTRAL LINE: [ ] YES [x ] NO  LOCATION:   DATE INSERTED:  REMOVE: [ ] YES [ ] NO  EXPLAIN:    HUSAIN: [ ] YES [x ] NO    DATE INSERTED:  REMOVE:  [ ] YES [ ] NO  EXPLAIN:    A-LINE:  [ ] YES [x ] NO  LOCATION:   DATE INSERTED:  REMOVE:  [ ] YES [ ] NO  EXPLAIN:    PAST MEDICAL & SURGICAL HISTORY:  Alzheimer disease  Parkinson disease  Respiratory failure  Hypertension  Schizophrenia  Diabetic coma  Diabetes mellitus  S/P percutaneous endoscopic gastrostomy (PEG) tube placement  H/O tracheostomy          10-30 @ 07:01  -  10-31 @ 07:00  --------------------------------------------------------  IN: 410 mL / OUT: 705 mL / NET: -295 mL      Vital Signs Last 24 Hrs  T(C): 37 (01 Nov 2019 12:44), Max: 37 (01 Nov 2019 12:44)  T(F): 98.6 (01 Nov 2019 12:44), Max: 98.6 (01 Nov 2019 12:44)  HR: 96 (01 Nov 2019 12:44) (76 - 96)  BP: 148/88 (01 Nov 2019 12:44) (112/85 - 157/58)  BP(mean): --  RR: 16 (01 Nov 2019 12:44) (16 - 22)  SpO2: 94% (01 Nov 2019 12:44) (94% - 98%)      PHYSICAL EXAM:    GENERAL: NAD, well-groomed, well-developed  HEAD:  Atraumatic, Normocephalic  EYES: EOMI, PERRLA, conjunctiva and sclera clear  ENMT: No tonsillar erythema, exudates.  NECK: Supple, No JVD, tracheostomy intact  NERVOUS SYSTEM:  Awake not orientated. Nonverbal. Spontaneous movement of extremities.  CHEST/LUNG: Clear to percussion bilaterally; No rales, rhonchi, wheezing, or rubs  HEART: Regular rate and rhythm; No murmurs, rubs, or gallops  ABDOMEN: Soft, Nontender, Nondistended; Bowel sounds present; Peg intact  EXTREMITIES:  2+ Peripheral Pulses, No clubbing, cyanosis, or edema  LYMPH: No lymphadenopathy noted  SKIN: No rashes or lesions      LABS:                        8.7    5.43  )-----------( 96       ( 01 Nov 2019 07:01 )             28.3     11-01    141  |  110<H>  |  7   ----------------------------<  107<H>  4.4   |  28  |  0.67    Ca    8.9      01 Nov 2019 07:01  Phos  3.5     11-01  Mg     2.2     10-31    TPro  4.9<L>  /  Alb  2.0<L>  /  TBili  0.5  /  DBili  x   /  AST  84<H>  /  ALT  85<H>  /  AlkPhos  47  11-01              [  ]  DVT Prophylaxis  [  ]  Nutrition, Brand, Rate  Glucerna 1.2          Goal Rate   40cc/hour         Abdominal Nutritional Status -  Malnutrition   Cachexia          RADIOLOGY & ADDITIONAL STUDIES:  ***  < from: Xray Chest 1 View- PORTABLE-Routine (10.29.19 @ 19:21) >  A tracheostomy is reidentified.    AP view of the chest demonstrates mild bibasilar discoid atelectasis..   There is no pleural effusion. The right hemidiaphragm is again elevated.   There is no pneumothorax.    The heart is normal in size. The aorta is atherosclerotic. There is no   mediastinal or hilar mass.     The pulmonary vasculature is normal.     Mild thoracic degenerative changes are present.    IMPRESSION:    Tracheostomy.    Bibasilar discoid atelectasis.    < end of copied text >    [  ] Goals of Care Discussion with Family/Proxy/Other           Elements of Conversation Discussed: Patient/Family understanding of current illness   Advanced Directives                                                                       Prognosis  Treatment Options  Care Aligned with patient's wishes                                             TIME SPENT: 35 minutes

## 2019-11-01 NOTE — PROGRESS NOTE ADULT - SUBJECTIVE AND OBJECTIVE BOX
[facility] – [place] [facility]   MD Barbara Donis D.O. Fatima Sheikh, D.O. Ruoru Wong, PA    From 7 AM - 5 PM:  OFFICE: [phone]  Dr. Irwin cell: [phone]  Dr. Hoffman cell: [phone]  Dr. Purvis cell: [phone]  WENDI Bojorquez cell: [phone]    From 5 PM - 7 AM: Answering Service: [phone]        RENAL FOLLOW UP NOTE  --------------------------------------------------------------------------------  HPI: Pt seen and examined.         PAST HISTORY  --------------------------------------------------------------------------------  No significant changes to PMH, PSH, FHx, SHx, unless otherwise noted    ALLERGIES & MEDICATIONS  --------------------------------------------------------------------------------  Allergies    angiotensin converting enzyme inhibitors (Unknown)    Intolerances      Standing Inpatient Medications  artificial tears (preservative free) Ophthalmic Solution 1 Drop(s) Both EYES two times a day  atorvastatin 20 milliGRAM(s) Oral at bedtime  cefTRIAXone   IVPB 1000 milliGRAM(s) IV Intermittent every 24 hours  chlorhexidine 4% Liquid 1 Application(s) Topical daily  heparin  Injectable 5000 Unit(s) SubCutaneous every 8 hours  insulin glargine Injectable (LANTUS) 10 Unit(s) SubCutaneous every morning  insulin lispro (HumaLOG) corrective regimen sliding scale   SubCutaneous every 6 hours  insulin lispro Injectable (HumaLOG) 2 Unit(s) SubCutaneous every 6 hours  levETIRAcetam  Oral Liquid - Peds 500 milliGRAM(s) Oral every 12 hours  LORazepam     Tablet 1 milliGRAM(s) Oral three times a day  metoprolol tartrate 50 milliGRAM(s) Oral two times a day  pantoprazole  Injectable 40 milliGRAM(s) IV Push daily    PRN Inpatient Medications      REVIEW OF SYSTEMS  --------------------------------------------------------------------------------  General: no fever  CVS: no chest pain  RESP: no sob, no cough  ABD: no abdominal pain  : no dysuria,  MSK: no edema     VITALS/PHYSICAL EXAM  --------------------------------------------------------------------------------  T(C): 36.3 (11-01-19 @ 05:07), Max: 36.8 (10-31-19 @ 14:41)  HR: 79 (11-01-19 @ 05:07) (65 - 95)  BP: 157/58 (11-01-19 @ 05:07) (112/85 - 157/58)  RR: 18 (11-01-19 @ 05:07) (18 - 22)  SpO2: 98% (11-01-19 @ 05:07) (90% - 100%)  Wt(kg): --    Weight (kg): 65 (10-30-19 @ 18:57)      Physical Exam:  	Gen: NAD  	HEENT: MMM  	Pulm: CTA B/L  	CV: S1S2  	Abd: Soft, +BS  	Ext: No LE edema B/L              Neuro: Awake   	Skin: Warm and Dry   	Vascular access:    LABS/STUDIES  --------------------------------------------------------------------------------              8.7    5.43  >-----------<  96       [11-01-19 @ 07:01]              28.3     141  |  110  |  7   ----------------------------<  107      [11-01-19 @ 07:01]  4.4   |  28  |  0.67        Ca     8.9     [11-01-19 @ 07:01]      Mg     2.2     [10-31-19 @ 06:41]      Phos  3.5     [11-01-19 @ 07:01]    TPro  4.9  /  Alb  2.0  /  TBili  0.5  /  DBili  x   /  AST  84  /  ALT  85  /  AlkPhos  47  [11-01-19 @ 07:01]          Creatinine Trend:  SCr 0.67 [11-01 @ 07:01]  SCr 0.82 [10-31 @ 06:41]  SCr 0.77 [10-30 @ 06:05]  SCr 1.06 [10-29 @ 04:43]  SCr 1.06 [10-29 @ 00:30]    Urinalysis - [10-26-19 @ 22:41]      Color Yellow / Appearance very cloudy / SG 1.010 / pH 5.0      Gluc 1000 / Ketone Trace  / Bili Negative / Urobili Negative       Blood Trace / Protein 30 / Leuk Est Trace / Nitrite Negative      RBC 2-5 / WBC 6-10 / Hyaline  / Gran  / Sq Epi  / Non Sq Epi Few / Bacteria Many      Vitamin D (25OH) 33.0      [09-23-19 @ 14:43]  HbA1c 12.0      [10-27-19 @ 12:00]  TSH 0.80      [10-27-19 @ 04:21]  Lipid: chol 150, , HDL 50, LDL 80      [09-23-19 @ 08:36] [facility] [place] [facility]   MD Barbara Donis D.O. Fatima Sheikh, D.O. Ruoru Wong, PA    From 7 AM - 5 PM:  OFFICE: [phone]  Dr. Irwin cell: [phone]  Dr. Hoffman cell: [phone]  Dr. Purvis cell: [phone]  WENDI Bojorquez cell: [phone]    From 5 PM - 7 AM: Answering Service: [phone]        RENAL FOLLOW UP NOTE  --------------------------------------------------------------------------------  HPI: Pt seen and examined. Nonverbal.        PAST HISTORY  --------------------------------------------------------------------------------  No significant changes to PMH, PSH, FHx, SHx, unless otherwise noted    ALLERGIES & MEDICATIONS  --------------------------------------------------------------------------------  Allergies    angiotensin converting enzyme inhibitors (Unknown)    Intolerances      Standing Inpatient Medications  artificial tears (preservative free) Ophthalmic Solution 1 Drop(s) Both EYES two times a day  atorvastatin 20 milliGRAM(s) Oral at bedtime  cefTRIAXone   IVPB 1000 milliGRAM(s) IV Intermittent every 24 hours  chlorhexidine 4% Liquid 1 Application(s) Topical daily  heparin  Injectable 5000 Unit(s) SubCutaneous every 8 hours  insulin glargine Injectable (LANTUS) 10 Unit(s) SubCutaneous every morning  insulin lispro (HumaLOG) corrective regimen sliding scale   SubCutaneous every 6 hours  insulin lispro Injectable (HumaLOG) 2 Unit(s) SubCutaneous every 6 hours  levETIRAcetam  Oral Liquid - Peds 500 milliGRAM(s) Oral every 12 hours  LORazepam     Tablet 1 milliGRAM(s) Oral three times a day  metoprolol tartrate 50 milliGRAM(s) Oral two times a day  pantoprazole  Injectable 40 milliGRAM(s) IV Push daily    PRN Inpatient Medications      REVIEW OF SYSTEMS  --------------------------------------------------------------------------------  unable to obtain    VITALS/PHYSICAL EXAM  --------------------------------------------------------------------------------  T(C): 36.3 (11-01-19 @ 05:07), Max: 36.8 (10-31-19 @ 14:41)  HR: 79 (11-01-19 @ 05:07) (65 - 95)  BP: 157/58 (11-01-19 @ 05:07) (112/85 - 157/58)  RR: 18 (11-01-19 @ 05:07) (18 - 22)  SpO2: 98% (11-01-19 @ 05:07) (90% - 100%)  Wt(kg): --    Weight (kg): 65 (10-30-19 @ 18:57)      Physical Exam:  	Gen: NAD  	HEENT: MMM  	Pulm: CTA B/L  	CV: S1S2  	Abd: Soft, +BS  	Ext: No LE edema B/L              Neuro: Awake, non-verbal.   	Skin: Warm and Dry   	    LABS/STUDIES  --------------------------------------------------------------------------------              8.7    5.43  >-----------<  96       [11-01-19 @ 07:01]              28.3     141  |  110  |  7   ----------------------------<  107      [11-01-19 @ 07:01]  4.4   |  28  |  0.67        Ca     8.9     [11-01-19 @ 07:01]      Mg     2.2     [10-31-19 @ 06:41]      Phos  3.5     [11-01-19 @ 07:01]    TPro  4.9  /  Alb  2.0  /  TBili  0.5  /  DBili  x   /  AST  84  /  ALT  85  /  AlkPhos  47  [11-01-19 @ 07:01]          Creatinine Trend:  SCr 0.67 [11-01 @ 07:01]  SCr 0.82 [10-31 @ 06:41]  SCr 0.77 [10-30 @ 06:05]  SCr 1.06 [10-29 @ 04:43]  SCr 1.06 [10-29 @ 00:30]    Urinalysis - [10-26-19 @ 22:41]      Color Yellow / Appearance very cloudy / SG 1.010 / pH 5.0      Gluc 1000 / Ketone Trace  / Bili Negative / Urobili Negative       Blood Trace / Protein 30 / Leuk Est Trace / Nitrite Negative      RBC 2-5 / WBC 6-10 / Hyaline  / Gran  / Sq Epi  / Non Sq Epi Few / Bacteria Many      Vitamin D (25OH) 33.0      [09-23-19 @ 14:43]  HbA1c 12.0      [10-27-19 @ 12:00]  TSH 0.80      [10-27-19 @ 04:21]  Lipid: chol 150, , HDL 50, LDL 80      [09-23-19 @ 08:36]

## 2019-11-01 NOTE — PROGRESS NOTE ADULT - PROBLEM SELECTOR PLAN 1
Sepsis resolved.  Continue antibiotics for 2 more days. Sepsis resolved.  Continue antibiotics for 1 more day.

## 2019-11-01 NOTE — PROGRESS NOTE ADULT - SUBJECTIVE AND OBJECTIVE BOX
Patient seen and examined at bedside earlier today  no new acute events noted    HPI:  64 years old Female with PMH Alzheimer's dementia , CKD 3, DM, HTN, non-verbal Parkinson's disease, chronic respiratory failure s/p Tracheostomy, dysphagia s/p peg tube, major depressive disorder,  sent from [facility] with complain of fever of 102F since 1 day.  Patient is AXO=0, not responding well, opening eyes only on painful stimuli. As per chart review, patient was sent for high grade fever. BS in NH was high in 600s and was given 15U Regular insulin before sending to hospital. Further hx is limited as patient is AXO=0.  In ED, patient's vital signs were remarkable for Temp of 102.6 F, HR: 102, BP: 96/70, RR: 24. Labs were remarkable for BS: 890, Corrected A, BUN/CR: 111/3.81, Acetone: small, Blood gases shows PH: 7.38, UA positive for UTI,  Patient was given 3L NS bolus and 10 Units Regular insulin, BS were still high  Goals of care: As per NH paper and ED provider note: HCP is sister Sharon mead,>> Patient is Full code (27 Oct 2019 00:44)      PAST MEDICAL & SURGICAL HISTORY:  Alzheimer disease  Parkinson disease  Respiratory failure  Hypertension  Schizophrenia  Diabetic coma  Diabetes mellitus  S/P percutaneous endoscopic gastrostomy (PEG) tube placement  H/O tracheostomy      angiotensin converting enzyme inhibitors (Unknown)    MEDICATIONS  (STANDING):  artificial tears (preservative free) Ophthalmic Solution 1 Drop(s) Both EYES two times a day  atorvastatin 20 milliGRAM(s) Oral at bedtime  chlorhexidine 4% Liquid 1 Application(s) Topical daily  heparin  Injectable 5000 Unit(s) SubCutaneous every 8 hours  insulin glargine Injectable (LANTUS) 10 Unit(s) SubCutaneous every morning  insulin lispro (HumaLOG) corrective regimen sliding scale   SubCutaneous every 6 hours  insulin lispro Injectable (HumaLOG) 2 Unit(s) SubCutaneous every 6 hours  levETIRAcetam  Oral Liquid - Peds 500 milliGRAM(s) Oral every 12 hours  LORazepam     Tablet 1 milliGRAM(s) Oral three times a day  metoprolol tartrate 50 milliGRAM(s) Oral two times a day  nystatin Powder 1 Application(s) Topical two times a day  pantoprazole  Injectable 40 milliGRAM(s) IV Push daily    MEDICATIONS  (PRN):      REVIEW OF SYSTEMS: limited from pt 2/2 medical/mental state	    Vital Signs Last 24 Hrs  T(C): 37 (2019 12:44), Max: 37 (2019 12:44)  T(F): 98.6 (2019 12:44), Max: 98.6 (2019 12:44)  HR: 96 (2019 12:44) (65 - 96)  BP: 148/88 (2019 12:44) (112/85 - 157/58)  BP(mean): --  RR: 16 (2019 12:44) (16 - 22)  SpO2: 94% (2019 12:44) (90% - 100%)    PHYSICAL EXAMINATION:   Constitutional: stable appearance  HEENT: AT  Neck:  trach intact  Respiratory: bronchial breath sounds. Adequate airflow b/l.   Cardiovascular: stable sys murmur, S1 & S2 intact, no R/G, 2+ radial pulses b/l  Gastrointestinal: Soft, ND, normoactive b.s.,   Extremities:  warm.   Neurological:  awake.  Crude sensation intact.     Labs and imaging reviewed

## 2019-11-01 NOTE — PROGRESS NOTE ADULT - SUBJECTIVE AND OBJECTIVE BOX
Time of Visit:  Patient seen and examined.     MEDICATIONS  (STANDING):  artificial tears (preservative free) Ophthalmic Solution 1 Drop(s) Both EYES two times a day  atorvastatin 20 milliGRAM(s) Oral at bedtime  chlorhexidine 4% Liquid 1 Application(s) Topical daily  heparin  Injectable 5000 Unit(s) SubCutaneous every 8 hours  insulin glargine Injectable (LANTUS) 10 Unit(s) SubCutaneous every morning  insulin lispro (HumaLOG) corrective regimen sliding scale   SubCutaneous every 6 hours  insulin lispro Injectable (HumaLOG) 2 Unit(s) SubCutaneous every 6 hours  levETIRAcetam  Oral Liquid - Peds 500 milliGRAM(s) Oral every 12 hours  LORazepam     Tablet 1 milliGRAM(s) Oral three times a day  metoprolol tartrate 50 milliGRAM(s) Oral two times a day  nystatin Powder 1 Application(s) Topical two times a day  pantoprazole  Injectable 40 milliGRAM(s) IV Push daily      MEDICATIONS  (PRN):       Medications up to date at time of exam.      PHYSICAL EXAMINATION:  Patient has no new complaints.  GENERAL: The patient is a well-developed, well-nourished, in no apparent distress.     Vital Signs Last 24 Hrs  T(C): 36.6 (01 Nov 2019 16:23), Max: 37 (01 Nov 2019 12:44)  T(F): 97.9 (01 Nov 2019 16:23), Max: 98.6 (01 Nov 2019 12:44)  HR: 57 (01 Nov 2019 16:23) (57 - 96)  BP: 150/92 (01 Nov 2019 16:23) (114/95 - 157/58)  BP(mean): --  RR: 20 (01 Nov 2019 16:23) (16 - 22)  SpO2: 95% (01 Nov 2019 16:23) (94% - 98%)   (if applicable)    Chest Tube (if applicable)    HEENT: Head is normocephalic and atraumatic. Extraocular muscles are intact. Mucous membranes are moist.     NECK: Supple, no palpable adenopathy.    LUNGS: Clear to auscultation, no wheezing, rales, or rhonchi. + trach    HEART: Regular rate and rhythm without murmur.    ABDOMEN: Soft, nontender, and nondistended.  No hepatosplenomegaly is noted. +PEG    : No painful voiding, no pelvic pain    EXTREMITIES: Without any cyanosis, clubbing, rash, lesions or edema.    NEUROLOGIC: eyes open    SKIN: Warm, dry, good turgor.      LABS:                        8.7    5.43  )-----------( 96       ( 01 Nov 2019 07:01 )             28.3     11-01    141  |  110<H>  |  7   ----------------------------<  107<H>  4.4   |  28  |  0.67    Ca    8.9      01 Nov 2019 07:01  Phos  3.5     11-01  Mg     2.2     10-31    TPro  4.9<L>  /  Alb  2.0<L>  /  TBili  0.5  /  DBili  x   /  AST  84<H>  /  ALT  85<H>  /  AlkPhos  47  11-01                        MICROBIOLOGY: (if applicable)    RADIOLOGY & ADDITIONAL STUDIES:  EKG:   CXR:  ECHO:    IMPRESSION: 64y Female PAST MEDICAL & SURGICAL HISTORY:  Alzheimer disease  Parkinson disease  Respiratory failure  Hypertension  Schizophrenia  Diabetic coma  Diabetes mellitus  S/P percutaneous endoscopic gastrostomy (PEG) tube placement  H/O tracheostomy   p/w         IMP: This is a 64 years old Female with PMH Alzheimer's dementia , CKD 3, DM, HTN, non-verbal Parkinson's disease, chronic respiratory failure s/p Tracheostomy, dysphagia s/p peg tube, major depressive disorder,  sent from [facility] with complain of fever of 102F since 1 day.      Sugg:  -continue O2 via trach collar  -pul hygiene  -ivf  -peg feed  -monitor blood sugar  -dvt/gi prophy  -resume ativan 1 mg q8h     case discussed with ICU attending

## 2019-11-02 RX ADMIN — LEVETIRACETAM 500 MILLIGRAM(S): 250 TABLET, FILM COATED ORAL at 06:19

## 2019-11-02 RX ADMIN — Medication 1 DROP(S): at 06:37

## 2019-11-02 RX ADMIN — Medication 50 MILLIGRAM(S): at 06:19

## 2019-11-02 RX ADMIN — Medication 2 UNIT(S): at 17:12

## 2019-11-02 RX ADMIN — Medication 1 MILLIGRAM(S): at 21:36

## 2019-11-02 RX ADMIN — LEVETIRACETAM 500 MILLIGRAM(S): 250 TABLET, FILM COATED ORAL at 17:23

## 2019-11-02 RX ADMIN — PANTOPRAZOLE SODIUM 40 MILLIGRAM(S): 20 TABLET, DELAYED RELEASE ORAL at 11:54

## 2019-11-02 RX ADMIN — ATORVASTATIN CALCIUM 20 MILLIGRAM(S): 80 TABLET, FILM COATED ORAL at 21:36

## 2019-11-02 RX ADMIN — Medication 1 MILLIGRAM(S): at 14:25

## 2019-11-02 RX ADMIN — Medication 2 UNIT(S): at 11:55

## 2019-11-02 RX ADMIN — NYSTATIN CREAM 1 APPLICATION(S): 100000 CREAM TOPICAL at 17:23

## 2019-11-02 RX ADMIN — CHLORHEXIDINE GLUCONATE 1 APPLICATION(S): 213 SOLUTION TOPICAL at 11:55

## 2019-11-02 RX ADMIN — INSULIN GLARGINE 10 UNIT(S): 100 INJECTION, SOLUTION SUBCUTANEOUS at 08:17

## 2019-11-02 RX ADMIN — Medication 1 MILLIGRAM(S): at 06:21

## 2019-11-02 RX ADMIN — Medication 1 DROP(S): at 17:24

## 2019-11-02 RX ADMIN — HEPARIN SODIUM 5000 UNIT(S): 5000 INJECTION INTRAVENOUS; SUBCUTANEOUS at 14:25

## 2019-11-02 RX ADMIN — HEPARIN SODIUM 5000 UNIT(S): 5000 INJECTION INTRAVENOUS; SUBCUTANEOUS at 06:36

## 2019-11-02 RX ADMIN — Medication 50 MILLIGRAM(S): at 17:23

## 2019-11-02 RX ADMIN — NYSTATIN CREAM 1 APPLICATION(S): 100000 CREAM TOPICAL at 06:28

## 2019-11-02 RX ADMIN — Medication 2 UNIT(S): at 06:35

## 2019-11-02 RX ADMIN — HEPARIN SODIUM 5000 UNIT(S): 5000 INJECTION INTRAVENOUS; SUBCUTANEOUS at 21:36

## 2019-11-02 NOTE — PROGRESS NOTE ADULT - SUBJECTIVE AND OBJECTIVE BOX
INTERVAL HPI/OVERNIGHT EVENTS: No acute events reported overnight.    Today pt presents in no acute distress.  Pt found resting comfortably in bed.  No new events reported today      PRESSORS: [ ] YES [x ] NO  WHICH:    ANTIBIOTICS:                  DATE STARTED:  ANTIBIOTICS:                  DATE STARTED:  ANTIBIOTICS:                  DATE STARTED:    cefTRIAXone   IVPB 1000 milliGRAM(s) IV Intermittent every 24 hours    Cardiovascular:  Heart Failure  Acute   Acute on Chronic  Chronic       metoprolol tartrate 50 milliGRAM(s) Oral two times a day    Pulmonary:    Hematalogic:  heparin  Injectable 5000 Unit(s) SubCutaneous every 8 hours    Other:  artificial tears (preservative free) Ophthalmic Solution 1 Drop(s) Both EYES two times a day  atorvastatin 20 milliGRAM(s) Oral at bedtime  chlorhexidine 4% Liquid 1 Application(s) Topical daily  insulin glargine Injectable (LANTUS) 10 Unit(s) SubCutaneous every morning  insulin lispro (HumaLOG) corrective regimen sliding scale   SubCutaneous every 6 hours  insulin lispro Injectable (HumaLOG) 2 Unit(s) SubCutaneous every 6 hours  levETIRAcetam  Oral Liquid - Peds 500 milliGRAM(s) Oral every 12 hours  LORazepam     Tablet 1 milliGRAM(s) Oral three times a day  pantoprazole  Injectable 40 milliGRAM(s) IV Push daily    artificial tears (preservative free) Ophthalmic Solution 1 Drop(s) Both EYES two times a day  atorvastatin 20 milliGRAM(s) Oral at bedtime  cefTRIAXone   IVPB 1000 milliGRAM(s) IV Intermittent every 24 hours  chlorhexidine 4% Liquid 1 Application(s) Topical daily  heparin  Injectable 5000 Unit(s) SubCutaneous every 8 hours  insulin glargine Injectable (LANTUS) 10 Unit(s) SubCutaneous every morning  insulin lispro (HumaLOG) corrective regimen sliding scale   SubCutaneous every 6 hours  insulin lispro Injectable (HumaLOG) 2 Unit(s) SubCutaneous every 6 hours  levETIRAcetam  Oral Liquid - Peds 500 milliGRAM(s) Oral every 12 hours  LORazepam     Tablet 1 milliGRAM(s) Oral three times a day  metoprolol tartrate 50 milliGRAM(s) Oral two times a day  pantoprazole  Injectable 40 milliGRAM(s) IV Push daily    Drug Dosing Weight  Height (cm): 170.18 (27 Sep 2019 14:38)  Weight (kg): 65 (30 Oct 2019 18:57)  BMI (kg/m2): 22.4 (30 Oct 2019 18:57)  BSA (m2): 1.75 (30 Oct 2019 18:57)    CENTRAL LINE: [ ] YES [x ] NO  LOCATION:   DATE INSERTED:  REMOVE: [ ] YES [ ] NO  EXPLAIN:    HUSAIN: [ ] YES [x ] NO    DATE INSERTED:  REMOVE:  [ ] YES [ ] NO  EXPLAIN:    A-LINE:  [ ] YES [x ] NO  LOCATION:   DATE INSERTED:  REMOVE:  [ ] YES [ ] NO  EXPLAIN:    PAST MEDICAL & SURGICAL HISTORY:  Alzheimer disease  Parkinson disease  Respiratory failure  Hypertension  Schizophrenia  Diabetic coma  Diabetes mellitus  S/P percutaneous endoscopic gastrostomy (PEG) tube placement  H/O tracheostomy          10-30 @ 07:01  -  10-31 @ 07:00  --------------------------------------------------------  IN: 410 mL / OUT: 705 mL / NET: -295 mL      Vital Signs Last 24 Hrs        PHYSICAL EXAM:    GENERAL: NAD, well-groomed, well-developed  HEAD:  Atraumatic, Normocephalic  EYES: EOMI, PERRLA, conjunctiva and sclera clear  ENMT: No tonsillar erythema, exudates.  NECK: Supple, No JVD, tracheostomy intact  NERVOUS SYSTEM:  Awake not orientated. Nonverbal. Spontaneous movement of extremities.  CHEST/LUNG: Clear to percussion bilaterally; No rales, rhonchi, wheezing, or rubs  HEART: Regular rate and rhythm; No murmurs, rubs, or gallops  ABDOMEN: Soft, Nontender, Nondistended; Bowel sounds present; Peg intact  EXTREMITIES:  2+ Peripheral Pulses, No clubbing, cyanosis, or edema  LYMPH: No lymphadenopathy noted  SKIN: No rashes or lesions      LABS:                           [  ]  DVT Prophylaxis  [  ]  Nutrition, Brand, Rate  Glucerna 1.2          Goal Rate   40cc/hour         Abdominal Nutritional Status -  Malnutrition   Cachexia          RADIOLOGY & ADDITIONAL STUDIES:  ***  < from: Xray Chest 1 View- PORTABLE-Routine (10.29.19 @ 19:21) >  A tracheostomy is reidentified.    AP view of the chest demonstrates mild bibasilar discoid atelectasis..   There is no pleural effusion. The right hemidiaphragm is again elevated.   There is no pneumothorax.    The heart is normal in size. The aorta is atherosclerotic. There is no   mediastinal or hilar mass.     The pulmonary vasculature is normal.     Mild thoracic degenerative changes are present.    IMPRESSION:    Tracheostomy.    Bibasilar discoid atelectasis.    < end of copied text >    [  ] Goals of Care Discussion with Family/Proxy/Other           Elements of Conversation Discussed: Patient/Family understanding of current illness   Advanced Directives                                                                       Prognosis  Treatment Options  Care Aligned with patient's wishes                                             TIME SPENT: 35 minutes

## 2019-11-02 NOTE — PROGRESS NOTE ADULT - SUBJECTIVE AND OBJECTIVE BOX
[facility] NEPHROLOG[facility]ACT[facility]   MD Barbara Donis D.O. Fatima Sheikh, D.O. Ruoru Wong, PA    From 7 AM - 5 PM:  OFFICE: [phone]  Dr. Irwin cell: [phone]  Dr. Hoffman cell: [phone]  Dr. Purvis cell: [phone]  WENDI Bojorquez cell: [phone]    From 5 PM - 7 AM: Answering Service: [phone]        RENAL FOLLOW UP NOTE  --------------------------------------------------------------------------------  HPI: Pt seen and examined. Non-verbal.        PAST HISTORY  --------------------------------------------------------------------------------  No significant changes to PMH, PSH, FHx, SHx, unless otherwise noted    ALLERGIES & MEDICATIONS  --------------------------------------------------------------------------------  Allergies    angiotensin converting enzyme inhibitors (Unknown)    Intolerances      Standing Inpatient Medications  artificial tears (preservative free) Ophthalmic Solution 1 Drop(s) Both EYES two times a day  atorvastatin 20 milliGRAM(s) Oral at bedtime  chlorhexidine 4% Liquid 1 Application(s) Topical daily  heparin  Injectable 5000 Unit(s) SubCutaneous every 8 hours  insulin glargine Injectable (LANTUS) 10 Unit(s) SubCutaneous every morning  insulin lispro (HumaLOG) corrective regimen sliding scale   SubCutaneous every 6 hours  insulin lispro Injectable (HumaLOG) 2 Unit(s) SubCutaneous every 6 hours  levETIRAcetam  Oral Liquid - Peds 500 milliGRAM(s) Oral every 12 hours  LORazepam     Tablet 1 milliGRAM(s) Oral three times a day  metoprolol tartrate 50 milliGRAM(s) Oral two times a day  nystatin Powder 1 Application(s) Topical two times a day  pantoprazole  Injectable 40 milliGRAM(s) IV Push daily    PRN Inpatient Medications      REVIEW OF SYSTEMS  --------------------------------------------------------------------------------  unable to obtain     VITALS/PHYSICAL EXAM  --------------------------------------------------------------------------------  T(C): 36.7 (11-02-19 @ 05:15), Max: 37 (11-01-19 @ 12:44)  HR: 83 (11-02-19 @ 05:15) (57 - 96)  BP: 139/85 (11-02-19 @ 05:15) (138/96 - 150/92)  RR: 20 (11-02-19 @ 05:15) (16 - 20)  SpO2: 95% (11-02-19 @ 05:15) (94% - 100%)  Wt(kg): --        Physical Exam:  	Gen: NAD  	HEENT: MMM  	Pulm: CTA B/L  	CV: S1S2  	Abd: Soft, +BS  	Ext: No LE edema B/L              Neuro: Awake, does not follow commands   	Skin: Warm and Dry   	    LABS/STUDIES  --------------------------------------------------------------------------------              8.7    5.43  >-----------<  96       [11-01-19 @ 07:01]              28.3     141  |  110  |  7   ----------------------------<  107      [11-01-19 @ 07:01]  4.4   |  28  |  0.67        Ca     8.9     [11-01-19 @ 07:01]      Phos  3.5     [11-01-19 @ 07:01]    TPro  4.9  /  Alb  2.0  /  TBili  0.5  /  DBili  x   /  AST  84  /  ALT  85  /  AlkPhos  47  [11-01-19 @ 07:01]          Creatinine Trend:  SCr 0.67 [11-01 @ 07:01]  SCr 0.82 [10-31 @ 06:41]  SCr 0.77 [10-30 @ 06:05]  SCr 1.06 [10-29 @ 04:43]  SCr 1.06 [10-29 @ 00:30]    Urinalysis - [10-26-19 @ 22:41]      Color Yellow / Appearance very cloudy / SG 1.010 / pH 5.0      Gluc 1000 / Ketone Trace  / Bili Negative / Urobili Negative       Blood Trace / Protein 30 / Leuk Est Trace / Nitrite Negative      RBC 2-5 / WBC 6-10 / Hyaline  / Gran  / Sq Epi  / Non Sq Epi Few / Bacteria Many      Vitamin D (25OH) 33.0      [09-23-19 @ 14:43]  HbA1c 12.0      [10-27-19 @ 12:00]  TSH 0.80      [10-27-19 @ 04:21]  Lipid: chol 150, , HDL 50, LDL 80      [09-23-19 @ 08:36]

## 2019-11-02 NOTE — PROGRESS NOTE ADULT - SUBJECTIVE AND OBJECTIVE BOX
Patient seen and examined at bedside earlier today  no new acute events noted    HPI:  64 years old Female with PMH Alzheimer's dementia , CKD 3, DM, HTN, non-verbal Parkinson's disease, chronic respiratory failure s/p Tracheostomy, dysphagia s/p peg tube, major depressive disorder,  sent from [facility] with complain of fever of 102F since 1 day.  Patient is AXO=0, not responding well, opening eyes only on painful stimuli. As per chart review, patient was sent for high grade fever. BS in NH was high in 600s and was given 15U Regular insulin before sending to hospital. Further hx is limited as patient is AXO=0.  In ED, patient's vital signs were remarkable for Temp of 102.6 F, HR: 102, BP: 96/70, RR: 24. Labs were remarkable for BS: 890, Corrected A, BUN/CR: 111/3.81, Acetone: small, Blood gases shows PH: 7.38, UA positive for UTI,  Patient was given 3L NS bolus and 10 Units Regular insulin, BS were still high  Goals of care: As per NH paper and ED provider note: HCP is sister Sharon mead,>> Patient is Full code (27 Oct 2019 00:44)      PAST MEDICAL & SURGICAL HISTORY:  Alzheimer disease  Parkinson disease  Respiratory failure  Hypertension  Schizophrenia  Diabetic coma  Diabetes mellitus  S/P percutaneous endoscopic gastrostomy (PEG) tube placement  H/O tracheostomy      angiotensin converting enzyme inhibitors (Unknown)    MEDICATIONS  (STANDING):  artificial tears (preservative free) Ophthalmic Solution 1 Drop(s) Both EYES two times a day  atorvastatin 20 milliGRAM(s) Oral at bedtime  chlorhexidine 4% Liquid 1 Application(s) Topical daily  heparin  Injectable 5000 Unit(s) SubCutaneous every 8 hours  insulin glargine Injectable (LANTUS) 10 Unit(s) SubCutaneous every morning  insulin lispro (HumaLOG) corrective regimen sliding scale   SubCutaneous every 6 hours  insulin lispro Injectable (HumaLOG) 2 Unit(s) SubCutaneous every 6 hours  levETIRAcetam  Oral Liquid - Peds 500 milliGRAM(s) Oral every 12 hours  LORazepam     Tablet 1 milliGRAM(s) Oral three times a day  metoprolol tartrate 50 milliGRAM(s) Oral two times a day  nystatin Powder 1 Application(s) Topical two times a day  pantoprazole  Injectable 40 milliGRAM(s) IV Push daily    MEDICATIONS  (PRN):      REVIEW OF SYSTEMS: limited from pt 2/2 medical/mental state	    Vital Signs Last 24 Hrs  T(C): 36.7 (2019 05:15), Max: 37 (2019 12:44)  T(F): 98 (2019 05:15), Max: 98.6 (2019 12:44)  HR: 83 (2019 05:15) (57 - 96)  BP: 139/85 (2019 05:15) (138/96 - 150/92)  BP(mean): --  RR: 20 (2019 05:15) (16 - 20)  SpO2: 95% (2019 05:15) (94% - 100%)    PHYSICAL EXAMINATION:   Constitutional: stable appearance  HEENT: AT  Neck:  trach intact  Respiratory: bronchial breath sounds. Adequate airflow b/l.   Cardiovascular: stable sys murmur, S1 & S2 intact, no R/G, 2+ radial pulses b/l  Gastrointestinal: Soft, ND, normoactive b.s.,   Extremities:  warm.   Neurological:  awake.  Crude sensation intact.     Labs and imaging reviewed

## 2019-11-03 RX ORDER — PANTOPRAZOLE SODIUM 20 MG/1
40 TABLET, DELAYED RELEASE ORAL
Refills: 0 | Status: DISCONTINUED | OUTPATIENT
Start: 2019-11-03 | End: 2019-11-05

## 2019-11-03 RX ADMIN — NYSTATIN CREAM 1 APPLICATION(S): 100000 CREAM TOPICAL at 17:56

## 2019-11-03 RX ADMIN — Medication 1 MILLIGRAM(S): at 21:45

## 2019-11-03 RX ADMIN — NYSTATIN CREAM 1 APPLICATION(S): 100000 CREAM TOPICAL at 05:28

## 2019-11-03 RX ADMIN — Medication 2 UNIT(S): at 00:35

## 2019-11-03 RX ADMIN — Medication 50 MILLIGRAM(S): at 05:31

## 2019-11-03 RX ADMIN — LEVETIRACETAM 500 MILLIGRAM(S): 250 TABLET, FILM COATED ORAL at 05:27

## 2019-11-03 RX ADMIN — Medication 1 DROP(S): at 05:26

## 2019-11-03 RX ADMIN — INSULIN GLARGINE 10 UNIT(S): 100 INJECTION, SOLUTION SUBCUTANEOUS at 08:58

## 2019-11-03 RX ADMIN — HEPARIN SODIUM 5000 UNIT(S): 5000 INJECTION INTRAVENOUS; SUBCUTANEOUS at 05:27

## 2019-11-03 RX ADMIN — LEVETIRACETAM 500 MILLIGRAM(S): 250 TABLET, FILM COATED ORAL at 17:56

## 2019-11-03 RX ADMIN — ATORVASTATIN CALCIUM 20 MILLIGRAM(S): 80 TABLET, FILM COATED ORAL at 21:45

## 2019-11-03 RX ADMIN — Medication 2 UNIT(S): at 05:28

## 2019-11-03 RX ADMIN — HEPARIN SODIUM 5000 UNIT(S): 5000 INJECTION INTRAVENOUS; SUBCUTANEOUS at 21:45

## 2019-11-03 RX ADMIN — Medication 2 UNIT(S): at 12:09

## 2019-11-03 RX ADMIN — Medication 1 MILLIGRAM(S): at 05:30

## 2019-11-03 RX ADMIN — HEPARIN SODIUM 5000 UNIT(S): 5000 INJECTION INTRAVENOUS; SUBCUTANEOUS at 15:09

## 2019-11-03 RX ADMIN — CHLORHEXIDINE GLUCONATE 1 APPLICATION(S): 213 SOLUTION TOPICAL at 12:09

## 2019-11-03 RX ADMIN — Medication 1 MILLIGRAM(S): at 15:09

## 2019-11-03 RX ADMIN — Medication 50 MILLIGRAM(S): at 17:56

## 2019-11-03 RX ADMIN — Medication 1: at 12:09

## 2019-11-03 NOTE — PROGRESS NOTE ADULT - SUBJECTIVE AND OBJECTIVE BOX
[facility]   MD Barbara Donis D.O. Fatima Sheikh, D.O. Ruoru Wong, PA    From 7 AM - 5 PM:  OFFICE: [phone]  Dr. Irwin cell: [phone]  Dr. Hoffman cell: [phone]  Dr. Purvis cell: [phone]  WENDI Bojorquez cell: [phone]    From 5 PM - 7 AM: Answering Service: [phone]        RENAL FOLLOW UP NOTE  --------------------------------------------------------------------------------  HPI: Pt seen and examined. Non-verbal        PAST HISTORY  --------------------------------------------------------------------------------  No significant changes to PMH, PSH, FHx, SHx, unless otherwise noted    ALLERGIES & MEDICATIONS  --------------------------------------------------------------------------------  Allergies    angiotensin converting enzyme inhibitors (Unknown)    Intolerances      Standing Inpatient Medications  artificial tears (preservative free) Ophthalmic Solution 1 Drop(s) Both EYES two times a day  atorvastatin 20 milliGRAM(s) Oral at bedtime  chlorhexidine 4% Liquid 1 Application(s) Topical daily  heparin  Injectable 5000 Unit(s) SubCutaneous every 8 hours  insulin glargine Injectable (LANTUS) 10 Unit(s) SubCutaneous every morning  insulin lispro (HumaLOG) corrective regimen sliding scale   SubCutaneous every 6 hours  insulin lispro Injectable (HumaLOG) 2 Unit(s) SubCutaneous every 6 hours  levETIRAcetam  Oral Liquid - Peds 500 milliGRAM(s) Oral every 12 hours  LORazepam     Tablet 1 milliGRAM(s) Oral three times a day  metoprolol tartrate 50 milliGRAM(s) Oral two times a day  nystatin Powder 1 Application(s) Topical two times a day  pantoprazole  Injectable 40 milliGRAM(s) IV Push daily    PRN Inpatient Medications      REVIEW OF SYSTEMS  --------------------------------------------------------------------------------  unable to obtain    VITALS/PHYSICAL EXAM  --------------------------------------------------------------------------------  T(C): 36.3 (11-03-19 @ 05:00), Max: 36.8 (11-02-19 @ 13:24)  HR: 107 (11-03-19 @ 05:00) (60 - 107)  BP: 136/71 (11-03-19 @ 05:00) (136/71 - 142/83)  RR: 18 (11-03-19 @ 05:00) (18 - 20)  SpO2: 95% (11-03-19 @ 05:00) (95% - 97%)  Wt(kg): --        Physical Exam:  	Gen: NAD  	HEENT: MMM  	Pulm: CTA B/L  	CV: S1S2  	Abd: Soft, +BS  	Ext: No LE edema B/L              Neuro: Awake, non-verbal   	Skin: Warm and Dry   	    LABS/STUDIES  --------------------------------------------------------------------------------                Creatinine Trend:  SCr 0.67 [11-01 @ 07:01]  SCr 0.82 [10-31 @ 06:41]  SCr 0.77 [10-30 @ 06:05]  SCr 1.06 [10-29 @ 04:43]  SCr 1.06 [10-29 @ 00:30]    Urinalysis - [10-26-19 @ 22:41]      Color Yellow / Appearance very cloudy / SG 1.010 / pH 5.0      Gluc 1000 / Ketone Trace  / Bili Negative / Urobili Negative       Blood Trace / Protein 30 / Leuk Est Trace / Nitrite Negative      RBC 2-5 / WBC 6-10 / Hyaline  / Gran  / Sq Epi  / Non Sq Epi Few / Bacteria Many      Vitamin D (25OH) 33.0      [09-23-19 @ 14:43]  HbA1c 12.0      [10-27-19 @ 12:00]  TSH 0.80      [10-27-19 @ 04:21]  Lipid: chol 150, , HDL 50, LDL 80      [09-23-19 @ 08:36]

## 2019-11-03 NOTE — PROGRESS NOTE ADULT - SUBJECTIVE AND OBJECTIVE BOX
Patient seen and examined at bedside earlier today  no new acute events noted overnight    HPI:  64 years old Female with PMH Alzheimer's dementia , CKD 3, DM, HTN, non-verbal Parkinson's disease, chronic respiratory failure s/p Tracheostomy, dysphagia s/p peg tube, major depressive disorder,  sent from [facility] with complain of fever of 102F since 1 day.  Patient is AXO=0, not responding well, opening eyes only on painful stimuli. As per chart review, patient was sent for high grade fever. BS in NH was high in 600s and was given 15U Regular insulin before sending to hospital. Further hx is limited as patient is AXO=0.  In ED, patient's vital signs were remarkable for Temp of 102.6 F, HR: 102, BP: 96/70, RR: 24. Labs were remarkable for BS: 890, Corrected A, BUN/CR: 111/3.81, Acetone: small, Blood gases shows PH: 7.38, UA positive for UTI,  Patient was given 3L NS bolus and 10 Units Regular insulin, BS were still high  Goals of care: As per NH paper and ED provider note: HCP is sister Sharon mead,>> Patient is Full code (27 Oct 2019 00:44)      PAST MEDICAL & SURGICAL HISTORY:  Alzheimer disease  Parkinson disease  Respiratory failure  Hypertension  Schizophrenia  Diabetic coma  Diabetes mellitus  S/P percutaneous endoscopic gastrostomy (PEG) tube placement  H/O tracheostomy      angiotensin converting enzyme inhibitors (Unknown)  MEDICATIONS  (STANDING):  artificial tears (preservative free) Ophthalmic Solution 1 Drop(s) Both EYES two times a day  atorvastatin 20 milliGRAM(s) Oral at bedtime  chlorhexidine 4% Liquid 1 Application(s) Topical daily  heparin  Injectable 5000 Unit(s) SubCutaneous every 8 hours  insulin glargine Injectable (LANTUS) 10 Unit(s) SubCutaneous every morning  insulin lispro (HumaLOG) corrective regimen sliding scale   SubCutaneous every 6 hours  insulin lispro Injectable (HumaLOG) 2 Unit(s) SubCutaneous every 6 hours  levETIRAcetam  Oral Liquid - Peds 500 milliGRAM(s) Oral every 12 hours  LORazepam     Tablet 1 milliGRAM(s) Oral three times a day  metoprolol tartrate 50 milliGRAM(s) Oral two times a day  nystatin Powder 1 Application(s) Topical two times a day  pantoprazole  Injectable 40 milliGRAM(s) IV Push daily    MEDICATIONS  (PRN):      Vital Signs Last 24 Hrs  T(C): 36.3 (2019 05:00), Max: 36.8 (2019 13:24)  T(F): 97.4 (2019 05:00), Max: 98.2 (2019 13:24)  HR: 107 (2019 05:00) (60 - 107)  BP: 136/71 (2019 05:00) (136/71 - 142/83)  BP(mean): --  RR: 18 (2019 05:00) (18 - 20)  SpO2: 95% (2019 05:00) (95% - 97%)    REVIEW OF SYSTEMS: limited from pt 2/2 medical/mental state	      PHYSICAL EXAMINATION:   Constitutional: stable appearance  HEENT: AT  Neck:  trach intact  Respiratory: bronchial breath sounds. Adequate airflow b/l.   Cardiovascular: stable sys murmur, S1 & S2 intact, no R/G, 2+ radial pulses b/l  Gastrointestinal: Soft, ND, normoactive b.s.,   Extremities:  warm.   Neurological:  awake.  Crude sensation intact.     Labs and imaging reviewed

## 2019-11-03 NOTE — PROGRESS NOTE ADULT - SUBJECTIVE AND OBJECTIVE BOX
INTERVAL HPI/OVERNIGHT EVENTS: no acute events noted overnight.     PRESSORS: [ ] YES [x ] NO  WHICH:      Cardiovascular:  metoprolol tartrate 50 milliGRAM(s) Oral two times a day    Pulmonary:    Hematalogic:  heparin  Injectable 5000 Unit(s) SubCutaneous every 8 hours    Other:  artificial tears (preservative free) Ophthalmic Solution 1 Drop(s) Both EYES two times a day  atorvastatin 20 milliGRAM(s) Oral at bedtime  chlorhexidine 4% Liquid 1 Application(s) Topical daily  insulin glargine Injectable (LANTUS) 10 Unit(s) SubCutaneous every morning  insulin lispro (HumaLOG) corrective regimen sliding scale   SubCutaneous every 6 hours  insulin lispro Injectable (HumaLOG) 2 Unit(s) SubCutaneous every 6 hours  levETIRAcetam  Oral Liquid - Peds 500 milliGRAM(s) Oral every 12 hours  LORazepam     Tablet 1 milliGRAM(s) Oral three times a day  nystatin Powder 1 Application(s) Topical two times a day  pantoprazole  Injectable 40 milliGRAM(s) IV Push daily    artificial tears (preservative free) Ophthalmic Solution 1 Drop(s) Both EYES two times a day  atorvastatin 20 milliGRAM(s) Oral at bedtime  chlorhexidine 4% Liquid 1 Application(s) Topical daily  heparin  Injectable 5000 Unit(s) SubCutaneous every 8 hours  insulin glargine Injectable (LANTUS) 10 Unit(s) SubCutaneous every morning  insulin lispro (HumaLOG) corrective regimen sliding scale   SubCutaneous every 6 hours  insulin lispro Injectable (HumaLOG) 2 Unit(s) SubCutaneous every 6 hours  levETIRAcetam  Oral Liquid - Peds 500 milliGRAM(s) Oral every 12 hours  LORazepam     Tablet 1 milliGRAM(s) Oral three times a day  metoprolol tartrate 50 milliGRAM(s) Oral two times a day  nystatin Powder 1 Application(s) Topical two times a day  pantoprazole  Injectable 40 milliGRAM(s) IV Push daily    Drug Dosing Weight  Height (cm): 170.18 (27 Sep 2019 14:38)  Weight (kg): 65 (30 Oct 2019 18:57)  BMI (kg/m2): 22.4 (30 Oct 2019 18:57)  BSA (m2): 1.75 (30 Oct 2019 18:57)      ICU Vital Signs Last 24 Hrs  T(C): 36.3 (03 Nov 2019 05:00), Max: 36.8 (02 Nov 2019 13:24)  T(F): 97.4 (03 Nov 2019 05:00), Max: 98.2 (02 Nov 2019 13:24)  HR: 107 (03 Nov 2019 05:00) (60 - 107)  BP: 136/71 (03 Nov 2019 05:00) (136/71 - 142/83)  BP(mean): --  ABP: --  ABP(mean): --  RR: 18 (03 Nov 2019 05:00) (18 - 20)  SpO2: 95% (03 Nov 2019 05:00) (95% - 97%)                  PHYSICAL EXAM:    GENERAL: NAD, well-groomed, well-developed  HEAD:  Atraumatic, Normocephalic  EYES: EOMI, PERRLA, conjunctiva and sclera clear  ENMT: No tonsillar erythema, exudates.  NECK: Supple, No JVD, tracheostomy intact  NERVOUS SYSTEM:  Awake not orientated. Nonverbal. Spontaneous movement of extremities.  CHEST/LUNG: Clear to percussion bilaterally; No rales, rhonchi, wheezing, or rubs  HEART: Regular rate and rhythm; No murmurs, rubs, or gallops  ABDOMEN: Soft, Nontender, Nondistended; Bowel sounds present; Peg intact  EXTREMITIES:  2+ Peripheral Pulses, No clubbing, cyanosis, or edema  LYMPH: No lymphadenopathy noted  SKIN: No rashes or lesions        LABS:  CBC Full  -  ( 01 Nov 2019 07:01 )  WBC Count : 5.43 K/uL  RBC Count : 3.40 M/uL  Hemoglobin : 8.7 g/dL  Hematocrit : 28.3 %  Platelet Count - Automated : 96 K/uL  Mean Cell Volume : 83.2 fl  Mean Cell Hemoglobin : 25.6 pg  Mean Cell Hemoglobin Concentration : 30.7 gm/dL  Auto Neutrophil # : x  Auto Lymphocyte # : x  Auto Monocyte # : x  Auto Eosinophil # : x  Auto Basophil # : x  Auto Neutrophil % : x  Auto Lymphocyte % : x  Auto Monocyte % : x  Auto Eosinophil % : x  Auto Basophil % : x    11-01    141  |  110<H>  |  7   ----------------------------<  107<H>  4.4   |  28  |  0.67    Ca    8.9      01 Nov 2019 07:01  Phos  3.5     11-01    TPro  4.9<L>  /  Alb  2.0<L>  /  TBili  0.5  /  DBili  x   /  AST  84<H>  /  ALT  85<H>  /  AlkPhos  47  11-01            RADIOLOGY & ADDITIONAL STUDIES REVIEWED:  ***    [x ]GOALS OF CARE DISCUSSION WITH PATIENT/FAMILY/PROXY:    CRITICAL CARE TIME SPENT: 35 minutes

## 2019-11-04 ENCOUNTER — TRANSCRIPTION ENCOUNTER (OUTPATIENT)
Age: 64
End: 2019-11-04

## 2019-11-04 DIAGNOSIS — E11.9 TYPE 2 DIABETES MELLITUS WITHOUT COMPLICATIONS: ICD-10-CM

## 2019-11-04 LAB
ALBUMIN SERPL ELPH-MCNC: 2.5 G/DL — LOW (ref 3.5–5)
ALP SERPL-CCNC: 59 U/L — SIGNIFICANT CHANGE UP (ref 40–120)
ALT FLD-CCNC: 65 U/L DA — HIGH (ref 10–60)
ANION GAP SERPL CALC-SCNC: 5 MMOL/L — SIGNIFICANT CHANGE UP (ref 5–17)
AST SERPL-CCNC: 45 U/L — HIGH (ref 10–40)
BILIRUB SERPL-MCNC: 0.4 MG/DL — SIGNIFICANT CHANGE UP (ref 0.2–1.2)
BUN SERPL-MCNC: 5 MG/DL — LOW (ref 7–18)
CALCIUM SERPL-MCNC: 9.3 MG/DL — SIGNIFICANT CHANGE UP (ref 8.4–10.5)
CHLORIDE SERPL-SCNC: 104 MMOL/L — SIGNIFICANT CHANGE UP (ref 96–108)
CO2 SERPL-SCNC: 29 MMOL/L — SIGNIFICANT CHANGE UP (ref 22–31)
CREAT SERPL-MCNC: 0.74 MG/DL — SIGNIFICANT CHANGE UP (ref 0.5–1.3)
GLUCOSE SERPL-MCNC: 125 MG/DL — HIGH (ref 70–99)
HCT VFR BLD CALC: 32.7 % — LOW (ref 34.5–45)
HGB BLD-MCNC: 10 G/DL — LOW (ref 11.5–15.5)
MAGNESIUM SERPL-MCNC: 1.7 MG/DL — SIGNIFICANT CHANGE UP (ref 1.6–2.6)
MCHC RBC-ENTMCNC: 25.6 PG — LOW (ref 27–34)
MCHC RBC-ENTMCNC: 30.6 GM/DL — LOW (ref 32–36)
MCV RBC AUTO: 83.6 FL — SIGNIFICANT CHANGE UP (ref 80–100)
NRBC # BLD: 0 /100 WBCS — SIGNIFICANT CHANGE UP (ref 0–0)
PHOSPHATE SERPL-MCNC: 3.3 MG/DL — SIGNIFICANT CHANGE UP (ref 2.5–4.5)
PLATELET # BLD AUTO: 143 K/UL — LOW (ref 150–400)
POTASSIUM SERPL-MCNC: 4.8 MMOL/L — SIGNIFICANT CHANGE UP (ref 3.5–5.3)
POTASSIUM SERPL-SCNC: 4.8 MMOL/L — SIGNIFICANT CHANGE UP (ref 3.5–5.3)
PROT SERPL-MCNC: 5.9 G/DL — LOW (ref 6–8.3)
RBC # BLD: 3.91 M/UL — SIGNIFICANT CHANGE UP (ref 3.8–5.2)
RBC # FLD: 15.2 % — HIGH (ref 10.3–14.5)
SODIUM SERPL-SCNC: 138 MMOL/L — SIGNIFICANT CHANGE UP (ref 135–145)
WBC # BLD: 4.64 K/UL — SIGNIFICANT CHANGE UP (ref 3.8–10.5)
WBC # FLD AUTO: 4.64 K/UL — SIGNIFICANT CHANGE UP (ref 3.8–10.5)

## 2019-11-04 RX ORDER — METOPROLOL TARTRATE 50 MG
1 TABLET ORAL
Qty: 0 | Refills: 0 | DISCHARGE

## 2019-11-04 RX ORDER — INSULIN LISPRO 100/ML
2 VIAL (ML) SUBCUTANEOUS
Qty: 1 | Refills: 0
Start: 2019-11-04 | End: 2019-12-03

## 2019-11-04 RX ORDER — INSULIN GLARGINE 100 [IU]/ML
10 INJECTION, SOLUTION SUBCUTANEOUS
Qty: 1 | Refills: 0
Start: 2019-11-04 | End: 2019-12-03

## 2019-11-04 RX ORDER — INFLUENZA VIRUS VACCINE 15; 15; 15; 15 UG/.5ML; UG/.5ML; UG/.5ML; UG/.5ML
0.5 SUSPENSION INTRAMUSCULAR ONCE
Refills: 0 | Status: COMPLETED | OUTPATIENT
Start: 2019-11-04 | End: 2019-11-04

## 2019-11-04 RX ORDER — METOPROLOL TARTRATE 50 MG
1 TABLET ORAL
Qty: 0 | Refills: 0 | DISCHARGE
Start: 2019-11-04

## 2019-11-04 RX ORDER — INSULIN LISPRO 100/ML
1 VIAL (ML) SUBCUTANEOUS
Qty: 1 | Refills: 0
Start: 2019-11-04 | End: 2019-12-03

## 2019-11-04 RX ADMIN — HEPARIN SODIUM 5000 UNIT(S): 5000 INJECTION INTRAVENOUS; SUBCUTANEOUS at 21:34

## 2019-11-04 RX ADMIN — ATORVASTATIN CALCIUM 20 MILLIGRAM(S): 80 TABLET, FILM COATED ORAL at 21:34

## 2019-11-04 RX ADMIN — Medication 1 MILLIGRAM(S): at 21:35

## 2019-11-04 RX ADMIN — LEVETIRACETAM 500 MILLIGRAM(S): 250 TABLET, FILM COATED ORAL at 05:24

## 2019-11-04 RX ADMIN — PANTOPRAZOLE SODIUM 40 MILLIGRAM(S): 20 TABLET, DELAYED RELEASE ORAL at 05:24

## 2019-11-04 RX ADMIN — HEPARIN SODIUM 5000 UNIT(S): 5000 INJECTION INTRAVENOUS; SUBCUTANEOUS at 05:23

## 2019-11-04 RX ADMIN — Medication 1 DROP(S): at 17:50

## 2019-11-04 RX ADMIN — Medication 2 UNIT(S): at 11:37

## 2019-11-04 RX ADMIN — Medication 50 MILLIGRAM(S): at 05:23

## 2019-11-04 RX ADMIN — Medication 50 MILLIGRAM(S): at 17:49

## 2019-11-04 RX ADMIN — Medication 1 MILLIGRAM(S): at 14:19

## 2019-11-04 RX ADMIN — INFLUENZA VIRUS VACCINE 0.5 MILLILITER(S): 15; 15; 15; 15 SUSPENSION INTRAMUSCULAR at 09:31

## 2019-11-04 RX ADMIN — LEVETIRACETAM 500 MILLIGRAM(S): 250 TABLET, FILM COATED ORAL at 17:50

## 2019-11-04 RX ADMIN — Medication 1 MILLIGRAM(S): at 05:29

## 2019-11-04 RX ADMIN — NYSTATIN CREAM 1 APPLICATION(S): 100000 CREAM TOPICAL at 05:24

## 2019-11-04 RX ADMIN — INSULIN GLARGINE 10 UNIT(S): 100 INJECTION, SOLUTION SUBCUTANEOUS at 08:03

## 2019-11-04 RX ADMIN — Medication 1: at 11:37

## 2019-11-04 RX ADMIN — HEPARIN SODIUM 5000 UNIT(S): 5000 INJECTION INTRAVENOUS; SUBCUTANEOUS at 14:19

## 2019-11-04 RX ADMIN — NYSTATIN CREAM 1 APPLICATION(S): 100000 CREAM TOPICAL at 17:50

## 2019-11-04 RX ADMIN — CHLORHEXIDINE GLUCONATE 1 APPLICATION(S): 213 SOLUTION TOPICAL at 11:37

## 2019-11-04 NOTE — PROGRESS NOTE ADULT - SUBJECTIVE AND OBJECTIVE BOX
Patient seen and examined at bedside   no new acute events noted overnight    HPI:  64 years old Female with PMH Alzheimer's dementia , CKD 3, DM, HTN, non-verbal Parkinson's disease, chronic respiratory failure s/p Tracheostomy, dysphagia s/p peg tube, major depressive disorder,  sent from [facility] with complain of fever of 102F since 1 day.  Patient is AXO=0, not responding well, opening eyes only on painful stimuli. As per chart review, patient was sent for high grade fever. BS in NH was high in 600s and was given 15U Regular insulin before sending to hospital. Further hx is limited as patient is AXO=0.  In ED, patient's vital signs were remarkable for Temp of 102.6 F, HR: 102, BP: 96/70, RR: 24. Labs were remarkable for BS: 890, Corrected A, BUN/CR: 111/3.81, Acetone: small, Blood gases shows PH: 7.38, UA positive for UTI,  Patient was given 3L NS bolus and 10 Units Regular insulin, BS were still high  Goals of care: As per NH paper and ED provider note: HCP is sister Sharon mead,>> Patient is Full code (27 Oct 2019 00:44)      PAST MEDICAL & SURGICAL HISTORY:  Alzheimer disease  Parkinson disease  Respiratory failure  Hypertension  Schizophrenia  Diabetic coma  Diabetes mellitus  S/P percutaneous endoscopic gastrostomy (PEG) tube placement  H/O tracheostomy      MEDICATIONS  (STANDING):  artificial tears (preservative free) Ophthalmic Solution 1 Drop(s) Both EYES two times a day  atorvastatin 20 milliGRAM(s) Oral at bedtime  chlorhexidine 4% Liquid 1 Application(s) Topical daily  heparin  Injectable 5000 Unit(s) SubCutaneous every 8 hours  insulin glargine Injectable (LANTUS) 10 Unit(s) SubCutaneous every morning  insulin lispro (HumaLOG) corrective regimen sliding scale   SubCutaneous every 6 hours  insulin lispro Injectable (HumaLOG) 2 Unit(s) SubCutaneous every 6 hours  levETIRAcetam  Oral Liquid - Peds 500 milliGRAM(s) Oral every 12 hours  LORazepam     Tablet 1 milliGRAM(s) Oral three times a day  metoprolol tartrate 50 milliGRAM(s) Oral two times a day  nystatin Powder 1 Application(s) Topical two times a day  pantoprazole    Tablet 40 milliGRAM(s) Oral before breakfast    MEDICATIONS  (PRN):      Vital Signs Last 24 Hrs  T(C): 36.4 (2019 13:24), Max: 36.9 (2019 05:10)  T(F): 97.6 (2019 13:24), Max: 98.4 (2019 05:10)  HR: 68 (2019 17:35) (52 - 93)  BP: 130/76 (2019 17:35) (100/47 - 130/76)  BP(mean): --  RR: 17 (2019 13:24) (17 - 20)  SpO2: 93% (2019 13:24) (93% - 100%)    REVIEW OF SYSTEMS: limited from pt 2/2 medical/mental state	      PHYSICAL EXAMINATION:   Constitutional: stable appearance  HEENT: AT  Neck:  trach intact  Respiratory: bronchial breath sounds. Adequate airflow b/l.   Cardiovascular: stable sys murmur, S1 & S2 intact, no R/G, 2+ radial pulses b/l  Gastrointestinal: Soft, ND, normoactive b.s.,   Extremities:  warm.   Neurological:  awake.  Crude sensation intact.     Labs and imaging reviewed

## 2019-11-04 NOTE — DISCHARGE NOTE PROVIDER - NSDCCPCAREPLAN_GEN_ALL_CORE_FT
PRINCIPAL DISCHARGE DIAGNOSIS  Diagnosis: Hyperglycemic hyperosmolar nonketotic coma  Assessment and Plan of Treatment: When you were admitted your sugar levels were extremely high. Continue your insulin as prescribed. Monitor your glucose levels as ordered.      SECONDARY DISCHARGE DIAGNOSES  Diagnosis: Seizures  Assessment and Plan of Treatment: Seizures  Maintain seizure precautions. Continue using keppra as prescribed.    Diagnosis: Diabetes mellitus  Assessment and Plan of Treatment: Diabetes mellitus  Continue taking your insulin as prescribed.  Monitor your glucose level.    Diagnosis: Hypernatremia  Assessment and Plan of Treatment: Hypernatremia  Your sodium level was high during admission. Continue free water via peg tube as prescribed. You will need your electrolytes monitored 1 week after you arrive at NH.    Diagnosis: Chronic respiratory failure requiring continuous mechanical ventilation through tracheostomy  Assessment and Plan of Treatment: Chronic respiratory failure requiring continuous mechanical ventilation through tracheostomy  Continue oxygen via trach collar. Suction as needed.    Diagnosis: Sepsis secondary to UTI  Assessment and Plan of Treatment: Sepsis secondary to UTI  You were treated with antibiotics while admitted. You no longer require antibiotics.    Diagnosis: Alzheimer disease  Assessment and Plan of Treatment: Alzheimer disease  Maintain fall and safety measures. Continue lorazepam.

## 2019-11-04 NOTE — DISCHARGE NOTE PROVIDER - HOSPITAL COURSE
64 years old Female with PMH Alzheimer's dementia , CKD 3, DM, HTN, non-verbal Parkinson's disease, chronic respiratory failure s/p Tracheostomy, dysphagia s/p peg tube, major depressive disorder,  sent from [facility] with complain of fever of 102F since 1 day.    Patient is AXO=0, not responding well, opening eyes only on painful stimuli. As per chart review, patient was sent for high grade fever. BS in NH was high in 600s and was given 15U Regular insulin before sending to hospital. Further hx is limited as patient is AXO=0.        In ED, patient's vital signs were remarkable for Temp of 102.6 F, HR: 102, BP: 96/70, RR: 24. Labs were remarkable for BS: 890, Corrected A, BUN/CR: 111/3.81, Acetone: small, Blood gases shows PH: 7.38, UA positive for UTI,  Patient was given 3L NS bolus and 10 Units Regular insulin, BS were still high        Goals of care: As per NH paper and ED provider note: HCP is sister Sharon mead,>> Patient is Full code    10/30/19  Transferred to [facility]

## 2019-11-04 NOTE — PROGRESS NOTE ADULT - PROBLEM SELECTOR PLAN 3
Continue to monitor glucose.  Continue lantus 10U every morning.  Continue sliding scale and 2U lispro every 6 hours.

## 2019-11-04 NOTE — DISCHARGE NOTE PROVIDER - NSDCMRMEDTOKEN_GEN_ALL_CORE_FT
acetaminophen 325 mg oral tablet, disintegratin tab(s) orally every 4 hours, As Needed  atorvastatin 20 mg oral tablet: 1 tab(s) orally once a day  heparin: 5000 unit(s) subcutaneously every 12 hours  insulin lispro 100 units/mL injectable solution: 1 Unit(s) if Glucose 151 - 200  2 Unit(s) if Glucose 201 - 250  3 Unit(s) if Glucose 251 - 300  4 Unit(s) if Glucose 301 - 350  5 Unit(s) if Glucose 351 - 400  6 Unit(s) if Glucose Greater Than 400  insulin lispro 100 units/mL injectable solution: 2 unit(s) injectable every 6 hours   lactobacillus acidophilus oral capsule: 1 tab(s) by gastrostomy tube every 8 hours  Lantus 100 units/mL subcutaneous solution: 10 unit(s) subcutaneous once a day   levETIRAcetam 100 mg/mL oral solution: 5 milliliter(s) orally every 12 hours  LORazepam 1 mg oral tablet: 1 tab(s) orally 3 times a day  metoprolol tartrate 50 mg oral tablet: 1 tab(s) orally 2 times a day  ocular lubricant ophthalmic solution: 1 drop(s) to each affected eye 2 times a day  omeprazole 20 mg oral delayed release capsule: 1 cap(s) orally once a day  scopolamine 1.5 mg transdermal film, extended release: transdermal every 3 days

## 2019-11-04 NOTE — PROGRESS NOTE ADULT - SUBJECTIVE AND OBJECTIVE BOX
DNR [ ]   DNI  [  ]    FULL CODE    INTERVAL HPI/OVERNIGHT EVENTS: ***No overnight events.    PRESSORS: [ ] YES [x ] NO  WHICH:    ANTIBIOTICS:                  DATE STARTED:  ANTIBIOTICS:                  DATE STARTED:  ANTIBIOTICS:                  DATE STARTED:      Cardiovascular:  Heart Failure  Acute   Acute on Chronic  Chronic       metoprolol tartrate 50 milliGRAM(s) Oral two times a day    Pulmonary:    Hematalogic:  heparin  Injectable 5000 Unit(s) SubCutaneous every 8 hours    Other:  artificial tears (preservative free) Ophthalmic Solution 1 Drop(s) Both EYES two times a day  atorvastatin 20 milliGRAM(s) Oral at bedtime  chlorhexidine 4% Liquid 1 Application(s) Topical daily  insulin glargine Injectable (LANTUS) 10 Unit(s) SubCutaneous every morning  insulin lispro (HumaLOG) corrective regimen sliding scale   SubCutaneous every 6 hours  insulin lispro Injectable (HumaLOG) 2 Unit(s) SubCutaneous every 6 hours  levETIRAcetam  Oral Liquid - Peds 500 milliGRAM(s) Oral every 12 hours  LORazepam     Tablet 1 milliGRAM(s) Oral three times a day  nystatin Powder 1 Application(s) Topical two times a day  pantoprazole    Tablet 40 milliGRAM(s) Oral before breakfast    artificial tears (preservative free) Ophthalmic Solution 1 Drop(s) Both EYES two times a day  atorvastatin 20 milliGRAM(s) Oral at bedtime  chlorhexidine 4% Liquid 1 Application(s) Topical daily  heparin  Injectable 5000 Unit(s) SubCutaneous every 8 hours  insulin glargine Injectable (LANTUS) 10 Unit(s) SubCutaneous every morning  insulin lispro (HumaLOG) corrective regimen sliding scale   SubCutaneous every 6 hours  insulin lispro Injectable (HumaLOG) 2 Unit(s) SubCutaneous every 6 hours  levETIRAcetam  Oral Liquid - Peds 500 milliGRAM(s) Oral every 12 hours  LORazepam     Tablet 1 milliGRAM(s) Oral three times a day  metoprolol tartrate 50 milliGRAM(s) Oral two times a day  nystatin Powder 1 Application(s) Topical two times a day  pantoprazole    Tablet 40 milliGRAM(s) Oral before breakfast    Drug Dosing Weight  Height (cm): 170.18 (27 Sep 2019 14:38)  Weight (kg): 65 (30 Oct 2019 18:57)  BMI (kg/m2): 22.4 (30 Oct 2019 18:57)  BSA (m2): 1.75 (30 Oct 2019 18:57)    CENTRAL LINE: [ ] YES [x ] NO  LOCATION:   DATE INSERTED:  REMOVE: [ ] YES [ ] NO  EXPLAIN:    HUSAIN: [ ] YES [ x] NO    DATE INSERTED:  REMOVE:  [ ] YES [ ] NO  EXPLAIN:    A-LINE:  [ ] YES [x ] NO  LOCATION:   DATE INSERTED:  REMOVE:  [ ] YES [ ] NO  EXPLAIN:    PAST MEDICAL & SURGICAL HISTORY:  Alzheimer disease  Parkinson disease  Respiratory failure  Hypertension  Schizophrenia  Diabetic coma  Diabetes mellitus  S/P percutaneous endoscopic gastrostomy (PEG) tube placement  H/O tracheostomy              11-03 @ 07:01  -  11-04 @ 07:00  --------------------------------------------------------  IN: 1280 mL / OUT: 0 mL / NET: 1280 mL            PHYSICAL EXAM:    GENERAL: NAD, extremities contracted.  HEAD:  Atraumatic, Normocephalic  EYES: EOMI, PERRLA, conjunctiva and sclera clear  ENMT: No tonsillar erythema, exudates  NECK: Supple, No JVD, tracheostomy intact  NERVOUS SYSTEM:  Awake, not orientated. Extremities contracted. +Extrapyramidal movements.   CHEST/LUNG: Clear to percussion bilaterally; No rales, rhonchi, wheezing, or rubs  HEART: Regular rate and rhythm; No murmurs, rubs, or gallops  ABDOMEN: Soft, Nontender, Nondistended; Bowel sounds present; Peg intact  EXTREMITIES:  Partially contracted. 2+ Peripheral Pulses, No clubbing, cyanosis, or edema  LYMPH: No lymphadenopathy noted  SKIN: No rashes or lesions      LABS:                    [  ]  DVT Prophylaxis  [  ]  Nutrition, Brand, Rate         Goal Rate         Abdominal Nutritional Status -  Malnutrition   Cachexia         RADIOLOGY & ADDITIONAL STUDIES:  ***    [  ] Goals of Care Discussion with Family/Proxy/Other           Elements of Conversation Discussed: Patient/Family understanding of current illness   Advanced Directives                                                                       Prognosis  Treatment Options  Care Aligned with patient's wishes                                             TIME SPENT: 35 minutes

## 2019-11-04 NOTE — DISCHARGE NOTE PROVIDER - INSTRUCTIONS
Glucerna 1.2  40ml/hr for 16 hours per day. Glucerna 1.2  40ml/hr for 16 hours per day.  FREE WATER 90ml per hour for 16 hours a day.

## 2019-11-04 NOTE — PROGRESS NOTE ADULT - SUBJECTIVE AND OBJECTIVE BOX
[facility]   MD Barbara Donis D.O. Fatima Sheikh, D.O. Ruoru Wong, PA    From 7 AM - 5 PM:  OFFICE: [phone]  Dr. Irwin cell: [phone]  Dr. Hoffman cell: [phone]  Dr. Purvis cell: [phone]  WENDI Bojorquez cell: [phone]    From 5 PM - 7 AM: Answering Service: [phone]        RENAL FOLLOW UP NOTE  --------------------------------------------------------------------------------  HPI: Pt seen and examined. Non-verbal.        PAST HISTORY  --------------------------------------------------------------------------------  No significant changes to PMH, PSH, FHx, SHx, unless otherwise noted    ALLERGIES & MEDICATIONS  --------------------------------------------------------------------------------  Allergies    angiotensin converting enzyme inhibitors (Unknown)    Intolerances      Standing Inpatient Medications  artificial tears (preservative free) Ophthalmic Solution 1 Drop(s) Both EYES two times a day  atorvastatin 20 milliGRAM(s) Oral at bedtime  chlorhexidine 4% Liquid 1 Application(s) Topical daily  heparin  Injectable 5000 Unit(s) SubCutaneous every 8 hours  insulin glargine Injectable (LANTUS) 10 Unit(s) SubCutaneous every morning  insulin lispro (HumaLOG) corrective regimen sliding scale   SubCutaneous every 6 hours  insulin lispro Injectable (HumaLOG) 2 Unit(s) SubCutaneous every 6 hours  levETIRAcetam  Oral Liquid - Peds 500 milliGRAM(s) Oral every 12 hours  LORazepam     Tablet 1 milliGRAM(s) Oral three times a day  metoprolol tartrate 50 milliGRAM(s) Oral two times a day  nystatin Powder 1 Application(s) Topical two times a day  pantoprazole    Tablet 40 milliGRAM(s) Oral before breakfast    PRN Inpatient Medications      REVIEW OF SYSTEMS  --------------------------------------------------------------------------------  unable to obtain    VITALS/PHYSICAL EXAM  --------------------------------------------------------------------------------  T(C): 36.9 (11-04-19 @ 05:10), Max: 36.9 (11-04-19 @ 05:10)  HR: 93 (11-04-19 @ 05:10) (67 - 112)  BP: 125/68 (11-04-19 @ 05:10) (100/47 - 149/99)  RR: 17 (11-04-19 @ 05:10) (17 - 20)  SpO2: 100% (11-04-19 @ 05:10) (95% - 100%)  Wt(kg): --        11-03-19 @ 07:01  -  11-04-19 @ 07:00  --------------------------------------------------------  IN: 1280 mL / OUT: 0 mL / NET: 1280 mL      Physical Exam:  	Gen: NAD  	HEENT: MMM  	Pulm: CTA B/L  	CV: S1S2  	Abd: Soft, +BS  	Ext: No LE edema B/L              Neuro: Awake, non-verbal  	Skin: Warm and Dry   	  LABS/STUDIES  --------------------------------------------------------------------------------                Creatinine Trend:  SCr 0.67 [11-01 @ 07:01]  SCr 0.82 [10-31 @ 06:41]  SCr 0.77 [10-30 @ 06:05]  SCr 1.06 [10-29 @ 04:43]  SCr 1.06 [10-29 @ 00:30]    Urinalysis - [10-26-19 @ 22:41]      Color Yellow / Appearance very cloudy / SG 1.010 / pH 5.0      Gluc 1000 / Ketone Trace  / Bili Negative / Urobili Negative       Blood Trace / Protein 30 / Leuk Est Trace / Nitrite Negative      RBC 2-5 / WBC 6-10 / Hyaline  / Gran  / Sq Epi  / Non Sq Epi Few / Bacteria Many      Vitamin D (25OH) 33.0      [09-23-19 @ 14:43]  HbA1c 12.0      [10-27-19 @ 12:00]  TSH 0.80      [10-27-19 @ 04:21]  Lipid: chol 150, , HDL 50, LDL 80      [09-23-19 @ 08:36]

## 2019-11-05 ENCOUNTER — TRANSCRIPTION ENCOUNTER (OUTPATIENT)
Age: 64
End: 2019-11-05

## 2019-11-05 VITALS
DIASTOLIC BLOOD PRESSURE: 96 MMHG | SYSTOLIC BLOOD PRESSURE: 133 MMHG | TEMPERATURE: 85 F | RESPIRATION RATE: 18 BRPM | OXYGEN SATURATION: 96 %

## 2019-11-05 DIAGNOSIS — E43 UNSPECIFIED SEVERE PROTEIN-CALORIE MALNUTRITION: ICD-10-CM

## 2019-11-05 LAB
ALBUMIN SERPL ELPH-MCNC: 2.4 G/DL — LOW (ref 3.5–5)
ALP SERPL-CCNC: 56 U/L — SIGNIFICANT CHANGE UP (ref 40–120)
ALT FLD-CCNC: 53 U/L DA — SIGNIFICANT CHANGE UP (ref 10–60)
ANION GAP SERPL CALC-SCNC: 6 MMOL/L — SIGNIFICANT CHANGE UP (ref 5–17)
AST SERPL-CCNC: 36 U/L — SIGNIFICANT CHANGE UP (ref 10–40)
BILIRUB SERPL-MCNC: 0.4 MG/DL — SIGNIFICANT CHANGE UP (ref 0.2–1.2)
BUN SERPL-MCNC: 5 MG/DL — LOW (ref 7–18)
CALCIUM SERPL-MCNC: 9.1 MG/DL — SIGNIFICANT CHANGE UP (ref 8.4–10.5)
CHLORIDE SERPL-SCNC: 107 MMOL/L — SIGNIFICANT CHANGE UP (ref 96–108)
CO2 SERPL-SCNC: 28 MMOL/L — SIGNIFICANT CHANGE UP (ref 22–31)
CREAT SERPL-MCNC: 0.58 MG/DL — SIGNIFICANT CHANGE UP (ref 0.5–1.3)
GLUCOSE SERPL-MCNC: 108 MG/DL — HIGH (ref 70–99)
HCT VFR BLD CALC: 31 % — LOW (ref 34.5–45)
HGB BLD-MCNC: 9.7 G/DL — LOW (ref 11.5–15.5)
MCHC RBC-ENTMCNC: 25.9 PG — LOW (ref 27–34)
MCHC RBC-ENTMCNC: 31.3 GM/DL — LOW (ref 32–36)
MCV RBC AUTO: 82.7 FL — SIGNIFICANT CHANGE UP (ref 80–100)
NRBC # BLD: 0 /100 WBCS — SIGNIFICANT CHANGE UP (ref 0–0)
PLATELET # BLD AUTO: 135 K/UL — LOW (ref 150–400)
POTASSIUM SERPL-MCNC: 4.4 MMOL/L — SIGNIFICANT CHANGE UP (ref 3.5–5.3)
POTASSIUM SERPL-SCNC: 4.4 MMOL/L — SIGNIFICANT CHANGE UP (ref 3.5–5.3)
PROT SERPL-MCNC: 5.6 G/DL — LOW (ref 6–8.3)
RBC # BLD: 3.75 M/UL — LOW (ref 3.8–5.2)
RBC # FLD: 15.5 % — HIGH (ref 10.3–14.5)
SODIUM SERPL-SCNC: 141 MMOL/L — SIGNIFICANT CHANGE UP (ref 135–145)
WBC # BLD: 3.37 K/UL — LOW (ref 3.8–10.5)
WBC # FLD AUTO: 3.37 K/UL — LOW (ref 3.8–10.5)

## 2019-11-05 PROCEDURE — 82009 KETONE BODYS QUAL: CPT

## 2019-11-05 PROCEDURE — 81001 URINALYSIS AUTO W/SCOPE: CPT

## 2019-11-05 PROCEDURE — 93005 ELECTROCARDIOGRAM TRACING: CPT

## 2019-11-05 PROCEDURE — 76770 US EXAM ABDO BACK WALL COMP: CPT

## 2019-11-05 PROCEDURE — 82803 BLOOD GASES ANY COMBINATION: CPT

## 2019-11-05 PROCEDURE — 83930 ASSAY OF BLOOD OSMOLALITY: CPT

## 2019-11-05 PROCEDURE — 96374 THER/PROPH/DIAG INJ IV PUSH: CPT

## 2019-11-05 PROCEDURE — 83735 ASSAY OF MAGNESIUM: CPT

## 2019-11-05 PROCEDURE — 85730 THROMBOPLASTIN TIME PARTIAL: CPT

## 2019-11-05 PROCEDURE — 96372 THER/PROPH/DIAG INJ SC/IM: CPT | Mod: XU

## 2019-11-05 PROCEDURE — 87186 SC STD MICRODIL/AGAR DIL: CPT

## 2019-11-05 PROCEDURE — 87040 BLOOD CULTURE FOR BACTERIA: CPT

## 2019-11-05 PROCEDURE — 85027 COMPLETE CBC AUTOMATED: CPT

## 2019-11-05 PROCEDURE — 83605 ASSAY OF LACTIC ACID: CPT

## 2019-11-05 PROCEDURE — 36415 COLL VENOUS BLD VENIPUNCTURE: CPT

## 2019-11-05 PROCEDURE — 83036 HEMOGLOBIN GLYCOSYLATED A1C: CPT

## 2019-11-05 PROCEDURE — 90686 IIV4 VACC NO PRSV 0.5 ML IM: CPT

## 2019-11-05 PROCEDURE — 82607 VITAMIN B-12: CPT

## 2019-11-05 PROCEDURE — 84145 PROCALCITONIN (PCT): CPT

## 2019-11-05 PROCEDURE — 84100 ASSAY OF PHOSPHORUS: CPT

## 2019-11-05 PROCEDURE — 99291 CRITICAL CARE FIRST HOUR: CPT

## 2019-11-05 PROCEDURE — 80048 BASIC METABOLIC PNL TOTAL CA: CPT

## 2019-11-05 PROCEDURE — 80053 COMPREHEN METABOLIC PANEL: CPT

## 2019-11-05 PROCEDURE — 94760 N-INVAS EAR/PLS OXIMETRY 1: CPT

## 2019-11-05 PROCEDURE — 82962 GLUCOSE BLOOD TEST: CPT

## 2019-11-05 PROCEDURE — 85610 PROTHROMBIN TIME: CPT

## 2019-11-05 PROCEDURE — 87086 URINE CULTURE/COLONY COUNT: CPT

## 2019-11-05 PROCEDURE — 87631 RESP VIRUS 3-5 TARGETS: CPT

## 2019-11-05 PROCEDURE — 84443 ASSAY THYROID STIM HORMONE: CPT

## 2019-11-05 PROCEDURE — 71045 X-RAY EXAM CHEST 1 VIEW: CPT

## 2019-11-05 PROCEDURE — 94640 AIRWAY INHALATION TREATMENT: CPT

## 2019-11-05 RX ADMIN — INSULIN GLARGINE 10 UNIT(S): 100 INJECTION, SOLUTION SUBCUTANEOUS at 08:54

## 2019-11-05 RX ADMIN — PANTOPRAZOLE SODIUM 40 MILLIGRAM(S): 20 TABLET, DELAYED RELEASE ORAL at 07:29

## 2019-11-05 RX ADMIN — Medication 50 MILLIGRAM(S): at 05:13

## 2019-11-05 RX ADMIN — Medication 2 UNIT(S): at 05:12

## 2019-11-05 RX ADMIN — LEVETIRACETAM 500 MILLIGRAM(S): 250 TABLET, FILM COATED ORAL at 05:13

## 2019-11-05 RX ADMIN — Medication 1 MILLIGRAM(S): at 05:16

## 2019-11-05 RX ADMIN — CHLORHEXIDINE GLUCONATE 1 APPLICATION(S): 213 SOLUTION TOPICAL at 11:41

## 2019-11-05 RX ADMIN — HEPARIN SODIUM 5000 UNIT(S): 5000 INJECTION INTRAVENOUS; SUBCUTANEOUS at 14:29

## 2019-11-05 RX ADMIN — HEPARIN SODIUM 5000 UNIT(S): 5000 INJECTION INTRAVENOUS; SUBCUTANEOUS at 05:13

## 2019-11-05 RX ADMIN — Medication 2 UNIT(S): at 12:41

## 2019-11-05 RX ADMIN — Medication 2 UNIT(S): at 00:24

## 2019-11-05 RX ADMIN — NYSTATIN CREAM 1 APPLICATION(S): 100000 CREAM TOPICAL at 05:14

## 2019-11-05 RX ADMIN — Medication 1 DROP(S): at 05:16

## 2019-11-05 RX ADMIN — Medication 1: at 12:40

## 2019-11-05 RX ADMIN — Medication 1 MILLIGRAM(S): at 14:29

## 2019-11-05 NOTE — PROGRESS NOTE ADULT - SUBJECTIVE AND OBJECTIVE BOX
DNR [ ]   DNI  [  ]  FULL CODE     INTERVAL HPI/OVERNIGHT EVENTS: ***No overnight events.    PRESSORS: [ ] YES [x ] NO  WHICH:    ANTIBIOTICS:                  DATE STARTED:  ANTIBIOTICS:                  DATE STARTED:  ANTIBIOTICS:                  DATE STARTED:      Cardiovascular:  Heart Failure  Acute   Acute on Chronic  Chronic       metoprolol tartrate 50 milliGRAM(s) Oral two times a day    Pulmonary:    Hematalogic:  heparin  Injectable 5000 Unit(s) SubCutaneous every 8 hours    Other:  artificial tears (preservative free) Ophthalmic Solution 1 Drop(s) Both EYES two times a day  atorvastatin 20 milliGRAM(s) Oral at bedtime  chlorhexidine 4% Liquid 1 Application(s) Topical daily  insulin glargine Injectable (LANTUS) 10 Unit(s) SubCutaneous every morning  insulin lispro (HumaLOG) corrective regimen sliding scale   SubCutaneous every 6 hours  insulin lispro Injectable (HumaLOG) 2 Unit(s) SubCutaneous every 6 hours  levETIRAcetam  Oral Liquid - Peds 500 milliGRAM(s) Oral every 12 hours  LORazepam     Tablet 1 milliGRAM(s) Oral three times a day  nystatin Powder 1 Application(s) Topical two times a day  pantoprazole    Tablet 40 milliGRAM(s) Oral before breakfast    artificial tears (preservative free) Ophthalmic Solution 1 Drop(s) Both EYES two times a day  atorvastatin 20 milliGRAM(s) Oral at bedtime  chlorhexidine 4% Liquid 1 Application(s) Topical daily  heparin  Injectable 5000 Unit(s) SubCutaneous every 8 hours  insulin glargine Injectable (LANTUS) 10 Unit(s) SubCutaneous every morning  insulin lispro (HumaLOG) corrective regimen sliding scale   SubCutaneous every 6 hours  insulin lispro Injectable (HumaLOG) 2 Unit(s) SubCutaneous every 6 hours  levETIRAcetam  Oral Liquid - Peds 500 milliGRAM(s) Oral every 12 hours  LORazepam     Tablet 1 milliGRAM(s) Oral three times a day  metoprolol tartrate 50 milliGRAM(s) Oral two times a day  nystatin Powder 1 Application(s) Topical two times a day  pantoprazole    Tablet 40 milliGRAM(s) Oral before breakfast    Drug Dosing Weight  Height (cm): 170.18 (27 Sep 2019 14:38)  Weight (kg): 65 (30 Oct 2019 18:57)  BMI (kg/m2): 22.4 (30 Oct 2019 18:57)  BSA (m2): 1.75 (30 Oct 2019 18:57)    CENTRAL LINE: [ ] YES [x ] NO  LOCATION:   DATE INSERTED:  REMOVE: [ ] YES [ ] NO  EXPLAIN:    HUSAIN: [ ] YES [x ] NO    DATE INSERTED:  REMOVE:  [ ] YES [ ] NO  EXPLAIN:    A-LINE:  [ ] YES [x ] NO  LOCATION:   DATE INSERTED:  REMOVE:  [ ] YES [ ] NO  EXPLAIN:    PAST MEDICAL & SURGICAL HISTORY:  Alzheimer disease  Parkinson disease  Respiratory failure  Hypertension  Schizophrenia  Diabetic coma  Diabetes mellitus  S/P percutaneous endoscopic gastrostomy (PEG) tube placement  H/O tracheostomy                    PHYSICAL EXAM:    GENERAL: NAD, extremities contracted.  HEAD:  Atraumatic, Normocephalic  EYES: EOMI, PERRLA, conjunctiva and sclera clear  ENMT: No tonsillar erythema, exudates  NECK: Supple, No JVD, tracheostomy intact  NERVOUS SYSTEM:  Awake, nonverbal. Does not follow commands. Extremities contracted.  CHEST/LUNG: Clear to percussion bilaterally; No rales, rhonchi, wheezing, or rubs  HEART: Regular rate and rhythm; No murmurs, rubs, or gallops  ABDOMEN: Soft, Nontender, Nondistended; Bowel sounds present  EXTREMITIES:  Contracted. 2+ Peripheral Pulses, No clubbing, cyanosis, or edema  LYMPH: No lymphadenopathy noted  SKIN: No rashes or lesions      LABS:  CBC Full  -  ( 05 Nov 2019 06:31 )  WBC Count : 3.37 K/uL  RBC Count : 3.75 M/uL  Hemoglobin : 9.7 g/dL  Hematocrit : 31.0 %  Platelet Count - Automated : 135 K/uL  Mean Cell Volume : 82.7 fl  Mean Cell Hemoglobin : 25.9 pg  Mean Cell Hemoglobin Concentration : 31.3 gm/dL  Auto Neutrophil # : x  Auto Lymphocyte # : x  Auto Monocyte # : x  Auto Eosinophil # : x  Auto Basophil # : x  Auto Neutrophil % : x  Auto Lymphocyte % : x  Auto Monocyte % : x  Auto Eosinophil % : x  Auto Basophil % : x    11-05    141  |  107  |  5<L>  ----------------------------<  108<H>  4.4   |  28  |  0.58    Ca    9.1      05 Nov 2019 06:31  Phos  3.3     11-04  Mg     1.7     11-04    TPro  5.6<L>  /  Alb  2.4<L>  /  TBili  0.4  /  DBili  x   /  AST  36  /  ALT  53  /  AlkPhos  56  11-05              [  ]  DVT Prophylaxis  [  ]  Nutrition, Brand, Rate         Goal Rate         Abdominal Nutritional Status -  Malnutrition   Cachexia       RADIOLOGY & ADDITIONAL STUDIES:  ***    [  ] Goals of Care Discussion with Family/Proxy/Other           Elements of Conversation Discussed: Patient/Family understanding of current illness   Advanced Directives                                                                       Prognosis  Treatment Options  Care Aligned with patient's wishes                                             TIME SPENT: 35 minutes

## 2019-11-05 NOTE — PROGRESS NOTE ADULT - SUBJECTIVE AND OBJECTIVE BOX
Time of Visit:  Patient seen and examined.     MEDICATIONS  (STANDING):  artificial tears (preservative free) Ophthalmic Solution 1 Drop(s) Both EYES two times a day  atorvastatin 20 milliGRAM(s) Oral at bedtime  chlorhexidine 4% Liquid 1 Application(s) Topical daily  heparin  Injectable 5000 Unit(s) SubCutaneous every 8 hours  insulin glargine Injectable (LANTUS) 10 Unit(s) SubCutaneous every morning  insulin lispro (HumaLOG) corrective regimen sliding scale   SubCutaneous every 6 hours  insulin lispro Injectable (HumaLOG) 2 Unit(s) SubCutaneous every 6 hours  levETIRAcetam  Oral Liquid - Peds 500 milliGRAM(s) Oral every 12 hours  LORazepam     Tablet 1 milliGRAM(s) Oral three times a day  metoprolol tartrate 50 milliGRAM(s) Oral two times a day  nystatin Powder 1 Application(s) Topical two times a day  pantoprazole    Tablet 40 milliGRAM(s) Oral before breakfast      MEDICATIONS  (PRN):       Medications up to date at time of exam.      PHYSICAL EXAMINATION:    Vital Signs Last 24 Hrs  T(C): 36.6 (05 Nov 2019 05:07), Max: 36.6 (05 Nov 2019 05:07)  T(F): 97.9 (05 Nov 2019 05:07), Max: 97.9 (05 Nov 2019 05:07)  HR: 59 (05 Nov 2019 05:07) (59 - 68)  BP: 139/46 (05 Nov 2019 05:07) (130/76 - 141/91)  BP(mean): --  RR: 16 (05 Nov 2019 05:07) (16 - 19)  SpO2: 100% (05 Nov 2019 05:07) (98% - 100%)   (if applicable)    General; Non verbal. Eyes open with tracking to tactile stimuli. No acute distress.       HEENT: Head is normocephalic and atraumatic. No nasal tenderness. Moist mucosa.     NECK: Has non infected Trach #6 .     LUNGS: Clear to auscultation B/L. No wheezing, rales, or rhonchi. No use of accessory muscle.     HEART: S1 S2 Regular rate and no JVD.    ABDOMEN: Soft, nontender, and nondistended.  No abdominal guarding. Active bowel sounds. + Peg.     EXTREMITIES: Total care. Non ambulatory.     NEUROLOGIC: Cognitive impaired .      SKIN: Warm and moist.  Non diaphoretic.   LABS:                        9.7    3.37  )-----------( 135      ( 05 Nov 2019 06:31 )             31.0     11-05    141  |  107  |  5<L>  ----------------------------<  108<H>  4.4   |  28  |  0.58    Ca    9.1      05 Nov 2019 06:31  Phos  3.3     11-04  Mg     1.7     11-04    TPro  5.6<L>  /  Alb  2.4<L>  /  TBili  0.4  /  DBili  x   /  AST  36  /  ALT  53  /  AlkPhos  56  11-05    RADIOLOGY & ADDITIONAL STUDIES:  EKG:   CXR: < from: Xray Chest 1 View- PORTABLE-Routine (10.29.19 @ 19:21) >  PROCEDURE DATE:  10/29/2019          INTERPRETATION:  CLINICAL INDICATION: 64 years  Female with icu follow up.    COMPARISON: 10/26/2019    A tracheostomy is reidentified.    AP view of the chest demonstrates mild bibasilar discoid atelectasis..   There is no pleural effusion. The right hemidiaphragm is again elevated.   There is no pneumothorax.    The heart is normal in size. The aorta is atherosclerotic. There is no   mediastinal or hilar mass.     The pulmonary vasculature is normal.     Mild thoracic degenerative changes are present.    IMPRESSION:    Tracheostomy.    Bibasilar discoid atelectasis.      IMPRESSION: 64y Female PAST MEDICAL & SURGICAL HISTORY:  Alzheimer disease  Parkinson disease  Respiratory failure  Hypertension  Schizophrenia  Diabetic coma  Diabetes mellitus  S/P percutaneous endoscopic gastrostomy (PEG) tube placement  H/O tracheostomy    Impression; 63 Y/O Female from [facility] presented with Temp 102.6. Was admitted here at [place] in  due to Peg dislodgement . On Oxygen dependent via trach collar due to Acute on chronic hypoxic  respiratory failure.     Suggestion;  Continue Oxygen supplementation FIO2 40% via Trach collar. Not a candidate for trach capping nor decannulation.   Pulmonary Oral, trach care, suction if needed.   Aspiration precautions . HOB elevation during Peg feeding.   DVT/ GI prophylactic.

## 2019-11-05 NOTE — PROGRESS NOTE ADULT - PROBLEM SELECTOR PLAN 10
DVT and GI prophylaxis.  Overall prognosis poor.
DVT and GI prophylaxis.  Medically stable for discharge.   Will need to have sodium monitored at NH.
DVT and GI prophylaxis.  Overall prognosis poor.

## 2019-11-05 NOTE — PROGRESS NOTE ADULT - SUBJECTIVE AND OBJECTIVE BOX
[facility] [place] [facility]   MD Barbara Donis D.O. Fatima Sheikh, D.O. Ruoru Wong, PA    From 7 AM - 5 PM:  OFFICE: [phone]  Dr. Irwin cell: [phone]  Dr. Hoffman cell: [phone]  Dr. Purvis cell: [phone]  WENDI Bojorquez cell: [phone]    From 5 PM - 7 AM: Answering Service: [phone]        RENAL FOLLOW UP NOTE  --------------------------------------------------------------------------------  HPI: Pt seen and examined. Non-verbal        PAST HISTORY  --------------------------------------------------------------------------------  No significant changes to PMH, PSH, FHx, SHx, unless otherwise noted    ALLERGIES & MEDICATIONS  --------------------------------------------------------------------------------  Allergies    angiotensin converting enzyme inhibitors (Unknown)    Intolerances      Standing Inpatient Medications  artificial tears (preservative free) Ophthalmic Solution 1 Drop(s) Both EYES two times a day  atorvastatin 20 milliGRAM(s) Oral at bedtime  chlorhexidine 4% Liquid 1 Application(s) Topical daily  heparin  Injectable 5000 Unit(s) SubCutaneous every 8 hours  insulin glargine Injectable (LANTUS) 10 Unit(s) SubCutaneous every morning  insulin lispro (HumaLOG) corrective regimen sliding scale   SubCutaneous every 6 hours  insulin lispro Injectable (HumaLOG) 2 Unit(s) SubCutaneous every 6 hours  levETIRAcetam  Oral Liquid - Peds 500 milliGRAM(s) Oral every 12 hours  LORazepam     Tablet 1 milliGRAM(s) Oral three times a day  metoprolol tartrate 50 milliGRAM(s) Oral two times a day  nystatin Powder 1 Application(s) Topical two times a day  pantoprazole    Tablet 40 milliGRAM(s) Oral before breakfast    PRN Inpatient Medications      REVIEW OF SYSTEMS  --------------------------------------------------------------------------------  unable to obtain    VITALS/PHYSICAL EXAM  --------------------------------------------------------------------------------  T(C): 36.6 (11-05-19 @ 05:07), Max: 36.6 (11-05-19 @ 05:07)  HR: 59 (11-05-19 @ 05:07) (52 - 68)  BP: 139/46 (11-05-19 @ 05:07) (125/88 - 141/91)  RR: 16 (11-05-19 @ 05:07) (16 - 19)  SpO2: 100% (11-05-19 @ 05:07) (93% - 100%)  Wt(kg): --        Physical Exam:  	Gen: NAD  	HEENT: MMM  	Pulm: CTA B/L  	CV: S1S2  	Abd: Soft, +BS  	Ext: No LE edema B/L              Neuro: Awake, non-verbal   	Skin: Warm and Dry       LABS/STUDIES  --------------------------------------------------------------------------------              9.7    3.37  >-----------<  135      [11-05-19 @ 06:31]              31.0     141  |  107  |  5   ----------------------------<  108      [11-05-19 @ 06:31]  4.4   |  28  |  0.58        Ca     9.1     [11-05-19 @ 06:31]      Mg     1.7     [11-04-19 @ 11:05]      Phos  3.3     [11-04-19 @ 11:05]    TPro  5.6  /  Alb  2.4  /  TBili  0.4  /  DBili  x   /  AST  36  /  ALT  53  /  AlkPhos  56  [11-05-19 @ 06:31]          Creatinine Trend:  SCr 0.58 [11-05 @ 06:31]  SCr 0.74 [11-04 @ 11:05]  SCr 0.67 [11-01 @ 07:01]  SCr 0.82 [10-31 @ 06:41]  SCr 0.77 [10-30 @ 06:05]    Urinalysis - [10-26-19 @ 22:41]      Color Yellow / Appearance very cloudy / SG 1.010 / pH 5.0      Gluc 1000 / Ketone Trace  / Bili Negative / Urobili Negative       Blood Trace / Protein 30 / Leuk Est Trace / Nitrite Negative      RBC 2-5 / WBC 6-10 / Hyaline  / Gran  / Sq Epi  / Non Sq Epi Few / Bacteria Many      Vitamin D (25OH) 33.0      [09-23-19 @ 14:43]  HbA1c 12.0      [10-27-19 @ 12:00]  TSH 0.80      [10-27-19 @ 04:21]  Lipid: chol 150, , HDL 50, LDL 80      [09-23-19 @ 08:36]

## 2019-11-05 NOTE — PROGRESS NOTE ADULT - REASON FOR ADMISSION
Fever

## 2019-11-05 NOTE — PROGRESS NOTE ADULT - PROBLEM SELECTOR PROBLEM 5
LAYLA (acute kidney injury)
Alzheimer disease
LAYLA (acute kidney injury)

## 2019-11-05 NOTE — PROGRESS NOTE ADULT - PROBLEM SELECTOR PLAN 6
Maintain fall and safety precautions.  Continue ativan.
RISK                                                          Points  [  ] Previous VTE                                                3  [  ] Thrombophilia                                             2  [  ] Lower limb paralysis                                   2        (unable to hold up >15 seconds)    [  ] Current Cancer                                             2         (within 6 months)  [ x ] Immobilization > 24 hrs                              1  [  ] ICU/CCU stay > 24 hours                             1  [ x ] Age > 60                                                         1    IMPROVE VTE Score: 2  heparin for VTE prophylaxis.
RISK                                                          Points  [  ] Previous VTE                                                3  [  ] Thrombophilia                                             2  [  ] Lower limb paralysis                                   2        (unable to hold up >15 seconds)    [  ] Current Cancer                                             2         (within 6 months)  [ x ] Immobilization > 24 hrs                              1  [  ] ICU/CCU stay > 24 hours                             1  [ x ] Age > 60                                                         1    IMPROVE VTE Score: 2  heparin for VTE prophylaxis.
Maintain fall and safety precautions.
Maintain fall and safety precautions.  Continue ativan.
Maintain fall and safety precautions.  Strict aspiration precautions
RISK                                                          Points  [  ] Previous VTE                                                3  [  ] Thrombophilia                                             2  [  ] Lower limb paralysis                                   2        (unable to hold up >15 seconds)    [  ] Current Cancer                                             2         (within 6 months)  [ x ] Immobilization > 24 hrs                              1  [  ] ICU/CCU stay > 24 hours                             1  [ x ] Age > 60                                                         1    IMPROVE VTE Score: 2  heparin for VTE prophylaxis.
RISK                                                          Points  [  ] Previous VTE                                                3  [  ] Thrombophilia                                             2  [  ] Lower limb paralysis                                   2        (unable to hold up >15 seconds)    [  ] Current Cancer                                             2         (within 6 months)  [ x ] Immobilization > 24 hrs                              1  [  ] ICU/CCU stay > 24 hours                             1  [ x ] Age > 60                                                         1    IMPROVE VTE Score: 2  heparin for VTE prophylaxis.

## 2019-11-05 NOTE — PROGRESS NOTE ADULT - NSHPATTENDINGPLANDISCUSS_GEN_ALL_CORE
primary
Agree with above assessment and plan as transcribed.
icu team on rounds
icu team on rounds

## 2019-11-05 NOTE — PROGRESS NOTE ADULT - PROBLEM SELECTOR PLAN 7
Continue keppra  Maintain seizures precautions.
Continue tube feeds and supplementation.  Increase tube feed rate to 50ml/hour.
Repeat CMP pending.  Free water at 250cc every 6 hours.

## 2019-11-05 NOTE — PROGRESS NOTE ADULT - PROBLEM SELECTOR PROBLEM 4
LAYLA (acute kidney injury)
LAYLA (acute kidney injury)
Chronic respiratory failure requiring continuous mechanical ventilation through tracheostomy
Chronic respiratory failure requiring continuous mechanical ventilation through tracheostomy
Hypernatremia
LAYLA (acute kidney injury)
LAYLA (acute kidney injury)
Hypernatremia

## 2019-11-05 NOTE — DISCHARGE NOTE NURSING/CASE MANAGEMENT/SOCIAL WORK - PATIENT PORTAL LINK FT
You can access the FollowMyHealth Patient Portal offered by [facility] by registering at the following website: [email or web address]. By joining [employer]’s FollowMyHealth portal, you will also be able to view your health information using other applications (apps) compatible with our system.

## 2019-11-05 NOTE — PROGRESS NOTE ADULT - ATTENDING COMMENTS
Patient seen and examined with NP,  Addendum to above.    64 y.o female with history of Dementia, Chronic respiratory failure, CKD, DM, HTN admitted with sepsis secondary to urinary tract infection. Initially with uncontrolled glucose and NONk, hypernatremia.     Assessment:  1. Severe sepsis   2. Hyperglycemic hyperosmolar nonketotic coma  3. Urinary tract infection  4. Chronic respiratory failure.   5. Diabetes mellitus   6. Hypernatremia   7. Hypertension  8. Dementia   9. Seizures     - Urine cultures growing E. Coli, sensitive to Ceftriaxone will complete for 10 days  - Cont. peg tube free water infusion  - Monitor fingerstick glucose with insulin coverage   - Oxygen via trach collar   - Cont. Metoprolol  - Cont. Keppra  - Neurology consult  - Seizure precautions  - Palliative care consult, poor prognosis  - d/c planing once antibiotics completed
Patient seen and examined with resident, Addendum to above.    64 y.o female with history of Dementia, Chronic respiratory failure, CKD, DM, HTN admitted with sepsis secondary to urinary tract infection. Initially with uncontrolled glucose and NONk, hypernatremia.     Assessment:  1. Severe sepsis   2. Hyperglycemic hyperosmolar nonketotic coma  3. Urinary tract infection  4. Chronic respiratory failure.   5. Diabetes mellitus   6. Hypernatremia   7. Hypertension  8. Dementia   9. Seizures     - Urine cultures growing E. Coli, sensitive to Ceftriaxone  - Cont. peg tube free water infusion, sodium level improved  - Monitor fingerstick glucose with insulin coverage   - Oxygen via trach collar   - Decrease Metoprolol  - Cont. Keppra  - Seizure precautions  - Palliative care consult, poor prognosis  - D/c coe catheter  - Transfer to [facility] today
Patient seen and examined with resident, Addendum to above.    64 y.o female with history of Dementia, Chronic respiratory failure, CKD, DM, HTN admitted with sepsis secondary to urinary tract infection. Initially with uncontrolled glucose and NONk, hypernatremia.     Assessment:  1. Severe sepsis   2. Hyperglycemic hyperosmolar nonketotic coma  3. Urinary tract infection  4. Chronic respiratory failure.   5. Diabetes mellitus   6. Hypernatremia   7. Hypertension  8. Dementia   9. Seizures     - Urine cultures growing gram negative rods  - Cont. antibiotics for now   - D/c IV fluids  - Cont. peg tube free water infusion   - Monitor fingerstick glucose with insulin coverage   - Oxygen via trach collar   - Monitor urine output   - Cont. Keppra  - Seizure precautions  - Palliative care consult, poor prognosis
Patient seen and examined with resident, Addendum to above.    64 y.o female with history of Dementia, Chronic respiratory failure, CKD, DM, HTN admitted with sepsis secondary to urinary tract infection. Initially with uncontrolled glucose and NONk, hypernatremia.     Assessment:  1. Severe sepsis   2. Hyperglycemic hyperosmolar nonketotic coma  3. Urinary tract infection  4. Chronic respiratory failure.   5. Diabetes mellitus   6. Hypernatremia   7. Hypertension  8. Dementia   9. Seizures     - Urine cultures growing E. Coli  - Cont. antibiotics for now   - Cont. peg tube free water infusion   - Monitor fingerstick glucose with insulin coverage   - Oxygen via trach collar   - Monitor urine output   - Cont. Keppra  - Seizure precautions  - Palliative care consult, poor prognosis
-continue O2 via TC  -off antibx  -for transfer to facility
-pat with encephalopathy due to diabetic coma s/p trach and peg on TC 40 % O2 admitted for AMS due to UTI. She will complete course of antibx in 24 hours  -for transfer to facility   -pul hygiene  -o2 via trach collar
Patient seen and examined with NP,  Addendum to above.    64 y.o female with history of Dementia, Chronic respiratory failure, CKD, DM, HTN admitted with sepsis secondary to urinary tract infection. Initially with uncontrolled glucose and NONk, hypernatremia.     Assessment:  1. Severe sepsis   2. Hyperglycemic hyperosmolar nonketotic coma  3. Urinary tract infection  4. Chronic respiratory failure.   5. Diabetes mellitus   6. Hypernatremia   7. Hypertension  8. Dementia   9. Seizures     - Completed antibiotics  - Cont. peg tube feeding  - Monitor fingerstick glucose with insulin coverage   - Oxygen via trach collar   - Cont. Metoprolol  - Cont. Keppra  - Neurology consult  - Seizure precautions  - Palliative care consult, poor prognosis  - D/c back to facility today
Patient seen and examined with NP,  Addendum to above.    64 y.o female with history of Dementia, Chronic respiratory failure, CKD, DM, HTN admitted with sepsis secondary to urinary tract infection. Initially with uncontrolled glucose and NONk, hypernatremia.     Assessment:  1. Severe sepsis   2. Hyperglycemic hyperosmolar nonketotic coma  3. Urinary tract infection  4. Chronic respiratory failure.   5. Diabetes mellitus   6. Hypernatremia   7. Hypertension  8. Dementia   9. Seizures     - Urine cultures growing E. Coli, sensitive to Ceftriaxone will complete for 10 days  - Cont. peg tube free water infusion  - Monitor fingerstick glucose with insulin coverage   - Oxygen via trach collar   - Cont. Metoprolol  - Cont. Keppra  - Neurology consult  - Seizure precautions  - Palliative care consult, poor prognosis
pat completed course of antibx  tolerating TC and peg feed  discharge planning to facility

## 2019-11-05 NOTE — PROGRESS NOTE ADULT - PROBLEM SELECTOR PLAN 8
Continue metoprolol, statin and ASA.
Maintain seizure precautions.  Continue keppra.
Maintain seizure precautions.  Continue keppra.

## 2019-11-05 NOTE — PROGRESS NOTE ADULT - PROBLEM SELECTOR PLAN 9
Maintain fall and safety precautions.  Turn every 2 hours.
DVT and GI prophylaxis.  Medically stable for discharge.
Maintain fall and safety precautions.  Turn every 2 hours.
Resolved.  Continue free water at 90ml/hour.  Continue to monitor sodium.

## 2019-11-05 NOTE — PROGRESS NOTE ADULT - PROBLEM SELECTOR PROBLEM 3
Hypernatremia
Chronic respiratory failure requiring continuous mechanical ventilation through tracheostomy
Diabetes mellitus
Diabetes mellitus
Hypernatremia
Chronic respiratory failure requiring continuous mechanical ventilation through tracheostomy

## 2019-11-05 NOTE — PROGRESS NOTE ADULT - PROBLEM SELECTOR PROBLEM 9
Functional quadriplegia
Hypernatremia
Prophylactic measure

## 2019-11-05 NOTE — PROGRESS NOTE ADULT - ASSESSMENT
63 yo F with PMH Alzheimer's dementia, DM, HTN, non-verbal Parkinson's disease, chronic respiratory failure s/p Tracheostomy, dysphagia s/p peg tube, major depressive disorder who was sent from [facility] for evaluation of fever of 102F. Found to have HHNK and UTI. Nephrology is following for hypernatremia and LAYLA.    LAYLA  - baseline Cr 0.8; admitted w/ Cr 3.81 which is improving   - LAYLA is likely pre-renal from hypovolemia/dehydration    - Avoid nephrotoxins including NSAIDs, IV contrast (if possible), [employer] products  - maintain MAP >65    - monitor I/O's closely (neisah w/ improving LAYLA and worsening hypernatremia)  - made only 905 mL urine in the past 24 hrs  - flush coe Qshift given urine sediment      Hypophosphatemia  - likely from poor intake  - repleted by ICU team  - monitor    Hypernatremia  - likely from poor intake + infection + improving renal function/inc UOP  - recommend free water flushes 470 mL Q4 hrs  - continue D5W @ 50 mL/hr  - repeat BMP  - Na should not change by more than 8 mEq in 24 hrs    Hypokalemia  - likely from poor intake  - started on tube feeds by ICU team  - repleted by ICU team  - monitor    UTI  - continue zosyn
65 yo F with PMH Alzheimer's dementia, DM, HTN, non-verbal Parkinson's disease, chronic respiratory failure s/p Tracheostomy, dysphagia s/p peg tube, major depressive disorder who was sent from [facility] for evaluation of fever of 102F. Found to have     LALYA  - baseline Cr 0.8; admitted w/ Cr 3.81 which is improving   - LAYLA is likely pre-renal from hypovolemia/dehydration    - Avoid nephrotoxins including NSAIDs, IV contrast (if possible), [employer] products  - maintain MAP >65    - monitor I/O's closely (neisha w/ improving LAYLA and worsening hypernatremia)  - made only 1.16L urine in the past 24 hrs    - repeat BMP     Hypophosphatemia  - likely from poor intake  - repleted by ICU team  - monitor    Hypernatremia  - likely from poor intake + infection + improving renal function/inc UOP  - recommend free water flushes 470 mL Q4 hrs  - repeat BMP  - Na should not change by more than 8 mEq in 24 hrs    Hypokalemia  - likely from poor intake  - started on tube feeds by ICU team  - repleted by ICU team  - monitor    UTI  - continue zosyn
63 yo F with PMH Alzheimer's dementia, DM, HTN, non-verbal Parkinson's disease, chronic respiratory failure s/p Tracheostomy, dysphagia s/p peg tube, major depressive disorder who was sent from [facility] for evaluation of fever of 102F. Found to have HHNK and UTI. Nephrology is following for hypernatremia and LAYLA.    LAYLA  - baseline Cr 0.8; admitted w/ Cr 3.81 which is improving   - LAYLA is likely pre-renal from hypovolemia/dehydration    - Avoid nephrotoxins including NSAIDs, IV contrast (if possible), [employer] products  - maintain MAP >65    - Cr at baseline    Hypophosphatemia  - likely from poor intake  - supplement as needed  - monitor    Hypernatremia  - likely from poor intake + infection + improving renal function/inc UOP  - continue free water flushes w/ tube feeds  - increase free water flushes to 400 Q4 hrs  - repeat BMP daily  - Na should not change by more than 8 mEq in 24 hrs    Hypokalemia  - likely from poor intake  - supplement as needed  - monitor    UTI  - continue ceftriaxone
64 years old Female with PMH Alzheimer's dementia , CKD 3, DM, HTN, non-verbal Parkinson's disease, chronic respiratory failure s/p Tracheostomy, dysphagia s/p peg tube, major depressive disorder,  sent from [facility] with complain of fever of 102F since 1 day.  Patient is AXO=0, not responding well, opening eyes only on painful stimuli. As per chart review, patient was sent for high grade fever. BS in NH was high in 600s and was given 15U Regular insulin before sending to hospital. Further hx is limited as patient is AXO=0.    In ED, patient's vital signs were remarkable for Temp of 102.6 F, HR: 102, BP: 96/70, RR: 24. Labs were remarkable for BS: 890, Corrected A, BUN/CR: 111/3.81, Acetone: small, Blood gases shows PH: 7.38, UA positive for UTI,  Patient was given 3L NS bolus and 10 Units Regular insulin, BS were still high    Goals of care: As per NH paper and ED provider note: HCP is sister Sharon mead,>> Patient is Full code  10/30/19  Transferred to SCU
64 years old Female with PMH Alzheimer's dementia , CKD 3, DM, HTN, non-verbal Parkinson's disease, chronic respiratory failure s/p Tracheostomy, dysphagia s/p peg tube, major depressive disorder,  sent from [facility] with complain of fever of 102F since 1 day.
64 years old Female with PMH Alzheimer's dementia , CKD 3, DM, HTN, non-verbal Parkinson's disease, chronic respiratory failure s/p Tracheostomy, dysphagia s/p peg tube, major depressive disorder,  sent from [facility] with complain of fever of 102F since 1 day.
65 yo F with PMH Alzheimer's dementia, DM, HTN, non-verbal Parkinson's disease, chronic respiratory failure s/p Tracheostomy, dysphagia s/p peg tube, major depressive disorder who was sent from [facility] for evaluation of fever of 102F. Found to have HHNK and UTI. Nephrology is following for hypernatremia and LAYLA.    LAYLA  - baseline Cr 0.8; admitted w/ Cr 3.81 which is improving   - LAYLA is likely pre-renal from hypovolemia/dehydration    - Avoid nephrotoxins including NSAIDs, IV contrast (if possible), [employer] products  - maintain MAP >65    - Cr back to baseline today    Hypophosphatemia  - likely from poor intake  - repleted by ICU team  - monitor    Hypernatremia  - likely from poor intake + infection + improving renal function/inc UOP  - can stop D5W  - continue free water flushes w/ tube feeds  - repeat BMP  - Na should not change by more than 8 mEq in 24 hrs    Hypokalemia  - likely from poor intake  - started on tube feeds by ICU team  - repleted by ICU team  - monitor    UTI  - continue ceftriaxone
63 yo F with PMH Alzheimer's dementia, DM, HTN, non-verbal Parkinson's disease, chronic respiratory failure s/p Tracheostomy, dysphagia s/p peg tube, major depressive disorder who was sent from [facility] for evaluation of fever of 102F. Found to have HHNK and UTI. Nephrology is following for hypernatremia and LAYLA.    LAYLA  - baseline Cr 0.8; admitted w/ Cr 3.81 which is improving   - LAYLA is likely pre-renal from hypovolemia/dehydration    - Avoid nephrotoxins including NSAIDs, IV contrast (if possible), [employer] products  - maintain MAP >65    - Cr at baseline now    Hypophosphatemia  - likely from poor intake  - supplement as needed  - monitor    Hypernatremia  - likely from poor intake + infection + improving renal function/inc UOP  - continue free water flushes w/ tube feeds  - free water flushes to 250 Q4 hrs    - Na should not change by more than 8 mEq in 24 hrs    Hypokalemia  - likely from poor intake  - supplement as needed  - monitor
64 years old Female with PMH Alzheimer's dementia , CKD 3, DM, HTN, non-verbal Parkinson's disease, chronic respiratory failure s/p Tracheostomy, dysphagia s/p peg tube, major depressive disorder,  sent from [facility] with complain of fever of 102F since 1 day.  Patient is AXO=0, not responding well, opening eyes only on painful stimuli. As per chart review, patient was sent for high grade fever. BS in NH was high in 600s and was given 15U Regular insulin before sending to hospital. Further hx is limited as patient is AXO=0.    In ED, patient's vital signs were remarkable for Temp of 102.6 F, HR: 102, BP: 96/70, RR: 24. Labs were remarkable for BS: 890, Corrected A, BUN/CR: 111/3.81, Acetone: small, Blood gases shows PH: 7.38, UA positive for UTI,  Patient was given 3L NS bolus and 10 Units Regular insulin, BS were still high    Goals of care: As per NH paper and ED provider note: HCP is sister Sharon mead,>> Patient is Full code  10/30/19  Transferred to SCU
64 years old Female with PMH Alzheimer's dementia , CKD 3, DM, HTN, non-verbal Parkinson's disease, chronic respiratory failure s/p Tracheostomy, dysphagia s/p peg tube, major depressive disorder,  sent from [facility] with complain of fever of 102F since 1 day.  Patient is AXO=0, not responding well, opening eyes only on painful stimuli. As per chart review, patient was sent for high grade fever. BS in NH was high in 600s and was given 15U Regular insulin before sending to hospital. Further hx is limited as patient is AXO=0.    In ED, patient's vital signs were remarkable for Temp of 102.6 F, HR: 102, BP: 96/70, RR: 24. Labs were remarkable for BS: 890, Corrected A, BUN/CR: 111/3.81, Acetone: small, Blood gases shows PH: 7.38, UA positive for UTI,  Patient was given 3L NS bolus and 10 Units Regular insulin, BS were still high    Goals of care: As per NH paper and ED provider note: HCP is sister Sharon mead,>> Patient is Full code  10/30/19  Transferred to SCU
64 years old Female with PMH Alzheimer's dementia , CKD 3, DM, HTN, non-verbal Parkinson's disease, chronic respiratory failure s/p Tracheostomy, dysphagia s/p peg tube, major depressive disorder,  sent from [facility] with complain of fever of 102F since 1 day.  Patient is AXO=0, not responding well, opening eyes only on painful stimuli. As per chart review, patient was sent for high grade fever. BS in NH was high in 600s and was given 15U Regular insulin before sending to hospital. Further hx is limited as patient is AXO=0.    In ED, patient's vital signs were remarkable for Temp of 102.6 F, HR: 102, BP: 96/70, RR: 24. Labs were remarkable for BS: 890, Corrected A, BUN/CR: 111/3.81, Acetone: small, Blood gases shows PH: 7.38, UA positive for UTI,  Patient was given 3L NS bolus and 10 Units Regular insulin, BS were still high    Goals of care: As per NH paper and ED provider note: HCP is sister Sharon mead,>> Patient is Full code  10/30/19  Transferred to SCU
64 years old Female with PMH Alzheimer's dementia , CKD 3, DM, HTN, non-verbal Parkinson's disease, chronic respiratory failure s/p Tracheostomy, dysphagia s/p peg tube, major depressive disorder,  sent from [facility] with complain of fever of 102F since 1 day.  Patient is AXO=0, not responding well, opening eyes only on painful stimuli. As per chart review, patient was sent for high grade fever. BS in NH was high in 600s and was given 15U Regular insulin before sending to hospital. Further hx is limited as patient is AXO=0.    In ED, patient's vital signs were remarkable for Temp of 102.6 F, HR: 102, BP: 96/70, RR: 24. Labs were remarkable for BS: 890, Corrected A, BUN/CR: 111/3.81, Acetone: small, Blood gases shows PH: 7.38, UA positive for UTI,  Patient was given 3L NS bolus and 10 Units Regular insulin, BS were still high    Goals of care: As per NH paper and ED provider note: HCP is sister Sharon mead,>> Patient is Full code  10/30/19  Transferred to SCU
64 years old Female with PMH Alzheimer's dementia , CKD 3, DM, HTN, non-verbal Parkinson's disease, chronic respiratory failure s/p Tracheostomy, dysphagia s/p peg tube, major depressive disorder,  sent from [facility] with complain of fever of 102F since 1 day.
64 years old Female with PMH Alzheimer's dementia , CKD 3, DM, HTN, non-verbal Parkinson's disease, chronic respiratory failure s/p Tracheostomy, dysphagia s/p peg tube, major depressive disorder,  sent from [facility] with complain of fever of 102F since 1 day.  Patient is AXO=0, not responding well, opening eyes only on painful stimuli. As per chart review, patient was sent for high grade fever. BS in NH was high in 600s and was given 15U Regular insulin before sending to hospital. Further hx is limited as patient is AXO=0.    In ED, patient's vital signs were remarkable for Temp of 102.6 F, HR: 102, BP: 96/70, RR: 24. Labs were remarkable for BS: 890, Corrected A, BUN/CR: 111/3.81, Acetone: small, Blood gases shows PH: 7.38, UA positive for UTI,  Patient was given 3L NS bolus and 10 Units Regular insulin, BS were still high    Goals of care: As per NH paper and ED provider note: HCP is sister Sharon mead,>> Patient is Full code  10/30/19  Transferred to SCU
64 years old Female with PMH DM, HTN, non-verbal Parkinson's disease, chronic respiratory failure s/p Tracheostomy, dysphagia s/p peg tube, major depressive disorder,  sent from NH w/ c/o fever    -> Hypernatremia:      - free water. Monitor bmp. Adjust accordingly.  -> Sepsis 2/2 Acute Cystitis:      - clinically improved sepsis criteria      - on abx      - All team members management and pt care greatly appreciated       - Monitor vitals and clinical status closely   -> DKA:      - improved      - dm rx      - dm diet       - monitor acks and clinical status       - adjust management accordingly   -> LAYLA:      - improved  -> Chronic Respiratory Failure:      - c/w trach care
64 years old Female with PMH DM, HTN, non-verbal Parkinson's disease, chronic respiratory failure s/p Tracheostomy, dysphagia s/p peg tube, major depressive disorder,  sent from NH w/ c/o fever    -> Hypernatremia:      - improved. C/w free water. Adjust accordingly.       - D/c planning, Case management for safe d/c arrangements.       - All team members management and pt care appreciated  -> Sepsis 2/2 Acute Cystitis:      - improved       - All team members management and pt care greatly appreciated       - d/c planning  -> DKA:      - improved      - dm rx. DM diet       - monitor acks and clinical status       - adjust management accordingly   -> LAYLA:      - improved  -> Chronic Respiratory Failure:      - c/w trach care
64 years old Female with PMH DM, HTN, non-verbal Parkinson's disease, chronic respiratory failure s/p Tracheostomy, dysphagia s/p peg tube, major depressive disorder,  sent from NH w/ c/o fever    -> Hypernatremia:      - improved. C/w free water. Adjust accordingly.       - D/c planning, Case management for safe d/c arrangements.       - All team members management and pt care appreciated  -> Sepsis 2/2 Acute Cystitis:      - improved       - All team members management and pt care greatly appreciated       - d/c planning  -> DKA:      - improved      - dm rx. DM diet       - monitor acks and clinical status       - adjust management accordingly   -> LAYLA:      - improved  -> Chronic Respiratory Failure:      - c/w trach care
64 years old Female with PMH DM, HTN, non-verbal Parkinson's disease, chronic respiratory failure s/p Tracheostomy, dysphagia s/p peg tube, major depressive disorder,  sent from NH w/ c/o fever    -> Hypernatremia:      - improved. C/w free water. Adjust accordingly.  -> Sepsis 2/2 Acute Cystitis:      - clinically improved       - All team members management and pt care greatly appreciated       - Monitor vitals and clinical status closely       - d/c planning  -> DKA:      - improved      - dm rx. DM diet       - monitor acks and clinical status       - adjust management accordingly   -> LAYLA:      - improved  -> Chronic Respiratory Failure:      - c/w trach care
64 years old Female with PMH DM, HTN, non-verbal Parkinson's disease, chronic respiratory failure s/p Tracheostomy, dysphagia s/p peg tube, major depressive disorder,  sent from NH w/ c/o fever    -> Hypernatremia:      - improved. C/w free water. Adjust accordingly. D/c planning, Case management.   -> Sepsis 2/2 Acute Cystitis:      - clinically improved       - All team members management and pt care greatly appreciated       - d/c planning  -> DKA:      - improved      - dm rx. DM diet       - monitor acks and clinical status       - adjust management accordingly   -> LAYLA:      - improved  -> Chronic Respiratory Failure:      - c/w trach care
64 years old Female with PMH DM, HTN, non-verbal Parkinson's disease, chronic respiratory failure s/p Tracheostomy, dysphagia s/p peg tube, major depressive disorder,  sent from NH w/ c/o fever    -> Hypernatremia:      - improved. C/w free water. Adjust accordingly. D/c planning, Case management.   -> Sepsis 2/2 Acute Cystitis:      - clinically improved       - All team members management and pt care greatly appreciated       - d/c planning  -> DKA:      - improved      - dm rx. DM diet       - monitor acks and clinical status       - adjust management accordingly   -> LAYLA:      - improved  -> Chronic Respiratory Failure:      - c/w trach care
64 years old Female with PMH DM, HTN, non-verbal Parkinson's disease, chronic respiratory failure s/p Tracheostomy, dysphagia s/p peg tube, major depressive disorder,  sent from NH w/ c/o fever    -> Sepsis 2/2 Acute Cystitis:      - clinically improving       - All team members management and pt care greatly appreciated       - abx       - monitor vitals and clinical status closely   -> DKA:      - improved      - dm rx      - diet       - monitor acks and clinical status       - adjust management accordingly   -> LAYLA:      - improved  -> Hypernatremia:      - improved   -> Chronic Respiratory Failure:      - c/w trach care
64 years old Female with PMH DM, HTN, non-verbal Parkinson's disease, chronic respiratory failure s/p Tracheostomy, dysphagia s/p peg tube, major depressive disorder,  sent from NH w/ c/o fever    -> Sepsis 2/2 Acute Cystitis:      - clinically improving       - MICU and all team members management and pt care greatly appreciated       - abx, fluids       - monitor vitals and clinical status closely   -> DKA:      - improved      - dm rx      - diet       - monitor acks and clinical status       - additional dm optimization as indicated       - lytes optimized      - adjust management accordingly   -> LAYLA:      - improved      - fluid hydration   -> Hypernatremia:      - improving. c/w fluids, monitor bmp, adjust as needed   -> Chronic Respiratory Failure:      - c/w trach care
64 years old Female with PMH DM, HTN, non-verbal Parkinson's disease, chronic respiratory failure s/p Tracheostomy, dysphagia s/p peg tube, major depressive disorder,  sent from NH w/ c/o fever    -> Sepsis 2/2 Acute Cystitis:      - clinically improving       - MICU and all team members management and pt care greatly appreciated       - abx, fluids      - f/u cultures to final date      - monitor vitals and clinical status closely   -> DKA:      - improved      - ihss      - diet       - monitor acks and clinical status       - additional dm optimization as indicated       - lytes optimized      - adjust management accordingly   -> LAYLA:      - improved      - fluid hydration   -> Hypernatremia:      - fluids, monitor bmp, adjust as needed   -> Chronic Respiratory Failure:      - c/w trach care
65 yo F with PMH Alzheimer's dementia, DM, HTN, non-verbal Parkinson's disease, chronic respiratory failure s/p Tracheostomy, dysphagia s/p peg tube, major depressive disorder who was sent from [facility] for evaluation of fever of 102F. Found to have HHNK and UTI. Nephrology is following for hypernatremia and LAYLA.    LAYLA  - baseline Cr 0.8; admitted w/ Cr 3.81 which is improving   - LAYLA is likely pre-renal from hypovolemia/dehydration    - Avoid nephrotoxins including NSAIDs, IV contrast (if possible), [employer] products  - maintain MAP >65    - Cr at baseline now  - repeat BMP today is pending    Hypophosphatemia  - likely from poor intake  - supplement as needed  - monitor    Hypernatremia  - likely from poor intake + infection + improving renal function/inc UOP  - continue free water flushes w/ tube feeds  - decrease free water flushes to 250 Q4 hrs  - repeat BMP daily (today's is pending)  - Na should not change by more than 8 mEq in 24 hrs    Hypokalemia  - likely from poor intake  - supplement as needed  - monitor
65 yo F with PMH Alzheimer's dementia, DM, HTN, non-verbal Parkinson's disease, chronic respiratory failure s/p Tracheostomy, dysphagia s/p peg tube, major depressive disorder who was sent from [facility] for evaluation of fever of 102F. Found to have HHNK and UTI. Nephrology is following for hypernatremia and LAYLA.    LAYLA  - baseline Cr 0.8; admitted w/ Cr 3.81 which is improving   - LAYLA is likely pre-renal from hypovolemia/dehydration    - Avoid nephrotoxins including NSAIDs, IV contrast (if possible), [employer] products  - maintain MAP >65    - Cr at baseline now    - repeat BMP today    Hypophosphatemia  - likely from poor intake  - supplement as needed  - monitor    Hypernatremia  - likely from poor intake + infection + improving renal function/inc UOP  - continue free water flushes w/ tube feeds  - decrease free water flushes to 250 Q4 hrs  - repeat BMP  - Na should not change by more than 8 mEq in 24 hrs    Hypokalemia  - likely from poor intake  - supplement as needed  - monitor
65 yo F with PMH Alzheimer's dementia, DM, HTN, non-verbal Parkinson's disease, chronic respiratory failure s/p Tracheostomy, dysphagia s/p peg tube, major depressive disorder who was sent from [facility] for evaluation of fever of 102F. Found to have HHNK and UTI. Nephrology is following for hypernatremia and LAYLA.    LAYLA  - baseline Cr 0.8; admitted w/ Cr 3.81 which is improving   - LAYLA is likely pre-renal from hypovolemia/dehydration    - Avoid nephrotoxins including NSAIDs, IV contrast (if possible), [employer] products  - maintain MAP >65    - Cr at baseline now    - repeat BMP today    Hypophosphatemia  - likely from poor intake  - supplement as needed  - monitor    Hypernatremia  - likely from poor intake + infection + improving renal function/inc UOP  - continue free water flushes w/ tube feeds  - decrease free water flushes to 250 Q4 hrs  - repeat BMP  - Na should not change by more than 8 mEq in 24 hrs    Hypokalemia  - likely from poor intake  - supplement as needed  - monitor
65 yo F with PMH Alzheimer's dementia, DM, HTN, non-verbal Parkinson's disease, chronic respiratory failure s/p Tracheostomy, dysphagia s/p peg tube, major depressive disorder who was sent from [facility] for evaluation of fever of 102F. Found to have HHNK and UTI. Nephrology is following for hypernatremia and LAYLA.    LAYLA  - baseline Cr 0.8; admitted w/ Cr 3.81 which is improving   - LAYLA is likely pre-renal from hypovolemia/dehydration    - Avoid nephrotoxins including NSAIDs, IV contrast (if possible), [employer] products  - maintain MAP >65    - Cr at baseline now    Hypophosphatemia  - likely from poor intake  - supplement as needed  - monitor    Hypernatremia  - likely from poor intake + infection + improving renal function/inc UOP  - continue free water flushes w/ tube feeds  - continue free water flushes 400 Q4 hrs  - repeat BMP daily  - Na should not change by more than 8 mEq in 24 hrs    Hypokalemia  - likely from poor intake  - supplement as needed  - monitor    UTI  - continue ceftriaxone
64 years old Female with PMH Alzheimer's dementia , CKD 3, DM, HTN, non-verbal Parkinson's disease, chronic respiratory failure s/p Tracheostomy, dysphagia s/p peg tube, major depressive disorder,  sent from [facility] with complain of fever of 102F since 1 day.

## 2019-11-05 NOTE — PROGRESS NOTE ADULT - PROBLEM SELECTOR PROBLEM 2
Sepsis secondary to UTI
Sepsis secondary to UTI
Hyperglycemic hyperosmolar nonketotic coma
Sepsis secondary to UTI
Sepsis secondary to UTI
Hyperglycemic hyperosmolar nonketotic coma

## 2019-11-05 NOTE — PROGRESS NOTE ADULT - PROBLEM SELECTOR PROBLEM 6
Alzheimer disease
Prophylactic measure
Prophylactic measure
Alzheimer disease
Functional quadriplegia
Functional quadriplegia
Prophylactic measure
Prophylactic measure

## 2019-11-05 NOTE — PROGRESS NOTE ADULT - PROBLEM SELECTOR PLAN 3
Continue to monitor glucose.  Continue lantus 10U every morning.  Continue sliding scale and 2U lispro every 6 hours.   Tube feeds rate increased to 50ml/hr

## 2019-11-05 NOTE — PROGRESS NOTE ADULT - SUBJECTIVE AND OBJECTIVE BOX
Patient seen and examined at bedside  No acute events noted overnight  Case discussed with medical team    HPI:  64 years old Female with PMH Alzheimer's dementia , CKD 3, DM, HTN, non-verbal Parkinson's disease, chronic respiratory failure s/p Tracheostomy, dysphagia s/p peg tube, major depressive disorder,  sent from [facility] with complain of fever of 102F since 1 day.    Patient is AXO=0, not responding well, opening eyes only on painful stimuli. As per chart review, patient was sent for high grade fever. BS in NH was high in 600s and was given 15U Regular insulin before sending to hospital. Further hx is limited as patient is AXO=0.    In ED, patient's vital signs were remarkable for Temp of 102.6 F, HR: 102, BP: 96/70, RR: 24. Labs were remarkable for BS: 890, Corrected A, BUN/CR: 111/3.81, Acetone: small, Blood gases shows PH: 7.38, UA positive for UTI,  Patient was given 3L NS bolus and 10 Units Regular insulin, BS were still high    Goals of care: As per NH paper and ED provider note: HCP is sister Sharon mead,>> Patient is Full code (27 Oct 2019 00:44)      PAST MEDICAL & SURGICAL HISTORY:  Alzheimer disease  Parkinson disease  Respiratory failure  Hypertension  Schizophrenia  Diabetic coma  Diabetes mellitus  S/P percutaneous endoscopic gastrostomy (PEG) tube placement  H/O tracheostomy      angiotensin converting enzyme inhibitors (Unknown)       MEDICATIONS  (STANDING):  artificial tears (preservative free) Ophthalmic Solution 1 Drop(s) Both EYES two times a day  atorvastatin 20 milliGRAM(s) Oral at bedtime  chlorhexidine 4% Liquid 1 Application(s) Topical daily  heparin  Injectable 5000 Unit(s) SubCutaneous every 8 hours  insulin glargine Injectable (LANTUS) 10 Unit(s) SubCutaneous every morning  insulin lispro (HumaLOG) corrective regimen sliding scale   SubCutaneous every 6 hours  insulin lispro Injectable (HumaLOG) 2 Unit(s) SubCutaneous every 6 hours  levETIRAcetam  Oral Liquid - Peds 500 milliGRAM(s) Oral every 12 hours  LORazepam     Tablet 1 milliGRAM(s) Oral three times a day  metoprolol tartrate 50 milliGRAM(s) Oral two times a day  nystatin Powder 1 Application(s) Topical two times a day  pantoprazole    Tablet 40 milliGRAM(s) Oral before breakfast    MEDICATIONS  (PRN):      REVIEW OF SYSTEMS: limited 2/2 pt's medical state	    T(C): 36.6 (19 @ 05:07), Max: 36.6 (19 @ 05:07)  HR: 59 (19 @ 05:07) (52 - 68)  BP: 139/46 (19 @ 05:07) (125/88 - 141/91)  RR: 16 (19 @ 05:07) (16 - 19)  SpO2: 100% (19 @ 05:07) (93% - 100%)    PHYSICAL EXAMINATION:   Constitutional:NAD  HEENT: NC, AT  Neck:  Supple  Respiratory:  Adequate airflow b/l. Not using accessory muscles of respiration.  Cardiovascular:  S1 & S2 intact, no R/G, 2+ radial pulses b/l  Gastrointestinal: Soft, ND, normoactive b.s., no organomegaly/RT/rigidity  Extremities: WWP  Neurological:  [place]. Crude sensation intact.     Labs and imaging reviewed    LABS:                        9.7    3.37  )-----------( 135      ( 2019 06:31 )             31.0         141  |  107  |  5<L>  ----------------------------<  108<H>  4.4   |  28  |  0.58    Ca    9.1      2019 06:31  Phos  3.3       Mg     1.7         TPro  5.6<L>  /  Alb  2.4<L>  /  TBili  0.4  /  DBili  x   /  AST  36  /  ALT  53  /  AlkPhos  56              CAPILLARY BLOOD GLUCOSE      POCT Blood Glucose.: 142 mg/dL (2019 08:02)  POCT Blood Glucose.: 113 mg/dL (2019 05:01)  POCT Blood Glucose.: 110 mg/dL (2019 00:02)  POCT Blood Glucose.: 75 mg/dL (2019 17:18)  POCT Blood Glucose.: 151 mg/dL (2019 11:26)        LIVER FUNCTIONS - ( 2019 06:31 )  Alb: 2.4 g/dL / Pro: 5.6 g/dL / ALK PHOS: 56 U/L / ALT: 53 U/L DA / AST: 36 U/L / GGT: x               RADIOLOGY & ADDITIONAL STUDIES:

## 2019-11-26 ENCOUNTER — EMERGENCY (EMERGENCY)
Facility: HOSPITAL | Age: 64
LOS: 1 days | Discharge: HOME | End: 2019-11-26
Attending: EMERGENCY MEDICINE
Payer: MEDICAID

## 2019-11-26 VITALS — RESPIRATION RATE: 23 BRPM | HEART RATE: 83 BPM | TEMPERATURE: 98 F | WEIGHT: 110.01 LBS

## 2019-11-26 VITALS — HEART RATE: 90 BPM | RESPIRATION RATE: 20 BRPM

## 2019-11-26 DIAGNOSIS — Z98.89 OTHER SPECIFIED POSTPROCEDURAL STATES: Chronic | ICD-10-CM

## 2019-11-26 DIAGNOSIS — Z93.1 GASTROSTOMY STATUS: Chronic | ICD-10-CM

## 2019-11-26 PROCEDURE — 43762 RPLC GTUBE NO REVJ TRC: CPT

## 2019-11-26 PROCEDURE — 99283 EMERGENCY DEPT VISIT LOW MDM: CPT

## 2019-11-26 PROCEDURE — 99283 EMERGENCY DEPT VISIT LOW MDM: CPT | Mod: 25

## 2019-11-26 PROCEDURE — 74018 RADEX ABDOMEN 1 VIEW: CPT

## 2019-11-26 PROCEDURE — L8699: CPT

## 2019-11-26 PROCEDURE — 74018 RADEX ABDOMEN 1 VIEW: CPT | Mod: 26

## 2019-11-26 RX ORDER — IOHEXOL 300 MG/ML
30 INJECTION, SOLUTION INTRAVENOUS ONCE
Refills: 0 | Status: COMPLETED | OUTPATIENT
Start: 2019-11-26 | End: 2019-11-26

## 2019-11-26 RX ORDER — HALOPERIDOL DECANOATE 100 MG/ML
5 INJECTION INTRAMUSCULAR ONCE
Refills: 0 | Status: DISCONTINUED | OUTPATIENT
Start: 2019-11-26 | End: 2019-11-26

## 2019-11-26 RX ADMIN — IOHEXOL 30 MILLILITER(S): 300 INJECTION, SOLUTION INTRAVENOUS at 16:11

## 2019-11-26 NOTE — ED PROVIDER NOTE - OBJECTIVE STATEMENT
Pertinent HPI/PMH/PSH/FHx/SHx and Review of Systems contained within  HPI:  64yoF bibems from nursing home to have peg tube replaced.   PMH/PSH relevant for:  Alzheimer's dementia , CKD 3, DM, HTN, non-verbal Parkinson's disease, chronic respiratory failure s/p Tracheostomy, dysphagia s/p peg tube, major depressive disorder,

## 2019-11-26 NOTE — ED ADULT NURSE NOTE - OBJECTIVE STATEMENT
pt is here for replacement of tube.  Sending by [facility], replacement of G-tube, no distress noted at this time.

## 2019-11-26 NOTE — ED PROVIDER NOTE - PATIENT PORTAL LINK FT
You can access the FollowMyHealth Patient Portal offered by [facility] by registering at the following website: [email or web address]. By joining [facility]’s FollowMyHealth portal, you will also be able to view your health information using other applications (apps) compatible with our system.

## 2019-11-26 NOTE — ED PROVIDER NOTE - CLINICAL SUMMARY MEDICAL DECISION MAKING FREE TEXT BOX
peg tube dislodged at Nursing home. tube replaced with 16Fr. RN injected omniopaque contrast prior to xray, so imaging not fully confirming tube inside stomach. gastric tube flushes easily & likely in place based on xray. will ask [facility] to arrange ambulette back to NH

## 2019-11-26 NOTE — ED PROVIDER NOTE - NS ED ROS FT
Review of Systems:  	•	CONSTITUTIONAL: no fever  	  	•	GI:  peg tube displaced  	  	•	PSYSCHIATRIC: + agitation & combative

## 2019-11-26 NOTE — ED PROVIDER NOTE - NSFOLLOWUPINSTRUCTIONS_ED_ALL_ED_FT
FOLLOW UP WITH PMD & GI Dr WITHIN 1-2DAYS, CALL TO MAKE APPOINTMENT  COME BACK TO ED IF YOUR CONDITION WORSENS OR IF YOU DEVELOP FEVER GREATER THAN 100.4F, CHEST PAIN,  SHORTNESS OF BREATH, VOMITTING OR ANY OTHER SYMPTOMS CONCERNING TO YOU  TAKE TYLENOL (ACETAMINOPHEN) 650 MG EVERY 6 HOURS AS NEEDED FOR PAIN  TAKE IBUPROFEN (MOTRIN)  600 MG EVERY 6 HOURS AS NEEDED FOR PAIN  IF YOU WERE PRESRCIBED ANY MEDICATIONS FROM TODAY'S VISIT, TAKE THEM AS DIRECTED

## 2019-11-26 NOTE — ED PROCEDURE NOTE - PROCEDURE ADDITIONAL DETAILS
G tube dislodged at Nursing home. tube replaced with 16Fr. RN injected omniopaque contrast prior to xray, so imaging not fully confirming tube inside stomach. gastric tube flushes easily & likely in place based on xray

## 2019-11-26 NOTE — ED PROVIDER NOTE - PHYSICAL EXAMINATION
*GEN: No acute distress, non verbal  *HEAD: Normocephalic, Atraumatic  *EYES/NOSE: PERRL & EOMI b/l  *THROAT: tach collar in place  *NECK: Neck supple, no masses  *PULMONARY: CTA b/l, symmetric breath sounds.   *CARDIAC: s1s2, regular rhythm, no Murmur  *ABDOMEN:  Non Tender, Non Distended, soft, no guarding, no rebound, no masses; peg tube site patent  *EXTREMITIES: symmetric pulses, 2+ dp & radial pulses, capillary refill < 2 seconds, no cyanosis, no edema   *SKIN: no rash or bruising   *NEUROLOGIC: alert, combative, kicks  *PSYCH: combative, kicks

## 2019-12-01 ENCOUNTER — EMERGENCY (EMERGENCY)
Facility: HOSPITAL | Age: 64
LOS: 1 days | Discharge: HOME | End: 2019-12-01
Attending: EMERGENCY MEDICINE
Payer: MEDICAID

## 2019-12-01 VITALS
SYSTOLIC BLOOD PRESSURE: 131 MMHG | HEIGHT: 66 IN | TEMPERATURE: 98 F | HEART RATE: 77 BPM | DIASTOLIC BLOOD PRESSURE: 76 MMHG | RESPIRATION RATE: 16 BRPM | WEIGHT: 119.93 LBS | OXYGEN SATURATION: 100 %

## 2019-12-01 DIAGNOSIS — Z98.89 OTHER SPECIFIED POSTPROCEDURAL STATES: Chronic | ICD-10-CM

## 2019-12-01 DIAGNOSIS — Z93.1 GASTROSTOMY STATUS: Chronic | ICD-10-CM

## 2019-12-01 PROCEDURE — 43753 TX GASTRO INTUB W/ASP: CPT

## 2019-12-01 PROCEDURE — 99285 EMERGENCY DEPT VISIT HI MDM: CPT | Mod: 25

## 2019-12-01 PROCEDURE — 49465 FLUORO EXAM OF G/COLON TUBE: CPT

## 2019-12-01 PROCEDURE — 99282 EMERGENCY DEPT VISIT SF MDM: CPT

## 2019-12-01 NOTE — ED ADULT NURSE NOTE - NS ED NRS NURSING HOMES
Margaret Tietz [facility]3]

## 2019-12-01 NOTE — ED PROCEDURE NOTE - CPROC ED TIME OUT STATEMENT1
“Patient's name, , procedure and correct site were confirmed during the [place] Timeout.”

## 2019-12-01 NOTE — ED PROVIDER NOTE - OBJECTIVE STATEMENT
64 yr old female with chronic medical condition, tache, g tube from nsg home presents to ed for clogged g tube that was placed in ed 5 days ago.  16 Fr inserted.  and clogged.

## 2019-12-01 NOTE — ED PROCEDURE NOTE - CPROC ED INFORMED CONSENT1
sent from [facility] home,/This was an emergent procedure and consent was implied.

## 2019-12-01 NOTE — ED PROVIDER NOTE - PATIENT PORTAL LINK FT
You can access the FollowMyHealth Patient Portal offered by [facility] by registering at the following website: [email or web address]. By joining [facility]’s FollowMyHealth portal, you will also be able to view your health information using other applications (apps) compatible with our system.

## 2019-12-01 NOTE — ED PROVIDER NOTE - CLINICAL SUMMARY MEDICAL DECISION MAKING FREE TEXT BOX
64 yr old female with chronic medical condition, tache, g tube from nsg home presents to ed for clogged g tube that was placed in ed 5 days ago.  16 Fr inserted.  and clogged.    replace with 20 Fr.  gastric content aspirated pending xr

## 2019-12-01 NOTE — ED ADULT NURSE NOTE - NSIMPLEMENTINTERV_GEN_ALL_ED
Implemented All Fall Risk Interventions:  [place] to call system. Call bell, personal items and telephone within reach. Instruct patient to call for assistance. Room bathroom lighting operational. Non-slip footwear when patient is off stretcher. Physically safe environment: no spills, clutter or unnecessary equipment. Stretcher in lowest position, wheels locked, appropriate side rails in place. Provide visual cue, wrist band, yellow gown, etc. Monitor gait and stability. Monitor for mental status changes and reorient to person, place, and time. Review medications for side effects contributing to fall risk. Reinforce activity limits and safety measures with patient and family.

## 2019-12-01 NOTE — ED ADULT NURSE NOTE - OBJECTIVE STATEMENT
pT WAS SENT FROM nURSING hOME FOR g TUBE REPLACEMENT  Pt has a tracheostomy attached to O2, g tube clogged, was replaced by SOFI Abbott

## 2020-06-29 NOTE — ED PROVIDER NOTE - TOBACCO USE
Unknown if ever smoked Unna Boot Text: An Unna boot was placed to help immobilize the limb and facilitate more rapid healing.

## 2020-08-19 NOTE — CHART NOTE - NSCHARTNOTEFT_GEN_A_CORE
EVENT:   Patient does not have IV access. NPO. PEG tube. Unable to give IV meds.   OBJECTIVE:  Vital Signs Last 24 Hrs  T(C): 36.5 (29 Sep 2019 04:40), Max: 36.7 (28 Sep 2019 14:15)  T(F): 97.7 (29 Sep 2019 04:40), Max: 98 (28 Sep 2019 14:15)  HR: 81 (29 Sep 2019 04:40) (72 - 81)  BP: 139/58 (29 Sep 2019 04:40) (122/43 - 166/67)  BP(mean): --  RR: 20 (29 Sep 2019 04:40) (18 - 22)  SpO2: 98% (29 Sep 2019 04:40) (97% - 100%)    FOCUSED PHYSICAL EXAM:    LABS:                        10.6   7.06  )-----------( 163      ( 27 Sep 2019 08:51 )             34.5     09-27    144  |  111<H>  |  20<H>  ----------------------------<  130<H>  4.5   |  27  |  1.04    Ca    9.2      27 Sep 2019 08:51        EKG:   IMAGING:    ASSESSMENT:  HPI:  64 years old Female from [place], DNR, with PMHx of Alzheimer's dementia , CKD 3, DM, HTN, non-verbal Parkinson's disease, s/p Tracheostomy presents to ED complaining of PEG tube dislodgment some time this morning. Pt states she pulled the tube this morning accidentally. Pt asks for transfer papers. Pt was sent here solely for the tube replacement. No other complaints. Pt allergic to angiotensin converting enzyme inhibitors. Source of history is nursing home chart. Patient is on 35 percent oxygen at nursing home.  Presenting Symptoms: dislodged PEG tube with closed opening.  GI dr Zambrano consult appreciated. Plan for PEG tube placement on Wednesday 9/25/2019. (22 Sep 2019 12:03)      PLAN: All her IV meds. were changed to liquid or tab. Ativan 1 mg via PEG Q6hrs, Ativan 0.5 mg IM q8hrs PRN for agitation, Keppra 500mg suspension Q12hrs, Protonix 40mg suspension QD, Metoprolol 100 mg tab. BID via PEG (home med.). Verified by pharmacy.     FOLLOW UP / RESULT: EMT/paramedic

## 2021-01-13 NOTE — PROGRESS NOTE ADULT - ASSESSMENT
61 Y/O F, non-verbal, bed bound, Trach/ PEG tube, from NH, PMhx of HTN, asthma, convulsion disorder, bipolar, DVT legs, DM, sent from NH for vomiting and cough.  In the ED, pt presents in no acute distress, and vitals WNL.  Pt is afebrile with leukocytosis of 19.  CXR concerning for RLL infiltrate likely 2/2 asp pneumonia.  Abdominal x-ray sig for non-specific gas pattern and intact PEG tube.  Pt will be admitted to medicine for suspected Aspiration pneumonia.    Plan:   - Tachypnea: improved. F/u case management for safe discharge asap.    - Lactate elevation - improved  - Diarrhea- improved  - Complicated Aspiration PNA/HCAP 2/2 CRE Pseudomonas & UTI - improved, completed abx.  - Uncontrolled Type 2 DM with complications of hyperglycemia - improved. C/w lantus, moderate ihss, monitor acks  - Need for prophylactic measure: dvt/gi ppx   d/c Consent (Lip)/Introductory Paragraph: The rationale for Mohs was explained to the patient and consent was obtained. The risks, benefits and alternatives to therapy were discussed in detail. Specifically, the risks of lip deformity, changes in the oral aperture, infection, scarring, bleeding, prolonged wound healing, incomplete removal, allergy to anesthesia, nerve injury and recurrence were addressed. Prior to the procedure, the treatment site was clearly identified and confirmed by the patient. All components of Universal Protocol/PAUSE Rule completed.

## 2021-02-05 NOTE — ED PROVIDER NOTE - CHPI ED SYMPTOMS NEG
Inpatient Rehabilitation - Physical Therapy Treatment Note       [facility]     Patient Name: Delvin Mayorga  : 1970  MRN: [id number]    MD     Safety Deficit (Cognitive)  impulsivity  -Taylor Shields, PTA Physical Therapy Assistant PT    MD 04/03/18 -  Cindy Cochran, MD  0800  -MD     Stop Time  1300  -MD  Cindy Cochran, PT GP 2    [id number] HC GAIT TRAINING EA 15 MIN 2/4/2021 Cindy Cochran, PT GP 2    [id number] HC PT THER SUPP EA 15 MIN 2/4/2021 Cindy Cochran, PT GP 4    [id number] HC PT THER PROC EA 15 MIN 2/5/2021 Cindy Cochran, PT GP 3    [id number] HC Jose Alfredo Lockwood was present throughout treatment.          Cindy Cochran, PT  2/5/2021     no tingling/no weakness/no numbness

## 2021-06-27 NOTE — H&P ADULT - ASSESSMENT
27-Jun-2021 23:26 61 Y/O F, non-verbal, bed bound, Trach/ PEG tube, from NH, PMhx of HTN, asthma, convulsion disorder, bipolar, DVT legs, DM, sent from NH for vomiting and cough.  In the ED, pt presents in no acute distress, and vitals WNL.  Pt is afebrile with leukocytosis of 19.  CXR concerning for RLL infiltrate likely 2/2 asp pneumonia.  Abdominal x-ray sig for non-specific gas pattern and intact PEG tube.  Pt will be admitted to medicine for suspected Aspiration pneumonia.

## 2021-07-13 NOTE — ED ADULT NURSE NOTE - NSFALLRSKINDICATORS_ED_ALL_ED
Does he need to be re-admitted to [facility]?    He has clinic appointment with Dr. Machuca on 7/13.     Fish Menendez MD  
For your records, the patient was admitted to [facility] on 7/12/21    Upon admission, a med reconciliation was completed to identify any drug discrepancies, interactions or contraindications. There were no discrepancies and/or drug interactions/contraindications identified.    Patient was discharged from [facility] on Saturday 7/10/21, family has been staying with him 24/7 due to how much care he needs. I spoke with one of the family members and he was granted 12 a week for a PCA. I do not believe it was a safe discharge home. I am unaware if this was by his choice or not. I was going to call [facility]  and dig a little deeper. He needs more care & it sounds like the family can not provide this for him. Please Address      Thanks for your time Isidra Martin RN    
He may be hospice appropriate.
Yes, he may need placement. I tried to call [facility] to see if they have staff in place for 24/7 care. Family stated this morning they will not bring him back to [facility]. 
yes

## 2021-09-16 NOTE — PROGRESS NOTE ADULT - PROBLEM SELECTOR PLAN 3
I called and spoke with patient's HHN. She confirmed with me that pt reported to her that pt thinks he had a stroke. She advised patient that he should go to ED for further evaluation. N nurse called to ask recommendation for cardiology office of what pt should do. I informed HHN that pt should seek treatment in ED and that NP LR would also suggest to pt and HHN for pt to go to ED for further evaluation of possible stroke. HHN reported to me that pt is refusing to go to ED. cont meds  Psych follow up.

## 2022-03-16 NOTE — H&P ADULT - NSHPOUTPATIENTPROVIDERS_GEN_ALL_CORE
Patient Education   Personalized Prevention Plan  You are due for the preventive services outlined below.  Your care team is available to assist you in scheduling these services.  If you have already completed any of these items, please share that information with your care team to update in your medical record.  Health Maintenance Due   Topic Date Due     Zoster (Shingles) Vaccine (2 of 3) 12/14/2017     FALL RISK ASSESSMENT  01/21/2021     Eye Exam  03/09/2021     Flu Vaccine (1) 09/01/2021     COVID-19 Vaccine (3 - Booster for Pfizer series) 12/23/2021     PHQ-2 (once per calendar year)  01/01/2022     Diptheria Tetanus Pertussis (DTAP/TDAP/TD) Vaccine (2 - Td or Tdap) 02/27/2022         PCP - AIDEN Bernstein  Pulmonologist - JAMEL Henderson

## 2022-04-29 NOTE — DISCHARGE NOTE ADULT - PHYSICIAN SECTION COMPLETE
Called [facility], [phone], and spoke with Kayla in the intake area. The pending auth for this is [id number]   Yes

## 2022-06-28 NOTE — DIETITIAN INITIAL EVALUATION ADULT. - PROBLEM SELECTOR PLAN 4
The patient's blood pressure was stable on her current medication  She will continue with the current dose of her medicine  She is strongly encouraged to quit smoking  She is strongly encouraged to go for the lab work as ordered  She is refusing any other testing besides the testing ordered today at this point  She will be scheduling a physical in 6 months  p/w BUN/CR: 111/3.81 ( baseline Cr:0.82)  likely prerenal azotemia  F/u urine electrolytes  F/u ultrasound Kidney  c/w coe catheter  c/w IV hydration  F/u BMP

## 2022-07-05 NOTE — PROGRESS NOTE ADULT - PROBLEM SELECTOR PROBLEM 4
Alzheimer disease
Tracheostomy present
Hypertension
Seizure
Tracheostomy present
Tracheostomy present
Alzheimer disease
Tracheostomy present
Tracheostomy present
3-point gait

## 2022-10-31 NOTE — ED PROVIDER NOTE - DIAGNOSIS COUNSELING, MDM
76  year old  RHD male PMH of HTN, HLD, DM2, former smoker,  hypothyroidism, obesity, LILLY on C-pap,  bladder cancer (2020, s/p resection of bladder tumor/ currently self catheterizes 6x/day ), PAF s/p DCCV /uncomplicated radiofrequency ablation of atrial fibrillation (WACA/PVI, CTI) on 7/13/22,  left atrial appendage & severe MR . Presents with c/o recent" abnormal echo with fatigue due to mitral valve regurgitation. Presents for  scheduled Mitral valve replacement , MAZE, left atrial appendage closure on 10/20/2022.  10/24/22   MVr - triangular resection with 28mm Bobby band  AtriClip 45mm   Dawn-Maze IV procedure  PFO CLOSURE  Code stroke called for LUE convulsion with residual LUE weakness immediately after, Patient had LUE convulsions that spread to entire body  CT done, neuro following:  Stroke on differential as well given vague lucency at the genu of the right internal capsule and in the right thalamus of indeterminate age. Likely lacunar infarcts.   MRI when capable , eeg and Keppra added  Extubated d 1  + pressors, prbc's  EEG prelim neg.  Pt with lethargy post keppra, d/c since eeg neg  10/ 26 EEG reviewed by neuro, although no seizures  he is at risk for further sezures, recommended resuming keppra and if lethargic ,  change to vimpat  Pt with junctional / afib  Transferred to [facility]  10/28 Maintain PW for junctional rhythm, sorbitol for constipation. Transferred to [address] [place] [address]  10/27 Junctional 50-60, remains on amio 200bid, no beta blocker   Cont keppra for now.  Neuro f/u  D/c jared drain.  10/29 Add Eliquis 2.5 bid>PW removed this am Continue diuretics  Amio 1800 mg to date>continue to 5 gram load as per DR Barraza  Magnesium repleted  10/30 VSS; rsr/junct 50-60- no bb; continue amio 200 bid; pt c/o dysuria--> ck ua/ urine cx +l.esterase; neg nitrate; start bactrim as per Dr. Barraza and ck urine cx; monitor ekg- ck in am 10/31  continue diuresis; allow pt to start self cath himself  increase activity as tolerated  10/31 Continue with diuretics. Pt remains 7kg above preop weight. Bactrim for UTI. . Completed 2.2G Amiodarone load. Maintaining junctional rhythm. Continue with amiodarone for 5G load as per Attending   Discharge planning- home likely tue        conducted a detailed discussion... I had a detailed discussion with the patient and/or guardian regarding the historical points, exam findings, and any diagnostic results supporting the discharge/admit diagnosis.

## 2022-12-12 NOTE — PROGRESS NOTE ADULT - SUBJECTIVE AND OBJECTIVE BOX
normal gait and station [facility] NEPHROLOGY- PROGRESS NOTE, Follow up     Patient is a 63y Female who presents with a chief complaint of vomiting (24 Sep 2018 12:18) with coffee ground emesis and cough.  She was found to have PNA.  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  She was started on polymyxin on 9/16 and amikacin on 9/17.  Baseline creatinine is  0.8, eitan to ~1 on 9/20, 1.3 on 9/22 and 1.7 today.  Pt is also on rifampin.  She was previously on vanco, zosyn, and unasyn this admission as well.  Pt was given lasix IV on 9/21 and 9/22.  She has a PEG and has been tolerating PEG feeds. No ACEi/ARBs/NSAIDs/IV Contrast.    Pt is unable to give history.      REVIEW OF SYSTEMS: unable to obtain  Allergy:  angiotensin converting enzyme inhibitors (Unknown)    Hospital Medications:   MEDICATIONS  (STANDING):  ALBUTerol/ipratropium for Nebulization 3 milliLiter(s) Nebulizer every 6 hours  amLODIPine   Tablet 2.5 milliGRAM(s) Oral daily  atorvastatin 20 milliGRAM(s) Oral at bedtime  benztropine 2 milliGRAM(s) Oral two times a day  chlorhexidine 4% Liquid 1 Application(s) Topical every 12 hours  heparin  Injectable 5000 Unit(s) SubCutaneous every 8 hours  insulin glargine Injectable (LANTUS) 25 Unit(s) SubCutaneous at bedtime  insulin lispro (HumaLOG) corrective regimen sliding scale   SubCutaneous every 6 hours  insulin lispro Injectable (HumaLOG) 10 Unit(s) SubCutaneous <User Schedule>  lactobacillus acidophilus 1 Tablet(s) Oral every 8 hours  levETIRAcetam  Solution 1000 milliGRAM(s) Oral two times a day  LORazepam     Tablet 1 milliGRAM(s) Oral every 6 hours  metoprolol tartrate 100 milliGRAM(s) Oral two times a day  nystatin Powder 1 Application(s) Topical every 8 hours  pantoprazole   Suspension 40 milliGRAM(s) Oral daily  QUEtiapine 50 milliGRAM(s) Oral every 12 hours  senna 2 Tablet(s) Oral at bedtime  sodium chloride 0.9%. 1000 milliLiter(s) (100 mL/Hr) IV Continuous <Continuous>  sodium chloride 0.9%. 1000 milliLiter(s) (100 mL/Hr) IV Continuous <Continuous>      VITALS:  T(F): 98.2 (09-30-18 @ 14:34), Max: 98.2 (09-30-18 @ 14:34)  HR: 69 (09-30-18 @ 14:34)  BP: 120/45 (09-30-18 @ 14:34)  RR: 16 (09-30-18 @ 14:34)  SpO2: 98% (09-30-18 @ 14:34)  Wt(kg): --    09-29 @ 07:01  -  09-30 @ 07:00  --------------------------------------------------------  IN: 1880 mL / OUT: 0 mL / NET: 1880 mL        PHYSICAL EXAM:  Constitutional: NAD, calm  HEENT: anicteric sclera, oropharynx clear, MMM  Neck: No JVD  Respiratory: CTAB, no wheezes, rales or rhonchi, trach  Cardiovascular: S1, S2, RRR  Gastrointestinal: BS+, soft, NT/ND  Extremities: No cyanosis or clubbing. No peripheral edema  : No CVA tenderness. No coe.   Skin: warm and dry   LABS:  09-30    139  |  108  |  17  ----------------------------<  175<H>  3.6   |  18<L>  |  1.80<H>    Ca    9.4      30 Sep 2018 07:02    TPro  6.8  /  Alb  2.0<L>  /  TBili  0.2  /  DBili  x   /  AST  21  /  ALT  20  /  AlkPhos  70  09-29    Creatinine Trend: 1.80 <--, 1.94 <--, 2.29 <--, 2.80 <--, 2.76 <--, 2.10 <--, 1.72 <--                        9.0    6.9   )-----------( 281      ( 30 Sep 2018 07:02 )             28.9     Urine Studies:      RADIOLOGY & ADDITIONAL STUDIES:

## 2023-01-01 NOTE — DIETITIAN INITIAL EVALUATION ADULT. - EST PROTEIN NEEDS8
88.9 weight check  jaundice check Discharge home with mom in car seat  Continue  care at home   Follow up with PMD in 1-2 days, or earlier if problems develop ( fever, weight loss, jaundice).   [facility] ER available if PCP is not available

## 2023-08-02 NOTE — ED ADULT NURSE NOTE - CAS DISCH TRANSFER METHOD
Post-Op Assessment Note    CV Status:  Stable    Pain management: adequate     Mental Status:  Alert and awake   Hydration Status:  Euvolemic   PONV Controlled:  Controlled   Airway Patency:  Patent      Post Op Vitals Reviewed: Yes      Staff: CRNA         No notable events documented.     BP (!) 88/53 (08/02/23 1321)    Temp 97.8 °F (36.6 °C) (08/02/23 1321)    Pulse 74 (08/02/23 1321)   Resp 16 (08/02/23 1321)    SpO2 92 % (08/02/23 1321)
Ambulance

## 2023-12-26 NOTE — ED PROVIDER NOTE - CARDIAC, MLM
Prior to immunization administration, verified patients identity using patient s name and date of birth. Please see Immunization Activity for additional information.     Screening Questionnaire for Adult Immunization    Are you sick today?   No   Do you have allergies to medications, food, a vaccine component or latex?   No   Have you ever had a serious reaction after receiving a vaccination?   No   Do you have a long-term health problem with heart, lung, kidney, or metabolic disease (e.g., diabetes), asthma, a blood disorder, no spleen, complement component deficiency, a cochlear implant, or a spinal fluid leak?  Are you on long-term aspirin therapy?   No   Do you have cancer, leukemia, HIV/AIDS, or any other immune system problem?   No   Do you have a parent, brother, or sister with an immune system problem?   No   In the past 3 months, have you taken medications that affect  your immune system, such as prednisone, other steroids, or anticancer drugs; drugs for the treatment of rheumatoid arthritis, Crohn s disease, or psoriasis; or have you had radiation treatments?   No   Have you had a seizure, or a brain or other nervous system problem?   No   During the past year, have you received a transfusion of blood or blood    products, or been given immune (gamma) globulin or antiviral drug?   No   For women: Are you pregnant or is there a chance you could become       pregnant during the next month?   No   Have you received any vaccinations in the past 4 weeks?   No     Immunization questionnaire answers were all negative.    I have reviewed the following standing orders:   This patient is due and qualifies for the HPV vaccine.    Click here for HPV (Adult 15-45Y) Standing Order     I have reviewed the vaccines inclusion and exclusion criteria;No concerns regarding eligibility.       Patient instructed to remain in clinic for 15 minutes afterwards, and to report any adverse reactions.     Screening performed by [facility]  Jose, CMA on 12/26/2023 at 2:59 PM.     Normal rate, regular rhythm.  Heart sounds S1, S2.  No murmurs, rubs or gallops.

## 2024-01-04 NOTE — ED ADULT NURSE NOTE - NSIMPLEMENTINTERV_GEN_ALL_ED
Pt has [employer]. Is she going to be coming in?   Implemented All Universal Safety Interventions:  [place] to call system. Call bell, personal items and telephone within reach. Instruct patient to call for assistance. Room bathroom lighting operational. Non-slip footwear when patient is off stretcher. Physically safe environment: no spills, clutter or unnecessary equipment. Stretcher in lowest position, wheels locked, appropriate side rails in place.

## 2024-01-30 NOTE — PATIENT PROFILE ADULT. - FALL HARM RISK
Encounter addended by: Nicolasa Painter PHARMD on: 1/30/2024 11:01 AM   Actions taken: Clinical Note Signed agitated/other

## 2024-02-16 NOTE — PROGRESS NOTE ADULT - PROVIDER SPECIALTY LIST ADULT
Critical Care
Internal Medicine
Nephrology
Pt has been informed.   
Pulmonology
Critical Care
Nephrology
Critical Care

## 2024-05-15 NOTE — PROGRESS NOTE ADULT - SUBJECTIVE AND OBJECTIVE BOX
Patient is seen and examined at the bed side, is afebrile. The  Sputum culture is growing Pseudomonas and Acinetobacter, sensitivity is pending. The MRSA PCR is negative.      REVIEW OF SYSTEMS: Unable to obtain due to mental status      ALLERGIES : NKDA    Vital Signs Last 24 Hrs  T(C): 36.6 (17 Sep 2018 14:49), Max: 36.7 (16 Sep 2018 16:41)  T(F): 97.9 (17 Sep 2018 14:49), Max: 98 (16 Sep 2018 16:41)  HR: 98 (17 Sep 2018 14:49) (73 - 109)  BP: 129/89 (17 Sep 2018 14:49) (106/54 - 129/89)  BP(mean): --  RR: 18 (17 Sep 2018 14:49) (16 - 18)  SpO2: 97% (17 Sep 2018 14:49) (97% - 100%)      PHYSICAL EXAM:  GENERAL: Not in distress  HEENT: Trach in placed  CHEST/LUNG: Air entry B/L  HEART: s1 and s2 present  ABDOMEN: Nontender, and Nondistended  EXTREMITIES:  No pedal  edema  CNS: Awake but not Alert        LABS:                            11.6   7.4   )-----------( 242      ( 17 Sep 2018 07:47 )             37.1                       12.0   9.7   )-----------( 261      ( 15 Sep 2018 06:05 )             39.2                           12.8   20.5  )-----------( 311      ( 14 Sep 2018 05:20 )             39.1           140  |  104  |  20<H>  ----------------------------<  132<H>  3.6   |  29  |  0.81    Ca    8.9      17 Sep 2018 07:47  Phos  2.9     -  Mg     2.1           09-15    141  |  104  |  35<H>  ----------------------------<  204<H>  4.2   |  30  |  1.02    Ca    9.5      15 Sep 2018 06:05      TPro  7.3  /  Alb  2.7<L>  /  TBili  0.7  /  DBili  x   /  AST  17  /  ALT  24  /  AlkPhos  97  09-13    PT/INR - ( 13 Sep 2018 11:31 )   PT: 11.4 sec;   INR: 1.04 ratio         PTT - ( 13 Sep 2018 11:31 )  PTT:31.6 sec  Urinalysis Basic - ( 14 Sep 2018 03:56 )    Color: Yellow / Appearance: Clear / S.020 / pH: x  Gluc: x / Ketone: Negative  / Bili: Negative / Urobili: Negative   Blood: x / Protein: 100 / Nitrite: Negative   Leuk Esterase: Negative / RBC: 0-2 /HPF / WBC 0-2 /HPF   Sq Epi: x / Non Sq Epi: Occasional /HPF / Bacteria: Trace /HPF      CAPILLARY BLOOD GLUCOSE      POCT Blood Glucose.: 144 mg/dL (14 Sep 2018 11:07)  POCT Blood Glucose.: 202 mg/dL (14 Sep 2018 08:09)  POCT Blood Glucose.: 277 mg/dL (14 Sep 2018 06:58)  POCT Blood Glucose.: 68 mg/dL (14 Sep 2018 06:03)  POCT Blood Glucose.: 316 mg/dL (14 Sep 2018 02:11)  POCT Blood Glucose.: 405 mg/dL (14 Sep 2018 00:28)  POCT Blood Glucose.: 386 mg/dL (13 Sep 2018 22:26)  POCT Blood Glucose.: 403 mg/dL (13 Sep 2018 19:09)        MEDICATIONS  (STANDING):  ALBUTerol/ipratropium for Nebulization 3 milliLiter(s) Nebulizer every 6 hours  amLODIPine   Tablet 2.5 milliGRAM(s) Oral daily  ampicillin/sulbactam  IVPB      ampicillin/sulbactam  IVPB 3 Gram(s) IV Intermittent every 6 hours  atorvastatin 20 milliGRAM(s) Oral at bedtime  benztropine 2 milliGRAM(s) Oral two times a day  heparin  Injectable 5000 Unit(s) SubCutaneous every 8 hours  insulin glargine Injectable (LANTUS) 25 Unit(s) SubCutaneous at bedtime  insulin lispro (HumaLOG) corrective regimen sliding scale   SubCutaneous every 6 hours  insulin lispro Injectable (HumaLOG) 4 Unit(s) SubCutaneous three times a day with meals  levETIRAcetam  Solution 1000 milliGRAM(s) Oral two times a day  metoprolol tartrate 100 milliGRAM(s) Oral two times a day  pantoprazole   Suspension 40 milliGRAM(s) Oral daily  polymyxin B IVPB [id number] Unit(s) IV Intermittent every 12 hours  QUEtiapine 50 milliGRAM(s) Oral every 12 hours  rifampin IVPB      rifampin IVPB 600 milliGRAM(s) IV Intermittent every 24 hours  senna 2 Tablet(s) Oral at bedtime    MEDICATIONS  (PRN):  LORazepam     Tablet 1 milliGRAM(s) Oral every 6 hours PRN Agitation  LORazepam   Injectable 1 milliGRAM(s) IV Push every 12 hours PRN Breakthrough Agitation/Assaultive Behavior that is uncontrolled with oral ativan          RADIOLOGY & ADDITIONAL TESTS:    18: CT Chest No Cont (18 @ 03:41) Impression: Consolidation involving the right lower lobe is of uncertain etiology. It is unclear whether this represents compressive atelectasis secondary to right pleural effusion/elevation of the right hemidiaphragm   and/or represents infection.    18: Xray Chest 1 View-PORTABLE IMMEDIATE (18 @ 13:37) Bilateral pleural effusions and associated atelectasis.        MICROBIOLOGY DATA:    MRSA/MSSA PCR (18 @ 11:26)    MRSA PCR Result.: [facility]: MRSA/MSSA PCR assay is a qualitative in vitro diagnostic test for the  direct detection and differentiation of methicillin-resistant  Staphylococcus aureus (MRSA) from Staphylococcus aureus (SA). The assay  detects DNA from nasal swabs in patients atrisk for nasal colonization.  It is not intended to diagnose MRSA or SA infections nor guide or monitor  treatment for MRSA/SA infections. A negative result does not preclude  nasal colonization. The assay is FDA-approved and its performance has  been established by [employer], [place] and the [facility], [place], [place].    Staph Aureus PCR Result: [facility]    Culture - Sputum . (09.15.18 @ 09:28)    Gram Stain:   Rare polymorphonuclear leukocytes per low power field  Few Squamous epithelial cells per low power field  Moderate Gram Negative Rods per oil power field  Few Gram Positive Rods per oil power field  Few Yeast like cells per oil power field  Rare Gram Negative Coccobacilli per oil power field    Specimen Source: .Sputum Sputum    Culture Results:   Numerous Pseudomonas aeruginosa  Moderate Acinetobacter baumannii Complex  Normal Respiratory Moraima present      Culture - Sputum . (18 @ 18:26)    Gram Stain:   Rare polymorphonuclear leukocytes per low power field  No Squamous epithelial cells per low power field  Numerous Gram Negative Rods per oil power field  Few Gram Positive Rods per oil power field  Few Yeast like cells per oil power field    Specimen Source: .Sputum Sputum      Culture - Urine (18 @ 09:47)    Specimen Source: .Urine Catheterized    Culture Results:   10,000 - 49,000 CFU/mL Yeast [de-identified] : Ms. Norman is a 69 y/o female who presents with swelling and pain of the right calf for several months. Ultrasound evaluation 11/30/2023 demonstrated a 5.0 x 0.9 x 6.4 cm echogenic mass within the subcutaneous tissue demonstrating mild nodularity. MR right tibula/fibula 02/16/2024 showed a 10.3 x 6.4 x 2.1 cm fat containing mass along the posterior medial aspect of the proximal leg contained within the superficial fascia of the medial gastrocnemius muscle with multiple traversing septations/vessels without enhancing nodular component.  While likely representing a lipoma, a liposarcoma could not be excluded due to large size.  04/09/2024: Patient is s/p resection of right calf intramuscular mass 04/05/2024.  She is recovering well and has minimal pain.  Currently ambulating slowly with minimal assistance. Family is recording scant TONYA output. Pathology: lipoma.  05/14/2024: Patient continues to improve.  She reports decreased pain and is ambulating better.  Right lower leg swelling is also improved. Patient is seen and examined at the bed side, is afebrile. She is tolerating Polymixin, Rifampin and Unasyn. The Pseudomonas and Acinetobacter, both are Carbapenem resistant.         REVIEW OF SYSTEMS: Unable to obtain due to mental status      ALLERGIES : NKDA    Vital Signs Last 24 Hrs  T(C): 36.6 (17 Sep 2018 14:49), Max: 36.7 (16 Sep 2018 16:41)  T(F): 97.9 (17 Sep 2018 14:49), Max: 98 (16 Sep 2018 16:41)  HR: 98 (17 Sep 2018 14:49) (73 - 109)  BP: 129/89 (17 Sep 2018 14:49) (106/54 - 129/89)  BP(mean): --  RR: 18 (17 Sep 2018 14:49) (16 - 18)  SpO2: 97% (17 Sep 2018 14:49) (97% - 100%)      PHYSICAL EXAM:  GENERAL: Not in distress  HEENT: Trach in placed  CHEST/LUNG: Air entry B/L  HEART: s1 and s2 present  ABDOMEN: Nontender, and Nondistended  EXTREMITIES:  No pedal  edema  CNS: Awake but not Alert        LABS:                            11.6   7.4   )-----------( 242      ( 17 Sep 2018 07:47 )             37.1                       12.0   9.7   )-----------( 261      ( 15 Sep 2018 06:05 )             39.2                           12.8   20.5  )-----------( 311      ( 14 Sep 2018 05:20 )             39.1           140  |  104  |  20<H>  ----------------------------<  132<H>  3.6   |  29  |  0.81    Ca    8.9      17 Sep 2018 07:47  Phos  2.9     -17  Mg     2.1     -      09-15    141  |  104  |  35<H>  ----------------------------<  204<H>  4.2   |  30  |  1.02    Ca    9.5      15 Sep 2018 06:05      TPro  7.3  /  Alb  2.7<L>  /  TBili  0.7  /  DBili  x   /  AST  17  /  ALT  24  /  AlkPhos  97  09-13    PT/INR - ( 13 Sep 2018 11:31 )   PT: 11.4 sec;   INR: 1.04 ratio         PTT - ( 13 Sep 2018 11:31 )  PTT:31.6 sec  Urinalysis Basic - ( 14 Sep 2018 03:56 )    Color: Yellow / Appearance: Clear / S.020 / pH: x  Gluc: x / Ketone: Negative  / Bili: Negative / Urobili: Negative   Blood: x / Protein: 100 / Nitrite: Negative   Leuk Esterase: Negative / RBC: 0-2 /HPF / WBC 0-2 /HPF   Sq Epi: x / Non Sq Epi: Occasional /HPF / Bacteria: Trace /HPF      CAPILLARY BLOOD GLUCOSE      POCT Blood Glucose.: 144 mg/dL (14 Sep 2018 11:07)  POCT Blood Glucose.: 202 mg/dL (14 Sep 2018 08:09)  POCT Blood Glucose.: 277 mg/dL (14 Sep 2018 06:58)  POCT Blood Glucose.: 68 mg/dL (14 Sep 2018 06:03)  POCT Blood Glucose.: 316 mg/dL (14 Sep 2018 02:11)  POCT Blood Glucose.: 405 mg/dL (14 Sep 2018 00:28)  POCT Blood Glucose.: 386 mg/dL (13 Sep 2018 22:26)  POCT Blood Glucose.: 403 mg/dL (13 Sep 2018 19:09)        MEDICATIONS  (STANDING):  ALBUTerol/ipratropium for Nebulization 3 milliLiter(s) Nebulizer every 6 hours  amLODIPine   Tablet 2.5 milliGRAM(s) Oral daily  ampicillin/sulbactam  IVPB      ampicillin/sulbactam  IVPB 3 Gram(s) IV Intermittent every 6 hours  atorvastatin 20 milliGRAM(s) Oral at bedtime  benztropine 2 milliGRAM(s) Oral two times a day  heparin  Injectable 5000 Unit(s) SubCutaneous every 8 hours  insulin glargine Injectable (LANTUS) 25 Unit(s) SubCutaneous at bedtime  insulin lispro (HumaLOG) corrective regimen sliding scale   SubCutaneous every 6 hours  insulin lispro Injectable (HumaLOG) 4 Unit(s) SubCutaneous three times a day with meals  levETIRAcetam  Solution 1000 milliGRAM(s) Oral two times a day  metoprolol tartrate 100 milliGRAM(s) Oral two times a day  pantoprazole   Suspension 40 milliGRAM(s) Oral daily  polymyxin B IVPB [id number] Unit(s) IV Intermittent every 12 hours  QUEtiapine 50 milliGRAM(s) Oral every 12 hours  rifampin IVPB      rifampin IVPB 600 milliGRAM(s) IV Intermittent every 24 hours  senna 2 Tablet(s) Oral at bedtime    MEDICATIONS  (PRN):  LORazepam     Tablet 1 milliGRAM(s) Oral every 6 hours PRN Agitation  LORazepam   Injectable 1 milliGRAM(s) IV Push every 12 hours PRN Breakthrough Agitation/Assaultive Behavior that is uncontrolled with oral ativan          RADIOLOGY & ADDITIONAL TESTS:    18: CT Chest No Cont (18 @ 03:41) Impression: Consolidation involving the right lower lobe is of uncertain etiology. It is unclear whether this represents compressive atelectasis secondary to right pleural effusion/elevation of the right hemidiaphragm   and/or represents infection.    18: Xray Chest 1 View-PORTABLE IMMEDIATE (18 @ 13:37) Bilateral pleural effusions and associated atelectasis.        MICROBIOLOGY DATA:    Culture - Sputum . (09.15.18 @ 09:28)    Gram Stain:   Rare polymorphonuclear leukocytes per low power field  Few Squamous epithelial cells per low power field  Moderate Gram Negative Rods per oil power field  Few Gram Positive Rods per oil power field  Few Yeast like cells per oil power field  Rare Gram Negative Coccobacilli per oil power field    -  Amikacin: S <=8    -  Amikacin: S <=8    -  Ampicillin/Sulbactam: R >16/8    -  Aztreonam: S 8    -  Cefepime: I 16    -  Cefepime: I 16    -  Ceftazidime: R >16    -  Ceftazidime: I 16    -  Ciprofloxacin: R >2    -  Ciprofloxacin: R >2    -  Gentamicin: R >8    -  Gentamicin: R >8    -  Imipenem: R >8    -  Imipenem: R    -  Levofloxacin: R >4    -  Levofloxacin: R >4    -  Meropenem: R >8    -  Meropenem: I 8    -  Piperacillin/Tazobactam: R >64    -  Piperacillin/Tazobactam: R    -  Tobramycin: R >8    -  Tobramycin: R >8    -  Trimethoprim/Sulfamethoxazole: R >2/38    Specimen Source: .Sputum Sputum    Culture Results:   Numerous Pseudomonas aeruginosa (Carbapenem Resistant)  Moderate Acinetobacter baumannii/haemolyticus (Carbapenem Resistant)  Complex  Normal Respiratory Moraima present    Organism Identification: Pseudomonas aeruginosa (Carbapenem Resistant)  Acinetobacter baumannii/haemolyticus (Carbapenem Resistant)    Organism: Acinetobacter baumannii/haemolyticus (Carbapenem Resistant)    Organism: Pseudomonas aeruginosa (Carbapenem Resistant)    Organism: Acinetobacter baumannii/haemolyticus (Carbapenem Resistant)    Method Type: ESTIVEN    Method Type: ESTIVEN    Method Type: KB        MRSA/MSSA PCR (18 @ 11:26)    MRSA PCR Result.: [facility]: MRSA/MSSA PCR assay is a qualitative in vitro diagnostic test for the  direct detection and differentiation of methicillin-resistant  Staphylococcus aureus (MRSA) from Staphylococcus aureus (SA). The assay  detects DNA from nasal swabs in patients atrisk for nasal colonization.  It is not intended to diagnose MRSA or SA infections nor guide or monitor  treatment for MRSA/SA infections. A negative result does not preclude  nasal colonization. The assay is FDA-approved and its performance has  been established by [employer] [place], [place] and the [facility], [place], [place].    Staph Aureus PCR Result: [facility]      Culture - Urine (18 @ 09:47)    Specimen Source: .Urine Catheterized    Culture Results:   10,000 - 49,000 CFU/mL Yeast

## 2024-11-11 NOTE — PROGRESS NOTE ADULT - PROBLEM SELECTOR PLAN 3
Patient Na: 148 likely due to dehydration  s/p IV hydration  F/u BMP Na: 148 likely due to dehydration  s/p IV hydration  repeat BMP Na 155 -> 159  gave free water

## 2024-11-27 NOTE — ED PROVIDER NOTE - ATTESTATION, MLM
Pt medicated per MAR.    I have reviewed and confirmed nurses' notes for patient's medications, allergies, medical history, and surgical history.

## 2025-04-28 NOTE — ED PROCEDURE NOTE - CPROC ED COMPLICATIONS1
Spoke with patient's son, Angel, who reports that results from stool specimen collected Fri 4/25/25 (for cdif) are pending per Dr Ortiz's office.   Aggie is having BMP drawn today per Dr Ortiz to evaluate K+ level .  Aggie desires to r/s f/u with Dr Ramirez out ~ 2 weeks to ensure diarrhea has resolved and that she can tolerate a rectal exam if planned, as she states her \"bottom is still very sore\".      Appt r/s from 4/29/25 to 5/14/25 as requested.  Angel will update clinic of any changes or concerns.    no

## 2025-06-13 NOTE — ED ADULT TRIAGE NOTE - WEIGHT IN LBS
Sleep study resulted and shows moderate sleep apnea with mild to moderate oxygen desaturations due to sleep apnea.    Follow up with ordering provider, Dr. Winter, cardiology.    Called patient and left message advising I will send a [employer] message with the sleep study results and next steps.  Provided sleep center number([phone]) to call if any questions.  
[phone]

## 2025-06-30 NOTE — ED PROVIDER NOTE - PRINCIPAL DIAGNOSIS
BMI: BMI (kg/m2): 22.3 (06-28-25 @ 16:43)  HbA1c: A1C with Estimated Average Glucose Result: 5.4 % (06-26-25 @ 08:58)    Glucose:   BP: --Vital Signs Last 24 Hrs  T(C): 36.7 (06-29-25 @ 19:56), Max: 36.7 (06-29-25 @ 08:35)  T(F): 98.1 (06-29-25 @ 19:56), Max: 98.1 (06-29-25 @ 08:35)  HR: --  BP: --  BP(mean): --  RR: 16 (06-29-25 @ 19:56) (16 - 16)  SpO2: --    Orthostatic VS  06-29-25 @ 19:56  Lying BP: --/-- HR: --  Sitting BP: 125/77 HR: 92  Standing BP: 120/85 HR: 106  Site: --  Mode: --  Orthostatic VS  06-29-25 @ 08:35  Lying BP: --/-- HR: --  Sitting BP: 122/79 HR: 91  Standing BP: 112/76 HR: 109  Site: upper left arm  Mode: electronic  Orthostatic VS  06-28-25 @ 19:30  Lying BP: --/-- HR: --  Sitting BP: 134/81 HR: 91  Standing BP: 127/89 HR: 99  Site: --  Mode: --  Orthostatic VS  06-28-25 @ 08:32  Lying BP: --/-- HR: --  Sitting BP: 110/75 HR: 86  Standing BP: 109/82 HR: 113  Site: upper right arm  Mode: electronic    Lipid Panel: Date/Time: 06-26-25 @ 08:58  Cholesterol, Serum: 116  LDL Cholesterol Calculated: 32  HDL Cholesterol, Serum: 70  Total Cholesterol/HDL Ration Measurement: --  Triglycerides, Serum: 66  
PEG (percutaneous endoscopic gastrostomy) adjustment/replacement/removal

## 2025-07-08 NOTE — ED PROVIDER NOTE - NS ED NOTE AC HIGH RISK COUNTRIES
Medication passed protocol.     Medication: Fluticasone-salmeterol 250-50 MCG /ACT inhaler passed protocol.   Last office visit date: 12/17/2024  Next appointment scheduled?: Yes 7/15/2025     
No